# Patient Record
Sex: FEMALE | Race: BLACK OR AFRICAN AMERICAN | NOT HISPANIC OR LATINO | Employment: OTHER | URBAN - METROPOLITAN AREA
[De-identification: names, ages, dates, MRNs, and addresses within clinical notes are randomized per-mention and may not be internally consistent; named-entity substitution may affect disease eponyms.]

---

## 2017-04-21 ENCOUNTER — HOSPITAL ENCOUNTER (INPATIENT)
Facility: HOSPITAL | Age: 72
LOS: 3 days | Discharge: HOME | DRG: 639 | End: 2017-04-24
Attending: EMERGENCY MEDICINE | Admitting: INTERNAL MEDICINE
Payer: MEDICARE

## 2017-04-21 DIAGNOSIS — K63.89 NARCOTIC BOWEL SYNDROME DUE TO THERAPEUTIC USE: Chronic | ICD-10-CM

## 2017-04-21 DIAGNOSIS — E11.9 DIABETES MELLITUS TYPE 2 WITHOUT RETINOPATHY: Chronic | ICD-10-CM

## 2017-04-21 DIAGNOSIS — M54.16 LUMBAR RADICULAR PAIN: Chronic | ICD-10-CM

## 2017-04-21 DIAGNOSIS — R11.10 VOMITING: ICD-10-CM

## 2017-04-21 DIAGNOSIS — E13.10 DIABETIC KETOACIDOSIS WITHOUT COMA ASSOCIATED WITH OTHER SPECIFIED DIABETES MELLITUS: Primary | ICD-10-CM

## 2017-04-21 DIAGNOSIS — E11.10 DKA, TYPE 2, NOT AT GOAL: ICD-10-CM

## 2017-04-21 DIAGNOSIS — R11.2 INTRACTABLE VOMITING WITH NAUSEA, UNSPECIFIED VOMITING TYPE: ICD-10-CM

## 2017-04-21 DIAGNOSIS — T40.605A NARCOTIC BOWEL SYNDROME DUE TO THERAPEUTIC USE: Chronic | ICD-10-CM

## 2017-04-21 DIAGNOSIS — I10 ESSENTIAL HYPERTENSION: Chronic | ICD-10-CM

## 2017-04-21 LAB
ALBUMIN SERPL BCP-MCNC: 4.1 G/DL
ALBUMIN SERPL BCP-MCNC: 4.1 G/DL
ALLENS TEST: ABNORMAL
ALP SERPL-CCNC: 106 U/L
ALP SERPL-CCNC: 98 U/L
ALT SERPL W/O P-5'-P-CCNC: 15 U/L
ALT SERPL W/O P-5'-P-CCNC: 16 U/L
ANION GAP SERPL CALC-SCNC: 11 MMOL/L
ANION GAP SERPL CALC-SCNC: 15 MMOL/L
ANION GAP SERPL CALC-SCNC: 16 MMOL/L
ANION GAP SERPL CALC-SCNC: 16 MMOL/L
ANION GAP SERPL CALC-SCNC: 21 MMOL/L
ANION GAP SERPL CALC-SCNC: 22 MMOL/L
ANION GAP SERPL CALC-SCNC: 6 MMOL/L
AST SERPL-CCNC: 21 U/L
AST SERPL-CCNC: 22 U/L
B-OH-BUTYR BLD STRIP-SCNC: 2.2 MMOL/L
BACTERIA #/AREA URNS AUTO: NORMAL /HPF
BASOPHILS # BLD AUTO: 0.02 K/UL
BASOPHILS NFR BLD: 0.3 %
BILIRUB SERPL-MCNC: 0.6 MG/DL
BILIRUB SERPL-MCNC: 0.7 MG/DL
BILIRUB UR QL STRIP: NEGATIVE
BUN SERPL-MCNC: 2 MG/DL
BUN SERPL-MCNC: 3 MG/DL
BUN SERPL-MCNC: 4 MG/DL
BUN SERPL-MCNC: 4 MG/DL
CALCIUM SERPL-MCNC: 10.2 MG/DL
CALCIUM SERPL-MCNC: 6.6 MG/DL
CALCIUM SERPL-MCNC: 8.2 MG/DL
CALCIUM SERPL-MCNC: 9 MG/DL
CALCIUM SERPL-MCNC: 9.1 MG/DL
CALCIUM SERPL-MCNC: 9.1 MG/DL
CALCIUM SERPL-MCNC: 9.5 MG/DL
CHLORIDE SERPL-SCNC: 102 MMOL/L
CHLORIDE SERPL-SCNC: 105 MMOL/L
CHLORIDE SERPL-SCNC: 105 MMOL/L
CHLORIDE SERPL-SCNC: 108 MMOL/L
CHLORIDE SERPL-SCNC: 108 MMOL/L
CHLORIDE SERPL-SCNC: 111 MMOL/L
CHLORIDE SERPL-SCNC: 122 MMOL/L
CLARITY UR REFRACT.AUTO: CLEAR
CO2 SERPL-SCNC: 17 MMOL/L
CO2 SERPL-SCNC: 17 MMOL/L
CO2 SERPL-SCNC: 19 MMOL/L
CO2 SERPL-SCNC: 22 MMOL/L
CO2 SERPL-SCNC: 22 MMOL/L
COLOR UR AUTO: YELLOW
CREAT SERPL-MCNC: 0.6 MG/DL
CREAT SERPL-MCNC: 0.7 MG/DL
CREAT SERPL-MCNC: 0.8 MG/DL
CREAT SERPL-MCNC: 0.9 MG/DL
DELSYS: ABNORMAL
DIFFERENTIAL METHOD: ABNORMAL
EOSINOPHIL # BLD AUTO: 0 K/UL
EOSINOPHIL NFR BLD: 0 %
ERYTHROCYTE [DISTWIDTH] IN BLOOD BY AUTOMATED COUNT: 14.1 %
EST. GFR  (AFRICAN AMERICAN): >60 ML/MIN/1.73 M^2
EST. GFR  (NON AFRICAN AMERICAN): >60 ML/MIN/1.73 M^2
FIO2: 21
GLUCOSE SERPL-MCNC: 106 MG/DL
GLUCOSE SERPL-MCNC: 126 MG/DL
GLUCOSE SERPL-MCNC: 133 MG/DL
GLUCOSE SERPL-MCNC: 133 MG/DL
GLUCOSE SERPL-MCNC: 163 MG/DL
GLUCOSE SERPL-MCNC: 192 MG/DL
GLUCOSE SERPL-MCNC: 239 MG/DL
GLUCOSE UR QL STRIP: ABNORMAL
HCO3 UR-SCNC: 19.9 MMOL/L (ref 24–28)
HCT VFR BLD AUTO: 36.5 %
HGB BLD-MCNC: 12.4 G/DL
HGB UR QL STRIP: ABNORMAL
HYALINE CASTS UR QL AUTO: 0 /LPF
KETONES UR QL STRIP: ABNORMAL
LACTATE SERPL-SCNC: 1.5 MMOL/L
LACTATE SERPL-SCNC: 1.5 MMOL/L
LEUKOCYTE ESTERASE UR QL STRIP: NEGATIVE
LIPASE SERPL-CCNC: 15 U/L
LYMPHOCYTES # BLD AUTO: 1.5 K/UL
LYMPHOCYTES NFR BLD: 20.4 %
MAGNESIUM SERPL-MCNC: 1.3 MG/DL
MAGNESIUM SERPL-MCNC: 1.4 MG/DL
MAGNESIUM SERPL-MCNC: 1.6 MG/DL
MCH RBC QN AUTO: 27.1 PG
MCHC RBC AUTO-ENTMCNC: 34 %
MCV RBC AUTO: 80 FL
MICROSCOPIC COMMENT: NORMAL
MODE: ABNORMAL
MONOCYTES # BLD AUTO: 0.5 K/UL
MONOCYTES NFR BLD: 7.4 %
NEUTROPHILS # BLD AUTO: 5.3 K/UL
NEUTROPHILS NFR BLD: 71.8 %
NITRITE UR QL STRIP: NEGATIVE
PCO2 BLDA: 22.8 MMHG (ref 35–45)
PH SMN: 7.55 [PH] (ref 7.35–7.45)
PH UR STRIP: 7 [PH] (ref 5–8)
PHOSPHATE SERPL-MCNC: 1.6 MG/DL
PHOSPHATE SERPL-MCNC: 3.6 MG/DL
PHOSPHATE SERPL-MCNC: 5.6 MG/DL
PLATELET # BLD AUTO: 258 K/UL
PMV BLD AUTO: 12.2 FL
PO2 BLDA: 25 MMHG (ref 40–60)
POC BE: -2 MMOL/L
POC SATURATED O2: 56 % (ref 95–100)
POTASSIUM SERPL-SCNC: 2.8 MMOL/L
POTASSIUM SERPL-SCNC: 2.8 MMOL/L
POTASSIUM SERPL-SCNC: 3 MMOL/L
POTASSIUM SERPL-SCNC: 3 MMOL/L
POTASSIUM SERPL-SCNC: 3.2 MMOL/L
POTASSIUM SERPL-SCNC: 3.5 MMOL/L
POTASSIUM SERPL-SCNC: 6.8 MMOL/L
PROT SERPL-MCNC: 8.4 G/DL
PROT SERPL-MCNC: 8.7 G/DL
PROT UR QL STRIP: ABNORMAL
RBC # BLD AUTO: 4.58 M/UL
RBC #/AREA URNS AUTO: 2 /HPF (ref 0–4)
SAMPLE: ABNORMAL
SITE: ABNORMAL
SODIUM SERPL-SCNC: 142 MMOL/L
SODIUM SERPL-SCNC: 142 MMOL/L
SODIUM SERPL-SCNC: 143 MMOL/L
SODIUM SERPL-SCNC: 143 MMOL/L
SODIUM SERPL-SCNC: 144 MMOL/L
SODIUM SERPL-SCNC: 144 MMOL/L
SODIUM SERPL-SCNC: 145 MMOL/L
SP GR UR STRIP: 1.01 (ref 1–1.03)
TROPONIN I SERPL DL<=0.01 NG/ML-MCNC: <0.006 NG/ML
URN SPEC COLLECT METH UR: ABNORMAL
UROBILINOGEN UR STRIP-ACNC: NEGATIVE EU/DL
WBC # BLD AUTO: 7.32 K/UL
WBC #/AREA URNS AUTO: 0 /HPF (ref 0–5)
YEAST UR QL AUTO: NORMAL

## 2017-04-21 PROCEDURE — 81000 URINALYSIS NONAUTO W/SCOPE: CPT

## 2017-04-21 PROCEDURE — 96375 TX/PRO/DX INJ NEW DRUG ADDON: CPT

## 2017-04-21 PROCEDURE — 84484 ASSAY OF TROPONIN QUANT: CPT

## 2017-04-21 PROCEDURE — 84100 ASSAY OF PHOSPHORUS: CPT

## 2017-04-21 PROCEDURE — 25000003 PHARM REV CODE 250: Performed by: EMERGENCY MEDICINE

## 2017-04-21 PROCEDURE — 83735 ASSAY OF MAGNESIUM: CPT

## 2017-04-21 PROCEDURE — 99291 CRITICAL CARE FIRST HOUR: CPT | Mod: 25

## 2017-04-21 PROCEDURE — 83605 ASSAY OF LACTIC ACID: CPT

## 2017-04-21 PROCEDURE — 63600175 PHARM REV CODE 636 W HCPCS: Performed by: NURSE PRACTITIONER

## 2017-04-21 PROCEDURE — 96361 HYDRATE IV INFUSION ADD-ON: CPT

## 2017-04-21 PROCEDURE — 83690 ASSAY OF LIPASE: CPT

## 2017-04-21 PROCEDURE — 83735 ASSAY OF MAGNESIUM: CPT | Mod: 91

## 2017-04-21 PROCEDURE — 99900035 HC TECH TIME PER 15 MIN (STAT)

## 2017-04-21 PROCEDURE — 93005 ELECTROCARDIOGRAM TRACING: CPT

## 2017-04-21 PROCEDURE — 25000003 PHARM REV CODE 250: Performed by: INTERNAL MEDICINE

## 2017-04-21 PROCEDURE — 80053 COMPREHEN METABOLIC PANEL: CPT | Mod: 91

## 2017-04-21 PROCEDURE — 80048 BASIC METABOLIC PNL TOTAL CA: CPT | Mod: 91

## 2017-04-21 PROCEDURE — 63600175 PHARM REV CODE 636 W HCPCS: Performed by: INTERNAL MEDICINE

## 2017-04-21 PROCEDURE — 63600175 PHARM REV CODE 636 W HCPCS: Performed by: EMERGENCY MEDICINE

## 2017-04-21 PROCEDURE — 80053 COMPREHEN METABOLIC PANEL: CPT

## 2017-04-21 PROCEDURE — 99291 CRITICAL CARE FIRST HOUR: CPT | Mod: ,,, | Performed by: INTERNAL MEDICINE

## 2017-04-21 PROCEDURE — 85025 COMPLETE CBC W/AUTO DIFF WBC: CPT

## 2017-04-21 PROCEDURE — 82962 GLUCOSE BLOOD TEST: CPT

## 2017-04-21 PROCEDURE — 36415 COLL VENOUS BLD VENIPUNCTURE: CPT

## 2017-04-21 PROCEDURE — 84100 ASSAY OF PHOSPHORUS: CPT | Mod: 91

## 2017-04-21 PROCEDURE — 51702 INSERT TEMP BLADDER CATH: CPT

## 2017-04-21 PROCEDURE — C9113 INJ PANTOPRAZOLE SODIUM, VIA: HCPCS | Performed by: INTERNAL MEDICINE

## 2017-04-21 PROCEDURE — 93010 ELECTROCARDIOGRAM REPORT: CPT | Mod: ,,, | Performed by: INTERNAL MEDICINE

## 2017-04-21 PROCEDURE — 82010 KETONE BODYS QUAN: CPT

## 2017-04-21 PROCEDURE — 96374 THER/PROPH/DIAG INJ IV PUSH: CPT

## 2017-04-21 PROCEDURE — 96376 TX/PRO/DX INJ SAME DRUG ADON: CPT

## 2017-04-21 PROCEDURE — 20000000 HC ICU ROOM

## 2017-04-21 RX ORDER — ONDANSETRON 2 MG/ML
4 INJECTION INTRAMUSCULAR; INTRAVENOUS
Status: DISCONTINUED | OUTPATIENT
Start: 2017-04-21 | End: 2017-04-21

## 2017-04-21 RX ORDER — MAGNESIUM SULFATE 1 G/100ML
1 INJECTION INTRAVENOUS ONCE
Status: COMPLETED | OUTPATIENT
Start: 2017-04-21 | End: 2017-04-21

## 2017-04-21 RX ORDER — POTASSIUM CHLORIDE 7.45 MG/ML
10 INJECTION INTRAVENOUS ONCE
Status: COMPLETED | OUTPATIENT
Start: 2017-04-21 | End: 2017-04-21

## 2017-04-21 RX ORDER — METOCLOPRAMIDE HYDROCHLORIDE 5 MG/ML
5 INJECTION INTRAMUSCULAR; INTRAVENOUS EVERY 6 HOURS PRN
Status: DISCONTINUED | OUTPATIENT
Start: 2017-04-21 | End: 2017-04-22

## 2017-04-21 RX ORDER — DEXTROSE MONOHYDRATE 100 MG/ML
1000 INJECTION, SOLUTION INTRAVENOUS
Status: DISCONTINUED | OUTPATIENT
Start: 2017-04-21 | End: 2017-04-24 | Stop reason: HOSPADM

## 2017-04-21 RX ORDER — ERGOCALCIFEROL 1.25 MG/1
50000 CAPSULE ORAL
COMMUNITY

## 2017-04-21 RX ORDER — HYDRALAZINE HYDROCHLORIDE 20 MG/ML
10 INJECTION INTRAMUSCULAR; INTRAVENOUS EVERY 6 HOURS PRN
Status: DISCONTINUED | OUTPATIENT
Start: 2017-04-21 | End: 2017-04-23

## 2017-04-21 RX ORDER — ONDANSETRON 2 MG/ML
4 INJECTION INTRAMUSCULAR; INTRAVENOUS
Status: COMPLETED | OUTPATIENT
Start: 2017-04-21 | End: 2017-04-21

## 2017-04-21 RX ORDER — SODIUM CHLORIDE 9 MG/ML
INJECTION, SOLUTION INTRAVENOUS ONCE
Status: COMPLETED | OUTPATIENT
Start: 2017-04-21 | End: 2017-04-21

## 2017-04-21 RX ORDER — HYDROMORPHONE HYDROCHLORIDE 2 MG/ML
0.5 INJECTION, SOLUTION INTRAMUSCULAR; INTRAVENOUS; SUBCUTANEOUS
Status: COMPLETED | OUTPATIENT
Start: 2017-04-21 | End: 2017-04-21

## 2017-04-21 RX ORDER — PANTOPRAZOLE SODIUM 40 MG/10ML
40 INJECTION, POWDER, LYOPHILIZED, FOR SOLUTION INTRAVENOUS DAILY
Status: DISCONTINUED | OUTPATIENT
Start: 2017-04-21 | End: 2017-04-24 | Stop reason: HOSPADM

## 2017-04-21 RX ORDER — CLONIDINE HYDROCHLORIDE 0.1 MG/1
0.1 TABLET ORAL ONCE
Status: DISCONTINUED | OUTPATIENT
Start: 2017-04-21 | End: 2017-04-23

## 2017-04-21 RX ORDER — HYDROCODONE BITARTRATE AND ACETAMINOPHEN 10; 325 MG/1; MG/1
1 TABLET ORAL EVERY 4 HOURS PRN
Status: DISCONTINUED | OUTPATIENT
Start: 2017-04-21 | End: 2017-04-23

## 2017-04-21 RX ORDER — SODIUM CHLORIDE 9 MG/ML
1000 INJECTION, SOLUTION INTRAVENOUS
Status: COMPLETED | OUTPATIENT
Start: 2017-04-21 | End: 2017-04-21

## 2017-04-21 RX ORDER — ONDANSETRON 2 MG/ML
4 INJECTION INTRAMUSCULAR; INTRAVENOUS EVERY 12 HOURS PRN
Status: DISCONTINUED | OUTPATIENT
Start: 2017-04-21 | End: 2017-04-22

## 2017-04-21 RX ORDER — TRAZODONE HYDROCHLORIDE 50 MG/1
50 TABLET ORAL NIGHTLY
COMMUNITY
End: 2019-05-11

## 2017-04-21 RX ORDER — LABETALOL HCL 20 MG/4 ML
50 SYRINGE (ML) INTRAVENOUS ONCE
Status: DISCONTINUED | OUTPATIENT
Start: 2017-04-21 | End: 2017-04-21

## 2017-04-21 RX ORDER — ENOXAPARIN SODIUM 100 MG/ML
40 INJECTION SUBCUTANEOUS EVERY 24 HOURS
Status: DISCONTINUED | OUTPATIENT
Start: 2017-04-21 | End: 2017-04-24 | Stop reason: HOSPADM

## 2017-04-21 RX ORDER — HYDRALAZINE HYDROCHLORIDE 20 MG/ML
10 INJECTION INTRAMUSCULAR; INTRAVENOUS EVERY 6 HOURS PRN
Status: DISCONTINUED | OUTPATIENT
Start: 2017-04-21 | End: 2017-04-21

## 2017-04-21 RX ORDER — SODIUM CHLORIDE AND POTASSIUM CHLORIDE 150; 900 MG/100ML; MG/100ML
INJECTION, SOLUTION INTRAVENOUS CONTINUOUS
Status: DISCONTINUED | OUTPATIENT
Start: 2017-04-21 | End: 2017-04-23

## 2017-04-21 RX ORDER — HYDROMORPHONE HYDROCHLORIDE 1 MG/ML
0.5 INJECTION, SOLUTION INTRAMUSCULAR; INTRAVENOUS; SUBCUTANEOUS EVERY 6 HOURS PRN
Status: DISCONTINUED | OUTPATIENT
Start: 2017-04-21 | End: 2017-04-24 | Stop reason: HOSPADM

## 2017-04-21 RX ORDER — LABETALOL HYDROCHLORIDE 5 MG/ML
20 INJECTION, SOLUTION INTRAVENOUS ONCE
Status: COMPLETED | OUTPATIENT
Start: 2017-04-21 | End: 2017-04-21

## 2017-04-21 RX ORDER — ENALAPRILAT 1.25 MG/ML
1.25 INJECTION INTRAVENOUS EVERY 6 HOURS
Status: DISCONTINUED | OUTPATIENT
Start: 2017-04-21 | End: 2017-04-21

## 2017-04-21 RX ORDER — METOCLOPRAMIDE HYDROCHLORIDE 5 MG/ML
10 INJECTION INTRAMUSCULAR; INTRAVENOUS
Status: COMPLETED | OUTPATIENT
Start: 2017-04-21 | End: 2017-04-21

## 2017-04-21 RX ORDER — ENOXAPARIN SODIUM 100 MG/ML
40 INJECTION SUBCUTANEOUS EVERY 24 HOURS
Status: DISCONTINUED | OUTPATIENT
Start: 2017-04-21 | End: 2017-04-21 | Stop reason: SDUPTHER

## 2017-04-21 RX ORDER — TIZANIDINE HYDROCHLORIDE 6 MG/1
4 CAPSULE, GELATIN COATED ORAL 3 TIMES DAILY
COMMUNITY
End: 2019-05-29 | Stop reason: CLARIF

## 2017-04-21 RX ORDER — MEPERIDINE HYDROCHLORIDE 25 MG/ML
25 INJECTION INTRAMUSCULAR; INTRAVENOUS; SUBCUTANEOUS
Status: COMPLETED | OUTPATIENT
Start: 2017-04-21 | End: 2017-04-21

## 2017-04-21 RX ADMIN — METOCLOPRAMIDE 5 MG: 5 INJECTION, SOLUTION INTRAMUSCULAR; INTRAVENOUS at 12:04

## 2017-04-21 RX ADMIN — POTASSIUM CHLORIDE 10 MEQ: 10 INJECTION, SOLUTION INTRAVENOUS at 10:04

## 2017-04-21 RX ADMIN — PANTOPRAZOLE SODIUM 40 MG: 40 INJECTION, POWDER, FOR SOLUTION INTRAVENOUS at 10:04

## 2017-04-21 RX ADMIN — INSULIN HUMAN 2 UNITS: 100 INJECTION, SOLUTION PARENTERAL at 05:04

## 2017-04-21 RX ADMIN — HYDROMORPHONE HYDROCHLORIDE 0.5 MG: 1 INJECTION, SOLUTION INTRAMUSCULAR; INTRAVENOUS; SUBCUTANEOUS at 09:04

## 2017-04-21 RX ADMIN — LABETALOL HYDROCHLORIDE 20 MG: 5 INJECTION, SOLUTION INTRAVENOUS at 10:04

## 2017-04-21 RX ADMIN — INSULIN HUMAN 2 UNITS: 100 INJECTION, SOLUTION PARENTERAL at 04:04

## 2017-04-21 RX ADMIN — POTASSIUM PHOSPHATE, MONOBASIC AND POTASSIUM PHOSPHATE, DIBASIC 30 MMOL: 224; 236 INJECTION, SOLUTION, CONCENTRATE INTRAVENOUS at 12:04

## 2017-04-21 RX ADMIN — INSULIN DETEMIR 10 UNITS: 100 INJECTION, SOLUTION SUBCUTANEOUS at 08:04

## 2017-04-21 RX ADMIN — ENOXAPARIN SODIUM 40 MG: 100 INJECTION SUBCUTANEOUS at 04:04

## 2017-04-21 RX ADMIN — SODIUM CHLORIDE: 0.9 INJECTION, SOLUTION INTRAVENOUS at 04:04

## 2017-04-21 RX ADMIN — SODIUM CHLORIDE 1000 ML: 0.9 SOLUTION INTRAVENOUS at 12:04

## 2017-04-21 RX ADMIN — SODIUM CHLORIDE 1000 ML: 0.9 INJECTION, SOLUTION INTRAVENOUS at 02:04

## 2017-04-21 RX ADMIN — METOCLOPRAMIDE 5 MG: 5 INJECTION, SOLUTION INTRAMUSCULAR; INTRAVENOUS at 09:04

## 2017-04-21 RX ADMIN — HYDRALAZINE HYDROCHLORIDE 10 MG: 20 INJECTION INTRAMUSCULAR; INTRAVENOUS at 10:04

## 2017-04-21 RX ADMIN — ONDANSETRON 4 MG: 2 INJECTION INTRAMUSCULAR; INTRAVENOUS at 06:04

## 2017-04-21 RX ADMIN — HYDROMORPHONE HYDROCHLORIDE 0.5 MG: 2 INJECTION, SOLUTION INTRAMUSCULAR; INTRAVENOUS; SUBCUTANEOUS at 04:04

## 2017-04-21 RX ADMIN — SODIUM CHLORIDE 1000 ML: 0.9 INJECTION, SOLUTION INTRAVENOUS at 11:04

## 2017-04-21 RX ADMIN — INSULIN HUMAN 2 UNITS: 100 INJECTION, SOLUTION PARENTERAL at 06:04

## 2017-04-21 RX ADMIN — MEPERIDINE HYDROCHLORIDE 25 MG: 25 INJECTION INTRAMUSCULAR; INTRAVENOUS; SUBCUTANEOUS at 02:04

## 2017-04-21 RX ADMIN — POTASSIUM CHLORIDE AND SODIUM CHLORIDE: 900; 150 INJECTION, SOLUTION INTRAVENOUS at 10:04

## 2017-04-21 RX ADMIN — MAGNESIUM SULFATE IN DEXTROSE 1 G: 10 INJECTION, SOLUTION INTRAVENOUS at 06:04

## 2017-04-21 RX ADMIN — HYDROMORPHONE HYDROCHLORIDE 0.5 MG: 1 INJECTION, SOLUTION INTRAMUSCULAR; INTRAVENOUS; SUBCUTANEOUS at 07:04

## 2017-04-21 RX ADMIN — ONDANSETRON 4 MG: 2 INJECTION INTRAMUSCULAR; INTRAVENOUS at 09:04

## 2017-04-21 RX ADMIN — ONDANSETRON 4 MG: 2 INJECTION INTRAMUSCULAR; INTRAVENOUS at 02:04

## 2017-04-21 RX ADMIN — SODIUM CHLORIDE 1 UNITS/HR: 900 INJECTION, SOLUTION INTRAVENOUS at 02:04

## 2017-04-21 RX ADMIN — METOCLOPRAMIDE 10 MG: 5 INJECTION, SOLUTION INTRAMUSCULAR; INTRAVENOUS at 12:04

## 2017-04-21 RX ADMIN — ENALAPRILAT 1.25 MG: 1.25 INJECTION, SOLUTION INTRAVENOUS at 10:04

## 2017-04-21 NOTE — ED NOTES
Spoke with tobi castrejon and [facility]. Called [facility] for transport spoke with Kevyn set up for within the hour. Marya pts primary nurse made aware.

## 2017-04-21 NOTE — PLAN OF CARE
Patient answered discharge assessment questions appropriately, but easily return to sleep. CM will continue to assess for post hospital discharge needs.  CM will suggest HH via MD comment.       04/21/17 5571   Discharge Assessment   Assessment Type Discharge Planning Assessment   Confirmed/corrected address and phone number on facesheet? Yes   Assessment information obtained from? Patient;Medical Record   Expected Length of Stay (days) (unable to determine)   Communicated expected length of stay with patient/caregiver yes   Type of Healthcare Directive Received (none)   If Healthcare Directive is received, is it scanned into Epic? no (comment)   Prior to hospitilization cognitive status: Alert/Oriented   Prior to hospitalization functional status: Independent   Current cognitive status: (slightly lethargic, but able to answer questions appropriately)   Current Functional Status: Needs Assistance   Arrived From [facility] hospital   Lives With other relative(s)   Able to Return to Prior Arrangements yes   Is patient able to care for self after discharge? Yes   How many people do you have in your home that can help with your care after discharge? 0   Patient's perception of discharge disposition admitted as an inpatient;home or selfcare   Readmission Within The Last 30 Days no previous admission in last 30 days   Patient currently being followed by outpatient case management? No   Patient currently receives home health services? No   Does the patient currently use HME? No   Patient currently receives private duty nursing? N/A   Patient currently receives any other outside agency services? No   Equipment Currently Used at Home cane, quad;walker, rolling;wheelchair   Do you have any problems affording any of your prescribed medications? No   Is the patient taking medications as prescribed? yes   Do you have any financial concerns preventing you from receiving the healthcare you need? No   Does the patient have  transportation to healthcare appointments? Yes   Transportation Available family or friend will provide   On Dialysis? No   Does the patient receive services at the [facility]? No   Are there any open cases? No   Discharge Plan A Home Health;Home with family   Discharge Plan B Home with family   Patient/Family In Agreement With Plan yes

## 2017-04-21 NOTE — PROGRESS NOTES
Pt arrived to unit at approx 0900.  Pt is aaox4.  Complains of back pain and right leg spasms.  99.5 temp,  on arrival.  Stat labs being drawn.

## 2017-04-21 NOTE — ED PROVIDER NOTES
SCRIBE #1 NOTE: I, Salena Henriquez, am scribing for, and in the presence of, Beny Garcia MD. I have scribed the entire note.      History      Chief Complaint   Patient presents with    Emesis     since 2000 tonight, 6 episodes,     Back Pain     since being put on [facility] stretcher        Review of patient's allergies indicates:   Allergen Reactions    Morphine Other (See Comments)     headache    Latex, natural rubber Rash        HPI   HPI    4/21/2017, 12:33 AM   History obtained from the [facility] and patient      History of Present Illness: Melva Barker is a 72 y.o. female patient who presents to the Emergency Department via [facility] for emesis which onset gradually 6 hours PTA. Sxs are episodic (x6 episodes) and moderate in severity. There are no mitigating or exacerbating factors noted. Associated sxs include nausea. Pt also c/o of exacerbation of chronic back pain which onset gradually shortly PTA.  Pt denies any fever, diarrhea, abd pain, bowel/bladder dysfunction, saddle anesthesia, injury, and all other sxs at this time. No further complaints or concerns at this time.     Arrival mode: [facility]    PCP: Claire Souza MD       Past Medical History:  Past Medical History:   Diagnosis Date    Anxiety     Back pain     chronic    Diabetes mellitus     Gastroparesis     Hypertension     Sciatica        Past Surgical History:  No past surgical history on file.      Family History:  Family History   Problem Relation Age of Onset    Hyperlipidemia Father     Hypertension Mother        Social History:  Social History     Social History Main Topics    Smoking status: Never Smoker    Smokeless tobacco: Not on file    Alcohol use No    Drug use: No    Sexual activity: No       ROS   Review of Systems   Constitutional: Negative for fever.   HENT: Negative for sore throat.    Respiratory: Negative for shortness of breath.    Cardiovascular: Negative for chest pain.   Gastrointestinal: Positive for nausea  and vomiting. Negative for abdominal pain and diarrhea.        (-) bowel dysfunction   Genitourinary: Negative for dysuria.        (-) bladder incontinence   Musculoskeletal: Positive for back pain.        (-) saddle anesthesia   Skin: Negative for rash.   Neurological: Negative for weakness.   Hematological: Does not bruise/bleed easily.       Physical Exam    Initial Vitals   BP Pulse Resp Temp SpO2   04/21/17 0019 04/21/17 0019 04/21/17 0019 04/21/17 0019 04/21/17 0019   186/86 108 26 97.6 °F (36.4 °C) 100 %      Physical Exam  Nursing Notes and Vital Signs Reviewed.  Constitutional: Patient is in no acute distress. Awake and alert. Well-developed and well-nourished.  Head: Atraumatic. Normocephalic.  Eyes: PERRL. EOM intact. Conjunctivae are not pale. No scleral icterus.  ENT: Mucous membranes are moist. Oropharynx is clear and symmetric.    Neck: Supple. No cervical midline bony tenderness, deformities, or step-offs.   Back: Mid to lower spinal tenderness. No midline bony tenderness, deformities, or step-offs of the T-spine or L-spine. Skin appears normal without abrasions or bruising. No erythema, induration, or fluctuance.   Neurological: Awake and alert. Appropriate for age. Positivenegative straight leg raise bilaterally. No strength deficit; equal and 5/5 in bilateral upper and lower extremities.  DTRs 2+ and equal.. No acute focal neurological deficits noted.  Cardiovascular: Regular rate. Regular rhythm. No murmurs, rubs, or gallops. Distal pulses are 2+ and symmetric.  Pulmonary/Chest: No respiratory distress. Clear to auscultation bilaterally. No wheezing, rales, or rhonchi.  Abdominal: Soft and non-distended.  There is no tenderness.  No rebound, guarding, or rigidity.   Genitourinary: No CVA tenderness  Musculoskeletal: Moves all extremities. No obvious deformities. No edema. No calf tenderness.  Skin: Warm and dry.  Psychiatric: Normal affect. Good eye contact. Appropriate in content.    ED Course     Critical Care  Date/Time: 4/21/2017 2:57 AM  Performed by: JONNATHAN MALDONADO.  Authorized by: JONNATHAN MALDONADO   Direct patient critical care time: 15 minutes  Additional history critical care time: 5 minutes  Ordering / reviewing critical care time: 5 minutes  Documentation critical care time: 5 minutes  Consulting other physicians critical care time: 10 minutes  Total critical care time (exclusive of procedural time) : 40 minutes  Critical care time was exclusive of separately billable procedures and treating other patients.  Critical care was necessary to treat or prevent imminent or life-threatening deterioration of the following conditions: DKA.  Critical care was time spent personally by me on the following activities: blood draw for specimens, discussions with consultants, evaluation of patient's response to treatment, obtaining history from patient or surrogate, pulse oximetry, review of old charts, ordering and review of laboratory studies, development of treatment plan with patient or surrogate, interpretation of cardiac output measurements, examination of patient, ordering and performing treatments and interventions, ordering and review of radiographic studies and re-evaluation of patient's condition.        ED Vital Signs:  Vitals:    04/21/17 0019 04/21/17 0232 04/21/17 0247 04/21/17 0403   BP: (!) 186/86 (!) 191/93 (!) 195/86 (!) 191/103   Pulse: 108 (!) 115 (!) 114 (!) 123   Resp: (!) 26      Temp: 97.6 °F (36.4 °C)      TempSrc: Oral      SpO2: 100% 98% 100% 100%   Weight: 52.2 kg (115 lb)      Height: 5' (1.524 m)          Abnormal Lab Results:  Labs Reviewed   CBC W/ AUTO DIFFERENTIAL - Abnormal; Notable for the following:        Result Value    Hematocrit 36.5 (*)     MCV 80 (*)     All other components within normal limits   COMPREHENSIVE METABOLIC PANEL - Abnormal; Notable for the following:     Potassium 3.2 (*)     CO2 17 (*)     Glucose 239 (*)     BUN, Bld 4 (*)     Anion Gap 22 (*)     All other  components within normal limits   URINALYSIS - Abnormal; Notable for the following:     Protein, UA 1+ (*)     Glucose, UA 3+ (*)     Ketones, UA 2+ (*)     Occult Blood UA Trace (*)     All other components within normal limits   BETA - HYDROXYBUTYRATE, SERUM - Abnormal; Notable for the following:     Beta-Hydroxybutyrate 2.2 (*)     All other components within normal limits   LIPASE   TROPONIN I   BETA - HYDROXYBUTYRATE, SERUM   URINALYSIS MICROSCOPIC   POCT GLUCOSE MONITORING CONTINUOUS        All Lab Results:  Results for orders placed or performed during the hospital encounter of 04/21/17   CBC auto differential   Result Value Ref Range    WBC 7.32 3.90 - 12.70 K/uL    RBC 4.58 4.00 - 5.40 M/uL    Hemoglobin 12.4 12.0 - 16.0 g/dL    Hematocrit 36.5 (L) 37.0 - 48.5 %    MCV 80 (L) 82 - 98 fL    MCH 27.1 27.0 - 31.0 pg    MCHC 34.0 32.0 - 36.0 %    RDW 14.1 11.5 - 14.5 %    Platelets 258 150 - 350 K/uL    MPV 12.2 9.2 - 12.9 fL    Gran # 5.3 1.8 - 7.7 K/uL    Lymph # 1.5 1.0 - 4.8 K/uL    Mono # 0.5 0.3 - 1.0 K/uL    Eos # 0.0 0.0 - 0.5 K/uL    Baso # 0.02 0.00 - 0.20 K/uL    Gran% 71.8 38.0 - 73.0 %    Lymph% 20.4 18.0 - 48.0 %    Mono% 7.4 4.0 - 15.0 %    Eosinophil% 0.0 0.0 - 8.0 %    Basophil% 0.3 0.0 - 1.9 %    Differential Method Automated    Comprehensive metabolic panel   Result Value Ref Range    Sodium 144 136 - 145 mmol/L    Potassium 3.2 (L) 3.5 - 5.1 mmol/L    Chloride 105 95 - 110 mmol/L    CO2 17 (L) 23 - 29 mmol/L    Glucose 239 (H) 70 - 110 mg/dL    BUN, Bld 4 (L) 8 - 23 mg/dL    Creatinine 0.8 0.5 - 1.4 mg/dL    Calcium 9.5 8.7 - 10.5 mg/dL    Total Protein 8.4 6.0 - 8.4 g/dL    Albumin 4.1 3.5 - 5.2 g/dL    Total Bilirubin 0.6 0.1 - 1.0 mg/dL    Alkaline Phosphatase 98 55 - 135 U/L    AST 22 10 - 40 U/L    ALT 16 10 - 44 U/L    Anion Gap 22 (H) 8 - 16 mmol/L    eGFR if African American >60.0 >60 mL/min/1.73 m^2    eGFR if non African American >60.0 >60 mL/min/1.73 m^2   Lipase   Result Value  Ref Range    Lipase 15 4 - 60 U/L   Troponin I   Result Value Ref Range    Troponin I <0.006 0.000 - 0.026 ng/mL   Urinalysis   Result Value Ref Range    Specimen UA Urine, Clean Catch     Color, UA Yellow Yellow, Straw, Tonya    Appearance, UA Clear Clear    pH, UA 7.0 5.0 - 8.0    Specific Gravity, UA 1.015 1.005 - 1.030    Protein, UA 1+ (A) Negative    Glucose, UA 3+ (A) Negative    Ketones, UA 2+ (A) Negative    Bilirubin (UA) Negative Negative    Occult Blood UA Trace (A) Negative    Nitrite, UA Negative Negative    Urobilinogen, UA Negative <2.0 EU/dL    Leukocytes, UA Negative Negative   Beta - Hydroxybutyrate, Serum   Result Value Ref Range    Beta-Hydroxybutyrate 2.2 (H) 0.0 - 0.5 mmol/L   Urinalysis Microscopic   Result Value Ref Range    RBC, UA 2 0 - 4 /hpf    WBC, UA 0 0 - 5 /hpf    Bacteria, UA None None-Occ /hpf    Yeast, UA None None    Hyaline Casts, UA 0 0-1/lpf /lpf    Microscopic Comment SEE COMMENT          Imaging Results:  Imaging Results         X-Ray Abdomen Flat And Erect (In process)          The EKG was ordered, reviewed, and independently interpreted by the ED provider.  Interpretation time: 00:53  Rate: 106 BPM  Rhythm: sinus tachycardia  Interpretation: Nonspecific T wave abnormality. Prolonged QT. No STEMI.           The Emergency Provider reviewed the vital signs and test results, which are outlined above.    ED Discussion     2:54 AM: Consult with Dr. Mosqueda (Hospital Medicine at [facility]) concerning pt. There are no inpatient services, which the patient requires, offered at [facility] at this time. Dr. Mosqueda expresses understanding and will accept transfer for DKA. Pt to be admitted to ICU.  Accepting Facility/Service: [facility]  Accepting Physician: Dr. Mosqueda    2:55 AM: Re-evaluated pt. Informed pt that there are no inpatient services available at this time. I have discussed test results, shared treatment plan, and the need for transfer with  patient and family at bedside. All historical, clinical, radiographic, and laboratory findings were reviewed with the patient in detail. Patient will be transferred by [facility] services with IV fluids and cardiac monitoring required en route. Patient understands that there could be unforeseen motor vehicle accidents or loss of vital signs that could result in potential death or permanent disability. Pt expresses understanding at this time and agree with all information. All questions answered. Pt has no further questions or concerns at this time. Pt is ready for transfer.        ED Medication(s):  Medications   hydrocodone-acetaminophen 10-325mg per tablet 1 tablet (not administered)   metoclopramide HCl injection 5 mg (not administered)   ondansetron injection 4 mg (not administered)   insulin regular injection 2 Units (2 Units Intravenous Given 4/21/17 0413)   0.9%  NaCl infusion (1,000 mLs Intravenous New Bag 4/21/17 0045)   metoclopramide HCl injection 10 mg (10 mg Intravenous Given 4/21/17 0045)   sodium chloride 0.9% bolus 1,000 mL (0 mLs Intravenous Stopped 4/21/17 0421)   0.9%  NaCl infusion (1,000 mLs Intravenous New Bag 4/21/17 0208)   meperidine (PF) injection 25 mg (25 mg Intravenous Given 4/21/17 0211)   ondansetron injection 4 mg (4 mg Intravenous Given 4/21/17 0240)   hydromorphone (PF) injection 0.5 mg (0.5 mg Intravenous Given 4/21/17 0453)       New Prescriptions    No medications on file             Medical Decision Making    Medical Decision Making:   History:   Old Records Summarized: records from previous admission(s).  Clinical Tests:   Lab Tests: Ordered and Reviewed  Radiological Study: Reviewed and Ordered  Medical Tests: Ordered and Reviewed  Other:   I have discussed this case with another health care provider.           Scribe Attestation:   Scribe #1: I performed the above scribed service and the documentation accurately describes the services I performed. I attest to the accuracy of the  note.    Attending:   Physician Attestation Statement for Scribe #1: I, Beny Garcia MD, personally performed the services described in this documentation, as scribed by Salena Henriquez, in my presence, and it is both accurate and complete.          Clinical Impression       ICD-10-CM ICD-9-CM   1. Diabetic ketoacidosis without coma associated with other specified diabetes mellitus E13.10 250.12   2. Vomiting R11.10 787.03   3. Intractable vomiting with nausea, unspecified vomiting type R11.2 536.2       Disposition:   Disposition: Transferred  Condition: Stable         Beny Garcia MD  04/21/17 8990

## 2017-04-21 NOTE — PROGRESS NOTES
Continuing HTN with systolic 215-220, after labetalol 20mg given.  MD Gray ordered enalaprilat, given.

## 2017-04-21 NOTE — PLAN OF CARE
Problem: Patient Care Overview  Goal: Plan of Care Review  Outcome: Ongoing (interventions implemented as appropriate)  Plan of care and all interventions discussed with patient.  Pt is aaox4. HTN, antihtns given.  Insulin gtt continues to infuse, currently at 2units/hr.  Monitoring, replacing electrolytes.

## 2017-04-21 NOTE — ED NOTES
ABG collection unsuccessful.  MD notified, states okay to collect venous sample.  Venous sample collected by RN.

## 2017-04-21 NOTE — ED NOTES
Report called to [facility] ICU bed 14. SOLITARIO Hathaway  Accepted report. Answered all questions at this time.

## 2017-04-21 NOTE — ED NOTES
Spoke with Emma, house supervisor; will hold pt here until after 7am when a bed will be available; MD and RN notified

## 2017-04-21 NOTE — ED NOTES
Pt resting in bed. Nad noted. SR up x 2, bed locked and low. Call light in reach. Instructed to call for assistance.

## 2017-04-21 NOTE — ASSESSMENT & PLAN NOTE
Admit to ICU  Insulin infusion  DKA protocol  BMP every 4 hours  Accuchecks hourly  Replete potassium

## 2017-04-21 NOTE — ED NOTES
"Patient back from radiology; patient stating: "oh Dionicio helps me my back hurts". Patient questioned about how long her back has been hurting her to which patient replies: "my sciatica is acting up real bad right now and it won't stop"  "

## 2017-04-21 NOTE — H&P
[facility] Hospital Medicine  History & Physical    Patient Name: Melva Barker  MRN: [id number]  Admission Date: 4/21/2017  Attending Physician: No att. providers found   Primary Care Provider: Claire Souza MD         Patient information was obtained from patient and ER records.     Subjective:     Principal Problem:Diabetic ketoacidosis without coma associated with type 2 diabetes mellitus    Chief Complaint:   Chief Complaint   Patient presents with    Emesis     since 2000 tonight, 6 episodes,     Back Pain     since being put on [facility] stretcher         HPI: Melva Barker is a 71 yo female with PMHx of HTN, IDDM, Gastroparesis and chronic back pain who presents to [facility] with c/o multiple episodes of emesis for 6 hours PTA.  Pt denies fever, urinary symptoms, URI symptoms, diarrhea, cough and S/S of caudal equina. She was uncertain if this was a flare of gastroparesis. In the ED, hypertension, tachycardia and tachypnea present. CBC is unremarkable; CMP notes hypokalemia, CO2 17, anion gap 22, glucose 239 and beta hydroxybutyrate, ABG notes pH 7.5. An insulin infusion was initiated and pt was admitted to ICU for DKA.     Past Medical History:   Diagnosis Date    Anxiety     Back pain     chronic    Diabetes mellitus     Gastroparesis     Hypertension     Sciatica        No past surgical history on file.    Review of patient's allergies indicates:   Allergen Reactions    Morphine Other (See Comments)     headache    Latex, natural rubber Rash       No current facility-administered medications on file prior to encounter.      Current Outpatient Prescriptions on File Prior to Encounter   Medication Sig    benazepril (LOTENSIN) 40 MG tablet Take 40 mg by mouth once daily.    bisoprolol-hydrochlorothiazide 5-6.25 mg (ZIAC) 5-6.25 mg Tab Take 1 tablet by mouth once daily.    clonazePAM (KLONOPIN) 1 MG tablet Take 0.5 tablets (0.5 mg total) by mouth 2 (two) times daily as  needed for Anxiety. (Patient taking differently: Take 0.5 mg by mouth 3 (three) times daily. )    gabapentin (NEURONTIN) 600 MG tablet Take 600 mg by mouth 3 (three) times daily.    insulin regular 100 unit/mL Inj injection Inject into the skin 3 (three) times daily before meals.    meloxicam (MOBIC) 7.5 MG tablet Take 1 tablet (7.5 mg total) by mouth once daily.    mirtazapine (REMERON) 15 MG tablet Take 15 mg by mouth every evening.    potassium chloride SA (K-DUR,KLOR-CON) 20 MEQ tablet Take 20 mEq by mouth once daily.    sertraline (ZOLOFT) 50 MG tablet Take 50 mg by mouth once daily.    insulin aspart protamine-insulin aspart (NOVOLOG 70/30) 100 unit/mL (70-30) InPn pen Inject 10 Units into the skin 2 (two) times daily before meals.    lisinopril (PRINIVIL,ZESTRIL) 20 MG tablet Take 1 tablet (20 mg total) by mouth once daily.    metoprolol tartrate (LOPRESSOR) 25 MG tablet Take 1 tablet (25 mg total) by mouth 2 (two) times daily.    ondansetron (ZOFRAN-ODT) 4 MG TbDL Take 1 tablet (4 mg total) by mouth every 8 (eight) hours as needed.    pantoprazole (PROTONIX) 40 MG tablet Take 1 tablet (40 mg total) by mouth once daily.     Family History     Problem Relation (Age of Onset)    Hyperlipidemia Father    Hypertension Mother        Social History Main Topics    Smoking status: Never Smoker    Smokeless tobacco: Not on file    Alcohol use No    Drug use: No    Sexual activity: No     Review of Systems   Constitutional: Positive for activity change, appetite change and fatigue. Negative for chills, diaphoresis and fever.   HENT: Negative.    Eyes: Negative for pain and redness.   Respiratory: Negative for cough, shortness of breath and wheezing.    Cardiovascular: Negative for chest pain, palpitations and leg swelling.   Gastrointestinal: Positive for abdominal pain, nausea and vomiting. Negative for constipation and diarrhea.   Genitourinary: Negative for difficulty urinating, dysuria, frequency  and hematuria.   Musculoskeletal: Positive for back pain. Negative for gait problem.        Right sided radiculopathy   Skin: Negative for pallor, rash and wound.   Neurological: Positive for weakness. Negative for dizziness, light-headedness, numbness and headaches.   Psychiatric/Behavioral: Negative for confusion. The patient is not nervous/anxious.      Objective:     Vital Signs (Most Recent):  Temp: 99.5 °F (37.5 °C) (04/21/17 0915)  Pulse: (!) 120 (04/21/17 0915)  Resp: (!) 26 (04/21/17 0915)  BP: (!) 221/109 (04/21/17 1001)  SpO2: 100 % (04/21/17 0915) Vital Signs (24h Range):  Temp:  [97.6 °F (36.4 °C)-99.5 °F (37.5 °C)] 99.5 °F (37.5 °C)  Pulse:  [106-123] 120  Resp:  [22-46] 26  SpO2:  [98 %-100 %] 100 %  BP: (186-221)/() 221/109     Weight: 52.2 kg (115 lb)  Body mass index is 22.46 kg/(m^2).    Physical Exam   Constitutional: She is oriented to person, place, and time. She appears well-developed and well-nourished. She appears ill.   HENT:   Head: Normocephalic and atraumatic.   Mouth/Throat: Mucous membranes are dry.   Eyes: Conjunctivae and EOM are normal. No scleral icterus.   Neck: Normal range of motion. Neck supple.   Cardiovascular: Regular rhythm and normal heart sounds.  Tachycardia present.  Exam reveals no gallop and no friction rub.    No murmur heard.  Pulmonary/Chest: Effort normal and breath sounds normal.   Abdominal: Soft. Bowel sounds are normal.   Musculoskeletal: Normal range of motion. She exhibits no edema or tenderness.   Neurological: She is alert and oriented to person, place, and time.   Skin: Skin is warm and dry.   Psychiatric: She has a normal mood and affect. Her behavior is normal.   Nursing note and vitals reviewed.       Significant Labs:   ABGs:   Recent Labs  Lab 04/21/17  0546   PH 7.549*   PCO2 22.8*   HCO3 19.9*   POCSATURATED 56*   BE -2     CBC:   Recent Labs  Lab 04/21/17  0044   WBC 7.32   HGB 12.4   HCT 36.5*        CMP:   Recent Labs  Lab  04/21/17  0044 04/21/17  0941    142   K 3.2* 2.8*    102   CO2 17* 19*   * 192*   BUN 4* 3*   CREATININE 0.8 0.8   CALCIUM 9.5 10.2   PROT 8.4  --    ALBUMIN 4.1  --    BILITOT 0.6  --    ALKPHOS 98  --    AST 22  --    ALT 16  --    ANIONGAP 22* 21*   EGFRNONAA >60.0 >60     All pertinent labs within the past 24 hours have been reviewed.    Significant Imaging: I have reviewed all pertinent imaging results/findings within the past 24 hours.    Assessment/Plan:     * Diabetic ketoacidosis without coma associated with type 2 diabetes mellitus  Admit to ICU  Insulin infusion  DKA protocol  BMP every 4 hours  Accuchecks hourly  Replete potassium      Lumbar radicular pain  Prn analgesia      Accelerated hypertension  IV lopressor x 1  Resume home meds once tolerating po  Vasotec IV every 6 hours      Intractable vomiting with nausea  Supportive care  IV hydration      VTE Risk Mitigation         Ordered     enoxaparin injection 40 mg  Daily     Route:  Subcutaneous        04/21/17 0907     Medium Risk of VTE  Once      04/21/17 0907        Mary Grace Rutledge NP  Department of Hospital Medicine   [facility]

## 2017-04-21 NOTE — ED NOTES
Patient pulled PIV out of left forearm intact. Bandage applied by CARMEN Renteria RN; NS infusion stopped presently for new IV to be started

## 2017-04-21 NOTE — ED NOTES
Aliyah Nguyen, patient niece, called to inform that patient will be transferred to [facility] ICU bed 14. Verbalized understanding. Answered all questions at this time. Patient notified that family member was contacted. Verbalized understanding.

## 2017-04-21 NOTE — PROGRESS NOTES
Pt had episode of severe hypotension, IVF Bolus NS being given at this time.  MD Ocampo at bedside.  Pt responsive and appropriate with periods of drowsiness.  Pt is currently in slight trendelenburg.

## 2017-04-21 NOTE — IP AVS SNAPSHOT
[address] [place] [address]           Patient Discharge Instructions   Our goal is to set you up for success. This packet includes information on your condition, medications, and your home care.  It will help you care for yourself to prevent having to return to the hospital.     Please ask your nurse if you have any questions.      There are many details to remember when preparing to leave the hospital. Here is what you will need to do:    1. Take your medicine. If you are prescribed medications, review your Medication List on the following pages. You may have new medications to  at the pharmacy and others that you'll need to stop taking. Review the instructions for how and when to take your medications. Talk with your doctor or nurses if you are unsure of what to do.     2. Go to your follow-up appointments. Specific follow-up information is listed in the following pages. Your may be contacted by a nurse or clinical provider about future appointments. Be sure we have all of the phone numbers to reach you. Please contact your provider's office if you are unable to make an appointment.     3. Watch for warning signs. Your doctor or nurse will give you detailed warning signs to watch for and when to call for assistance. These instructions may also include educational information about your condition. If you experience any of warning signs to your health, call your doctor.               ** Verify the list of medication(s) below is accurate and up to date. Carry this with you in case of emergency. If your medications have changed, please notify your healthcare provider.             Medication List      START taking these medications        Additional Info                      bisacodyl 10 mg Supp   Commonly known as:  DULCOLAX   Refills:  0   Dose:  10 mg    Instructions:  Place 1 suppository (10 mg total) rectally daily as needed.     Begin Date    AM    Noon     PM    Bedtime       megestrol 40 MG Tab   Commonly known as:  MEGACE   Quantity:  30 tablet   Refills:  0   Dose:  40 mg    Last time this was given:  40 mg on 4/24/2017  8:35 AM   Instructions:  Take 1 tablet (40 mg total) by mouth once daily.     Begin Date    AM    Noon    PM    Bedtime       metoclopramide HCl 5 MG tablet   Commonly known as:  REGLAN   Quantity:  20 tablet   Refills:  0   Dose:  5 mg    Instructions:  Take 1 tablet (5 mg total) by mouth 4 (four) times daily before meals and nightly.     Begin Date    AM    Noon    PM    Bedtime         CONTINUE taking these medications        Additional Info                      benazepril 40 MG tablet   Commonly known as:  LOTENSIN   Refills:  0   Dose:  40 mg    Instructions:  Take 40 mg by mouth once daily.     Begin Date    AM    Noon    PM    Bedtime       bisoprolol-hydrochlorothiazide 5-6.25 mg 5-6.25 mg Tab   Commonly known as:  ZIAC   Refills:  0   Dose:  1 tablet    Instructions:  Take 1 tablet by mouth once daily.     Begin Date    AM    Noon    PM    Bedtime       gabapentin 600 MG tablet   Commonly known as:  NEURONTIN   Refills:  0   Dose:  600 mg    Instructions:  Take 600 mg by mouth 3 (three) times daily.     Begin Date    AM    Noon    PM    Bedtime       insulin glargine 100 unit/mL injection   Commonly known as:  LANTUS   Refills:  0   Dose:  10 Units    Instructions:  Inject 10 Units into the skin every evening.     Begin Date    AM    Noon    PM    Bedtime       insulin regular 100 unit/mL injection   Commonly known as:  Humulin R   Refills:  0    Last time this was given:  4/21/2017  9:00 PM   Instructions:  Inject into the skin 3 (three) times daily before meals.     Begin Date    AM    Noon    PM    Bedtime       ondansetron 4 MG Tbdl   Commonly known as:  ZOFRAN-ODT   Quantity:  20 tablet   Refills:  1   Dose:  4 mg    Instructions:  Take 1 tablet (4 mg total) by mouth every 8 (eight) hours as needed.     Begin Date    AM    Noon     PM    Bedtime       pantoprazole 40 MG tablet   Commonly known as:  PROTONIX   Quantity:  30 tablet   Refills:  0   Dose:  40 mg    Instructions:  Take 1 tablet (40 mg total) by mouth once daily.     Begin Date    AM    Noon    PM    Bedtime       potassium chloride SA 20 MEQ tablet   Commonly known as:  K-DUR,KLOR-CON   Refills:  0   Dose:  20 mEq    Instructions:  Take 20 mEq by mouth once daily.     Begin Date    AM    Noon    PM    Bedtime       sertraline 50 MG tablet   Commonly known as:  ZOLOFT   Refills:  0   Dose:  50 mg    Last time this was given:  50 mg on 4/24/2017  8:39 AM   Instructions:  Take 50 mg by mouth once daily.     Begin Date    AM    Noon    PM    Bedtime       trazodone 50 MG tablet   Commonly known as:  DESYREL   Refills:  0   Dose:  50 mg    Instructions:  Take 50 mg by mouth every evening.     Begin Date    AM    Noon    PM    Bedtime       VITAMIN D2 50,000 unit Cap   Refills:  0   Dose:  44772 Units   Generic drug:  ergocalciferol    Instructions:  Take 50,000 Units by mouth every 7 days.     Begin Date    AM    Noon    PM    Bedtime       ZANAFLEX 6 mg capsule   Refills:  0   Dose:  4 mg   Generic drug:  tizanidine    Instructions:  Take 4 mg by mouth 3 (three) times daily.     Begin Date    AM    Noon    PM    Bedtime         STOP taking these medications     clonazePAM 1 MG tablet   Commonly known as:  KLONOPIN       insulin aspart protamine-insulin aspart 100 unit/mL (70-30) Inpn pen   Commonly known as:  NovoLOG 70/30       lisinopril 20 MG tablet   Commonly known as:  PRINIVIL,ZESTRIL       meloxicam 7.5 MG tablet   Commonly known as:  MOBIC       metoprolol tartrate 25 MG tablet   Commonly known as:  LOPRESSOR       mirtazapine 15 MG tablet   Commonly known as:  REMERON            Where to Get Your Medications      These medications were sent to [facility] - [address] [facility]  [address] [facility] [address] [address]     Phone:  [phone]      megestrol 40 MG Tab    metoclopramide HCl 5 MG tablet    pantoprazole 40 MG tablet         You can get these medications from any pharmacy     You don't need a prescription for these medications     bisacodyl 10 mg Supp                  Please bring to all follow up appointments:    1. A copy of your discharge instructions.  2. All medicines you are currently taking in their original bottles.  3. Identification and insurance card.    Please arrive 15 minutes ahead of scheduled appointment time.    Please call 24 hours in advance if you must reschedule your appointment and/or time.        Follow-up Information     Follow up with Claire Souza MD In 3 days.    Specialty:  Family Medicine    Contact information:    [address] [place] [address]  [phone]          Discharge Instructions     Future Orders    Activity as tolerated     Diet general     Questions:    Total calories:  2000 Calorie    Fat restriction, if any:      Protein restriction, if any:      Na restriction, if any:      Fluid restriction:      Additional restrictions:  Cardiac (Low Na/Chol)    Diabetic 2000        Primary Diagnosis     Your primary diagnosis was:  High Blood Pressure      Admission Information     Date & Time Provider Department CSN    4/21/2017 12:22 AM Nasim Gerard MD [facility] -  [id number]      Care Providers     Provider Role Specialty Primary office phone    Nasim Gerard MD Attending Provider Internal Medicine [phone]    Rene Mosqueda MD Consulting Physician  Internal Medicine [phone]    Nasim Gerard MD Team Attending  Internal Medicine [phone]      Your Vitals Were     BP Pulse Temp Resp Height Weight    171/92 (BP Location: Right arm, Patient Position: Lying, BP Method: Automatic) 56 98.6 °F (37 °C) (Oral) 18 5' (1.524 m) 51.8 kg (114 lb 4.6 oz)    SpO2 BMI             96% 22.32 kg/m2         Recent Lab Values        8/20/2015 8/21/2015 2/19/2016                      3:12 PM  3:27 AM  8:42 AM         A1C 7.7 (H) 7.5 (H) 10.4 (H)                   Pending Labs     Order Current Status    Hemoglobin A1c if not done in past 6 weeks In process      Allergies as of 4/24/2017        Reactions    Morphine Other (See Comments)    headache    Latex, Natural Rubber Rash      [facility]     [facility] Nurse Care Line - 24/7 Assistance  Unless otherwise directed by your provider, please contact [facility], our nurse care line that is available for 24/7 assistance.     Registered nurses in the [facility] provide clinical advisement, health education, appointment booking, and other advisory services.  Call for this free service at [phone].        Advance Directives     An advance directive is a document which, in the event you are no longer able to make decisions for yourself, tells your healthcare team what kind of treatment you do or do not want to receive, or who you would like to make those decisions for you.  If you do not currently have an advance directive, [facility] encourages you to create one.  For more information call:  [phone]), [phone] ([phone]),  or log on to [email or web address].        Language Assistance Services     ATTENTION: Language assistance services are available, free of charge. Please call [phone].      ATENCIÓN: Si habla español, tiene a quijano disposición servicios gratuitos de asistencia lingüística. Llame al [phone].     [facility] Ý: N?u b?n nói Ti?ng Vi?t, có các d?ch v? h? tr? ngôn ng? mi?n phí dành cho b?n. G?i s? [phone].        Diabetes Discharge Instructions                                   [facility] Sign-Up     Activating your [facility] account is as easy as 1-2-3!     1) Visit [email or web address], select Sign Up Now, enter this activation code and your date of birth, then select Next.  [email or web address]  Expires: 6/8/2017  2:32 PM      2) Create a username and password to use when  you visit [facility] in the future and select a security question in case you lose your password and select Next.    3) Enter your e-mail address and click Sign Up!    Additional Information  If you have questions, please e-mail [email or web address] or call [phone] to talk to our [facility] staff. Remember, [facility] is NOT to be used for urgent needs. For medical emergencies, dial 911.          [facility] complies with applicable Federal civil rights laws and does not discriminate on the basis of race, color, national origin, age, disability, or sex.

## 2017-04-21 NOTE — SUBJECTIVE & OBJECTIVE
Past Medical History:   Diagnosis Date    Anxiety     Back pain     chronic    Diabetes mellitus     Gastroparesis     Hypertension     Sciatica        No past surgical history on file.    Review of patient's allergies indicates:   Allergen Reactions    Morphine Other (See Comments)     headache    Latex, natural rubber Rash       No current facility-administered medications on file prior to encounter.      Current Outpatient Prescriptions on File Prior to Encounter   Medication Sig    benazepril (LOTENSIN) 40 MG tablet Take 40 mg by mouth once daily.    bisoprolol-hydrochlorothiazide 5-6.25 mg (ZIAC) 5-6.25 mg Tab Take 1 tablet by mouth once daily.    clonazePAM (KLONOPIN) 1 MG tablet Take 0.5 tablets (0.5 mg total) by mouth 2 (two) times daily as needed for Anxiety. (Patient taking differently: Take 0.5 mg by mouth 3 (three) times daily. )    gabapentin (NEURONTIN) 600 MG tablet Take 600 mg by mouth 3 (three) times daily.    insulin regular 100 unit/mL Inj injection Inject into the skin 3 (three) times daily before meals.    meloxicam (MOBIC) 7.5 MG tablet Take 1 tablet (7.5 mg total) by mouth once daily.    mirtazapine (REMERON) 15 MG tablet Take 15 mg by mouth every evening.    potassium chloride SA (K-DUR,KLOR-CON) 20 MEQ tablet Take 20 mEq by mouth once daily.    sertraline (ZOLOFT) 50 MG tablet Take 50 mg by mouth once daily.    insulin aspart protamine-insulin aspart (NOVOLOG 70/30) 100 unit/mL (70-30) InPn pen Inject 10 Units into the skin 2 (two) times daily before meals.    lisinopril (PRINIVIL,ZESTRIL) 20 MG tablet Take 1 tablet (20 mg total) by mouth once daily.    metoprolol tartrate (LOPRESSOR) 25 MG tablet Take 1 tablet (25 mg total) by mouth 2 (two) times daily.    ondansetron (ZOFRAN-ODT) 4 MG TbDL Take 1 tablet (4 mg total) by mouth every 8 (eight) hours as needed.    pantoprazole (PROTONIX) 40 MG tablet Take 1 tablet (40 mg total) by mouth once daily.     Family History      Problem Relation (Age of Onset)    Hyperlipidemia Father    Hypertension Mother        Social History Main Topics    Smoking status: Never Smoker    Smokeless tobacco: Not on file    Alcohol use No    Drug use: No    Sexual activity: No     Review of Systems   Constitutional: Positive for activity change, appetite change and fatigue. Negative for chills, diaphoresis and fever.   HENT: Negative.    Eyes: Negative for pain and redness.   Respiratory: Negative for cough, shortness of breath and wheezing.    Cardiovascular: Negative for chest pain, palpitations and leg swelling.   Gastrointestinal: Positive for abdominal pain, nausea and vomiting. Negative for constipation and diarrhea.   Genitourinary: Negative for difficulty urinating, dysuria, frequency and hematuria.   Musculoskeletal: Positive for back pain. Negative for gait problem.        Right sided radiculopathy   Skin: Negative for pallor, rash and wound.   Neurological: Positive for weakness. Negative for dizziness, light-headedness, numbness and headaches.   Psychiatric/Behavioral: Negative for confusion. The patient is not nervous/anxious.      Objective:     Vital Signs (Most Recent):  Temp: 99.5 °F (37.5 °C) (04/21/17 0915)  Pulse: (!) 120 (04/21/17 0915)  Resp: (!) 26 (04/21/17 0915)  BP: (!) 221/109 (04/21/17 1001)  SpO2: 100 % (04/21/17 0915) Vital Signs (24h Range):  Temp:  [97.6 °F (36.4 °C)-99.5 °F (37.5 °C)] 99.5 °F (37.5 °C)  Pulse:  [106-123] 120  Resp:  [22-46] 26  SpO2:  [98 %-100 %] 100 %  BP: (186-221)/() 221/109     Weight: 52.2 kg (115 lb)  Body mass index is 22.46 kg/(m^2).    Physical Exam   Constitutional: She is oriented to person, place, and time. She appears well-developed and well-nourished. She appears ill.   HENT:   Head: Normocephalic and atraumatic.   Mouth/Throat: Mucous membranes are dry.   Eyes: Conjunctivae and EOM are normal. No scleral icterus.   Neck: Normal range of motion. Neck supple.   Cardiovascular:  Regular rhythm and normal heart sounds.  Tachycardia present.  Exam reveals no gallop and no friction rub.    No murmur heard.  Pulmonary/Chest: Effort normal and breath sounds normal.   Abdominal: Soft. Bowel sounds are normal.   Musculoskeletal: Normal range of motion. She exhibits no edema or tenderness.   Neurological: She is alert and oriented to person, place, and time.   Skin: Skin is warm and dry.   Psychiatric: She has a normal mood and affect. Her behavior is normal.   Nursing note and vitals reviewed.       Significant Labs:   ABGs:   Recent Labs  Lab 04/21/17  0546   PH 7.549*   PCO2 22.8*   HCO3 19.9*   POCSATURATED 56*   BE -2     CBC:   Recent Labs  Lab 04/21/17 0044   WBC 7.32   HGB 12.4   HCT 36.5*        CMP:   Recent Labs  Lab 04/21/17 0044 04/21/17  0941    142   K 3.2* 2.8*    102   CO2 17* 19*   * 192*   BUN 4* 3*   CREATININE 0.8 0.8   CALCIUM 9.5 10.2   PROT 8.4  --    ALBUMIN 4.1  --    BILITOT 0.6  --    ALKPHOS 98  --    AST 22  --    ALT 16  --    ANIONGAP 22* 21*   EGFRNONAA >60.0 >60     All pertinent labs within the past 24 hours have been reviewed.    Significant Imaging: I have reviewed all pertinent imaging results/findings within the past 24 hours.

## 2017-04-21 NOTE — CONSULTS
Consult Note    Inpatient consult to Pulmonology  Consult performed by: ELENO PRADHAN  Consult ordered by: JONNATHAN MALDONADO  Reason for consult: DKA          Patient Name: Melva Barker  MRN: [id number]  Admission Date: 4/21/2017  Hospital Length of Stay: 0 days  Code Status: Full Code  Attending Provider: No att. providers found  Primary Care Provider: Claire Souza MD   Principal Problem: Diabetic ketoacidosis without coma associated with type 2 diabetes mellitus    SUBJECTIVE:     HPI: 72 year old female who  has a past medical history of Anxiety; Back pain; Diabetes mellitus; Gastroparesis; Hypertension; and Sciatica admitted from [facility] ED with intractable nausea and vomitting and DKA    Hospital/ICU Course: Admitted to ICU for DKA. Insulin gtt  - DKA protocol. IV rehydration. IV Dilaudid. IV Labetalol. IV Reglan. IV Zofran.    Interval History/Significant Events: Seen and examined at bedside. Pain adequately controlled. She reports that she feels better.       Review of patient's allergies indicates:   Allergen Reactions    Morphine Other (See Comments)     headache    Latex, natural rubber Rash     Past Medical History:   Diagnosis Date    Anxiety     Back pain     chronic    Diabetes mellitus     Gastroparesis     Hypertension     Sciatica      No past surgical history on file.  Family History   Problem Relation Age of Onset    Hyperlipidemia Father     Hypertension Mother      Social History   Substance Use Topics    Smoking status: Never Smoker    Smokeless tobacco: Not on file    Alcohol use No     Review of Systems   Constitutional: Negative.    HENT: Negative.    Eyes: Negative.    Respiratory: Negative.    Cardiovascular: Negative.    Gastrointestinal: Positive for nausea and vomiting. Negative for abdominal pain.   Genitourinary: Negative.    Musculoskeletal: Positive for back pain.   Skin: Negative.    Neurological: Negative for focal weakness, seizures and loss of  consciousness.   Endo/Heme/Allergies: Negative for environmental allergies and polydipsia.   Psychiatric/Behavioral: Positive for depression.     OBJECTIVE:     Medications :  Scheduled Meds:   enoxaparin  40 mg Subcutaneous Daily    insulin detemir  10 Units Subcutaneous QHS    pantoprazole  40 mg Intravenous Daily     Continuous Infusions:   0/9% NACL & POTASSIUM CHLORIDE 20 MEQ/L      insulin (HUMAN R) infusion (adults)       PRN Meds:.dextrose 10 % in water (D10W), dextrose 10 % in water (D10W), dextrose 50%, dextrose 50%, hydrocodone-acetaminophen 10-325mg, hydromorphone, metoclopramide HCl, ondansetron      Vital Signs (Most Recent):  Temp: 99.5 °F (37.5 °C) (04/21/17 0915)  Pulse: (!) 120 (04/21/17 0915)  Resp: (!) 26 (04/21/17 0915)  BP: (!) 221/109 (04/21/17 1001)  SpO2: 100 % (04/21/17 0915) Vital Signs (24h Range):  Temp:  [97.6 °F (36.4 °C)-99.5 °F (37.5 °C)] 99.5 °F (37.5 °C)  Pulse:  [106-123] 120  Resp:  [22-46] 26  SpO2:  [98 %-100 %] 100 %  BP: (186-221)/() 221/109     Weight: 52.2 kg (115 lb)  Body mass index is 22.46 kg/(m^2).      Intake/Output Summary (Last 24 hours) at 04/21/17 1034  Last data filed at 04/21/17 0600   Gross per 24 hour   Intake             2000 ml   Output             2625 ml   Net             -625 ml       Physical Exam   Constitutional: She is oriented to person, place, and time. She appears well-developed and well-nourished.   HENT:   Head: Normocephalic and atraumatic.   Eyes: EOM are normal. Pupils are equal, round, and reactive to light.   Neck: Normal range of motion. Neck supple.   Cardiovascular: Normal rate and regular rhythm.  Exam reveals no gallop and no friction rub.    No murmur heard.  Pulmonary/Chest: Effort normal and breath sounds normal.   Abdominal: Soft. Bowel sounds are normal.   Musculoskeletal: Normal range of motion.   Neurological: She is alert and oriented to person, place, and time. She has normal reflexes.   Skin: Skin is warm and dry.    Psychiatric: She has a normal mood and affect.   Nursing note and vitals reviewed.      Vents:           Lines/Drains/Airways     Drain                 Urethral Catheter 04/21/17 0400 Non-latex;Straight-tip 18 Fr. less than 1 day                Significant Labs:    CBC/Anemia Profile:    Recent Labs  Lab 04/21/17  0044   WBC 7.32   HGB 12.4   HCT 36.5*      MCV 80*   RDW 14.1        Chemistries:    Recent Labs  Lab 04/21/17  0044 04/21/17  0941    142   K 3.2* 2.8*    102   CO2 17* 19*   BUN 4* 3*   CREATININE 0.8 0.8   CALCIUM 9.5 10.2   ALBUMIN 4.1  --    PROT 8.4  --    BILITOT 0.6  --    ALKPHOS 98  --    ALT 16  --    AST 22  --    MG  --  1.6   PHOS  --  1.6*       ABGs:   Recent Labs  Lab 04/21/17  0546   PH 7.549*   PCO2 22.8*   HCO3 19.9*   POCSATURATED 56*   BE -2     Lactic Acid: Pending.    Significant Imaging:    X-Ray Abdomen Flat And Erect:    1. The bowel gas pattern is normal in appearance.  2. There is a mild amount of dextroconvex curvature of the lumbar spine.  3. There are oval shaped calcifications projected over the pelvis. These are characteristic of phleboliths.    ASSESSMENT/PLAN:       I have reviewed all labs and imaging studies and compared to previous results. 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   n/a   C: SAT & SBT Coordinated?  yes                    D: Delirium: CAM-ICU   n/a   E: Early Mobility Performed? Yes   F: Feeding Goal:    Status:     Current Diet Order   Procedures    Diet NPO      AS: Analgesia/Sedation DILAUDID   T: Thromboembolic  Prophylaxis ENOXAPARIN   H: HOB > 300 Yes   U: Stress Ulcer Prophylaxis (if needed) PANTOPRAZOLE   G: Glucose Control INSULIN GTT PER DKA PROTOCOL   B: Bowel Function     I: Indwelling Catheter (Lines & Hendricks) Necessity yes   D: De-escalation of Antimicrobials/Pharmacotherapies n/a    Plan for the day/ETD Continue current    Code Status:  Family/Goals of Care: Full Code  Home self care       PLAN:    1. Neuro:   Normal strength and tone. No focal numbness or weakness Sedation not required for adequate patient comfort crossed into general / IV anesthesia RASS 0  alert and calm: ICU neuro monitoring.    2. Pulmonary:    Stable Oxygen Supplementation by nasal canula. FIO2 titrate to > 90 - 92 % Bronchodilators as needed      3. Cardiac:    Stable  Hypertensive Regular rate and rhythm. IV LABETALOL.  Discontinue HYDRALLAZINE  Monitor hemodynamics and  monitor for dysrhythmias. MAP goal of  65 mmHg.      4. Renal:    Stable Volume status: EUVOLEMIC. IV hydration. Daily weights   Hendricks in place, monitor I/Os     5. Infectious Disease:   Stable   Monitor fever curve and sepsis surveillance        6. Hematology/Oncology:   Stable Conservative transfusion strategy and monitor for SS of occult or overt bleeding        7. Endocrine: Improving. INSULIN IV GTT - DKA PROTOCOL. Transition to LEVIMIR once blood sugar under control.  Blood glucose target 100 - 180 mg/dl    8. Fluids/Electrolytes/Nutrition/GI: hypokalemia: replace potassium, Monitor and replete electrolytes.  Anion gap trending down.  Maintain even fluid balance NPO. REGLAN. ZOFRAN.    9. Musculoskeletal: Stable  Bed Rest     10. Pain Management: Pain control  adequate. Analgesia per ICU protocol.    11. Disposition: Unchanged. FULL CODE        Counseling/Consultation: I have discussed the care of this patient in detail with Multidisciplinary team during rounds, bedside nursing staff and Dr. ZULUAGA      Critical Care Time: 50 Minutes    Critical secondary to Patient  has a condition that poses threat to life and bodily function: DKA      Critical care was time spent personally by me on the following activities: development of treatment plan with patient or surrogate and bedside caregivers, discussions with consultants, evaluation of patient's response to treatment, examination of patient, ordering and performing treatments and interventions, ordering and review of laboratory studies, ordering and review of radiographic studies, pulse oximetry, re-evaluation of patient's condition.  This critical care time did not overlap with that of any other provider or involve time for any procedures.     Hector Gray MD  Critical Care Medicine  [facility]

## 2017-04-21 NOTE — ED NOTES
Pt continues to vomit. 2 episodes in last 20 minutes. Dr. Garcia notified. Pt also asking for additional pain medication. Offered prn medication but pt unable to tolerate po secondary to vomiting. Awaiting additional orders.

## 2017-04-22 PROBLEM — E11.43 GASTROPARESIS DIABETICORUM: Chronic | Status: ACTIVE | Noted: 2017-04-22

## 2017-04-22 PROBLEM — K31.84 GASTROPARESIS DIABETICORUM: Chronic | Status: ACTIVE | Noted: 2017-04-22

## 2017-04-22 LAB
ANION GAP SERPL CALC-SCNC: 11 MMOL/L
ANION GAP SERPL CALC-SCNC: 12 MMOL/L
ANION GAP SERPL CALC-SCNC: 14 MMOL/L
ANION GAP SERPL CALC-SCNC: 15 MMOL/L
BASOPHILS # BLD AUTO: 0.01 K/UL
BASOPHILS NFR BLD: 0.1 %
BUN SERPL-MCNC: 5 MG/DL
BUN SERPL-MCNC: 6 MG/DL
BUN SERPL-MCNC: 7 MG/DL
BUN SERPL-MCNC: 7 MG/DL
CALCIUM SERPL-MCNC: 8.4 MG/DL
CALCIUM SERPL-MCNC: 8.5 MG/DL
CALCIUM SERPL-MCNC: 8.8 MG/DL
CALCIUM SERPL-MCNC: 8.8 MG/DL
CHLORIDE SERPL-SCNC: 111 MMOL/L
CHLORIDE SERPL-SCNC: 111 MMOL/L
CHLORIDE SERPL-SCNC: 112 MMOL/L
CHLORIDE SERPL-SCNC: 114 MMOL/L
CO2 SERPL-SCNC: 15 MMOL/L
CO2 SERPL-SCNC: 16 MMOL/L
CO2 SERPL-SCNC: 17 MMOL/L
CO2 SERPL-SCNC: 19 MMOL/L
CREAT SERPL-MCNC: 0.8 MG/DL
CREAT SERPL-MCNC: 1 MG/DL
DIFFERENTIAL METHOD: ABNORMAL
EOSINOPHIL # BLD AUTO: 0 K/UL
EOSINOPHIL NFR BLD: 0.1 %
ERYTHROCYTE [DISTWIDTH] IN BLOOD BY AUTOMATED COUNT: 14.6 %
EST. GFR  (AFRICAN AMERICAN): >60 ML/MIN/1.73 M^2
EST. GFR  (NON AFRICAN AMERICAN): 57 ML/MIN/1.73 M^2
EST. GFR  (NON AFRICAN AMERICAN): >60 ML/MIN/1.73 M^2
GLUCOSE SERPL-MCNC: 127 MG/DL
GLUCOSE SERPL-MCNC: 145 MG/DL
GLUCOSE SERPL-MCNC: 157 MG/DL
GLUCOSE SERPL-MCNC: 215 MG/DL
HCT VFR BLD AUTO: 33.5 %
HGB BLD-MCNC: 11.5 G/DL
LYMPHOCYTES # BLD AUTO: 2.2 K/UL
LYMPHOCYTES NFR BLD: 26.3 %
MAGNESIUM SERPL-MCNC: 1.6 MG/DL
MAGNESIUM SERPL-MCNC: 1.7 MG/DL
MAGNESIUM SERPL-MCNC: 1.7 MG/DL
MAGNESIUM SERPL-MCNC: 1.9 MG/DL
MAGNESIUM SERPL-MCNC: 1.9 MG/DL
MCH RBC QN AUTO: 27.4 PG
MCHC RBC AUTO-ENTMCNC: 34.3 %
MCV RBC AUTO: 80 FL
MONOCYTES # BLD AUTO: 0.4 K/UL
MONOCYTES NFR BLD: 4.8 %
NEUTROPHILS # BLD AUTO: 5.8 K/UL
NEUTROPHILS NFR BLD: 68.7 %
PHOSPHATE SERPL-MCNC: 2.1 MG/DL
PHOSPHATE SERPL-MCNC: 2.4 MG/DL
PHOSPHATE SERPL-MCNC: 3 MG/DL
PHOSPHATE SERPL-MCNC: 3.6 MG/DL
PHOSPHATE SERPL-MCNC: 3.6 MG/DL
PLATELET # BLD AUTO: 252 K/UL
PMV BLD AUTO: 10.9 FL
POCT GLUCOSE: 112 MG/DL (ref 70–110)
POCT GLUCOSE: 125 MG/DL (ref 70–110)
POCT GLUCOSE: 129 MG/DL (ref 70–110)
POCT GLUCOSE: 130 MG/DL (ref 70–110)
POCT GLUCOSE: 131 MG/DL (ref 70–110)
POCT GLUCOSE: 136 MG/DL (ref 70–110)
POCT GLUCOSE: 138 MG/DL (ref 70–110)
POCT GLUCOSE: 144 MG/DL (ref 70–110)
POCT GLUCOSE: 159 MG/DL (ref 70–110)
POCT GLUCOSE: 162 MG/DL (ref 70–110)
POCT GLUCOSE: 165 MG/DL (ref 70–110)
POCT GLUCOSE: 169 MG/DL (ref 70–110)
POCT GLUCOSE: 178 MG/DL (ref 70–110)
POCT GLUCOSE: 186 MG/DL (ref 70–110)
POCT GLUCOSE: 195 MG/DL (ref 70–110)
POCT GLUCOSE: 203 MG/DL (ref 70–110)
POCT GLUCOSE: 205 MG/DL (ref 70–110)
POCT GLUCOSE: 206 MG/DL (ref 70–110)
POCT GLUCOSE: 212 MG/DL (ref 70–110)
POCT GLUCOSE: 248 MG/DL (ref 70–110)
POTASSIUM SERPL-SCNC: 2.9 MMOL/L
POTASSIUM SERPL-SCNC: 3.5 MMOL/L
POTASSIUM SERPL-SCNC: 3.8 MMOL/L
POTASSIUM SERPL-SCNC: 4.2 MMOL/L
RBC # BLD AUTO: 4.2 M/UL
SODIUM SERPL-SCNC: 138 MMOL/L
SODIUM SERPL-SCNC: 142 MMOL/L
SODIUM SERPL-SCNC: 143 MMOL/L
SODIUM SERPL-SCNC: 144 MMOL/L
WBC # BLD AUTO: 8.47 K/UL

## 2017-04-22 PROCEDURE — 63600175 PHARM REV CODE 636 W HCPCS: Performed by: EMERGENCY MEDICINE

## 2017-04-22 PROCEDURE — 99291 CRITICAL CARE FIRST HOUR: CPT | Mod: ,,, | Performed by: INTERNAL MEDICINE

## 2017-04-22 PROCEDURE — 80048 BASIC METABOLIC PNL TOTAL CA: CPT

## 2017-04-22 PROCEDURE — 25000003 PHARM REV CODE 250: Performed by: INTERNAL MEDICINE

## 2017-04-22 PROCEDURE — C9113 INJ PANTOPRAZOLE SODIUM, VIA: HCPCS | Performed by: INTERNAL MEDICINE

## 2017-04-22 PROCEDURE — 83735 ASSAY OF MAGNESIUM: CPT | Mod: 91

## 2017-04-22 PROCEDURE — 36415 COLL VENOUS BLD VENIPUNCTURE: CPT

## 2017-04-22 PROCEDURE — 84100 ASSAY OF PHOSPHORUS: CPT | Mod: 91

## 2017-04-22 PROCEDURE — 63600175 PHARM REV CODE 636 W HCPCS: Performed by: NURSE PRACTITIONER

## 2017-04-22 PROCEDURE — 20000000 HC ICU ROOM

## 2017-04-22 PROCEDURE — 83036 HEMOGLOBIN GLYCOSYLATED A1C: CPT

## 2017-04-22 PROCEDURE — 85025 COMPLETE CBC W/AUTO DIFF WBC: CPT

## 2017-04-22 PROCEDURE — 63600175 PHARM REV CODE 636 W HCPCS: Performed by: INTERNAL MEDICINE

## 2017-04-22 RX ORDER — METOPROLOL TARTRATE 1 MG/ML
5 INJECTION, SOLUTION INTRAVENOUS 4 TIMES DAILY PRN
Status: DISCONTINUED | OUTPATIENT
Start: 2017-04-22 | End: 2017-04-22

## 2017-04-22 RX ORDER — METOCLOPRAMIDE HYDROCHLORIDE 5 MG/ML
10 INJECTION INTRAMUSCULAR; INTRAVENOUS EVERY 6 HOURS PRN
Status: DISCONTINUED | OUTPATIENT
Start: 2017-04-22 | End: 2017-04-24 | Stop reason: HOSPADM

## 2017-04-22 RX ORDER — METOCLOPRAMIDE HYDROCHLORIDE 5 MG/ML
5 INJECTION INTRAMUSCULAR; INTRAVENOUS EVERY 6 HOURS PRN
Status: DISCONTINUED | OUTPATIENT
Start: 2017-04-22 | End: 2017-04-22

## 2017-04-22 RX ORDER — MAGNESIUM SULFATE 1 G/100ML
1 INJECTION INTRAVENOUS ONCE
Status: COMPLETED | OUTPATIENT
Start: 2017-04-22 | End: 2017-04-22

## 2017-04-22 RX ORDER — ONDANSETRON 2 MG/ML
8 INJECTION INTRAMUSCULAR; INTRAVENOUS EVERY 8 HOURS PRN
Status: DISCONTINUED | OUTPATIENT
Start: 2017-04-22 | End: 2017-04-24 | Stop reason: HOSPADM

## 2017-04-22 RX ORDER — ONDANSETRON 2 MG/ML
4 INJECTION INTRAMUSCULAR; INTRAVENOUS EVERY 8 HOURS PRN
Status: DISCONTINUED | OUTPATIENT
Start: 2017-04-22 | End: 2017-04-22

## 2017-04-22 RX ORDER — ONDANSETRON 2 MG/ML
4 INJECTION INTRAMUSCULAR; INTRAVENOUS EVERY 12 HOURS PRN
Status: DISCONTINUED | OUTPATIENT
Start: 2017-04-22 | End: 2017-04-22

## 2017-04-22 RX ORDER — GLUCAGON 1 MG
1 KIT INJECTION
Status: DISCONTINUED | OUTPATIENT
Start: 2017-04-22 | End: 2017-04-24 | Stop reason: HOSPADM

## 2017-04-22 RX ORDER — INSULIN ASPART 100 [IU]/ML
1-10 INJECTION, SOLUTION INTRAVENOUS; SUBCUTANEOUS EVERY 6 HOURS PRN
Status: DISCONTINUED | OUTPATIENT
Start: 2017-04-22 | End: 2017-04-24 | Stop reason: HOSPADM

## 2017-04-22 RX ORDER — POTASSIUM CHLORIDE 7.45 MG/ML
10 INJECTION INTRAVENOUS
Status: COMPLETED | OUTPATIENT
Start: 2017-04-22 | End: 2017-04-22

## 2017-04-22 RX ADMIN — MAGNESIUM SULFATE IN DEXTROSE 1 G: 10 INJECTION, SOLUTION INTRAVENOUS at 06:04

## 2017-04-22 RX ADMIN — POTASSIUM PHOSPHATE, MONOBASIC AND POTASSIUM PHOSPHATE, DIBASIC 30 MMOL: 224; 236 INJECTION, SOLUTION, CONCENTRATE INTRAVENOUS at 06:04

## 2017-04-22 RX ADMIN — METOCLOPRAMIDE 5 MG: 5 INJECTION, SOLUTION INTRAMUSCULAR; INTRAVENOUS at 03:04

## 2017-04-22 RX ADMIN — PROMETHAZINE HYDROCHLORIDE 12.5 MG: 25 INJECTION, SOLUTION INTRAMUSCULAR; INTRAVENOUS at 11:04

## 2017-04-22 RX ADMIN — HYDROMORPHONE HYDROCHLORIDE 0.5 MG: 1 INJECTION, SOLUTION INTRAMUSCULAR; INTRAVENOUS; SUBCUTANEOUS at 10:04

## 2017-04-22 RX ADMIN — METOCLOPRAMIDE 5 MG: 5 INJECTION, SOLUTION INTRAMUSCULAR; INTRAVENOUS at 11:04

## 2017-04-22 RX ADMIN — HYDROMORPHONE HYDROCHLORIDE 0.5 MG: 1 INJECTION, SOLUTION INTRAMUSCULAR; INTRAVENOUS; SUBCUTANEOUS at 03:04

## 2017-04-22 RX ADMIN — PANTOPRAZOLE SODIUM 40 MG: 40 INJECTION, POWDER, FOR SOLUTION INTRAVENOUS at 09:04

## 2017-04-22 RX ADMIN — SODIUM CHLORIDE 500 ML: 0.9 INJECTION, SOLUTION INTRAVENOUS at 05:04

## 2017-04-22 RX ADMIN — ENOXAPARIN SODIUM 40 MG: 100 INJECTION SUBCUTANEOUS at 04:04

## 2017-04-22 RX ADMIN — POTASSIUM CHLORIDE 10 MEQ: 10 INJECTION, SOLUTION INTRAVENOUS at 07:04

## 2017-04-22 RX ADMIN — POTASSIUM CHLORIDE 10 MEQ: 10 INJECTION, SOLUTION INTRAVENOUS at 10:04

## 2017-04-22 RX ADMIN — POTASSIUM CHLORIDE 10 MEQ: 10 INJECTION, SOLUTION INTRAVENOUS at 09:04

## 2017-04-22 RX ADMIN — ONDANSETRON 4 MG: 2 INJECTION, SOLUTION INTRAMUSCULAR; INTRAVENOUS at 04:04

## 2017-04-22 RX ADMIN — POTASSIUM CHLORIDE AND SODIUM CHLORIDE: 900; 150 INJECTION, SOLUTION INTRAVENOUS at 01:04

## 2017-04-22 RX ADMIN — INSULIN DETEMIR 10 UNITS: 100 INJECTION, SOLUTION SUBCUTANEOUS at 09:04

## 2017-04-22 RX ADMIN — ONDANSETRON 8 MG: 2 INJECTION INTRAMUSCULAR; INTRAVENOUS at 09:04

## 2017-04-22 RX ADMIN — HYDROMORPHONE HYDROCHLORIDE 0.5 MG: 1 INJECTION, SOLUTION INTRAMUSCULAR; INTRAVENOUS; SUBCUTANEOUS at 05:04

## 2017-04-22 RX ADMIN — ONDANSETRON 4 MG: 2 INJECTION INTRAMUSCULAR; INTRAVENOUS at 10:04

## 2017-04-22 RX ADMIN — POTASSIUM CHLORIDE 10 MEQ: 10 INJECTION, SOLUTION INTRAVENOUS at 08:04

## 2017-04-22 NOTE — PROGRESS NOTES
BP 38/14, patient drowsy, respond aproprietly to Questions, oriented to self , day, time and situation,  eICU aware Dr. Harris order 1000cc bolus of 0.9% saline, order implemented, will continue to monitor

## 2017-04-22 NOTE — SUBJECTIVE & OBJECTIVE
Interval History: Patient developing N/V. No pain    Review of Systems   Constitutional: Positive for appetite change. Negative for unexpected weight change.   HENT: Negative.  Negative for trouble swallowing.    Eyes: Negative.    Respiratory: Negative.  Negative for cough, shortness of breath and wheezing.    Cardiovascular: Negative.  Negative for chest pain.   Gastrointestinal: Positive for constipation, nausea and vomiting.   Endocrine: Negative.    Genitourinary: Negative.    Musculoskeletal: Negative.    Skin: Negative.    Allergic/Immunologic: Negative.    Neurological: Positive for weakness. Negative for dizziness.   Hematological: Negative.    Psychiatric/Behavioral: Negative.    All other systems reviewed and are negative.    Objective:     Vital Signs (Most Recent):  Temp: 99.4 °F (37.4 °C) (04/22/17 0745)  Pulse: 69 (04/22/17 0733)  Resp: 20 (04/22/17 0730)  BP: (!) 89/42 (04/22/17 0730)  SpO2: 100 % (04/22/17 0730) Vital Signs (24h Range):  Temp:  [98.9 °F (37.2 °C)-99.4 °F (37.4 °C)] 99.4 °F (37.4 °C)  Pulse:  [] 69  Resp:  [16-32] 20  SpO2:  [94 %-100 %] 100 %  BP: ()/() 89/42     Weight: 51.8 kg (114 lb 4.6 oz)  Body mass index is 22.32 kg/(m^2).    Intake/Output Summary (Last 24 hours) at 04/22/17 1143  Last data filed at 04/22/17 0722   Gross per 24 hour   Intake          4056.73 ml   Output             1870 ml   Net          2186.73 ml      Physical Exam   Constitutional: She is oriented to person, place, and time. She appears well-developed and well-nourished.   HENT:   Head: Normocephalic and atraumatic.   Eyes: EOM are normal. Pupils are equal, round, and reactive to light.   Neck: Normal range of motion. Neck supple. No JVD present.   Cardiovascular: Regular rhythm, normal heart sounds and intact distal pulses.    No murmur heard.  Tachy   Pulmonary/Chest: Effort normal and breath sounds normal. No respiratory distress. She has no wheezes.   Abdominal: She exhibits no  distension. There is no tenderness. There is no guarding.   Hyperactive BS   Musculoskeletal: Normal range of motion. She exhibits no edema or tenderness.   Neurological: She is alert and oriented to person, place, and time. She has normal reflexes. No cranial nerve deficit.   Skin: Skin is warm and dry. No erythema.   Psychiatric: She has a normal mood and affect. Her behavior is normal. Judgment and thought content normal.   Nursing note and vitals reviewed.      Significant Labs:   BMP:   Recent Labs  Lab 04/22/17  0347   *      K 2.9*   *   CO2 19*   BUN 5*   CREATININE 1.0   CALCIUM 8.8   MG 1.7     CBC:   Recent Labs  Lab 04/21/17  0044 04/22/17  0347   WBC 7.32 8.47   HGB 12.4 11.5*   HCT 36.5* 33.5*    252     CMP:   Recent Labs  Lab 04/21/17  0044  04/21/17  2019 04/21/17  2154 04/22/17 0347     < > 145 143  143 144   K 3.2*  < > 6.8* 3.0*  3.0* 2.9*     < > 122* 108  108 111*   CO2 17*  < > 17* 19*  19* 19*   *  < > 106 133*  133* 127*   BUN 4*  < > 2* 3*  3* 5*   CREATININE 0.8  < > 0.6 0.8  0.8 1.0   CALCIUM 9.5  < > 6.6* 9.1  9.1 8.8   PROT 8.4  --   --  8.7*  --    ALBUMIN 4.1  --   --  4.1  --    BILITOT 0.6  --   --  0.7  --    ALKPHOS 98  --   --  106  --    AST 22  --   --  21  --    ALT 16  --   --  15  --    ANIONGAP 22*  < > 6* 16  16 14   EGFRNONAA >60.0  < > >60 >60  >60 57*   < > = values in this interval not displayed.  Cardiac Markers: No results for input(s): CKMB, MYOGLOBIN, BNP, TROPISTAT in the last 48 hours.  Lactic Acid:   Recent Labs  Lab 04/21/17  1044   LACTATE 1.5  1.5     Urine Studies:   Recent Labs  Lab 04/21/17  0156   COLORU Yellow   APPEARANCEUA Clear   PHUR 7.0   SPECGRAV 1.015   PROTEINUA 1+*   GLUCUA 3+*   KETONESU 2+*   BILIRUBINUA Negative   OCCULTUA Trace*   NITRITE Negative   UROBILINOGEN Negative   LEUKOCYTESUR Negative   RBCUA 2   WBCUA 0   BACTERIA None   HYALINECASTS 0     All pertinent labs within the past  24 hours have been reviewed.    Significant Imaging: I have reviewed all pertinent imaging results/findings within the past 24 hours.

## 2017-04-22 NOTE — PROGRESS NOTES
Pt having hypotension this am.  Fluid bolus finishing upon receiving report this am.  MD Gray at bedside giving orders.  Replacing K this am.  Will continue to monitor electrolytes.  Pt remains alert, oriented, and calm to situation.  HR 72, BP currently 84/42, MAP 53.

## 2017-04-22 NOTE — PROGRESS NOTES
Pt having intermittent NV, unrelieved by reglan, zofran.  Pt given 12.5mg of phenergan IV diluted in 50cc of d5%. Pt's blood pressure also elevated at 227/115, MD Gerard aware, prn orders given for metoprolol IV.  BP decreased to 206/87 after phenergan.  Will continue to monitor.

## 2017-04-22 NOTE — PLAN OF CARE
Problem: Patient Care Overview  Goal: Plan of Care Review  Outcome: Ongoing (interventions implemented as appropriate)  Pt having intermittent nausea this shift with intermittent bile vomitus.  Antiemetic medications adjusted.  Pt attempting  Clear fluids and light food.  BS stable.  Continuing to monitor and replace electrolytes.

## 2017-04-22 NOTE — PROGRESS NOTES
[facility]  Critical Care Medicine  Progress Note    Patient Name: Melva Barker  MRN: [id number]  Admission Date: 4/21/2017  Hospital Length of Stay: 1 days  Code Status: Full Code  Attending Provider: Nasim Gerard MD  Primary Care Provider: Claire Souza MD   Principal Problem: Accelerated hypertension    SUBJECTIVE:     HPI: 72 year old female who  has a past medical history of Anxiety; Back pain; Diabetes mellitus; Gastroparesis; Hypertension; and Sciatica admitted from [facility] ED with intractable nausea and vomitting and DKA     Hospital/ICU Course: Admitted to ICU for DKA. Insulin gtt - DKA protocol. IV rehydration. IV Dilaudid. IV Labetalol. IV Reglan. IV Zofran.     Interval History/Significant Events: Seen and examined at bedside. DKA RESOLVED. PERSISTENT NAUSEA AND  EMESIS. LABILE HYPERTENSION. Pain adequately controlled.     Review of Systems   Constitutional: Negative for chills and diaphoresis.   HENT: Negative for congestion, facial swelling, sneezing and sore throat.    Eyes: Negative for pain and itching.   Respiratory: Negative for cough, chest tightness and wheezing.    Cardiovascular: Negative for chest pain and leg swelling.   Gastrointestinal: Positive for nausea and vomiting. Negative for abdominal pain and diarrhea.   Genitourinary: Negative for dysuria and flank pain.   Musculoskeletal: Negative for arthralgias, back pain and joint swelling.   Skin: Negative for rash and wound.   Allergic/Immunologic: Negative for immunocompromised state.   Neurological: Negative for dizziness, facial asymmetry and headaches.   Hematological: Negative for adenopathy.   Psychiatric/Behavioral: Positive for dysphoric mood. Negative for agitation.         OBJECTIVE:       Medications :  Scheduled Meds:   cloNIDine  0.1 mg Oral Once    enoxaparin  40 mg Subcutaneous Daily    insulin detemir  10 Units Subcutaneous QHS    pantoprazole  40 mg Intravenous Daily    potassium  phosphate IVPB  30 mmol Intravenous Once     Continuous Infusions:   0/9% NACL & POTASSIUM CHLORIDE 20 MEQ/L 100 mL/hr at 04/22/17 1600     PRN Meds:.dextrose 10 % in water (D10W), dextrose 10 % in water (D10W), dextrose 50%, dextrose 50%, glucagon (human recombinant), hydrALAZINE, hydrocodone-acetaminophen 10-325mg, hydromorphone, insulin aspart, metoclopramide HCl, ondansetron      Review of patient's allergies indicates:   Allergen Reactions    Morphine Other (See Comments)     headache    Latex, natural rubber Rash       Vital Signs (Most Recent):  Temp: 100.2 °F (37.9 °C) (04/22/17 1600)  Pulse: 90 (04/22/17 1600)  Resp: (!) 22 (04/22/17 1600)  BP: 139/79 (04/22/17 1600)  SpO2: 100 % (04/22/17 1600) Vital Signs (24h Range):  Temp:  [98.9 °F (37.2 °C)-100.2 °F (37.9 °C)] 100.2 °F (37.9 °C)  Pulse:  [] 90  Resp:  [16-32] 22  SpO2:  [99 %-100 %] 100 %  BP: ()/() 139/79     Weight: 51.8 kg (114 lb 4.6 oz)  Body mass index is 22.32 kg/(m^2).      Intake/Output Summary (Last 24 hours) at 04/22/17 1747  Last data filed at 04/22/17 1600   Gross per 24 hour   Intake           3263.4 ml   Output             2320 ml   Net            943.4 ml       Physical Exam    Vents:       Lines/Drains/Airways     Drain                 Urethral Catheter 04/21/17 0400 Non-latex;Straight-tip 18 Fr. 1 day          Peripheral Intravenous Line                 Peripheral IV - Single Lumen 04/21/17 0600 Left Wrist 1 day         Peripheral IV - Single Lumen 04/21/17 1000 Left Forearm 1 day         Peripheral IV - Single Lumen 04/21/17 1454 Right Hand 1 day                Significant Labs:    CBC/Anemia Profile:    Recent Labs  Lab 04/21/17  0044 04/22/17  0347   WBC 7.32 8.47   HGB 12.4 11.5*   HCT 36.5* 33.5*    252   MCV 80* 80*   RDW 14.1 14.6*        Chemistries:    Recent Labs  Lab 04/21/17  0044  04/21/17  2154  04/22/17  0347 04/22/17  1204 04/22/17  1651     < > 143  143  --  144 142 143   K 3.2*  <  > 3.0*  3.0*  --  2.9* 3.5 3.8     < > 108  108  --  111* 112* 114*   CO2 17*  < > 19*  19*  --  19* 15* 17*   BUN 4*  < > 3*  3*  --  5* 6* 7*   CREATININE 0.8  < > 0.8  0.8  --  1.0 0.8 0.8   CALCIUM 9.5  < > 9.1  9.1  --  8.8 8.8 8.5*   ALBUMIN 4.1  --  4.1  --   --   --   --    PROT 8.4  --  8.7*  --   --   --   --    BILITOT 0.6  --  0.7  --   --   --   --    ALKPHOS 98  --  106  --   --   --   --    ALT 16  --  15  --   --   --   --    AST 22  --  21  --   --   --   --    MG  --   < >  --   < > 1.7 1.7 1.6   PHOS  --   < >  --   < > 3.0 2.1* 2.4*   < > = values in this interval not displayed.    ABGs:     Recent Labs  Lab 04/21/17  0546   PH 7.549*   PCO2 22.8*   HCO3 19.9*   POCSATURATED 56*   BE -2     Lactic Acid:     Recent Labs  Lab 04/21/17  1044   LACTATE 1.5  1.5       Significant Imaging:    X-Ray Abdomen Portable     Comparisons are made to 04/21/2017. Mild S-shaped curvature of the spine again seen.  Stable degenerative changes of the spine and right greater than left hip joint.  Phleboliths versus ureteroliths overlie the pelvis.    Bowel gas pattern remains normal.    ASSESSMENT/PLAN:       I have reviewed all labs and imaging studies and compared to previous results. I have also discussed labs with all the teams in the medical care of the patient and my plan is outlined below       Active Diagnoses:    Diagnosis Date Noted POA    PRINCIPAL PROBLEM:  Accelerated hypertension [I10] 08/20/2015 Yes    Intractable vomiting with nausea [R11.2] 02/19/2016 Yes    Narcotic bowel syndrome due to therapeutic use [K63.89, T50.905A] 07/04/2015 Yes     Chronic    Lumbar radicular pain [M54.16] 04/22/2015 Yes     Chronic      Problems Resolved During this Admission:    Diagnosis Date Noted Date Resolved POA    Diabetic ketoacidosis without coma associated with type 2 diabetes mellitus [E13.10] 08/20/2015 04/22/2017 Yes    Diabetic ketoacidosis without coma [E13.10] 02/19/2016 04/22/2017 Yes     DKA, type 2, not at goal [E13.10] 02/19/2016 04/22/2017 Yes        Critical Care Medicine Daily Checklist:    A: Awake: RASS Goal/Actual Goal:    Actual: Greer Agitation Sedation Scale (RASS): Alert and calm   B: Spontaneous Breathing Trial Performed?     C: SAT & SBT Coordinated?  YES                      D: Delirium: CAM-ICU     E: Early Mobility Performed? Yes   F: Feeding Goal:    Status:     Current Diet Order   Procedures    Diet Diabetic 2000 Calories      AS: Analgesia/Sedation DILAUDID   T: Thromboembolic Prophylaxis ENOXAPARIN   H: HOB > 300 Yes   U: Stress Ulcer Prophylaxis (if needed) PANTOPRAZOLE   G: Glucose Control INSULIN ASPART + DETEMIR   B: Bowel Function     I: Indwelling Catheter (Lines & Hendricks) Necessity YES   D: De-escalation of Antimicrobials/Pharmacotherapies N/A    Plan for the day/ETD CONTINUE CURRENT    Code Status:  Family/Goals of Care: Full Code  HOME SELF CARE              PLAN:    1. Neuro:   Normal strength and tone. No focal numbness or weakness Sedation not required for patient comfort RASS 0  alert and calm ICU neuro monitoring. RESUME REMERON, ZOLOFT WHEN ABLE TO TOLERATE PO INTAKE.    2. Pulmonary:    Stable Oxygen supplementation. FIO2 titrate to > 90 - 92 % Bronchodilators as needed.    3. Cardiac:    Stable  LABILE HYPERTENSION regular rate and rhythm. HYDRALLAZINE PRN.  RESUME HOME ANTIHYPERTENSIVES WHEN ABLE TO TOLERATE PO INTAKE ( LOTENSIN, ZIAC) Monitor hemodynamics and  monitor for dysrhythmias MAP goal of  65 mmHg.     4. Renal:    Stable Volume status: MILD HYPOVOLEMIA - VOLUME REPLETION ( NACL + K @ 100 MLS /HOUR)  Daily weights. Hendricks in place. Monitor I/Os     5. Infectious Disease:   Stable   Monitor fever curve and sepsis surveillance         6. Hematology/Oncology:   Stable Conservative transfusion strategy and monitor for SS of occult or overt bleeding        7. Endocrine: Stable. DKA RESOLVED.EUGLYCEMIC.  DETEMIR + SSI. Blood glucose target 100 - 180  mg/dl    8. Fluids/Electrolytes/Nutrition/GI: MONITOR AND REPLETE ELECTROLYTES Maintain even fluid balance diabetic diet . REGLAN, ZOFRAN PRN NAUSEA AND VOMITTING. DISCONTINUE PHENERGAN - INDUCES HYPOTENSION    9. Musculoskeletal: Stable  PT and OT     10. Pain Management: Pain control  adequate. Analgesia per ICU protocol.    11. Disposition: Unchanged. FULL CODE        Counseling/Consultation: I have discussed the care of this patient in detail with Multidisciplinary team during rounds, bedside nursing staff and Dr. RODRIGUEZ      Critical Care Time: 60 Minutes    Critical secondary to Patient has a condition that poses threat to life and bodily function: INTRACTABLE VOMITTING.  ACCELERATED / LABILE HYPERTENSION. DKA NOW RESOLVED. DIABETIC GASTROPARESIS     Critical care was time spent personally by me on the following activities: development of treatment plan with patient or surrogate and bedside caregivers, discussions with consultants, evaluation of patient's response to treatment, examination of patient, ordering and performing treatments and interventions, ordering and review of laboratory studies, ordering and review of radiographic studies, pulse oximetry, re-evaluation of patient's condition.  This critical care time did not overlap with that of any other provider or involve time for any procedures.     Hector Gray MD  Critical Care Medicine  [facility]

## 2017-04-22 NOTE — PROGRESS NOTES
eICU notified about , order received for 10mg Hydralazine IV push, please see MAR, will continue to monitor

## 2017-04-23 PROBLEM — I10 UNCONTROLLED HYPERTENSION: Status: ACTIVE | Noted: 2017-04-23

## 2017-04-23 PROBLEM — E11.10 DIABETIC KETOACIDOSIS WITHOUT COMA: Status: ACTIVE | Noted: 2017-04-23

## 2017-04-23 LAB
ANION GAP SERPL CALC-SCNC: 11 MMOL/L
ANION GAP SERPL CALC-SCNC: 8 MMOL/L
ANION GAP SERPL CALC-SCNC: 9 MMOL/L
BUN SERPL-MCNC: 5 MG/DL
BUN SERPL-MCNC: 5 MG/DL
BUN SERPL-MCNC: 6 MG/DL
CALCIUM SERPL-MCNC: 8.4 MG/DL
CALCIUM SERPL-MCNC: 8.6 MG/DL
CALCIUM SERPL-MCNC: 9.6 MG/DL
CHLORIDE SERPL-SCNC: 110 MMOL/L
CO2 SERPL-SCNC: 17 MMOL/L
CO2 SERPL-SCNC: 18 MMOL/L
CO2 SERPL-SCNC: 19 MMOL/L
CREAT SERPL-MCNC: 0.8 MG/DL
EST. GFR  (AFRICAN AMERICAN): >60 ML/MIN/1.73 M^2
EST. GFR  (NON AFRICAN AMERICAN): >60 ML/MIN/1.73 M^2
GLUCOSE SERPL-MCNC: 168 MG/DL
GLUCOSE SERPL-MCNC: 213 MG/DL
GLUCOSE SERPL-MCNC: 220 MG/DL
MAGNESIUM SERPL-MCNC: 1.8 MG/DL
MAGNESIUM SERPL-MCNC: 1.9 MG/DL
PHOSPHATE SERPL-MCNC: 2.6 MG/DL
PHOSPHATE SERPL-MCNC: 3.8 MG/DL
POCT GLUCOSE: 152 MG/DL (ref 70–110)
POCT GLUCOSE: 188 MG/DL (ref 70–110)
POCT GLUCOSE: 206 MG/DL (ref 70–110)
POCT GLUCOSE: 228 MG/DL (ref 70–110)
POTASSIUM SERPL-SCNC: 4 MMOL/L
POTASSIUM SERPL-SCNC: 4.2 MMOL/L
POTASSIUM SERPL-SCNC: 4.3 MMOL/L
SODIUM SERPL-SCNC: 136 MMOL/L
SODIUM SERPL-SCNC: 137 MMOL/L
SODIUM SERPL-SCNC: 139 MMOL/L

## 2017-04-23 PROCEDURE — 25000003 PHARM REV CODE 250: Performed by: NURSE PRACTITIONER

## 2017-04-23 PROCEDURE — G8987 SELF CARE CURRENT STATUS: HCPCS | Mod: CK

## 2017-04-23 PROCEDURE — 97116 GAIT TRAINING THERAPY: CPT

## 2017-04-23 PROCEDURE — 63600175 PHARM REV CODE 636 W HCPCS: Performed by: INTERNAL MEDICINE

## 2017-04-23 PROCEDURE — G8979 MOBILITY GOAL STATUS: HCPCS | Mod: CJ

## 2017-04-23 PROCEDURE — 84100 ASSAY OF PHOSPHORUS: CPT | Mod: 91

## 2017-04-23 PROCEDURE — 80048 BASIC METABOLIC PNL TOTAL CA: CPT | Mod: 91

## 2017-04-23 PROCEDURE — 63600175 PHARM REV CODE 636 W HCPCS: Performed by: EMERGENCY MEDICINE

## 2017-04-23 PROCEDURE — 80048 BASIC METABOLIC PNL TOTAL CA: CPT

## 2017-04-23 PROCEDURE — 36415 COLL VENOUS BLD VENIPUNCTURE: CPT

## 2017-04-23 PROCEDURE — 11000001 HC ACUTE MED/SURG PRIVATE ROOM

## 2017-04-23 PROCEDURE — 83735 ASSAY OF MAGNESIUM: CPT | Mod: 91

## 2017-04-23 PROCEDURE — 84100 ASSAY OF PHOSPHORUS: CPT

## 2017-04-23 PROCEDURE — C9113 INJ PANTOPRAZOLE SODIUM, VIA: HCPCS | Performed by: INTERNAL MEDICINE

## 2017-04-23 PROCEDURE — G8978 MOBILITY CURRENT STATUS: HCPCS | Mod: CK

## 2017-04-23 PROCEDURE — 83735 ASSAY OF MAGNESIUM: CPT

## 2017-04-23 PROCEDURE — 99233 SBSQ HOSP IP/OBS HIGH 50: CPT | Mod: ,,, | Performed by: INTERNAL MEDICINE

## 2017-04-23 PROCEDURE — 97161 PT EVAL LOW COMPLEX 20 MIN: CPT

## 2017-04-23 PROCEDURE — 97530 THERAPEUTIC ACTIVITIES: CPT

## 2017-04-23 PROCEDURE — 63600175 PHARM REV CODE 636 W HCPCS: Performed by: NURSE PRACTITIONER

## 2017-04-23 PROCEDURE — 25000003 PHARM REV CODE 250: Performed by: INTERNAL MEDICINE

## 2017-04-23 PROCEDURE — G8988 SELF CARE GOAL STATUS: HCPCS | Mod: CJ

## 2017-04-23 PROCEDURE — G8980 MOBILITY D/C STATUS: HCPCS | Mod: CI

## 2017-04-23 PROCEDURE — 97165 OT EVAL LOW COMPLEX 30 MIN: CPT

## 2017-04-23 RX ORDER — MEGESTROL ACETATE 40 MG/1
40 TABLET ORAL DAILY
Status: DISCONTINUED | OUTPATIENT
Start: 2017-04-23 | End: 2017-04-24 | Stop reason: HOSPADM

## 2017-04-23 RX ORDER — HYDRALAZINE HYDROCHLORIDE 20 MG/ML
10 INJECTION INTRAMUSCULAR; INTRAVENOUS EVERY 8 HOURS PRN
Status: DISCONTINUED | OUTPATIENT
Start: 2017-04-23 | End: 2017-04-24 | Stop reason: HOSPADM

## 2017-04-23 RX ORDER — GABAPENTIN 100 MG/1
100 CAPSULE ORAL 3 TIMES DAILY
Status: DISCONTINUED | OUTPATIENT
Start: 2017-04-23 | End: 2017-04-24 | Stop reason: HOSPADM

## 2017-04-23 RX ORDER — SODIUM CHLORIDE 9 MG/ML
INJECTION, SOLUTION INTRAVENOUS CONTINUOUS
Status: DISCONTINUED | OUTPATIENT
Start: 2017-04-23 | End: 2017-04-24 | Stop reason: HOSPADM

## 2017-04-23 RX ORDER — SERTRALINE HYDROCHLORIDE 50 MG/1
50 TABLET, FILM COATED ORAL DAILY
Status: DISCONTINUED | OUTPATIENT
Start: 2017-04-23 | End: 2017-04-24 | Stop reason: HOSPADM

## 2017-04-23 RX ORDER — BISACODYL 10 MG
10 SUPPOSITORY, RECTAL RECTAL DAILY PRN
Status: DISCONTINUED | OUTPATIENT
Start: 2017-04-23 | End: 2017-04-24 | Stop reason: HOSPADM

## 2017-04-23 RX ORDER — HYDROCODONE BITARTRATE AND ACETAMINOPHEN 10; 325 MG/1; MG/1
1 TABLET ORAL EVERY 4 HOURS PRN
Status: DISCONTINUED | OUTPATIENT
Start: 2017-04-23 | End: 2017-04-24 | Stop reason: HOSPADM

## 2017-04-23 RX ADMIN — METOCLOPRAMIDE 10 MG: 5 INJECTION, SOLUTION INTRAMUSCULAR; INTRAVENOUS at 08:04

## 2017-04-23 RX ADMIN — HYDROMORPHONE HYDROCHLORIDE 0.5 MG: 1 INJECTION, SOLUTION INTRAMUSCULAR; INTRAVENOUS; SUBCUTANEOUS at 12:04

## 2017-04-23 RX ADMIN — METOCLOPRAMIDE 10 MG: 5 INJECTION, SOLUTION INTRAMUSCULAR; INTRAVENOUS at 12:04

## 2017-04-23 RX ADMIN — POTASSIUM CHLORIDE AND SODIUM CHLORIDE: 900; 150 INJECTION, SOLUTION INTRAVENOUS at 12:04

## 2017-04-23 RX ADMIN — INSULIN DETEMIR 10 UNITS: 100 INJECTION, SOLUTION SUBCUTANEOUS at 09:04

## 2017-04-23 RX ADMIN — HYDROMORPHONE HYDROCHLORIDE 0.5 MG: 1 INJECTION, SOLUTION INTRAMUSCULAR; INTRAVENOUS; SUBCUTANEOUS at 11:04

## 2017-04-23 RX ADMIN — ONDANSETRON 8 MG: 2 INJECTION INTRAMUSCULAR; INTRAVENOUS at 05:04

## 2017-04-23 RX ADMIN — GABAPENTIN 100 MG: 100 CAPSULE ORAL at 02:04

## 2017-04-23 RX ADMIN — GABAPENTIN 100 MG: 100 CAPSULE ORAL at 09:04

## 2017-04-23 RX ADMIN — INSULIN ASPART 1 UNITS: 100 INJECTION, SOLUTION INTRAVENOUS; SUBCUTANEOUS at 06:04

## 2017-04-23 RX ADMIN — ONDANSETRON 8 MG: 2 INJECTION INTRAMUSCULAR; INTRAVENOUS at 02:04

## 2017-04-23 RX ADMIN — SODIUM CHLORIDE: 0.9 INJECTION, SOLUTION INTRAVENOUS at 10:04

## 2017-04-23 RX ADMIN — MEGESTROL ACETATE 40 MG: 40 TABLET ORAL at 10:04

## 2017-04-23 RX ADMIN — ENOXAPARIN SODIUM 40 MG: 100 INJECTION SUBCUTANEOUS at 04:04

## 2017-04-23 RX ADMIN — HYDROMORPHONE HYDROCHLORIDE 0.5 MG: 1 INJECTION, SOLUTION INTRAMUSCULAR; INTRAVENOUS; SUBCUTANEOUS at 06:04

## 2017-04-23 RX ADMIN — SERTRALINE HYDROCHLORIDE 50 MG: 50 TABLET ORAL at 02:04

## 2017-04-23 RX ADMIN — PANTOPRAZOLE SODIUM 40 MG: 40 INJECTION, POWDER, FOR SOLUTION INTRAVENOUS at 08:04

## 2017-04-23 RX ADMIN — INSULIN ASPART 4 UNITS: 100 INJECTION, SOLUTION INTRAVENOUS; SUBCUTANEOUS at 04:04

## 2017-04-23 RX ADMIN — HYDROMORPHONE HYDROCHLORIDE 0.5 MG: 1 INJECTION, SOLUTION INTRAMUSCULAR; INTRAVENOUS; SUBCUTANEOUS at 05:04

## 2017-04-23 NOTE — PLAN OF CARE
Problem: Patient Care Overview  Goal: Plan of Care Review  Outcome: Ongoing (interventions implemented as appropriate)  Received from ICU to room 544 via wheelchair. C/o intermittent nausea, relieved with IV Zofran. Denies c/o pain at this time. Skin intact. No s/s acute distress.

## 2017-04-23 NOTE — PT/OT/SLP EVAL
Physical Therapy  Evaluation    Melva Barker   MRN: [id number]   Admitting Diagnosis: Essential hypertension    PT Received On: 17  PT Start Time: 1115     PT Stop Time: 1140    PT Total Time (min): 25 min       Billable Minutes:  Evaluation 15 and Gait [id number]    Diagnosis: Essential hypertension  P.T. RX DX:  IMPAIRED GAIT    Past Medical History:   Diagnosis Date    Anxiety     Back pain     chronic    Diabetes mellitus     Gastroparesis     Hypertension     Sciatica       No past surgical history on file.    Referring physician: DR RODRIGUEZ  Date referred to PT: 17     General Precautions: Standard, fall  Orthopedic Precautions: N/A   Braces: N/A            Patient History:  Lives With: other relative(s)  Living Arrangements: house  Home Accessibility:  (RAMP)  Stair Railings at Home: outside, present at both sides (RAILS ON RAMP)  Transportation Available: family or friend will provide  Living Environment Comment: FAMILY MEMBER PROVIDES 24 HR CARE  Equipment Currently Used at Home: cane, quad, walker, rolling, wheelchair  DME owned (not currently used): rolling walker and wheelchair    Previous Level of Function:  Ambulation Skills: needs device (USES QUAD AT HOME)  Transfer Skills: independent  ADL Skills: independent  Work/Leisure Activity: independent    Subjective:  Communicated with EPIC AND NURSE GODOY prior to session.  PT AGREES TO THERAPY  Chief Complaint: PAIN IN BACK  Patient goals: TO GET BETTER    Pain Ratin/10      Location - Orientation: lower  Location: back (SI AREA)  Pain Addressed: Reposition, Distraction, Pre-medicate for activity, Nurse notified       Objective:   Patient found with: telemetry, peripheral IV, blood pressure cuff     Cognitive Exam:  Oriented to: Person, Place, Time and Situation    Follows Commands/attention: Follows multistep  commands  Communication: clear/fluent  Safety awareness/insight to disability: intact    Physical Exam:  Postural  examination/scapula alignment: Rounded shoulder and Head forward    Skin integrity: Visible skin intact  Edema: None noted LE'S    Sensation:   Intact      Lower Extremity Range of Motion:  Right Lower Extremity: WFL  Left Lower Extremity: WFL    Lower Extremity Strength:  Right Lower Extremity: Deficits: 3-/5  Left Lower Extremity: Deficits: 3+/5       Functional Mobility:  Bed Mobility:  Rolling/Turning to Left: Stand by assistance  Rolling/Turning Right: Stand by assistance  Scooting/Bridging: Contact Guard Assistance  Supine to Sit: Contact Guard Assistance  Sit to Supine: Contact Guard Assistance    Transfers:  Sit <> Stand Assistance: Minimum Assistance  Sit <> Stand Assistive Device: No Assistive Device  Bed <> Chair Technique: Stand Pivot  Bed <> Chair Assistance: Minimum Assistance  Bed <> Chair Assistive Device: No Assistive Device    Gait:   Gait Distance: 35 FEET IN ROOM WITH PT FAVORING RLE QUITE A BIT, SEC PAIN, WHICH EASED A LITTLE WITH GAIT AND MOBILITY  Assistance 1: Minimum assistance  Gait Assistive Device: Hand held assist  Gait Pattern: swing-to gait  Gait Deviation(s): decreased lyle, decreased stride length, decreased step length      Balance:   Static Sit: FAIR+: Able to take MINIMAL challenges from all directions  Dynamic Sit: FAIR+: Maintains balance through MINIMAL excursions of active trunk motion  Static Stand: POOR+: Needs MINIMAL assist to maintain  Dynamic stand: POOR: N/A    Therapeutic Activities and Exercises:  PT SAT ON SIDE OF BED FOR 5 MINS, THEN AMBULATED 35 FEET IN ROOM WITH SLOW DELIBERATE PACED, AND RETURNED TO BED TO REST.  MET TO REDUCE PELVIC OBLIQUITY AND PAIN IN SI AREA DECREASED.  PT EDUCATION    AM-PAC 6 CLICK MOBILITY  How much help from another person does this patient currently need?   1 = Unable, Total/Dependent Assistance  2 = A lot, Maximum/Moderate Assistance  3 = A little, Minimum/Contact Guard/Supervision  4 = None, Modified [place]/Independent           AM-PAC Raw Score CMS G-Code Modifier Level of Impairment Assistance   6 % Total / Unable   7 - 9 CM 80 - 100% Maximal Assist   10 - 14 CL 60 - 80% Moderate Assist   15 - 19 CK 40 - 60% Moderate Assist   20 - 22 CJ 20 - 40% Minimal Assist   23 CI 1-20% SBA / CGA   24 CH 0% Independent/ Mod I     Patient left supine with all lines intact, call button in reach and WALT  present.    Assessment:   Melva Barker is a 72 y.o. female with a medical diagnosis of Essential hypertension and presents with IMPAIRED MOBILITY AND GAIT AND WILL BENEFIT FROM SKILLED P.T. INTERVENTION TO ADDRESS STATED GOALS AND IMPROVE GAIT AND MOBILITY.    Rehab identified problem list/impairments: Rehab identified problem list/impairments: weakness, impaired endurance, impaired balance, decreased lower extremity function, pain, decreased ROM    Rehab potential is good.    Activity tolerance: Good    Discharge recommendations: Discharge Facility/Level Of Care Needs: home health PT, FOLLOWED BY OUTPATIENT P.T. FOR BACK PAIN/SCIATIC PAIN.     Barriers to discharge: Barriers to Discharge: None    Equipment recommendations: Equipment Needed After Discharge: none     GOALS:   Physical Therapy Goals        Problem: Physical Therapy Goal    Goal Priority Disciplines Outcome Goal Variances Interventions   Physical Therapy Goal     PT/OT, PT      Description:  Goals to be met by: 17     Patient will increase functional independence with mobility by performin. Supine to sit with [place]  2. Bed to chair transfer with Modified [place] using Quad Cane  3. Gait  x 150 feet with Modified [place] using QUAD CANE  4. Lower extremity exercise program x20 reps per handout, with supervision                PLAN:    Patient to be seen 5 x/week to address the above listed problems via gait training, therapeutic activities, therapeutic exercises  Plan of Care expires: 17  Plan of Care reviewed with:  patient    Functional Assessment Tool Used: FIM  Score: 4  Functional Limitation: Mobility: Walking and moving around  Mobility: Walking and Moving Around Current Status (): CK  Mobility: Walking and Moving Around Goal Status (): CJ  Mobility: Walking and Moving Around Discharge Status (): PRISCA Moss, PT  04/23/2017

## 2017-04-23 NOTE — PROGRESS NOTES
[facility] Hospital Medicine  Progress Note    Patient Name: Melva Barker  MRN: [id number]  Patient Class: IP- Inpatient   Admission Date: 4/21/2017  Length of Stay: 2 days  Attending Physician: Nasim Gerard MD  Primary Care Provider: Claire Souza MD        Subjective:     Principal Problem:Essential hypertension    HPI:  Melva Barker is a 73 yo female with PMHx of HTN, IDDM, Gastroparesis and chronic back pain who presents to [facility] with c/o multiple episodes of emesis for 6 hours PTA.  Pt denies fever, urinary symptoms, URI symptoms, diarrhea, cough and S/S of caudal equina. She was uncertain if this was a flare of gastroparesis. In the ED, hypertension, tachycardia and tachypnea present. CBC is unremarkable; CMP notes hypokalemia, CO2 17, anion gap 22, glucose 239 and beta hydroxybutyrate, ABG notes pH 7.5. An insulin infusion was initiated and pt was admitted to ICU for DKA.     Hospital Course:  4/22/17 - Pateint admitted to ICU for DKA POA - Initiated on insulin Gtt. Patient blood sugars controlled and patient weaned off Gtt. Patient experiencing bouts of Hypotension - 38/14 and hypertensive urgency with SBP in the 220's noted. Patient developing an acute abdomen associated with Nausea / Vomiting. Appears to be narcotic bowel in etiology. Xray ordered. Phenergan provided.   4/23/17 - Patient improved BP control. Patient denies new complaint.       Interval History: Patient improving clinically.     Review of Systems   Constitutional: Positive for appetite change. Negative for unexpected weight change.   HENT: Negative.  Negative for trouble swallowing.    Eyes: Negative.    Respiratory: Negative.  Negative for cough, shortness of breath and wheezing.    Cardiovascular: Negative.  Negative for chest pain.   Gastrointestinal: Positive for nausea. Negative for constipation and vomiting.   Endocrine: Negative.    Genitourinary: Negative.    Musculoskeletal: Negative.    Skin:  Negative.    Allergic/Immunologic: Negative.    Neurological: Positive for weakness. Negative for dizziness.   Hematological: Negative.    Psychiatric/Behavioral: Negative.    All other systems reviewed and are negative.    Objective:     Vital Signs (Most Recent):  Temp: 99.1 °F (37.3 °C) (04/23/17 0845)  Pulse: (!) 58 (04/23/17 1000)  Resp: (!) 22 (04/23/17 1000)  BP: 124/78 (04/23/17 1000)  SpO2: 100 % (04/23/17 1000) Vital Signs (24h Range):  Temp:  [98.4 °F (36.9 °C)-100.2 °F (37.9 °C)] 99.1 °F (37.3 °C)  Pulse:  [] 58  Resp:  [12-29] 22  SpO2:  [99 %-100 %] 100 %  BP: ()/(41-90) 124/78     Weight: 51.8 kg (114 lb 4.6 oz)  Body mass index is 22.32 kg/(m^2).    Intake/Output Summary (Last 24 hours) at 04/23/17 1254  Last data filed at 04/23/17 1000   Gross per 24 hour   Intake             2700 ml   Output             2145 ml   Net              555 ml      Physical Exam   Constitutional: She is oriented to person, place, and time. She appears well-developed and well-nourished.   HENT:   Head: Normocephalic and atraumatic.   Eyes: EOM are normal. Pupils are equal, round, and reactive to light.   Neck: Normal range of motion. Neck supple. No JVD present.   Cardiovascular: Regular rhythm, normal heart sounds and intact distal pulses.    No murmur heard.  Tachy   Pulmonary/Chest: Effort normal and breath sounds normal. No respiratory distress. She has no wheezes.   Abdominal: She exhibits no distension. There is no tenderness. There is no guarding.   Hyperactive BS   Musculoskeletal: Normal range of motion. She exhibits no edema or tenderness.   Neurological: She is alert and oriented to person, place, and time. She has normal reflexes. No cranial nerve deficit.   Skin: Skin is warm and dry. No erythema.   Psychiatric: She has a normal mood and affect. Her behavior is normal. Judgment and thought content normal.   Nursing note and vitals reviewed.      Significant Labs:   BMP:     Recent Labs  Lab  04/23/17  0357   *      K 4.2      CO2 17*   BUN 5*   CREATININE 0.8   CALCIUM 8.4*   MG 1.8     CBC:     Recent Labs  Lab 04/22/17  0347   WBC 8.47   HGB 11.5*   HCT 33.5*        CMP:   Recent Labs  Lab 04/21/17  2154  04/22/17  2133 04/23/17  0055 04/23/17  0357     143  < > 138 139 136   K 3.0*  3.0*  < > 4.2 4.3 4.2     108  < > 111* 110 110   CO2 19*  19*  < > 16* 18* 17*   *  133*  < > 215* 220* 213*   BUN 3*  3*  < > 7* 6* 5*   CREATININE 0.8  0.8  < > 0.8 0.8 0.8   CALCIUM 9.1  9.1  < > 8.4* 9.6 8.4*   PROT 8.7*  --   --   --   --    ALBUMIN 4.1  --   --   --   --    BILITOT 0.7  --   --   --   --    ALKPHOS 106  --   --   --   --    AST 21  --   --   --   --    ALT 15  --   --   --   --    ANIONGAP 16  16  < > 11 11 9   EGFRNONAA >60  >60  < > >60 >60 >60   < > = values in this interval not displayed.  Cardiac Markers: No results for input(s): CKMB, MYOGLOBIN, BNP, TROPISTAT in the last 48 hours.  Lactic Acid: No results for input(s): LACTATE in the last 48 hours.  Urine Studies: No results for input(s): COLORU, APPEARANCEUA, PHUR, SPECGRAV, PROTEINUA, GLUCUA, KETONESU, BILIRUBINUA, OCCULTUA, NITRITE, UROBILINOGEN, LEUKOCYTESUR, RBCUA, WBCUA, BACTERIA, SQUAMEPITHEL, HYALINECASTS in the last 48 hours.    Invalid input(s): WRIGHTSUR  All pertinent labs within the past 24 hours have been reviewed.    Significant Imaging: I have reviewed all pertinent imaging results/findings within the past 24 hours.    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

## 2017-04-23 NOTE — SUBJECTIVE & OBJECTIVE
Interval History: Patient improving clinically.     Review of Systems   Constitutional: Positive for appetite change. Negative for unexpected weight change.   HENT: Negative.  Negative for trouble swallowing.    Eyes: Negative.    Respiratory: Negative.  Negative for cough, shortness of breath and wheezing.    Cardiovascular: Negative.  Negative for chest pain.   Gastrointestinal: Positive for nausea. Negative for constipation and vomiting.   Endocrine: Negative.    Genitourinary: Negative.    Musculoskeletal: Negative.    Skin: Negative.    Allergic/Immunologic: Negative.    Neurological: Positive for weakness. Negative for dizziness.   Hematological: Negative.    Psychiatric/Behavioral: Negative.    All other systems reviewed and are negative.    Objective:     Vital Signs (Most Recent):  Temp: 99.1 °F (37.3 °C) (04/23/17 0845)  Pulse: (!) 58 (04/23/17 1000)  Resp: (!) 22 (04/23/17 1000)  BP: 124/78 (04/23/17 1000)  SpO2: 100 % (04/23/17 1000) Vital Signs (24h Range):  Temp:  [98.4 °F (36.9 °C)-100.2 °F (37.9 °C)] 99.1 °F (37.3 °C)  Pulse:  [] 58  Resp:  [12-29] 22  SpO2:  [99 %-100 %] 100 %  BP: ()/(41-90) 124/78     Weight: 51.8 kg (114 lb 4.6 oz)  Body mass index is 22.32 kg/(m^2).    Intake/Output Summary (Last 24 hours) at 04/23/17 1254  Last data filed at 04/23/17 1000   Gross per 24 hour   Intake             2700 ml   Output             2145 ml   Net              555 ml      Physical Exam   Constitutional: She is oriented to person, place, and time. She appears well-developed and well-nourished.   HENT:   Head: Normocephalic and atraumatic.   Eyes: EOM are normal. Pupils are equal, round, and reactive to light.   Neck: Normal range of motion. Neck supple. No JVD present.   Cardiovascular: Regular rhythm, normal heart sounds and intact distal pulses.    No murmur heard.  Tachy   Pulmonary/Chest: Effort normal and breath sounds normal. No respiratory distress. She has no wheezes.   Abdominal: She  exhibits no distension. There is no tenderness. There is no guarding.   Hyperactive BS   Musculoskeletal: Normal range of motion. She exhibits no edema or tenderness.   Neurological: She is alert and oriented to person, place, and time. She has normal reflexes. No cranial nerve deficit.   Skin: Skin is warm and dry. No erythema.   Psychiatric: She has a normal mood and affect. Her behavior is normal. Judgment and thought content normal.   Nursing note and vitals reviewed.      Significant Labs:   BMP:     Recent Labs  Lab 04/23/17  0357   *      K 4.2      CO2 17*   BUN 5*   CREATININE 0.8   CALCIUM 8.4*   MG 1.8     CBC:     Recent Labs  Lab 04/22/17  0347   WBC 8.47   HGB 11.5*   HCT 33.5*        CMP:   Recent Labs  Lab 04/21/17  2154  04/22/17  2133 04/23/17  0055 04/23/17  0357     143  < > 138 139 136   K 3.0*  3.0*  < > 4.2 4.3 4.2     108  < > 111* 110 110   CO2 19*  19*  < > 16* 18* 17*   *  133*  < > 215* 220* 213*   BUN 3*  3*  < > 7* 6* 5*   CREATININE 0.8  0.8  < > 0.8 0.8 0.8   CALCIUM 9.1  9.1  < > 8.4* 9.6 8.4*   PROT 8.7*  --   --   --   --    ALBUMIN 4.1  --   --   --   --    BILITOT 0.7  --   --   --   --    ALKPHOS 106  --   --   --   --    AST 21  --   --   --   --    ALT 15  --   --   --   --    ANIONGAP 16  16  < > 11 11 9   EGFRNONAA >60  >60  < > >60 >60 >60   < > = values in this interval not displayed.  Cardiac Markers: No results for input(s): CKMB, MYOGLOBIN, BNP, TROPISTAT in the last 48 hours.  Lactic Acid: No results for input(s): LACTATE in the last 48 hours.  Urine Studies: No results for input(s): COLORU, APPEARANCEUA, PHUR, SPECGRAV, PROTEINUA, GLUCUA, KETONESU, BILIRUBINUA, OCCULTUA, NITRITE, UROBILINOGEN, LEUKOCYTESUR, RBCUA, WBCUA, BACTERIA, SQUAMEPITHEL, HYALINECASTS in the last 48 hours.    Invalid input(s): MIRIAN  All pertinent labs within the past 24 hours have been reviewed.    Significant Imaging: I have reviewed  all pertinent imaging results/findings within the past 24 hours.

## 2017-04-23 NOTE — PROGRESS NOTES
Progress Note  Critical Care    Admit Date: 4/21/2017   LOS: 2 days     Follow-up For: labile HTN and resolved DKA     SUBJECTIVE:     New over last 24 hours: BP improved off BP meds and nausea improved.  Up in chair this AM eating breakfast    ROS  Review of Systems   Constitutional: Negative for chills, fever and malaise/fatigue.   HENT: Negative for congestion.    Eyes: Negative for blurred vision.   Respiratory: Negative for cough, sputum production and shortness of breath.    Cardiovascular: Negative for chest pain and leg swelling.   Gastrointestinal: Positive for nausea. Negative for abdominal pain, diarrhea and vomiting.        Poor appetite   Genitourinary: Negative for dysuria.   Musculoskeletal: Positive for back pain (chronic). Negative for myalgias.   Skin: Negative for rash.   Neurological: Negative for dizziness, focal weakness, weakness and headaches.   Endo/Heme/Allergies: Does not bruise/bleed easily.   Psychiatric/Behavioral: The patient is not nervous/anxious.        Continuous Infusions:   0/9% NACL & POTASSIUM CHLORIDE 20 MEQ/L 100 mL/hr at 04/23/17 0700     Review of patient's allergies indicates:   Allergen Reactions    Morphine Other (See Comments)     headache    Latex, natural rubber Rash       OBJECTIVE:     Vital Signs (Most Recent)  Temp: 99.5 °F (37.5 °C) (04/23/17 0330)  Pulse: (!) 53 (04/23/17 0720)  Resp: 19 (04/23/17 0700)  BP: (!) 101/52 (04/23/17 0700)  SpO2: 100 % (04/23/17 0720)    Vital Signs Range (Last 24H):  Temp:  [98.4 °F (36.9 °C)-100.2 °F (37.9 °C)]   Pulse:  []   Resp:  [12-29]   BP: ()/()   SpO2:  [99 %-100 %]     I & O (Last 24H):  Intake/Output Summary (Last 24 hours) at 04/23/17 0939  Last data filed at 04/23/17 0700   Gross per 24 hour   Intake             2750 ml   Output             2290 ml   Net              460 ml       Ventilator Data (Last 24H):          Physical Exam:    Physical Exam   Constitutional: She is oriented to person, place,  and time and well-developed, well-nourished, and in no distress. Vital signs are normal. She appears not lethargic, to not be writhing in pain and not malnourished. She appears healthy.  Non-toxic appearance. She does not have a sickly appearance. No distress.   HENT:   Head: Normocephalic and atraumatic.   Mouth/Throat: Oropharynx is clear and moist.   Eyes: EOM and lids are normal. Pupils are equal, round, and reactive to light.   Neck: Normal range of motion. Carotid bruit is not present.   Cardiovascular: Regular rhythm.  Bradycardia present.    Pulses:       Radial pulses are 2+ on the right side, and 2+ on the left side.        Dorsalis pedis pulses are 1+ on the right side, and 1+ on the left side.   Pulmonary/Chest: Breath sounds normal. No accessory muscle usage. No respiratory distress.   Abdominal: Soft. Normal appearance and bowel sounds are normal. She exhibits no distension. There is no tenderness.   Genitourinary:   Genitourinary Comments: Hendricks   Musculoskeletal: Normal range of motion.   No edema   Lymphadenopathy:     She has no cervical adenopathy.   Neurological: She is alert and oriented to person, place, and time. She appears not lethargic.   Skin: Skin is warm, dry and intact. She is not diaphoretic. No cyanosis.   Psychiatric: Mood, memory and judgment normal.       Laboratory (Last 24H):  CMP:    Recent Labs  Lab 04/23/17  0357   CALCIUM 8.4*      K 4.2   CO2 17*      BUN 5*   CREATININE 0.8       Chest X-Ray:  No recent film last 24 hours          ASSESSMENT/PLAN:     Problem   Essential Hypertension   Diabetes Mellitus Type 2 Without Retinopathy   Gastroparesis Diabeticorum   Narcotic Bowel Syndrome Due to Therapeutic Use   Lumbar Radicular Pain   Intractable Vomiting With Nausea (Resolved)       PLAN:    1. Neuro: neuro monitoring    2. Pulmonary: Encourage C&DB and OOB.      3. Cardiac: monitor Vital signs.    4. Renal: Hendricks in place remove, monitor I/Os     5. Infectious  Disease: Follow fever curve and remove Hendricks    6. Hematology/Oncology: monitor for bleeding    7. Endocrine:  Monitor glucose cont SSI    8. Fluids/Electrolytes/Nutrition/GI: Encourage ADA diet and cont IVFs.  Add Megace    9. Musculoskeletal:  ROM and consult PT/OT    10. Pain Management: PRN Hydrocodone and Dilaudid     11. Discharge and Palliative Care: Plan home with family    Preventive Measures and Monitoring:   Stress Ulcer: PPI  Nutrition: ADA diet  Glucose control: SSI  Bowel prophylaxis: PRN Dulcolax  DVT prophylaxis: LMWH/SCDs  Hx CAD on B-Blocker: no hx CAD  Head of Bed/Reposition: Elevate HOB and turn Q1-2 hours    Early Mobility: OOB  Hendricks Day: #3  Code Status: Full    Counseling/Consultation: I have discussed the care of this patient in detail with Multidisciplinary team during rounds, bedside nursing staff and Dr. Gray and Dr. Gerard    Will transfer to Med Surg and sign off,  Dr. Gerard agrees.     CARMEN Sawyer [facility]

## 2017-04-23 NOTE — PT/OT/SLP EVAL
Occupational Therapy  Evaluation    Melva Barker   MRN: [id number]   Admitting Diagnosis: Essential hypertension    OT Date of Treatment: 17   OT Start Time: 1100  OT Stop Time: 1130  OT Total Time (min): 30 min    Billable Minutes:  Evaluation 15 minutes  Therapeutic Activity 10 minutes    Diagnosis: Essential hypertension   Debility and generalized weakness    Past Medical History:   Diagnosis Date    Anxiety     Back pain     chronic    Diabetes mellitus     Gastroparesis     Hypertension     Sciatica       No past surgical history on file.    Referring physician: dr. tom  Date referred to OT: 17    General Precautions: Standard, fall  Orthopedic Precautions: N/A  Braces:            Patient History:  Living Environment  Lives With: other (see comments) (godbrother)  Living Arrangements: house  Home Layout: Able to live on 1st floor  Stair Railings at Home: none  Transportation Available: family or friend will provide  Living Environment Comment: pt lives with god-brother with 24 hour care.  Equipment Currently Used at Home: cane, quad, walker, rolling, wheelchair    Prior level of function:   Bed Mobility/Transfers: independent  Grooming: independent  Bathing: independent  Upper Body Dressing: independent  Lower Body Dressing: independent  Toileting: independent  Driving License: Yes  Occupation: Retired     Dominant hand: right    Subjective:  Communicated with nurse milan and epic chart review prior to session.    Chief Complaint: debility and generalized weakness  Patient/Family stated goals:     Pain Ratin/10                   Objective:  Patient found with: telemetry, peripheral IV, blood pressure cuff    Cognitive Exam:  Oriented to: Person, Place, Time and Situation  Follows Commands/attention: Follows one-step commands  Communication: clear/fluent  Memory:  No Deficits noted  Safety awareness/insight to disability: intact  Coping skills/emotional control: Appropriate to  "situation    Visual/perceptual:  Intact    Physical Exam:  Postural examination/scapula alignment: Scoliosis  Skin integrity: Visible skin intact and Thin  Edema: None noted     Sensation:   Intact    Upper Extremity Range of Motion:  Right Upper Extremity: WFL  Left Upper Extremity: WFL    Upper Extremity Strength:  Right Upper Extremity: mmt: 3/5 grossly  Left Upper Extremity: mmt: 3/5 grossly   Strength: mmt: 3+/5 grossly    Fine motor coordination:   Intact    Gross motor coordination: WFL    Functional Mobility:  Bed Mobility:  Rolling/Turning to Left: Stand by assistance  Rolling/Turning Right: Stand by assistance  Scooting/Bridging: Contact Guard Assistance  Supine to Sit: Contact Guard Assistance  Sit to Supine: Contact Guard Assistance    Transfers:  Sit <> Stand Assistance: Minimum Assistance  Sit <> Stand Assistive Device: No Assistive Device (hand held assist)    Functional Ambulation: pt ambulated 35 feet with min a / hand held assist    Activities of Daily Living:     Feeding adaptive equipment: na  UE Dressing Level of Assistance: Minimum assistance  UE adaptive equipment: na     LE adaptive equipment: na                    Bathing adaptive equipment: na    Balance:   Static Sit: FAIR+: Able to take MINIMAL challenges from all directions  Dynamic Sit: FAIR: Cannot move trunk without losing balance  Static Stand: POOR+: Needs MINIMAL assist to maintain  Dynamic stand: poor+  Therapeutic Activities and Exercises:      AM-PAC 6 CLICK ADL  How much help from another person does this patient currently need?  1 = Unable, Total/Dependent Assistance  2 = A lot, Maximum/Moderate Assistance  3 = A little, Minimum/Contact Guard/Supervision  4 = None, Modified [place]/Independent         AM-PAC Raw Score CMS "G-Code Modifier Level of Impairment Assistance   6 % Total / Unable   7 - 9 CM 80 - 100% Maximal Assist   10 - 14 CL 60 - 80% Moderate Assist   15 - 19 CK 40 - 60% Moderate Assist   20 - 22 " CJ 20 - 40% Minimal Assist   23 CI 1-20% SBA / CGA   24 CH 0% Independent/ Mod I       Patient left HOB elevated with all lines intact, call button in reach, NURSE WALT notified and NURSE WALT present    Assessment:  Melva Barker is a 72 y.o. female with a medical diagnosis of Essential hypertension and presents with DEBILITY AND GENERALIZED WEAKNESS. PT WILL BENEFIT FROM SKILLED O.T. TO INCREASE (I) IN THE ABOVE AREAS.    Rehab identified problem list/impairments: Rehab identified problem list/impairments: weakness, impaired functional mobilty, impaired balance, impaired self care skills, impaired endurance, gait instability, decreased coordination, decreased upper extremity function, decreased safety awareness, decreased lower extremity function    Rehab potential is good.    Activity tolerance: Good    Discharge recommendations: Discharge Facility/Level Of Care Needs: home health OT     Barriers to discharge: Barriers to Discharge: None    Equipment recommendations: none     GOALS:   Occupational Therapy Goals        Problem: Occupational Therapy Goal    Goal Priority Disciplines Outcome Interventions   Occupational Therapy Goal     OT, PT/OT     Description:  OT GOALS TO BE MET BY 4-30-17  1. S WITH UE DRESSING  2. S WITH TOILET T/F'S  3. PT WILL TOLERATE 1 SET X 10 REPS B UE ROM EXERCISE WITH MIN RESISTANCE                PLAN:  Patient to be seen 3 x/week to address the above listed problems via therapeutic exercises, self-care/home management, therapeutic activities  Plan of Care expires:    Plan of Care reviewed with: patient         Josefina Connerzier, OT  04/23/2017

## 2017-04-23 NOTE — PLAN OF CARE
Problem: Patient Care Overview  Goal: Plan of Care Review  Outcome: Ongoing (interventions implemented as appropriate)  Pt AAO. RA. Hemodynamically stable. Mild c/o nausea and pain. Medication given prn. Pt rested most of the night. Md informed of low phos and calcium.  Will continue to monitor.

## 2017-04-24 VITALS
OXYGEN SATURATION: 96 % | BODY MASS INDEX: 22.44 KG/M2 | DIASTOLIC BLOOD PRESSURE: 92 MMHG | RESPIRATION RATE: 18 BRPM | SYSTOLIC BLOOD PRESSURE: 171 MMHG | HEIGHT: 60 IN | HEART RATE: 56 BPM | WEIGHT: 114.31 LBS | TEMPERATURE: 99 F

## 2017-04-24 LAB
ANION GAP SERPL CALC-SCNC: 10 MMOL/L
ANION GAP SERPL CALC-SCNC: 9 MMOL/L
BUN SERPL-MCNC: 3 MG/DL
BUN SERPL-MCNC: 4 MG/DL
CALCIUM SERPL-MCNC: 8.6 MG/DL
CALCIUM SERPL-MCNC: 8.9 MG/DL
CHLORIDE SERPL-SCNC: 109 MMOL/L
CHLORIDE SERPL-SCNC: 110 MMOL/L
CO2 SERPL-SCNC: 19 MMOL/L
CO2 SERPL-SCNC: 20 MMOL/L
CREAT SERPL-MCNC: 0.8 MG/DL
CREAT SERPL-MCNC: 0.8 MG/DL
EST. GFR  (AFRICAN AMERICAN): >60 ML/MIN/1.73 M^2
EST. GFR  (AFRICAN AMERICAN): >60 ML/MIN/1.73 M^2
EST. GFR  (NON AFRICAN AMERICAN): >60 ML/MIN/1.73 M^2
EST. GFR  (NON AFRICAN AMERICAN): >60 ML/MIN/1.73 M^2
GLUCOSE SERPL-MCNC: 223 MG/DL
GLUCOSE SERPL-MCNC: 98 MG/DL
MAGNESIUM SERPL-MCNC: 1.6 MG/DL
POCT GLUCOSE: 229 MG/DL (ref 70–110)
POCT GLUCOSE: 279 MG/DL (ref 70–110)
POTASSIUM SERPL-SCNC: 3.2 MMOL/L
POTASSIUM SERPL-SCNC: 3.8 MMOL/L
SODIUM SERPL-SCNC: 137 MMOL/L
SODIUM SERPL-SCNC: 140 MMOL/L

## 2017-04-24 PROCEDURE — 36415 COLL VENOUS BLD VENIPUNCTURE: CPT

## 2017-04-24 PROCEDURE — 25000003 PHARM REV CODE 250: Performed by: NURSE PRACTITIONER

## 2017-04-24 PROCEDURE — 63600175 PHARM REV CODE 636 W HCPCS: Performed by: NURSE PRACTITIONER

## 2017-04-24 PROCEDURE — 80048 BASIC METABOLIC PNL TOTAL CA: CPT | Mod: 91

## 2017-04-24 PROCEDURE — 96372 THER/PROPH/DIAG INJ SC/IM: CPT

## 2017-04-24 PROCEDURE — C9113 INJ PANTOPRAZOLE SODIUM, VIA: HCPCS | Performed by: INTERNAL MEDICINE

## 2017-04-24 PROCEDURE — 63600175 PHARM REV CODE 636 W HCPCS: Performed by: INTERNAL MEDICINE

## 2017-04-24 PROCEDURE — 97116 GAIT TRAINING THERAPY: CPT

## 2017-04-24 PROCEDURE — 97530 THERAPEUTIC ACTIVITIES: CPT

## 2017-04-24 PROCEDURE — 83735 ASSAY OF MAGNESIUM: CPT

## 2017-04-24 PROCEDURE — 97110 THERAPEUTIC EXERCISES: CPT

## 2017-04-24 RX ORDER — INSULIN GLARGINE 100 [IU]/ML
10 INJECTION, SOLUTION SUBCUTANEOUS NIGHTLY
COMMUNITY
End: 2017-12-25

## 2017-04-24 RX ORDER — PANTOPRAZOLE SODIUM 40 MG/1
40 TABLET, DELAYED RELEASE ORAL DAILY
Qty: 30 TABLET | Refills: 0 | Status: ON HOLD | OUTPATIENT
Start: 2017-04-24 | End: 2019-11-18 | Stop reason: HOSPADM

## 2017-04-24 RX ORDER — BISACODYL 10 MG
10 SUPPOSITORY, RECTAL RECTAL DAILY PRN
Refills: 0 | COMMUNITY
Start: 2017-04-24 | End: 2019-05-29

## 2017-04-24 RX ORDER — MEGESTROL ACETATE 40 MG/1
40 TABLET ORAL DAILY
Qty: 30 TABLET | Refills: 0 | Status: SHIPPED | OUTPATIENT
Start: 2017-04-24 | End: 2019-05-11

## 2017-04-24 RX ORDER — METOCLOPRAMIDE 5 MG/1
5 TABLET ORAL
Qty: 20 TABLET | Refills: 0 | Status: SHIPPED | OUTPATIENT
Start: 2017-04-24 | End: 2017-04-29

## 2017-04-24 RX ADMIN — PANTOPRAZOLE SODIUM 40 MG: 40 INJECTION, POWDER, FOR SOLUTION INTRAVENOUS at 08:04

## 2017-04-24 RX ADMIN — INSULIN ASPART 2 UNITS: 100 INJECTION, SOLUTION INTRAVENOUS; SUBCUTANEOUS at 12:04

## 2017-04-24 RX ADMIN — ONDANSETRON 8 MG: 2 INJECTION INTRAMUSCULAR; INTRAVENOUS at 12:04

## 2017-04-24 RX ADMIN — INSULIN ASPART 4 UNITS: 100 INJECTION, SOLUTION INTRAVENOUS; SUBCUTANEOUS at 11:04

## 2017-04-24 RX ADMIN — HYDROMORPHONE HYDROCHLORIDE 0.5 MG: 1 INJECTION, SOLUTION INTRAMUSCULAR; INTRAVENOUS; SUBCUTANEOUS at 06:04

## 2017-04-24 RX ADMIN — HYDROCODONE BITARTRATE AND ACETAMINOPHEN 1 TABLET: 10; 325 TABLET ORAL at 01:04

## 2017-04-24 RX ADMIN — SERTRALINE HYDROCHLORIDE 50 MG: 50 TABLET ORAL at 08:04

## 2017-04-24 RX ADMIN — SODIUM CHLORIDE: 0.9 INJECTION, SOLUTION INTRAVENOUS at 09:04

## 2017-04-24 RX ADMIN — ONDANSETRON 8 MG: 2 INJECTION INTRAMUSCULAR; INTRAVENOUS at 08:04

## 2017-04-24 RX ADMIN — METOCLOPRAMIDE 10 MG: 5 INJECTION, SOLUTION INTRAMUSCULAR; INTRAVENOUS at 10:04

## 2017-04-24 RX ADMIN — GABAPENTIN 100 MG: 100 CAPSULE ORAL at 01:04

## 2017-04-24 RX ADMIN — GABAPENTIN 100 MG: 100 CAPSULE ORAL at 05:04

## 2017-04-24 RX ADMIN — HYDROMORPHONE HYDROCHLORIDE 0.5 MG: 1 INJECTION, SOLUTION INTRAMUSCULAR; INTRAVENOUS; SUBCUTANEOUS at 12:04

## 2017-04-24 RX ADMIN — MEGESTROL ACETATE 40 MG: 40 TABLET ORAL at 08:04

## 2017-04-24 RX ADMIN — INSULIN ASPART 6 UNITS: 100 INJECTION, SOLUTION INTRAVENOUS; SUBCUTANEOUS at 05:04

## 2017-04-24 NOTE — PHYSICIAN QUERY
PT Name: Melva Barker  MR #: [id number]     Physician Query Form - Documentation Clarification      CDS/: Keri Raman RN               Contact information:[email or web address]    This form is a permanent document in the medical record.     Query Date: April 24, 2017    By submitting this query, we are merely seeking further clarification of documentation. Please utilize your independent clinical judgment when addressing the question(s) below.    The Medical record reflects the following:    Supporting Clinical Findings Location in Medical Record   Kphosphate IV given    Phosphorous 1.6     4/22 HM PN    4/21 lab                                                                            Doctor, Please specify diagnosis or diagnoses associated with above clinical findings.    Provider Use Only    Hypophosphatemia POA                                                                                                                           [  ] Clinically undetermined

## 2017-04-24 NOTE — NURSING
Discharge instructions given to pt. 3 RX called into Pt's pharmacy and the suppository is OTC. Pt to make a hospital f/u appt with her PCP (non [facility] PCP) in 3 days. Pt verbalized understanding. Pt said she has a ride and will be calling them shortly.

## 2017-04-24 NOTE — PLAN OF CARE
Problem: Patient Care Overview  Goal: Plan of Care Review  Outcome: Ongoing (interventions implemented as appropriate)  Pt remains free of injury, pain managed adequately, Pt complains of nausea. Pt has not vomited. no s/s of distress. 24 hour chart check completed. Will continue to monitor.

## 2017-04-24 NOTE — NURSING
Patient assessed for diabetes educational needs following chart review  She reports was diagnosed over 5 years ago and has attended several education classes in the past  She admits to being off track with her care due to family stress  She has a home glucose meter and checks 3-4 times daily with averages 130-160      She logs results for PCP visits  She is consistent with administering her insulin using the insulin pen    She does basal/bolus and also follows a sliding scale    She reports correct insulin storage and site selection/rotation  She has very good recall on teach back but reinforced info on target glucose values/hyper/hypoglycemia  She verbalizes understanding

## 2017-04-24 NOTE — PT/OT/SLP PROGRESS
Physical Therapy  Treatment    Melva Barker   MRN: [id number]   Admitting Diagnosis: Essential hypertension    PT Received On: 17  PT Start Time: 0840     PT Stop Time: 905    PT Total Time (min): 25 min       Billable Minutes:  Gait [id number] and Therapeutic Activity 10    Treatment Type: Treatment  PT/PTA: PT             General Precautions: Standard, fall  Orthopedic Precautions: N/A   Braces: N/A         Subjective:  Communicated with EPIC AND NURSE GARCIA prior to session.  PT AGREES TO THERAPY    Pain Ratin/10     Location - Orientation: lower  Location: back  Pain Addressed: Reposition, Distraction, Pre-medicate for activity       Objective:   Patient found with:  (waiting to start new IV)    Functional Mobility:  Bed Mobility:   Rolling/Turning to Left: Stand by assistance  Scooting/Bridging: Stand by Assistance  Supine to Sit: Stand by Assistance  Sit to Supine: Stand by Assistance    Transfers:  Sit <> Stand Assistance: Contact Guard Assistance  Sit <> Stand Assistive Device: Rolling Walker  Bed <> Chair Technique: Stand Pivot  Bed <> Chair Assistance: Contact Guard Assistance  Bed <> Chair Assistive Device: Rolling Walker    Gait:   Gait Distance: 80 FEET WITH RW RAISED A LITTLE HISGHER THAN NORMAL TO DECREASE TOO PUSH DEPENDENCY ON IT.  NO LOB. SLOW PACE  Assistance 1: Contact Guard Assistance  Gait Assistive Device: Rolling walker  Gait Pattern: swing-through gait  Gait Deviation(s): decreased lyle, decreased step length, decreased stride length      Balance:   Static Sit: GOOD-: Takes MODERATE challenges from all directions but inconsistently  Dynamic Sit: GOOD-: Maintains balance through MODERATE excursions of active trunk movement,     Static Stand: FAIR: Maintains without assist but unable to take challenges  Dynamic stand: FAIR: Needs CONTACT GUARD during gait     Therapeutic Activities and Exercises:  PT AMBULATED 80 FEET WITH RW, AND COMPLETED AROM EXERCISES.  CONT PT  EDUCATION     AM-PAC 6 CLICK MOBILITY  How much help from another person does this patient currently need?   1 = Unable, Total/Dependent Assistance  2 = A lot, Maximum/Moderate Assistance  3 = A little, Minimum/Contact Guard/Supervision  4 = None, Modified [place]/Independent         AM-PAC Raw Score CMS G-Code Modifier Level of Impairment Assistance   6 % Total / Unable   7 - 9 CM 80 - 100% Maximal Assist   10 - 14 CL 60 - 80% Moderate Assist   15 - 19 CK 40 - 60% Moderate Assist   20 - 22 CJ 20 - 40% Minimal Assist   23 CI 1-20% SBA / CGA   24 CH 0% Independent/ Mod I     Patient left supine with call button in reach and CHARGE NURSE ALDAIR present.    Assessment:  Melva Barker is a 72 y.o. female with a medical diagnosis of Essential hypertension and presents with IMPAIRED MOBILITY AND WILL NEED CONT P.T.    Rehab identified problem list/impairments: Rehab identified problem list/impairments: weakness, impaired endurance, impaired balance, decreased lower extremity function, pain, decreased ROM    Rehab potential is good.    Activity tolerance: Good    Discharge recommendations: Discharge Facility/Level Of Care Needs: home health PT     Barriers to discharge: Barriers to Discharge: None    Equipment recommendations: Equipment Needed After Discharge: none     GOALS:   Physical Therapy Goals        Problem: Physical Therapy Goal    Goal Priority Disciplines Outcome Goal Variances Interventions   Physical Therapy Goal     PT/OT, PT      Description:  Goals to be met by: 17     Patient will increase functional independence with mobility by performin. Supine to sit with [place]  2. Bed to chair transfer with Modified [place] using Quad Cane  3. Gait  x 150 feet with Modified [place] using QUAD CANE  4. Lower extremity exercise program x20 reps per handout, with supervision                PLAN:    Patient to be seen 5 x/week  to address the above listed problems via gait  training, therapeutic activities, therapeutic exercises  Plan of Care expires: 04/30/17  Plan of Care reviewed with: patient         Juanita Rojasan, PT  04/24/2017

## 2017-04-24 NOTE — PLAN OF CARE
Problem: Patient Care Overview  Goal: Plan of Care Review  P.T. NOTE: PT AMBULATED 80 FEET WITH RW CGA   Outcome: Ongoing (interventions implemented as appropriate)  Pt remains free from injury/falls. Fall precautions in place. Bed alarm on and functioning while pt is in bed. Pt turns herself in bed independently. Pt on 2000 ADA diet with c/o nausea, controlled with IV antiemetic. IV fluids infusing as ordered. Blood glucose monitoring continues. Pain controlled with PRN pain meds. Pt able to verbalize needs/wants. Bed in low, locked position, call light and personal items within reach. VSS. Will cont to monitor.

## 2017-04-24 NOTE — PT/OT/SLP PROGRESS
Physical Therapy      Melva Barker  MRN: [id number]    PT IN SUPINE,  COMPLETED AROM EXERCISES IN SITTING ON SIDE OF BED 2 X 10 REPS, AT SBA.  TKE'S, AP'S AND MARCHING IN PLACE.  PT RETURNED TO SUPINE AND POSITIONED SELF.      Juanita Moss, PT   04/24/17  [id number]

## 2017-04-24 NOTE — PLAN OF CARE
Pt remains free from injury/falls. Fall precautions in place. Bed alarm on and functioning while pt is in bed. Pt turns herself in bed independently. Pt on 2000 ADA diet with no further c/o nausea since this morning. IV fluids infusing as ordered. Blood glucose monitoring continues. Pain controlled with PRN pain meds. Pt able to verbalize needs/wants. Bed in low, locked position, call light and personal items within reach. VSS. Will cont to monitor.

## 2017-04-25 PROBLEM — K31.84 GASTROPARESIS: Status: ACTIVE | Noted: 2017-04-22

## 2017-04-25 LAB
ESTIMATED AVG GLUCOSE: 186 MG/DL
HBA1C MFR BLD HPLC: 8.1 %
POCT GLUCOSE: 218 MG/DL (ref 70–110)

## 2017-04-25 NOTE — DISCHARGE SUMMARY
[facility] Hospital Medicine  Discharge Summary      Patient Name: Melva Barker  MRN: [id number]  Admission Date: 4/21/2017  Hospital Length of Stay: 3 days  Discharge Date: 4/24/17  Attending Physician Dr. Gerard   Discharging Provider: Emi Tripathi NP  Primary Care Provider: Claire Souza MD      HPI:   Melva Barker is a 73 yo female with PMHx of HTN, IDDM, Gastroparesis and chronic back pain who presents to [facility] with c/o multiple episodes of emesis for 6 hours PTA.  Pt denies fever, urinary symptoms, URI symptoms, diarrhea, cough and S/S of caudal equina. She was uncertain if this was a flare of gastroparesis. In the ED, hypertension, tachycardia and tachypnea present. CBC is unremarkable; CMP notes hypokalemia, CO2 17, anion gap 22, glucose 239 and beta hydroxybutyrate, ABG notes pH 7.5. An insulin infusion was initiated and pt was admitted to ICU for DKA.     * No surgery found *      Indwelling Lines/Drains at time of discharge:   Lines/Drains/Airways          No matching active lines, drains, or airways        Hospital Course:    Pateint admitted to ICU with DKA on insulin Gtt. Patient blood sugars controlled and patient weaned off Gtt. Pt was placed on Levemir and NISS - blood sugars were controlled. Pt then had uncontrolled Hypertension as well as mild diffuse abdominal pain with some N/V. She was restarted on home medications and BP was controlled. She has hx of gastroparesis and has taken Reglan in the past. She was placed on Reglan and N/V and abdominal pain improved. She will be discharged back on Lantus. She was provided prescription for Reglan QID x 5 days. The pt was seen and examined today and determined to be stable for discharge.      Consults:   Consults         Status Ordering Provider     Inpatient consult to Pulmonology  Once     Provider:  (Not yet assigned)    Completed JONNATHAN MALDONADO          Significant Diagnostic Studies:   Imaging Results         X-Ray  Abdomen Portable (Final result) Result time:  04/22/17 12:08:03    Final result by Manjinder Bess MD (04/22/17 12:08:03)    Impression:           1.  Overall, no significant interval change has occurred.      Electronically signed by: MANJINDER BESS MD  Date:     04/22/17  Time:    12:08     Narrative:    KUB, one view    Clinical Indication:   Generalized abdominal pain.       Findings:     Comparisons are made to 04/21/2017. Mild S-shaped curvature of the spine again seen.  Stable degenerative changes of the spine and right greater than left hip joint.  Phleboliths versus ureteroliths overlie the pelvis.    Bowel gas pattern remains normal.            X-Ray Abdomen Flat And Erect (Final result) Result time:  04/21/17 06:10:47    Final result by MARIBEL Botello Sr., MD (04/21/17 06:10:47)    Impression:      1. The bowel gas pattern is normal in appearance.  2. There is a mild amount of dextroconvex curvature of the lumbar spine.  3. There are oval shaped calcifications projected over the pelvis. These are characteristic of phleboliths.      Electronically signed by: MARIBEL BOTELLO MD  Date:     04/21/17  Time:    06:10     Narrative:    Flat and erect KUB    History: Vomiting, unspecified    Finding: Comparison was made to a prior examination performed on 2/19/2016. The bowel gas pattern is normal in appearance. There is no pneumoperitoneum. There is a mild amount of dextroconvex curvature of the lumbar spine. There are oval shaped calcifications projected over the pelvis.              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] 07/04/2015 Yes     Chronic     Diabetes mellitus type 2 without retinopathy [E11.9] 04/23/2015 Yes     Chronic    Lumbar radicular pain [M54.16] 04/22/2015 Yes     Chronic      Problems Resolved During this Admission:    Diagnosis Date Noted Date Resolved POA    Diabetic ketoacidosis without coma [E13.10] 02/19/2016 04/22/2017 Yes    Intractable vomiting with nausea [R11.2] 02/19/2016 04/23/2017 Yes    DKA, type 2, not at goal [E13.10] 02/19/2016 04/22/2017 Yes    Diabetic ketoacidosis without coma associated with type 2 diabetes mellitus [E13.10] 08/20/2015 04/22/2017 Yes          Discharged Condition: stable    Disposition: Home or Self Care    Follow Up:  Follow-up Information     Follow up with Claire Souza MD In 3 days.    Specialty:  Family Medicine    Contact information:    [facility] [id number] [phone]          Patient Instructions:     Diet general   Order Specific Question Answer Comments   Total calories: 2000 Calorie    Additional restrictions: Cardiac (Low Na/Chol)    Additional restrictions: Diabetic 2000      Activity as tolerated       Medications:  Reconciled Home Medications:   Discharge Medication List as of 4/24/2017  2:32 PM      START taking these medications    Details   bisacodyl (DULCOLAX) 10 mg Supp Place 1 suppository (10 mg total) rectally daily as needed., Starting 4/24/2017, Until Discontinued, OTC      megestrol (MEGACE) 40 MG Tab Take 1 tablet (40 mg total) by mouth once daily., Starting 4/24/2017, Until Tue 4/24/18, Normal      metoclopramide HCl (REGLAN) 5 MG tablet Take 1 tablet (5 mg total) by mouth 4 (four) times daily before meals and nightly., Starting 4/24/2017, Until Sat 4/29/17, Normal         CONTINUE these medications which have CHANGED    Details   pantoprazole (PROTONIX) 40 MG tablet Take 1 tablet (40 mg total) by mouth once daily., Starting 4/24/2017, Until Tue 4/24/18, Normal         CONTINUE these medications which have NOT CHANGED    Details   benazepril  (LOTENSIN) 40 MG tablet Take 40 mg by mouth once daily., Until Discontinued, Historical Med      bisoprolol-hydrochlorothiazide 5-6.25 mg (ZIAC) 5-6.25 mg Tab Take 1 tablet by mouth once daily., Until Discontinued, Historical Med      ergocalciferol (VITAMIN D2) 50,000 unit Cap Take 50,000 Units by mouth every 7 days., Until Discontinued, Historical Med      gabapentin (NEURONTIN) 600 MG tablet Take 600 mg by mouth 3 (three) times daily., Until Discontinued, Historical Med      insulin glargine (LANTUS) 100 unit/mL injection Inject 10 Units into the skin every evening., Until Discontinued, Historical Med      insulin regular 100 unit/mL Inj injection Inject into the skin 3 (three) times daily before meals., Until Discontinued, Historical Med      ondansetron (ZOFRAN-ODT) 4 MG TbDL Take 1 tablet (4 mg total) by mouth every 8 (eight) hours as needed., Starting 2/25/2016, Until Discontinued, Print      potassium chloride SA (K-DUR,KLOR-CON) 20 MEQ tablet Take 20 mEq by mouth once daily., Until Discontinued, Historical Med      sertraline (ZOLOFT) 50 MG tablet Take 50 mg by mouth once daily., Until Discontinued, Historical Med      tizanidine (ZANAFLEX) 6 mg capsule Take 4 mg by mouth 3 (three) times daily. , Until Discontinued, Historical Med      trazodone (DESYREL) 50 MG tablet Take 50 mg by mouth every evening., Until Discontinued, Historical Med         STOP taking these medications       clonazePAM (KLONOPIN) 1 MG tablet Comments:   Reason for Stopping:         insulin aspart protamine-insulin aspart (NOVOLOG 70/30) 100 unit/mL (70-30) InPn pen Comments:   Reason for Stopping:         lisinopril (PRINIVIL,ZESTRIL) 20 MG tablet Comments:   Reason for Stopping:         meloxicam (MOBIC) 7.5 MG tablet Comments:   Reason for Stopping:         metoprolol tartrate (LOPRESSOR) 25 MG tablet Comments:   Reason for Stopping:         mirtazapine (REMERON) 15 MG tablet Comments:   Reason for Stopping:             Time spent  on the discharge of patient: 45 minutes    Emi Tripathi NP  Department of Hospital Medicine  [facility]

## 2017-12-25 ENCOUNTER — HOSPITAL ENCOUNTER (EMERGENCY)
Facility: HOSPITAL | Age: 72
Discharge: HOME | End: 2017-12-25
Attending: EMERGENCY MEDICINE
Payer: MEDICARE

## 2017-12-25 ENCOUNTER — TELEPHONE (OUTPATIENT)
Dept: EMERGENCY MEDICINE | Facility: HOSPITAL | Age: 72
End: 2017-12-25

## 2017-12-25 VITALS
SYSTOLIC BLOOD PRESSURE: 144 MMHG | RESPIRATION RATE: 20 BRPM | DIASTOLIC BLOOD PRESSURE: 90 MMHG | HEIGHT: 60 IN | BODY MASS INDEX: 23.56 KG/M2 | OXYGEN SATURATION: 97 % | TEMPERATURE: 99 F | HEART RATE: 98 BPM | WEIGHT: 120 LBS

## 2017-12-25 DIAGNOSIS — I10 ESSENTIAL HYPERTENSION: ICD-10-CM

## 2017-12-25 DIAGNOSIS — J01.90 ACUTE BACTERIAL RHINOSINUSITIS: Primary | ICD-10-CM

## 2017-12-25 DIAGNOSIS — M54.50 CHRONIC BILATERAL LOW BACK PAIN WITHOUT SCIATICA: ICD-10-CM

## 2017-12-25 DIAGNOSIS — B96.89 ACUTE BACTERIAL RHINOSINUSITIS: Primary | ICD-10-CM

## 2017-12-25 DIAGNOSIS — G89.29 CHRONIC BILATERAL LOW BACK PAIN WITHOUT SCIATICA: ICD-10-CM

## 2017-12-25 LAB
FLUAV AG SPEC QL IA: NEGATIVE
FLUBV AG SPEC QL IA: NEGATIVE
SPECIMEN SOURCE: NORMAL

## 2017-12-25 PROCEDURE — 25000003 PHARM REV CODE 250: Performed by: NURSE PRACTITIONER

## 2017-12-25 PROCEDURE — 99284 EMERGENCY DEPT VISIT MOD MDM: CPT

## 2017-12-25 PROCEDURE — 87400 INFLUENZA A/B EACH AG IA: CPT

## 2017-12-25 RX ORDER — ONDANSETRON 4 MG/1
4 TABLET, ORALLY DISINTEGRATING ORAL
Status: COMPLETED | OUTPATIENT
Start: 2017-12-25 | End: 2017-12-25

## 2017-12-25 RX ORDER — NABUMETONE 500 MG/1
500 TABLET, FILM COATED ORAL 2 TIMES DAILY PRN
Qty: 30 TABLET | Refills: 0 | Status: SHIPPED | OUTPATIENT
Start: 2017-12-25 | End: 2019-05-11

## 2017-12-25 RX ORDER — HYDROCODONE BITARTRATE AND ACETAMINOPHEN 7.5; 325 MG/1; MG/1
1 TABLET ORAL EVERY 6 HOURS PRN
Qty: 10 TABLET | Refills: 0 | Status: SHIPPED | OUTPATIENT
Start: 2017-12-25 | End: 2018-04-05 | Stop reason: SDUPTHER

## 2017-12-25 RX ORDER — METHOCARBAMOL 500 MG/1
1000 TABLET, FILM COATED ORAL
Status: COMPLETED | OUTPATIENT
Start: 2017-12-25 | End: 2017-12-25

## 2017-12-25 RX ORDER — AMOXICILLIN AND CLAVULANATE POTASSIUM 875; 125 MG/1; MG/1
1 TABLET, FILM COATED ORAL 2 TIMES DAILY
Qty: 20 TABLET | Refills: 0 | Status: SHIPPED | OUTPATIENT
Start: 2017-12-25 | End: 2018-01-04

## 2017-12-25 RX ORDER — KETOROLAC TROMETHAMINE 10 MG/1
10 TABLET, FILM COATED ORAL
Status: COMPLETED | OUTPATIENT
Start: 2017-12-25 | End: 2017-12-25

## 2017-12-25 RX ORDER — HYDROCODONE BITARTRATE AND ACETAMINOPHEN 10; 325 MG/1; MG/1
TABLET ORAL
COMMUNITY
End: 2017-12-25 | Stop reason: ALTCHOICE

## 2017-12-25 RX ORDER — HYDROCODONE BITARTRATE AND ACETAMINOPHEN 10; 325 MG/1; MG/1
1 TABLET ORAL
Status: COMPLETED | OUTPATIENT
Start: 2017-12-25 | End: 2017-12-25

## 2017-12-25 RX ORDER — ACETAMINOPHEN 325 MG/1
650 TABLET ORAL
Status: COMPLETED | OUTPATIENT
Start: 2017-12-25 | End: 2017-12-25

## 2017-12-25 RX ORDER — INSULIN ASPART 100 [IU]/ML
INJECTION, SOLUTION INTRAVENOUS; SUBCUTANEOUS
Status: ON HOLD | COMMUNITY
End: 2019-11-18 | Stop reason: HOSPADM

## 2017-12-25 RX ADMIN — METHOCARBAMOL 1000 MG: 500 TABLET ORAL at 08:12

## 2017-12-25 RX ADMIN — KETOROLAC TROMETHAMINE 10 MG: 10 TABLET, FILM COATED ORAL at 08:12

## 2017-12-25 RX ADMIN — HYDROCODONE BITARTRATE AND ACETAMINOPHEN 1 TABLET: 10; 325 TABLET ORAL at 07:12

## 2017-12-25 RX ADMIN — ACETAMINOPHEN 650 MG: 325 TABLET ORAL at 07:12

## 2017-12-25 RX ADMIN — ONDANSETRON 4 MG: 4 TABLET, ORALLY DISINTEGRATING ORAL at 08:12

## 2017-12-26 NOTE — ED NOTES
LOC: The patient is awake, alert and aware of environment with an appropriate affect, the patient is oriented x 3 and speaking appropriately.  APPEARANCE: Patient resting comfortably and in no acute distress, patient is clean and well groomed, patient's clothing is properly fastened.  HEENT: Brief WNL  SKIN: Brief WNL.   MUSCULOSKELETAL: Brief WNL  RESPIRATORY: Brief WNL. resp easy and labored, breath sounds clear bilateral throughout. Ambulating to room without difficulty  CARDIAC: Brief WNL  GASTRO: Brief WNL  : Brief WNL  Peripheral Vasc: Brief WNL  NEURO: Brief WNL  PSYCH: Brief WNL

## 2017-12-26 NOTE — ED PROVIDER NOTES
Encounter Date: 12/25/2017       History     Chief Complaint   Patient presents with    Cough     Patient reports that she is here to get a prescription for her pain medeication because someone stole her last few pills and also to have her cough check. No coughing noted in triage. She also reports having pain across her back that she rates 8/10 at present       The history is provided by the patient and the EMS personnel.   Cough   This is a new problem. The current episode started two days ago. The cough is productive of sputum. Associated symptoms include chills, headaches, rhinorrhea and myalgias. Pertinent negatives include no chest pain, no sweats, no weight loss, no ear pain, no sore throat, no shortness of breath and no wheezing. She has tried nothing for the symptoms. She is not a smoker.   URI   The primary symptoms include fever, headaches, cough and myalgias. Primary symptoms do not include ear pain, sore throat, wheezing, abdominal pain, nausea, vomiting or rash. The current episode started two days ago. This is a new problem. The problem has been gradually worsening. The fever began yesterday. The temperature was taken by no thermometer. The maximum temperature recorded prior to her arrival was unknown.   The pain from the headache is at a severity of 8/10. Location/region(s) of the headache: frontal. The headache is not associated with aura, photophobia, eye pain, visual change, neck stiffness, paresthesias or loss of balance.   The cough began 2 days ago. The cough is productive. The sputum is white and clear.   Myalgias began yesterday. The myalgias have been unchanged since their onset. The myalgias are generalized. The myalgias are aching. The myalgias are not associated with weakness. The myalgia pain is at a severity of 8/10.   Symptoms associated with the illness include chills, congestion and rhinorrhea. The following treatments were addressed: Acetaminophen was ineffective.       PCP:   Claire  Susu Souza MD        Review of patient's allergies indicates:   Allergen Reactions    Morphine Other (See Comments)     headache    Latex, natural rubber Rash     Past Medical History:   Diagnosis Date    Anxiety     Back pain     chronic    Diabetes mellitus     Gastroparesis     Hypertension     Sciatica      History reviewed. No pertinent surgical history.  Family History   Problem Relation Age of Onset    Hyperlipidemia Father     Hypertension Mother      Social History   Substance Use Topics    Smoking status: Never Smoker    Smokeless tobacco: Never Used    Alcohol use No     Review of Systems   Constitutional: Positive for chills and fever. Negative for weight loss.   HENT: Positive for congestion and rhinorrhea. Negative for ear pain and sore throat.    Eyes: Negative for photophobia and pain.   Respiratory: Positive for cough and chest tightness. Negative for shortness of breath and wheezing.    Cardiovascular: Negative for chest pain.   Gastrointestinal: Negative for abdominal pain, nausea and vomiting.   Genitourinary: Negative for dysuria.   Musculoskeletal: Positive for back pain (chronic) and myalgias. Negative for neck stiffness.   Skin: Negative for rash.   Neurological: Positive for headaches. Negative for dizziness, weakness, light-headedness, paresthesias and loss of balance.   Hematological: Does not bruise/bleed easily.       Physical Exam     Initial Vitals [12/25/17 1909]   BP Pulse Resp Temp SpO2   (!) 170/86 (!) 118 20 99.9 °F (37.7 °C) --      MAP       114         Physical Exam    Nursing note and vitals reviewed.  Constitutional: She appears well-developed and well-nourished. She is cooperative. She does not appear ill. No distress.   HENT:   Head: Normocephalic and atraumatic.   Right Ear: Hearing, tympanic membrane and external ear normal.   Left Ear: Hearing, tympanic membrane and external ear normal.   Nose: Mucosal edema and sinus tenderness present.   Mouth/Throat:  Uvula is midline and mucous membranes are normal. Posterior oropharyngeal erythema present.   Ceruminosis present bilaterally.   Eyes: Conjunctivae, EOM and lids are normal. Pupils are equal, round, and reactive to light.   Neck: Trachea normal and normal range of motion. Neck supple.   Cardiovascular: Normal rate, regular rhythm, intact distal pulses and normal pulses.   Pulmonary/Chest: Effort normal and breath sounds normal. No respiratory distress. She has no wheezes. She has no rhonchi. She has no rales.   Abdominal: Soft. She exhibits no distension and no mass. There is no tenderness. There is no rebound and no guarding.   Musculoskeletal: Normal range of motion. She exhibits no edema.        Lumbar back: She exhibits tenderness (patient has subjective complaints of bilateral lower back pain - no bony tenderness or deformity noted - neurovascular intact distally). She exhibits normal range of motion and no bony tenderness.   Neurological: She is alert and oriented to person, place, and time. She has normal strength. Gait normal. GCS eye subscore is 4. GCS verbal subscore is 5. GCS motor subscore is 6.   Skin: Skin is warm, dry and intact. Capillary refill takes less than 2 seconds. No rash noted.   Psychiatric: She has a normal mood and affect. Her speech is normal and behavior is normal. Thought content normal.         ED Course   Procedures      ED Lab Results:   Results for orders placed or performed during the hospital encounter of 12/25/17   Influenza antigen Nasopharyngeal Swab   Result Value Ref Range    Influenza A Ag, EIA Negative Negative    Influenza B Ag, EIA Negative Negative    Flu A & B Source Nasopharyngeal Swab        Medications   hydrocodone-acetaminophen 10-325mg per tablet 1 tablet (1 tablet Oral Given 12/25/17 1951)   acetaminophen tablet 650 mg (650 mg Oral Given 12/25/17 1951)   ondansetron disintegrating tablet 4 mg (4 mg Oral Given 12/25/17 2021)   methocarbamol tablet 1,000 mg (1,000  mg Oral Given 12/25/17 2045)   ketorolac tablet 10 mg (10 mg Oral Given 12/25/17 2045)         ED Course Vitals  Vitals:    12/25/17 1909 12/25/17 2052   BP: (!) 170/86 (!) 144/90   BP Location: Left arm Right arm   Patient Position: Sitting Lying   Pulse: (!) 118 98   Resp: 20 20   Temp: 99.9 °F (37.7 °C) 98.7 °F (37.1 °C)   TempSrc: Oral Axillary   SpO2:  97%   Weight: 54.4 kg (120 lb)    Height: 5' (1.524 m)        2050 HOURS RE-EVALUATION & DISPOSITION:   Reassessment at the time of disposition demonstrates that the patient is resting comfortably in no acute distress.  She has remained hemodynamically stable throughout the entire ED visit and is without objective evidence for acute process requiring urgent intervention or hospitalization. I discussed test results and provided counseling to patient with regard to condition, the treatment plan, specific conditions for return, and the importance of follow up.  Answered questions at this time. The patient is stable for discharge.             Medical Decision Making:   History:   I obtained history from: EMS provider.  Old Records Summarized: records from clinic visits.  Clinical Tests:   Lab Tests: Ordered and Reviewed                     Clinical Impression:       ICD-10-CM ICD-9-CM   1. Acute bacterial rhinosinusitis J01.90 461.9    B96.89    2. Essential hypertension I10 401.9   3. Chronic bilateral low back pain without sciatica M54.5 724.2    G89.29 338.29         Disposition:   Disposition: Discharged  Condition: Stable  I discussed with patient that the evaluation in the emergency department does not suggest any emergent or life threatening medical condition requiring immediate intervention beyond what was provided in the ED, and I believe patient is safe for discharge.  Regardless, an unremarkable evaluation in the ED does not preclude the development or presence of a serious of life threatening condition. As such, patient was instructed to return  immediately for any worsening or change in current symptoms. I also discussed the results of my evaluation and diagnosis with patient and she concurs with the evaluation and management plan.  Detailed written and verbal instructions provided to patient and she expressed a verbal understanding of the discharge instructions and overall management plan. Reiterated the importance of medication administration and safety and advised patient to follow up with primary care provider in 3-5 days or sooner if needed.  Also advised patient to return to the ER for any complications.     Regarding SINUSITIS, for treatment, encouraged patient to refrain from smoking, drink plenty of fluids, rest, take medications as prescribed, and to use a humidifier or steam in the bathroom. Patient instructed to notify primary care provider if: they have a cough most days or have a cough that returns frequently; are coughing up blood; have a high fever or shaking chills; have a low-grade fever for three or more days; develop thick, greenish mucus, especially if it has a bad smell; and feel short of breath or have chest pain, or return to emergency department for further evaluation. Also recommended that the patient shower in the morning and evening to wash away any allergens and help reduce the production of mucus, avoid taking decongestants if diagnosed with hypertension.  If decongestants were recommended, advised patient to not take for longer than 5 days to help prevent rebound congestion. For prevention, discussed with patient the importance of not smoking, getting annual influenza vaccines, reducing exposure to air pollution, and to frequently wash hands to avoid spread of infection. Instructed patient to follow up with primary care provider.    Regarding HYPERTENSION, I advised patient to: keep a record of blood pressure results; take your blood pressure medication exactly as directed without skipping doses; avoid medications that  contain heart stimulants, including over-the-counter drugs such as decongestants; maintain a healthy weight; cut back on sodium intake (i.e., limit canned, dried, packaged, and fast foods and dont add salt to food); follow the DASH (Dietary Approaches to Stop Hypertension) eating plan which recommends vegetables, fruits, whole gains, and other heart healthy foods; begin an exercise program that includes  aerobic exercise 3 to 4 times a week for an average of 40 minutes at a time (with approval of cardiologist or primary care provider); limit drinks that contain alcohol and caffeine; control levels of emotional stress; and seek emergency care for any shortness of breath, chest pain, difficulty speaking, confusion, or visual changes.      Regarding BACK PAIN, I advised patient to rest and slowly start to increase activity as pain decreases or as tolerable.  Also recommend the use of ice and heat. Explained to patient that ice will help decrease swelling and pain and prevent tissue damage (verbalized importance of covering ice with a towel and not applying it directly to skin and applying it for 20-30 minutes every 2 hours as needed). Further explained that heat will help decrease pain and muscle spasms (instructed patient to not fall asleep with heating pad and to apply heat to lower back for 20-30 minutes every 2 hours as needed). For prevention, I recommended that the patient use correct body movements (bend at the hips and knees when picking up objects and use leg muscles as you lift the load; keep objects close to chest when lifting it; and avoid twisting or lifting anything above the waist; change position often when standing for long periods of time; never reach, pull, or push while seated; exercise frequently and warm up before exercising; and maintain a healthy weight.  Follow up with primary care provider if no improvement is noticed in next three days.  Take all medications as prescribed and avoid operating  heavy machinery or driving if prescribed pain medications or muscle relaxants.  Instructed patient to return to the emergency department if they develop incontinence, notice increased swelling or pain in lower back; begin to have difficulty moving lower extremities; or develop numbness to legs.            New Prescriptions    AMOXICILLIN-CLAVULANATE 875-125MG (AUGMENTIN) 875-125 MG PER TABLET    Take 1 tablet by mouth 2 (two) times daily.    HYDROCODONE-ACETAMINOPHEN 7.5-325MG (NORCO) 7.5-325 MG PER TABLET    Take 1 tablet by mouth every 6 (six) hours as needed for Pain.    NABUMETONE (RELAFEN) 500 MG TABLET    Take 1 tablet (500 mg total) by mouth 2 (two) times daily as needed for Pain.       Follow-up Information     Schedule an appointment as soon as possible for a visit  with Claire Souza MD.    Specialty:  Family Medicine  Contact information:  [address] [facility]  [place] [address]  [phone]                                Robert Otto NP  12/25/17 1835

## 2018-04-05 ENCOUNTER — HOSPITAL ENCOUNTER (EMERGENCY)
Facility: HOSPITAL | Age: 73
Discharge: HOME | End: 2018-04-05
Attending: EMERGENCY MEDICINE
Payer: MEDICARE

## 2018-04-05 VITALS
RESPIRATION RATE: 18 BRPM | BODY MASS INDEX: 23.88 KG/M2 | TEMPERATURE: 99 F | WEIGHT: 121.63 LBS | HEART RATE: 102 BPM | DIASTOLIC BLOOD PRESSURE: 75 MMHG | SYSTOLIC BLOOD PRESSURE: 159 MMHG | HEIGHT: 60 IN | OXYGEN SATURATION: 100 %

## 2018-04-05 DIAGNOSIS — R10.9 ABDOMINAL PAIN: ICD-10-CM

## 2018-04-05 DIAGNOSIS — K31.84 GASTROPARESIS: Primary | ICD-10-CM

## 2018-04-05 LAB
ALBUMIN SERPL BCP-MCNC: 4.1 G/DL
ALP SERPL-CCNC: 91 U/L
ALT SERPL W/O P-5'-P-CCNC: 9 U/L
ANION GAP SERPL CALC-SCNC: 13 MMOL/L
AST SERPL-CCNC: 14 U/L
BASOPHILS # BLD AUTO: 0.06 K/UL
BASOPHILS NFR BLD: 0.4 %
BILIRUB SERPL-MCNC: 0.7 MG/DL
BUN SERPL-MCNC: 13 MG/DL
CALCIUM SERPL-MCNC: 9.8 MG/DL
CHLORIDE SERPL-SCNC: 105 MMOL/L
CO2 SERPL-SCNC: 18 MMOL/L
CREAT SERPL-MCNC: 0.8 MG/DL
DIFFERENTIAL METHOD: ABNORMAL
EOSINOPHIL # BLD AUTO: 0.2 K/UL
EOSINOPHIL NFR BLD: 1.3 %
ERYTHROCYTE [DISTWIDTH] IN BLOOD BY AUTOMATED COUNT: 15.3 %
EST. GFR  (AFRICAN AMERICAN): >60 ML/MIN/1.73 M^2
EST. GFR  (NON AFRICAN AMERICAN): >60 ML/MIN/1.73 M^2
GLUCOSE SERPL-MCNC: 168 MG/DL
HCT VFR BLD AUTO: 36.4 %
HGB BLD-MCNC: 12 G/DL
LIPASE SERPL-CCNC: 28 U/L
LYMPHOCYTES # BLD AUTO: 4.2 K/UL
LYMPHOCYTES NFR BLD: 26.8 %
MCH RBC QN AUTO: 26.8 PG
MCHC RBC AUTO-ENTMCNC: 33 G/DL
MCV RBC AUTO: 81 FL
MONOCYTES # BLD AUTO: 1.1 K/UL
MONOCYTES NFR BLD: 7 %
NEUTROPHILS # BLD AUTO: 10 K/UL
NEUTROPHILS NFR BLD: 63.5 %
PLATELET # BLD AUTO: 352 K/UL
PMV BLD AUTO: 10.9 FL
POTASSIUM SERPL-SCNC: 4 MMOL/L
PROT SERPL-MCNC: 9.1 G/DL
RBC # BLD AUTO: 4.48 M/UL
SODIUM SERPL-SCNC: 136 MMOL/L
TROPONIN I SERPL DL<=0.01 NG/ML-MCNC: <0.006 NG/ML
WBC # BLD AUTO: 15.78 K/UL

## 2018-04-05 PROCEDURE — 93010 ELECTROCARDIOGRAM REPORT: CPT | Mod: ,,, | Performed by: INTERNAL MEDICINE

## 2018-04-05 PROCEDURE — 96365 THER/PROPH/DIAG IV INF INIT: CPT

## 2018-04-05 PROCEDURE — 63600175 PHARM REV CODE 636 W HCPCS: Performed by: EMERGENCY MEDICINE

## 2018-04-05 PROCEDURE — 96376 TX/PRO/DX INJ SAME DRUG ADON: CPT

## 2018-04-05 PROCEDURE — 80053 COMPREHEN METABOLIC PANEL: CPT

## 2018-04-05 PROCEDURE — 84484 ASSAY OF TROPONIN QUANT: CPT

## 2018-04-05 PROCEDURE — 99900035 HC TECH TIME PER 15 MIN (STAT)

## 2018-04-05 PROCEDURE — 96375 TX/PRO/DX INJ NEW DRUG ADDON: CPT

## 2018-04-05 PROCEDURE — 25000003 PHARM REV CODE 250: Performed by: EMERGENCY MEDICINE

## 2018-04-05 PROCEDURE — 99284 EMERGENCY DEPT VISIT MOD MDM: CPT | Mod: 25

## 2018-04-05 PROCEDURE — 93005 ELECTROCARDIOGRAM TRACING: CPT

## 2018-04-05 PROCEDURE — 85025 COMPLETE CBC W/AUTO DIFF WBC: CPT

## 2018-04-05 PROCEDURE — 83690 ASSAY OF LIPASE: CPT

## 2018-04-05 RX ORDER — METOCLOPRAMIDE 5 MG/1
5 TABLET ORAL 4 TIMES DAILY
COMMUNITY
End: 2018-06-02 | Stop reason: SDUPTHER

## 2018-04-05 RX ORDER — HYDROCODONE BITARTRATE AND ACETAMINOPHEN 5; 325 MG/1; MG/1
2 TABLET ORAL
Status: DISCONTINUED | OUTPATIENT
Start: 2018-04-05 | End: 2018-04-05 | Stop reason: HOSPADM

## 2018-04-05 RX ORDER — ONDANSETRON 2 MG/ML
4 INJECTION INTRAMUSCULAR; INTRAVENOUS
Status: COMPLETED | OUTPATIENT
Start: 2018-04-05 | End: 2018-04-05

## 2018-04-05 RX ORDER — ONDANSETRON 4 MG/1
4 TABLET, ORALLY DISINTEGRATING ORAL EVERY 12 HOURS PRN
Qty: 6 TABLET | Refills: 1 | Status: SHIPPED | OUTPATIENT
Start: 2018-04-05 | End: 2018-06-02 | Stop reason: SDUPTHER

## 2018-04-05 RX ORDER — HYDROMORPHONE HYDROCHLORIDE 1 MG/ML
0.5 INJECTION, SOLUTION INTRAMUSCULAR; INTRAVENOUS; SUBCUTANEOUS
Status: COMPLETED | OUTPATIENT
Start: 2018-04-05 | End: 2018-04-05

## 2018-04-05 RX ORDER — BISOPROLOL FUMARATE AND HYDROCHLOROTHIAZIDE 10; 6.25 MG/1; MG/1
1 TABLET ORAL DAILY
Status: ON HOLD | COMMUNITY
End: 2019-11-18 | Stop reason: HOSPADM

## 2018-04-05 RX ORDER — HYDROCODONE BITARTRATE AND ACETAMINOPHEN 7.5; 325 MG/1; MG/1
1 TABLET ORAL EVERY 12 HOURS PRN
Qty: 12 TABLET | Refills: 0 | Status: SHIPPED | OUTPATIENT
Start: 2018-04-05 | End: 2018-06-02 | Stop reason: SDUPTHER

## 2018-04-05 RX ADMIN — Medication: at 07:04

## 2018-04-05 RX ADMIN — HYDROMORPHONE HYDROCHLORIDE 0.5 MG: 1 INJECTION, SOLUTION INTRAMUSCULAR; INTRAVENOUS; SUBCUTANEOUS at 05:04

## 2018-04-05 RX ADMIN — PROMETHAZINE HYDROCHLORIDE 6.25 MG: 25 INJECTION INTRAMUSCULAR; INTRAVENOUS at 05:04

## 2018-04-05 RX ADMIN — ONDANSETRON 4 MG: 2 INJECTION INTRAMUSCULAR; INTRAVENOUS at 04:04

## 2018-04-05 NOTE — ED NOTES
Called Nidia again for patient discharge. Spoke to Nidia and stated she would be coming to get patient.

## 2018-04-05 NOTE — ED PROVIDER NOTES
Encounter Date: 4/5/2018       History     Chief Complaint   Patient presents with    Emesis     Patient reports vomiting and right hip pain that radiates down leg. She reports nausea and vomiting onset earlier today.     Recent hospitalization at the [facility]. including 15 days in ICU for pneumonia and influenza, ultimately transferred to rehabilitation and home since March 20.  Continues to have home health, was checked by them as morning and had a blood pressure of 195/105 by report.  Mildly ill since this morning with upper abdominal discomfort, some nausea and 3 episodes of vomiting.  No diarrhea, fever, urinary symptoms, chest pain, hematemesis, or other new complaints she is on chronic Norco for chronic sciatica, indicating the generalized area of her right hip, buttock, and sciatic pathway, and although that is hurting her today it is not different than her usual.  She feel that her abdominal symptoms are likely due to her previously diagnosed gastroparesis.  No known ill contacts.  No other complaints.      The history is provided by the patient and a relative. No  was used.     Review of patient's allergies indicates:   Allergen Reactions    Morphine Other (See Comments)     headache    Latex, natural rubber Rash     Past Medical History:   Diagnosis Date    Anxiety     Back pain     chronic    Diabetes mellitus     Gastroparesis     Hypertension     Sciatica      History reviewed. No pertinent surgical history.  Family History   Problem Relation Age of Onset    Hyperlipidemia Father     Hypertension Mother      Social History   Substance Use Topics    Smoking status: Never Smoker    Smokeless tobacco: Never Used    Alcohol use No     Review of Systems   Constitutional: Negative for activity change, fatigue and fever.   HENT: Negative for congestion, ear pain, facial swelling, nosebleeds, sinus pressure and sore throat.    Eyes: Negative for pain, discharge, redness  and visual disturbance.   Respiratory: Negative for cough, choking, chest tightness, shortness of breath and wheezing.    Cardiovascular: Negative for chest pain, palpitations and leg swelling.   Gastrointestinal: Positive for abdominal pain, nausea and vomiting. Negative for abdominal distention.   Endocrine: Negative for heat intolerance, polydipsia and polyuria.   Genitourinary: Negative for difficulty urinating, dysuria, flank pain, hematuria and urgency.   Musculoskeletal: Positive for back pain. Negative for gait problem, joint swelling and myalgias.   Skin: Negative for color change and rash.   Allergic/Immunologic: Negative for environmental allergies and food allergies.   Neurological: Negative for dizziness, weakness, numbness and headaches.   Hematological: Negative for adenopathy. Does not bruise/bleed easily.   Psychiatric/Behavioral: Negative for agitation and behavioral problems. The patient is not nervous/anxious.    All other systems reviewed and are negative.      Physical Exam     Initial Vitals [04/05/18 1540]   BP Pulse Resp Temp SpO2   (!) 156/79 99 18 98.6 °F (37 °C) 100 %      MAP       104.67         Physical Exam    Nursing note and vitals reviewed.  Constitutional: She appears well-developed and well-nourished. She is not diaphoretic. No distress.   Mildly anxious/ NAD   HENT:   Head: Normocephalic and atraumatic.   Mouth/Throat: No oropharyngeal exudate.   Eyes: Conjunctivae and EOM are normal. Pupils are equal, round, and reactive to light. Right eye exhibits no discharge. Left eye exhibits no discharge. No scleral icterus.   Neck: Normal range of motion. Neck supple. No thyromegaly present. No tracheal deviation present. No JVD present.   Cardiovascular: Normal rate, regular rhythm, normal heart sounds and intact distal pulses. Exam reveals no gallop and no friction rub.    No murmur heard.  Pulmonary/Chest: Breath sounds normal. No stridor. No respiratory distress. She has no wheezes.  She has no rhonchi. She has no rales. She exhibits no tenderness.   Abdominal: Soft. Bowel sounds are normal. She exhibits no distension and no mass. There is no tenderness. There is no rebound and no guarding.   Musculoskeletal: Normal range of motion. She exhibits tenderness. She exhibits no edema.   Generalized mild tenderness right lateral hip/ sciatic pathway; chronic pain per patient.   Neurological: She is alert and oriented to person, place, and time. She has normal strength.   Skin: Skin is warm and dry. No rash and no abscess noted. No erythema.   Psychiatric: She has a normal mood and affect. Her behavior is normal. Judgment and thought content normal.         ED Course   Procedures  Labs Reviewed   CBC W/ AUTO DIFFERENTIAL - Abnormal; Notable for the following:        Result Value    WBC 15.78 (*)     Hematocrit 36.4 (*)     MCV 81 (*)     MCH 26.8 (*)     RDW 15.3 (*)     Platelets 352 (*)     Gran # (ANC) 10.0 (*)     Mono # 1.1 (*)     All other components within normal limits   COMPREHENSIVE METABOLIC PANEL - Abnormal; Notable for the following:     CO2 18 (*)     Glucose 168 (*)     Total Protein 9.1 (*)     ALT 9 (*)     All other components within normal limits   LIPASE   TROPONIN I     EKG Readings: (Independently Interpreted)   Initial Reading: No STEMI. Rhythm: Sinus Tachycardia. Heart Rate: 108. Ectopy: No Ectopy. Conduction: Normal. Axis: Normal.   ST o/w wnl     Imaging Results          X-Ray Chest PA And Lateral (Final result)  Result time 04/05/18 16:57:10    Final result by MARIBEL Botello Sr., MD (04/05/18 16:57:10)                 Impression:        1. The lungs are clear.   2. There are findings characteristic of a small hiatal hernia.       Electronically signed by: MARIBEL BOTELLO MD  Date:     04/05/18  Time:    16:57              Narrative:    Two-view chest x-ray    Clinical History:  Unspecified abdominal pain    Finding: Comparison was made to a prior examination performed on  2/20/2016. The size and contour of the heart are normal. The lungs are clear. There are findings characteristic of a small hiatal hernia. There is no pneumothorax or pleural effusion.                             X-Ray Abdomen Flat And Erect (Final result)  Result time 04/05/18 16:55:10    Final result by MARIBEL Botello Sr., MD (04/05/18 16:55:10)                 Impression:      1. The bowel gas pattern is normal in appearance.  2. There is a mild amount of dextroconvex curvature of the thoracolumbar spine.   3. There are mild degenerative changes in the lumbar spine.          Electronically signed by: MARIBEL BOTELLO MD  Date:     04/05/18  Time:    16:55              Narrative:    Flat and erect KUB    History: Abdominal pain    Finding: The bowel gas pattern is normal in appearance. There is no pneumoperitoneum. There is a mild amount of dextroconvex curvature of the thoracolumbar spine. There are mild degenerative changes in the lumbar spine.                                5:47 PM Scattered intermittent complaints of nausea and pain persists, we are now realizing that she is out of her Norco 7.5 and would like a refill.  Asking for IV Dilaudid by name.  Exam is stable, she is clinically stable for discharge home and continued routine outpatient therapy and home health follow-up.                          Clinical Impression:     1. Gastroparesis    2. Abdominal pain          Disposition:   Condition: Stable                        Nasim Nguyen MD  04/05/18 [id number]

## 2018-04-05 NOTE — ED NOTES
Dr. Santos informed of patient emesis upon discharging; understanding verbalized and awaiting further orders.

## 2018-04-05 NOTE — DISCHARGE INSTRUCTIONS
_______________    Tests and exam are fine.    In particular, your chest x-ray is normal.    Return to the ER as needed.    See your MD for a recheck soon.    _________________

## 2018-04-10 ENCOUNTER — HOSPITAL ENCOUNTER (EMERGENCY)
Facility: HOSPITAL | Age: 73
Discharge: HOME | End: 2018-04-10
Attending: EMERGENCY MEDICINE
Payer: MEDICARE

## 2018-04-10 VITALS
OXYGEN SATURATION: 98 % | TEMPERATURE: 99 F | WEIGHT: 121 LBS | SYSTOLIC BLOOD PRESSURE: 180 MMHG | RESPIRATION RATE: 18 BRPM | HEART RATE: 107 BPM | DIASTOLIC BLOOD PRESSURE: 82 MMHG | BODY MASS INDEX: 23.63 KG/M2

## 2018-04-10 DIAGNOSIS — K31.84 GASTROPARESIS: Primary | ICD-10-CM

## 2018-04-10 DIAGNOSIS — N30.00 ACUTE CYSTITIS WITHOUT HEMATURIA: ICD-10-CM

## 2018-04-10 DIAGNOSIS — G89.4 CHRONIC PAIN SYNDROME: ICD-10-CM

## 2018-04-10 LAB
ALBUMIN SERPL BCP-MCNC: 4.2 G/DL
ALP SERPL-CCNC: 104 U/L
ALT SERPL W/O P-5'-P-CCNC: 16 U/L
ANION GAP SERPL CALC-SCNC: 19 MMOL/L
AST SERPL-CCNC: 29 U/L
BACTERIA #/AREA URNS AUTO: ABNORMAL /HPF
BASOPHILS # BLD AUTO: 0.04 K/UL
BASOPHILS NFR BLD: 0.3 %
BILIRUB SERPL-MCNC: 0.7 MG/DL
BILIRUB UR QL STRIP: NEGATIVE
BUN SERPL-MCNC: 9 MG/DL
CALCIUM SERPL-MCNC: 10.5 MG/DL
CHLORIDE SERPL-SCNC: 96 MMOL/L
CLARITY UR REFRACT.AUTO: CLEAR
CO2 SERPL-SCNC: 23 MMOL/L
COLOR UR AUTO: YELLOW
CREAT SERPL-MCNC: 0.9 MG/DL
DIFFERENTIAL METHOD: ABNORMAL
EOSINOPHIL # BLD AUTO: 0 K/UL
EOSINOPHIL NFR BLD: 0.2 %
ERYTHROCYTE [DISTWIDTH] IN BLOOD BY AUTOMATED COUNT: 14.2 %
EST. GFR  (AFRICAN AMERICAN): >60 ML/MIN/1.73 M^2
EST. GFR  (NON AFRICAN AMERICAN): >60 ML/MIN/1.73 M^2
GLUCOSE SERPL-MCNC: 160 MG/DL
GLUCOSE UR QL STRIP: ABNORMAL
HCT VFR BLD AUTO: 38 %
HGB BLD-MCNC: 13.1 G/DL
HGB UR QL STRIP: ABNORMAL
HYALINE CASTS UR QL AUTO: 0 /LPF
KETONES UR QL STRIP: ABNORMAL
LEUKOCYTE ESTERASE UR QL STRIP: ABNORMAL
LIPASE SERPL-CCNC: 22 U/L
LYMPHOCYTES # BLD AUTO: 3.1 K/UL
LYMPHOCYTES NFR BLD: 23.5 %
MCH RBC QN AUTO: 27.8 PG
MCHC RBC AUTO-ENTMCNC: 34.5 G/DL
MCV RBC AUTO: 81 FL
MICROSCOPIC COMMENT: ABNORMAL
MONOCYTES # BLD AUTO: 0.9 K/UL
MONOCYTES NFR BLD: 6.9 %
NEUTROPHILS # BLD AUTO: 9 K/UL
NEUTROPHILS NFR BLD: 68.7 %
NITRITE UR QL STRIP: NEGATIVE
NON-SQ EPI CELLS #/AREA URNS AUTO: 3 /HPF
PH UR STRIP: 8 [PH] (ref 5–8)
PLATELET # BLD AUTO: 336 K/UL
PMV BLD AUTO: 10.5 FL
POTASSIUM SERPL-SCNC: 3.7 MMOL/L
PROT SERPL-MCNC: 9.6 G/DL
PROT UR QL STRIP: ABNORMAL
RBC # BLD AUTO: 4.71 M/UL
RBC #/AREA URNS AUTO: 0 /HPF (ref 0–4)
SODIUM SERPL-SCNC: 138 MMOL/L
SP GR UR STRIP: 1.02 (ref 1–1.03)
SQUAMOUS #/AREA URNS AUTO: 3 /HPF
URN SPEC COLLECT METH UR: ABNORMAL
UROBILINOGEN UR STRIP-ACNC: ABNORMAL EU/DL
WBC # BLD AUTO: 13.06 K/UL
WBC #/AREA URNS AUTO: 50 /HPF (ref 0–5)
WBC CLUMPS UR QL AUTO: ABNORMAL
YEAST UR QL AUTO: ABNORMAL

## 2018-04-10 PROCEDURE — 96372 THER/PROPH/DIAG INJ SC/IM: CPT | Mod: 59

## 2018-04-10 PROCEDURE — 63600175 PHARM REV CODE 636 W HCPCS: Performed by: EMERGENCY MEDICINE

## 2018-04-10 PROCEDURE — 99284 EMERGENCY DEPT VISIT MOD MDM: CPT | Mod: 25

## 2018-04-10 PROCEDURE — 87086 URINE CULTURE/COLONY COUNT: CPT

## 2018-04-10 PROCEDURE — 85025 COMPLETE CBC W/AUTO DIFF WBC: CPT

## 2018-04-10 PROCEDURE — S0028 INJECTION, FAMOTIDINE, 20 MG: HCPCS | Performed by: EMERGENCY MEDICINE

## 2018-04-10 PROCEDURE — 80053 COMPREHEN METABOLIC PANEL: CPT

## 2018-04-10 PROCEDURE — 25000003 PHARM REV CODE 250: Performed by: EMERGENCY MEDICINE

## 2018-04-10 PROCEDURE — 96361 HYDRATE IV INFUSION ADD-ON: CPT

## 2018-04-10 PROCEDURE — 96375 TX/PRO/DX INJ NEW DRUG ADDON: CPT

## 2018-04-10 PROCEDURE — 81000 URINALYSIS NONAUTO W/SCOPE: CPT

## 2018-04-10 PROCEDURE — 96365 THER/PROPH/DIAG IV INF INIT: CPT

## 2018-04-10 PROCEDURE — 83690 ASSAY OF LIPASE: CPT

## 2018-04-10 RX ORDER — ZIPRASIDONE MESYLATE 20 MG/ML
5 INJECTION, POWDER, LYOPHILIZED, FOR SOLUTION INTRAMUSCULAR
Status: COMPLETED | OUTPATIENT
Start: 2018-04-10 | End: 2018-04-10

## 2018-04-10 RX ORDER — DIPHENHYDRAMINE HYDROCHLORIDE 50 MG/ML
25 INJECTION INTRAMUSCULAR; INTRAVENOUS
Status: COMPLETED | OUTPATIENT
Start: 2018-04-10 | End: 2018-04-10

## 2018-04-10 RX ORDER — FAMOTIDINE 20 MG/50ML
20 INJECTION, SOLUTION INTRAVENOUS
Status: COMPLETED | OUTPATIENT
Start: 2018-04-10 | End: 2018-04-10

## 2018-04-10 RX ORDER — ONDANSETRON 2 MG/ML
4 INJECTION INTRAMUSCULAR; INTRAVENOUS
Status: COMPLETED | OUTPATIENT
Start: 2018-04-10 | End: 2018-04-10

## 2018-04-10 RX ORDER — FAMOTIDINE 10 MG/ML
20 INJECTION INTRAVENOUS
Status: DISCONTINUED | OUTPATIENT
Start: 2018-04-10 | End: 2018-04-10

## 2018-04-10 RX ORDER — CIPROFLOXACIN 500 MG/1
500 TABLET ORAL
Status: COMPLETED | OUTPATIENT
Start: 2018-04-10 | End: 2018-04-10

## 2018-04-10 RX ORDER — CIPROFLOXACIN 500 MG/1
500 TABLET ORAL 2 TIMES DAILY
Qty: 9 TABLET | Refills: 0 | Status: SHIPPED | OUTPATIENT
Start: 2018-04-10 | End: 2018-04-15

## 2018-04-10 RX ORDER — HYDROMORPHONE HYDROCHLORIDE 1 MG/ML
0.5 INJECTION, SOLUTION INTRAMUSCULAR; INTRAVENOUS; SUBCUTANEOUS
Status: COMPLETED | OUTPATIENT
Start: 2018-04-10 | End: 2018-04-10

## 2018-04-10 RX ADMIN — CIPROFLOXACIN 500 MG: 500 TABLET, FILM COATED ORAL at 07:04

## 2018-04-10 RX ADMIN — ONDANSETRON 4 MG: 2 INJECTION INTRAMUSCULAR; INTRAVENOUS at 05:04

## 2018-04-10 RX ADMIN — DIPHENHYDRAMINE HYDROCHLORIDE 25 MG: 50 INJECTION, SOLUTION INTRAMUSCULAR; INTRAVENOUS at 05:04

## 2018-04-10 RX ADMIN — ZIPRASIDONE MESYLATE 5 MG: 20 INJECTION, POWDER, LYOPHILIZED, FOR SOLUTION INTRAMUSCULAR at 06:04

## 2018-04-10 RX ADMIN — SODIUM CHLORIDE 1000 ML: 0.9 INJECTION, SOLUTION INTRAVENOUS at 05:04

## 2018-04-10 RX ADMIN — HYDROMORPHONE HYDROCHLORIDE 0.5 MG: 1 INJECTION, SOLUTION INTRAMUSCULAR; INTRAVENOUS; SUBCUTANEOUS at 07:04

## 2018-04-10 RX ADMIN — FAMOTIDINE 20 MG: 20 INJECTION, SOLUTION INTRAVENOUS at 05:04

## 2018-04-10 NOTE — ED PROVIDER NOTES
Encounter Date: 4/10/2018       History     Chief Complaint   Patient presents with    Emesis     seen here for same 5 days ago     The history is provided by the patient.   Emesis    This is a recurrent problem. The current episode started just prior to arrival. The problem occurs constantly. The problem has been unchanged (similar to pt's previous episodes of gastroparesis). The emesis has an appearance of stomach contents and bilious material. Associated symptoms include abdominal pain (generalized). Pertinent negatives include no arthralgias, no chills, no cough, no diarrhea, no fever, no headaches, no myalgias, no sweats and no URI.     Pt states she had improved from this gastroparesis episode 4 days ago, but this recurred today.  Came to ER for further evaluation and treatment.    Review of patient's allergies indicates:   Allergen Reactions    Morphine Other (See Comments)     headache    Latex, natural rubber Rash     Past Medical History:   Diagnosis Date    Anxiety     Back pain     chronic    Diabetes mellitus     Gastroparesis     Hypertension     Sciatica      No past surgical history on file.  Family History   Problem Relation Age of Onset    Hyperlipidemia Father     Hypertension Mother      Social History   Substance Use Topics    Smoking status: Never Smoker    Smokeless tobacco: Never Used    Alcohol use No     Review of Systems   Constitutional: Negative for chills and fever.   HENT: Negative for sore throat.    Respiratory: Negative for cough and shortness of breath.    Cardiovascular: Negative for chest pain.   Gastrointestinal: Positive for abdominal pain (generalized) and vomiting. Negative for diarrhea and nausea.   Genitourinary: Negative for dysuria.   Musculoskeletal: Negative for arthralgias, back pain and myalgias.   Skin: Negative for rash.   Neurological: Negative for weakness and headaches.   Hematological: Does not bruise/bleed easily.   All other systems reviewed and  are negative.      Physical Exam     Initial Vitals [04/10/18 1646]   BP Pulse Resp Temp SpO2   (!) 170/88 108 18 99.1 °F (37.3 °C) 99 %      MAP       115.33         Physical Exam    Nursing note and vitals reviewed.  Constitutional: She appears well-developed and well-nourished.   HENT:   Head: Normocephalic and atraumatic.   Mouth/Throat: No oropharyngeal exudate.   Eyes: Conjunctivae and EOM are normal. Pupils are equal, round, and reactive to light.   Neck: Normal range of motion. Neck supple. No thyromegaly present.   Cardiovascular: Normal rate, regular rhythm, normal heart sounds and intact distal pulses. Exam reveals no gallop and no friction rub.    No murmur heard.  Pulmonary/Chest: Breath sounds normal. No respiratory distress. She has no wheezes. She has no rhonchi. She exhibits no tenderness.   Abdominal: Soft. Bowel sounds are normal. She exhibits no distension. There is generalized tenderness. There is no rigidity, no rebound, no guarding, no CVA tenderness, no tenderness at McBurney's point and negative Inman's sign. No hernia.   Musculoskeletal: Normal range of motion. She exhibits no edema or tenderness.   Lymphadenopathy:     She has no cervical adenopathy.   Neurological: She is alert and oriented to person, place, and time. She has normal strength. No cranial nerve deficit or sensory deficit.   Skin: Skin is warm and dry. No rash noted.   Psychiatric: She has a normal mood and affect. Her behavior is normal. Judgment and thought content normal.         ED Course   Procedures  Labs Reviewed   CBC W/ AUTO DIFFERENTIAL - Abnormal; Notable for the following:        Result Value    WBC 13.06 (*)     MCV 81 (*)     Gran # (ANC) 9.0 (*)     All other components within normal limits   COMPREHENSIVE METABOLIC PANEL - Abnormal; Notable for the following:     Glucose 160 (*)     Total Protein 9.6 (*)     Anion Gap 19 (*)     All other components within normal limits   LIPASE   URINALYSIS          Vitals:     04/10/18 1646 04/10/18 1904   BP: (!) 170/88 (!) 195/91   Pulse: 108 (!) 123   Resp: 18 18   Temp: 99.1 °F (37.3 °C) 99 °F (37.2 °C)   TempSrc: Oral Oral   SpO2: 99% 97%   Weight: 54.9 kg (121 lb)        Results for orders placed or performed during the hospital encounter of 04/10/18   CBC W/ AUTO DIFFERENTIAL   Result Value Ref Range    WBC 13.06 (H) 3.90 - 12.70 K/uL    RBC 4.71 4.00 - 5.40 M/uL    Hemoglobin 13.1 12.0 - 16.0 g/dL    Hematocrit 38.0 37.0 - 48.5 %    MCV 81 (L) 82 - 98 fL    MCH 27.8 27.0 - 31.0 pg    MCHC 34.5 32.0 - 36.0 g/dL    RDW 14.2 11.5 - 14.5 %    Platelets 336 150 - 350 K/uL    MPV 10.5 9.2 - 12.9 fL    Gran # (ANC) 9.0 (H) 1.8 - 7.7 K/uL    Lymph # 3.1 1.0 - 4.8 K/uL    Mono # 0.9 0.3 - 1.0 K/uL    Eos # 0.0 0.0 - 0.5 K/uL    Baso # 0.04 0.00 - 0.20 K/uL    Gran% 68.7 38.0 - 73.0 %    Lymph% 23.5 18.0 - 48.0 %    Mono% 6.9 4.0 - 15.0 %    Eosinophil% 0.2 0.0 - 8.0 %    Basophil% 0.3 0.0 - 1.9 %    Differential Method Automated    Comp. Metabolic Panel   Result Value Ref Range    Sodium 138 136 - 145 mmol/L    Potassium 3.7 3.5 - 5.1 mmol/L    Chloride 96 95 - 110 mmol/L    CO2 23 23 - 29 mmol/L    Glucose 160 (H) 70 - 110 mg/dL    BUN, Bld 9 8 - 23 mg/dL    Creatinine 0.9 0.5 - 1.4 mg/dL    Calcium 10.5 8.7 - 10.5 mg/dL    Total Protein 9.6 (H) 6.0 - 8.4 g/dL    Albumin 4.2 3.5 - 5.2 g/dL    Total Bilirubin 0.7 0.1 - 1.0 mg/dL    Alkaline Phosphatase 104 55 - 135 U/L    AST 29 10 - 40 U/L    ALT 16 10 - 44 U/L    Anion Gap 19 (H) 8 - 16 mmol/L    eGFR if African American >60.0 >60 mL/min/1.73 m^2    eGFR if non African American >60.0 >60 mL/min/1.73 m^2   Lipase   Result Value Ref Range    Lipase 22 4 - 60 U/L         Imaging Results          X-Ray Abdomen Flat And Erect (Final result)  Result time 04/10/18 18:34:17    Final result by Bety Villanueva MD (Timothy) (04/10/18 18:34:17)                 Impression:         Unremarkable abdomen .      Electronically signed by: BETY  JEFFERY BAKER  Date:     04/10/18  Time:    18:34              Narrative:    Abdomen, 2 views    Clinical history: abdominal pain    Findings: Comparison 04/05/2018.    Normal, nonobstructed bowel gas pattern. There is an average amount of stool present. There are no unusual calcifications.    Multilevel degenerative changes of the thoracolumbar spine.    There is no intraperitoneal free air. The lung bases are clear.                              Medications   HYDROmorphone injection 0.5 mg (not administered)   sodium chloride 0.9% bolus 1,000 mL (1,000 mLs Intravenous New Bag 4/10/18 1723)   ondansetron injection 4 mg (4 mg Intravenous Given 4/10/18 1724)   diphenhydrAMINE injection 25 mg (25 mg Intravenous Given 4/10/18 1724)   famotidine IVPB 20 mg (0 mg Intravenous Stopped 4/10/18 1758)   ziprasidone injection 5 mg (5 mg Intramuscular Given 4/10/18 1808)     6:00 PM - Transfer of Care: Patient care transferred from Dr. Blum to Dr. Nguyen, pending labs and studies.        6:47 PM Assumed care as above.  Known to me from recent ER visit for similar circumstances.  Repeat presentation with gastroparesis symptoms including nausea, vomiting, decreased oral intake, and presentation is somewhat complicated by the presence of chronic sciatica and the use of oral narcotics.  IV hydration and medications as well as laboratory evaluation underway as per Dr. Blum, awaiting response to treatment and further data.  Patient interviewed, examined, chart reviewed.    7:02 PM No distress, states that abdominal symptoms are generally improved, but still has generalized pain and would like a shot of pain medicine.  She now reports that she is running out of her Norco and Percocet, and has been discontinued from her previous pain management clinic because of her recent hospitalization.  She has an appointment with her primary care doctor tomorrow to reestablish chronic pain management.  Exam is benign.  Laboratory profile is  improved generally from last time.    - Re-evaluation:  The patient is resting comfortably and is in no acute distress. Discussed test results and notified of pending labs. Answered questions at this time.     Pre-hypertension/Hypertension: The pt has been informed that they may have pre-hypertension or hypertension based on a blood pressure reading in the ED. I recommend that the pt call the PCP listed on their discharge instructions or a physician of their choice this week to arrange f/u for further evaluation of possible pre-hypertension or hypertension.     Melva Barker was given a handout which discussed their disease process, precautions, and instructions for follow-up and therapy.    Follow-up Information     Claire Souza MD In 1 day.    Specialty:  Family Medicine  Contact information:  [address] [facility] [id number] [phone]             [facility].    Specialty:  Emergency Medicine  Why:  As needed  Contact information:  [address] [address] [address] [phone]                 New Prescriptions    No medications on file          ED Diagnosis  1. Gastroparesis    2. Chronic pain syndrome                                Clinical Impression:   The primary encounter diagnosis was Gastroparesis. A diagnosis of Chronic pain syndrome was also pertinent to this visit.                           Nasim Nguyen MD  04/10/18 9539

## 2018-04-10 NOTE — ED NOTES
Pt presents to ED with hx of gastroparesis. Reports vomiting and left side pain (sciatica). appt with PCP tomorrow.     Level of Consciousness: The patient is awake, alert, and oriented to person, place and time. Pts affect is appropriate, speech is appropriate.   Appearance: Pt is resting in stretcher, no acute distress is noted. Clothing and hygiene are appropriate.   Skin: Skin is W/D/I. Normal skin turgor. Mucous membranes are moist. Skin color normal.   Musculoskeletal: Moves all extremities well, full range of motion. No obvious deformities noted.  Respiratory: Airway open and patent. Respiration rate even and unlabored. No use of accessory muscles noted.   Cardiac: No peripheral edema noted. Peripheral pulses palpated. Capillary refill brisk.   Abdomen: No distension noted. Soft and non-tender to palpation.   Neurologic: Symmetrical expression noted in face. Hand grasps equal. Normal sensation reported in all extremities. No obvious neurological deficits noted.

## 2018-04-11 ENCOUNTER — PES CALL (OUTPATIENT)
Dept: ADMINISTRATIVE | Facility: CLINIC | Age: 73
End: 2018-04-11

## 2018-04-11 NOTE — DISCHARGE INSTRUCTIONS
________________    As discussed, your x-rays and labs are actually improved compared to last time.  We have given you medications that will last for the night.  We cannot treat your chronic pain in the Emergency Department; keep your follow-up with your primary care tomorrow as scheduled to resume chronic pain management.  Return to emergency department as needed.    _________________

## 2018-04-12 LAB
BACTERIA UR CULT: NORMAL
BACTERIA UR CULT: NORMAL

## 2018-06-02 ENCOUNTER — HOSPITAL ENCOUNTER (EMERGENCY)
Facility: HOSPITAL | Age: 73
Discharge: HOME | End: 2018-06-02
Attending: EMERGENCY MEDICINE
Payer: MEDICARE

## 2018-06-02 VITALS
SYSTOLIC BLOOD PRESSURE: 152 MMHG | WEIGHT: 118.63 LBS | OXYGEN SATURATION: 99 % | DIASTOLIC BLOOD PRESSURE: 80 MMHG | TEMPERATURE: 99 F | BODY MASS INDEX: 24.9 KG/M2 | HEART RATE: 89 BPM | HEIGHT: 58 IN | RESPIRATION RATE: 16 BRPM

## 2018-06-02 DIAGNOSIS — E11.43 DIABETIC GASTROPARESIS: Primary | ICD-10-CM

## 2018-06-02 DIAGNOSIS — K31.84 DIABETIC GASTROPARESIS: Primary | ICD-10-CM

## 2018-06-02 LAB
ALBUMIN SERPL BCP-MCNC: 4.4 G/DL
ALP SERPL-CCNC: 114 U/L
ALT SERPL W/O P-5'-P-CCNC: 37 U/L
ANION GAP SERPL CALC-SCNC: 14 MMOL/L
AST SERPL-CCNC: 27 U/L
BASOPHILS # BLD AUTO: 0.06 K/UL
BASOPHILS NFR BLD: 0.7 %
BILIRUB SERPL-MCNC: 0.7 MG/DL
BUN SERPL-MCNC: 17 MG/DL
CALCIUM SERPL-MCNC: 10.3 MG/DL
CHLORIDE SERPL-SCNC: 107 MMOL/L
CO2 SERPL-SCNC: 14 MMOL/L
CREAT SERPL-MCNC: 1.3 MG/DL
DIFFERENTIAL METHOD: ABNORMAL
EOSINOPHIL # BLD AUTO: 0.4 K/UL
EOSINOPHIL NFR BLD: 5 %
ERYTHROCYTE [DISTWIDTH] IN BLOOD BY AUTOMATED COUNT: 14.1 %
EST. GFR  (AFRICAN AMERICAN): 47.4 ML/MIN/1.73 M^2
EST. GFR  (NON AFRICAN AMERICAN): 41.1 ML/MIN/1.73 M^2
GLUCOSE SERPL-MCNC: 173 MG/DL
HCT VFR BLD AUTO: 38.6 %
HGB BLD-MCNC: 13.4 G/DL
LYMPHOCYTES # BLD AUTO: 2.8 K/UL
LYMPHOCYTES NFR BLD: 33.5 %
MCH RBC QN AUTO: 26.2 PG
MCHC RBC AUTO-ENTMCNC: 34.7 G/DL
MCV RBC AUTO: 76 FL
MONOCYTES # BLD AUTO: 0.5 K/UL
MONOCYTES NFR BLD: 6.5 %
NEUTROPHILS # BLD AUTO: 4.4 K/UL
NEUTROPHILS NFR BLD: 54.1 %
PLATELET # BLD AUTO: 266 K/UL
PMV BLD AUTO: 11.2 FL
POTASSIUM SERPL-SCNC: 3.9 MMOL/L
PROT SERPL-MCNC: 9.5 G/DL
RBC # BLD AUTO: 5.11 M/UL
SODIUM SERPL-SCNC: 135 MMOL/L
WBC # BLD AUTO: 8.21 K/UL

## 2018-06-02 PROCEDURE — 96361 HYDRATE IV INFUSION ADD-ON: CPT

## 2018-06-02 PROCEDURE — 85025 COMPLETE CBC W/AUTO DIFF WBC: CPT

## 2018-06-02 PROCEDURE — 25000003 PHARM REV CODE 250: Performed by: EMERGENCY MEDICINE

## 2018-06-02 PROCEDURE — 99284 EMERGENCY DEPT VISIT MOD MDM: CPT | Mod: 25

## 2018-06-02 PROCEDURE — 63600175 PHARM REV CODE 636 W HCPCS: Performed by: EMERGENCY MEDICINE

## 2018-06-02 PROCEDURE — 80053 COMPREHEN METABOLIC PANEL: CPT

## 2018-06-02 PROCEDURE — 96374 THER/PROPH/DIAG INJ IV PUSH: CPT

## 2018-06-02 RX ORDER — METOCLOPRAMIDE HYDROCHLORIDE 5 MG/ML
5 INJECTION INTRAMUSCULAR; INTRAVENOUS
Status: COMPLETED | OUTPATIENT
Start: 2018-06-02 | End: 2018-06-02

## 2018-06-02 RX ORDER — METOCLOPRAMIDE 10 MG/1
5 TABLET ORAL EVERY 6 HOURS PRN
Qty: 30 TABLET | Refills: 1 | Status: SHIPPED | OUTPATIENT
Start: 2018-06-02 | End: 2019-05-11

## 2018-06-02 RX ORDER — HYDROCODONE BITARTRATE AND ACETAMINOPHEN 7.5; 325 MG/1; MG/1
1 TABLET ORAL EVERY 12 HOURS PRN
Qty: 20 TABLET | Refills: 0 | Status: SHIPPED | OUTPATIENT
Start: 2018-06-02 | End: 2019-06-21

## 2018-06-02 RX ORDER — HYDROCODONE BITARTRATE AND ACETAMINOPHEN 5; 325 MG/1; MG/1
2 TABLET ORAL
Status: COMPLETED | OUTPATIENT
Start: 2018-06-02 | End: 2018-06-02

## 2018-06-02 RX ORDER — ONDANSETRON 4 MG/1
4 TABLET, ORALLY DISINTEGRATING ORAL
Status: COMPLETED | OUTPATIENT
Start: 2018-06-02 | End: 2018-06-02

## 2018-06-02 RX ORDER — ONDANSETRON 4 MG/1
4 TABLET, ORALLY DISINTEGRATING ORAL EVERY 12 HOURS PRN
Qty: 20 TABLET | Refills: 1 | Status: SHIPPED | OUTPATIENT
Start: 2018-06-02 | End: 2019-06-21

## 2018-06-02 RX ADMIN — HYDROCODONE BITARTRATE AND ACETAMINOPHEN 2 TABLET: 5; 325 TABLET ORAL at 04:06

## 2018-06-02 RX ADMIN — METOCLOPRAMIDE 5 MG: 5 INJECTION, SOLUTION INTRAMUSCULAR; INTRAVENOUS at 04:06

## 2018-06-02 RX ADMIN — SODIUM CHLORIDE 1000 ML: 0.9 INJECTION, SOLUTION INTRAVENOUS at 05:06

## 2018-06-02 RX ADMIN — ONDANSETRON 4 MG: 4 TABLET, ORALLY DISINTEGRATING ORAL at 04:06

## 2018-06-02 NOTE — ED NOTES
Patient supine on ER stretcher reports that for several days she has been having diarrhea and vomiting. She reports that she has loss 2 pounds in the last week. 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 Patient abd soft non tender, + BS skin warm and dry resp even and unlabored. Denies fevers. Will continue to monitor.

## 2018-06-02 NOTE — ED PROVIDER NOTES
Encounter Date: 6/2/2018       History     Chief Complaint   Patient presents with    Diarrhea     and vomiting. x 2 weeks off and on      Known to me from previous visits, has history of diabetic gastroparesis.  Will be seeing her primary care physician soon and plans to ask for a referral to Gastroenterology.  Out of Reglan, Zofran, and Norco with associated flare in symptoms for the last 10 or 12 days.  She feels her blood pressure is a little low because she has a little bit of dehydration at present.  She reports that she has lost a little bit of weight.  No fever, abdominal pain, but is having vomiting and diarrhea intermittently.  Her medications do work well for her generally speaking.  No other complaints.      The history is provided by the patient and a relative. No  was used.     Review of patient's allergies indicates:   Allergen Reactions    Morphine Other (See Comments)     headache    Latex, natural rubber Rash     Past Medical History:   Diagnosis Date    Anxiety     Back pain     chronic    Diabetes mellitus     Gastroparesis     Hypertension     Sciatica      History reviewed. No pertinent surgical history.  Family History   Problem Relation Age of Onset    Hyperlipidemia Father     Hypertension Mother      Social History   Substance Use Topics    Smoking status: Never Smoker    Smokeless tobacco: Never Used    Alcohol use No     Review of Systems   Constitutional: Negative for activity change, fatigue and fever.   HENT: Negative for congestion, ear pain, facial swelling, nosebleeds, sinus pressure and sore throat.    Eyes: Negative for pain, discharge, redness and visual disturbance.   Respiratory: Negative for cough, choking, chest tightness, shortness of breath and wheezing.    Cardiovascular: Negative for chest pain, palpitations and leg swelling.   Gastrointestinal: Positive for nausea and vomiting. Negative for abdominal distention and abdominal pain.    Endocrine: Negative for heat intolerance, polydipsia and polyuria.   Genitourinary: Negative for difficulty urinating, dysuria, flank pain, hematuria and urgency.   Musculoskeletal: Negative for back pain, gait problem, joint swelling and myalgias.   Skin: Negative for color change and rash.   Allergic/Immunologic: Negative for environmental allergies and food allergies.   Neurological: Negative for dizziness, weakness, numbness and headaches.   Hematological: Negative for adenopathy. Does not bruise/bleed easily.   Psychiatric/Behavioral: Negative for agitation and behavioral problems. The patient is not nervous/anxious.    All other systems reviewed and are negative.      Physical Exam     Initial Vitals [06/02/18 1536]   BP Pulse Resp Temp SpO2   (!) 151/73 (!) 111 20 98.5 °F (36.9 °C) 99 %      MAP       99         Physical Exam    Nursing note and vitals reviewed.  Constitutional: She appears well-developed and well-nourished. She is not diaphoretic. No distress.   HENT:   Head: Normocephalic and atraumatic.   Mouth/Throat: No oropharyngeal exudate.   Eyes: Conjunctivae and EOM are normal. Pupils are equal, round, and reactive to light. Right eye exhibits no discharge. Left eye exhibits no discharge. No scleral icterus.   Neck: Normal range of motion. Neck supple. No thyromegaly present. No tracheal deviation present. No JVD present.   Cardiovascular: Normal rate, regular rhythm, normal heart sounds and intact distal pulses. Exam reveals no gallop and no friction rub.    No murmur heard.  Pulmonary/Chest: Breath sounds normal. No stridor. No respiratory distress. She has no wheezes. She has no rhonchi. She has no rales. She exhibits no tenderness.   Abdominal: Soft. Bowel sounds are normal. She exhibits no distension and no mass. There is no tenderness. There is no rebound and no guarding.   Musculoskeletal: Normal range of motion. She exhibits no edema or tenderness.   Neurological: She is alert and oriented  to person, place, and time. She has normal strength.   Skin: Skin is warm and dry. No rash and no abscess noted. No erythema.   Psychiatric: She has a normal mood and affect. Her behavior is normal. Judgment and thought content normal.         ED Course   Procedures  Labs Reviewed   CBC W/ AUTO DIFFERENTIAL - Abnormal; Notable for the following:        Result Value    MCV 76 (*)     MCH 26.2 (*)     All other components within normal limits   COMPREHENSIVE METABOLIC PANEL - Abnormal; Notable for the following:     Sodium 135 (*)     CO2 14 (*)     Glucose 173 (*)     Total Protein 9.5 (*)     eGFR if  47.4 (*)     eGFR if non  41.1 (*)     All other components within normal limits                                  Clinical Impression:     1. Diabetic gastroparesis          Disposition:   Disposition: Discharged  Condition: Stable                        Nasim Nguyen MD  06/02/18 5534

## 2018-06-02 NOTE — ED NOTES
Patient in NAD awaiting for fluids to finish. Skin warm and dry resp even and unlabored. Will continue to monitor.

## 2019-02-25 ENCOUNTER — HOSPITAL ENCOUNTER (EMERGENCY)
Facility: HOSPITAL | Age: 74
End: 2019-02-25
Attending: EMERGENCY MEDICINE
Payer: MEDICARE

## 2019-02-25 VITALS
SYSTOLIC BLOOD PRESSURE: 168 MMHG | DIASTOLIC BLOOD PRESSURE: 97 MMHG | HEIGHT: 60 IN | HEART RATE: 112 BPM | RESPIRATION RATE: 20 BRPM | TEMPERATURE: 100 F | BODY MASS INDEX: 25.52 KG/M2 | WEIGHT: 130 LBS | OXYGEN SATURATION: 100 %

## 2019-02-25 DIAGNOSIS — R11.2 NAUSEA & VOMITING: ICD-10-CM

## 2019-02-25 DIAGNOSIS — E11.43 GASTROPARESIS DUE TO DM: Primary | ICD-10-CM

## 2019-02-25 DIAGNOSIS — R11.2 INTRACTABLE VOMITING WITH NAUSEA, UNSPECIFIED VOMITING TYPE: ICD-10-CM

## 2019-02-25 DIAGNOSIS — Z79.4 TYPE 2 DIABETES MELLITUS WITH HYPERGLYCEMIA, WITH LONG-TERM CURRENT USE OF INSULIN: ICD-10-CM

## 2019-02-25 DIAGNOSIS — E11.65 TYPE 2 DIABETES MELLITUS WITH HYPERGLYCEMIA, WITH LONG-TERM CURRENT USE OF INSULIN: ICD-10-CM

## 2019-02-25 DIAGNOSIS — K31.84 GASTROPARESIS DUE TO DM: Primary | ICD-10-CM

## 2019-02-25 DIAGNOSIS — I10 HYPERTENSION, UNSPECIFIED TYPE: ICD-10-CM

## 2019-02-25 LAB
ALBUMIN SERPL BCP-MCNC: 4 G/DL
ALP SERPL-CCNC: 113 U/L
ALT SERPL W/O P-5'-P-CCNC: 15 U/L
ANION GAP SERPL CALC-SCNC: 12 MMOL/L
AST SERPL-CCNC: 14 U/L
B-OH-BUTYR BLD STRIP-SCNC: 0.8 MMOL/L
BACTERIA #/AREA URNS AUTO: ABNORMAL /HPF
BASOPHILS # BLD AUTO: 0.03 K/UL
BASOPHILS NFR BLD: 0.3 %
BILIRUB SERPL-MCNC: 0.6 MG/DL
BILIRUB UR QL STRIP: NEGATIVE
BUN SERPL-MCNC: 11 MG/DL
CALCIUM SERPL-MCNC: 9.8 MG/DL
CHLORIDE SERPL-SCNC: 101 MMOL/L
CLARITY UR REFRACT.AUTO: CLEAR
CO2 SERPL-SCNC: 23 MMOL/L
COLOR UR AUTO: YELLOW
CREAT SERPL-MCNC: 1 MG/DL
DIFFERENTIAL METHOD: ABNORMAL
EOSINOPHIL # BLD AUTO: 0.1 K/UL
EOSINOPHIL NFR BLD: 0.8 %
ERYTHROCYTE [DISTWIDTH] IN BLOOD BY AUTOMATED COUNT: 16.1 %
EST. GFR  (AFRICAN AMERICAN): >60 ML/MIN/1.73 M^2
EST. GFR  (NON AFRICAN AMERICAN): 56 ML/MIN/1.73 M^2
GLUCOSE SERPL-MCNC: 263 MG/DL
GLUCOSE UR QL STRIP: ABNORMAL
HCT VFR BLD AUTO: 40 %
HGB BLD-MCNC: 13.3 G/DL
HGB UR QL STRIP: ABNORMAL
HYALINE CASTS UR QL AUTO: 0 /LPF
KETONES UR QL STRIP: ABNORMAL
LEUKOCYTE ESTERASE UR QL STRIP: ABNORMAL
LYMPHOCYTES # BLD AUTO: 3.4 K/UL
LYMPHOCYTES NFR BLD: 31.1 %
MCH RBC QN AUTO: 27.4 PG
MCHC RBC AUTO-ENTMCNC: 33.3 G/DL
MCV RBC AUTO: 82 FL
MICROSCOPIC COMMENT: ABNORMAL
MONOCYTES # BLD AUTO: 0.7 K/UL
MONOCYTES NFR BLD: 6.5 %
NEUTROPHILS # BLD AUTO: 6.7 K/UL
NEUTROPHILS NFR BLD: 60.5 %
NITRITE UR QL STRIP: NEGATIVE
PH UR STRIP: 7 [PH] (ref 5–8)
PLATELET # BLD AUTO: 280 K/UL
PMV BLD AUTO: 10.7 FL
POCT GLUCOSE: 236 MG/DL (ref 70–110)
POCT GLUCOSE: 271 MG/DL (ref 70–110)
POTASSIUM SERPL-SCNC: 3.8 MMOL/L
PROT SERPL-MCNC: 8.6 G/DL
PROT UR QL STRIP: ABNORMAL
RBC # BLD AUTO: 4.86 M/UL
RBC #/AREA URNS AUTO: 0 /HPF (ref 0–4)
SODIUM SERPL-SCNC: 136 MMOL/L
SP GR UR STRIP: 1.01 (ref 1–1.03)
SQUAMOUS #/AREA URNS AUTO: 3 /HPF
URN SPEC COLLECT METH UR: ABNORMAL
UROBILINOGEN UR STRIP-ACNC: <2 EU/DL
WBC # BLD AUTO: 11.02 K/UL
WBC #/AREA URNS AUTO: 5 /HPF (ref 0–5)
YEAST UR QL AUTO: ABNORMAL

## 2019-02-25 PROCEDURE — 82962 GLUCOSE BLOOD TEST: CPT | Mod: ER

## 2019-02-25 PROCEDURE — 96375 TX/PRO/DX INJ NEW DRUG ADDON: CPT | Mod: ER

## 2019-02-25 PROCEDURE — 96365 THER/PROPH/DIAG IV INF INIT: CPT | Mod: ER

## 2019-02-25 PROCEDURE — 93010 EKG 12-LEAD: ICD-10-PCS | Mod: ,,, | Performed by: INTERNAL MEDICINE

## 2019-02-25 PROCEDURE — 25000003 PHARM REV CODE 250: Mod: ER | Performed by: EMERGENCY MEDICINE

## 2019-02-25 PROCEDURE — 99900035 HC TECH TIME PER 15 MIN (STAT): Mod: ER

## 2019-02-25 PROCEDURE — 82010 KETONE BODYS QUAN: CPT | Mod: ER

## 2019-02-25 PROCEDURE — 93010 ELECTROCARDIOGRAM REPORT: CPT | Mod: ,,, | Performed by: INTERNAL MEDICINE

## 2019-02-25 PROCEDURE — 81000 URINALYSIS NONAUTO W/SCOPE: CPT | Mod: ER

## 2019-02-25 PROCEDURE — 80053 COMPREHEN METABOLIC PANEL: CPT | Mod: ER

## 2019-02-25 PROCEDURE — 96361 HYDRATE IV INFUSION ADD-ON: CPT | Mod: ER

## 2019-02-25 PROCEDURE — 85025 COMPLETE CBC W/AUTO DIFF WBC: CPT | Mod: ER

## 2019-02-25 PROCEDURE — 99285 EMERGENCY DEPT VISIT HI MDM: CPT | Mod: 25,ER

## 2019-02-25 PROCEDURE — 93005 ELECTROCARDIOGRAM TRACING: CPT | Mod: ER

## 2019-02-25 PROCEDURE — 63600175 PHARM REV CODE 636 W HCPCS: Mod: ER | Performed by: EMERGENCY MEDICINE

## 2019-02-25 RX ORDER — METOCLOPRAMIDE HYDROCHLORIDE 5 MG/ML
10 INJECTION INTRAMUSCULAR; INTRAVENOUS
Status: COMPLETED | OUTPATIENT
Start: 2019-02-25 | End: 2019-02-25

## 2019-02-25 RX ORDER — HYDROCODONE BITARTRATE AND ACETAMINOPHEN 10; 325 MG/1; MG/1
1 TABLET ORAL
Status: COMPLETED | OUTPATIENT
Start: 2019-02-25 | End: 2019-02-25

## 2019-02-25 RX ORDER — ONDANSETRON 2 MG/ML
4 INJECTION INTRAMUSCULAR; INTRAVENOUS
Status: COMPLETED | OUTPATIENT
Start: 2019-02-25 | End: 2019-02-25

## 2019-02-25 RX ADMIN — METOCLOPRAMIDE 10 MG: 5 INJECTION, SOLUTION INTRAMUSCULAR; INTRAVENOUS at 02:02

## 2019-02-25 RX ADMIN — PROMETHAZINE HYDROCHLORIDE 12.5 MG: 25 INJECTION INTRAMUSCULAR; INTRAVENOUS at 04:02

## 2019-02-25 RX ADMIN — SODIUM CHLORIDE 1000 ML: 0.9 INJECTION, SOLUTION INTRAVENOUS at 02:02

## 2019-02-25 RX ADMIN — ONDANSETRON 4 MG: 2 INJECTION INTRAMUSCULAR; INTRAVENOUS at 04:02

## 2019-02-25 RX ADMIN — HYDROCODONE BITARTRATE AND ACETAMINOPHEN 1 TABLET: 10; 325 TABLET ORAL at 03:02

## 2019-02-25 NOTE — ED NOTES
"Patient AAOx3, nad, no sob, no cp, denies nausea and vomiting or diarrhea at this time. Continue to monitor patient while in ER. Call light in reach, side rails up, bed locked an in low position. Pt states "i'm cold." Warm blanket provided to pt.   "

## 2019-02-25 NOTE — ED NOTES
Contacted by Thao at [facility]. Patient is accepted by Dr. Flip Souza at [facility]. Phone number is  [phone]. [facility]

## 2019-02-25 NOTE — ED NOTES
The patient is resting quietly. Airway is open and patent, respirations are spontaneous, normal respiratory effort and rate noted, skin warm and dry, appearance: in no acute distress and changing positions for comfort. Bed lock in low position. Cardia monitor in place. Pt c/o lower back pain at this time. MD aware.

## 2019-02-25 NOTE — ED PROVIDER NOTES
"Encounter Date: 2/25/2019       History     Chief Complaint   Patient presents with    Vomiting      x 3 hours. +diarrhea. [facility] states pt vomited upon arrival. states "I tried taking something but it keeps coming back up. Everything comes back up. My pain medicine too." VSS. NAD. GCS 15     Patient currently presents with chief complaint of nausea and vomiting.  Onset noted 6 hours ago.  There have been numerous bouts of emesis and a few episodes episodes of associated diarrhea.  Patient denies fever.  Patient denies sustained abdominal pain.  Patient denies urinary symptoms.  There is not blood in the stools.  Patient notably has a history of gastroparesis.  She has been unable to hold down her medications this evening.           Review of patient's allergies indicates:   Allergen Reactions    Morphine Other (See Comments)     headache    Latex, natural rubber Rash     Past Medical History:   Diagnosis Date    Anxiety     Back pain     chronic    Diabetes mellitus     Gastroparesis     Hypertension     Sciatica      Past Surgical History:   Procedure Laterality Date    ESOPHAGOGASTRODUODENOSCOPY (EGD) N/A 7/2/2015    Performed by Pj Campos MD at [facility] ENDO     Family History   Problem Relation Age of Onset    Hyperlipidemia Father     Hypertension Mother      Social History     Tobacco Use    Smoking status: Never Smoker    Smokeless tobacco: Never Used   Substance Use Topics    Alcohol use: No     Alcohol/week: 0.0 oz    Drug use: No     Review of Systems   Constitutional: Negative for chills and fever.   HENT: Negative for congestion and rhinorrhea.    Respiratory: Negative for cough, chest tightness, shortness of breath and wheezing.    Cardiovascular: Negative for chest pain, palpitations and leg swelling.   Gastrointestinal: Positive for diarrhea, nausea and vomiting. Negative for abdominal pain and constipation.   Genitourinary: Negative for dysuria, frequency, urgency, vaginal " bleeding and vaginal discharge.   Musculoskeletal: Positive for back pain (acute on chronic).   Skin: Negative for color change and rash.   Allergic/Immunologic: Negative for immunocompromised state.   Neurological: Negative for dizziness, weakness and numbness.   Hematological: Negative for adenopathy. Does not bruise/bleed easily.   All other systems reviewed and are negative.      Physical Exam     Initial Vitals [02/25/19 0155]   BP Pulse Resp Temp SpO2   (!) 158/90 102 18 98.9 °F (37.2 °C) 100 %      MAP       --         Vitals:    02/25/19 0155 02/25/19 0203 02/25/19 0302 02/25/19 0358   BP: (!) 158/90  (!) 168/88 (!) 167/70   Pulse: 102 103 109 103   Resp: 18  20 (!) 22   Temp: 98.9 °F (37.2 °C)      TempSrc: Oral      SpO2: 100%  98% 100%   Weight: 59 kg (130 lb)      Height: 5' (1.524 m)       02/25/19 0402 02/25/19 0554   BP: (!) 198/94    Pulse: 101 (!) 112   Resp: (!) 22 (!) 22   Temp:  99.7 °F (37.6 °C)   TempSrc:  Oral   SpO2: 100% 99%   Weight:     Height:       Physical Exam    Nursing note and vitals reviewed.  Constitutional: She appears well-developed and well-nourished. She is not diaphoretic. No distress.   HENT:   Head: Normocephalic and atraumatic.   Right Ear: External ear normal.   Left Ear: External ear normal.   Nose: Nose normal.   Mouth/Throat: Oropharynx is clear and moist.   Eyes: Conjunctivae and EOM are normal. Pupils are equal, round, and reactive to light. No scleral icterus.   Neck: Neck supple. No tracheal deviation present. No JVD present.   Cardiovascular: Normal rate, regular rhythm, normal heart sounds and intact distal pulses. Exam reveals no gallop and no friction rub.    No murmur heard.  Pulmonary/Chest: Breath sounds normal. No respiratory distress. She has no wheezes. She has no rhonchi. She has no rales.   Abdominal: Soft. Bowel sounds are normal. She exhibits no distension. There is no tenderness.   Musculoskeletal: Normal range of motion. She exhibits no edema.    Neurological: She is alert and oriented to person, place, and time. She has normal strength. No cranial nerve deficit or sensory deficit.   Skin: Skin is warm and dry. No rash noted.   Psychiatric: She has a normal mood and affect. Her behavior is normal.         ED Course   Procedures  Labs Reviewed   CBC W/ AUTO DIFFERENTIAL - Abnormal; Notable for the following components:       Result Value    RDW 16.1 (*)     All other components within normal limits   COMPREHENSIVE METABOLIC PANEL - Abnormal; Notable for the following components:    Glucose 263 (*)     Total Protein 8.6 (*)     eGFR if non  56.0 (*)     All other components within normal limits   URINALYSIS, REFLEX TO URINE CULTURE - Abnormal; Notable for the following components:    Protein, UA 1+ (*)     Glucose, UA 3+ (*)     Ketones, UA 1+ (*)     Occult Blood UA 1+ (*)     Leukocytes, UA Trace (*)     All other components within normal limits    Narrative:     Preferred Collection Type->Urine, Clean Catch   BETA - HYDROXYBUTYRATE, SERUM - Abnormal; Notable for the following components:    Beta-Hydroxybutyrate 0.8 (*)     All other components within normal limits   URINALYSIS MICROSCOPIC - Abnormal; Notable for the following components:    Bacteria, UA Many (*)     Yeast, UA Occasional (*)     All other components within normal limits    Narrative:     Preferred Collection Type->Urine, Clean Catch   POCT GLUCOSE - Abnormal; Notable for the following components:    POCT Glucose 271 (*)     All other components within normal limits   POCT GLUCOSE - Abnormal; Notable for the following components:    POCT Glucose 236 (*)     All other components within normal limits     EKG Readings: (Independently Interpreted)   Initial Reading: No STEMI. Rhythm: Sinus Tachycardia. Heart Rate: 105. Ectopy: No Ectopy. Conduction: Normal. Axis: Normal.       Imaging Results          X-Ray Abdomen Flat And Erect (In process)                  Medical Decision  Making:   ED Management:  Although briefly resolved, the patients nausea and vomiting has returned.  Will administer Zofran and Phenergan and reassess.  5:03 AM      Patient with additional emesis shortly after last dose of meds.  Will  Monitor for further emesis.  BG at 236 but without evidence of HNS or DKA.  Following discussion of all pertinent details presently available from the patient's encounter, the patient was transitioned into the care of  for ongoing evaluation and management.  A request has been placed with the [facility] for transfer to General acute hospital per the patient's request secondary to intractable vomiting despite 2 rounds of Zofran, Reglan, and Phenergan.  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  Raghu Mccord MD  6:02 AM            All historical, clinical, radiographic, and laboratory findings were reviewed with the patient/family in detail along with the indications for transfer to an outside facility (rather than admission to our facility in [place]) secondary to patient request and a need for iv fluids and admission given the diagnosis of intractable vomiting.  All remaining questions and concerns were addressed at that time and the patient/family communicates understanding and agrees to proceed accordingly.  Similarly all pertinent details of the encounter were discussed with Dr. Souza at the [facility] who agrees to accept the patient in transfer based on the needs/patient preferences outlined above.  Patient will be transferred by [facility] ambulance services secondary to a need for ongoing IV fluids and cardiac monitoring en route.  Raghu Mccord MD  6:34 AM               Clinical Impression:       ICD-10-CM ICD-9-CM   1. Gastroparesis due to DM E11.43 250.60    K31.84 536.3   2. Nausea & vomiting  R11.2 787.01   3. Hypertension, unspecified type I10 401.9   4. Type 2 diabetes mellitus with hyperglycemia, with long-term current use of insulin E11.65 250.00    Z79.4 790.29     V58.67   5. Intractable vomiting with nausea, unspecified vomiting type R11.2 536.2         Disposition:   Disposition: Transferred  Condition: Fair                        Raghu Mccord MD  02/25/19 0635

## 2019-03-26 ENCOUNTER — HOSPITAL ENCOUNTER (EMERGENCY)
Facility: HOSPITAL | Age: 74
Discharge: LEFT BEFORE DISCHARGE | End: 2019-03-27
Attending: EMERGENCY MEDICINE
Payer: MEDICARE

## 2019-03-26 DIAGNOSIS — E87.6 HYPOKALEMIA: ICD-10-CM

## 2019-03-26 DIAGNOSIS — R11.10 EMESIS: ICD-10-CM

## 2019-03-26 DIAGNOSIS — K31.84 GASTROPARESIS: Primary | ICD-10-CM

## 2019-03-26 DIAGNOSIS — I10 POORLY-CONTROLLED HYPERTENSION: ICD-10-CM

## 2019-03-26 LAB
ALBUMIN SERPL BCP-MCNC: 3.8 G/DL (ref 3.5–5.2)
ALP SERPL-CCNC: 90 U/L (ref 55–135)
ALT SERPL W/O P-5'-P-CCNC: 12 U/L (ref 10–44)
ANION GAP SERPL CALC-SCNC: 11 MMOL/L (ref 8–16)
AST SERPL-CCNC: 15 U/L (ref 10–40)
BACTERIA #/AREA URNS AUTO: ABNORMAL /HPF
BASOPHILS # BLD AUTO: 0.03 K/UL (ref 0–0.2)
BASOPHILS NFR BLD: 0.2 % (ref 0–1.9)
BILIRUB SERPL-MCNC: 0.5 MG/DL (ref 0.1–1)
BILIRUB UR QL STRIP: NEGATIVE
BUN SERPL-MCNC: 5 MG/DL (ref 8–23)
CALCIUM SERPL-MCNC: 9.7 MG/DL (ref 8.7–10.5)
CHLORIDE SERPL-SCNC: 110 MMOL/L (ref 95–110)
CLARITY UR REFRACT.AUTO: CLEAR
CO2 SERPL-SCNC: 19 MMOL/L (ref 23–29)
COLOR UR AUTO: YELLOW
CREAT SERPL-MCNC: 0.8 MG/DL (ref 0.5–1.4)
DIFFERENTIAL METHOD: ABNORMAL
EOSINOPHIL # BLD AUTO: 0.2 K/UL (ref 0–0.5)
EOSINOPHIL NFR BLD: 1.3 % (ref 0–8)
ERYTHROCYTE [DISTWIDTH] IN BLOOD BY AUTOMATED COUNT: 14.4 % (ref 11.5–14.5)
EST. GFR  (AFRICAN AMERICAN): >60 ML/MIN/1.73 M^2
EST. GFR  (NON AFRICAN AMERICAN): >60 ML/MIN/1.73 M^2
GLUCOSE SERPL-MCNC: 174 MG/DL (ref 70–110)
GLUCOSE UR QL STRIP: ABNORMAL
HCT VFR BLD AUTO: 33.3 % (ref 37–48.5)
HGB BLD-MCNC: 11 G/DL (ref 12–16)
HGB UR QL STRIP: NEGATIVE
HYALINE CASTS UR QL AUTO: 0 /LPF
KETONES UR QL STRIP: ABNORMAL
LEUKOCYTE ESTERASE UR QL STRIP: NEGATIVE
LIPASE SERPL-CCNC: 23 U/L (ref 4–60)
LYMPHOCYTES # BLD AUTO: 2.5 K/UL (ref 1–4.8)
LYMPHOCYTES NFR BLD: 17.8 % (ref 18–48)
MCH RBC QN AUTO: 27.4 PG (ref 27–31)
MCHC RBC AUTO-ENTMCNC: 33 G/DL (ref 32–36)
MCV RBC AUTO: 83 FL (ref 82–98)
MICROSCOPIC COMMENT: ABNORMAL
MONOCYTES # BLD AUTO: 0.8 K/UL (ref 0.3–1)
MONOCYTES NFR BLD: 5.5 % (ref 4–15)
NEUTROPHILS # BLD AUTO: 10.3 K/UL (ref 1.8–7.7)
NEUTROPHILS NFR BLD: 74.8 % (ref 38–73)
NITRITE UR QL STRIP: NEGATIVE
NON-SQ EPI CELLS #/AREA URNS AUTO: 2 /HPF
PH UR STRIP: 7 [PH] (ref 5–8)
PLATELET # BLD AUTO: 250 K/UL (ref 150–350)
PMV BLD AUTO: 10.9 FL (ref 9.2–12.9)
POTASSIUM SERPL-SCNC: 2.7 MMOL/L (ref 3.5–5.1)
PROT SERPL-MCNC: 7.7 G/DL (ref 6–8.4)
PROT UR QL STRIP: ABNORMAL
RBC # BLD AUTO: 4.01 M/UL (ref 4–5.4)
RBC #/AREA URNS AUTO: 0 /HPF (ref 0–4)
SODIUM SERPL-SCNC: 140 MMOL/L (ref 136–145)
SP GR UR STRIP: 1.02 (ref 1–1.03)
TROPONIN I SERPL DL<=0.01 NG/ML-MCNC: <0.006 NG/ML (ref 0–0.03)
URN SPEC COLLECT METH UR: ABNORMAL
UROBILINOGEN UR STRIP-ACNC: NEGATIVE EU/DL
WBC # BLD AUTO: 13.82 K/UL (ref 3.9–12.7)
WBC #/AREA URNS AUTO: 1 /HPF (ref 0–5)
YEAST UR QL AUTO: ABNORMAL

## 2019-03-26 PROCEDURE — 96365 THER/PROPH/DIAG IV INF INIT: CPT | Mod: 59,ER

## 2019-03-26 PROCEDURE — 84484 ASSAY OF TROPONIN QUANT: CPT | Mod: ER

## 2019-03-26 PROCEDURE — 25000003 PHARM REV CODE 250: Mod: ER | Performed by: EMERGENCY MEDICINE

## 2019-03-26 PROCEDURE — 80053 COMPREHEN METABOLIC PANEL: CPT | Mod: ER

## 2019-03-26 PROCEDURE — 96375 TX/PRO/DX INJ NEW DRUG ADDON: CPT | Mod: ER

## 2019-03-26 PROCEDURE — 99285 EMERGENCY DEPT VISIT HI MDM: CPT | Mod: 25,ER

## 2019-03-26 PROCEDURE — 63600175 PHARM REV CODE 636 W HCPCS: Mod: ER | Performed by: EMERGENCY MEDICINE

## 2019-03-26 PROCEDURE — 96361 HYDRATE IV INFUSION ADD-ON: CPT | Mod: ER

## 2019-03-26 PROCEDURE — 81000 URINALYSIS NONAUTO W/SCOPE: CPT | Mod: ER

## 2019-03-26 PROCEDURE — 93005 ELECTROCARDIOGRAM TRACING: CPT | Mod: ER

## 2019-03-26 PROCEDURE — 83690 ASSAY OF LIPASE: CPT | Mod: ER

## 2019-03-26 PROCEDURE — 85025 COMPLETE CBC W/AUTO DIFF WBC: CPT | Mod: ER

## 2019-03-26 PROCEDURE — 93010 EKG 12-LEAD: ICD-10-PCS | Mod: ,,, | Performed by: INTERNAL MEDICINE

## 2019-03-26 PROCEDURE — 93010 ELECTROCARDIOGRAM REPORT: CPT | Mod: ,,, | Performed by: INTERNAL MEDICINE

## 2019-03-26 PROCEDURE — 96366 THER/PROPH/DIAG IV INF ADDON: CPT | Mod: ER

## 2019-03-26 PROCEDURE — 99900035 HC TECH TIME PER 15 MIN (STAT): Mod: ER

## 2019-03-26 RX ORDER — LABETALOL HYDROCHLORIDE 5 MG/ML
20 INJECTION, SOLUTION INTRAVENOUS
Status: COMPLETED | OUTPATIENT
Start: 2019-03-26 | End: 2019-03-26

## 2019-03-26 RX ORDER — METOCLOPRAMIDE HYDROCHLORIDE 5 MG/ML
10 INJECTION INTRAMUSCULAR; INTRAVENOUS
Status: COMPLETED | OUTPATIENT
Start: 2019-03-26 | End: 2019-03-26

## 2019-03-26 RX ORDER — POTASSIUM CHLORIDE 7.45 MG/ML
10 INJECTION INTRAVENOUS
Status: COMPLETED | OUTPATIENT
Start: 2019-03-26 | End: 2019-03-27

## 2019-03-26 RX ORDER — HYDROMORPHONE HYDROCHLORIDE 2 MG/ML
0.5 INJECTION, SOLUTION INTRAMUSCULAR; INTRAVENOUS; SUBCUTANEOUS
Status: COMPLETED | OUTPATIENT
Start: 2019-03-27 | End: 2019-03-27

## 2019-03-26 RX ORDER — ONDANSETRON 2 MG/ML
4 INJECTION INTRAMUSCULAR; INTRAVENOUS
Status: COMPLETED | OUTPATIENT
Start: 2019-03-26 | End: 2019-03-26

## 2019-03-26 RX ADMIN — POTASSIUM CHLORIDE 10 MEQ: 10 INJECTION, SOLUTION INTRAVENOUS at 08:03

## 2019-03-26 RX ADMIN — METOCLOPRAMIDE 10 MG: 5 INJECTION, SOLUTION INTRAMUSCULAR; INTRAVENOUS at 06:03

## 2019-03-26 RX ADMIN — LABETALOL HYDROCHLORIDE 20 MG: 5 INJECTION, SOLUTION INTRAVENOUS at 06:03

## 2019-03-26 RX ADMIN — SODIUM CHLORIDE 1000 ML: 0.9 INJECTION, SOLUTION INTRAVENOUS at 08:03

## 2019-03-26 RX ADMIN — POTASSIUM CHLORIDE 10 MEQ: 10 INJECTION, SOLUTION INTRAVENOUS at 09:03

## 2019-03-26 RX ADMIN — POTASSIUM CHLORIDE 10 MEQ: 10 INJECTION, SOLUTION INTRAVENOUS at 10:03

## 2019-03-26 RX ADMIN — ONDANSETRON 4 MG: 2 INJECTION INTRAMUSCULAR; INTRAVENOUS at 10:03

## 2019-03-26 RX ADMIN — PROMETHAZINE HYDROCHLORIDE 12.5 MG: 25 INJECTION INTRAMUSCULAR; INTRAVENOUS at 06:03

## 2019-03-26 RX ADMIN — SODIUM CHLORIDE 1000 ML: 0.9 INJECTION, SOLUTION INTRAVENOUS at 06:03

## 2019-03-26 RX ADMIN — POTASSIUM CHLORIDE 10 MEQ: 10 INJECTION, SOLUTION INTRAVENOUS at 07:03

## 2019-03-26 NOTE — ED PROVIDER NOTES
Encounter Date: 3/26/2019       History     Chief Complaint   Patient presents with    Abdominal Pain     abd pain with vomiting since 2pm today. enroute zofran iv and iv 22g left forearm . cbg 150    Emesis     The history is provided by the patient.   Abdominal Pain   The current episode started today. The onset of the illness was abrupt. The problem has been gradually improving. The abdominal pain is generalized. The abdominal pain radiates to the back. The severity of the abdominal pain is 8/10. The abdominal pain is relieved by vomiting. The abdominal pain is exacerbated by eating. The other symptoms of the illness include vomiting.   The vomiting began today. The emesis contains stomach contents.   The patient states that she believes she is currently not pregnant. The patient has not had a change in bowel habit. Additional symptoms associated with the illness include back pain. Symptoms associated with the illness do not include chills, anorexia, diaphoresis, heartburn, constipation, urgency, hematuria or frequency.   Emesis    Associated symptoms include abdominal pain. Pertinent negatives include no chills.     Review of patient's allergies indicates:   Allergen Reactions    Morphine Other (See Comments)     headache    Latex, natural rubber Rash     Past Medical History:   Diagnosis Date    Anxiety     Back pain     chronic    Diabetes mellitus     Gastroparesis     Hypertension     Sciatica      Past Surgical History:   Procedure Laterality Date    ESOPHAGOGASTRODUODENOSCOPY (EGD) N/A 7/2/2015    Performed by Pj [facility]pos MD at [facility] ENDO     Family History   Problem Relation Age of Onset    Hyperlipidemia Father     Hypertension Mother      Social History     Tobacco Use    Smoking status: Never Smoker    Smokeless tobacco: Never Used   Substance Use Topics    Alcohol use: No     Alcohol/week: 0.0 oz    Drug use: No     Review of Systems   Constitutional: Negative for chills and  diaphoresis.   Gastrointestinal: Positive for abdominal pain and vomiting. Negative for anorexia, constipation and heartburn.   Genitourinary: Negative for frequency, hematuria and urgency.   Musculoskeletal: Positive for back pain.   All other systems reviewed and are negative.      Physical Exam     Initial Vitals [03/26/19 1746]   BP Pulse Resp Temp SpO2   (!) 200/105 (!) 114 20 98.5 °F (36.9 °C) 100 %      MAP       --         Vitals:    03/26/19 1746 03/26/19 1750 03/26/19 1800 03/26/19 1802   BP: (!) 200/105 (!) 200/105 (!) 204/96 (!) 187/99   Pulse: (!) 114 (!) 111 105 101   Resp: 20 (!) 22 (!) 23 (!) 23   Temp: 98.5 °F (36.9 °C)      TempSrc: Oral      SpO2: 100%  100% 100%   Weight: 59 kg (130 lb)      Height: 5' (1.524 m)       03/26/19 2132 03/27/19 0002   BP: (!) 165/85 (!) 176/81   Pulse: 94 109   Resp:  20   Temp:     TempSrc:     SpO2: 100% 100%   Weight:     Height:       Physical Exam    Nursing note and vitals reviewed.  Constitutional: She appears well-developed and well-nourished. No distress.   HENT:   Head: Normocephalic and atraumatic.   Mouth/Throat: Oropharynx is clear and moist.   Eyes: Conjunctivae and EOM are normal. Pupils are equal, round, and reactive to light.   Neck: Normal range of motion. Neck supple.   Cardiovascular: Regular rhythm. Tachycardia present.    Pulmonary/Chest: Breath sounds normal. No respiratory distress. She has no wheezes. She has no rales.   Abdominal: Soft. Bowel sounds are normal.   Musculoskeletal: Normal range of motion.   Neurological: She is alert and oriented to person, place, and time. She has normal strength.   Skin: Skin is warm and dry.   Psychiatric: She has a normal mood and affect. Thought content normal.       ED Course   Procedures  Labs Reviewed   CBC W/ AUTO DIFFERENTIAL - Abnormal; Notable for the following components:       Result Value    WBC 13.82 (*)     Hemoglobin 11.0 (*)     Hematocrit 33.3 (*)     Gran # (ANC) 10.3 (*)     Gran% 74.8  (*)     Lymph% 17.8 (*)     All other components within normal limits   COMPREHENSIVE METABOLIC PANEL - Abnormal; Notable for the following components:    Potassium 2.7 (*)     CO2 19 (*)     Glucose 174 (*)     BUN, Bld 5 (*)     All other components within normal limits    Narrative:      K+  critical result(s) called and verbal readback obtained from Sergey Renteria, 03/26/2019 18:45   URINALYSIS, REFLEX TO URINE CULTURE - Abnormal; Notable for the following components:    Protein, UA 1+ (*)     Glucose, UA 3+ (*)     Ketones, UA Trace (*)     All other components within normal limits    Narrative:     Preferred Collection Type->Urine, Clean Catch   URINALYSIS MICROSCOPIC - Abnormal; Notable for the following components:    Non-Squam Epith 2 (*)     All other components within normal limits    Narrative:     Preferred Collection Type->Urine, Clean Catch   LIPASE   TROPONIN I          Imaging Results    None          Medical Decision Making:   ED Management:  All findings were reviewed with the patient/family in detail along with the diagnosis of gastroparesis, hypokalemia, poorly-controlled hypertension, and persistent vomiting.    We have advised patient of the need for admission given her persistent vomiting and the need to maintain IV hydration and antiemetics pending her improvement.  Patient unfortunately reports that she will declined admission noting that she has to care for a family member at home.  We expressed our concern regarding the potential risk that it poses to her at this point as she does not appear to be markedly improved.  She is mildly tachycardic and still vomiting intermittently.  She communicates understanding of the need for admission and reports that she will return to the hospital later today after checking on her family.  We attempted to a sister in alleviating any barriers that would prevent her from staying but she notes that there is no one else available to care for her sister.   Patient at this point will sign out against medical advice notably declining further labs, IV fluids, and ongoing observation for bowel rest.                          Clinical Impression:       ICD-10-CM ICD-9-CM   1. Gastroparesis K31.84 536.3   2. Emesis R11.10 787.03   3. Hypokalemia E87.6 276.8   4. Poorly-controlled hypertension I10 401.9                                Deep Agee MD  03/27/19 0423       Deep Agee MD  03/27/19 0430

## 2019-03-27 VITALS
RESPIRATION RATE: 20 BRPM | HEIGHT: 60 IN | DIASTOLIC BLOOD PRESSURE: 88 MMHG | BODY MASS INDEX: 25.52 KG/M2 | OXYGEN SATURATION: 100 % | SYSTOLIC BLOOD PRESSURE: 164 MMHG | WEIGHT: 130 LBS | HEART RATE: 110 BPM | TEMPERATURE: 99 F

## 2019-03-27 PROCEDURE — 63600175 PHARM REV CODE 636 W HCPCS: Mod: ER | Performed by: EMERGENCY MEDICINE

## 2019-03-27 RX ORDER — POTASSIUM CHLORIDE 20 MEQ/1
40 TABLET, EXTENDED RELEASE ORAL
Status: DISCONTINUED | OUTPATIENT
Start: 2019-03-27 | End: 2019-03-27 | Stop reason: HOSPADM

## 2019-03-27 RX ADMIN — HYDROMORPHONE HYDROCHLORIDE 0.5 MG: 2 INJECTION, SOLUTION INTRAMUSCULAR; INTRAVENOUS; SUBCUTANEOUS at 12:03

## 2019-03-27 NOTE — ED NOTES
Pt decided she will leave Md DHEERAJ explained risk of leaving at this time. Will try to find patient family member to come get patient.

## 2019-03-27 NOTE — ED NOTES
Pt had large watery stool, pt bathed, linen changed, cathaterized  with specimen sent to lab & diapered.  SR up x 2, callbell within reach

## 2019-03-27 NOTE — ED NOTES
Pt states at this time that only family member that could come get her is at the hospital with another family member. No transportation at this time. MD notified. Will continue to monitor patient

## 2019-03-28 ENCOUNTER — HOSPITAL ENCOUNTER (EMERGENCY)
Facility: HOSPITAL | Age: 74
Discharge: HOME | End: 2019-03-28
Attending: EMERGENCY MEDICINE
Payer: MEDICARE

## 2019-03-28 VITALS
TEMPERATURE: 99 F | RESPIRATION RATE: 20 BRPM | OXYGEN SATURATION: 98 % | BODY MASS INDEX: 25.35 KG/M2 | DIASTOLIC BLOOD PRESSURE: 96 MMHG | HEART RATE: 100 BPM | WEIGHT: 129.81 LBS | SYSTOLIC BLOOD PRESSURE: 187 MMHG

## 2019-03-28 DIAGNOSIS — G89.29 CHRONIC BILATERAL LOW BACK PAIN WITH RIGHT-SIDED SCIATICA: Primary | ICD-10-CM

## 2019-03-28 DIAGNOSIS — M54.41 CHRONIC BILATERAL LOW BACK PAIN WITH RIGHT-SIDED SCIATICA: Primary | ICD-10-CM

## 2019-03-28 DIAGNOSIS — K31.84 GASTROPARESIS: ICD-10-CM

## 2019-03-28 PROCEDURE — 99283 EMERGENCY DEPT VISIT LOW MDM: CPT | Mod: ER

## 2019-03-28 PROCEDURE — 25000003 PHARM REV CODE 250: Mod: ER | Performed by: EMERGENCY MEDICINE

## 2019-03-28 RX ORDER — HYDROCODONE BITARTRATE AND ACETAMINOPHEN 10; 325 MG/1; MG/1
1 TABLET ORAL
Status: COMPLETED | OUTPATIENT
Start: 2019-03-28 | End: 2019-03-28

## 2019-03-28 RX ORDER — PROMETHAZINE HYDROCHLORIDE 25 MG/1
25 TABLET ORAL
Status: COMPLETED | OUTPATIENT
Start: 2019-03-28 | End: 2019-03-28

## 2019-03-28 RX ADMIN — PROMETHAZINE HYDROCHLORIDE 25 MG: 25 TABLET ORAL at 07:03

## 2019-03-28 RX ADMIN — HYDROCODONE BITARTRATE AND ACETAMINOPHEN 1 TABLET: 10; 325 TABLET ORAL at 07:03

## 2019-03-29 NOTE — ED PROVIDER NOTES
Encounter Date: 3/28/2019       History     Chief Complaint   Patient presents with    Back Pain     chronic back pain. worsening today. states pain is now making her nauseous.      Patient reports a chief complaint of back pain.  This is isolated to the lower back.  This pain does radiate down the lateral right thigh.  There has been no trauma.  This problem has been present for 3-4 years and fluctuates in intensity.  There is no numbness, weakness, incontinence reported.  Patient has attempted treatment at home with her usual medications which include gabapentin, Zanaflex, and Relafen.  Urinary complaints are denied.  Patient notably left against medical advice from an encounter that began the day before yesterday.  She notes that she has not had any further vomiting but remains intermittently nauseated.            Review of patient's allergies indicates:   Allergen Reactions    Morphine Other (See Comments)     headache    Latex, natural rubber Rash     Past Medical History:   Diagnosis Date    Anxiety     Back pain     chronic    Diabetes mellitus     Gastroparesis     Hypertension     Sciatica      Past Surgical History:   Procedure Laterality Date    ESOPHAGOGASTRODUODENOSCOPY (EGD) N/A 7/2/2015    Performed by Pj [facility]pos MD at [facility] ENDO     Family History   Problem Relation Age of Onset    Hyperlipidemia Father     Hypertension Mother      Social History     Tobacco Use    Smoking status: Never Smoker    Smokeless tobacco: Never Used   Substance Use Topics    Alcohol use: No     Alcohol/week: 0.0 oz    Drug use: No     Review of Systems   Constitutional: Negative for chills and fever.   HENT: Negative for congestion and rhinorrhea.    Eyes: Negative for visual disturbance.   Respiratory: Negative for cough, chest tightness, shortness of breath and wheezing.    Cardiovascular: Negative for chest pain, palpitations and leg swelling.   Gastrointestinal: Positive for nausea. Negative for  abdominal pain, constipation, diarrhea and vomiting.   Genitourinary: Negative for dysuria, frequency, urgency, vaginal bleeding and vaginal discharge.   Skin: Negative for color change and rash.   Allergic/Immunologic: Negative for immunocompromised state.   Neurological: Negative for dizziness, weakness and numbness.   Hematological: Negative for adenopathy. Does not bruise/bleed easily.   All other systems reviewed and are negative.      Physical Exam     Initial Vitals [03/28/19 1850]   BP Pulse Resp Temp SpO2   (!) 211/102 84 20 98.8 °F (37.1 °C) 98 %      MAP       --         Physical Exam    Nursing note and vitals reviewed.  Constitutional: She appears well-developed and well-nourished. She is not diaphoretic. No distress.   HENT:   Head: Normocephalic and atraumatic.   Right Ear: External ear normal.   Left Ear: External ear normal.   Nose: Nose normal.   Mouth/Throat: Oropharynx is clear and moist.   Eyes: Conjunctivae and EOM are normal. Pupils are equal, round, and reactive to light. No scleral icterus.   Neck: Neck supple. No tracheal deviation present. No JVD present.   Cardiovascular: Normal rate, regular rhythm, normal heart sounds and intact distal pulses. Exam reveals no gallop and no friction rub.    No murmur heard.  Pulmonary/Chest: Breath sounds normal. No respiratory distress. She has no wheezes. She has no rhonchi. She has no rales.   Abdominal: Soft. Bowel sounds are normal. She exhibits no distension. There is no tenderness.   Musculoskeletal: Normal range of motion. She exhibits no edema.        Lumbar back: She exhibits tenderness and pain. She exhibits normal range of motion, no bony tenderness, no deformity and normal pulse.        Back:    Neurological: She is alert and oriented to person, place, and time. She has normal strength. No cranial nerve deficit or sensory deficit.   Skin: Skin is warm and dry. No rash noted.   Psychiatric: She has a normal mood and affect. Her behavior is  normal.         ED Course   Procedures  Labs Reviewed - No data to display       Imaging Results    None          Medical Decision Making:   ED Management:  All findings were reviewed with the patient/family in detail along with the diagnosis of chronic low back pain and sciatica as well as nausea related to chronic gastroparesis.  I see no indication of an emergent process beyond that addressed during our encounter but have duly counseled the patient/family regarding the need for prompt follow-up as well as the indications that should prompt immediate return to the emergency room should new or worrisome developments occur.  The patient/family communicates understanding of all this information and all remaining questions and concerns were addressed at this time.                          Clinical Impression:       ICD-10-CM ICD-9-CM   1. Chronic bilateral low back pain with right-sided sciatica M54.41 724.2    G89.29 724.3     338.29   2. Gastroparesis K31.84 536.3                                Deep Agee MD  03/28/19 2218

## 2019-04-25 ENCOUNTER — HOSPITAL ENCOUNTER (EMERGENCY)
Facility: HOSPITAL | Age: 74
Discharge: HOME | End: 2019-04-25
Attending: EMERGENCY MEDICINE
Payer: MEDICARE

## 2019-04-25 VITALS
RESPIRATION RATE: 20 BRPM | OXYGEN SATURATION: 100 % | SYSTOLIC BLOOD PRESSURE: 122 MMHG | DIASTOLIC BLOOD PRESSURE: 75 MMHG | BODY MASS INDEX: 24.02 KG/M2 | WEIGHT: 123 LBS | HEART RATE: 63 BPM | TEMPERATURE: 98 F

## 2019-04-25 DIAGNOSIS — I95.1 ORTHOSTATIC HYPOTENSION: Primary | ICD-10-CM

## 2019-04-25 DIAGNOSIS — R73.9 HYPERGLYCEMIA: ICD-10-CM

## 2019-04-25 DIAGNOSIS — R42 LIGHTHEADEDNESS: ICD-10-CM

## 2019-04-25 LAB
ALBUMIN SERPL BCP-MCNC: 3.7 G/DL (ref 3.5–5.2)
ALP SERPL-CCNC: 89 U/L (ref 55–135)
ALT SERPL W/O P-5'-P-CCNC: 63 U/L (ref 10–44)
ANION GAP SERPL CALC-SCNC: 12 MMOL/L (ref 8–16)
AST SERPL-CCNC: 58 U/L (ref 10–40)
BACTERIA #/AREA URNS AUTO: NORMAL /HPF
BASOPHILS # BLD AUTO: 0.03 K/UL (ref 0–0.2)
BASOPHILS NFR BLD: 0.4 % (ref 0–1.9)
BILIRUB SERPL-MCNC: 0.7 MG/DL (ref 0.1–1)
BILIRUB UR QL STRIP: NEGATIVE
BUN SERPL-MCNC: 16 MG/DL (ref 8–23)
CALCIUM SERPL-MCNC: 9.4 MG/DL (ref 8.7–10.5)
CHLORIDE SERPL-SCNC: 103 MMOL/L (ref 95–110)
CLARITY UR REFRACT.AUTO: CLEAR
CO2 SERPL-SCNC: 20 MMOL/L (ref 23–29)
COLOR UR AUTO: YELLOW
CREAT SERPL-MCNC: 1.2 MG/DL (ref 0.5–1.4)
DIFFERENTIAL METHOD: ABNORMAL
EOSINOPHIL # BLD AUTO: 0.4 K/UL (ref 0–0.5)
EOSINOPHIL NFR BLD: 5.7 % (ref 0–8)
ERYTHROCYTE [DISTWIDTH] IN BLOOD BY AUTOMATED COUNT: 14.2 % (ref 11.5–14.5)
EST. GFR  (AFRICAN AMERICAN): 51.8 ML/MIN/1.73 M^2
EST. GFR  (NON AFRICAN AMERICAN): 44.9 ML/MIN/1.73 M^2
GLUCOSE SERPL-MCNC: 308 MG/DL (ref 70–110)
GLUCOSE UR QL STRIP: ABNORMAL
HCT VFR BLD AUTO: 34.4 % (ref 37–48.5)
HGB BLD-MCNC: 11.4 G/DL (ref 12–16)
HGB UR QL STRIP: NEGATIVE
KETONES UR QL STRIP: NEGATIVE
LACTATE SERPL-SCNC: 2 MMOL/L (ref 0.5–2.2)
LACTATE SERPL-SCNC: 2.8 MMOL/L (ref 0.5–2.2)
LEUKOCYTE ESTERASE UR QL STRIP: ABNORMAL
LYMPHOCYTES # BLD AUTO: 1.8 K/UL (ref 1–4.8)
LYMPHOCYTES NFR BLD: 25.3 % (ref 18–48)
MAGNESIUM SERPL-MCNC: 2 MG/DL (ref 1.6–2.6)
MCH RBC QN AUTO: 27.4 PG (ref 27–31)
MCHC RBC AUTO-ENTMCNC: 33.1 G/DL (ref 32–36)
MCV RBC AUTO: 83 FL (ref 82–98)
MICROSCOPIC COMMENT: NORMAL
MONOCYTES # BLD AUTO: 0.5 K/UL (ref 0.3–1)
MONOCYTES NFR BLD: 6.7 % (ref 4–15)
NEUTROPHILS # BLD AUTO: 4.4 K/UL (ref 1.8–7.7)
NEUTROPHILS NFR BLD: 61.8 % (ref 38–73)
NITRITE UR QL STRIP: NEGATIVE
PH UR STRIP: 6 [PH] (ref 5–8)
PLATELET # BLD AUTO: 252 K/UL (ref 150–350)
PMV BLD AUTO: 11.1 FL (ref 9.2–12.9)
POCT GLUCOSE: 217 MG/DL (ref 70–110)
POTASSIUM SERPL-SCNC: 3.8 MMOL/L (ref 3.5–5.1)
PROT SERPL-MCNC: 7.8 G/DL (ref 6–8.4)
PROT UR QL STRIP: NEGATIVE
RBC # BLD AUTO: 4.16 M/UL (ref 4–5.4)
SODIUM SERPL-SCNC: 135 MMOL/L (ref 136–145)
SP GR UR STRIP: <=1.005 (ref 1–1.03)
SQUAMOUS #/AREA URNS AUTO: 2 /HPF
T4 FREE SERPL-MCNC: 0.89 NG/DL (ref 0.71–1.51)
TROPONIN I SERPL DL<=0.01 NG/ML-MCNC: <0.006 NG/ML (ref 0–0.03)
TSH SERPL DL<=0.005 MIU/L-ACNC: 0.19 UIU/ML (ref 0.4–4)
URN SPEC COLLECT METH UR: ABNORMAL
UROBILINOGEN UR STRIP-ACNC: <2 EU/DL
WBC # BLD AUTO: 7.04 K/UL (ref 3.9–12.7)
WBC #/AREA URNS AUTO: 3 /HPF (ref 0–5)
YEAST UR QL AUTO: NORMAL

## 2019-04-25 PROCEDURE — 99900035 HC TECH TIME PER 15 MIN (STAT): Mod: ER

## 2019-04-25 PROCEDURE — 93010 ELECTROCARDIOGRAM REPORT: CPT | Mod: ,,, | Performed by: INTERNAL MEDICINE

## 2019-04-25 PROCEDURE — 83735 ASSAY OF MAGNESIUM: CPT | Mod: ER

## 2019-04-25 PROCEDURE — 63600175 PHARM REV CODE 636 W HCPCS: Mod: ER | Performed by: EMERGENCY MEDICINE

## 2019-04-25 PROCEDURE — 80053 COMPREHEN METABOLIC PANEL: CPT | Mod: ER

## 2019-04-25 PROCEDURE — 84439 ASSAY OF FREE THYROXINE: CPT | Mod: ER

## 2019-04-25 PROCEDURE — 25000003 PHARM REV CODE 250: Mod: ER | Performed by: EMERGENCY MEDICINE

## 2019-04-25 PROCEDURE — 93005 ELECTROCARDIOGRAM TRACING: CPT | Mod: ER

## 2019-04-25 PROCEDURE — 96360 HYDRATION IV INFUSION INIT: CPT | Mod: ER

## 2019-04-25 PROCEDURE — 99285 EMERGENCY DEPT VISIT HI MDM: CPT | Mod: 25,ER

## 2019-04-25 PROCEDURE — 83605 ASSAY OF LACTIC ACID: CPT | Mod: ER

## 2019-04-25 PROCEDURE — 81000 URINALYSIS NONAUTO W/SCOPE: CPT | Mod: ER

## 2019-04-25 PROCEDURE — 84443 ASSAY THYROID STIM HORMONE: CPT | Mod: ER

## 2019-04-25 PROCEDURE — 93010 EKG 12-LEAD: ICD-10-PCS | Mod: ,,, | Performed by: INTERNAL MEDICINE

## 2019-04-25 PROCEDURE — 84484 ASSAY OF TROPONIN QUANT: CPT | Mod: ER

## 2019-04-25 PROCEDURE — 85025 COMPLETE CBC W/AUTO DIFF WBC: CPT | Mod: ER

## 2019-04-25 RX ORDER — HYDROCODONE BITARTRATE AND ACETAMINOPHEN 10; 325 MG/1; MG/1
1 TABLET ORAL
Status: COMPLETED | OUTPATIENT
Start: 2019-04-25 | End: 2019-04-25

## 2019-04-25 RX ADMIN — SODIUM CHLORIDE 1000 ML: 0.9 INJECTION, SOLUTION INTRAVENOUS at 11:04

## 2019-04-25 RX ADMIN — HYDROCODONE BITARTRATE AND ACETAMINOPHEN 1 TABLET: 10; 325 TABLET ORAL at 01:04

## 2019-04-25 NOTE — ED PROVIDER NOTES
Encounter Date: 4/25/2019       History     Chief Complaint   Patient presents with    Dizziness     dizziness this morning and home health nurse told her that her blood pressure was low when she stood up.      Patient is a 74-year-old female with a past medical history as listed below, who presents today with complaints of lightheadedness.  Patient states that she woke up today feeling lightheaded.  She took her muscle relaxer and continued to feel lightheaded.  She informed her home health nurse that she felt lightheaded.  Home health nurse took the blood pressure and it was reportedly low.  EMS was called and EMS stated that she had positive orthostatics on their arrival.  Patient was transferred to the emergency department via EMS.  No treatment was given in route.  Patient states that she feels like her normal self while sitting down.  She denied any chest pain at any point, shortness of breath, palpitations, blood in the stool, nausea/vomiting/diarrhea, abdominal pain, dysuria, fever/chills.  Patient states she did not take her hypertension medications this morning.  Only medication she took this morning was her muscle relaxer.  She denies any new medications.  She is requesting her chronic hydrocodone does for her chronic back pain. No other complaints.        Review of patient's allergies indicates:   Allergen Reactions    Morphine Other (See Comments)     headache    Latex, natural rubber Rash     Past Medical History:   Diagnosis Date    Anxiety     Back pain     chronic    Diabetes mellitus     Gastroparesis     Hypertension     Sciatica      Past Surgical History:   Procedure Laterality Date    ESOPHAGOGASTRODUODENOSCOPY (EGD) N/A 7/2/2015    Performed by Pj Campos MD at [facility] END[facility]     Family History   Problem Relation Age of Onset    Hyperlipidemia Father     Hypertension Mother      Social History     Tobacco Use    Smoking status: Never Smoker    Smokeless tobacco: Never Used    Substance Use Topics    Alcohol use: No     Alcohol/week: 0.0 oz    Drug use: No     Review of Systems   Constitutional: Negative for chills, diaphoresis and fever.   HENT: Negative for congestion, rhinorrhea and sore throat.    Eyes: Negative for pain, redness and visual disturbance.   Respiratory: Negative for cough and shortness of breath.    Cardiovascular: Negative for chest pain, palpitations and leg swelling.   Gastrointestinal: Negative for abdominal distention, abdominal pain, blood in stool, constipation, diarrhea, nausea and vomiting.   Genitourinary: Negative for dysuria and hematuria.   Musculoskeletal: Positive for back pain (chronic). Negative for arthralgias and joint swelling.   Skin: Negative for rash and wound.   Neurological: Positive for light-headedness. Negative for seizures, syncope and headaches.   All other systems reviewed and are negative.      Physical Exam     Initial Vitals [04/25/19 1041]   BP Pulse Resp Temp SpO2   (!) 101/58 61 20 98.9 °F (37.2 °C) 99 %      MAP       --         Physical Exam    Nursing note and vitals reviewed.  Constitutional: She appears well-developed and well-nourished. No distress.   HENT:   Head: Normocephalic and atraumatic.   Mouth/Throat: Oropharynx is clear and moist.   Eyes: Conjunctivae and EOM are normal. Pupils are equal, round, and reactive to light.   Neck: Neck supple. No tracheal deviation present.   Cardiovascular: Normal rate, regular rhythm, normal heart sounds and intact distal pulses.   Pulmonary/Chest: Breath sounds normal. No respiratory distress.   Abdominal: Soft. She exhibits no distension. There is no tenderness. There is no rebound and no guarding.   Musculoskeletal: Normal range of motion. She exhibits no edema or tenderness.   Neurological: She is alert and oriented to person, place, and time.   No focal deficits   Skin: Skin is warm. No rash noted. No erythema.   Psychiatric: She has a normal mood and affect. Her behavior is  normal.         ED Course   Procedures  Labs Reviewed   COMPREHENSIVE METABOLIC PANEL - Abnormal; Notable for the following components:       Result Value    Sodium 135 (*)     CO2 20 (*)     Glucose 308 (*)     AST 58 (*)     ALT 63 (*)     eGFR if  51.8 (*)     eGFR if non  44.9 (*)     All other components within normal limits   CBC W/ AUTO DIFFERENTIAL - Abnormal; Notable for the following components:    Hemoglobin 11.4 (*)     Hematocrit 34.4 (*)     All other components within normal limits   LACTIC ACID, PLASMA - Abnormal; Notable for the following components:    Lactate (Lactic Acid) 2.8 (*)     All other components within normal limits   URINALYSIS, REFLEX TO URINE CULTURE - Abnormal; Notable for the following components:    Specific Gravity, UA <=1.005 (*)     Glucose, UA 3+ (*)     Leukocytes, UA Trace (*)     All other components within normal limits    Narrative:     Preferred Collection Type->Urine, Clean Catch   TSH - Abnormal; Notable for the following components:    TSH 0.189 (*)     All other components within normal limits   POCT GLUCOSE - Abnormal; Notable for the following components:    POCT Glucose 217 (*)     All other components within normal limits   TROPONIN I   MAGNESIUM   T4, FREE   URINALYSIS MICROSCOPIC    Narrative:     Preferred Collection Type->Urine, Clean Catch   LACTIC ACID, PLASMA   POCT GLUCOSE MONITORING CONTINUOUS     Results for orders placed or performed during the hospital encounter of 04/25/19   Comprehensive metabolic panel   Result Value Ref Range    Sodium 135 (L) 136 - 145 mmol/L    Potassium 3.8 3.5 - 5.1 mmol/L    Chloride 103 95 - 110 mmol/L    CO2 20 (L) 23 - 29 mmol/L    Glucose 308 (H) 70 - 110 mg/dL    BUN, Bld 16 8 - 23 mg/dL    Creatinine 1.2 0.5 - 1.4 mg/dL    Calcium 9.4 8.7 - 10.5 mg/dL    Total Protein 7.8 6.0 - 8.4 g/dL    Albumin 3.7 3.5 - 5.2 g/dL    Total Bilirubin 0.7 0.1 - 1.0 mg/dL    Alkaline Phosphatase 89 55 - 135  U/L    AST 58 (H) 10 - 40 U/L    ALT 63 (H) 10 - 44 U/L    Anion Gap 12 8 - 16 mmol/L    eGFR if African American 51.8 (A) >60 mL/min/1.73 m^2    eGFR if non African American 44.9 (A) >60 mL/min/1.73 m^2   CBC auto differential   Result Value Ref Range    WBC 7.04 3.90 - 12.70 K/uL    RBC 4.16 4.00 - 5.40 M/uL    Hemoglobin 11.4 (L) 12.0 - 16.0 g/dL    Hematocrit 34.4 (L) 37.0 - 48.5 %    MCV 83 82 - 98 fL    MCH 27.4 27.0 - 31.0 pg    MCHC 33.1 32.0 - 36.0 g/dL    RDW 14.2 11.5 - 14.5 %    Platelets 252 150 - 350 K/uL    MPV 11.1 9.2 - 12.9 fL    Gran # (ANC) 4.4 1.8 - 7.7 K/uL    Lymph # 1.8 1.0 - 4.8 K/uL    Mono # 0.5 0.3 - 1.0 K/uL    Eos # 0.4 0.0 - 0.5 K/uL    Baso # 0.03 0.00 - 0.20 K/uL    Gran% 61.8 38.0 - 73.0 %    Lymph% 25.3 18.0 - 48.0 %    Mono% 6.7 4.0 - 15.0 %    Eosinophil% 5.7 0.0 - 8.0 %    Basophil% 0.4 0.0 - 1.9 %    Differential Method Automated    Troponin I   Result Value Ref Range    Troponin I <0.006 0.000 - 0.026 ng/mL   Magnesium   Result Value Ref Range    Magnesium 2.0 1.6 - 2.6 mg/dL   Lactic acid, plasma   Result Value Ref Range    Lactate (Lactic Acid) 2.8 (H) 0.5 - 2.2 mmol/L   Urinalysis, Reflex to Urine Culture Urine, Clean Catch   Result Value Ref Range    Specimen UA Urine, Clean Catch     Color, UA Yellow Yellow, Straw, Tonya    Appearance, UA Clear Clear    pH, UA 6.0 5.0 - 8.0    Specific Gravity, UA <=1.005 (A) 1.005 - 1.030    Protein, UA Negative Negative    Glucose, UA 3+ (A) Negative    Ketones, UA Negative Negative    Bilirubin (UA) Negative Negative    Occult Blood UA Negative Negative    Nitrite, UA Negative Negative    Urobilinogen, UA <2.0 <2.0 EU/dL    Leukocytes, UA Trace (A) Negative   TSH   Result Value Ref Range    TSH 0.189 (L) 0.400 - 4.000 uIU/mL   T4, free   Result Value Ref Range    Free T4 0.89 0.71 - 1.51 ng/dL   Urinalysis Microscopic   Result Value Ref Range    WBC, UA 3 0 - 5 /hpf    Bacteria, UA Rare None-Occ /hpf    Yeast, UA None None    Squam  Epithel, UA 2 /hpf    Microscopic Comment SEE COMMENT    Lactic acid, plasma   Result Value Ref Range    Lactate (Lactic Acid) 2.0 0.5 - 2.2 mmol/L   POCT glucose   Result Value Ref Range    POCT Glucose 217 (H) 70 - 110 mg/dL          ECG Results          EKG 12-lead (In process)  Result time 04/25/19 12:02:26    In process by Interface, Lab In [facility] (04/25/19 12:02:26)                 Narrative:    Test Reason : I95.9,    Vent. Rate : 062 BPM     Atrial Rate : 062 BPM     P-R Int : 160 ms          QRS Dur : 056 ms      QT Int : 454 ms       P-R-T Axes : 025 032 053 degrees     QTc Int : 460 ms    Normal sinus rhythm  Nonspecific T wave abnormality  Prolonged QT  Abnormal ECG  When compared with ECG of 26-MAR-2019 17:56,  Vent. rate has decreased BY  44 BPM    Referred By: [email or web address]   SELF           Confirmed By:                             Imaging Results          X-Ray Chest AP Portable (Final result)  Result time 04/25/19 11:15:43    Final result by MARIBEL Botello Sr., MD (04/25/19 11:15:43)                 Impression:      1. The lungs are clear.  2. There are findings characteristic of a small hiatal hernia.  .      Electronically signed by: Melvin Botello MD  Date:    04/25/2019  Time:    11:15             Narrative:    EXAMINATION:  XR CHEST AP PORTABLE    CLINICAL HISTORY:  hypotension;    COMPARISON:  03/26/2019    FINDINGS:  The size of the heart is normal. The lungs are clear. There is no pneumothorax.  The costophrenic angles are sharp.  There are findings characteristic of a small hiatal hernia.                              Medications   sodium chloride 0.9% bolus 1,000 mL (0 mLs Intravenous Stopped 4/25/19 1222)   HYDROcodone-acetaminophen  mg per tablet 1 tablet (1 tablet Oral Given 4/25/19 1311)     Vitals:    04/25/19 1222 04/25/19 1300 04/25/19 1308 04/25/19 1330   BP: 129/69  118/65 122/75   BP Location:   Left arm Left arm   Patient Position:   Lying Lying   Pulse: 78  65 63   Resp:  20 20 20 20   Temp:   98.2 °F (36.8 °C) 98 °F (36.7 °C)   TempSrc:   Oral Oral   SpO2:   100% 100%   Weight:         Patient given IV fluids and observed in the emergency department until her blood pressure normalized and she could stand symptoms.  Patient ambulating around the ED asymptomatic prior to discharge. Patient advised not to take her hydrocodone and muscle relaxer at the same time, and to follow up with primary care physician in regards to further evaluation of her blood pressure.  Patient discharged home in stable/improved condition with PCP follow-up instructions and ER return precautions                            Clinical Impression:   Final diagnoses:  [I95.1] Orthostatic hypotension (Primary)  [R73.9] Hyperglycemia  [R42] Lightheadedness                             Maritn Pan MD  04/25/19 0684

## 2019-05-11 ENCOUNTER — HOSPITAL ENCOUNTER (EMERGENCY)
Facility: HOSPITAL | Age: 74
Discharge: HOME | End: 2019-05-11
Attending: EMERGENCY MEDICINE
Payer: MEDICARE

## 2019-05-11 VITALS
HEIGHT: 60 IN | TEMPERATURE: 99 F | OXYGEN SATURATION: 97 % | HEART RATE: 121 BPM | DIASTOLIC BLOOD PRESSURE: 94 MMHG | RESPIRATION RATE: 20 BRPM | SYSTOLIC BLOOD PRESSURE: 197 MMHG | WEIGHT: 120 LBS | BODY MASS INDEX: 23.56 KG/M2

## 2019-05-11 DIAGNOSIS — R00.0 TACHYCARDIA: ICD-10-CM

## 2019-05-11 DIAGNOSIS — K52.9 COLITIS: ICD-10-CM

## 2019-05-11 DIAGNOSIS — E87.6 HYPOKALEMIA: ICD-10-CM

## 2019-05-11 DIAGNOSIS — R11.2 NON-INTRACTABLE VOMITING WITH NAUSEA, UNSPECIFIED VOMITING TYPE: Primary | ICD-10-CM

## 2019-05-11 LAB
ALBUMIN SERPL BCP-MCNC: 4.3 G/DL (ref 3.5–5.2)
ALP SERPL-CCNC: 97 U/L (ref 55–135)
ALT SERPL W/O P-5'-P-CCNC: 28 U/L (ref 10–44)
ANION GAP SERPL CALC-SCNC: 17 MMOL/L (ref 8–16)
AST SERPL-CCNC: 25 U/L (ref 10–40)
BACTERIA #/AREA URNS AUTO: NORMAL /HPF
BASOPHILS # BLD AUTO: 0.03 K/UL (ref 0–0.2)
BASOPHILS NFR BLD: 0.2 % (ref 0–1.9)
BILIRUB SERPL-MCNC: 0.8 MG/DL (ref 0.1–1)
BILIRUB UR QL STRIP: NEGATIVE
BUN SERPL-MCNC: 7 MG/DL (ref 8–23)
CALCIUM SERPL-MCNC: 10.2 MG/DL (ref 8.7–10.5)
CHLORIDE SERPL-SCNC: 98 MMOL/L (ref 95–110)
CLARITY UR REFRACT.AUTO: CLEAR
CO2 SERPL-SCNC: 23 MMOL/L (ref 23–29)
COLOR UR AUTO: YELLOW
CREAT SERPL-MCNC: 0.7 MG/DL (ref 0.5–1.4)
DIFFERENTIAL METHOD: ABNORMAL
EOSINOPHIL # BLD AUTO: 0 K/UL (ref 0–0.5)
EOSINOPHIL NFR BLD: 0.3 % (ref 0–8)
ERYTHROCYTE [DISTWIDTH] IN BLOOD BY AUTOMATED COUNT: 14.9 % (ref 11.5–14.5)
EST. GFR  (AFRICAN AMERICAN): >60 ML/MIN/1.73 M^2
EST. GFR  (NON AFRICAN AMERICAN): >60 ML/MIN/1.73 M^2
GLUCOSE SERPL-MCNC: 190 MG/DL (ref 70–110)
GLUCOSE UR QL STRIP: ABNORMAL
HCT VFR BLD AUTO: 40.5 % (ref 37–48.5)
HGB BLD-MCNC: 14 G/DL (ref 12–16)
HGB UR QL STRIP: ABNORMAL
HYALINE CASTS UR QL AUTO: 0 /LPF
KETONES UR QL STRIP: ABNORMAL
LEUKOCYTE ESTERASE UR QL STRIP: NEGATIVE
LIPASE SERPL-CCNC: 15 U/L (ref 4–60)
LYMPHOCYTES # BLD AUTO: 1.9 K/UL (ref 1–4.8)
LYMPHOCYTES NFR BLD: 14 % (ref 18–48)
MAGNESIUM SERPL-MCNC: 1.8 MG/DL (ref 1.6–2.6)
MCH RBC QN AUTO: 27.1 PG (ref 27–31)
MCHC RBC AUTO-ENTMCNC: 34.6 G/DL (ref 32–36)
MCV RBC AUTO: 78 FL (ref 82–98)
MICROSCOPIC COMMENT: NORMAL
MONOCYTES # BLD AUTO: 0.5 K/UL (ref 0.3–1)
MONOCYTES NFR BLD: 3.8 % (ref 4–15)
NEUTROPHILS # BLD AUTO: 11.2 K/UL (ref 1.8–7.7)
NEUTROPHILS NFR BLD: 81.4 % (ref 38–73)
NITRITE UR QL STRIP: NEGATIVE
PH UR STRIP: 8 [PH] (ref 5–8)
PLATELET # BLD AUTO: 331 K/UL (ref 150–350)
PMV BLD AUTO: 11.6 FL (ref 9.2–12.9)
POTASSIUM SERPL-SCNC: 3.2 MMOL/L (ref 3.5–5.1)
PROT SERPL-MCNC: 8.9 G/DL (ref 6–8.4)
PROT UR QL STRIP: ABNORMAL
RBC # BLD AUTO: 5.17 M/UL (ref 4–5.4)
RBC #/AREA URNS AUTO: 2 /HPF (ref 0–4)
SODIUM SERPL-SCNC: 138 MMOL/L (ref 136–145)
SP GR UR STRIP: <=1.005 (ref 1–1.03)
TROPONIN I SERPL DL<=0.01 NG/ML-MCNC: 0.01 NG/ML (ref 0–0.03)
URN SPEC COLLECT METH UR: ABNORMAL
UROBILINOGEN UR STRIP-ACNC: NEGATIVE EU/DL
WBC # BLD AUTO: 13.76 K/UL (ref 3.9–12.7)
WBC #/AREA URNS AUTO: 0 /HPF (ref 0–5)
YEAST UR QL AUTO: NORMAL

## 2019-05-11 PROCEDURE — 96374 THER/PROPH/DIAG INJ IV PUSH: CPT | Mod: ER

## 2019-05-11 PROCEDURE — 96372 THER/PROPH/DIAG INJ SC/IM: CPT | Mod: 59,ER

## 2019-05-11 PROCEDURE — 96361 HYDRATE IV INFUSION ADD-ON: CPT | Mod: ER

## 2019-05-11 PROCEDURE — 84484 ASSAY OF TROPONIN QUANT: CPT | Mod: ER

## 2019-05-11 PROCEDURE — 25000003 PHARM REV CODE 250: Mod: ER | Performed by: EMERGENCY MEDICINE

## 2019-05-11 PROCEDURE — 25500020 PHARM REV CODE 255: Mod: ER | Performed by: EMERGENCY MEDICINE

## 2019-05-11 PROCEDURE — 83735 ASSAY OF MAGNESIUM: CPT | Mod: ER

## 2019-05-11 PROCEDURE — 81000 URINALYSIS NONAUTO W/SCOPE: CPT | Mod: ER

## 2019-05-11 PROCEDURE — 96375 TX/PRO/DX INJ NEW DRUG ADDON: CPT | Mod: ER

## 2019-05-11 PROCEDURE — C9113 INJ PANTOPRAZOLE SODIUM, VIA: HCPCS | Mod: ER | Performed by: EMERGENCY MEDICINE

## 2019-05-11 PROCEDURE — 99285 EMERGENCY DEPT VISIT HI MDM: CPT | Mod: 25,ER

## 2019-05-11 PROCEDURE — 63600175 PHARM REV CODE 636 W HCPCS: Mod: ER | Performed by: EMERGENCY MEDICINE

## 2019-05-11 PROCEDURE — 83690 ASSAY OF LIPASE: CPT | Mod: ER

## 2019-05-11 PROCEDURE — 96376 TX/PRO/DX INJ SAME DRUG ADON: CPT | Mod: ER

## 2019-05-11 PROCEDURE — 85025 COMPLETE CBC W/AUTO DIFF WBC: CPT | Mod: ER

## 2019-05-11 PROCEDURE — 80053 COMPREHEN METABOLIC PANEL: CPT | Mod: ER

## 2019-05-11 RX ORDER — HYDROMORPHONE HYDROCHLORIDE 2 MG/ML
0.5 INJECTION, SOLUTION INTRAMUSCULAR; INTRAVENOUS; SUBCUTANEOUS
Status: COMPLETED | OUTPATIENT
Start: 2019-05-11 | End: 2019-05-11

## 2019-05-11 RX ORDER — ONDANSETRON 2 MG/ML
4 INJECTION INTRAMUSCULAR; INTRAVENOUS
Status: COMPLETED | OUTPATIENT
Start: 2019-05-11 | End: 2019-05-11

## 2019-05-11 RX ORDER — POTASSIUM CHLORIDE 20 MEQ/15ML
20 SOLUTION ORAL
Status: COMPLETED | OUTPATIENT
Start: 2019-05-11 | End: 2019-05-11

## 2019-05-11 RX ORDER — POTASSIUM CHLORIDE 1500 MG/1
20 TABLET, EXTENDED RELEASE ORAL 2 TIMES DAILY
Qty: 6 TABLET | Refills: 0 | Status: SHIPPED | OUTPATIENT
Start: 2019-05-11 | End: 2019-05-14

## 2019-05-11 RX ORDER — HYDROCODONE BITARTRATE AND ACETAMINOPHEN 10; 325 MG/1; MG/1
1 TABLET ORAL EVERY 8 HOURS PRN
Refills: 0 | COMMUNITY
Start: 2019-04-02 | End: 2019-05-11

## 2019-05-11 RX ORDER — PANTOPRAZOLE SODIUM 40 MG/10ML
40 INJECTION, POWDER, LYOPHILIZED, FOR SOLUTION INTRAVENOUS
Status: COMPLETED | OUTPATIENT
Start: 2019-05-11 | End: 2019-05-11

## 2019-05-11 RX ORDER — CIPROFLOXACIN 500 MG/1
500 TABLET ORAL 2 TIMES DAILY
Qty: 14 TABLET | Refills: 0 | Status: SHIPPED | OUTPATIENT
Start: 2019-05-11 | End: 2019-05-18

## 2019-05-11 RX ORDER — TIZANIDINE 4 MG/1
4 TABLET ORAL 3 TIMES DAILY PRN
Status: ON HOLD | COMMUNITY
Start: 2019-04-24 | End: 2019-11-18 | Stop reason: HOSPADM

## 2019-05-11 RX ORDER — PROMETHAZINE HYDROCHLORIDE 25 MG/ML
25 INJECTION, SOLUTION INTRAMUSCULAR; INTRAVENOUS
Status: COMPLETED | OUTPATIENT
Start: 2019-05-11 | End: 2019-05-11

## 2019-05-11 RX ADMIN — PROMETHAZINE HYDROCHLORIDE 25 MG: 25 INJECTION INTRAMUSCULAR; INTRAVENOUS at 12:05

## 2019-05-11 RX ADMIN — SODIUM CHLORIDE 1000 ML: 0.9 INJECTION, SOLUTION INTRAVENOUS at 11:05

## 2019-05-11 RX ADMIN — IOHEXOL 75 ML: 350 INJECTION, SOLUTION INTRAVENOUS at 12:05

## 2019-05-11 RX ADMIN — POTASSIUM CHLORIDE 20 MEQ: 20 SOLUTION ORAL at 03:05

## 2019-05-11 RX ADMIN — HYDROMORPHONE HYDROCHLORIDE 0.5 MG: 2 INJECTION INTRAMUSCULAR; INTRAVENOUS; SUBCUTANEOUS at 04:05

## 2019-05-11 RX ADMIN — PANTOPRAZOLE SODIUM 40 MG: 40 INJECTION, POWDER, LYOPHILIZED, FOR SOLUTION INTRAVENOUS at 03:05

## 2019-05-11 RX ADMIN — SODIUM CHLORIDE, SODIUM LACTATE, POTASSIUM CHLORIDE, AND CALCIUM CHLORIDE 1000 ML: .6; .31; .03; .02 INJECTION, SOLUTION INTRAVENOUS at 03:05

## 2019-05-11 RX ADMIN — ONDANSETRON 4 MG: 2 INJECTION INTRAMUSCULAR; INTRAVENOUS at 02:05

## 2019-05-11 RX ADMIN — HYDROMORPHONE HYDROCHLORIDE 0.5 MG: 2 INJECTION INTRAMUSCULAR; INTRAVENOUS; SUBCUTANEOUS at 12:05

## 2019-05-11 RX ADMIN — ONDANSETRON 4 MG: 2 INJECTION INTRAMUSCULAR; INTRAVENOUS at 11:05

## 2019-05-11 NOTE — ED NOTES
Pt in NAD, Resp e/u. AAO x4. Sinus tach on monitor. Stretcher locked in lowest position. Side rails up x2. Call bell within reach. Will continue to monitor.

## 2019-05-11 NOTE — ED NOTES
Pt in NAD, Resp e/u. MD aware of pts VS.  Stretcher locked in lowest position. Side rails up x2. Call bell within reach. Will continue to monitor.

## 2019-05-11 NOTE — ED PROVIDER NOTES
Encounter Date: 5/11/2019       History     Chief Complaint   Patient presents with    Diarrhea     since last night    Emesis     Patient is a 74-year-old female who presents today with complaints nausea/vomiting/diarrhea since last night.  She reports 5-10 episodes of vomiting. She states if she ate or drank anything, it would be vomited shortly thereafter.  Denies any chest pain, shortness of breath, fever/chills, dysuria, hematuria, flank pain, headache.  She does report some generalized abdominal discomfort.  She has had no prior evaluation for her symptoms.  Symptoms are constant in duration and moderate in severity.  Patient is requesting pain medication for her chronic back pain. She states that she could not tolerate her home p.o. pain meds        Review of patient's allergies indicates:   Allergen Reactions    Morphine Other (See Comments)     headache    Latex, natural rubber Rash     Past Medical History:   Diagnosis Date    Anxiety     Back pain     chronic    Diabetes mellitus     Gastroparesis     Hypertension     Sciatica      Past Surgical History:   Procedure Laterality Date    ESOPHAGOGASTRODUODENOSCOPY (EGD) N/A 7/2/2015    Performed by Pj Campos MD at [facility]     Family History   Problem Relation Age of Onset    Hyperlipidemia Father     Hypertension Mother      Social History     Tobacco Use    Smoking status: Never Smoker    Smokeless tobacco: Never Used   Substance Use Topics    Alcohol use: No     Alcohol/week: 0.0 oz    Drug use: No     Review of Systems   Constitutional: Negative for chills, diaphoresis and fever.   HENT: Negative for congestion, rhinorrhea and sore throat.    Eyes: Negative for pain, redness and visual disturbance.   Respiratory: Negative for cough and shortness of breath.    Cardiovascular: Negative for chest pain, palpitations and leg swelling.   Gastrointestinal: Positive for abdominal pain, diarrhea, nausea and vomiting. Negative for  abdominal distention, blood in stool and constipation.   Genitourinary: Negative for dysuria and hematuria.   Musculoskeletal: Positive for back pain. Negative for arthralgias and joint swelling.   Skin: Negative for rash and wound.   Neurological: Negative for seizures, syncope and headaches.   All other systems reviewed and are negative.      Physical Exam     Initial Vitals [05/11/19 1100]   BP Pulse Resp Temp SpO2   (!) 188/98 106 16 98.7 °F (37.1 °C) 100 %      MAP       --         Physical Exam    Nursing note and vitals reviewed.  Constitutional: She appears well-developed and well-nourished. No distress.   HENT:   Head: Normocephalic and atraumatic.   Mouth/Throat: Oropharynx is clear and moist.   Eyes: Conjunctivae and EOM are normal. Pupils are equal, round, and reactive to light.   Neck: Neck supple. No tracheal deviation present.   Cardiovascular: Regular rhythm, normal heart sounds and intact distal pulses.   Tachycardic   Pulmonary/Chest: Breath sounds normal. No respiratory distress.   Abdominal: Soft. She exhibits no distension. There is tenderness (generalized). There is no rebound and no guarding.   Actively vomiting   Musculoskeletal: Normal range of motion. She exhibits no edema or tenderness.   Neurological: She is alert and oriented to person, place, and time.   No focal deficits   Skin: Skin is warm. No rash noted. No erythema.   Psychiatric: She has a normal mood and affect. Her behavior is normal.         ED Course   Procedures  Labs Reviewed   CBC W/ AUTO DIFFERENTIAL - Abnormal; Notable for the following components:       Result Value    WBC 13.76 (*)     Mean Corpuscular Volume 78 (*)     RDW 14.9 (*)     Gran # (ANC) 11.2 (*)     Gran% 81.4 (*)     Lymph% 14.0 (*)     Mono% 3.8 (*)     All other components within normal limits   COMPREHENSIVE METABOLIC PANEL - Abnormal; Notable for the following components:    Potassium 3.2 (*)     Glucose 190 (*)     BUN, Bld 7 (*)     Total Protein 8.9  (*)     Anion Gap 17 (*)     All other components within normal limits   URINALYSIS, REFLEX TO URINE CULTURE - Abnormal; Notable for the following components:    Specific Gravity, UA <=1.005 (*)     Protein, UA 2+ (*)     Glucose, UA 3+ (*)     Ketones, UA 1+ (*)     Occult Blood UA Trace (*)     All other components within normal limits    Narrative:     Preferred Collection Type->Urine, Clean Catch   LIPASE   MAGNESIUM   TROPONIN I   URINALYSIS MICROSCOPIC    Narrative:     Preferred Collection Type->Urine, Clean Catch     Results for orders placed or performed during the hospital encounter of 05/11/19   CBC auto differential   Result Value Ref Range    WBC 13.76 (H) 3.90 - 12.70 K/uL    RBC 5.17 4.00 - 5.40 M/uL    Hemoglobin 14.0 12.0 - 16.0 g/dL    Hematocrit 40.5 37.0 - 48.5 %    Mean Corpuscular Volume 78 (L) 82 - 98 fL    Mean Corpuscular Hemoglobin 27.1 27.0 - 31.0 pg    Mean Corpuscular Hemoglobin Conc 34.6 32.0 - 36.0 g/dL    RDW 14.9 (H) 11.5 - 14.5 %    Platelets 331 150 - 350 K/uL    MPV 11.6 9.2 - 12.9 fL    Gran # (ANC) 11.2 (H) 1.8 - 7.7 K/uL    Lymph # 1.9 1.0 - 4.8 K/uL    Mono # 0.5 0.3 - 1.0 K/uL    Eos # 0.0 0.0 - 0.5 K/uL    Baso # 0.03 0.00 - 0.20 K/uL    Gran% 81.4 (H) 38.0 - 73.0 %    Lymph% 14.0 (L) 18.0 - 48.0 %    Mono% 3.8 (L) 4.0 - 15.0 %    Eosinophil% 0.3 0.0 - 8.0 %    Basophil% 0.2 0.0 - 1.9 %    Differential Method Automated    Comprehensive metabolic panel   Result Value Ref Range    Sodium 138 136 - 145 mmol/L    Potassium 3.2 (L) 3.5 - 5.1 mmol/L    Chloride 98 95 - 110 mmol/L    CO2 23 23 - 29 mmol/L    Glucose 190 (H) 70 - 110 mg/dL    BUN, Bld 7 (L) 8 - 23 mg/dL    Creatinine 0.7 0.5 - 1.4 mg/dL    Calcium 10.2 8.7 - 10.5 mg/dL    Total Protein 8.9 (H) 6.0 - 8.4 g/dL    Albumin 4.3 3.5 - 5.2 g/dL    Total Bilirubin 0.8 0.1 - 1.0 mg/dL    Alkaline Phosphatase 97 55 - 135 U/L    AST 25 10 - 40 U/L    ALT 28 10 - 44 U/L    Anion Gap 17 (H) 8 - 16 mmol/L    eGFR if African  American >60.0 >60 mL/min/1.73 m^2    eGFR if non African American >60.0 >60 mL/min/1.73 m^2   Lipase   Result Value Ref Range    Lipase 15 4 - 60 U/L   Magnesium   Result Value Ref Range    Magnesium 1.8 1.6 - 2.6 mg/dL   Urinalysis, Reflex to Urine Culture Urine, Clean Catch   Result Value Ref Range    Specimen UA Urine, Clean Catch     Color, UA Yellow Yellow, Straw, Tonya    Appearance, UA Clear Clear    pH, UA 8.0 5.0 - 8.0    Specific Gravity, UA <=1.005 (A) 1.005 - 1.030    Protein, UA 2+ (A) Negative    Glucose, UA 3+ (A) Negative    Ketones, UA 1+ (A) Negative    Bilirubin (UA) Negative Negative    Occult Blood UA Trace (A) Negative    Nitrite, UA Negative Negative    Urobilinogen, UA Negative <2.0 EU/dL    Leukocytes, UA Negative Negative   Troponin I   Result Value Ref Range    Troponin I 0.006 0.000 - 0.026 ng/mL   Urinalysis Microscopic   Result Value Ref Range    RBC, UA 2 0 - 4 /hpf    WBC, UA 0 0 - 5 /hpf    Bacteria Rare None-Occ /hpf    Yeast, UA None None    Hyaline Casts, UA 0 0-1/lpf /lpf    Microscopic Comment SEE COMMENT             Imaging Results          CT Abdomen Pelvis With Contrast (Final result)  Result time 05/11/19 15:02:03    Final result by Constantino Hi MD (05/11/19 15:02:03)                 Impression:      Colon is mostly collapsed limiting evaluation however there is suggestion of generalized wall thickening of the right colon and transverse colon and descending colon suspicious for colitis of infectious or inflammatory etiology.  Pancolonic mild diverticulosis without diverticulitis.  Appendix unremarkable.  No abscess, bowel obstruction, or free air.    Large sliding hiatal hernia.  Within the hiatal hernia, there is suggestion of gastric wall edema raising question of gastritis.  Similar gastric wall edema seen at the antrum also suspicious for gastritis.      Electronically signed by: Constantino Hi  Date:    05/11/2019  Time:    15:02             Narrative:     EXAMINATION:  CT ABDOMEN PELVIS WITH CONTRAST    CLINICAL HISTORY:  Nausea, vomiting, diarrhea;    TECHNIQUE:  Low dose axial images, sagittal and coronal reformations were obtained from the lung bases to the pubic symphysis following the IV administration of 100 mL of Omnipaque 350 .  Oral contrast was not administered.    COMPARISON:  02/18/2016    FINDINGS:  ABDOMEN:    - Lung bases: Large sliding hiatal hernia.  Within the hiatal hernia, there is suggestion of gastric wall edema raising question of gastritis.  Similar gastric wall edema seen at the antrum also raising question of gastritis.    - Liver: 11 mm hepatic left cysts unchanged.    - Gallbladder: No calcified gallstones.    - Bile Ducts: No evidence of intra or extra hepatic biliary ductal dilation.    - Spleen: Negative.    - Kidneys: No mass or hydronephrosis.    - Adrenals: Unremarkable.    - Pancreas: No mass or peripancreatic fat stranding.    - Retroperitoneum:  No significant adenopathy.    - Vascular: No abdominal aortic aneurysm.    - Abdominal wall:  Unremarkable.    PELVIS:    No pelvic mass, adenopathy.  Trace free fluid in the pelvis.    BOWEL/MESENTERY:    Colon is mostly collapsed limiting evaluation however there is suggestion of generalized wall thickening of the right colon and transverse colon and descending colon suspicious for colitis of infectious or inflammatory etiology.  Pancolonic mild diverticulosis without diverticulitis.  Appendix unremarkable.    BONES:  No acute osseous abnormality and no suspicious lytic or blastic lesion.  Discogenic degenerative changes multiple levels of the lumbar spine, most notable at T12-L1, L1-2, L2-3.    All CT scans at this facility use dose modulation, iterative reconstruction and/or weight based dosing when appropriate to reduce radiation dose to as low as reasonably achievable.                              Medications   sodium chloride 0.9% bolus 1,000 mL (0 mLs Intravenous Stopped 5/11/19  1330)   ondansetron injection 4 mg (4 mg Intravenous Given 5/11/19 1120)   promethazine injection 25 mg (25 mg Intramuscular Given 5/11/19 1202)   iohexol (OMNIPAQUE 350) injection 100 mL (75 mLs Intravenous Given 5/11/19 1220)   hydromorphone (PF) injection 0.5 mg (0.5 mg Intravenous Given by Other 5/11/19 1247)   ondansetron injection 4 mg (4 mg Intravenous Given 5/11/19 1459)   potassium chloride 10% oral solution 20 mEq (20 mEq Oral Given 5/11/19 1541)   pantoprazole injection 40 mg (40 mg Intravenous Given by Other 5/11/19 1540)   lactated ringers bolus 1,000 mL (0 mLs Intravenous Stopped 5/11/19 1646)   hydromorphone (PF) injection 0.5 mg (0.5 mg Intravenous Given 5/11/19 1613)        Medication List      START taking these medications    ciprofloxacin HCl 500 MG tablet  Commonly known as:  CIPRO  Take 1 tablet (500 mg total) by mouth 2 (two) times daily. for 7 days        ASK your doctor about these medications    benazepril 40 MG tablet  Commonly known as:  LOTENSIN     bisacodyl 10 mg Supp  Commonly known as:  DULCOLAX  Place 1 suppository (10 mg total) rectally daily as needed.     * bisoprolol-hydrochlorothiazide 5-6.25 mg 5-6.25 mg Tab  Commonly known as:  ZIAC     * bisoprolol-hydrochlorothiazide 10-6.25 mg per tablet  Commonly known as:  ZIAC     gabapentin 600 MG tablet  Commonly known as:  NEURONTIN     HYDROcodone-acetaminophen 7.5-325 mg per tablet  Commonly known as:  NORCO  Take 1 tablet by mouth every 12 (twelve) hours as needed for Pain.     insulin aspart U-100 100 unit/mL injection  Commonly known as:  NOVOLOG     ondansetron 4 MG Tbdl  Commonly known as:  ZOFRAN-ODT  Take 1 tablet (4 mg total) by mouth every 12 (twelve) hours as needed.     pantoprazole 40 MG tablet  Commonly known as:  PROTONIX  Take 1 tablet (40 mg total) by mouth once daily.     potassium chloride 20 mEq  Commonly known as:  K-TAB  Take 1 tablet (20 mEq total) by mouth 2 (two) times daily. for 3 days  Ask about: Should I  take this medication?     TOUJEO SOLOSTAR SUBQ     VITAMIN D2 50,000 unit Cap  Generic drug:  ergocalciferol     * ZANAFLEX 6 mg capsule  Generic drug:  tiZANidine     * tiZANidine 4 MG tablet  Commonly known as:  ZANAFLEX         * This list has 4 medication(s) that are the same as other medications prescribed for you. Read the directions carefully, and ask your doctor or other care provider to review them with you.               Where to Get Your Medications      You can get these medications from any pharmacy    Bring a paper prescription for each of these medications  · ciprofloxacin HCl 500 MG tablet  · potassium chloride 20 mEq            Medical Decision Makin-year-old female presenting with nausea/vomiting/diarrhea; patient vomited in the emergency department multiple times requiring multiple antiemetics; patient did have an elevated white blood cell count with suggestion of colitis on CT scan; antiemetics eventually controlled nausea/vomiting and patient was able to tolerate p.o. in the emergency department; on multiple re-evaluations, patient was hopeful for discharge; patient was noted to be tachycardic even after IV fluids; patient has not tolerated any of her p.o. medications including her beta blockers and this may be playing a role in her tachycardia; patient's home beta-blocker is not currently available here in the emergency department; patient advised to take her beta blocker and all her other scheduled medications when she gets home; patient agreeable; patient offered antiemetic prescription, but patient states that she has some at home already that she can start taking; patient given prescription for ciprofloxacin for colitis and given prescription for potassium; patient given potassium in the emergency department as well; patient on Protonix daily and advised to continue taking this; close outpatient follow-up encouraged; ER return precautions provided;   Pain medicine were given however  they were given for her chronic back pain. She was unable to tolerate her home PO pain meds                      Clinical Impression:   Final diagnoses:  [R11.2] Non-intractable vomiting with nausea, unspecified vomiting type (Primary)  [R00.0] Tachycardia  [K52.9] Colitis  [E87.6] Hypokalemia     Discharged home in improved condition                             Martin Pan MD  05/16/19 1212

## 2019-05-11 NOTE — ED NOTES
Pt in NAD, VSS, Resp e/u. Pt asking for pain medication to relieve back and leg pain. Stretcher locked in lowest position. Side rails up x2. Call bell within reach. Will continue to monitor.

## 2019-05-11 NOTE — ED NOTES
MD discussed plan of care with patient. OK with most recent VS for patient to be discharged with. Patient advised to take home medication as soon as she gets home since we do not have the specific medication that she takes. Pt verbalized understanding.

## 2019-05-11 NOTE — ED NOTES
Pt lying in stretcher, NAD. Reports nausea is still present, but has not thrown up since approx 1355 when ERTs were cleaning up patient. ST on monitor, resp e/u. No episodes of diarrhea either. Aware of plan of care. Will continue to monitor.

## 2019-05-11 NOTE — ED NOTES
RAMOS Clemons and Boni ERT cleaned patient soap/water wipes, and placed on clean gown. ERTs reported that patient had one episode of emesis during cleaning of patient, but felt better once she was lying flat, but still remained nauseous. Primary nurse aware, will continue to monitor.

## 2019-05-29 ENCOUNTER — HOSPITAL ENCOUNTER (EMERGENCY)
Facility: HOSPITAL | Age: 74
Discharge: HOME | End: 2019-05-29
Attending: EMERGENCY MEDICINE
Payer: MEDICARE

## 2019-05-29 VITALS
WEIGHT: 121 LBS | OXYGEN SATURATION: 100 % | BODY MASS INDEX: 23.75 KG/M2 | RESPIRATION RATE: 16 BRPM | SYSTOLIC BLOOD PRESSURE: 176 MMHG | HEIGHT: 60 IN | HEART RATE: 96 BPM | TEMPERATURE: 100 F | DIASTOLIC BLOOD PRESSURE: 86 MMHG

## 2019-05-29 DIAGNOSIS — R11.10 EMESIS: ICD-10-CM

## 2019-05-29 DIAGNOSIS — R11.10 VOMITING: ICD-10-CM

## 2019-05-29 DIAGNOSIS — K31.84 GASTROPARESIS: ICD-10-CM

## 2019-05-29 DIAGNOSIS — R73.9 HYPERGLYCEMIA: Primary | ICD-10-CM

## 2019-05-29 LAB
ALBUMIN SERPL BCP-MCNC: 4.2 G/DL (ref 3.5–5.2)
ALP SERPL-CCNC: 121 U/L (ref 55–135)
ALT SERPL W/O P-5'-P-CCNC: 40 U/L (ref 10–44)
AMORPH CRY UR QL COMP ASSIST: NORMAL
ANION GAP SERPL CALC-SCNC: 21 MMOL/L (ref 8–16)
AST SERPL-CCNC: 32 U/L (ref 10–40)
B-OH-BUTYR BLD STRIP-SCNC: 2.6 MMOL/L (ref 0–0.5)
BACTERIA #/AREA URNS AUTO: NORMAL /HPF
BASOPHILS # BLD AUTO: 0.05 K/UL (ref 0–0.2)
BASOPHILS NFR BLD: 0.4 % (ref 0–1.9)
BILIRUB SERPL-MCNC: 0.9 MG/DL (ref 0.1–1)
BILIRUB UR QL STRIP: ABNORMAL
BUN SERPL-MCNC: 22 MG/DL (ref 8–23)
CALCIUM SERPL-MCNC: 11.1 MG/DL (ref 8.7–10.5)
CHLORIDE SERPL-SCNC: 103 MMOL/L (ref 95–110)
CLARITY UR REFRACT.AUTO: CLEAR
CO2 SERPL-SCNC: 14 MMOL/L (ref 23–29)
COLOR UR AUTO: YELLOW
CREAT SERPL-MCNC: 1.8 MG/DL (ref 0.5–1.4)
DIFFERENTIAL METHOD: ABNORMAL
EOSINOPHIL # BLD AUTO: 0.2 K/UL (ref 0–0.5)
EOSINOPHIL NFR BLD: 1.3 % (ref 0–8)
ERYTHROCYTE [DISTWIDTH] IN BLOOD BY AUTOMATED COUNT: 14.4 % (ref 11.5–14.5)
EST. GFR  (AFRICAN AMERICAN): 31.7 ML/MIN/1.73 M^2
EST. GFR  (NON AFRICAN AMERICAN): 27.5 ML/MIN/1.73 M^2
GLUCOSE SERPL-MCNC: 250 MG/DL (ref 70–110)
GLUCOSE UR QL STRIP: ABNORMAL
HCT VFR BLD AUTO: 39.9 % (ref 37–48.5)
HGB BLD-MCNC: 13.3 G/DL (ref 12–16)
HGB UR QL STRIP: ABNORMAL
HYALINE CASTS UR QL AUTO: 0 /LPF
KETONES UR QL STRIP: ABNORMAL
LEUKOCYTE ESTERASE UR QL STRIP: NEGATIVE
LYMPHOCYTES # BLD AUTO: 2.6 K/UL (ref 1–4.8)
LYMPHOCYTES NFR BLD: 23.3 % (ref 18–48)
MCH RBC QN AUTO: 26.9 PG (ref 27–31)
MCHC RBC AUTO-ENTMCNC: 33.3 G/DL (ref 32–36)
MCV RBC AUTO: 81 FL (ref 82–98)
MICROSCOPIC COMMENT: NORMAL
MONOCYTES # BLD AUTO: 0.5 K/UL (ref 0.3–1)
MONOCYTES NFR BLD: 4.9 % (ref 4–15)
NEUTROPHILS # BLD AUTO: 7.7 K/UL (ref 1.8–7.7)
NEUTROPHILS NFR BLD: 69.7 % (ref 38–73)
NITRITE UR QL STRIP: NEGATIVE
PH UR STRIP: 6 [PH] (ref 5–8)
PLATELET # BLD AUTO: 348 K/UL (ref 150–350)
PMV BLD AUTO: 11.4 FL (ref 9.2–12.9)
POCT GLUCOSE: 168 MG/DL (ref 70–110)
POCT GLUCOSE: 252 MG/DL (ref 70–110)
POTASSIUM SERPL-SCNC: 4.1 MMOL/L (ref 3.5–5.1)
PROT SERPL-MCNC: 9.6 G/DL (ref 6–8.4)
PROT UR QL STRIP: ABNORMAL
RBC # BLD AUTO: 4.95 M/UL (ref 4–5.4)
RBC #/AREA URNS AUTO: 1 /HPF (ref 0–4)
SODIUM SERPL-SCNC: 138 MMOL/L (ref 136–145)
SP GR UR STRIP: >=1.03 (ref 1–1.03)
SQUAMOUS #/AREA URNS AUTO: 1 /HPF
URN SPEC COLLECT METH UR: ABNORMAL
UROBILINOGEN UR STRIP-ACNC: NEGATIVE EU/DL
WBC # BLD AUTO: 11.12 K/UL (ref 3.9–12.7)
WBC #/AREA URNS AUTO: 1 /HPF (ref 0–5)
WBC CLUMPS UR QL AUTO: NORMAL

## 2019-05-29 PROCEDURE — 93010 EKG 12-LEAD: ICD-10-PCS | Mod: ,,, | Performed by: INTERNAL MEDICINE

## 2019-05-29 PROCEDURE — 96361 HYDRATE IV INFUSION ADD-ON: CPT | Mod: ER

## 2019-05-29 PROCEDURE — 93010 ELECTROCARDIOGRAM REPORT: CPT | Mod: ,,, | Performed by: INTERNAL MEDICINE

## 2019-05-29 PROCEDURE — 99285 EMERGENCY DEPT VISIT HI MDM: CPT | Mod: 25,ER

## 2019-05-29 PROCEDURE — 81000 URINALYSIS NONAUTO W/SCOPE: CPT | Mod: ER

## 2019-05-29 PROCEDURE — 80053 COMPREHEN METABOLIC PANEL: CPT | Mod: ER

## 2019-05-29 PROCEDURE — 63600175 PHARM REV CODE 636 W HCPCS: Mod: ER | Performed by: EMERGENCY MEDICINE

## 2019-05-29 PROCEDURE — 96365 THER/PROPH/DIAG IV INF INIT: CPT | Mod: ER

## 2019-05-29 PROCEDURE — 82962 GLUCOSE BLOOD TEST: CPT | Mod: ER,91

## 2019-05-29 PROCEDURE — P9612 CATHETERIZE FOR URINE SPEC: HCPCS | Mod: ER

## 2019-05-29 PROCEDURE — 85025 COMPLETE CBC W/AUTO DIFF WBC: CPT | Mod: ER

## 2019-05-29 PROCEDURE — 93005 ELECTROCARDIOGRAM TRACING: CPT | Mod: ER

## 2019-05-29 PROCEDURE — 96375 TX/PRO/DX INJ NEW DRUG ADDON: CPT | Mod: ER

## 2019-05-29 PROCEDURE — 25000003 PHARM REV CODE 250: Mod: ER | Performed by: EMERGENCY MEDICINE

## 2019-05-29 PROCEDURE — 93041 RHYTHM ECG TRACING: CPT | Mod: ER

## 2019-05-29 PROCEDURE — 82010 KETONE BODYS QUAN: CPT | Mod: ER

## 2019-05-29 PROCEDURE — 99900035 HC TECH TIME PER 15 MIN (STAT): Mod: ER

## 2019-05-29 RX ORDER — PROMETHAZINE HYDROCHLORIDE 25 MG/1
25 SUPPOSITORY RECTAL EVERY 6 HOURS PRN
Qty: 10 SUPPOSITORY | Refills: 0 | Status: ON HOLD | OUTPATIENT
Start: 2019-05-29 | End: 2019-11-18 | Stop reason: HOSPADM

## 2019-05-29 RX ORDER — DIPHENHYDRAMINE HYDROCHLORIDE 50 MG/ML
25 INJECTION INTRAMUSCULAR; INTRAVENOUS
Status: COMPLETED | OUTPATIENT
Start: 2019-05-29 | End: 2019-05-29

## 2019-05-29 RX ORDER — LANCETS 33 GAUGE
EACH MISCELLANEOUS
COMMUNITY
Start: 2019-02-28

## 2019-05-29 RX ORDER — CALCIUM CITRATE/VITAMIN D3 200MG-6.25
TABLET ORAL
COMMUNITY
Start: 2019-02-28

## 2019-05-29 RX ORDER — BLOOD-GLUCOSE METER
EACH MISCELLANEOUS
COMMUNITY
Start: 2019-02-28

## 2019-05-29 RX ORDER — METOCLOPRAMIDE HYDROCHLORIDE 5 MG/ML
10 INJECTION INTRAMUSCULAR; INTRAVENOUS
Status: COMPLETED | OUTPATIENT
Start: 2019-05-29 | End: 2019-05-29

## 2019-05-29 RX ORDER — KETOROLAC TROMETHAMINE 30 MG/ML
15 INJECTION, SOLUTION INTRAMUSCULAR; INTRAVENOUS
Status: COMPLETED | OUTPATIENT
Start: 2019-05-29 | End: 2019-05-29

## 2019-05-29 RX ADMIN — SODIUM CHLORIDE 1000 ML: 0.9 INJECTION, SOLUTION INTRAVENOUS at 06:05

## 2019-05-29 RX ADMIN — METOCLOPRAMIDE 10 MG: 5 INJECTION, SOLUTION INTRAMUSCULAR; INTRAVENOUS at 07:05

## 2019-05-29 RX ADMIN — PROMETHAZINE HYDROCHLORIDE 25 MG: 25 INJECTION INTRAMUSCULAR; INTRAVENOUS at 05:05

## 2019-05-29 RX ADMIN — DIPHENHYDRAMINE HYDROCHLORIDE 25 MG: 50 INJECTION, SOLUTION INTRAMUSCULAR; INTRAVENOUS at 07:05

## 2019-05-29 RX ADMIN — KETOROLAC TROMETHAMINE 15 MG: 30 INJECTION, SOLUTION INTRAMUSCULAR at 07:05

## 2019-05-29 NOTE — ED NOTES
Pt asking why her pain isn't any better, explained that we may not be able to get all of her pain to subside due to it's chronic nature. Pt verbalized understanding. Dr. Blum aware.

## 2019-05-29 NOTE — ED NOTES
"Pt here today c/o generalized abdominal pain, N/V and diarrhea since yesterday. Reports vomiting did not start until 10 pm last night. Small amt of bile looking emesis noted in bag. Also c/o R sided lower back pain that "feels like sciatica". AAOx4. NAD. Resp e/u. Reports being here two weeks ago for same symptoms. Has appt with PCP today for follow up.   "

## 2019-05-29 NOTE — ED PROVIDER NOTES
Encounter Date: 5/29/2019       History     Chief Complaint   Patient presents with    Emesis     since 11pm last night. c/o abdominal pain. tender to touch. given 4mg zofran pta. reports little relief.      The history is provided by the patient.   Emesis    This is a recurrent problem. The current episode started yesterday. The problem occurs 5 - 10 times per day. The problem has been unchanged. The emesis has an appearance of stomach contents and bilious material. Associated symptoms include abdominal pain (generalized, pt states similar to previous gastroparesis episodes). Pertinent negatives include no arthralgias, no chills, no cough, no diarrhea, no fever, no headaches, no myalgias, no sweats and no URI. Risk factors: gastroparesis.     Review of patient's allergies indicates:   Allergen Reactions    Morphine Other (See Comments)     headache    Latex, natural rubber Rash     Past Medical History:   Diagnosis Date    Anxiety     Back pain     chronic    Diabetes mellitus     Gastroparesis     Hypertension     Sciatica      Past Surgical History:   Procedure Laterality Date    ESOPHAGOGASTRODUODENOSCOPY (EGD) N/A 7/2/2015    Performed by Pj Campos MD at [facility] END[facility]     Family History   Problem Relation Age of Onset    Hyperlipidemia Father     Hypertension Mother      Social History     Tobacco Use    Smoking status: Never Smoker    Smokeless tobacco: Never Used   Substance Use Topics    Alcohol use: No     Alcohol/week: 0.0 oz    Drug use: No     Review of Systems   Constitutional: Negative for chills and fever.   HENT: Negative for sore throat.    Respiratory: Negative for cough and shortness of breath.    Cardiovascular: Negative for chest pain.   Gastrointestinal: Positive for abdominal pain (generalized, pt states similar to previous gastroparesis episodes) and vomiting. Negative for diarrhea and nausea.   Genitourinary: Negative for dysuria.   Musculoskeletal: Negative for  arthralgias, back pain and myalgias.   Skin: Negative for rash.   Neurological: Negative for weakness and headaches.   Hematological: Does not bruise/bleed easily.   All other systems reviewed and are negative.      Physical Exam     Initial Vitals [05/29/19 0531]   BP Pulse Resp Temp SpO2   (!) 171/83 96 20 99.6 °F (37.6 °C) 100 %      MAP       --         Physical Exam    Nursing note and vitals reviewed.  Constitutional: She appears well-developed and well-nourished.   HENT:   Head: Normocephalic and atraumatic.   Mouth/Throat: No oropharyngeal exudate.   Eyes: Conjunctivae and EOM are normal. Pupils are equal, round, and reactive to light.   Neck: Normal range of motion. Neck supple. No thyromegaly present.   Cardiovascular: Normal rate, regular rhythm, normal heart sounds and intact distal pulses. Exam reveals no gallop and no friction rub.    No murmur heard.  Pulmonary/Chest: Breath sounds normal. No respiratory distress. She has no wheezes. She has no rhonchi. She exhibits no tenderness.   Abdominal: Soft. Bowel sounds are normal. She exhibits no distension. There is no tenderness. There is no rebound and no guarding.   Musculoskeletal: Normal range of motion. She exhibits no edema or tenderness.   Lymphadenopathy:     She has no cervical adenopathy.   Neurological: She is alert and oriented to person, place, and time. She has normal strength. No cranial nerve deficit or sensory deficit.   Skin: Skin is warm and dry. No rash noted.   Psychiatric: She has a normal mood and affect. Her behavior is normal. Judgment and thought content normal.         ED Course   Procedures  Labs Reviewed   URINALYSIS, REFLEX TO URINE CULTURE - Abnormal; Notable for the following components:       Result Value    Specific Gravity, UA >=1.030 (*)     Protein, UA 2+ (*)     Glucose, UA 2+ (*)     Ketones, UA 1+ (*)     Bilirubin (UA) 1+ (*)     Occult Blood UA Trace (*)     All other components within normal limits    Narrative:      Preferred Collection Type->Urine, Clean Catch   CBC W/ AUTO DIFFERENTIAL - Abnormal; Notable for the following components:    Mean Corpuscular Volume 81 (*)     Mean Corpuscular Hemoglobin 26.9 (*)     All other components within normal limits   COMPREHENSIVE METABOLIC PANEL - Abnormal; Notable for the following components:    CO2 14 (*)     Glucose 250 (*)     Creatinine 1.8 (*)     Calcium 11.1 (*)     Total Protein 9.6 (*)     Anion Gap 21 (*)     eGFR if  31.7 (*)     eGFR if non  27.5 (*)     All other components within normal limits   BETA - HYDROXYBUTYRATE, SERUM - Abnormal; Notable for the following components:    Beta-Hydroxybutyrate 2.6 (*)     All other components within normal limits   POCT GLUCOSE - Abnormal; Notable for the following components:    POCT Glucose 252 (*)     All other components within normal limits   POCT GLUCOSE - Abnormal; Notable for the following components:    POCT Glucose 168 (*)     All other components within normal limits   URINALYSIS MICROSCOPIC    Narrative:     Preferred Collection Type->Urine, Clean Catch     EKG Readings: (Independently Interpreted)   Initial Reading: No STEMI. Rhythm: Normal Sinus Rhythm. Heart Rate: 95. Ectopy: No Ectopy. Conduction: Normal. ST Segments: Normal ST Segments. T Waves: Normal. Axis: Normal. Clinical Impression: Normal Sinus Rhythm     ECG Results          EKG 12-lead (In process)  Result time 05/29/19 10:17:42    In process by Interface, Lab In [facility] (05/29/19 10:17:42)                 Narrative:    Test Reason : R11.10,    Vent. Rate : 095 BPM     Atrial Rate : 095 BPM     P-R Int : 142 ms          QRS Dur : 052 ms      QT Int : 378 ms       P-R-T Axes : 077 053 061 degrees     QTc Int : 475 ms    Normal sinus rhythm  Possible Left atrial enlargement  Septal infarct ,age undetermined  Abnormal ECG  When compared with ECG of 25-APR-2019 10:51,  Vent. rate has increased BY  33 BPM  Septal infarct is now  Present  Nonspecific T wave abnormality no longer evident in Lateral leads    Referred By: [email or web address]   SELF           Confirmed By:                             Imaging Results          X-Ray Abdomen Flat And Erect (Final result)  Result time 05/29/19 08:32:26    Final result by Андрей Camp MD (05/29/19 08:32:26)                 Impression:      Normal study.      Electronically signed by: Андрей Camp MD  Date:    05/29/2019  Time:    08:32             Narrative:    EXAMINATION:  XR ABDOMEN FLAT AND ERECT    CLINICAL HISTORY:  Vomiting, unspecified    TECHNIQUE:  Flat and erect AP views of the abdomen were performed.    COMPARISON:  03/26/2019    FINDINGS:  The bowel gas pattern is within normal limits.  No abnormal calcifications identified over the collecting systems.  No acute osseous abnormality.                                    Vitals:    05/29/19 0531 05/29/19 0702 05/29/19 0745 05/29/19 0854   BP: (!) 171/83 (!) 198/89 (!) 167/89 (!) 167/87   Pulse: 96 93 99 95   Resp: 20 18 16    Temp: 99.6 °F (37.6 °C)      TempSrc: Oral      SpO2: 100% 100% 99% 100%   Weight: 54.9 kg (121 lb)      Height: 5' (1.524 m)       05/29/19 0931   BP: (!) 176/86   Pulse: 96   Resp:    Temp:    TempSrc:    SpO2: 100%   Weight:    Height:        Results for orders placed or performed during the hospital encounter of 05/29/19   Urinalysis, Reflex to Urine Culture Urine, Clean Catch   Result Value Ref Range    Specimen UA Urine, Catheterized     Color, UA Yellow Yellow, Straw, Tonya    Appearance, UA Clear Clear    pH, UA 6.0 5.0 - 8.0    Specific Gravity, UA >=1.030 (A) 1.005 - 1.030    Protein, UA 2+ (A) Negative    Glucose, UA 2+ (A) Negative    Ketones, UA 1+ (A) Negative    Bilirubin (UA) 1+ (A) Negative    Occult Blood UA Trace (A) Negative    Nitrite, UA Negative Negative    Urobilinogen, UA Negative <2.0 EU/dL    Leukocytes, UA Negative Negative   CBC auto differential   Result Value Ref Range    WBC 11.12 3.90 - 12.70 K/uL     RBC 4.95 4.00 - 5.40 M/uL    Hemoglobin 13.3 12.0 - 16.0 g/dL    Hematocrit 39.9 37.0 - 48.5 %    Mean Corpuscular Volume 81 (L) 82 - 98 fL    Mean Corpuscular Hemoglobin 26.9 (L) 27.0 - 31.0 pg    Mean Corpuscular Hemoglobin Conc 33.3 32.0 - 36.0 g/dL    RDW 14.4 11.5 - 14.5 %    Platelets 348 150 - 350 K/uL    MPV 11.4 9.2 - 12.9 fL    Gran # (ANC) 7.7 1.8 - 7.7 K/uL    Lymph # 2.6 1.0 - 4.8 K/uL    Mono # 0.5 0.3 - 1.0 K/uL    Eos # 0.2 0.0 - 0.5 K/uL    Baso # 0.05 0.00 - 0.20 K/uL    Gran% 69.7 38.0 - 73.0 %    Lymph% 23.3 18.0 - 48.0 %    Mono% 4.9 4.0 - 15.0 %    Eosinophil% 1.3 0.0 - 8.0 %    Basophil% 0.4 0.0 - 1.9 %    Differential Method Automated    Comprehensive metabolic panel   Result Value Ref Range    Sodium 138 136 - 145 mmol/L    Potassium 4.1 3.5 - 5.1 mmol/L    Chloride 103 95 - 110 mmol/L    CO2 14 (L) 23 - 29 mmol/L    Glucose 250 (H) 70 - 110 mg/dL    BUN, Bld 22 8 - 23 mg/dL    Creatinine 1.8 (H) 0.5 - 1.4 mg/dL    Calcium 11.1 (H) 8.7 - 10.5 mg/dL    Total Protein 9.6 (H) 6.0 - 8.4 g/dL    Albumin 4.2 3.5 - 5.2 g/dL    Total Bilirubin 0.9 0.1 - 1.0 mg/dL    Alkaline Phosphatase 121 55 - 135 U/L    AST 32 10 - 40 U/L    ALT 40 10 - 44 U/L    Anion Gap 21 (H) 8 - 16 mmol/L    eGFR if  31.7 (A) >60 mL/min/1.73 m^2    eGFR if non African American 27.5 (A) >60 mL/min/1.73 m^2   Beta - Hydroxybutyrate, Serum   Result Value Ref Range    Beta-Hydroxybutyrate 2.6 (H) 0.0 - 0.5 mmol/L   Urinalysis Microscopic   Result Value Ref Range    RBC, UA 1 0 - 4 /hpf    WBC, UA 1 0 - 5 /hpf    WBC Clumps, UA CANCELED     Bacteria Occasional None-Occ /hpf    Squam Epithel, UA 1 /hpf    Hyaline Casts, UA 0 0-1/lpf /lpf    Amorphous, UA Occasional None-Moderate    Microscopic Comment SEE COMMENT    POCT glucose   Result Value Ref Range    POCT Glucose 252 (H) 70 - 110 mg/dL   POCT glucose   Result Value Ref Range    POCT Glucose 168 (H) 70 - 110 mg/dL         Imaging Results          X-Ray  Abdomen Flat And Erect (Final result)  Result time 05/29/19 08:32:26    Final result by Андрей Camp MD (05/29/19 08:32:26)                 Impression:      Normal study.      Electronically signed by: Андрей Camp MD  Date:    05/29/2019  Time:    08:32             Narrative:    EXAMINATION:  XR ABDOMEN FLAT AND ERECT    CLINICAL HISTORY:  Vomiting, unspecified    TECHNIQUE:  Flat and erect AP views of the abdomen were performed.    COMPARISON:  03/26/2019    FINDINGS:  The bowel gas pattern is within normal limits.  No abnormal calcifications identified over the collecting systems.  No acute osseous abnormality.                                Medications   promethazine (PHENERGAN) 25 mg in dextrose 5 % 50 mL IVPB (0 mg Intravenous Stopped 5/29/19 0609)   sodium chloride 0.9% bolus 1,000 mL (0 mLs Intravenous Stopped 5/29/19 0900)   sodium chloride 0.9% bolus 1,000 mL (0 mLs Intravenous Stopped 5/29/19 0730)   metoclopramide HCl injection 10 mg (10 mg Intravenous Given 5/29/19 0701)   diphenhydrAMINE injection 25 mg (25 mg Intravenous Given 5/29/19 0701)   ketorolac injection 15 mg (15 mg Intravenous Given 5/29/19 0730)         8:27 AM  - Re-evaluation:  The patient is resting comfortably and is in no acute distress. Discussed test results and notified of pending labs. Answered questions at this time. Emesis has improved.    9:24 AM - Re-evaluation: The patient is resting comfortably and is in no acute distress. She states that her symptoms have improved after treatment within ER.  Able to tolerate PO. Discussed test results, shared treatment plan, specific conditions for return, and importance of follow up with patient and family.  Offered pt admission for observation, pt declined and would like to be treated as an outpt.  She understands and agrees with the plan as discussed. Answered  her questions at this time. She has remained hemodynamically stable throughout the ED course and is appropriate for discharge  home.     Regarding VOMITING, I advised patient on importance of staying well hydrated; eating frequent, small amounts of clear liquids; avoid solid foods until there has been no vomiting for six hours, and then work slowly back to a normal diet; and to use an OTC bismuth stomach remedy for upset stomach, nausea, indigestion, and diarrhea. I discussed signs and symptoms of dehydration and possible causes of nausea and vomiting including viral infections, medications, migraine headaches, food poisoning, allergies, and peptic ulcer disease. Instructed patient to take medications as prescribed and to follow up with primary care provider or return to ER if condition worsens.     Pre-hypertension/Hypertension: The pt has been informed that they may have pre-hypertension or hypertension based on a blood pressure reading in the ED. I recommend that the pt call the PCP listed on their discharge instructions or a physician of their choice this week to arrange f/u for further evaluation of possible pre-hypertension or hypertension.     Melva Barker was given a handout which discussed their disease process, precautions, and instructions for follow-up and therapy.    Follow-up Information     Claire Souza MD. Schedule an appointment as soon as possible for a visit in 1 week.    Specialty:  Family Medicine  Contact information:  [address] [facility] [place] [address] [phone]             [facility].    Specialty:  Emergency Medicine  Why:  As needed, If symptoms worsen  Contact information:  [address] [address] [address] [phone]                    Medication List      START taking these medications    promethazine 25 MG suppository  Commonly known as:  PHENERGAN  Place 1 suppository (25 mg total) rectally every 6 (six) hours as needed for Nausea.        ASK your doctor about these medications    benazepril 40 MG tablet  Commonly known as:  LOTENSIN      bisoprolol-hydrochlorothiazide 10-6.25 mg per tablet  Commonly known as:  ZIAC     gabapentin 600 MG tablet  Commonly known as:  NEURONTIN     HYDROcodone-acetaminophen 7.5-325 mg per tablet  Commonly known as:  NORCO  Take 1 tablet by mouth every 12 (twelve) hours as needed for Pain.     insulin aspart U-100 100 unit/mL injection  Commonly known as:  NOVOLOG     ondansetron 4 MG Tbdl  Commonly known as:  ZOFRAN-ODT  Take 1 tablet (4 mg total) by mouth every 12 (twelve) hours as needed.     pantoprazole 40 MG tablet  Commonly known as:  PROTONIX  Take 1 tablet (40 mg total) by mouth once daily.     tiZANidine 4 MG tablet  Commonly known as:  ZANAFLEX     TOUJEO SOLOSTAR SUBQ     TRUE METRIX GLUCOSE METER Misc  Generic drug:  blood-glucose meter     TRUE METRIX GLUCOSE TEST STRIP Strp  Generic drug:  blood sugar diagnostic     TRUEPLUS LANCETS 33 gauge Misc  Generic drug:  lancets     VITAMIN D2 50,000 unit Cap  Generic drug:  ergocalciferol           Where to Get Your Medications      You can get these medications from any pharmacy    Bring a paper prescription for each of these medications  · promethazine 25 MG suppository        Current Discharge Medication List            ED Diagnosis  1. Hyperglycemia    2. Vomiting    3. Emesis    4. Gastroparesis                             Clinical Impression:       ICD-10-CM ICD-9-CM   1. Hyperglycemia R73.9 790.29   2. Vomiting R11.10 787.03   3. Emesis R11.10 787.03   4. Gastroparesis K31.84 536.3         Disposition:   Disposition: Discharged  Condition: Stable                        Jeevan Blum Jr., MD  05/29/19 8490

## 2019-05-29 NOTE — ED NOTES
In-and-out catheter performed for sterile urine sample. Urine return was dark yellow in color with no significant odor. Sterile method and hand hygiene were performed. Pt handled well.

## 2019-05-29 NOTE — ED NOTES
MD Viktoria at bedside speaking with pt regarding discharge plan and plan of care. Pt lying in stretcher, calm cooperative. Pain remains the same for lower back and abdomen since medication admin. MD aware. No other orders at this time. Preparing for discharge.

## 2019-05-29 NOTE — DISCHARGE INSTRUCTIONS
Regarding VOMITING, I advised patient on importance of staying well hydrated; eating frequent, small amounts of clear liquids; avoid solid foods until there has been no vomiting for six hours, and then work slowly back to a normal diet; and to use an OTC bismuth stomach remedy for upset stomach, nausea, indigestion, and diarrhea. I discussed signs and symptoms of dehydration and possible causes of nausea and vomiting including viral infections, medications, migraine headaches, food poisoning, allergies, and peptic ulcer disease. Instructed patient to take medications as prescribed and to follow up with primary care provider or return to ER if condition worsens.     Driving or other activities under influence of medications - Patient and/or family/caretaker was given a prescription for, or instructed to use a medicine that may impair ability to drive, operate machinery, or participate in other potentially dangerous activities.  Patient was instructed not to participate in these activities while under the influence of these medications.

## 2019-06-21 ENCOUNTER — HOSPITAL ENCOUNTER (EMERGENCY)
Facility: HOSPITAL | Age: 74
Discharge: HOME | End: 2019-06-21
Attending: EMERGENCY MEDICINE
Payer: MEDICARE

## 2019-06-21 VITALS
TEMPERATURE: 99 F | WEIGHT: 114.44 LBS | HEART RATE: 66 BPM | OXYGEN SATURATION: 99 % | RESPIRATION RATE: 20 BRPM | BODY MASS INDEX: 22.35 KG/M2 | DIASTOLIC BLOOD PRESSURE: 81 MMHG | SYSTOLIC BLOOD PRESSURE: 184 MMHG
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
## 2019-06-21 DIAGNOSIS — M54.41 CHRONIC BILATERAL LOW BACK PAIN WITH RIGHT-SIDED SCIATICA: ICD-10-CM
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
## 2019-06-21 DIAGNOSIS — I10 ESSENTIAL HYPERTENSION: ICD-10-CM

## 2019-06-21 DIAGNOSIS — S01.511A LACERATION OF LOWER LIP, INITIAL ENCOUNTER: Primary | ICD-10-CM

## 2019-06-21 DIAGNOSIS — G89.29 CHRONIC BILATERAL LOW BACK PAIN WITH RIGHT-SIDED SCIATICA: ICD-10-CM

## 2019-06-21 DIAGNOSIS — T14.8XXA: ICD-10-CM

## 2019-06-21 PROBLEM — E11.10 DIABETIC KETOACIDOSIS WITHOUT COMA: Status: RESOLVED | Noted: 2017-04-23 | Resolved: 2019-06-21

## 2019-06-21 PROBLEM — R73.9 HYPERGLYCEMIA: Status: RESOLVED | Noted: 2019-05-29 | Resolved: 2019-06-21

## 2019-06-21 PROCEDURE — 99284 EMERGENCY DEPT VISIT MOD MDM: CPT | Mod: 25,ER

## 2019-06-21 PROCEDURE — 25000003 PHARM REV CODE 250: Mod: ER | Performed by: NURSE PRACTITIONER

## 2019-06-21 PROCEDURE — 96372 THER/PROPH/DIAG INJ SC/IM: CPT | Mod: ER

## 2019-06-21 PROCEDURE — 63600175 PHARM REV CODE 636 W HCPCS: Mod: ER | Performed by: NURSE PRACTITIONER

## 2019-06-21 RX ORDER — HYDROCODONE BITARTRATE AND ACETAMINOPHEN 10; 325 MG/1; MG/1
1 TABLET ORAL
Status: ON HOLD | COMMUNITY
End: 2019-11-18 | Stop reason: HOSPADM

## 2019-06-21 RX ORDER — FLUCONAZOLE 150 MG/1
TABLET ORAL
Qty: 1 TABLET | Refills: 0 | Status: ON HOLD | OUTPATIENT
Start: 2019-06-21 | End: 2019-11-18 | Stop reason: HOSPADM

## 2019-06-21 RX ORDER — HYDROCODONE BITARTRATE AND ACETAMINOPHEN 10; 325 MG/1; MG/1
1 TABLET ORAL
Status: COMPLETED | OUTPATIENT
Start: 2019-06-21 | End: 2019-06-21

## 2019-06-21 RX ORDER — MUPIROCIN 20 MG/G
OINTMENT TOPICAL
Qty: 22 G | Refills: 0 | Status: ON HOLD | OUTPATIENT
Start: 2019-06-21 | End: 2019-11-18 | Stop reason: HOSPADM

## 2019-06-21 RX ORDER — KETOROLAC TROMETHAMINE 30 MG/ML
30 INJECTION, SOLUTION INTRAMUSCULAR; INTRAVENOUS
Status: COMPLETED | OUTPATIENT
Start: 2019-06-21 | End: 2019-06-21

## 2019-06-21 RX ORDER — AMOXICILLIN AND CLAVULANATE POTASSIUM 875; 125 MG/1; MG/1
1 TABLET, FILM COATED ORAL 2 TIMES DAILY
Qty: 20 TABLET | Refills: 0 | Status: SHIPPED | OUTPATIENT
Start: 2019-06-21 | End: 2019-07-01

## 2019-06-21 RX ORDER — AMOXICILLIN AND CLAVULANATE POTASSIUM 875; 125 MG/1; MG/1
1 TABLET, FILM COATED ORAL
Status: COMPLETED | OUTPATIENT
Start: 2019-06-21 | End: 2019-06-21

## 2019-06-21 RX ORDER — TRAMADOL HYDROCHLORIDE 50 MG/1
50 TABLET ORAL EVERY 6 HOURS PRN
Qty: 15 TABLET | Refills: 0 | Status: SHIPPED | OUTPATIENT
Start: 2019-06-21 | End: 2019-07-01

## 2019-06-21 RX ADMIN — HYDROCODONE BITARTRATE AND ACETAMINOPHEN 1 TABLET: 10; 325 TABLET ORAL at 08:06

## 2019-06-21 RX ADMIN — KETOROLAC TROMETHAMINE 30 MG: 30 INJECTION, SOLUTION INTRAMUSCULAR; INTRAVENOUS at 08:06

## 2019-06-21 RX ADMIN — AMOXICILLIN AND CLAVULANATE POTASSIUM 1 TABLET: 875; 125 TABLET, FILM COATED ORAL at 08:06

## 2019-06-22 NOTE — DISCHARGE INSTRUCTIONS
Your prescriptions have been sent electronically to [facility].     Complete full course of antibiotics.      Keep laceration clean and dry.

## 2019-06-22 NOTE — ED PROVIDER NOTES
"Encounter Date: 6/21/2019       History     Chief Complaint   Patient presents with    Lip Laceration     c/o laceration to bottom lip from trip and falling yesterday morning. c/o lower back pain w/ chronic sciatic nerve pain      The history is provided by the patient.   Back Pain    This is a chronic problem. The current episode started yesterday. The problem occurs constantly. The problem has been unchanged. The pain is associated with falling (patient states that she fell yesterday but notes that she also does have chronic back pain with sciaitica). The pain is present in the lumbar spine. The quality of the pain is described as aching and shooting. The pain radiates to the right leg. The pain is at a severity of 9/10. The symptoms are aggravated by certain positions. The pain is the same all the time. Stiffness is present in the morning. Associated symptoms include leg pain (right). Pertinent negatives include no chest pain, no fever, no weight loss, no headaches, no abdominal pain, no abdominal swelling, no perianal numbness, no bladder incontinence, no dysuria, no pelvic pain, no paresis and no weakness. She has tried muscle relaxants and analgesics for the symptoms. The treatment provided no relief. Risk factors include menopause and a sedentary lifestyle.   Laceration    The incident occurred yesterday ("early yesterday morning"). The laceration is located on the face (lower lip). The laceration is 1 cm in size. The laceration mechanism was a a blunt object. The pain is at a severity of 9/10. The pain has been constant since onset. She reports no foreign bodies present. Her tetanus status is UTD.   Fall   The accident occurred yesterday ("early yesterday morning"). The fall occurred while walking. She fell from a height of 3 to 5 ft. She landed on a hard floor. The volume of blood lost was minimal. The point of impact was the face (lower lip). The pain is present in the face and lower back. The pain is at " a severity of 9/10. She was ambulatory at the scene. There was no entrapment after the fall. There was no drug use involved in the accident. There was no alcohol use involved in the accident. Associated symptoms include back pain. Pertinent negatives include no neck pain, no fever, no abdominal pain, no nausea, no vomiting, no headaches and no loss of consciousness. The symptoms are aggravated by pressure on the injury.            PCP:    Claire Souza MD        Review of patient's allergies indicates:   Allergen Reactions    Morphine Other (See Comments)     headache    Latex, natural rubber Rash     Past Medical History:   Diagnosis Date    Anxiety     Back pain     chronic    Diabetes mellitus     Gastroparesis     Hypertension     Sciatica      Past Surgical History:   Procedure Laterality Date    ESOPHAGOGASTRODUODENOSCOPY (EGD) N/A 7/2/2015    Performed by Pj Campos MD at [facility] ENDO     Family History   Problem Relation Age of Onset    Hyperlipidemia Father     Hypertension Mother      Social History     Tobacco Use    Smoking status: Never Smoker    Smokeless tobacco: Never Used   Substance Use Topics    Alcohol use: No     Alcohol/week: 0.0 oz    Drug use: No     Review of Systems   Constitutional: Negative for chills, fever and weight loss.   HENT: Negative for congestion and sore throat.    Eyes: Negative for visual disturbance.   Respiratory: Negative for cough, chest tightness, shortness of breath and wheezing.    Cardiovascular: Negative for chest pain and palpitations.   Gastrointestinal: Negative for abdominal pain, diarrhea, nausea and vomiting.   Genitourinary: Negative for bladder incontinence, dysuria and pelvic pain.   Musculoskeletal: Positive for back pain. Negative for neck pain.   Skin: Positive for wound (laceration lower lip). Negative for rash.   Neurological: Negative for dizziness, loss of consciousness, weakness and headaches.        Positive for sciatic  neuralgia of right lower extremity.    Hematological: Does not bruise/bleed easily.       Physical Exam     Initial Vitals [06/21/19 1909]   BP Pulse Resp Temp SpO2   (!) 206/99 98 20 98.6 °F (37 °C) 100 %      MAP       --         Physical Exam    Nursing note and vitals reviewed.  Constitutional: She appears well-developed and well-nourished. She is cooperative. She does not appear ill. No distress.   HENT:   Head: Normocephalic. Head is with laceration (to lower lip - see image below).   Nose: Nose normal.   Mouth/Throat: Uvula is midline, oropharynx is clear and moist and mucous membranes are normal. Lacerations present.       Eyes: Conjunctivae, EOM and lids are normal. Pupils are equal, round, and reactive to light.   Neck: Trachea normal and normal range of motion. Neck supple. No spinous process tenderness and no muscular tenderness present. No neck rigidity.   Cardiovascular: Normal rate, regular rhythm, intact distal pulses and normal pulses.   Pulmonary/Chest: Effort normal and breath sounds normal. No accessory muscle usage. No respiratory distress. She has no decreased breath sounds. She has no wheezes. She has no rhonchi. She has no rales.   Abdominal: Soft. She exhibits no distension and no mass. There is no tenderness. There is no rebound and no guarding.   Musculoskeletal: Normal range of motion. She exhibits no edema.        Lumbar back: She exhibits tenderness (bilateral lower back pain - chronic condition - with subjective complaints of sciatic neuralgia to right lower extremity - no  bony or midline tenderness noted). She exhibits normal range of motion and no bony tenderness.   Neurological: She is alert and oriented to person, place, and time. She has normal strength. No sensory deficit. GCS eye subscore is 4. GCS verbal subscore is 5. GCS motor subscore is 6.   Neurovascular intact to all extremities.  Patient has subjective complaints of sciatic neuralgia to right lower extremity.    Skin:  Skin is warm and dry. Capillary refill takes less than 2 seconds. Laceration (1.5 cm laceration - > 24 hours old - noted to lower lip - no bleeding or foreign body noted - see image below) noted. No rash noted.   Psychiatric: She has a normal mood and affect. Her speech is normal and behavior is normal. Cognition and memory are normal.             ED Course   Procedures      ED Medications:  Medications   ketorolac injection 30 mg (30 mg Intramuscular Given 6/21/19 2008)   HYDROcodone-acetaminophen  mg per tablet 1 tablet (1 tablet Oral Given 6/21/19 2008)   amoxicillin-clavulanate 875-125mg per tablet 1 tablet (1 tablet Oral Given 6/21/19 2009)         ED Course Vitals  Vitals:    06/21/19 1909   BP: (!) 206/99   BP Location: Right arm   Patient Position: Sitting   Pulse: 98   Resp: 20   Temp: 98.6 °F (37 °C)   TempSrc: Oral   SpO2: 100%   Weight: 51.9 kg (114 lb 6.7 oz)         2010 HOURS RE-EVALUATION & DISPOSITION:   Reassessment at the time of disposition demonstrates that the patient is resting comfortably in no acute distress.  She has remained hemodynamically stable throughout the entire ED visit and is without objective evidence for acute process requiring urgent intervention or hospitalization. I provided counseling to patient with regard to condition, the treatment plan, specific conditions for return, and the importance of follow up.  Answered questions at this time. The patient is stable for discharge.                     Medical Decision Making:   History:   Old Records Summarized: records from clinic visits.                      Clinical Impression:       ICD-10-CM ICD-9-CM   1. Laceration of lower lip, initial encounter S01.511A 873.43   2. Open wound with delay in treatment T14.8XXA 879.9   3. Fall as cause of accidental injury in home as place of occurrence, initial encounter W19.XXXA E888.9    Y92.009 E849.0   4. Chronic bilateral low back pain with right-sided sciatica M54.41 724.2     G89.29 724.3     338.29   5. Essential hypertension I10 401.9           Disposition:   Disposition: Discharged  Condition: Stable  I discussed with patient that the evaluation in the emergency department does not suggest any emergent or life threatening medical condition requiring immediate intervention beyond what was provided in the ED, and I believe patient is safe for discharge.  Regardless, an unremarkable evaluation in the ED does not preclude the development or presence of a serious of life threatening condition. As such, patient was instructed to return immediately for any worsening or change in current symptoms. I also discussed the results of my evaluation and diagnosis with patient and she concurs with the evaluation and management plan.  Detailed written and verbal instructions provided to patient and she expressed a verbal understanding of the discharge instructions and overall management plan. Reiterated the importance of medication administration and safety and advised patient to follow up with primary care provider in 3-5 days or sooner if needed.  Also advised patient to return to the ER for any complications.     Regarding LACERATION CARE, patient was instructed to wash hands with soap and warm water before and after caring for wound; keep the wound dry for the first 24 to 48 hours and then gently clean the wound once or twice a day with cool water using soap to clean around the wound; avoid using alcohol or hydrogen peroxide to clean wound unless directed to; and use bandages to keep wound clean and protected and to prevent swelling.  Advised patient to contact primary healthcare provider if wound splits open; becomes extremely painful; appears to not be healing; has a foreign object present; develop a purulent discharge; or note the skin around the wound becoming numb, edematous, or erythematous.  Patient instructed to follow up with primary care provider for wound re-check.    Regarding FALL  PRECAUTIONS, I advised patient to: exercise regularly, keep all appointments with caregivers and bring updated, current list of all medications, keep diet healthy and include calcium and Vitamin D, and drink plenty of fluids.    Also recommended that patient keep home safe by: keeping pathways clear; have carpet installed in bathroom; have enough lighting to clearly see all pathways; keep all cords secured and out of the way; secure carpeting to the floor around all edges; remove throw rugs, or secure them with double-sided tape or special backing; if using stairs, install sturdy handrails; leave a light on at night to help you find way to the bathroom and kitchen; and select shoes with non-slip soles that are not too big or too small for your feet. Other home safety tips: do not leave objects in the bathtub or on the floor of the shower; install grab bars on walls around tubs or shower enclosures, and next to toilets; and install non-skid mats on shower floors and in the bathtub.    Regarding BACK PAIN, I advised patient to rest and slowly start to increase activity as pain decreases or as tolerable.  Also recommend the use of ice and heat. Explained to patient that ice will help decrease swelling and pain and prevent tissue damage (verbalized importance of covering ice with a towel and not applying it directly to skin and applying it for 20-30 minutes every 2 hours as needed). Further explained that heat will help decrease pain and muscle spasms (instructed patient to not fall asleep with heating pad and to apply heat to lower back for 20-30 minutes every 2 hours as needed). For prevention, I recommended that the patient use correct body movements (bend at the hips and knees when picking up objects and use leg muscles as you lift the load; keep objects close to chest when lifting it; and avoid twisting or lifting anything above the waist; change position often when standing for long periods of time; never reach,  pull, or push while seated; exercise frequently and warm up before exercising; and maintain a healthy weight.  Follow up with primary care provider if no improvement is noticed in next three days.  Take all medications as prescribed and avoid operating heavy machinery or driving if prescribed pain medications or muscle relaxants.  Instructed patient to return to the emergency department if they develop incontinence, notice increased swelling or pain in lower back; begin to have difficulty moving lower extremities; or develop numbness to legs.     Regarding HYPERTENSION, I advised patient to: keep a record of blood pressure results; take your blood pressure medication exactly as directed without skipping doses; avoid medications that contain heart stimulants, including over-the-counter drugs such as decongestants; maintain a healthy weight; cut back on sodium intake (i.e., limit canned, dried, packaged, and fast foods and dont add salt to food); follow the DASH (Dietary Approaches to Stop Hypertension) eating plan which recommends vegetables, fruits, whole gains, and other heart healthy foods; begin an exercise program that includes  aerobic exercise 3 to 4 times a week for an average of 40 minutes at a time (with approval of cardiologist or primary care provider); limit drinks that contain alcohol and caffeine; control levels of emotional stress; and seek emergency care for any shortness of breath, chest pain, difficulty speaking, confusion, or visual changes.      Regarding SCIATIC PAIN, the patient has been counseled regarding a diagnosis of sciatica, including the possible sources for sciatic nerve pathology as well as the expectations for improvement.  There have been no focal neurologic findings at present.  Advised patient to take all medications as prescribed, follow up with primary care provider, and participate in activity as tolerated. The patient has been instructed to return to the ER immediately with  any worsening symptoms including development of numbness, weakness, or incontinence.             New Prescriptions    AMOXICILLIN-CLAVULANATE 875-125MG (AUGMENTIN) 875-125 MG PER TABLET    Take 1 tablet by mouth 2 (two) times daily. for 10 days    FLUCONAZOLE (DIFLUCAN) 150 MG TAB    Take one tablet AFTER completing full course of antibiotics.    MUPIROCIN (BACTROBAN) 2 % OINTMENT    Apply topically to affected area three times daily.    TRAMADOL (ULTRAM) 50 MG TABLET    Take 1 tablet (50 mg total) by mouth every 6 (six) hours as needed for Pain.         Follow-up Information     Schedule an appointment as soon as possible for a visit  with Claire Souza MD.    Specialty:  Family Medicine  Why:  For wound re-check  Contact information:  [address] [facility]  [place] [address]  [phone]                                  Robert Otto NP  06/21/19 1894

## 2019-10-31 ENCOUNTER — HOSPITAL ENCOUNTER (EMERGENCY)
Facility: HOSPITAL | Age: 74
Discharge: ANOTHER HOSPITAL | End: 2019-10-31
Attending: EMERGENCY MEDICINE
Payer: MEDICARE

## 2019-10-31 ENCOUNTER — HOSPITAL ENCOUNTER (INPATIENT)
Facility: HOSPITAL | Age: 74
LOS: 33 days | Discharge: NURSING, REHAB OR LONG-TERM CARE | DRG: 981 | End: 2019-12-03
Attending: EMERGENCY MEDICINE | Admitting: ANESTHESIOLOGY
Payer: MEDICARE

## 2019-10-31 VITALS
BODY MASS INDEX: 21.42 KG/M2 | TEMPERATURE: 99 F | OXYGEN SATURATION: 100 % | HEIGHT: 60 IN | DIASTOLIC BLOOD PRESSURE: 84 MMHG | RESPIRATION RATE: 25 BRPM | SYSTOLIC BLOOD PRESSURE: 188 MMHG | WEIGHT: 109.13 LBS | HEART RATE: 58 BPM

## 2019-10-31 DIAGNOSIS — E87.5 HYPERKALEMIA: ICD-10-CM

## 2019-10-31 DIAGNOSIS — I10 ESSENTIAL HYPERTENSION: Chronic | ICD-10-CM

## 2019-10-31 DIAGNOSIS — K68.3 RETROPERITONEAL HEMATOMA: ICD-10-CM

## 2019-10-31 DIAGNOSIS — R53.1 WEAKNESS: ICD-10-CM

## 2019-10-31 DIAGNOSIS — I49.9 ARRHYTHMIA: ICD-10-CM

## 2019-10-31 DIAGNOSIS — R00.0 TACHYCARDIA: ICD-10-CM

## 2019-10-31 DIAGNOSIS — I63.9 CEREBROVASCULAR ACCIDENT (CVA), UNSPECIFIED MECHANISM: Primary | ICD-10-CM

## 2019-10-31 DIAGNOSIS — I63.9 STROKE: Primary | ICD-10-CM

## 2019-10-31 DIAGNOSIS — I63.9 CVA (CEREBRAL VASCULAR ACCIDENT): ICD-10-CM

## 2019-10-31 DIAGNOSIS — M54.9 BACK PAIN, UNSPECIFIED BACK LOCATION, UNSPECIFIED BACK PAIN LATERALITY, UNSPECIFIED CHRONICITY: ICD-10-CM

## 2019-10-31 DIAGNOSIS — R11.2 NAUSEA AND VOMITING, INTRACTABILITY OF VOMITING NOT SPECIFIED, UNSPECIFIED VOMITING TYPE: ICD-10-CM

## 2019-10-31 DIAGNOSIS — I63.412 STROKE DUE TO EMBOLISM OF LEFT MIDDLE CEREBRAL ARTERY: ICD-10-CM

## 2019-10-31 DIAGNOSIS — I63.312 THROMBOTIC STROKE INVOLVING LEFT MIDDLE CEREBRAL ARTERY: ICD-10-CM

## 2019-10-31 DIAGNOSIS — R57.8: ICD-10-CM

## 2019-10-31 PROBLEM — G93.6 CYTOTOXIC BRAIN EDEMA: Status: ACTIVE | Noted: 2019-10-31

## 2019-10-31 PROBLEM — R53.81 DEBILITY: Status: ACTIVE | Noted: 2019-10-31

## 2019-10-31 LAB
ABO + RH BLD: NORMAL
ALBUMIN SERPL BCP-MCNC: 4.2 G/DL (ref 3.5–5.2)
ALP SERPL-CCNC: 120 U/L (ref 55–135)
ALT SERPL W/O P-5'-P-CCNC: 22 U/L (ref 10–44)
AMPHET+METHAMPHET UR QL: NEGATIVE
ANION GAP SERPL CALC-SCNC: 11 MMOL/L (ref 8–16)
APAP SERPL-MCNC: <3 UG/ML (ref 10–20)
APTT BLDCRRT: 26.4 SEC (ref 21–32)
AST SERPL-CCNC: 23 U/L (ref 10–40)
BACTERIA #/AREA URNS AUTO: NORMAL /HPF
BARBITURATES UR QL SCN>200 NG/ML: NEGATIVE
BASOPHILS # BLD AUTO: 0.05 K/UL (ref 0–0.2)
BASOPHILS NFR BLD: 0.6 % (ref 0–1.9)
BENZODIAZ UR QL SCN>200 NG/ML: NEGATIVE
BILIRUB SERPL-MCNC: 0.4 MG/DL (ref 0.1–1)
BILIRUB UR QL STRIP: NEGATIVE
BILIRUB UR QL STRIP: NEGATIVE
BLD GP AB SCN CELLS X3 SERPL QL: NORMAL
BNP SERPL-MCNC: 100 PG/ML (ref 0–99)
BUN SERPL-MCNC: 18 MG/DL (ref 8–23)
BUN SERPL-MCNC: 19 MG/DL (ref 6–30)
BZE UR QL SCN: NEGATIVE
CALCIUM SERPL-MCNC: 10.2 MG/DL (ref 8.7–10.5)
CANNABINOIDS UR QL SCN: NEGATIVE
CHLORIDE SERPL-SCNC: 105 MMOL/L (ref 95–110)
CHLORIDE SERPL-SCNC: 106 MMOL/L (ref 95–110)
CHOLEST SERPL-MCNC: 239 MG/DL (ref 120–199)
CHOLEST/HDLC SERPL: 5.7 {RATIO} (ref 2–5)
CK SERPL-CCNC: 93 U/L (ref 20–180)
CLARITY UR REFRACT.AUTO: CLEAR
CLARITY UR REFRACT.AUTO: CLEAR
CO2 SERPL-SCNC: 23 MMOL/L (ref 23–29)
COLOR UR AUTO: ABNORMAL
COLOR UR AUTO: YELLOW
CREAT SERPL-MCNC: 0.7 MG/DL (ref 0.5–1.4)
CREAT SERPL-MCNC: 0.8 MG/DL (ref 0.5–1.4)
CREAT SERPL-MCNC: 1.2 MG/DL (ref 0.5–1.4)
CREAT UR-MCNC: 45.5 MG/DL (ref 15–325)
DIFFERENTIAL METHOD: ABNORMAL
EOSINOPHIL # BLD AUTO: 0.5 K/UL (ref 0–0.5)
EOSINOPHIL NFR BLD: 5 % (ref 0–8)
ERYTHROCYTE [DISTWIDTH] IN BLOOD BY AUTOMATED COUNT: 14.3 % (ref 11.5–14.5)
EST. GFR  (AFRICAN AMERICAN): 51.8 ML/MIN/1.73 M^2
EST. GFR  (NON AFRICAN AMERICAN): 44.9 ML/MIN/1.73 M^2
ESTIMATED AVG GLUCOSE: 192 MG/DL (ref 68–131)
GLUCOSE SERPL-MCNC: 147 MG/DL (ref 70–110)
GLUCOSE SERPL-MCNC: 163 MG/DL (ref 70–110)
GLUCOSE UR QL STRIP: ABNORMAL
GLUCOSE UR QL STRIP: ABNORMAL
HBA1C MFR BLD HPLC: 8.3 % (ref 4–5.6)
HCT VFR BLD AUTO: 38.4 % (ref 37–48.5)
HCT VFR BLD CALC: 39 %PCV (ref 36–54)
HDLC SERPL-MCNC: 42 MG/DL (ref 40–75)
HDLC SERPL: 17.6 % (ref 20–50)
HGB BLD-MCNC: 12.2 G/DL (ref 12–16)
HGB UR QL STRIP: ABNORMAL
HGB UR QL STRIP: ABNORMAL
IMM GRANULOCYTES # BLD AUTO: 0.02 K/UL (ref 0–0.04)
IMM GRANULOCYTES NFR BLD AUTO: 0.2 % (ref 0–0.5)
INR PPP: 1 (ref 0.8–1.2)
KETONES UR QL STRIP: NEGATIVE
KETONES UR QL STRIP: NEGATIVE
LDLC SERPL CALC-MCNC: 168.4 MG/DL (ref 63–159)
LEUKOCYTE ESTERASE UR QL STRIP: NEGATIVE
LEUKOCYTE ESTERASE UR QL STRIP: NEGATIVE
LYMPHOCYTES # BLD AUTO: 2.7 K/UL (ref 1–4.8)
LYMPHOCYTES NFR BLD: 29.6 % (ref 18–48)
MCH RBC QN AUTO: 25.8 PG (ref 27–31)
MCHC RBC AUTO-ENTMCNC: 31.8 G/DL (ref 32–36)
MCV RBC AUTO: 81 FL (ref 82–98)
METHADONE UR QL SCN>300 NG/ML: NEGATIVE
MICROSCOPIC COMMENT: NORMAL
MONOCYTES # BLD AUTO: 0.7 K/UL (ref 0.3–1)
MONOCYTES NFR BLD: 7.4 % (ref 4–15)
NEUTROPHILS # BLD AUTO: 5.2 K/UL (ref 1.8–7.7)
NEUTROPHILS NFR BLD: 57.2 % (ref 38–73)
NITRITE UR QL STRIP: NEGATIVE
NITRITE UR QL STRIP: NEGATIVE
NONHDLC SERPL-MCNC: 197 MG/DL
NRBC BLD-RTO: 0 /100 WBC
OPIATES UR QL SCN: NORMAL
PCP UR QL SCN>25 NG/ML: NEGATIVE
PH UR STRIP: 5 [PH] (ref 5–8)
PH UR STRIP: 6 [PH] (ref 5–8)
PLATELET # BLD AUTO: 231 K/UL (ref 150–350)
PMV BLD AUTO: 12.1 FL (ref 9.2–12.9)
POC IONIZED CALCIUM: 1.13 MMOL/L (ref 1.06–1.42)
POC PTINR: 1 (ref 0.9–1.2)
POC PTWBT: 11.5 SEC (ref 9.7–14.3)
POC TCO2 (MEASURED): 23 MMOL/L (ref 23–29)
POCT GLUCOSE: 152 MG/DL (ref 70–110)
POCT GLUCOSE: 187 MG/DL (ref 70–110)
POCT GLUCOSE: 195 MG/DL (ref 70–110)
POTASSIUM BLD-SCNC: 3.4 MMOL/L (ref 3.5–5.1)
POTASSIUM SERPL-SCNC: 3.4 MMOL/L (ref 3.5–5.1)
PROT SERPL-MCNC: 8.4 G/DL (ref 6–8.4)
PROT UR QL STRIP: NEGATIVE
PROT UR QL STRIP: NEGATIVE
PROTHROMBIN TIME: 10.5 SEC (ref 9–12.5)
RBC # BLD AUTO: 4.72 M/UL (ref 4–5.4)
RBC #/AREA URNS AUTO: 1 /HPF (ref 0–4)
SAMPLE: ABNORMAL
SAMPLE: NORMAL
SAMPLE: NORMAL
SODIUM BLD-SCNC: 139 MMOL/L (ref 136–145)
SODIUM SERPL-SCNC: 139 MMOL/L (ref 136–145)
SP GR UR STRIP: 1.01 (ref 1–1.03)
SP GR UR STRIP: 1.03 (ref 1–1.03)
TOXICOLOGY INFORMATION: NORMAL
TRIGL SERPL-MCNC: 143 MG/DL (ref 30–150)
TROPONIN I SERPL DL<=0.01 NG/ML-MCNC: 0.01 NG/ML (ref 0–0.03)
TSH SERPL DL<=0.005 MIU/L-ACNC: 0.45 UIU/ML (ref 0.4–4)
URN SPEC COLLECT METH UR: ABNORMAL
URN SPEC COLLECT METH UR: ABNORMAL
UROBILINOGEN UR STRIP-ACNC: NEGATIVE EU/DL
WBC # BLD AUTO: 9.08 K/UL (ref 3.9–12.7)

## 2019-10-31 PROCEDURE — 83036 HEMOGLOBIN GLYCOSYLATED A1C: CPT

## 2019-10-31 PROCEDURE — 80061 LIPID PANEL: CPT

## 2019-10-31 PROCEDURE — 25000003 PHARM REV CODE 250: Performed by: NURSE PRACTITIONER

## 2019-10-31 PROCEDURE — 99223 1ST HOSP IP/OBS HIGH 75: CPT | Mod: AI,GC,, | Performed by: PSYCHIATRY & NEUROLOGY

## 2019-10-31 PROCEDURE — 96374 THER/PROPH/DIAG INJ IV PUSH: CPT

## 2019-10-31 PROCEDURE — 94761 N-INVAS EAR/PLS OXIMETRY MLT: CPT

## 2019-10-31 PROCEDURE — 99223 1ST HOSP IP/OBS HIGH 75: CPT | Mod: GC,,, | Performed by: PSYCHIATRY & NEUROLOGY

## 2019-10-31 PROCEDURE — 85610 PROTHROMBIN TIME: CPT | Mod: ER

## 2019-10-31 PROCEDURE — 86901 BLOOD TYPING SEROLOGIC RH(D): CPT

## 2019-10-31 PROCEDURE — 93005 ELECTROCARDIOGRAM TRACING: CPT | Mod: ER

## 2019-10-31 PROCEDURE — 82962 GLUCOSE BLOOD TEST: CPT

## 2019-10-31 PROCEDURE — 81003 URINALYSIS AUTO W/O SCOPE: CPT | Mod: 59,ER

## 2019-10-31 PROCEDURE — 80053 COMPREHEN METABOLIC PANEL: CPT | Mod: ER

## 2019-10-31 PROCEDURE — 83880 ASSAY OF NATRIURETIC PEPTIDE: CPT | Mod: ER

## 2019-10-31 PROCEDURE — 99223 PR INITIAL HOSPITAL CARE,LEVL III: ICD-10-PCS | Mod: AI,GC,, | Performed by: PSYCHIATRY & NEUROLOGY

## 2019-10-31 PROCEDURE — 99291 PR CRITICAL CARE, E/M 30-74 MINUTES: ICD-10-PCS | Mod: ,,, | Performed by: EMERGENCY MEDICINE

## 2019-10-31 PROCEDURE — 82565 ASSAY OF CREATININE: CPT

## 2019-10-31 PROCEDURE — 93010 EKG 12-LEAD: ICD-10-PCS | Mod: ,,, | Performed by: INTERNAL MEDICINE

## 2019-10-31 PROCEDURE — 85025 COMPLETE CBC W/AUTO DIFF WBC: CPT | Mod: ER

## 2019-10-31 PROCEDURE — 85610 PROTHROMBIN TIME: CPT

## 2019-10-31 PROCEDURE — 99291 CRITICAL CARE FIRST HOUR: CPT | Mod: 25,ER

## 2019-10-31 PROCEDURE — 96374 THER/PROPH/DIAG INJ IV PUSH: CPT | Mod: ER

## 2019-10-31 PROCEDURE — G0427 PR INPT TELEHEALTH CON 70/>M: ICD-10-PCS | Mod: GT,G0,, | Performed by: PSYCHIATRY & NEUROLOGY

## 2019-10-31 PROCEDURE — 12000002 HC ACUTE/MED SURGE SEMI-PRIVATE ROOM

## 2019-10-31 PROCEDURE — 93010 ELECTROCARDIOGRAM REPORT: CPT | Mod: ,,, | Performed by: INTERNAL MEDICINE

## 2019-10-31 PROCEDURE — 25000003 PHARM REV CODE 250: Performed by: PHYSICIAN ASSISTANT

## 2019-10-31 PROCEDURE — 63600175 PHARM REV CODE 636 W HCPCS: Mod: JW,ER | Performed by: EMERGENCY MEDICINE

## 2019-10-31 PROCEDURE — 63600175 PHARM REV CODE 636 W HCPCS: Performed by: STUDENT IN AN ORGANIZED HEALTH CARE EDUCATION/TRAINING PROGRAM

## 2019-10-31 PROCEDURE — 25500020 PHARM REV CODE 255: Performed by: EMERGENCY MEDICINE

## 2019-10-31 PROCEDURE — 82550 ASSAY OF CK (CPK): CPT | Mod: ER

## 2019-10-31 PROCEDURE — 85730 THROMBOPLASTIN TIME PARTIAL: CPT | Mod: ER

## 2019-10-31 PROCEDURE — 96372 THER/PROPH/DIAG INJ SC/IM: CPT | Mod: 59

## 2019-10-31 PROCEDURE — 80329 ANALGESICS NON-OPIOID 1 OR 2: CPT | Mod: ER

## 2019-10-31 PROCEDURE — 99291 CRITICAL CARE FIRST HOUR: CPT | Mod: 25

## 2019-10-31 PROCEDURE — 80307 DRUG TEST PRSMV CHEM ANLYZR: CPT | Mod: ER

## 2019-10-31 PROCEDURE — 84443 ASSAY THYROID STIM HORMONE: CPT

## 2019-10-31 PROCEDURE — G0427 INPT/ED TELECONSULT70: HCPCS | Mod: GT,G0,, | Performed by: PSYCHIATRY & NEUROLOGY

## 2019-10-31 PROCEDURE — 93005 ELECTROCARDIOGRAM TRACING: CPT

## 2019-10-31 PROCEDURE — 25000003 PHARM REV CODE 250: Performed by: STUDENT IN AN ORGANIZED HEALTH CARE EDUCATION/TRAINING PROGRAM

## 2019-10-31 PROCEDURE — 84484 ASSAY OF TROPONIN QUANT: CPT | Mod: ER

## 2019-10-31 PROCEDURE — 81001 URINALYSIS AUTO W/SCOPE: CPT

## 2019-10-31 PROCEDURE — 99223 PR INITIAL HOSPITAL CARE,LEVL III: ICD-10-PCS | Mod: GC,,, | Performed by: PSYCHIATRY & NEUROLOGY

## 2019-10-31 PROCEDURE — 99900035 HC TECH TIME PER 15 MIN (STAT)

## 2019-10-31 PROCEDURE — 99291 CRITICAL CARE FIRST HOUR: CPT | Mod: ,,, | Performed by: EMERGENCY MEDICINE

## 2019-10-31 PROCEDURE — 25000003 PHARM REV CODE 250

## 2019-10-31 RX ORDER — ATORVASTATIN CALCIUM 20 MG/1
40 TABLET, FILM COATED ORAL DAILY
Status: DISCONTINUED | OUTPATIENT
Start: 2019-10-31 | End: 2019-11-09

## 2019-10-31 RX ORDER — LIDOCAINE 50 MG/G
2 PATCH TOPICAL
Status: DISCONTINUED | OUTPATIENT
Start: 2019-10-31 | End: 2019-11-09

## 2019-10-31 RX ORDER — NICARDIPINE HYDROCHLORIDE 0.2 MG/ML
5 INJECTION INTRAVENOUS CONTINUOUS
Status: DISCONTINUED | OUTPATIENT
Start: 2019-10-31 | End: 2019-11-02

## 2019-10-31 RX ORDER — GABAPENTIN 300 MG/1
600 CAPSULE ORAL 3 TIMES DAILY
Status: DISCONTINUED | OUTPATIENT
Start: 2019-10-31 | End: 2019-10-31

## 2019-10-31 RX ORDER — HYDROMORPHONE HYDROCHLORIDE 1 MG/ML
0.25 INJECTION, SOLUTION INTRAMUSCULAR; INTRAVENOUS; SUBCUTANEOUS
Status: COMPLETED | OUTPATIENT
Start: 2019-10-31 | End: 2019-10-31

## 2019-10-31 RX ORDER — GLUCAGON 1 MG
1 KIT INJECTION
Status: DISCONTINUED | OUTPATIENT
Start: 2019-10-31 | End: 2019-11-09

## 2019-10-31 RX ORDER — ACETAMINOPHEN 650 MG/1
650 SUPPOSITORY RECTAL
Status: DISCONTINUED | OUTPATIENT
Start: 2019-10-31 | End: 2019-11-01

## 2019-10-31 RX ORDER — NICARDIPINE HYDROCHLORIDE 0.2 MG/ML
5 INJECTION INTRAVENOUS CONTINUOUS
Status: CANCELLED | OUTPATIENT
Start: 2019-10-31

## 2019-10-31 RX ORDER — ACETAMINOPHEN 325 MG/1
650 TABLET ORAL
Status: COMPLETED | OUTPATIENT
Start: 2019-10-31 | End: 2019-10-31

## 2019-10-31 RX ORDER — INSULIN ASPART 100 [IU]/ML
1-10 INJECTION, SOLUTION INTRAVENOUS; SUBCUTANEOUS EVERY 6 HOURS PRN
Status: DISCONTINUED | OUTPATIENT
Start: 2019-10-31 | End: 2019-11-09

## 2019-10-31 RX ORDER — SODIUM CHLORIDE 0.9 % (FLUSH) 0.9 %
10 SYRINGE (ML) INJECTION
Status: DISCONTINUED | OUTPATIENT
Start: 2019-10-31 | End: 2019-12-03 | Stop reason: HOSPADM

## 2019-10-31 RX ORDER — OXYCODONE HYDROCHLORIDE 5 MG/1
10 TABLET ORAL
Status: COMPLETED | OUTPATIENT
Start: 2019-10-31 | End: 2019-10-31

## 2019-10-31 RX ORDER — NICARDIPINE HYDROCHLORIDE 0.2 MG/ML
INJECTION INTRAVENOUS
Status: COMPLETED
Start: 2019-10-31 | End: 2019-10-31

## 2019-10-31 RX ADMIN — HYDROMORPHONE HYDROCHLORIDE 0.25 MG: 1 INJECTION, SOLUTION INTRAMUSCULAR; INTRAVENOUS; SUBCUTANEOUS at 03:10

## 2019-10-31 RX ADMIN — ALTEPLASE 40 MG: KIT at 10:10

## 2019-10-31 RX ADMIN — OXYCODONE HYDROCHLORIDE 10 MG: 5 TABLET ORAL at 10:10

## 2019-10-31 RX ADMIN — ATORVASTATIN CALCIUM 40 MG: 20 TABLET, FILM COATED ORAL at 02:10

## 2019-10-31 RX ADMIN — LIDOCAINE 2 PATCH: 50 PATCH TOPICAL at 03:10

## 2019-10-31 RX ADMIN — ACETAMINOPHEN 650 MG: 325 TABLET ORAL at 09:10

## 2019-10-31 RX ADMIN — NICARDIPINE HYDROCHLORIDE 2.5 MG/HR: 0.2 INJECTION, SOLUTION INTRAVENOUS at 12:10

## 2019-10-31 RX ADMIN — IOHEXOL 100 ML: 350 INJECTION, SOLUTION INTRAVENOUS at 01:10

## 2019-10-31 RX ADMIN — HYDROMORPHONE HYDROCHLORIDE 0.25 MG: 1 INJECTION, SOLUTION INTRAMUSCULAR; INTRAVENOUS; SUBCUTANEOUS at 01:10

## 2019-10-31 RX ADMIN — NICARDIPINE HYDROCHLORIDE 2.5 MG/HR: 0.2 INJECTION INTRAVENOUS at 12:10

## 2019-10-31 NOTE — ED NOTES
Pt okayed to let friend and family know. Number to brandyn wrong but other number provided for friend noman [phone] aware and will notify family. Pt made aware.

## 2019-10-31 NOTE — HPI
The patient is a 73 y/o AAF with HTN and DM, Who is being evaluated by vascular neurology as an acute transfer from [facility] for left MCA syndrome.       Patient reports chronic RSW that started 1 month however she never sought medical attention. She woke up this morning at her baseline, however later around 7:30 am she developed sudden worsening in right-sided weakness. She was taken to OSH where she was evaluated by tele stroke, NIH 9 at the time, initial CTH without intracranial bleed, and she deemed a candidate for thrombolytic therapy. tPA bolus was given at 10:26 and infusion started at 10:27. She was transferred to [facility] for CTA-MP and close monitoring in [facility]. She reports some improvement in RUE weakness but not the leg. She reports compliance with her medication.

## 2019-10-31 NOTE — ASSESSMENT & PLAN NOTE
stroke risk factor, poorly controlled on glargine 17 units daily  HbA1c 8.3    - diabetic diet  - MD SSI while inpatient   - management per [facility]   Equal and normal pulses (carotid, femoral, dorsalis pedis)

## 2019-10-31 NOTE — SUBJECTIVE & OBJECTIVE
Woke up with symptoms?: no    Recent bleeding noted: no  Does the patient take any Blood Thinners? no  Medications: No relevant medications      Past Medical History: hypertension, diabetes and hyperlipidemia    Past Surgical History: no major surgeries within the last 2 weeks    Family History: no relevant history    Social History: no smoking, no drinking, no drugs    Allergies: Morphine  Latex, Natural Rubber     Review of Systems   Neurological: Positive for facial asymmetry, speech difficulty and weakness.   All other systems reviewed and are negative.    Objective:   Vitals: Blood pressure (!) 192/84, pulse 64, temperature 99.4 °F (37.4 °C), temperature source Oral, resp. rate 16, height 5' (1.524 m), weight 49.5 kg (109 lb 2 oz), SpO2 100 %.     CT READ: Yes  No hemmorhage. No mass effect. No early infarct signs.     Physical Exam   Constitutional: She is oriented to person, place, and time. She appears well-developed and well-nourished.   HENT:   Head: Normocephalic and atraumatic.   Eyes: Pupils are equal, round, and reactive to light. EOM are normal.   Cardiovascular: Normal rate and regular rhythm.   Pulmonary/Chest: Effort normal.   Neurological: She is alert and oriented to person, place, and time. A cranial nerve deficit is present.   Vitals reviewed.

## 2019-10-31 NOTE — SUBJECTIVE & OBJECTIVE
Past Medical History:   Diagnosis Date    Anxiety     Back pain     chronic    Diabetes mellitus     Gastroparesis     Hypertension     Sciatica      History reviewed. No pertinent surgical history.  Family History   Problem Relation Age of Onset    Hyperlipidemia Father     Hypertension Mother      Social History     Tobacco Use    Smoking status: Never Smoker    Smokeless tobacco: Never Used   Substance Use Topics    Alcohol use: No     Alcohol/week: 0.0 standard drinks    Drug use: No     Review of patient's allergies indicates:   Allergen Reactions    Morphine Other (See Comments)     headache    Latex, natural rubber Rash       Medications: I have reviewed the current medication administration record.      (Not in a hospital admission)    Review of Systems   Constitutional: Negative for chills, diaphoresis and fever.   HENT: Negative for drooling and trouble swallowing.    Eyes: Negative for photophobia and visual disturbance.   Respiratory: Negative for choking and shortness of breath.    Cardiovascular: Negative for chest pain and palpitations.   Gastrointestinal: Negative for nausea and vomiting.   Musculoskeletal: Positive for back pain.   Allergic/Immunologic: Negative for immunocompromised state.   Neurological: Positive for facial asymmetry, weakness and numbness. Negative for seizures, speech difficulty and headaches.   Psychiatric/Behavioral: Negative for agitation and confusion.     Objective:     Vital Signs (Most Recent):  Temp: 98.1 °F (36.7 °C) (10/31/19 1224)  Pulse: 81 (10/31/19 1433)  Resp: 18 (10/31/19 1433)  BP: 135/61 (10/31/19 1433)  SpO2: 98 % (10/31/19 1433)    Vital Signs Range (Last 24H):  Temp:  [98.1 °F (36.7 °C)-99.4 °F (37.4 °C)]   Pulse:  [58-81]   Resp:  [16-32]   BP: (134-207)/()   SpO2:  [97 %-100 %]     Physical Exam   Constitutional: She appears well-developed and well-nourished. She is cooperative. She does not appear ill. No distress.   HENT:   Head:  Normocephalic.   Eyes: Pupils are equal, round, and reactive to light.   Neck: Normal range of motion.   Cardiovascular: Normal rate.   Pulmonary/Chest: Effort normal. No respiratory distress.   Abdominal: Soft. She exhibits no distension.   Musculoskeletal: Normal range of motion. She exhibits no edema.   Neurological: She is alert. She displays no seizure activity.   Skin: Skin is warm. She is not diaphoretic. No erythema.   Psychiatric: She has a normal mood and affect. Her speech is normal and behavior is normal.   Vitals reviewed.      Neurological Exam:   LOC: alert  Attention Span: Good   Language: No aphasia, some anomia (unclear if baseline or poor knowledge)  Articulation: No dysarthria  Orientation: Person, Place, Time   Visual Fields: Superior quadrantanopsia: right  EOM (CN III, IV, VI): Gaze preference  left and can overcome midline  Pupils (CN II, III): PERRL  Facial Sensation (CN V): Normal  Facial Movement (CN VII): Lower facial weakness on the Right  Motor: Arm left  Normal 5/5  Leg left  Normal 5/5  Arm right  Paresis: 3/5  Leg right Paresis: 2/5  Cebellar: slight end point tremor due to weakness  Sensation: Andrews-hypoesthesia right  Tone: Normal tone throughout      Laboratory:  CMP:   Recent Labs   Lab 10/31/19  1015   CALCIUM 10.2   ALBUMIN 4.2   PROT 8.4      K 3.4*   CO2 23      BUN 18   CREATININE 1.2   ALKPHOS 120   ALT 22   AST 23   BILITOT 0.4     CBC:   Recent Labs   Lab 10/31/19  1015 10/31/19  1239   WBC 9.08  --    RBC 4.72  --    HGB 12.2  --    HCT 38.4 39     --    MCV 81*  --    MCH 25.8*  --    MCHC 31.8*  --      Lipid Panel: No results for input(s): CHOL, LDLCALC, HDL, TRIG in the last 168 hours.  Coagulation:   Recent Labs   Lab 10/31/19  1015   INR 1.0   APTT 26.4     Hgb A1C: No results for input(s): HGBA1C in the last 168 hours.  TSH: No results for input(s): TSH in the last 168 hours.    Diagnostic Results:    Brain imaging:  CTH (10/31/19):  No acute  intracranial pathology    Vessel Imaging:  CTA-MP (10/31/19):  Atherosclerotic disease of the cavernous and supraclinoid ICAs with mild narrowing. No high-grade stenosis or LVO. Less than 50% proximal ICA stenosis. mild-moderate narrowing of the right vertebral artery origin. There is mild left V1 segment narrowing.  No significant focal vertebral artery stenosis or occlusion.    Cardiac Evaluation:   ECG (10/31/2019):  Normal sinus rhythm and rate !80  Normal axis  No ZEUS ot TWI  QTc 444

## 2019-10-31 NOTE — ASSESSMENT & PLAN NOTE
Stroke risk factor, sBP 180-200 upon arrival to [facility]  At home on benazepril 40 daily + bisoprolol-HCTZ 10-6.25 daily

## 2019-10-31 NOTE — ED PROVIDER NOTES
"Encounter Date: 10/31/2019       History     Chief Complaint   Patient presents with    Transfer     arrived via [facility] from [facility], tpa bolus 1026 4mg, infusion 40mg 1027     The pt is a 73 yo F with IDDM and HTN that presents as a transfer for a stroke code. She reports that she has been having right sided weakness most notably in the upper extremities since the end of September- noted that her already terrible hand writing was nearly illegible since then. She woke up this morning with a marked difference in her strength- stating that she was unable to get out of bed and that she had marked increase in her weakness across her entire right body. Also reporting diminished sensation on the right sense- described as "numbness".         Review of patient's allergies indicates:   Allergen Reactions    Morphine Other (See Comments)     headache    Latex, natural rubber Rash     Past Medical History:   Diagnosis Date    Anxiety     Back pain     chronic    Diabetes mellitus     Gastroparesis     Hypertension     Sciatica      History reviewed. No pertinent surgical history.  Family History   Problem Relation Age of Onset    Hyperlipidemia Father     Hypertension Mother      Social History     Tobacco Use    Smoking status: Never Smoker    Smokeless tobacco: Never Used   Substance Use Topics    Alcohol use: No     Alcohol/week: 0.0 standard drinks    Drug use: No     Review of Systems   Constitutional: Negative for chills and fever.   HENT: Negative for congestion and drooling.    Eyes: Positive for visual disturbance (not more so than her baseline). Negative for photophobia.   Respiratory: Negative for cough and shortness of breath.    Cardiovascular: Negative for chest pain and palpitations.   Gastrointestinal: Negative for nausea and vomiting.   Musculoskeletal: Positive for back pain and gait problem (chronic, does not ambulate independently).   Skin: Negative for pallor and rash. "   Neurological: Positive for weakness (right sided) and numbness (right sided).       Physical Exam     Initial Vitals   BP Pulse Resp Temp SpO2   10/31/19 1226 10/31/19 1224 10/31/19 1224 -- 10/31/19 1224   (!) 180/87 61 18  99 %      MAP       --                Physical Exam    Constitutional: She appears well-developed and well-nourished. She is not diaphoretic. No distress.   HENT:   Head: Normocephalic and atraumatic.   Right Ear: External ear normal.   Left Ear: External ear normal.   Nose: Nose normal.   Arcus senilis bilaterally  Facial droop on the right   Eyes: EOM are normal. Pupils are equal, round, and reactive to light. No scleral icterus.   Neck: No tracheal deviation present.   Cardiovascular: Normal rate, regular rhythm, normal heart sounds and intact distal pulses. Exam reveals no gallop.    No murmur heard.  Pulmonary/Chest: Breath sounds normal. No stridor. She has no wheezes. She has no rales.   Abdominal: Soft. Bowel sounds are normal. She exhibits no distension. There is no guarding.   Musculoskeletal: She exhibits no edema or tenderness.   Neurological: She is alert and oriented to person, place, and time. A sensory deficit is present. No cranial nerve deficit. GCS score is 15. GCS eye subscore is 4. GCS verbal subscore is 5. GCS motor subscore is 6.   5/5 strength to LUE and LLE  2/5 strength to RUE and RLE  Diminished sensation on the right upper and lower extremity as compared to the left  V1 and V2 sensation equal bilaterally, V3 diminished on the right     Skin: Skin is warm and dry. Capillary refill takes less than 2 seconds. No rash noted. No erythema.   Psychiatric: She has a normal mood and affect. Thought content normal.         ED Course   Procedures  Labs Reviewed   POCT GLUCOSE - Abnormal; Notable for the following components:       Result Value    POCT Glucose 152 (*)     All other components within normal limits   ISTAT PROCEDURE - Abnormal; Notable for the following  components:    POC Glucose 163 (*)     POC Potassium 3.4 (*)     All other components within normal limits   ISTAT PROCEDURE   ISTAT CREATININE          Imaging Results          CTA STROKE MULTI-PHASE (Final result)  Result time 10/31/19 13:30:51    Final result by Cristofer Hansen DO (10/31/19 13:30:51)                 Impression:      CTA head: Atherosclerotic disease of the cavernous and supraclinoid ICAs with mild narrowing.  Otherwise unremarkable CTA of the head specifically without evidence for proximal significant stenosis or occlusion.    CTA neck: Less than 50% proximal ICA stenosis by NASCET criteria.    Atherosclerotic disease with mild moderate narrowing of the right vertebral artery origin.  There is mild left V1 segment narrowing.  No significant focal vertebral artery stenosis or occlusion.    Interlobular septal thickening with tree in bud micro nodularity visualized upper lobes bilaterally which may represent inflammatory/infectious process clinical correlation and correlation with dedicated CT thorax recommended.    CT head: Bandlike region hypoattenuation left posterior limb internal capsule unchanged from prior suggestive for possible recent to subacute age infarction.  No evidence for hydrocephalus or acute intracranial hemorrhage.  Clinical correlation and further evaluation with MRI if patient compatible.    Please see above for additional details.      Electronically signed by: Cristofer Hansen DO  Date:    10/31/2019  Time:    13:30             Narrative:    EXAMINATION:  CTA STROKE MULTI-PHASE    CLINICAL HISTORY:  Stroke;    TECHNIQUE:  5 mm axial images of the head pre contrast with 0.625 mm axial CTA images of the head neck postcontrast.  Coronal and sagittal MPR and MIP imaging was performed 100 ml of Omnipaque 350 contrast was injected intravenously    COMPARISON:  CT outside institution earlier today 10/31/2019 at 09:32    FINDINGS:  CT head  without contrast: There is no evidence for  acute intracranial hemorrhage or sulcal effacement.  There is slight heterogeneous hypoattenuation and fullness along the left posterior limb of the internal capsule similar to prior earlier today cannot exclude recent to subacute aged area of infarction.  There is no evidence for midline shift or significant mass effect.  The ventricles are stable in size and configuration without evidence for hydrocephalus.  There is no midline shift or mass effect.  The visualized paranasal sinuses and mastoid air cells are clear.    CTA head:    Anterior circulation: The bilateral distal cervical, petrous, cavernous, and supraclinoid segments of the ICAs are patent without significant focal stenosis or aneurysm.  There is scattered atherosclerotic plaquing in the cavernous and supraclinoid ICAs with mild narrowing.    Anterior and middle cerebral arteries are patent allowing for slight motion limitation no focal high-grade stenosis or proximal occlusion.    Posterior circulation: The distal vertebral arteries, basilar artery and posterior cerebral arteries are patent without focal stenosis or aneurysm.    CTA neck: Atherosclerotic plaquing of the arch.  There is no significant stenosis origin of the great vessels from the arch which are somewhat tortuous.    Atherosclerotic plaquing origin of the right vertebral artery with mild moderate narrowing.  There is tortuosity of the left vertebral artery V1 segment with atherosclerotic plaquing and mild narrowing.  Otherwise the vertebral arteries are patent throughout their course without significant focal stenosis..    Right carotid: The right common carotid artery, carotid bifurcation and extracranial portions of the internal carotid artery are patent without significant focal stenosis.    Left carotid: The left common carotid artery, carotid bifurcation and extracranial portions of the internal carotid arteries are patent without significant focal stenosis.    Less than 50%  proximal ICA stenosis by NASCET criteria.  There is medialized course of the internal carotid arteries within the retropharyngeal soft tissues greater on the left.    Pharynx/larynx: Nonspecific prominence of the palatine tonsils with superimposed medialized left carotid artery.  Study somewhat limited by patient motion.  Asymmetry of the piriform sinuses the left of which is not opacified the right is prominent.  There is questionable medialized right vocal cord concerning for possible right vocal cord paralysis/paresis.  Clinical correlation advised.    Oral cavity and the buccal space     there is multiple periapical cyst associated with the maxillary dentition with numerous dental caries throughout the maxillary mandibular dentition.  Clinical correlation advised    Glands: Bilateral parotid and submandibular glands are within normal limits. Subcentimeter hypodensity right lobe of the thyroid overall indeterminate.    No evidence for adenopathy throughout the neck by size criteria.    No evidence for acute fracture or subluxation visualized spine.    No focal lung consolidation.  There is interlobular septal thickening and multiple tree in bud micro nodules within the upper lobes bilaterally which may represent inflammatory/infectious process clinical correlation and follow-up dedicated CT thorax recommended.    Case discussed with Dr. Amaro on 10/31/2019 at 13:21                                 Medical Decision Making:   Initial Assessment:   The pt is a 73 yo F with T2DM and HTN that presents for stroke code 2/2 new onset right sided weakness and numbness.   Differential Diagnosis:   DDX includes but is not limited to:  Ischemic Stroke  Hemorrhagic stroke (r/o with CT head)          APC / Resident Notes:   Patient to be admitted to neuro critical care for further evaluation                  Clinical Impression:       ICD-10-CM ICD-9-CM   1. Stroke I63.9 434.91                                Jony Cummings  DO  Resident  10/31/19 [id number]

## 2019-10-31 NOTE — ASSESSMENT & PLAN NOTE
Patient with multiple risk factors for stroke and a recent-onset RSW x1 month, p/w acute worsening of RSW at 7:30 am (10/31). NIH 9 on tele stroke, received tPA at OSH and transferred to [facility]. CTA-MP w/o LVO. Etiology unclear as of now. Patient will be admitted to NCC for close monitoring post tPA.    Antithrombotics for secondary stroke prevention: Antiplatelets: None: Hold all Antithrombotics x 24 hours after IV t-PA administration  Statins for secondary stroke prevention and hyperlipidemia, if present: Statins: Atorvastatin- 40 mg daily,   Aggressive risk factor modification: HTN, HLD, Diet     Rehab efforts: The patient has been evaluated by a stroke team provider and the therapy needs have been fully considered based off the presenting complaints and exam findings. The following therapy evaluations are needed: PT evaluate and treat, OT evaluate and treat, SLP evaluate and treat, PM&R evaluate for appropriate placement    Diagnostics ordered/pending: MRI head without contrast to assess brain parenchyma, TTE to assess cardiac function/status   VTE prophylaxis: None: Reason for No Pharmacological VTE Prophylaxis: Mechanical prophylaxis: Place SCDs, post tPA  BP parameters: Infarct: Post tPA, SBP <180

## 2019-10-31 NOTE — ED NOTES
Patient complains of right sided weakness. Patient states that  She has had some weakness since September, however she woke up this morning at approx 0630, and could not move her right side at 0730.    Level of Consciousness: Patient is awake, alert, and oriented to person, place, time, and situation. Speech is clear.   HEENT: PERRLA, moist mucous membranes, no congestion/drainage, no JVD, pt has right sided facial droop, more noticeable with smile.  Appearance: Patient resting comfortably in bed, hygiene and clothing are both intact and appropriate.   Skin: Skin is warm, dry, and intact. Skin is of normal color, free of any skin breakdown. Mucus membranes pink and moist.   Musculoskeletal: Patient can move all extremities, however right sided weakness exist, pt has right facial droop, cannot hold right arm up over gravity for 10 seconds without drift. Right leg weakness exist, When patient was stood up to weigh, pt needed 3 person assistance for safety, pt had difficulty standing.  Respiratory: Airway open and patent. Respirations equal and unlabored. Lung sounds clear upon auscultation. Patient denies any SOB.   Cardiac: Regular rate and rhythm. No peripheral edema noted to bilateral lower extremities. Denies any chest pain or discomfort.   GI: Abdomen soft, non-tender. Bowel sounds present and active in all quadrants x 4. No distention noted. Denies any nausea or vomiting.   : Patient stating she has to urinate, however pt unable to produce urine when on bedpan.  Neurological: Normal sensation reported to all extremities with sensation test. Patient has right sided facial droop more prominent with smile, pt unable to look fully to right with eye test.   Psychosocial: Patient is calm and cooperative, appropriate to situation.      Patient informed of plan of care, verbalizes understanding, and has no questions or concern at this time. Bed is in the lowest position and locked. Call bell within reach of the  patient. Will continue to monitor.

## 2019-10-31 NOTE — HPI
"The pt is a 75 yo F with IDDM and HTN that presents as a transfer for a stroke code. She reports that she has been having right sided weakness most notably in the upper extremities since the end of September- noted that her already terrible hand writing was nearly illegible since then. She woke up this morning with a marked difference in her strength- stating that she was unable to get out of bed and that she had marked increase in her weakness across her entire right body. Also reporting diminished sensation on the right sense- described as "numbness". Symptoms initially began around 6 am and patient was initially admitted to [place] where CT head showed no bleed so TPA was administered at 1030. She was subsequently transferred [place] [facility] [place]A and CT head were done showing bandlike region hypoattenuation left posterior limb internal capsule unchanged from prior suggestive for possible recent to subacute age infarction. Patient is currently stable endorsing a minor im  "

## 2019-10-31 NOTE — CONSULTS
[facility]  Vascular Neurology  Comprehensive Stroke Center  Tele-Consultation Note      Inpatient consult to Telemedicine-Stroke  Consult performed by: Urmila Caceres MD  Consult ordered by: Raghu Mccord MD  Reason for consult: Stroke due to embolism of left middle cerebral artery          Consulting Provider: RAGHU MCCORD  Current Providers  No providers found    Patient Location:  [facility] EMERGENCY DEPARTMENT Emergency Department  Spoke hospital nurse at bedside with patient assisting consultant.     Patient information was obtained from patient.         Assessment/Plan:     STROKE DOCUMENTATION     Acute Stroke Times:   Acute Stroke Times   Last Known Normal Date: 10/31/19  Last Known Normal Time: 0630  Symptom Onset Date: 10/31/19  Symptom Onset Time: 0730  Stroke Team Called Date: 10/31/19  Stroke Team Called Time: 0936  Stroke Team Arrival Date: 10/31/19  Stroke Team Arrival Time: 0946  CT Interpretation Time: 0946  Decision to Treat Time for Alteplase: 1003    NIH Scale:  Interval: baseline  1a. Level of Consciousness: 0-->Alert, keenly responsive  1b. LOC Questions: 0-->Answers both questions correctly  1c. LOC Commands: 0-->Performs both tasks correctly  2. Best Gaze: 1-->Partial gaze palsy, gaze is abnormal in one or both eyes, but forced deviation or total gaze paresis is not present  3. Visual: 0-->No visual loss  4. Facial Palsy: 2-->Partial paralysis (total or near-total paralysis of lower face)  5a. Motor Arm, Left: 0-->No drift, limb holds 90 (or 45) degrees for full 10 secs  5b. Motor Arm, Right: 2-->Some effort against gravity, limb cannot get to or maintain (if cued) 90 (or 45) degrees, drifts down to bed, but has some effort against gravity  6a. Motor Leg, Left: 0-->No drift, leg holds 30 degree position for full 5 secs  6b. Motor Leg, Right: 2-->Some effort against gravity, leg falls to bed by 5 secs, but has some effort against gravity  7.  Limb Ataxia: 1-->Present in one limb  8. Sensory: 0-->Normal, no sensory loss  9. Best Language: 1-->Mild-to-moderate aphasia, some obvious loss of fluency or facility of comprehension, without significant limitation on ideas expressed or form of expression. Reduction of speech and/or comprehension, however, makes conversation. . . (see row details)  10. Dysarthria: 0-->Normal  11. Extinction and Inattention (formerly Neglect): 0-->No abnormality  Total (NIH Stroke Scale): 9     Modified [place] Score: 0  Dillon Coma Scale:    ABCD2 Score:    [id number] Score:   HAS -BLED Score:   ICH Score:   Hunt & Amaya Classification:       Diagnoses:   Stroke due to embolism of left middle cerebral artery  Stroke due to embolism of left middle cerebral artery  Antithrombotics for secondary stroke prevention: Antiplatelets: None: Hold all Antithrombotics x 24 hours after IV t-PA administration    Statins for secondary stroke prevention and hyperlipidemia, if present:   Statins: Atorvastatin- 80 mg daily    Aggressive risk factor modification: HTN, DM     Rehab efforts: The patient has been evaluated by a stroke team provider and the therapy needs have been fully considered based off the presenting complaints and exam findings. The following therapy evaluations are needed: PT evaluate and treat, OT evaluate and treat, SLP evaluate and treat    Diagnostics ordered/pending: CTA Head to assess vasculature , CTA Neck/Arch to assess vasculature, HgbA1C to assess blood glucose levels, Lipid Profile to assess cholesterol levels, MRI head without contrast to assess brain parenchyma, TTE to assess cardiac function/status     VTE prophylaxis: None: Reason for No Pharmacological VTE Prophylaxis: Mechanical prophylaxis: Place SCDs    BP parameters: Infarct: Post tPA, SBP <180            Blood pressure (!) 192/84, pulse 64, temperature 99.4 °F (37.4 °C), temperature source Oral, resp. rate 16, height 5' (1.524 m), weight 49.5 kg (109 lb 2  oz), SpO2 100 %.  Alteplase Eligible?: Yes  Alteplase Recommendation:   Alteplase Total Dose:   Total dose: Alteplase 0.9mg/kg (max dose:90mg)                      ** based on acquired weight from facility   Bolus Dose:   10% of total Alteplase dose given intravenously over 1 minute   Continuous Infusion Dose:   Remaining 90% of total Alteplase dose infused intravenously over 60 minutes    **infusion must start at the same time as the bolus dose     Additional Recommendations:   1. Neurological assessment and vital signs (except temperature) every 15 minutes during Altaplase infusion.  2. Frequency of BP assessments may need to be increased if systolic BP stays >= 180 mm Hg or diastolic BP stays >= 105 mm Hg. Administer antihypertensive meds as ordered  3. Continue to monitor and control blood pressure and monitor for neurological deterioration every 15 minutes for the first hour after the infusion is stopped. Then every 30 minutes for the next 6 hours. Perform hourly monitoring from the 8th post-infusion hour until 24 hours post-infusion.  4. Temperature every 4 hours or as required.  5. Follow hospital protocol for further orders re: post tPA infusion patient management.  6. No antithrombotics or anticoagulants (including but not limited to: heparin, warfarin, aspirin, clopidigrel, or dipyridamole) for 24 hours, then start antithrombotics as ordered by treating physician    Adapted from the American Heart Association/American Stroke Association (AHA/ASA) and American Association of Neuroscience Nurses (AANN) Guidelines.   Possible Interventional Revascularization Candidate? Yes    Disposition Recommendation: transfer to [facility] by  air  stat    Subjective:     History of Present Illness:  73 y/o female with history of right sided paresis since 9/2019 but stable until 730 today when she had worsening of same.       Woke up with symptoms?: no    Recent bleeding noted: no  Does the patient take any Blood  Thinners? no  Medications: No relevant medications      Past Medical History: hypertension, diabetes and hyperlipidemia    Past Surgical History: no major surgeries within the last 2 weeks    Family History: no relevant history    Social History: no smoking, no drinking, no drugs    Allergies: Morphine  Latex, Natural Rubber     Review of Systems   Neurological: Positive for facial asymmetry, speech difficulty and weakness.   All other systems reviewed and are negative.    Objective:   Vitals: Blood pressure (!) 192/84, pulse 64, temperature 99.4 °F (37.4 °C), temperature source Oral, resp. rate 16, height 5' (1.524 m), weight 49.5 kg (109 lb 2 oz), SpO2 100 %.     CT READ: Yes  No hemmorhage. No mass effect. No early infarct signs.     Physical Exam   Constitutional: She is oriented to person, place, and time. She appears well-developed and well-nourished.   HENT:   Head: Normocephalic and atraumatic.   Eyes: Pupils are equal, round, and reactive to light. EOM are normal.   Cardiovascular: Normal rate and regular rhythm.   Pulmonary/Chest: Effort normal.   Neurological: She is alert and oriented to person, place, and time. A cranial nerve deficit is present.   Vitals reviewed.            Recommended the emergency room physician to have a brief discussion with the patient and/or family if available regarding the risks and benefits of treatment, and to briefly document the occurrence of that discussion in his clinical encounter note.     The attending portion of this evaluation, treatment, and documentation was performed per Urmila Caceres MD via audiovisual.    Billing code:  (moderate to severe stroke, large areas of edema, some mimics)    · This patient has a critical neurological condition/illness, with high morbidity and mortality.  · There is a high probability for acute neurological change leading to clinical and possibly life-threatening deterioration requiring highest level of physician preparedness  for urgent intervention.  · Care was coordinated with other physicians involved in the patient's care.  · Radiologic studies and laboratory data were reviewed and interpreted, and plan of care was re-assessed based on the results.  · Diagnosis, treatment options and prognosis may have been discussed with the patient and/or family members or caregiver.  · Further advanced medical management and further evaluation is warranted for his care.      In your opinion, this was a: Tier 1 Van Positive    Consult End Time: 10:19 AM     Urmila Caceres MD  [facility]  Vascular Neurology   [facility]

## 2019-10-31 NOTE — ASSESSMENT & PLAN NOTE
New onset R sided plegia presenting at 6am, s/p tpa at [place] @ 10:30, CTA head and neck showed no acute infarct or hemorrhage  Plan  -s/p tpa  -bp <180  -repeat MRI 24 hours from time of tpa

## 2019-10-31 NOTE — ED NOTES
Patient in CT scan on cardiac monitor with RN x 2. Patient A&Ox4 and following commands. Neuro at bedside.

## 2019-10-31 NOTE — ED NOTES
leoncio with c called and pt accepted to [facility] on [address]. Accepting md nathaniel hinds ed. Call report [phone].

## 2019-10-31 NOTE — ED TRIAGE NOTES
Patient presents as stroke tx from [facility]. Patient LKN 0730. Patient with right side weakness, right side facial droop, and left side gaze preference. Patient with TPA administration PTA. Patient arrives A&Ox4 and following commands.

## 2019-10-31 NOTE — HPI
73 y/o female with history of right sided paresis since 9/2019 but stable until 730 today when she had worsening of same.

## 2019-10-31 NOTE — H&P
"[facility]  Neurocritical Care  History & Physical    Admit Date: 10/31/2019  Service Date: 10/31/2019  Length of Stay: 0    Subjective:     Chief Complaint: Stroke due to embolism of left middle cerebral artery    History of Present Illness: The pt is a 75 yo F with IDDM and HTN that presents as a transfer for a stroke code. She reports that she has been having right sided weakness most notably in the upper extremities since the end of September- noted that her already terrible hand writing was nearly illegible since then. She woke up this morning with a marked difference in her strength- stating that she was unable to get out of bed and that she had marked increase in her weakness across her entire right body. Also reporting diminished sensation on the right sense- described as "numbness".  Symptoms initially began around 6 am and patient was initially admitted to [facility] where CT head showed no bleed so TPA was administered at 1030. She was subsequently transferred to [facility] where CTA and CT head were done showing bandlike region hypoattenuation left posterior limb internal capsule unchanged from prior suggestive for possible recent to subacute age infarction. Patient is currently stable endorsing a minor im    Past Medical History:   Diagnosis Date    Anxiety     Back pain     chronic    Diabetes mellitus     Gastroparesis     Hypertension     Sciatica      History reviewed. No pertinent surgical history.   Current Facility-Administered Medications on File Prior to Encounter   Medication Dose Route Frequency Provider Last Rate Last Dose    [COMPLETED] alteplase (ACTIVASE) 100 mg injection 40 mg  0.81 mg/kg Intravenous ED 1 Time Raghu Mccord MD 40 mL/hr at 10/31/19 1027 40 mg at 10/31/19 1027    [COMPLETED] ALTEPLASE IV BOLUS bolus from vial 4 mg  0.09 mg/kg Intravenous ED 1 Time Raghu Mccord MD   4 mg at 10/31/19 1026     Current Outpatient Medications on File Prior to " Encounter   Medication Sig Dispense Refill    benazepril (LOTENSIN) 40 MG tablet Take 40 mg by mouth once daily.      bisoprolol-hydrochlorothiazide (ZIAC) 10-6.25 mg per tablet Take 1 tablet by mouth once daily.      ergocalciferol (VITAMIN D2) 50,000 unit Cap Take 50,000 Units by mouth every 7 days.      fluconazole (DIFLUCAN) 150 MG Tab Take one tablet AFTER completing full course of antibiotics. 1 tablet 0    gabapentin (NEURONTIN) 600 MG tablet Take 600 mg by mouth 3 (three) times daily.      HYDROcodone-acetaminophen (NORCO)  mg per tablet Take 1 tablet by mouth.      insulin aspart (NOVOLOG) 100 unit/mL injection Inject into the skin 3 (three) times daily after meals. Patient reports a sliding scale      INSULIN GLARGINE,HUM.REC.ANLOG (TOUJEO SOLOSTAR SUBQ) Inject 17 Units/day into the skin.      mupirocin (BACTROBAN) 2 % ointment Apply topically to affected area three times daily. 22 g 0    pantoprazole (PROTONIX) 40 MG tablet Take 1 tablet (40 mg total) by mouth once daily. 30 tablet 0    promethazine (PHENERGAN) 25 MG suppository Place 1 suppository (25 mg total) rectally every 6 (six) hours as needed for Nausea. 10 suppository 0    tiZANidine (ZANAFLEX) 4 MG tablet Take 4 mg by mouth 3 (three) times daily as needed.       TRUE METRIX GLUCOSE METER Misc       TRUE METRIX GLUCOSE TEST STRIP Strp       TRUEPLUS LANCETS 33 gauge Misc         Allergies: Morphine and Latex, natural rubber    Family History   Problem Relation Age of Onset    Hyperlipidemia Father     Hypertension Mother      Social History     Tobacco Use    Smoking status: Never Smoker    Smokeless tobacco: Never Used   Substance Use Topics    Alcohol use: No     Alcohol/week: 0.0 standard drinks    Drug use: No     Review of Systems   Constitutional: Positive for activity change.   HENT: Negative for ear pain.    Eyes: Negative for discharge.   Respiratory: Negative for cough and shortness of breath.     Cardiovascular: Negative for chest pain.   Gastrointestinal: Negative for abdominal distention and abdominal pain.   Genitourinary: Negative for dysuria.   Neurological: Positive for numbness. Negative for dizziness and facial asymmetry.     Objective:     Vitals:    Temp: 98.1 °F (36.7 °C)  Pulse: 80  BP: 135/63  MAP (mmHg): 90  Resp: 12  SpO2: 96 %  O2 Device (Oxygen Therapy): room air    Temp  Min: 98.1 °F (36.7 °C)  Max: 99.4 °F (37.4 °C)  Pulse  Min: 58  Max: 84  BP  Min: 130/61  Max: 207/99  MAP (mmHg)  Min: 88  Max: 132  Resp  Min: 12  Max: 32  SpO2  Min: 96 %  Max: 100 %    No intake/output data recorded.           Physical Exam   Constitutional: She is oriented to person, place, and time. She appears well-developed and well-nourished.   HENT:   Head: Normocephalic and atraumatic.   Eyes: Pupils are equal, round, and reactive to light.   Cardiovascular: Normal rate.   Pulmonary/Chest: Effort normal and breath sounds normal.   Neurological: She is alert and oriented to person, place, and time.   2/5 R sided Upper and lower extremity with decreased sensation on right side, baseline strength on left side, no facial weakness   Skin: Skin is warm and dry.           Assessment/Plan:     Neuro  * Stroke due to embolism of left middle cerebral artery  New onset R sided plegia presenting at 6am, s/p tpa at [place] @ 10:30, CTA head and neck showed no acute infarct or hemorrhage  Plan  -s/p tpa  -bp <180  -repeat MRI 24 hours from time of tpa    Cardiac/Vascular  Essential hypertension  Permissive HTN <180 in the setting of ischemic stroke  -cardene drip for pressure control    Endocrine  Diabetes mellitus type 2 without retinopathy  Most recent a1c of 10.4  -SSI              Activity Orders          Diet NPO: NPO starting at 10/31 1350        Full Code    Traci Lamb MD  Neurocritical Care  [facility]

## 2019-10-31 NOTE — ASSESSMENT & PLAN NOTE
Stroke due to embolism of left middle cerebral artery  Antithrombotics for secondary stroke prevention: Antiplatelets: None: Hold all Antithrombotics x 24 hours after IV t-PA administration    Statins for secondary stroke prevention and hyperlipidemia, if present:   Statins: Atorvastatin- 80 mg daily    Aggressive risk factor modification: HTN, DM     Rehab efforts: The patient has been evaluated by a stroke team provider and the therapy needs have been fully considered based off the presenting complaints and exam findings. The following therapy evaluations are needed: PT evaluate and treat, OT evaluate and treat, SLP evaluate and treat    Diagnostics ordered/pending: CTA Head to assess vasculature , CTA Neck/Arch to assess vasculature, HgbA1C to assess blood glucose levels, Lipid Profile to assess cholesterol levels, MRI head without contrast to assess brain parenchyma, TTE to assess cardiac function/status     VTE prophylaxis: None: Reason for No Pharmacological VTE Prophylaxis: Mechanical prophylaxis: Place SCDs    BP parameters: Infarct: Post tPA, SBP <180

## 2019-10-31 NOTE — ED NOTES
56mg/56mL wasted from Activase bottle prior to hanging for administration, to ensure pt safety. Precious Berg RN witness.

## 2019-10-31 NOTE — ED NOTES
Immediately after code stroke called CT was notified and came to get pt. Pt placed on heart monitor and VS machine once returning from CT. Vitals were interrupted due to pt continuously asking to urinate, having to get pt up to scale, Telestroke, IV insertion and in and out cath.

## 2019-10-31 NOTE — CONSULTS
[facility]  Vascular Neurology  Comprehensive Stroke Center  Consult Note    Inpatient consult to Vascular (Stroke) Neurology  Consult performed by: Meghna Cotter MD  Consult ordered by: Traci Lamb MD  Reason for consult: L MCA infarct        Assessment/Plan:     Patient is a 74 y.o. year old female with:    * Stroke due to embolism of left middle cerebral artery  Patient with multiple risk factors for stroke and a recent-onset RSW x1 month, p/w acute worsening of RSW at 7:30 am (10/31). NIH 9 on tele stroke, received tPA at OSH and [place] [facility] CTA-MP w/o LVO. Etiology unclear as of now. Patient will be admitted to [facility] for close monitoring post tPA.    Antithrombotics for secondary stroke prevention: Antiplatelets: None: Hold all Antithrombotics x 24 hours after IV t-PA administration  Statins for secondary stroke prevention and hyperlipidemia, if present: Statins: Atorvastatin- 40 mg daily,   Aggressive risk factor modification: HTN, HLD, Diet     Rehab efforts: The patient has been evaluated by a stroke team provider and the therapy needs have been fully considered based off the presenting complaints and exam findings. The following therapy evaluations are needed: PT evaluate and treat, OT evaluate and treat, SLP evaluate and treat, PM&R evaluate for appropriate placement    Diagnostics ordered/pending: MRI head without contrast to assess brain parenchyma, TTE to assess cardiac function/status   VTE prophylaxis: None: Reason for No Pharmacological VTE Prophylaxis: Mechanical prophylaxis: Place SCDs, post tPA  BP parameters: Infarct: Post tPA, SBP <180    Essential hypertension  Stroke risk factor, sBP 180-200 upon arrival to [facility]  At home on benazepril 40 daily + bisoprolol-HCTZ 10-6.25 daily    Diabetes mellitus type 2 without retinopathy  stroke risk factor, poorly controlled on glargine 17 units daily  HbA1c 8.3    - diabetic diet  - MD JOHNSON while inpatient   - management  per [facility]      STROKE DOCUMENTATION     Acute Stroke Times   Last Known Normal Date: 10/31/19  Last Known Normal Time: 0630  Symptom Onset Date: 10/31/19  Symptom Onset Time: 0730  Stroke Team Called Date: 10/31/19  Stroke Team Called Time: 0936  Stroke Team Arrival Date: 10/31/19  Stroke Team Arrival Time: 0946  Decision to Treat Time for Alteplase: 1003    NIH Scale:  1a. Level of Consciousness: 0-->Alert, keenly responsive  1b. LOC Questions: 0-->Answers both questions correctly  1c. LOC Commands: 0-->Performs both tasks correctly  2. Best Gaze: 1-->Partial gaze palsy, gaze is abnormal in one or both eyes, but forced deviation or total gaze paresis is not present  3. Visual: 1-->Partial hemianopia  4. Facial Palsy: 2-->Partial paralysis (total or near-total paralysis of lower face)  5a. Motor Arm, Left: 0-->No drift, limb holds 90 (or 45) degrees for full 10 secs  5b. Motor Arm, Right: 1-->Drift, limb holds 90 (or 45) degrees, but drifts down before full 10 secs, does not hit bed or other support  6a. Motor Leg, Left: 0-->No drift, leg holds 30 degree position for full 5 secs  6b. Motor Leg, Right: 3-->No effort against gravity, leg falls to bed immediately  7. Limb Ataxia: 1-->Present in one limb  8. Sensory: 1-->Mild-to-moderate sensory loss, patient feels pinprick is less sharp or is dull on the affected side, or there is a loss of superficial pain with pinprick, but patient is aware of being touched  9. Best Language: 0-->No aphasia, normal  10. Dysarthria: 0-->Normal  11. Extinction and Inattention (formerly Neglect): 0-->No abnormality  Total (NIH Stroke Scale): 10    Modified [place] Score: 0  [place] Coma Scale:    ABCD2 Score:    [id number] Score:   HAS -BLED Score:   ICH Score:   Hunt & Amaya Classification:       Thrombolysis Candidate? Yes, given prior to arrival at outside hospital    Delays to Thrombolysis?  No    Interventional Revascularization Candidate?   Is the patient eligible for mechanical  endovascular reperfusion (MARTINA)?  No; No large vessel occlusion    Hemorrhagic change of an Ischemic Stroke: Does this patient have an ischemic stroke with hemorrhagic changes? No     Subjective:     History of Present Illness:  The patient is a 75 y/o AAF with HTN and DM, Who is being evaluated by vascular neurology as an acute transfer from [facility] for left MCA syndrome.       Patient reports chronic RSW that started 1 month however she never sought medical attention. She woke up this morning at her baseline, however later around 7:30 am she developed sudden worsening in right-sided weakness. She was taken to OSH where she was evaluated by tele stroke, NIH 9 at the time, initial CTH without intracranial bleed, and she deemed a candidate for thrombolytic therapy. tPA bolus was given at 10:26 and infusion started at 10:27. She was [place] [facility] [place] and close monitoring in [facility]. She reports some improvement in RUE weakness but not the leg. She reports compliance with her medication.         Past Medical History:   Diagnosis Date    Anxiety     Back pain     chronic    Diabetes mellitus     Gastroparesis     Hypertension     Sciatica      History reviewed. No pertinent surgical history.  Family History   Problem Relation Age of Onset    Hyperlipidemia Father     Hypertension Mother      Social History     Tobacco Use    Smoking status: Never Smoker    Smokeless tobacco: Never Used   Substance Use Topics    Alcohol use: No     Alcohol/week: 0.0 standard drinks    Drug use: No     Review of patient's allergies indicates:   Allergen Reactions    Morphine Other (See Comments)     headache    Latex, natural rubber Rash       Medications: I have reviewed the current medication administration record.      (Not in a hospital admission)    Review of Systems   Constitutional: Negative for chills, diaphoresis and fever.   HENT: Negative for drooling and trouble swallowing.    Eyes: Negative for  photophobia and visual disturbance.   Respiratory: Negative for choking and shortness of breath.    Cardiovascular: Negative for chest pain and palpitations.   Gastrointestinal: Negative for nausea and vomiting.   Musculoskeletal: Positive for back pain.   Allergic/Immunologic: Negative for immunocompromised state.   Neurological: Positive for facial asymmetry, weakness and numbness. Negative for seizures, speech difficulty and headaches.   Psychiatric/Behavioral: Negative for agitation and confusion.     Objective:     Vital Signs (Most Recent):  Temp: 98.1 °F (36.7 °C) (10/31/19 1224)  Pulse: 81 (10/31/19 1433)  Resp: 18 (10/31/19 1433)  BP: 135/61 (10/31/19 1433)  SpO2: 98 % (10/31/19 1433)    Vital Signs Range (Last 24H):  Temp:  [98.1 °F (36.7 °C)-99.4 °F (37.4 °C)]   Pulse:  [58-81]   Resp:  [16-32]   BP: (134-207)/()   SpO2:  [97 %-100 %]     Physical Exam   Constitutional: She appears well-developed and well-nourished. She is cooperative. She does not appear ill. No distress.   HENT:   Head: Normocephalic.   Eyes: Pupils are equal, round, and reactive to light.   Neck: Normal range of motion.   Cardiovascular: Normal rate.   Pulmonary/Chest: Effort normal. No respiratory distress.   Abdominal: Soft. She exhibits no distension.   Musculoskeletal: Normal range of motion. She exhibits no edema.   Neurological: She is alert. She displays no seizure activity.   Skin: Skin is warm. She is not diaphoretic. No erythema.   Psychiatric: She has a normal mood and affect. Her speech is normal and behavior is normal.   Vitals reviewed.      Neurological Exam:   LOC: alert  Attention Span: Good   Language: No aphasia, some anomia (unclear if baseline or poor knowledge)  Articulation: No dysarthria  Orientation: Person, Place, Time   Visual Fields: Superior quadrantanopsia: right  EOM (CN III, IV, VI): Gaze preference  left and can overcome midline  Pupils (CN II, III): PERRL  Facial Sensation (CN V): Normal  Facial  Movement (CN VII): Lower facial weakness on the Right  Motor: Arm left  Normal 5/5  Leg left  Normal 5/5  Arm right  Paresis: 3/5  Leg right Paresis: 2/5  Cebellar: slight end point tremor due to weakness  Sensation: Andrews-hypoesthesia right  Tone: Normal tone throughout      Laboratory:  CMP:   Recent Labs   Lab 10/31/19  1015   CALCIUM 10.2   ALBUMIN 4.2   PROT 8.4      K 3.4*   CO2 23      BUN 18   CREATININE 1.2   ALKPHOS 120   ALT 22   AST 23   BILITOT 0.4     CBC:   Recent Labs   Lab 10/31/19  1015 10/31/19  1239   WBC 9.08  --    RBC 4.72  --    HGB 12.2  --    HCT 38.4 39     --    MCV 81*  --    MCH 25.8*  --    MCHC 31.8*  --      Lipid Panel: No results for input(s): CHOL, LDLCALC, HDL, TRIG in the last 168 hours.  Coagulation:   Recent Labs   Lab 10/31/19  1015   INR 1.0   APTT 26.4     Hgb A1C: No results for input(s): HGBA1C in the last 168 hours.  TSH: No results for input(s): TSH in the last 168 hours.    Diagnostic Results:    Brain imaging:  CTH (10/31/19):  No acute intracranial pathology    Vessel Imaging:  CTA-MP (10/31/19):  Atherosclerotic disease of the cavernous and supraclinoid ICAs with mild narrowing. No high-grade stenosis or LVO. Less than 50% proximal ICA stenosis. mild-moderate narrowing of the right vertebral artery origin. There is mild left V1 segment narrowing.  No significant focal vertebral artery stenosis or occlusion.    Cardiac Evaluation:   ECG (10/31/2019):  Normal sinus rhythm and rate !80  Normal axis  No ZEUS ot TWI  QTc 444        Meghna Cotter MD  [facility]  Department of Vascular Neurology   [facility]

## 2019-10-31 NOTE — SUBJECTIVE & OBJECTIVE
Past Medical History:   Diagnosis Date    Anxiety     Back pain     chronic    Diabetes mellitus     Gastroparesis     Hypertension     Sciatica      History reviewed. No pertinent surgical history.   Current Facility-Administered Medications on File Prior to Encounter   Medication Dose Route Frequency Provider Last Rate Last Dose    [COMPLETED] alteplase (ACTIVASE) 100 mg injection 40 mg  0.81 mg/kg Intravenous ED 1 Time Raghu Mccord MD 40 mL/hr at 10/31/19 1027 40 mg at 10/31/19 1027    [COMPLETED] ALTEPLASE IV BOLUS bolus from vial 4 mg  0.09 mg/kg Intravenous ED 1 Time Raghu Mccord MD   4 mg at 10/31/19 1026     Current Outpatient Medications on File Prior to Encounter   Medication Sig Dispense Refill    benazepril (LOTENSIN) 40 MG tablet Take 40 mg by mouth once daily.      bisoprolol-hydrochlorothiazide (ZIAC) 10-6.25 mg per tablet Take 1 tablet by mouth once daily.      ergocalciferol (VITAMIN D2) 50,000 unit Cap Take 50,000 Units by mouth every 7 days.      fluconazole (DIFLUCAN) 150 MG Tab Take one tablet AFTER completing full course of antibiotics. 1 tablet 0    gabapentin (NEURONTIN) 600 MG tablet Take 600 mg by mouth 3 (three) times daily.      HYDROcodone-acetaminophen (NORCO)  mg per tablet Take 1 tablet by mouth.      insulin aspart (NOVOLOG) 100 unit/mL injection Inject into the skin 3 (three) times daily after meals. Patient reports a sliding scale      INSULIN GLARGINE,HUM.REC.ANLOG (TOUJEO SOLOSTAR SUBQ) Inject 17 Units/day into the skin.      mupirocin (BACTROBAN) 2 % ointment Apply topically to affected area three times daily. 22 g 0    pantoprazole (PROTONIX) 40 MG tablet Take 1 tablet (40 mg total) by mouth once daily. 30 tablet 0    promethazine (PHENERGAN) 25 MG suppository Place 1 suppository (25 mg total) rectally every 6 (six) hours as needed for Nausea. 10 suppository 0    tiZANidine (ZANAFLEX) 4 MG tablet Take 4 mg by mouth 3 (three) times daily as  needed.       TRUE METRIX GLUCOSE METER Misc       TRUE METRIX GLUCOSE TEST STRIP Strp       TRUEPLUS LANCETS 33 gauge Misc         Allergies: Morphine and Latex, natural rubber    Family History   Problem Relation Age of Onset    Hyperlipidemia Father     Hypertension Mother      Social History     Tobacco Use    Smoking status: Never Smoker    Smokeless tobacco: Never Used   Substance Use Topics    Alcohol use: No     Alcohol/week: 0.0 standard drinks    Drug use: No     Review of Systems   Constitutional: Positive for activity change.   HENT: Negative for ear pain.    Eyes: Negative for discharge.   Respiratory: Negative for cough and shortness of breath.    Cardiovascular: Negative for chest pain.   Gastrointestinal: Negative for abdominal distention and abdominal pain.   Genitourinary: Negative for dysuria.   Neurological: Positive for numbness. Negative for dizziness and facial asymmetry.     Objective:     Vitals:    Temp: 98.1 °F (36.7 °C)  Pulse: 80  BP: 135/63  MAP (mmHg): 90  Resp: 12  SpO2: 96 %  O2 Device (Oxygen Therapy): room air    Temp  Min: 98.1 °F (36.7 °C)  Max: 99.4 °F (37.4 °C)  Pulse  Min: 58  Max: 84  BP  Min: 130/61  Max: 207/99  MAP (mmHg)  Min: 88  Max: 132  Resp  Min: 12  Max: 32  SpO2  Min: 96 %  Max: 100 %    No intake/output data recorded.           Physical Exam   Constitutional: She is oriented to person, place, and time. She appears well-developed and well-nourished.   HENT:   Head: Normocephalic and atraumatic.   Eyes: Pupils are equal, round, and reactive to light.   Cardiovascular: Normal rate.   Pulmonary/Chest: Effort normal and breath sounds normal.   Neurological: She is alert and oriented to person, place, and time.   2/5 R sided Upper and lower extremity with decreased sensation on right side, baseline strength on left side, no facial weakness   Skin: Skin is warm and dry.

## 2019-10-31 NOTE — ED PROVIDER NOTES
Encounter Date: 10/31/2019       History     Chief Complaint   Patient presents with    Cerebrovascular Accident     Pt states onset x 2 weeks.      The history is provided by the patient.   Cerebrovascular Accident   The primary symptoms include focal weakness. Primary symptoms do not include headaches, loss of consciousness, visual change, loss of sensation, memory loss, fever, nausea or vomiting. The symptoms began 1 to 2 hours ago. The neurological symptoms are focal.   Weakness began 1 - 3 hours ago.   Additional symptoms do not include weakness.     Review of patient's allergies indicates:   Allergen Reactions    Morphine Other (See Comments)     headache    Latex, natural rubber Rash     Past Medical History:   Diagnosis Date    Anxiety     Back pain     chronic    Diabetes mellitus     Gastroparesis     Hypertension     Sciatica      No past surgical history on file.  Family History   Problem Relation Age of Onset    Hyperlipidemia Father     Hypertension Mother      Social History     Tobacco Use    Smoking status: Never Smoker    Smokeless tobacco: Never Used   Substance Use Topics    Alcohol use: No     Alcohol/week: 0.0 standard drinks    Drug use: No     Review of Systems   Constitutional: Negative for fever.   HENT: Negative for sore throat.    Respiratory: Negative for shortness of breath.    Cardiovascular: Negative for chest pain.   Gastrointestinal: Negative for nausea and vomiting.   Genitourinary: Negative for dysuria.   Musculoskeletal: Negative for back pain.   Skin: Negative for rash.   Neurological: Positive for focal weakness. Negative for loss of consciousness, weakness and headaches.   Hematological: Does not bruise/bleed easily.   Psychiatric/Behavioral: Negative for memory loss.       Physical Exam     Initial Vitals   BP Pulse Resp Temp SpO2   10/31/19 0951 10/31/19 0948 10/31/19 0951 10/31/19 0951 10/31/19 0951   (!) 192/84 62 16 99.4 °F (37.4 °C) 100 %      MAP       --                 Physical Exam    Nursing note and vitals reviewed.  Constitutional: She appears well-developed and well-nourished. No distress.   HENT:   Head: Normocephalic and atraumatic.   Mouth/Throat: Oropharynx is clear and moist.   Eyes: Conjunctivae and EOM are normal. Pupils are equal, round, and reactive to light.   Neck: Normal range of motion. Neck supple.   Cardiovascular: Normal rate, regular rhythm and normal heart sounds. Exam reveals no gallop and no friction rub.    No murmur heard.  Pulmonary/Chest: Breath sounds normal. No respiratory distress. She has no wheezes. She has no rhonchi. She has no rales.   Abdominal: Soft. Bowel sounds are normal. She exhibits no distension and no mass. There is no tenderness. There is no rebound and no guarding.   Musculoskeletal: Normal range of motion. She exhibits no edema or tenderness.   Neurological: She is alert and oriented to person, place, and time. She has normal strength. GCS eye subscore is 4. GCS verbal subscore is 5. GCS motor subscore is 6.   3/5 strength to RUE 5/5 to LUE.  Right sided facial droop.   Skin: Skin is warm and dry. No rash noted.   Psychiatric: She has a normal mood and affect. Thought content normal.         ED Course   Critical Care  Date/Time: 10/31/2019 10:28 AM  Performed by: Raghu Mccord MD  Authorized by: Raghu Mccord MD   Direct patient critical care time: 20 minutes  Additional history critical care time: 10 minutes  Ordering / reviewing critical care time: 10 minutes  Documentation critical care time: 12 minutes  Consulting other physicians critical care time: 5 minutes  Total critical care time (exclusive of procedural time) : 57 minutes  Critical care was necessary to treat or prevent imminent or life-threatening deterioration of the following conditions: CNS failure or compromise.        Labs Reviewed   CBC W/ AUTO DIFFERENTIAL   COMPREHENSIVE METABOLIC PANEL   URINALYSIS, REFLEX TO URINE CULTURE   B-TYPE  NATRIURETIC PEPTIDE   CK   TROPONIN I   ACETAMINOPHEN LEVEL   DRUG SCREEN PANEL, URINE EMERGENCY     EKG Readings: (Independently Interpreted)   Rhythm: Normal Sinus Rhythm. Heart Rate: 60. Ectopy: No Ectopy. Conduction: Normal. ST Segments: Normal ST Segments. T Waves: Normal. Clinical Impression: Normal Sinus Rhythm       Imaging Results          X-Ray Chest AP Portable (Final result)  Result time 10/31/19 09:45:41    Final result by Zheng Espinosa Jr., MD (10/31/19 09:45:41)                 Impression:      No acute findings.      Electronically signed by: Zheng Espinosa Jr., MD  Date:    10/31/2019  Time:    09:45             Narrative:    EXAMINATION:  XR CHEST AP PORTABLE    CLINICAL HISTORY:  weakness;    COMPARISON:  Prior radiograph from April 25, 2019.    FINDINGS:  Calcified granulomata right upper lobe.  The lungs are otherwise clear.  No pleural fluid or pneumothorax.  Heart size within normal limits.Degenerative spine change.                               CT Head Without Contrast (Final result)  Result time 10/31/19 09:44:50    Final result by Zheng Espinosa Jr., MD (10/31/19 09:44:50)                 Impression:      No acute or significant CT abnormality.    All CT scans at this facility use dose modulation, iterative reconstruction, and/or weight base dosing when appropriate to reduce radiation dose to as low as reasonably achievable.      Electronically signed by: Zheng Espinosa Jr., MD  Date:    10/31/2019  Time:    09:44             Narrative:    EXAMINATION:  CT HEAD WITHOUT CONTRAST    CLINICAL HISTORY:  Stroke;    TECHNIQUE:  Contiguous axial images were obtained from the skull base through the vertex without intravenous contrast.    COMPARISON:  None    FINDINGS:  No intracranial hemorrhage. No mass effect or midline shift. Normal parenchymal attenuation. The ventricles and sulci are normal in size and configuration. The paranasal sinuses and mastoid air cells are clear.  No concerning osseous  findings.                                     ED Vital Signs:  Vitals:    10/31/19 0948 10/31/19 0951 10/31/19 1007 10/31/19 1018   BP:  (!) 192/84  (!) 146/72   Pulse: 62 64  65   Resp:  16  20   Temp:  99.4 °F (37.4 °C)     TempSrc:  Oral     SpO2:  100%  98%   Weight:   49.5 kg (109 lb 2 oz)    Height:   5' (1.524 m)          Abnormal Lab Results:  Labs Reviewed   CBC W/ AUTO DIFFERENTIAL   COMPREHENSIVE METABOLIC PANEL   URINALYSIS, REFLEX TO URINE CULTURE   B-TYPE NATRIURETIC PEPTIDE   CK   TROPONIN I   ACETAMINOPHEN LEVEL   DRUG SCREEN PANEL, URINE EMERGENCY          All Lab Results:  Results for orders placed or performed during the hospital encounter of 05/29/19   Urinalysis, Reflex to Urine Culture Urine, Clean Catch   Result Value Ref Range    Specimen UA Urine, Catheterized     Color, UA Yellow Yellow, Straw, Tonya    Appearance, UA Clear Clear    pH, UA 6.0 5.0 - 8.0    Specific Gravity, UA >=1.030 (A) 1.005 - 1.030    Protein, UA 2+ (A) Negative    Glucose, UA 2+ (A) Negative    Ketones, UA 1+ (A) Negative    Bilirubin (UA) 1+ (A) Negative    Occult Blood UA Trace (A) Negative    Nitrite, UA Negative Negative    Urobilinogen, UA Negative <2.0 EU/dL    Leukocytes, UA Negative Negative   CBC auto differential   Result Value Ref Range    WBC 11.12 3.90 - 12.70 K/uL    RBC 4.95 4.00 - 5.40 M/uL    Hemoglobin 13.3 12.0 - 16.0 g/dL    Hematocrit 39.9 37.0 - 48.5 %    Mean Corpuscular Volume 81 (L) 82 - 98 fL    Mean Corpuscular Hemoglobin 26.9 (L) 27.0 - 31.0 pg    Mean Corpuscular Hemoglobin Conc 33.3 32.0 - 36.0 g/dL    RDW 14.4 11.5 - 14.5 %    Platelets 348 150 - 350 K/uL    MPV 11.4 9.2 - 12.9 fL    Gran # (ANC) 7.7 1.8 - 7.7 K/uL    Lymph # 2.6 1.0 - 4.8 K/uL    Mono # 0.5 0.3 - 1.0 K/uL    Eos # 0.2 0.0 - 0.5 K/uL    Baso # 0.05 0.00 - 0.20 K/uL    Gran% 69.7 38.0 - 73.0 %    Lymph% 23.3 18.0 - 48.0 %    Mono% 4.9 4.0 - 15.0 %    Eosinophil% 1.3 0.0 - 8.0 %    Basophil% 0.4 0.0 - 1.9 %    Differential  Method Automated    Comprehensive metabolic panel   Result Value Ref Range    Sodium 138 136 - 145 mmol/L    Potassium 4.1 3.5 - 5.1 mmol/L    Chloride 103 95 - 110 mmol/L    CO2 14 (L) 23 - 29 mmol/L    Glucose 250 (H) 70 - 110 mg/dL    BUN, Bld 22 8 - 23 mg/dL    Creatinine 1.8 (H) 0.5 - 1.4 mg/dL    Calcium 11.1 (H) 8.7 - 10.5 mg/dL    Total Protein 9.6 (H) 6.0 - 8.4 g/dL    Albumin 4.2 3.5 - 5.2 g/dL    Total Bilirubin 0.9 0.1 - 1.0 mg/dL    Alkaline Phosphatase 121 55 - 135 U/L    AST 32 10 - 40 U/L    ALT 40 10 - 44 U/L    Anion Gap 21 (H) 8 - 16 mmol/L    eGFR if  31.7 (A) >60 mL/min/1.73 m^2    eGFR if non African American 27.5 (A) >60 mL/min/1.73 m^2   Beta - Hydroxybutyrate, Serum   Result Value Ref Range    Beta-Hydroxybutyrate 2.6 (H) 0.0 - 0.5 mmol/L   Urinalysis Microscopic   Result Value Ref Range    RBC, UA 1 0 - 4 /hpf    WBC, UA 1 0 - 5 /hpf    WBC Clumps, UA CANCELED     Bacteria Occasional None-Occ /hpf    Squam Epithel, UA 1 /hpf    Hyaline Casts, UA 0 0-1/lpf /lpf    Amorphous, UA Occasional None-Moderate    Microscopic Comment SEE COMMENT    POCT glucose   Result Value Ref Range    POCT Glucose 252 (H) 70 - 110 mg/dL   POCT glucose   Result Value Ref Range    POCT Glucose 168 (H) 70 - 110 mg/dL           Imaging Results:  Imaging Results          X-Ray Chest AP Portable (Final result)  Result time 10/31/19 09:45:41    Final result by Zheng Espinosa Jr., MD (10/31/19 09:45:41)                 Impression:      No acute findings.      Electronically signed by: Zheng Espinosa Jr., MD  Date:    10/31/2019  Time:    09:45             Narrative:    EXAMINATION:  XR CHEST AP PORTABLE    CLINICAL HISTORY:  weakness;    COMPARISON:  Prior radiograph from April 25, 2019.    FINDINGS:  Calcified granulomata right upper lobe.  The lungs are otherwise clear.  No pleural fluid or pneumothorax.  Heart size within normal limits.Degenerative spine change.                               CT Head  Without Contrast (Final result)  Result time 10/31/19 09:44:50    Final result by Zheng Espinosa Jr., MD (10/31/19 09:44:50)                 Impression:      No acute or significant CT abnormality.    All CT scans at this facility use dose modulation, iterative reconstruction, and/or weight base dosing when appropriate to reduce radiation dose to as low as reasonably achievable.      Electronically signed by: Zheng Espinosa Jr., MD  Date:    10/31/2019  Time:    09:44             Narrative:    EXAMINATION:  CT HEAD WITHOUT CONTRAST    CLINICAL HISTORY:  Stroke;    TECHNIQUE:  Contiguous axial images were obtained from the skull base through the vertex without intravenous contrast.    COMPARISON:  None    FINDINGS:  No intracranial hemorrhage. No mass effect or midline shift. Normal parenchymal attenuation. The ventricles and sulci are normal in size and configuration. The paranasal sinuses and mastoid air cells are clear.  No concerning osseous findings.                                   The Emergency Provider reviewed the vital signs and test results, which are outlined above.    ED Discussions:  10:10 AM  Discussed with patient the recommendation from Dr. Caceres to give tPA.  Patient understands the risk of bleeding, and the benefit of treating her CVA  Patient also understands that she will be admitted to the [facility] in [place] for further management of her CVA>          All historical, clinical, radiographic, and laboratory findings were reviewed with the patient/family in detail along with the indications for transfer to an outside facility (rather than admission to our facility in [place]) secondary to patient need and a need for  Neuro critical care given the diagnosis of CVA s/p tPA.  All remaining questions and concerns were addressed at that time and the patient/family communicates understanding and agrees to proceed accordingly.  Similarly all pertinent details of the encounter were discussed with  Dr. Caceres at [facility] who agrees to accept the patient in transfer based on the needs/patient preferences outlined above.  Patient will be transferred by [facility] ambulance services secondary to a need for ongoing neuro monitoring en route.  Raghu Mccord MD  10:29 AM                     Clinical Impression:       ICD-10-CM ICD-9-CM   1. Cerebrovascular accident (CVA), unspecified mechanism I63.9 434.91   2. Weakness R53.1 780.79           Disposition:   Disposition: Transferred  Condition: Critical                        Raghu Mccord MD  10/31/19 1031

## 2019-11-01 PROBLEM — G62.9 NEUROPATHY: Status: ACTIVE | Noted: 2019-11-01

## 2019-11-01 PROBLEM — I63.9 STROKE: Status: ACTIVE | Noted: 2019-11-01

## 2019-11-01 LAB
ALBUMIN SERPL BCP-MCNC: 4.3 G/DL (ref 3.5–5.2)
ALP SERPL-CCNC: 143 U/L (ref 55–135)
ALT SERPL W/O P-5'-P-CCNC: 24 U/L (ref 10–44)
ANION GAP SERPL CALC-SCNC: 11 MMOL/L (ref 8–16)
ASCENDING AORTA: 2.45 CM
AST SERPL-CCNC: 29 U/L (ref 10–40)
AV INDEX (PROSTH): 0.74
AV MEAN GRADIENT: 6 MMHG
AV PEAK GRADIENT: 9 MMHG
AV VALVE AREA: 1.99 CM2
AV VELOCITY RATIO: 0.71
BASOPHILS # BLD AUTO: 0.09 K/UL (ref 0–0.2)
BASOPHILS NFR BLD: 1 % (ref 0–1.9)
BILIRUB SERPL-MCNC: 0.7 MG/DL (ref 0.1–1)
BSA FOR ECHO PROCEDURE: 1.45 M2
BUN SERPL-MCNC: 13 MG/DL (ref 8–23)
CALCIUM SERPL-MCNC: 10.4 MG/DL (ref 8.7–10.5)
CHLORIDE SERPL-SCNC: 103 MMOL/L (ref 95–110)
CO2 SERPL-SCNC: 22 MMOL/L (ref 23–29)
CREAT SERPL-MCNC: 0.8 MG/DL (ref 0.5–1.4)
CV ECHO LV RWT: 0.63 CM
DIFFERENTIAL METHOD: ABNORMAL
DOP CALC AO PEAK VEL: 1.54 M/S
DOP CALC AO VTI: 24.78 CM
DOP CALC LVOT AREA: 2.7 CM2
DOP CALC LVOT DIAMETER: 1.85 CM
DOP CALC LVOT PEAK VEL: 1.09 M/S
DOP CALC LVOT STROKE VOLUME: 49.35 CM3
DOP CALCLVOT PEAK VEL VTI: 18.37 CM
ECHO LV POSTERIOR WALL: 1 CM (ref 0.6–1.1)
EOSINOPHIL # BLD AUTO: 0.8 K/UL (ref 0–0.5)
EOSINOPHIL NFR BLD: 8.1 % (ref 0–8)
ERYTHROCYTE [DISTWIDTH] IN BLOOD BY AUTOMATED COUNT: 14.2 % (ref 11.5–14.5)
EST. GFR  (AFRICAN AMERICAN): >60 ML/MIN/1.73 M^2
EST. GFR  (NON AFRICAN AMERICAN): >60 ML/MIN/1.73 M^2
FRACTIONAL SHORTENING: 41 % (ref 28–44)
GLUCOSE SERPL-MCNC: 176 MG/DL (ref 70–110)
HCT VFR BLD AUTO: 40.9 % (ref 37–48.5)
HGB BLD-MCNC: 13.6 G/DL (ref 12–16)
IMM GRANULOCYTES # BLD AUTO: 0.05 K/UL (ref 0–0.04)
IMM GRANULOCYTES NFR BLD AUTO: 0.5 % (ref 0–0.5)
INTERVENTRICULAR SEPTUM: 1 CM (ref 0.6–1.1)
LA MAJOR: 4.2 CM
LA MINOR: 3.55 CM
LA WIDTH: 2.87 CM
LEFT ATRIUM SIZE: 2.57 CM
LEFT ATRIUM VOLUME INDEX: 16.7 ML/M2
LEFT ATRIUM VOLUME: 24.12 CM3
LEFT INTERNAL DIMENSION IN SYSTOLE: 1.87 CM (ref 2.1–4)
LEFT VENTRICLE DIASTOLIC VOLUME INDEX: 27.57 ML/M2
LEFT VENTRICLE DIASTOLIC VOLUME: 39.78 ML
LEFT VENTRICLE MASS INDEX: 61 G/M2
LEFT VENTRICLE SYSTOLIC VOLUME INDEX: 7.5 ML/M2
LEFT VENTRICLE SYSTOLIC VOLUME: 10.75 ML
LEFT VENTRICULAR INTERNAL DIMENSION IN DIASTOLE: 3.16 CM (ref 3.5–6)
LEFT VENTRICULAR MASS: 88.65 G
LYMPHOCYTES # BLD AUTO: 3.2 K/UL (ref 1–4.8)
LYMPHOCYTES NFR BLD: 34.5 % (ref 18–48)
MAGNESIUM SERPL-MCNC: 2.1 MG/DL (ref 1.6–2.6)
MCH RBC QN AUTO: 26.5 PG (ref 27–31)
MCHC RBC AUTO-ENTMCNC: 33.3 G/DL (ref 32–36)
MCV RBC AUTO: 80 FL (ref 82–98)
MONOCYTES # BLD AUTO: 0.6 K/UL (ref 0.3–1)
MONOCYTES NFR BLD: 6.8 % (ref 4–15)
NEUTROPHILS # BLD AUTO: 4.5 K/UL (ref 1.8–7.7)
NEUTROPHILS NFR BLD: 49.1 % (ref 38–73)
NRBC BLD-RTO: 0 /100 WBC
PHOSPHATE SERPL-MCNC: 3.6 MG/DL (ref 2.7–4.5)
PISA TR MAX VEL: 2.39 M/S
PLATELET # BLD AUTO: 218 K/UL (ref 150–350)
PMV BLD AUTO: 11.8 FL (ref 9.2–12.9)
POCT GLUCOSE: 146 MG/DL (ref 70–110)
POCT GLUCOSE: 162 MG/DL (ref 70–110)
POCT GLUCOSE: 165 MG/DL (ref 70–110)
POCT GLUCOSE: 188 MG/DL (ref 70–110)
POTASSIUM SERPL-SCNC: 3.2 MMOL/L (ref 3.5–5.1)
PROT SERPL-MCNC: 8.5 G/DL (ref 6–8.4)
RA MAJOR: 4.21 CM
RA PRESSURE: 3 MMHG
RA WIDTH: 2.72 CM
RBC # BLD AUTO: 5.13 M/UL (ref 4–5.4)
RIGHT VENTRICULAR END-DIASTOLIC DIMENSION: 2.32 CM
SINUS: 2.31 CM
SODIUM SERPL-SCNC: 136 MMOL/L (ref 136–145)
STJ: 2.42 CM
TDI LATERAL: 0.09 M/S
TDI SEPTAL: 0.04 M/S
TDI: 0.07 M/S
TR MAX PG: 23 MMHG
TRICUSPID ANNULAR PLANE SYSTOLIC EXCURSION: 1.66 CM
TV REST PULMONARY ARTERY PRESSURE: 26 MMHG
WBC # BLD AUTO: 9.23 K/UL (ref 3.9–12.7)

## 2019-11-01 PROCEDURE — 80053 COMPREHEN METABOLIC PANEL: CPT

## 2019-11-01 PROCEDURE — 99233 PR SUBSEQUENT HOSPITAL CARE,LEVL III: ICD-10-PCS | Mod: ,,, | Performed by: PSYCHIATRY & NEUROLOGY

## 2019-11-01 PROCEDURE — 25000003 PHARM REV CODE 250: Performed by: STUDENT IN AN ORGANIZED HEALTH CARE EDUCATION/TRAINING PROGRAM

## 2019-11-01 PROCEDURE — 25000003 PHARM REV CODE 250: Performed by: NURSE PRACTITIONER

## 2019-11-01 PROCEDURE — 92610 EVALUATE SWALLOWING FUNCTION: CPT

## 2019-11-01 PROCEDURE — 63600175 PHARM REV CODE 636 W HCPCS: Performed by: STUDENT IN AN ORGANIZED HEALTH CARE EDUCATION/TRAINING PROGRAM

## 2019-11-01 PROCEDURE — 99233 SBSQ HOSP IP/OBS HIGH 50: CPT | Mod: GC,,, | Performed by: PSYCHIATRY & NEUROLOGY

## 2019-11-01 PROCEDURE — 99233 PR SUBSEQUENT HOSPITAL CARE,LEVL III: ICD-10-PCS | Mod: GC,,, | Performed by: PSYCHIATRY & NEUROLOGY

## 2019-11-01 PROCEDURE — 25000003 PHARM REV CODE 250: Performed by: PSYCHIATRY & NEUROLOGY

## 2019-11-01 PROCEDURE — 94761 N-INVAS EAR/PLS OXIMETRY MLT: CPT

## 2019-11-01 PROCEDURE — 97165 OT EVAL LOW COMPLEX 30 MIN: CPT

## 2019-11-01 PROCEDURE — 83735 ASSAY OF MAGNESIUM: CPT

## 2019-11-01 PROCEDURE — 84100 ASSAY OF PHOSPHORUS: CPT

## 2019-11-01 PROCEDURE — 20000000 HC ICU ROOM

## 2019-11-01 PROCEDURE — 85025 COMPLETE CBC W/AUTO DIFF WBC: CPT

## 2019-11-01 PROCEDURE — 92523 SPEECH SOUND LANG COMPREHEN: CPT

## 2019-11-01 PROCEDURE — 97162 PT EVAL MOD COMPLEX 30 MIN: CPT

## 2019-11-01 PROCEDURE — 99233 SBSQ HOSP IP/OBS HIGH 50: CPT | Mod: ,,, | Performed by: PSYCHIATRY & NEUROLOGY

## 2019-11-01 RX ORDER — GABAPENTIN 600 MG/1
600 TABLET ORAL 3 TIMES DAILY
Status: DISCONTINUED | OUTPATIENT
Start: 2019-11-01 | End: 2019-11-01

## 2019-11-01 RX ORDER — BENAZEPRIL HYDROCHLORIDE 20 MG/1
40 TABLET ORAL DAILY
Status: DISCONTINUED | OUTPATIENT
Start: 2019-11-01 | End: 2019-11-06

## 2019-11-01 RX ORDER — BISOPROLOL FUMARATE AND HYDROCHLOROTHIAZIDE 10; 6.25 MG/1; MG/1
1 TABLET ORAL DAILY
Status: DISCONTINUED | OUTPATIENT
Start: 2019-11-01 | End: 2019-11-01

## 2019-11-01 RX ORDER — GABAPENTIN 300 MG/1
600 CAPSULE ORAL 3 TIMES DAILY
Status: DISCONTINUED | OUTPATIENT
Start: 2019-11-01 | End: 2019-11-01

## 2019-11-01 RX ORDER — NAPROXEN SODIUM 220 MG/1
81 TABLET, FILM COATED ORAL DAILY
Status: DISCONTINUED | OUTPATIENT
Start: 2019-11-02 | End: 2019-11-01

## 2019-11-01 RX ORDER — NAPROXEN SODIUM 220 MG/1
81 TABLET, FILM COATED ORAL DAILY
Status: DISCONTINUED | OUTPATIENT
Start: 2019-11-01 | End: 2019-11-05

## 2019-11-01 RX ORDER — AMOXICILLIN 250 MG
1 CAPSULE ORAL DAILY
Status: DISCONTINUED | OUTPATIENT
Start: 2019-11-01 | End: 2019-11-09

## 2019-11-01 RX ORDER — GABAPENTIN 300 MG/1
600 CAPSULE ORAL EVERY 8 HOURS
Status: DISCONTINUED | OUTPATIENT
Start: 2019-11-01 | End: 2019-11-09

## 2019-11-01 RX ORDER — ACETAMINOPHEN 325 MG/1
650 TABLET ORAL EVERY 4 HOURS PRN
Status: DISCONTINUED | OUTPATIENT
Start: 2019-11-01 | End: 2019-11-09

## 2019-11-01 RX ADMIN — LIDOCAINE 2 PATCH: 50 PATCH TOPICAL at 03:11

## 2019-11-01 RX ADMIN — BENAZEPRIL HYDROCHLORIDE 40 MG: 10 TABLET ORAL at 10:11

## 2019-11-01 RX ADMIN — SENNOSIDES AND DOCUSATE SODIUM 1 TABLET: 8.6; 5 TABLET ORAL at 10:11

## 2019-11-01 RX ADMIN — ASPIRIN 81 MG CHEWABLE TABLET 81 MG: 81 TABLET CHEWABLE at 09:11

## 2019-11-01 RX ADMIN — ACETAMINOPHEN 650 MG: 325 TABLET ORAL at 04:11

## 2019-11-01 RX ADMIN — GABAPENTIN 600 MG: 300 CAPSULE ORAL at 12:11

## 2019-11-01 RX ADMIN — INSULIN ASPART 2 UNITS: 100 INJECTION, SOLUTION INTRAVENOUS; SUBCUTANEOUS at 12:11

## 2019-11-01 RX ADMIN — ATORVASTATIN CALCIUM 40 MG: 20 TABLET, FILM COATED ORAL at 08:11

## 2019-11-01 RX ADMIN — INSULIN ASPART 1 UNITS: 100 INJECTION, SOLUTION INTRAVENOUS; SUBCUTANEOUS at 02:11

## 2019-11-01 RX ADMIN — ACETAMINOPHEN 650 MG: 325 TABLET ORAL at 08:11

## 2019-11-01 RX ADMIN — GABAPENTIN 600 MG: 300 CAPSULE ORAL at 09:11

## 2019-11-01 RX ADMIN — ACETAMINOPHEN 650 MG: 325 TABLET ORAL at 06:11

## 2019-11-01 RX ADMIN — INSULIN ASPART 2 UNITS: 100 INJECTION, SOLUTION INTRAVENOUS; SUBCUTANEOUS at 06:11

## 2019-11-01 NOTE — HOSPITAL COURSE
Ms. Melva Barker was admitted to Vascular Neurology for acute stroke workup. Stroke etiology suspected to be small artery occlusion 2/2 small vessel disease at this time. Hospital stay was extended due to complications related to R-sided retroperitoneal hematoma requiring blood transfusions, as well lumbar artery embolization in IR with subsequent return of hemodynamic stability and evidence of progressively decreased size of hematoma on repeat imaging. Pt also with intermittent episodes of hypotension during admission requiring fluid resuscitation; BP stabilized and later required initiation of antihypertensive agent. Discharge then delayed due to several issues surrounding coordinating placement. Patient now accepted to [facility]; family by phone amenable to plan.     Inpatient acute stroke work-up completed and patient is stable for discharge. Please see all appropriate medication changes, imaging results, and necessary follow-up below.

## 2019-11-01 NOTE — PLAN OF CARE
11/01/19 1657   Post-Acute Status   Post-Acute Authorization Placement   Post-Acute Placement Status Referrals Sent  ([facility])     NONA met with Pt at bedside. Discussed therapy recs for rehab and provided list. Pt reported [facility] and will want to return there.    NONA sent referral via .    Mely Tran LMSW  Neurocritical Care   [facility]  [id number]

## 2019-11-01 NOTE — PROGRESS NOTES
[facility]  Vascular Neurology  Comprehensive Stroke Center  Progress Note    Assessment/Plan:     * Stroke due to embolism of left middle cerebral artery  Melva Barker is a 74 y.o. female with PMHx of HTN, HLD, and DM who presented to OSH with acute worsening of RSW. Patient had been experiencing RSW x1 month. Yesterday, she began having acute worsening. She was treated with tPA at OSH. CTA without LVO. MRI with infarct in L internal capsule and corona radiata. Etiology likely small vessel disease        Antithrombotics for secondary stroke prevention: Antiplatelets: None: Hold all Antithrombotics x 24 hours after IV t-PA administration, start asa 81 mg daily 24 hours post tPA  Statins for secondary stroke prevention and hyperlipidemia, if present: Statins: Atorvastatin- 40 mg daily,   Aggressive risk factor modification: HTN, HLD, Diet     Rehab efforts: The patient has been evaluated by a stroke team provider and the therapy needs have been fully considered based off the presenting complaints and exam findings. The following therapy evaluations are needed: PT evaluate and treat, OT evaluate and treat, SLP evaluate and treat, PM&R evaluate for appropriate placement    Diagnostics ordered/pending: None   VTE prophylaxis: None: Reason for No Pharmacological VTE Prophylaxis: Mechanical prophylaxis: Place SCDs, post tPA, start sq heparin 24 hours post tPA  BP parameters: Infarct: Post tPA, SBP <180        Cytotoxic brain edema  Area of cytotoxic cerebral edema identified when reviewing brain imaging in the territory of the L middle cerebral artery. There is no mass effect associated with it. We will continue to monitor the patients clinical exam for any worsening of symptoms which may indicate expansion of the stroke or the area of the edema resulting in the clinical change. The pattern is suggestive of small vessel etiology.        Essential hypertension  Stroke risk factor  SBP  <180 post tPA  Management per primary team    Diabetes mellitus type 2 without retinopathy  stroke risk factor, poorly controlled on glargine 17 units daily  HbA1c 8.3  Currently on SSI   Diabetic diet  Management per primary team         11/1/19 MRI with small vessel L MCA infarct, therapy recommending rehab    STROKE DOCUMENTATION   Acute Stroke Times   Last Known Normal Date: 10/31/19  Last Known Normal Time: 0630  Symptom Onset Date: 10/31/19  Symptom Onset Time: 0730  Stroke Team Called Date: 10/31/19  Stroke Team Called Time: 0936  Stroke Team Arrival Date: 10/31/19  Stroke Team Arrival Time: 0946  Decision to Treat Time for Alteplase: 1003    NIH Scale:  1a. Level of Consciousness: 0-->Alert, keenly responsive  1b. LOC Questions: 0-->Answers both questions correctly  1c. LOC Commands: 0-->Performs both tasks correctly  2. Best Gaze: 0-->Normal  3. Visual: 0-->No visual loss  4. Facial Palsy: 1-->Minor paralysis (flattened nasolabial fold, asymmetry on smiling)  5a. Motor Arm, Left: 0-->No drift, limb holds 90 (or 45) degrees for full 10 secs  5b. Motor Arm, Right: 1-->Drift, limb holds 90 (or 45) degrees, but drifts down before full 10 secs, does not hit bed or other support  6a. Motor Leg, Left: 0-->No drift, leg holds 30 degree position for full 5 secs  6b. Motor Leg, Right: 3-->No effort against gravity, leg falls to bed immediately  7. Limb Ataxia: 0-->Absent  8. Sensory: 0-->Normal, no sensory loss  9. Best Language: 0-->No aphasia, normal  10. Dysarthria: 0-->Normal  11. Extinction and Inattention (formerly Neglect): 0-->No abnormality  Total (NIH Stroke Scale): 5       Modified [place] Score: 0  [place] Coma Scale:    ABCD2 Score:    [id number] Score:   HAS -BLED Score:   ICH Score:   Hunt & Amaya Classification:      Hemorrhagic change of an Ischemic Stroke: Does this patient have an ischemic stroke with hemorrhagic changes? No     Neurologic Chief Complaint: L MCA    Subjective:     Interval History:  Patient is seen for follow-up neurological assessment and treatment recommendations: NAEON, no new complaints, continued RSW    HPI, Past Medical, Family, and Social History remains the same as documented in the initial encounter.     Review of Systems   Constitutional: Negative for chills and fever.   Respiratory: Negative for cough.    Cardiovascular: Negative for chest pain.   Gastrointestinal: Negative for nausea and vomiting.   Neurological: Positive for facial asymmetry and weakness. Negative for speech difficulty and numbness.     Scheduled Meds:   atorvastatin  40 mg Oral Daily    benazepril  40 mg Oral Daily    gabapentin  600 mg Oral Q8H    lidocaine  2 patch Transdermal Q24H    senna-docusate 8.6-50 mg  1 tablet Oral Daily     Continuous Infusions:   niCARdipine Stopped (11/01/19 0805)     PRN Meds:acetaminophen, Dextrose 10% Bolus, Dextrose 10% Bolus, glucagon (human recombinant), insulin aspart U-100, sodium chloride 0.9%    Objective:     Vital Signs (Most Recent):  Temp: 97.9 °F (36.6 °C) (11/01/19 1205)  Pulse: 90 (11/01/19 1305)  Resp: (!) 37 (11/01/19 1305)  BP: 136/65 (11/01/19 1305)  SpO2: 100 % (11/01/19 1305)  BP Location: Left arm    Vital Signs Range (Last 24H):  Temp:  [97.8 °F (36.6 °C)-98.2 °F (36.8 °C)]   Pulse:  [69-91]   Resp:  [12-38]   BP: (118-154)/(57-82)   SpO2:  [96 %-100 %]   BP Location: Left arm    Physical Exam   Constitutional: She appears well-developed and well-nourished. No distress.   HENT:   Head: Normocephalic and atraumatic.   Eyes: Pupils are equal, round, and reactive to light. EOM are normal.   Cardiovascular: Normal rate.   Pulmonary/Chest: Effort normal. No respiratory distress.   Neurological: She is alert.   Skin: Skin is warm and dry.   Vitals reviewed.      Neurological Exam:   LOC: alert  Attention Span: Good   Language: No aphasia  Articulation: No dysarthria  Orientation: Person, Place, Time   Visual Fields: Full  EOM (CN III, IV, VI):  Full/intact  Pupils (CN II, III): PERRL  Facial Movement (CN VII): Lower facial weakness on the Right  Motor: Arm left  Normal 5/5  Leg left  Normal 5/5  Arm right  Paresis: 4/5  Leg right Paresis: 3/5  Sensation: Intact to light touch, temperature and vibration  Tone: Normal tone throughout    Laboratory:  CMP:   Recent Labs   Lab 11/01/19  0446   CALCIUM 10.4   ALBUMIN 4.3   PROT 8.5*      K 3.2*   CO2 22*      BUN 13   CREATININE 0.8   ALKPHOS 143*   ALT 24   AST 29   BILITOT 0.7     CBC:   Recent Labs   Lab 11/01/19  0446   WBC 9.23   RBC 5.13   HGB 13.6   HCT 40.9      MCV 80*   MCH 26.5*   MCHC 33.3     Lipid Panel:   Recent Labs   Lab 10/31/19  1437   CHOL 239*   LDLCALC 168.4*   HDL 42   TRIG 143     Coagulation:   Recent Labs   Lab 10/31/19  1015   INR 1.0   APTT 26.4     Platelet Aggregation Study: No results for input(s): PLTAGG, PLTAGINTERP, PLTAGREGLACO, ADPPLTAGGREG in the last 168 hours.  Hgb A1C:   Recent Labs   Lab 10/31/19  1437   HGBA1C 8.3*     TSH:   Recent Labs   Lab 10/31/19  1437   TSH 0.447       Diagnostic Results     Brain Imaging   MRI Brain (11/01/19):  Acute lacunar type infarct coursing through the left posterior limb internal capsule and corona radiata.    CTH (10/31/19):  No acute intracranial pathology    Vessel Imaging   CTA-MP (10/31/19):  Atherosclerotic disease of the cavernous and supraclinoid ICAs with mild narrowing. No high-grade stenosis or LVO. Less than 50% proximal ICA stenosis. mild-moderate narrowing of the right vertebral artery origin. There is mild left V1 segment narrowing.  No significant focal vertebral artery stenosis or occlusion.    Cardiac Imaging   Echo (11/01/19)  · Increased (hyperdynamic) left ventricular systolic function. The estimated ejection fraction is 75%  · Concentric left ventricular remodeling.  · Normal LV diastolic function.  · No wall motion abnormalities.  · Normal right ventricular systolic function.  · Normal central  venous pressure (3 mm Hg).  · Mild tricuspid regurgitation.  · The estimated PA systolic pressure is 26 mm Hg  · Echolucent structure next to the LA, likely representing hiatal hernia. Clinincal correlation is required.          Emi Murphy PA-C  Comprehensive Stroke Center  Department of Vascular Neurology   [facility]

## 2019-11-01 NOTE — HOSPITAL COURSE
10/31/2019: admitted to hospital   11/1/2019   NAEO. Neuro exam improving. TPA window ends at 1130. Defer SQH and ASA until MRI brain completed. Started home benazepril and gabapentin. Started bowel regimen. Afebrile, no leukocytosis, and H&H/Plts stable. If MRI stable

## 2019-11-01 NOTE — ASSESSMENT & PLAN NOTE
Melva Barker is a 74 y.o. female with PMHx of HTN, HLD, and DM who presented to OSH with acute worsening of RSW. Patient had been experiencing RSW x1 month. Yesterday, she began having acute worsening. She was treated with tPA at OSH. CTA without LVO. MRI with infarct in L internal capsule and corona radiata. Etiology likely small vessel disease        Antithrombotics for secondary stroke prevention: Antiplatelets: None: Hold all Antithrombotics x 24 hours after IV t-PA administration, start asa 81 mg daily 24 hours post tPA  Statins for secondary stroke prevention and hyperlipidemia, if present: Statins: Atorvastatin- 40 mg daily,   Aggressive risk factor modification: HTN, HLD, Diet     Rehab efforts: The patient has been evaluated by a stroke team provider and the therapy needs have been fully considered based off the presenting complaints and exam findings. The following therapy evaluations are needed: PT evaluate and treat, OT evaluate and treat, SLP evaluate and treat, PM&R evaluate for appropriate placement    Diagnostics ordered/pending: None   VTE prophylaxis: None: Reason for No Pharmacological VTE Prophylaxis: Mechanical prophylaxis: Place SCDs, post tPA, start sq heparin 24 hours post tPA  BP parameters: Infarct: Post tPA, SBP <180

## 2019-11-01 NOTE — CONSULTS
[facility]  Adult Nutrition  Consult Note    SUMMARY     Recommendations    Recommendation/Intervention:   As medically able, recommend advancing diet to Diabetic with texture per SLP recommendations.   Add Boost Glucose (strawberry) TID to increase caloric intake daily.     RD to monitor.    Goals: Pt to receive and tolerate >85% EEN and EPN by RD follow up.  Nutrition Goal Status: new  Communication of RD Recs: reviewed with RN    Reason for Assessment    Reason For Assessment: consult  Diagnosis: stroke/CVA  Relevant Medical History: IDDM, HTN  Interdisciplinary Rounds: attended  General Information Comments: Pt remains NPO. SLP recs for Regular/thin liquids. Pt reports wanting to try small meal at first and work up intake as tolerated. Pt states over previous year she has lost 20lbs while caring for her sister with dementia. 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 NFPE completed - pt with moderate muscle wasting in clavicles, calves, hands. Pt does not meet criteria for malnutrition given only 16% weight loss in 1 year however pt at risk.   Nutrition Discharge Planning: adequate po intake for optimal nutrition    Nutrition Risk Screen    Nutrition Risk Screen: no indicators present    Nutrition/Diet History    Spiritual, Cultural Beliefs, Congregational Practices, Values that Affect Care: no  Supplemental Drinks or Food Habits: Ensure Plus  Factors Affecting Nutritional Intake: NPO    Anthropometrics    Temp: 97.9 °F (36.6 °C)  Height: 5' (152.4 cm)  Height (inches): 60 in  Weight Method: Bed Scale  Weight: 49.5 kg (109 lb 2 oz)  Weight (lb): 109.13 lb  Ideal Body Weight (IBW), Female: 100 lb  % Ideal Body Weight, Female (lb): 109.13 lb  BMI (Calculated): 21.4  BMI Grade: 18.5-24.9 - normal  Weight Loss: unintentional  Usual Body Weight (UBW), k kg  % Usual Body Weight: 84.07  % Weight Change From Usual Weight: -16.1 %       Lab/Procedures/Meds    Pertinent  Labs Reviewed: reviewed  Pertinent Labs Comments: HgbA1c 8.3, POCT Glu 165-195  Pertinent Medications Reviewed: reviewed  Pertinent Medications Comments: cardene, insulin    Estimated/Assessed Needs    Weight Used For Calorie Calculations: 49.5 kg (109 lb 2 oz)  Energy Calorie Requirements (kcal): 6540-9444  Energy Need Method: Kcal/kg(25-30kcal/kg)  Protein Requirements: 50-60g(1.0-1.2g/kg)  Weight Used For Protein Calculations: 49.5 kg (109 lb 2 oz)  Fluid Requirements (mL): 1mL/kcal or per MD     RDA Method (mL): 1237  CHO Requirement: 175g CHO daily      Nutrition Prescription Ordered    Current Diet Order: NPO    Evaluation of Received Nutrient/Fluid Intake       % Intake of Estimated Energy Needs: 0 - 25 %  % Meal Intake: NPO    Nutrition Risk    Level of Risk/Frequency of Follow-up: high(f/u 2x/week)     Assessment and Plan    Nutrition Problem  Inadequate energy intake    Related to (etiology):   NPO    Signs and Symptoms (as evidenced by):   Pt receiving <85% EEN and EPN.     Interventions(treatment strategy):  Collaboration of care with providers    Nutrition Diagnosis Status:   New         Monitor and Evaluation    Food and Nutrient Intake: energy intake, food and beverage intake  Food and Nutrient Adminstration: diet order  Anthropometric Measurements: weight, weight change, body mass index  Biochemical Data, Medical Tests and Procedures: electrolyte and renal panel, gastrointestinal profile, glucose/endocrine profile, inflammatory profile, lipid profile  Nutrition-Focused Physical Findings: overall appearance     Malnutrition Assessment             Weight Loss (Malnutrition): (16% in 1 year)  Energy Intake (Malnutrition): (pt reports poor but cannot quantify)   Orbital Region (Subcutaneous Fat Loss): mild depletion  Upper Arm Region (Subcutaneous Fat Loss): well nourished  Thoracic and Lumbar Region: well nourished   [place] Region (Muscle Loss): mild depletion  Clavicle Bone Region (Muscle Loss):  moderate depletion  Clavicle and Acromion Bone Region (Muscle Loss): moderate depletion  Scapular Bone Region (Muscle Loss): moderate depletion  Dorsal Hand (Muscle Loss): moderate depletion  Patellar Region (Muscle Loss): mild depletion  Anterior Thigh Region (Muscle Loss): mild depletion  Posterior Calf Region (Muscle Loss): moderate depletion                 Nutrition Follow-Up    RD Follow-up?: Yes

## 2019-11-01 NOTE — ASSESSMENT & PLAN NOTE
-SBP goal  HTN <180 in the setting of ischemic stroke  -on cardene gtt   -restarted home med Benazepril tab 40 mg daily

## 2019-11-01 NOTE — NURSING
Multiple attempts by RN since 0900 to get pt to MRI on time for 24 hr post tpa scan. Pt now in MRI, transported by RN on monitor, VSS.

## 2019-11-01 NOTE — CONSULTS
Inpatient consult to Physical Medicine Rehab  Consult performed by: Emi Castano NP  Consult ordered by: Traci Lamb MD  Reason for consult: Assess rehab needs      Reviewed patient history and current admission.  Rehab team following.  Full consult to follow.    AKANKSHA Butts, FNP-C  Physical Medicine & Rehabilitation   11/01/2019  Spectralink: [id number]

## 2019-11-01 NOTE — SUBJECTIVE & OBJECTIVE
Interval History:  NAEO. Neuro exam improving. TPA window ends at 1130. Defer SQH and ASA until MRI brain completed. Started home benazepril and gabapentin. Started bowel regimen. Afebrile, no leukocytosis, and H&H/Plts stable. If MRI stable     Review of Systems   Constitutional: Negative for fever.   Eyes: Negative for photophobia.   Respiratory: Negative for chest tightness and shortness of breath.    Cardiovascular: Negative for chest pain.   Gastrointestinal: Negative for diarrhea, nausea and vomiting.   Neurological: Positive for weakness. Negative for speech difficulty.   Psychiatric/Behavioral: Negative for agitation and behavioral problems.       Objective:     Vitals:  Temp: 97.8 °F (36.6 °C)  Pulse: 81  Rhythm: normal sinus rhythm  BP: (!) 154/74  MAP (mmHg): 106  Resp: (!) 28  SpO2: 100 %  O2 Device (Oxygen Therapy): room air    Temp  Min: 97.8 °F (36.6 °C)  Max: 98.2 °F (36.8 °C)  Pulse  Min: 61  Max: 91  BP  Min: 118/74  Max: 207/99  MAP (mmHg)  Min: 84  Max: 110  Resp  Min: 12  Max: 38  SpO2  Min: 96 %  Max: 100 %    10/31 0701 - 11/01 0700  In: 174.5 [I.V.:174.5]  Out: -            Physical Exam   Constitutional: She appears well-developed and well-nourished.   HENT:   Head: Normocephalic.   Cardiovascular: Normal rate and regular rhythm.   Pulmonary/Chest: Effort normal and breath sounds normal.   Abdominal: Soft. Bowel sounds are normal.   Neurological:   -AAOx4, follows commands   -E4V5M6 GCS (15)   -PERRLA, EOMI  -Face symmetric  -ASHRAF spontaneously and against gravity   -Diminished strength and sensation on Right side   -c/o of neuropathy down posterior leg         Medications:  Continuous  niCARdipine Last Rate: Stopped (11/01/19 0805)   Scheduled  atorvastatin 40 mg Daily   benazepril 40 mg Daily   gabapentin 600 mg TID   lidocaine 2 patch Q24H   senna-docusate 8.6-50 mg 1 tablet Daily   PRN  acetaminophen 650 mg Q4H PRN   Dextrose 10% Bolus 12.5 g PRN   Dextrose 10% Bolus 25 g PRN   glucagon  (human recombinant) 1 mg PRN   insulin aspart U-100 1-10 Units Q6H PRN   sodium chloride 0.9% 10 mL PRN     Today I personally reviewed pertinent medications, lines/drains/airways, imaging, cardiology results, laboratory results, microbiology results, notably:    Diet  Diet NPO  Diet NPO

## 2019-11-01 NOTE — ASSESSMENT & PLAN NOTE
stroke risk factor, poorly controlled on glargine 17 units daily  HbA1c 8.3  Currently on SSI   Diabetic diet  Management per primary team

## 2019-11-01 NOTE — PLAN OF CARE
Nutrition assessment completed. Please see RD note for details.    Recommendation/Intervention:   As medically able, recommend advancing diet to Diabetic with texture per SLP recommendations.   Add Boost Glucose (strawberry) TID to increase caloric intake daily.     RD to monitor.    Goals: Pt to receive and tolerate >85% EEN and EPN by RD follow up.  Nutrition Goal Status: new  Communication of RD Recs: reviewed with RN  Problem: Oral Intake Inadequate  Goal: Improved Oral Intake  Outcome: Ongoing, Progressing

## 2019-11-01 NOTE — PT/OT/SLP EVAL
Occupational Therapy   Evaluation    Name: Melva Barker  MRN: [id number]  Admitting Diagnosis:  Stroke due to embolism of left middle cerebral artery      Recommendations:     Discharge Recommendations: rehabilitation facility  Discharge Equipment Recommendations:  (TBD)  Barriers to discharge:  Decreased caregiver support    Assessment:     Melva Barker is a 74 y.o. female with a medical diagnosis of Stroke due to embolism of left middle cerebral artery.  She presents with performance deficits including weakness, impaired endurance, impaired sensation, impaired self care skills, impaired functional mobilty, gait instability, impaired balance, decreased upper extremity function, decreased lower extremity function, pain, decreased coordination, decreased ROM. Pt would continue to benefit from OT to increase functional independence and safety. Recommend rehab upon D/C as pt is motivated to return to PLOF and unsafe to return home at current functional level.    Rehab Prognosis: Good; patient would benefit from acute skilled OT services to address these deficits and reach maximum level of function.       Plan:     Patient to be seen 4 x/week to address the above listed problems via self-care/home management, therapeutic activities, therapeutic exercises, neuromuscular re-education  · Plan of Care Expires: 12/01/19  · Plan of Care Reviewed with: patient    Subjective     Chief Complaint: Back pain  Patient/Family Comments/goals: Return to PLOF    Occupational Profile:  Living Environment: Pt lives with her sister and nephew in 1-story house with 2 ZEUS and tub/shower combo.  Previous level of function: (I) with ADLs/IADLs and mobility, drives  Equipment Used at Home:  none (owns quad cane and rolling walker)  Assistance upon Discharge: Not consistently available-- sister has Alzheimer's and nephew has COPD and are unable to assist    Pain/Comfort:  · Pain Rating 1: 8/10  · Location - Orientation 1:  lower  · Location 1: back (chronic)  · Pain Addressed 1: Reposition, Cessation of Activity, Nurse notified  · Pain Rating Post-Intervention 1: 8/10    Patients cultural, spiritual, Tenriism conflicts given the current situation: no    Objective:     Communicated with: RN prior to session. Patient found supine with blood pressure cuff, pulse ox (continuous), telemetry, bed alarm, peripheral IV upon OT entry to room.  Pt within 24 hr tPA window.    General Precautions: Standard, fall, aspiration   Orthopedic Precautions:N/A   Braces: N/A     Occupational Performance:    Bed Mobility:    · Patient completed Supine to Sit with contact guard assistance  · Patient completed Sit to Supine with moderate assistance    Functional Mobility/Transfers:  · Patient completed Sit <> Stand Transfer with minimum assistance from EOB with hand-held assist and R knee blocked  · Functional Mobility: Few side steps along EOB with Mod A with hand-held assist; unable to ambulate further due to back pain     Activities of Daily Living:  · Lower Body Dressing: maximal assistance to don socks while seated    Cognitive/Visual Perceptual:  Cognitive/Psychosocial Skills:     -       Oriented to: Person, Place, Time and Situation   -       Follows Commands/attention: Follows multistep commands  -       Communication: clear/fluent  -       Safety awareness/insight to disability: intact   Visual/Perceptual:      -Intact      Physical Exam:  Balance:    -       good sitting, fair standing balance  Sensation: Impaired-- reports decreased sensation R UE  Dominant hand:    -       Right  Upper Extremity Range of Motion:     -       Right Upper Extremity: shld AROM less than 90 deg; WFL elbow flex/ext  -       Left Upper Extremity: WFL  Upper Extremity Strength:    -       Right Upper Extremity: shld 2/5; elbow flex/ext 3/5  -       Left Upper Extremity: WFL   Strength:    -       Right Upper Extremity: impaired  -       Left Upper Extremity:  WFL  Fine Motor Coordination: impaired R opposition and finger to nose    AMPAC 6 Click ADL:  AMPAC Total Score: 12    Treatment & Education:  OT eval; educated on OT role and POC as well as R UE ROM and positioning  Supine BP after OOB activity 157/80-- RN present  Education:    Patient left supine with all lines intact, call button in reach, bed alarm on and RN notified    GOALS:   Multidisciplinary Problems     Occupational Therapy Goals        Problem: Occupational Therapy Goal    Goal Priority Disciplines Outcome Interventions   Occupational Therapy Goal     OT, PT/OT Ongoing, Progressing    Description:  Goals to be met by: 7 days (11/8/19)     Patient will increase functional independence with ADLs by performing:    UE Dressing with Contact Guard Assistance.  LE Dressing with Minimal Assistance.  Grooming while standing with Minimal Assistance.  Toileting from bedside commode with Minimal Assistance for hygiene and clothing management.   Toilet transfer to bedside commode with Contact Guard Assistance.  Increased functional strength to WFL for ADLs.  Complete functional mobility household distance with CGA using AD as needed.                      History:     Past Medical History:   Diagnosis Date    Anxiety     Back pain     chronic    Diabetes mellitus     Gastroparesis     Hypertension     Sciatica        History reviewed. No pertinent surgical history.    Time Tracking:     OT Date of Treatment: 11/01/19  OT Start Time: 0933  OT Stop Time: 0948  OT Total Time (min): 15 min    Billable Minutes:Evaluation 15 minutes    AURORA Florian  11/1/2019

## 2019-11-01 NOTE — PLAN OF CARE
Problem: SLP Goal  Goal: SLP Goal  Description  Speech Language Pathology Goals  Goals expected to be met by 11/8  1. Pt will participate in conversation without cues to express thought.   2. Pt will complete simple functional reasoning tasks with 80% accy given min cues.   3. Pt will complete assessment of reading, writing, visual spatial skills to determine need for tx.          Outcome: Ongoing, Progressing    Rec regular diet with thin liquids with strict aspiration precautions, meds whole 1-2 at a time. CRESENCIO Bryant, CCC/SLP  11/1/2019

## 2019-11-01 NOTE — PT/OT/SLP EVAL
Physical Therapy Evaluation    Patient Name:  Melva Barker   MRN:  [id number]    Recommendations:     Discharge Recommendations:  rehabilitation facility   Discharge Equipment Recommendations: (TBD)   Barriers to discharge: Inaccessible home, Decreased caregiver support and patient below functional baseline    Assessment:     Melva Barker is a 74 y.o. female admitted with a medical diagnosis of Stroke due to embolism of left middle cerebral artery.  She presents with the following impairments/functional limitations:  weakness, impaired endurance, impaired sensation, impaired functional mobilty, gait instability, impaired self care skills, impaired balance, decreased coordination, decreased upper extremity function, decreased lower extremity function, decreased safety awareness, pain, impaired coordination, abnormal tone, impaired fine motor, decreased ROM, impaired cardiopulmonary response to activity. The patient demonstrates R hemiparesis, impaired R UE/LE sensation to light touch. PTA she was independent with mobility and did not need to use an AD. Currently the patient requires moderate assistance for sidestepping towards head of bed with legs braced against bed, R knee buckling with increased weight bearing. She is unable to tolerate taking steps away from bedside due to weakness, fatigue, impaired balance.  PTA the patient was independent with gait. Based on the patient's progress with therapy, motivation to participate in treatment, and prior level of independence, they are an excellent candidate for inpatient rehabilitation and they would benefit from rehab to maximize their functional potential.      Rehab Prognosis: Good; patient would benefit from acute skilled PT services to address these deficits and reach maximum level of function.    Recent Surgery: * No surgery found *      Plan:     During this hospitalization, patient to be seen 4 x/week to address the identified rehab impairments via  gait training, therapeutic activities, therapeutic exercises, neuromuscular re-education and progress toward the following goals:    · Plan of Care Expires:  12/01/19    Subjective     Chief Complaint: back pain, chronic  Patient/Family Comments/goals: improve strength  Pain/Comfort:  · Pain Rating 1: 8/10(chronic LBP)  · Location - Orientation 1: generalized  · Location 1: back  · Pain Addressed 1: Reposition, Distraction, Cessation of Activity, Nurse notified  · Pain Rating Post-Intervention 1: 8/10    Patients cultural, spiritual, Mormon conflicts given the current situation: no    Living Environment:  The patient lives with her sister who has Alzheimers and son who has COPD in a [facility] with 2 ZEUS (no rail); tub shower. She was driving and independent PTA.  Equipment used at home: none.  DME owned (not currently used): rolling walker and quad cane.  Upon discharge, patient will have assistance from family (unable to provide physical assist to patient).    Objective:     Communicated with RN prior to session.  Patient found HOB elevated with bed alarm, blood pressure cuff, peripheral IV, telemetry, pulse ox (continuous)  upon PT entry to room.    General Precautions: Standard, aspiration, fall   Orthopedic Precautions:N/A   Braces: N/A     Exams:    Cognitive Exam  Patient is A&O x4 and follows 100% of one -step commands    Fine Motor Coordination   -       Impaired R heel to shin, R hemiparesis     Postural Exam Patient presented with the following abnormalities:    -       Rounded shoulders  -       Forward head  -       Kyphosis  -       Posterior pelvic tilt  -       Weight shift posterior    Sensation    -       Light touch decreased sensation to light touch R UE and LE   Skin Integrity/Edema     -       Skin integrity: visibly intact  -       Edema: mild R hand    R LE ROM WFL PROM   R LE Strength 3-/5 hip flexion, knee ext/flex, and ankle DF/PF   L LE ROM WFL AROM   L LE Strength  4-/5 hip flexion, knee  ext/flex, and ankle DF/PF     Balance          Static Sitting supervision assistance    Dynamic Sitting stand by assistance    Static Standing minimum assistance, hand held assist, legs braced against bed   Dynamic Standing moderate assistance, hand held assist, legs braced against bed, R knee buckling                Functional Mobility:    Bed Mobility  Rolling to L: minimum assistance   Supine to Sit:  minimum assistance   Sit to supine: moderate assistance   Transfers Sit to Stand:  minimum assistance, R knee blocked, cues for set up and sequencing   Gait  Gait Distance: 6 steps to Rt with hand held assist, legs braced against bed  Assistance Level: moderate assistance   Description: R knee buckling in stance- blocked, facilitation for weight shift, kyphotic posture, cues for sequencing, manual assist and cuing for R leg progression.   Gait limited by fatigue, weakness, back pain.           Therapeutic Activities and Exercises:     Patient educated on role of therapy, goals of session, benefits of out of bed mobility. Patient agreeable to mobilize with therapy.  Discussed PT plan of care during hospitalization. Patient educated that they need to call for assistance to mobilize out of bed. Whiteboard updated as appropriate. Patient educated on how their diagnosis impacts their mobility within PT scope of practice.     AM-PAC 6 CLICK MOBILITY  Total Score:12     Patient left HOB elevated with all lines intact, call button in reach and bed alarm on.    GOALS:   Multidisciplinary Problems     Physical Therapy Goals        Problem: Physical Therapy Goal    Goal Priority Disciplines Outcome Goal Variances Interventions   Physical Therapy Goal     PT, PT/OT Ongoing, Progressing     Description:  Goals to be met by:      Patient will increase functional independence with mobility by performin. Supine to sit with Stand-by Assistance  2. Sit to supine with Stand-by Assistance  3. Sit to stand transfer with  Stand-by Assistance  4. Gait  X 25 feet with Minimal Assistance using least restrictive device.   5. Stand for 1 minutes with Contact Guard Assistance using  least restrictive device or no AD  6. Lower extremity exercise program x20 reps per handout, with independence to improve strength and activity tolerance.                       History:     Past Medical History:   Diagnosis Date    Anxiety     Back pain     chronic    Diabetes mellitus     Gastroparesis     Hypertension     Sciatica        History reviewed. No pertinent surgical history.    Time Tracking:     PT Received On: 11/01/19  PT Start Time: 0933     PT Stop Time: 0950  PT Total Time (min): 17 min     Billable Minutes: Evaluation 15 (Co-eval with OT)        Hortencia Umaña, PT  11/01/2019

## 2019-11-01 NOTE — ED NOTES
"Pt pressing call light several times asking for pain meds. Admit team paged, verbal order for 650 acetaminophen PO. Pt refused. Pt states, "you can keep that, that isnt going to work for me ...".  "

## 2019-11-01 NOTE — PLAN OF CARE
POC reviewed with pt and family at 1400. Pt verbalized understanding. Questions and concerns addressed. Pt taken to MRI for 24 hr post tpa scan. No acute events today. Pt progressing toward goals. Will continue to monitor. See flowsheets for full assessment and VS info.

## 2019-11-01 NOTE — ASSESSMENT & PLAN NOTE
New onset R sided plegia presenting at 6am, s/p tpa at [place] @ 10:30, CTA head and neck showed no acute infarct or hemorrhage  - neuro checks Q1hr   - vital checks Q1hr   - SBP <180   - on Atorvastatin 40 mg daily   - on Benazepril tab 40 mg daily   -s/p tPA 10/31 (1003)   -repeat MRI acute infarct coursing through left posterior limb of internal capsule and corona radiata   - PT/OT/SLP when appropriate

## 2019-11-01 NOTE — PLAN OF CARE
Eval and POC set 11/1/19    Problem: Occupational Therapy Goal  Goal: Occupational Therapy Goal  Description  Goals to be met by: 7 days (11/8/19)     Patient will increase functional independence with ADLs by performing:    UE Dressing with Contact Guard Assistance.  LE Dressing with Minimal Assistance.  Grooming while standing with Minimal Assistance.  Toileting from bedside commode with Minimal Assistance for hygiene and clothing management.   Toilet transfer to bedside commode with Contact Guard Assistance.  Increased functional strength to WFL for ADLs.  Complete functional mobility household distance with CGA using AD as needed.     Outcome: Ongoing, Progressing

## 2019-11-01 NOTE — SUBJECTIVE & OBJECTIVE
Neurologic Chief Complaint: L MCA    Subjective:     Interval History: Patient is seen for follow-up neurological assessment and treatment recommendations: NAEON, no new complaints, continued RSW    HPI, Past Medical, Family, and Social History remains the same as documented in the initial encounter.     Review of Systems   Constitutional: Negative for chills and fever.   Respiratory: Negative for cough.    Cardiovascular: Negative for chest pain.   Gastrointestinal: Negative for nausea and vomiting.   Neurological: Positive for facial asymmetry and weakness. Negative for speech difficulty and numbness.     Scheduled Meds:   atorvastatin  40 mg Oral Daily    benazepril  40 mg Oral Daily    gabapentin  600 mg Oral Q8H    lidocaine  2 patch Transdermal Q24H    senna-docusate 8.6-50 mg  1 tablet Oral Daily     Continuous Infusions:   niCARdipine Stopped (11/01/19 0805)     PRN Meds:acetaminophen, Dextrose 10% Bolus, Dextrose 10% Bolus, glucagon (human recombinant), insulin aspart U-100, sodium chloride 0.9%    Objective:     Vital Signs (Most Recent):  Temp: 97.9 °F (36.6 °C) (11/01/19 1205)  Pulse: 90 (11/01/19 1305)  Resp: (!) 37 (11/01/19 1305)  BP: 136/65 (11/01/19 1305)  SpO2: 100 % (11/01/19 1305)  BP Location: Left arm    Vital Signs Range (Last 24H):  Temp:  [97.8 °F (36.6 °C)-98.2 °F (36.8 °C)]   Pulse:  [69-91]   Resp:  [12-38]   BP: (118-154)/(57-82)   SpO2:  [96 %-100 %]   BP Location: Left arm    Physical Exam   Constitutional: She appears well-developed and well-nourished. No distress.   HENT:   Head: Normocephalic and atraumatic.   Eyes: Pupils are equal, round, and reactive to light. EOM are normal.   Cardiovascular: Normal rate.   Pulmonary/Chest: Effort normal. No respiratory distress.   Neurological: She is alert.   Skin: Skin is warm and dry.   Vitals reviewed.      Neurological Exam:   LOC: alert  Attention Span: Good   Language: No aphasia  Articulation: No dysarthria  Orientation: Person,  Place, Time   Visual Fields: Full  EOM (CN III, IV, VI): Full/intact  Pupils (CN II, III): PERRL  Facial Movement (CN VII): Lower facial weakness on the Right  Motor: Arm left  Normal 5/5  Leg left  Normal 5/5  Arm right  Paresis: 4/5  Leg right Paresis: 3/5  Sensation: Intact to light touch, temperature and vibration  Tone: Normal tone throughout    Laboratory:  CMP:   Recent Labs   Lab 11/01/19  0446   CALCIUM 10.4   ALBUMIN 4.3   PROT 8.5*      K 3.2*   CO2 22*      BUN 13   CREATININE 0.8   ALKPHOS 143*   ALT 24   AST 29   BILITOT 0.7     CBC:   Recent Labs   Lab 11/01/19  0446   WBC 9.23   RBC 5.13   HGB 13.6   HCT 40.9      MCV 80*   MCH 26.5*   MCHC 33.3     Lipid Panel:   Recent Labs   Lab 10/31/19  1437   CHOL 239*   LDLCALC 168.4*   HDL 42   TRIG 143     Coagulation:   Recent Labs   Lab 10/31/19  1015   INR 1.0   APTT 26.4     Platelet Aggregation Study: No results for input(s): PLTAGG, PLTAGINTERP, PLTAGREGLACO, ADPPLTAGGREG in the last 168 hours.  Hgb A1C:   Recent Labs   Lab 10/31/19  1437   HGBA1C 8.3*     TSH:   Recent Labs   Lab 10/31/19  1437   TSH 0.447       Diagnostic Results     Brain Imaging   MRI Brain (11/01/19):  Acute lacunar type infarct coursing through the left posterior limb internal capsule and corona radiata.    CTH (10/31/19):  No acute intracranial pathology    Vessel Imaging   CTA-MP (10/31/19):  Atherosclerotic disease of the cavernous and supraclinoid ICAs with mild narrowing. No high-grade stenosis or LVO. Less than 50% proximal ICA stenosis. mild-moderate narrowing of the right vertebral artery origin. There is mild left V1 segment narrowing.  No significant focal vertebral artery stenosis or occlusion.    Cardiac Imaging   Echo (11/01/19)  · Increased (hyperdynamic) left ventricular systolic function. The estimated ejection fraction is 75%  · Concentric left ventricular remodeling.  · Normal LV diastolic function.  · No wall motion abnormalities.  · Normal  right ventricular systolic function.  · Normal central venous pressure (3 mm Hg).  · Mild tricuspid regurgitation.  · The estimated PA systolic pressure is 26 mm Hg  · Echolucent structure next to the LA, likely representing hiatal hernia. Clinincal correlation is required.

## 2019-11-01 NOTE — ASSESSMENT & PLAN NOTE
Area of cytotoxic cerebral edema identified when reviewing brain imaging in the territory of the L middle cerebral artery. There is no mass effect associated with it. We will continue to monitor the patients clinical exam for any worsening of symptoms which may indicate expansion of the stroke or the area of the edema resulting in the clinical change. The pattern is suggestive of small vessel etiology.

## 2019-11-01 NOTE — PROGRESS NOTES
"[facility]  Neurocritical Care  Progress Note    Admit Date: 10/31/2019  Service Date: 11/01/2019  Length of Stay: 1    Subjective:     Chief Complaint: Stroke due to embolism of left middle cerebral artery    History of Present Illness: The pt is a 73 yo F with IDDM and HTN that presents as a transfer for a stroke code. She reports that she has been having right sided weakness most notably in the upper extremities since the end of September- noted that her already terrible hand writing was nearly illegible since then. She woke up this morning with a marked difference in her strength- stating that she was unable to get out of bed and that she had marked increase in her weakness across her entire right body. Also reporting diminished sensation on the right sense- described as "numbness".  Symptoms initially began around 6 am and patient was initially admitted to [place] where CT head showed no bleed so TPA was administered at 1030. [place] [facility] [place] head were done showing bandlike region hypoattenuation left posterior limb internal capsule unchanged from prior suggestive for possible recent to subacute age infarction. Patient is currently stable endorsing a minor im    Hospital Course: 10/31/2019: admitted to hospital   11/1/2019   NAEO. Neuro exam improving. TPA window ends at 1130. Defer SQH and ASA until MRI brain completed. Started home benazepril and gabapentin. Started bowel regimen. Afebrile, no leukocytosis, and H&H/Plts stable. If MRI stable         Interval History:  NAEO. Neuro exam improving. TPA window ends at 1130. Defer SQH and ASA until MRI brain completed. Started home benazepril and gabapentin. Started bowel regimen. Afebrile, no leukocytosis, and H&H/Plts stable. If MRI stable     Review of Systems   Constitutional: Negative for fever.   Eyes: Negative for photophobia.   Respiratory: Negative for chest tightness and shortness of breath.  "   Cardiovascular: Negative for chest pain.   Gastrointestinal: Negative for diarrhea, nausea and vomiting.   Neurological: Positive for weakness. Negative for speech difficulty.   Psychiatric/Behavioral: Negative for agitation and behavioral problems.       Objective:     Vitals:  Temp: 97.8 °F (36.6 °C)  Pulse: 81  Rhythm: normal sinus rhythm  BP: (!) 154/74  MAP (mmHg): 106  Resp: (!) 28  SpO2: 100 %  O2 Device (Oxygen Therapy): room air    Temp  Min: 97.8 °F (36.6 °C)  Max: 98.2 °F (36.8 °C)  Pulse  Min: 61  Max: 91  BP  Min: 118/74  Max: 207/99  MAP (mmHg)  Min: 84  Max: 110  Resp  Min: 12  Max: 38  SpO2  Min: 96 %  Max: 100 %    10/31 0701 - 11/01 0700  In: 174.5 [I.V.:174.5]  Out: -            Physical Exam   Constitutional: She appears well-developed and well-nourished.   HENT:   Head: Normocephalic.   Cardiovascular: Normal rate and regular rhythm.   Pulmonary/Chest: Effort normal and breath sounds normal.   Abdominal: Soft. Bowel sounds are normal.   Neurological:   -AAOx4, follows commands   -E4V5M6 GCS (15)   -PERRLA, EOMI  -Face symmetric  -ASHRAF spontaneously and against gravity   -Diminished strength and sensation on Right side   -c/o of neuropathy down posterior leg         Medications:  Continuous  niCARdipine Last Rate: Stopped (11/01/19 0805)   Scheduled  atorvastatin 40 mg Daily   benazepril 40 mg Daily   gabapentin 600 mg TID   lidocaine 2 patch Q24H   senna-docusate 8.6-50 mg 1 tablet Daily   PRN  acetaminophen 650 mg Q4H PRN   Dextrose 10% Bolus 12.5 g PRN   Dextrose 10% Bolus 25 g PRN   glucagon (human recombinant) 1 mg PRN   insulin aspart U-100 1-10 Units Q6H PRN   sodium chloride 0.9% 10 mL PRN     Today I personally reviewed pertinent medications, lines/drains/airways, imaging, cardiology results, laboratory results, microbiology results, notably:    Diet  Diet NPO  Diet NPO        Assessment/Plan:     Neuro  * Stroke due to embolism of left middle cerebral artery  New onset R sided plegia  presenting at 6am, s/p tpa at [place] @ 10:30, CTA head and neck showed no acute infarct or hemorrhage  - neuro checks Q1hr   - vital checks Q1hr   - SBP <180   - on Atorvastatin 40 mg daily   - on Benazepril tab 40 mg daily   -s/p tPA 10/31 (1003)   -repeat MRI acute infarct coursing through left posterior limb of internal capsule and corona radiata   - PT/OT/SLP when appropriate      Neuropathy  Restarted on home meds Gabapentin 600 mg TID   Reports chronic neuropathic pain         Cardiac/Vascular  Essential hypertension  -SBP goal  HTN <180 in the setting of ischemic stroke  -on cardene gtt   -restarted home med Benazepril tab 40 mg daily     Endocrine  Diabetes mellitus type 2 without retinopathy  -HgA1C 8.3 (10/31)   -Glucose checks  -SSI    Orthopedic  Sciatica  Pt complaints of pain radiating down posterior leg   On Gabapentin     Other  Debility  2/2 stroke   Right sided weakness   PT/OT when appropriate           The patient is being Prophylaxed for:  Venous Thromboembolism with: Mechanical  Stress Ulcer with: None  Ventilator Pneumonia with: not applicable    Activity Orders          Diet NPO: NPO starting at 10/31 1350        Full Code  I have spent 35 min with this patient, with over 50% of this time spent coordinating care and speaking with the family    Faraz Weinstein PA-C  Neurocritical Care  [facility]

## 2019-11-01 NOTE — PT/OT/SLP EVAL
Speech Language Pathology Evaluation  Cognitive/Bedside Swallow    Patient Name:  Melva Barker   MRN:  [id number]  Admitting Diagnosis: Stroke due to embolism of left middle cerebral artery    Recommendations:                  General Recommendations:  Speech/language therapy and Cognitive-linguistic therapy  Diet recommendations:  Regular, Thin   Aspiration Precautions: Strict aspiration precautions   General Precautions: Standard, aspiration, fall  Communication strategies:  provide increased time to answer and go to room if call light pushed    History:     Past Medical History:   Diagnosis Date    Anxiety     Back pain     chronic    Diabetes mellitus     Gastroparesis     Hypertension     Sciatica        History reviewed. No pertinent surgical history.    Social History: Patient lives with sister whom she care for 2/2 Alzheimer's Disease.  Her nephew also lives with them though is also in poor health 2/2 COPD.  Pt reports she manages also household tasks including finances and medications.  She drives.     Prior Intubation HX:  None this admission     Prior diet: reg/thin.    Subjective     Pt seen bedside, cooperative. RN reports coughing episode with meds last night, pt took all medications at one time.     Pain/Comfort:  · Pain Rating 1: 8/10  · Location - Side 1: Right  · Location 1: (side)  · Pain Rating Post-Intervention 1: 8/10    Objective:     Cognitive Status:    Orientation Oriented x4  Memory Immediate Recall 100% up to 5 digits/words and Delayed 0/3 unassociated words recalled after delay  Problem Solving Categories 50% with assist, Sequencing multiple repetitions 100%, Solutions 100% and Compare/contrast 100%      Receptive Language:   Comprehension:   WFL  Questions Complex yes/no 100%  Commands  multistep basic commands repetition x1    Pragmatics:    WFL    Expressive Language:  Verbal:    Pt able to express simple thought though some circumlocution noted   Naming Confrontation 100%  and Divergent responsive 5 items stated in concrete cat in 1 min, 15-20 is WFL      Motor Speech:  WFL    Voice:   WFL, low intensity    Visual-Spatial:  tba    Reading:   tba     Written Expression:   tba    Oral Musculature Evaluation  · Oral Musculature: (mild R droop)  · Dentition: edentulous  · Secretion Management: adequate  · Mucosal Quality: adequate  · Mandibular Strength and Mobility: WFL  · Oral Labial Strength and Mobility: functional seal  · Lingual Strength and Mobility: functional strength  · Buccal Strength and Mobility: decreased tone(mild)  · Volitional Cough: WFL  · Volitional Swallow: WFL  · Voice Prior to PO Intake: clear, low intensity, no dysarthria    Bedside Swallow Eval:   Consistencies Assessed:  · Thin liquids    · Puree    · Solids        Oral Phase:   · WFL    Pharyngeal Phase:   · no overt clinical signs/symptoms of aspiration  · no overt clinical signs/symptoms of pharyngeal dysphagia    Compensatory Strategies  · None    Treatment: Education provided re: role of SLP, diet recs, swallow precs and SLP POC.     Assessment:     Melva Barker is a 74 y.o. female with an SLP diagnosis of possible mild aphasia, cogntiive lingusitic impairment.  .    Goals:   Multidisciplinary Problems     SLP Goals        Problem: SLP Goal    Goal Priority Disciplines Outcome   SLP Goal     SLP Ongoing, Progressing   Description:  Speech Language Pathology Goals  Goals expected to be met by 11/8  1. Pt will participate in conversation without cues to express thought.   2. Pt will complete simple functional reasoning tasks with 80% accy given min cues.   3. Pt will complete assessment of reading, writing, visual spatial skills to determine need for tx.                           Plan:     · Patient to be seen:  3 x/week   · Plan of Care expires:  12/01/19  · Plan of Care reviewed with:  patient   · SLP Follow-Up:  Yes       Discharge recommendations:  Discharge Facility/Level of Care Needs: home health  speech therapy(pending PT/OT)   Barriers to Discharge:  Decreased Care Giver Support    Time Tracking:     SLP Treatment Date:   11/01/19  Speech Start Time:  0735  Speech Stop Time:  0752     Speech Total Time (min):  17 min    Billable Minutes: Eval 9  and Eval Swallow and Oral Function 8    CRESENCIO Bryant, CCC-SLP  11/01/2019

## 2019-11-01 NOTE — PLAN OF CARE
Problem: Physical Therapy Goal  Goal: Physical Therapy Goal  Description  Goals to be met by:      Patient will increase functional independence with mobility by performin. Supine to sit with Stand-by Assistance  2. Sit to supine with Stand-by Assistance  3. Sit to stand transfer with Stand-by Assistance  4. Gait  X 25 feet with Minimal Assistance using least restrictive device.   5. Stand for 1 minutes with Contact Guard Assistance using  least restrictive device or no AD  6. Lower extremity exercise program x20 reps per handout, with independence to improve strength and activity tolerance.      Outcome: Ongoing, Progressing   Evaluation completed, initiated plan of care.   Hortencia Umaña, PT  2019

## 2019-11-01 NOTE — PLAN OF CARE
CM met with patient for Discharge Planning Assessment.  Per patient, she resides with her sister (who has Alzheimer's) and a nephew (who has COPD).  Patient stated that she is the caretaker for both relatives.  Per patient, she was independent and used no dme PTA.  Patient stated that she has a rolling walker and a quad cane at home.  Per patient, she will have the assistance of a friend (Meka Ruiz [phone]) upon dishcarge.  Patient stated that she does not have a ride home.         11/01/19 1340   Discharge Assessment   Assessment Type Discharge Planning Assessment   Confirmed/corrected address and phone number on facesheet? Yes   Assessment information obtained from? Patient   Expected Length of Stay (days) 3   Prior to hospitilization cognitive status: Alert/Oriented   Prior to hospitalization functional status: Independent;Assistive Equipment   Current cognitive status: Alert/Oriented   Current Functional Status: Needs Assistance   Facility Arrived From: [facility]   Lives With sibling(s);other relative(s)  (Patient stated that she lives with her sister with Alzheimer's and a nephew with COPD)   Able to Return to Prior Arrangements yes   Is patient able to care for self after discharge? Unable to determine at this time (comments)   Who are your caregiver(s) and their phone number(s)? Paolo Barker (son) [phone]   Patient's perception of discharge disposition rehab facility   Readmission Within the Last 30 Days no previous admission in last 30 days   Patient currently being followed by outpatient case management? No   Patient currently receives any other outside agency services? No   Equipment Currently Used at Home cane, quad;walker, rolling   Do you have any problems affording any of your prescribed medications? No   Does the patient have transportation home? No   Does the patient receive services at the [facility]? No   Discharge Plan A Rehab   Discharge Plan B Home;Home Health   DME  Needed Upon Discharge  none   Patient/Family in Agreement with Plan yes         Discharge/ My Health Packet Folder Given to patient/family:      yes      PCP:   Claire Souza MD        Pharm[place]    [facility] [place] [address]  [address]  [place] [address]  Phone: [phone] Fax: [phone]        Emergency Contacts:    Extended Emergency Contact Information  Primary Emergency Contact: Aliyah Nguyen   [facility]  Home Phone: [phone]  Mobile Phone: [phone]  Relation: Relative  Secondary Emergency Contact: TAMMY CACREES  Mobile Phone: [phone]  Relation: Friend  Preferred language: English   needed? No    Insurance:    Payor: [employer] MANAGED MEDICARE / Plan: [employer] MEDICARE HMO / Product Type: Capitation /     Danelle Khanna RN, CCRN-K, [facility]  Neuro-Critical Care   X [phone]

## 2019-11-02 PROBLEM — R11.2 NAUSEA & VOMITING: Status: ACTIVE | Noted: 2019-11-02

## 2019-11-02 LAB
ALBUMIN SERPL BCP-MCNC: 4.3 G/DL (ref 3.5–5.2)
ALP SERPL-CCNC: 145 U/L (ref 55–135)
ALT SERPL W/O P-5'-P-CCNC: 24 U/L (ref 10–44)
ANION GAP SERPL CALC-SCNC: 18 MMOL/L (ref 8–16)
AST SERPL-CCNC: 29 U/L (ref 10–40)
BASOPHILS # BLD AUTO: 0.05 K/UL (ref 0–0.2)
BASOPHILS NFR BLD: 0.6 % (ref 0–1.9)
BILIRUB SERPL-MCNC: 1.1 MG/DL (ref 0.1–1)
BUN SERPL-MCNC: 12 MG/DL (ref 8–23)
CALCIUM SERPL-MCNC: 10.3 MG/DL (ref 8.7–10.5)
CHLORIDE SERPL-SCNC: 104 MMOL/L (ref 95–110)
CO2 SERPL-SCNC: 17 MMOL/L (ref 23–29)
CREAT SERPL-MCNC: 0.8 MG/DL (ref 0.5–1.4)
DIFFERENTIAL METHOD: ABNORMAL
EOSINOPHIL # BLD AUTO: 0.3 K/UL (ref 0–0.5)
EOSINOPHIL NFR BLD: 3.8 % (ref 0–8)
ERYTHROCYTE [DISTWIDTH] IN BLOOD BY AUTOMATED COUNT: 14.1 % (ref 11.5–14.5)
EST. GFR  (AFRICAN AMERICAN): >60 ML/MIN/1.73 M^2
EST. GFR  (NON AFRICAN AMERICAN): >60 ML/MIN/1.73 M^2
GLUCOSE SERPL-MCNC: 164 MG/DL (ref 70–110)
HCT VFR BLD AUTO: 39.7 % (ref 37–48.5)
HGB BLD-MCNC: 12.9 G/DL (ref 12–16)
IMM GRANULOCYTES # BLD AUTO: 0.02 K/UL (ref 0–0.04)
IMM GRANULOCYTES NFR BLD AUTO: 0.2 % (ref 0–0.5)
LYMPHOCYTES # BLD AUTO: 2.7 K/UL (ref 1–4.8)
LYMPHOCYTES NFR BLD: 32.6 % (ref 18–48)
MAGNESIUM SERPL-MCNC: 2 MG/DL (ref 1.6–2.6)
MCH RBC QN AUTO: 26 PG (ref 27–31)
MCHC RBC AUTO-ENTMCNC: 32.5 G/DL (ref 32–36)
MCV RBC AUTO: 80 FL (ref 82–98)
MONOCYTES # BLD AUTO: 0.6 K/UL (ref 0.3–1)
MONOCYTES NFR BLD: 6.9 % (ref 4–15)
NEUTROPHILS # BLD AUTO: 4.6 K/UL (ref 1.8–7.7)
NEUTROPHILS NFR BLD: 55.9 % (ref 38–73)
NRBC BLD-RTO: 0 /100 WBC
PHOSPHATE SERPL-MCNC: 3.7 MG/DL (ref 2.7–4.5)
PLATELET # BLD AUTO: 250 K/UL (ref 150–350)
PMV BLD AUTO: 12.1 FL (ref 9.2–12.9)
POCT GLUCOSE: 149 MG/DL (ref 70–110)
POCT GLUCOSE: 186 MG/DL (ref 70–110)
POCT GLUCOSE: 190 MG/DL (ref 70–110)
POCT GLUCOSE: 210 MG/DL (ref 70–110)
POTASSIUM SERPL-SCNC: 3.2 MMOL/L (ref 3.5–5.1)
PROT SERPL-MCNC: 8.6 G/DL (ref 6–8.4)
RBC # BLD AUTO: 4.97 M/UL (ref 4–5.4)
SODIUM SERPL-SCNC: 139 MMOL/L (ref 136–145)
WBC # BLD AUTO: 8.17 K/UL (ref 3.9–12.7)

## 2019-11-02 PROCEDURE — 63600175 PHARM REV CODE 636 W HCPCS

## 2019-11-02 PROCEDURE — 99233 SBSQ HOSP IP/OBS HIGH 50: CPT | Mod: GC,,, | Performed by: PSYCHIATRY & NEUROLOGY

## 2019-11-02 PROCEDURE — 25000003 PHARM REV CODE 250: Performed by: NURSE PRACTITIONER

## 2019-11-02 PROCEDURE — 85025 COMPLETE CBC W/AUTO DIFF WBC: CPT

## 2019-11-02 PROCEDURE — 83735 ASSAY OF MAGNESIUM: CPT

## 2019-11-02 PROCEDURE — 25000003 PHARM REV CODE 250: Performed by: STUDENT IN AN ORGANIZED HEALTH CARE EDUCATION/TRAINING PROGRAM

## 2019-11-02 PROCEDURE — 99233 PR SUBSEQUENT HOSPITAL CARE,LEVL III: ICD-10-PCS | Mod: GC,,, | Performed by: PSYCHIATRY & NEUROLOGY

## 2019-11-02 PROCEDURE — 63600175 PHARM REV CODE 636 W HCPCS: Performed by: STUDENT IN AN ORGANIZED HEALTH CARE EDUCATION/TRAINING PROGRAM

## 2019-11-02 PROCEDURE — 63600175 PHARM REV CODE 636 W HCPCS: Performed by: NURSE PRACTITIONER

## 2019-11-02 PROCEDURE — 84100 ASSAY OF PHOSPHORUS: CPT

## 2019-11-02 PROCEDURE — 25000003 PHARM REV CODE 250: Performed by: PSYCHIATRY & NEUROLOGY

## 2019-11-02 PROCEDURE — 20600001 HC STEP DOWN PRIVATE ROOM

## 2019-11-02 PROCEDURE — 80053 COMPREHEN METABOLIC PANEL: CPT

## 2019-11-02 RX ORDER — LABETALOL HCL 20 MG/4 ML
5 SYRINGE (ML) INTRAVENOUS ONCE
Status: COMPLETED | OUTPATIENT
Start: 2019-11-02 | End: 2019-11-02

## 2019-11-02 RX ORDER — SODIUM CHLORIDE 9 MG/ML
INJECTION, SOLUTION INTRAVENOUS CONTINUOUS
Status: DISCONTINUED | OUTPATIENT
Start: 2019-11-02 | End: 2019-11-05

## 2019-11-02 RX ORDER — HYDROCHLOROTHIAZIDE 12.5 MG/1
12.5 TABLET ORAL DAILY
Status: DISCONTINUED | OUTPATIENT
Start: 2019-11-02 | End: 2019-11-03

## 2019-11-02 RX ORDER — BISACODYL 10 MG
10 SUPPOSITORY, RECTAL RECTAL ONCE
Status: COMPLETED | OUTPATIENT
Start: 2019-11-02 | End: 2019-11-02

## 2019-11-02 RX ORDER — BISOPROLOL FUMARATE 5 MG/1
10 TABLET, FILM COATED ORAL DAILY
Status: DISCONTINUED | OUTPATIENT
Start: 2019-11-02 | End: 2019-11-06

## 2019-11-02 RX ORDER — POTASSIUM CHLORIDE 7.45 MG/ML
10 INJECTION INTRAVENOUS
Status: DISCONTINUED | OUTPATIENT
Start: 2019-11-02 | End: 2019-11-02

## 2019-11-02 RX ORDER — PANTOPRAZOLE SODIUM 40 MG/1
40 TABLET, DELAYED RELEASE ORAL DAILY
Status: DISCONTINUED | OUTPATIENT
Start: 2019-11-02 | End: 2019-11-05

## 2019-11-02 RX ORDER — ONDANSETRON 2 MG/ML
4 INJECTION INTRAMUSCULAR; INTRAVENOUS ONCE
Status: COMPLETED | OUTPATIENT
Start: 2019-11-02 | End: 2019-11-02

## 2019-11-02 RX ORDER — ONDANSETRON 2 MG/ML
INJECTION INTRAMUSCULAR; INTRAVENOUS
Status: COMPLETED
Start: 2019-11-02 | End: 2019-11-02

## 2019-11-02 RX ORDER — ONDANSETRON 2 MG/ML
4 INJECTION INTRAMUSCULAR; INTRAVENOUS EVERY 6 HOURS PRN
Status: DISCONTINUED | OUTPATIENT
Start: 2019-11-02 | End: 2019-11-13

## 2019-11-02 RX ORDER — CLOPIDOGREL BISULFATE 75 MG/1
75 TABLET ORAL DAILY
Status: DISCONTINUED | OUTPATIENT
Start: 2019-11-02 | End: 2019-11-08

## 2019-11-02 RX ADMIN — ATORVASTATIN CALCIUM 40 MG: 20 TABLET, FILM COATED ORAL at 08:11

## 2019-11-02 RX ADMIN — ONDANSETRON 4 MG: 2 INJECTION INTRAMUSCULAR; INTRAVENOUS at 08:11

## 2019-11-02 RX ADMIN — POTASSIUM CHLORIDE 10 MEQ: 7.46 INJECTION, SOLUTION INTRAVENOUS at 09:11

## 2019-11-02 RX ADMIN — SODIUM CHLORIDE: 0.9 INJECTION, SOLUTION INTRAVENOUS at 06:11

## 2019-11-02 RX ADMIN — ONDANSETRON 4 MG: 2 INJECTION INTRAMUSCULAR; INTRAVENOUS at 07:11

## 2019-11-02 RX ADMIN — CLOPIDOGREL BISULFATE 75 MG: 75 TABLET ORAL at 08:11

## 2019-11-02 RX ADMIN — HYDROCHLOROTHIAZIDE 12.5 MG: 12.5 TABLET ORAL at 08:11

## 2019-11-02 RX ADMIN — BENAZEPRIL HYDROCHLORIDE 40 MG: 10 TABLET ORAL at 08:11

## 2019-11-02 RX ADMIN — POTASSIUM CHLORIDE 10 MEQ: 7.46 INJECTION, SOLUTION INTRAVENOUS at 05:11

## 2019-11-02 RX ADMIN — GABAPENTIN 600 MG: 300 CAPSULE ORAL at 06:11

## 2019-11-02 RX ADMIN — PANTOPRAZOLE SODIUM 40 MG: 40 TABLET, DELAYED RELEASE ORAL at 08:11

## 2019-11-02 RX ADMIN — GABAPENTIN 600 MG: 300 CAPSULE ORAL at 09:11

## 2019-11-02 RX ADMIN — PROMETHAZINE HYDROCHLORIDE 12.5 MG: 25 INJECTION INTRAMUSCULAR; INTRAVENOUS at 02:11

## 2019-11-02 RX ADMIN — POTASSIUM CHLORIDE 10 MEQ: 7.46 INJECTION, SOLUTION INTRAVENOUS at 08:11

## 2019-11-02 RX ADMIN — BISACODYL 10 MG: 10 SUPPOSITORY RECTAL at 04:11

## 2019-11-02 RX ADMIN — GABAPENTIN 600 MG: 300 CAPSULE ORAL at 02:11

## 2019-11-02 RX ADMIN — LIDOCAINE 2 PATCH: 50 PATCH TOPICAL at 02:11

## 2019-11-02 RX ADMIN — ONDANSETRON 4 MG: 2 INJECTION INTRAMUSCULAR; INTRAVENOUS at 05:11

## 2019-11-02 RX ADMIN — LABETALOL HCL IV SOLN PREFILLED SYRINGE 20 MG/4ML (5 MG/ML) 5 MG: 20/4 SOLUTION PREFILLED SYRINGE at 01:11

## 2019-11-02 RX ADMIN — BISOPROLOL FUMARATE 10 MG: 5 TABLET, COATED ORAL at 11:11

## 2019-11-02 RX ADMIN — ASPIRIN 81 MG CHEWABLE TABLET 81 MG: 81 TABLET CHEWABLE at 08:11

## 2019-11-02 RX ADMIN — SENNOSIDES AND DOCUSATE SODIUM 1 TABLET: 8.6; 5 TABLET ORAL at 08:11

## 2019-11-02 NOTE — ASSESSMENT & PLAN NOTE
Patient with episodes of vomiting early this AM  IV zofran ordered with mild relief  Pt with no BM since 10/30 - KUB pending

## 2019-11-02 NOTE — PROGRESS NOTES
[facility]  Vascular Neurology  Comprehensive Stroke Center  Progress Note    Assessment/Plan:     * Stroke due to embolism of left middle cerebral artery  Melva Barker is a 74 y.o. female with PMHx of HTN, HLD, and DM who presented to OSH with acute worsening of RSW. Patient had been experiencing RSW x1 month. Yesterday, she began having acute worsening. She was treated with tPA at OSH. CTA without LVO. MRI with infarct in L internal capsule and corona radiata. Etiology likely small vessel disease    Antithrombotics for secondary stroke prevention: Antiplatelets: asa 81 mg + plavix 75 mg   Statins for secondary stroke prevention and hyperlipidemia, if present: Statins: Atorvastatin- 40 mg daily,   Aggressive risk factor modification: HTN, HLD, Diet     Rehab efforts: The patient has been evaluated by a stroke team provider and the therapy needs have been fully considered based off the presenting complaints and exam findings. The following therapy evaluations are needed: PT evaluate and treat, OT evaluate and treat, SLP evaluate and treat, PM&R evaluate for appropriate placement - rehab placement   Diagnostics ordered/pending: None   VTE prophylaxis: None: Reason for No Pharmacological VTE Prophylaxis: Mechanical prophylaxis: Place SCDs, post tPA, start sq heparin 24 hours post tPA  BP parameters: Infarct: Post tPA, SBP <180        Cytotoxic brain edema  Area of cytotoxic cerebral edema identified when reviewing brain imaging in the territory of the L middle cerebral artery. There is no mass effect associated with it. We will continue to monitor the patients clinical exam for any worsening of symptoms which may indicate expansion of the stroke or the area of the edema resulting in the clinical change. The pattern is suggestive of small vessel etiology.        Essential hypertension  Stroke risk factor  SBP <180 post tPA  Currently on Benazepril 40 mg daily, HCTZ 12.5 (increased  from home dose of 6.25), and bisoprolol 10 mg daily  Was on combo med at home - Bisoprolol 10 mg + HCTZ 6.25 mg       Diabetes mellitus type 2 without retinopathy  stroke risk factor, poorly controlled on glargine 17 units daily  HbA1c 8.3  Currently on SSI   Diabetic diet           11/1/19 MRI with small vessel L MCA infarct, therapy recommending rehab  11/2 - Patient stepped down overnight. Adjusting BP regimen. Patient with continuous complaints if nausea. IV Zofran ordered with some relief. Patient has not has BM since 10/30. Ordering KUB to rule out obstruction. Neuro exam unchanged.     STROKE DOCUMENTATION   Acute Stroke Times   Last Known Normal Date: 10/31/19  Last Known Normal Time: 0630  Symptom Onset Date: 10/31/19  Symptom Onset Time: 0730  Stroke Team Called Date: 10/31/19  Stroke Team Called Time: 0936  Stroke Team Arrival Date: 10/31/19  Stroke Team Arrival Time: 0946  Decision to Treat Time for Alteplase: 1003    NIH Scale:  1a. Level of Consciousness: 0-->Alert, keenly responsive  1b. LOC Questions: 1-->Answers one question correctly  1c. LOC Commands: 0-->Performs both tasks correctly  2. Best Gaze: 0-->Normal  3. Visual: 0-->No visual loss  4. Facial Palsy: 1-->Minor paralysis (flattened nasolabial fold, asymmetry on smiling)  5a. Motor Arm, Left: 0-->No drift, limb holds 90 (or 45) degrees for full 10 secs  5b. Motor Arm, Right: 1-->Drift, limb holds 90 (or 45) degrees, but drifts down before full 10 secs, does not hit bed or other support  6a. Motor Leg, Left: 0-->No drift, leg holds 30 degree position for full 5 secs  6b. Motor Leg, Right: 3-->No effort against gravity, leg falls to bed immediately  7. Limb Ataxia: 0-->Absent  8. Sensory: 1-->Mild-to-moderate sensory loss, patient feels pinprick is less sharp or is dull on the affected side, or there is a loss of superficial pain with pinprick, but patient is aware of being touched  9. Best Language: 0-->No aphasia, normal  10. Dysarthria:  0-->Normal  11. Extinction and Inattention (formerly Neglect): 0-->No abnormality  Total (NIH Stroke Scale): 7       Modified [place] Score: 0  Nabila Coma Scale:    ABCD2 Score:    [id number] Score:   HAS -BLED Score:   ICH Score:   Hunt & Amaya Classification:      Hemorrhagic change of an Ischemic Stroke: Does this patient have an ischemic stroke with hemorrhagic changes? No     Neurologic Chief Complaint: L MCA    Subjective:     Interval History: Patient is seen for follow-up neurological assessment and treatment recommendations:     Patient stepped down overnight. Adjusting BP regimen. Patient with continuous complaints if nausea. IV Zofran ordered with some relief. Patient has not has BM since 10/30. Ordering KUB to rule out obstruction. Neuro exam unchanged.     HPI, Past Medical, Family, and Social History remains the same as documented in the initial encounter.     Review of Systems   Constitutional: Negative for chills and fever.   Respiratory: Negative for cough.    Cardiovascular: Negative for chest pain.   Gastrointestinal: Positive for nausea and vomiting.   Neurological: Positive for facial asymmetry and weakness. Negative for speech difficulty and numbness.     Scheduled Meds:   aspirin  81 mg Oral Daily    atorvastatin  40 mg Oral Daily    benazepril  40 mg Oral Daily    bisoprolol  10 mg Oral Daily    clopidogrel  75 mg Oral Daily    gabapentin  600 mg Oral Q8H    hydroCHLOROthiazide  12.5 mg Oral Daily    lidocaine  2 patch Transdermal Q24H    pantoprazole  40 mg Oral Daily    senna-docusate 8.6-50 mg  1 tablet Oral Daily     Continuous Infusions:   sodium chloride 0.9% 50 mL/hr at 11/02/19 0654     PRN Meds:acetaminophen, Dextrose 10% Bolus, Dextrose 10% Bolus, glucagon (human recombinant), insulin aspart U-100, ondansetron, sodium chloride 0.9%    Objective:     Vital Signs (Most Recent):  Temp: 98.4 °F (36.9 °C) (11/02/19 0900)  Pulse: 103 (11/02/19 0900)  Resp: 18 (11/02/19  0900)  BP: (!) 182/88 (11/02/19 0900)  SpO2: 100 % (11/02/19 0900)  BP Location: Left arm    Vital Signs Range (Last 24H):  Temp:  [97.9 °F (36.6 °C)-99 °F (37.2 °C)]   Pulse:  []   Resp:  [18-37]   BP: (136-187)/()   SpO2:  [98 %-100 %]   BP Location: Left arm    Physical Exam   Constitutional: She appears well-developed and well-nourished. No distress.   HENT:   Head: Normocephalic and atraumatic.   Eyes: Pupils are equal, round, and reactive to light. EOM are normal.   Cardiovascular: Normal rate.   Pulmonary/Chest: Effort normal. No respiratory distress.   Neurological: She is alert.   Skin: Skin is warm and dry.   Vitals reviewed.      Neurological Exam:   LOC: alert  Attention Span: Good   Language: No aphasia  Articulation: No dysarthria  Orientation: Person, Time   Visual Fields: Full  EOM (CN III, IV, VI): Full/intact  Pupils (CN II, III): PERRL  Facial Movement (CN VII): Lower facial weakness on the Right  Motor: Arm left  Normal 5/5  Leg left  Normal 5/5  Arm right  Paresis: 4/5  Leg right Paresis: 3/5  Sensation: mild decrease in sensation RUE  Tone: Normal tone throughout    Laboratory:  CMP:   Recent Labs   Lab 11/02/19  0245   CALCIUM 10.3   ALBUMIN 4.3   PROT 8.6*      K 3.2*   CO2 17*      BUN 12   CREATININE 0.8   ALKPHOS 145*   ALT 24   AST 29   BILITOT 1.1*     CBC:   Recent Labs   Lab 11/02/19  0245   WBC 8.17   RBC 4.97   HGB 12.9   HCT 39.7      MCV 80*   MCH 26.0*   MCHC 32.5     Lipid Panel:   Recent Labs   Lab 10/31/19  1437   CHOL 239*   LDLCALC 168.4*   HDL 42   TRIG 143     Coagulation:   Recent Labs   Lab 10/31/19  1015   INR 1.0   APTT 26.4     Platelet Aggregation Study: No results for input(s): PLTAGG, PLTAGINTERP, PLTAGREGLACO, ADPPLTAGGREG in the last 168 hours.  Hgb A1C:   Recent Labs   Lab 10/31/19  1437   HGBA1C 8.3*     TSH:   Recent Labs   Lab 10/31/19  1437   TSH 0.447       Diagnostic Results     Brain Imaging   MRI Brain (11/01/19):  Acute  lacunar type infarct coursing through the left posterior limb internal capsule and corona radiata.    CTH (10/31/19):  No acute intracranial pathology    Vessel Imaging   CTA-MP (10/31/19):  Atherosclerotic disease of the cavernous and supraclinoid ICAs with mild narrowing. No high-grade stenosis or LVO. Less than 50% proximal ICA stenosis. mild-moderate narrowing of the right vertebral artery origin. There is mild left V1 segment narrowing.  No significant focal vertebral artery stenosis or occlusion.    Cardiac Imaging   Echo (11/01/19)  · Increased (hyperdynamic) left ventricular systolic function. The estimated ejection fraction is 75%  · Concentric left ventricular remodeling.  · Normal LV diastolic function.  · No wall motion abnormalities.  · Normal right ventricular systolic function.  · Normal central venous pressure (3 mm Hg).  · Mild tricuspid regurgitation.  · The estimated PA systolic pressure is 26 mm Hg  · Echolucent structure next to the LA, likely representing hiatal hernia. Clinincal correlation is required.      Sailaja Carter NP  Comprehensive Stroke Center  Department of Vascular Neurology   [facility]

## 2019-11-02 NOTE — NURSING
Called stroke about pt's high BP and high pulse rate and vomiting. They are following pt. No further orders.kayla Wallace RN.

## 2019-11-02 NOTE — SUBJECTIVE & OBJECTIVE
Neurologic Chief Complaint: L MCA    Subjective:     Interval History: Patient is seen for follow-up neurological assessment and treatment recommendations:     Patient stepped down overnight. Adjusting BP regimen. Patient with continuous complaints if nausea. IV Zofran ordered with some relief. Patient has not has BM since 10/30. Ordering KUB to rule out obstruction. Neuro exam unchanged.     HPI, Past Medical, Family, and Social History remains the same as documented in the initial encounter.     Review of Systems   Constitutional: Negative for chills and fever.   Respiratory: Negative for cough.    Cardiovascular: Negative for chest pain.   Gastrointestinal: Positive for nausea and vomiting.   Neurological: Positive for facial asymmetry and weakness. Negative for speech difficulty and numbness.     Scheduled Meds:   aspirin  81 mg Oral Daily    atorvastatin  40 mg Oral Daily    benazepril  40 mg Oral Daily    bisoprolol  10 mg Oral Daily    clopidogrel  75 mg Oral Daily    gabapentin  600 mg Oral Q8H    hydroCHLOROthiazide  12.5 mg Oral Daily    lidocaine  2 patch Transdermal Q24H    pantoprazole  40 mg Oral Daily    senna-docusate 8.6-50 mg  1 tablet Oral Daily     Continuous Infusions:   sodium chloride 0.9% 50 mL/hr at 11/02/19 0654     PRN Meds:acetaminophen, Dextrose 10% Bolus, Dextrose 10% Bolus, glucagon (human recombinant), insulin aspart U-100, ondansetron, sodium chloride 0.9%    Objective:     Vital Signs (Most Recent):  Temp: 98.4 °F (36.9 °C) (11/02/19 0900)  Pulse: 103 (11/02/19 0900)  Resp: 18 (11/02/19 0900)  BP: (!) 182/88 (11/02/19 0900)  SpO2: 100 % (11/02/19 0900)  BP Location: Left arm    Vital Signs Range (Last 24H):  Temp:  [97.9 °F (36.6 °C)-99 °F (37.2 °C)]   Pulse:  []   Resp:  [18-37]   BP: (136-187)/()   SpO2:  [98 %-100 %]   BP Location: Left arm    Physical Exam   Constitutional: She appears well-developed and well-nourished. No distress.   HENT:   Head:  Normocephalic and atraumatic.   Eyes: Pupils are equal, round, and reactive to light. EOM are normal.   Cardiovascular: Normal rate.   Pulmonary/Chest: Effort normal. No respiratory distress.   Neurological: She is alert.   Skin: Skin is warm and dry.   Vitals reviewed.      Neurological Exam:   LOC: alert  Attention Span: Good   Language: No aphasia  Articulation: No dysarthria  Orientation: Person, Time   Visual Fields: Full  EOM (CN III, IV, VI): Full/intact  Pupils (CN II, III): PERRL  Facial Movement (CN VII): Lower facial weakness on the Right  Motor: Arm left  Normal 5/5  Leg left  Normal 5/5  Arm right  Paresis: 4/5  Leg right Paresis: 3/5  Sensation: mild decrease in sensation RUE  Tone: Normal tone throughout    Laboratory:  CMP:   Recent Labs   Lab 11/02/19  0245   CALCIUM 10.3   ALBUMIN 4.3   PROT 8.6*      K 3.2*   CO2 17*      BUN 12   CREATININE 0.8   ALKPHOS 145*   ALT 24   AST 29   BILITOT 1.1*     CBC:   Recent Labs   Lab 11/02/19  0245   WBC 8.17   RBC 4.97   HGB 12.9   HCT 39.7      MCV 80*   MCH 26.0*   MCHC 32.5     Lipid Panel:   Recent Labs   Lab 10/31/19  1437   CHOL 239*   LDLCALC 168.4*   HDL 42   TRIG 143     Coagulation:   Recent Labs   Lab 10/31/19  1015   INR 1.0   APTT 26.4     Platelet Aggregation Study: No results for input(s): PLTAGG, PLTAGINTERP, PLTAGREGLACO, ADPPLTAGGREG in the last 168 hours.  Hgb A1C:   Recent Labs   Lab 10/31/19  1437   HGBA1C 8.3*     TSH:   Recent Labs   Lab 10/31/19  1437   TSH 0.447       Diagnostic Results     Brain Imaging   MRI Brain (11/01/19):  Acute lacunar type infarct coursing through the left posterior limb internal capsule and corona radiata.    CTH (10/31/19):  No acute intracranial pathology    Vessel Imaging   CTA-MP (10/31/19):  Atherosclerotic disease of the cavernous and supraclinoid ICAs with mild narrowing. No high-grade stenosis or LVO. Less than 50% proximal ICA stenosis. mild-moderate narrowing of the right  vertebral artery origin. There is mild left V1 segment narrowing.  No significant focal vertebral artery stenosis or occlusion.    Cardiac Imaging   Echo (11/01/19)  · Increased (hyperdynamic) left ventricular systolic function. The estimated ejection fraction is 75%  · Concentric left ventricular remodeling.  · Normal LV diastolic function.  · No wall motion abnormalities.  · Normal right ventricular systolic function.  · Normal central venous pressure (3 mm Hg).  · Mild tricuspid regurgitation.  · The estimated PA systolic pressure is 26 mm Hg  · Echolucent structure next to the LA, likely representing hiatal hernia. Clinincal correlation is required.

## 2019-11-02 NOTE — ASSESSMENT & PLAN NOTE
Melva Barker is a 74 y.o. female with PMHx of HTN, HLD, and DM who presented to OSH with acute worsening of RSW. Patient had been experiencing RSW x1 month. Yesterday, she began having acute worsening. She was treated with tPA at OSH. CTA without LVO. MRI with infarct in L internal capsule and corona radiata. Etiology likely small vessel disease    Antithrombotics for secondary stroke prevention: Antiplatelets: asa 81 mg + plavix 75 mg   Statins for secondary stroke prevention and hyperlipidemia, if present: Statins: Atorvastatin- 40 mg daily,   Aggressive risk factor modification: HTN, HLD, Diet     Rehab efforts: The patient has been evaluated by a stroke team provider and the therapy needs have been fully considered based off the presenting complaints and exam findings. The following therapy evaluations are needed: PT evaluate and treat, OT evaluate and treat, SLP evaluate and treat, PM&R evaluate for appropriate placement - rehab placement   Diagnostics ordered/pending: None   VTE prophylaxis: None: Reason for No Pharmacological VTE Prophylaxis: Mechanical prophylaxis: Place SCDs, post tPA, start sq heparin 24 hours post tPA  BP parameters: Infarct: Post tPA, SBP <180

## 2019-11-02 NOTE — ASSESSMENT & PLAN NOTE
Stroke risk factor  SBP <180 post tPA  Currently on Benazepril 40 mg daily, HCTZ 12.5 (increased from home dose of 6.25) --> increased 11/3 to 25 mg, and bisoprolol 10 mg daily  Was on combo med at home - Bisoprolol 10 mg + HCTZ 6.25 mg

## 2019-11-02 NOTE — NURSING
0538 am Pt arrived on the Unit accompanied by staff (2 male RNs).  AAOx4. No falls.  Ej Villalba RN informed this nurse that pt has been experiencing NV and Zofran was administered.  To continue to monitor.

## 2019-11-02 NOTE — PROGRESS NOTES
Pt having vomiting episode. Notified MD Vladimir. Per orders administer Zofran 4mg injection x1. RN will continue to monitor.

## 2019-11-02 NOTE — ASSESSMENT & PLAN NOTE
stroke risk factor, poorly controlled on glargine 17 units daily  HbA1c 8.3  Currently on SSI   Diabetic diet

## 2019-11-02 NOTE — PROGRESS NOTES
[facility]  Vascular Neurology  Comprehensive Stroke Center  Progress Note    Assessment/Plan:     * Stroke due to embolism of left middle cerebral artery  Melva Barker is a 74 y.o. female with PMHx of HTN, HLD, and DM who presented to OSH with acute worsening of RSW. Patient had been experiencing RSW x1 month. Yesterday, she began having acute worsening. She was treated with tPA at OSH. CTA without LVO. MRI with infarct in L internal capsule and corona radiata. Etiology likely small vessel disease    Antithrombotics for secondary stroke prevention: Antiplatelets: asa 81 mg + plavix 75 mg   Statins for secondary stroke prevention and hyperlipidemia, if present: Statins: Atorvastatin- 40 mg daily,   Aggressive risk factor modification: HTN, HLD, Diet     Rehab efforts: The patient has been evaluated by a stroke team provider and the therapy needs have been fully considered based off the presenting complaints and exam findings. The following therapy evaluations are needed: PT evaluate and treat, OT evaluate and treat, SLP evaluate and treat, PM&R evaluate for appropriate placement - rehab placement   Diagnostics ordered/pending: None   VTE prophylaxis: None: Reason for No Pharmacological VTE Prophylaxis: Mechanical prophylaxis: Place SCDs, post tPA, start sq heparin 24 hours post tPA  BP parameters: Infarct: Post tPA, SBP <180        Nausea & vomiting  Patient with episodes of vomiting early this AM  IV zofran ordered with mild relief  Pt with no BM since 10/30 - KUB pending     Cytotoxic brain edema  Area of cytotoxic cerebral edema identified when reviewing brain imaging in the territory of the L middle cerebral artery. There is no mass effect associated with it. We will continue to monitor the patients clinical exam for any worsening of symptoms which may indicate expansion of the stroke or the area of the edema resulting in the clinical change. The pattern is suggestive of small  vessel etiology.        Essential hypertension  Stroke risk factor  SBP <180 post tPA  Currently on Benazepril 40 mg daily, HCTZ 12.5 (increased from home dose of 6.25), and bisoprolol 10 mg daily  Was on combo med at home - Bisoprolol 10 mg + HCTZ 6.25 mg       Diabetes mellitus type 2 without retinopathy  stroke risk factor, poorly controlled on glargine 17 units daily  HbA1c 8.3  Currently on SSI   Diabetic diet           11/1/19 MRI with small vessel L MCA infarct, therapy recommending rehab  11/2 - Patient stepped down overnight. Adjusting BP regimen. Patient with continuous complaints if nausea. IV Zofran ordered with some relief. Patient has not has BM since 10/30. Ordering KUB to rule out obstruction. Neuro exam unchanged.     STROKE DOCUMENTATION   Acute Stroke Times   Last Known Normal Date: 10/31/19  Last Known Normal Time: 0630  Symptom Onset Date: 10/31/19  Symptom Onset Time: 0730  Stroke Team Called Date: 10/31/19  Stroke Team Called Time: 0936  Stroke Team Arrival Date: 10/31/19  Stroke Team Arrival Time: 0946  Decision to Treat Time for Alteplase: 1003    NIH Scale:  1a. Level of Consciousness: 0-->Alert, keenly responsive  1b. LOC Questions: 1-->Answers one question correctly  1c. LOC Commands: 0-->Performs both tasks correctly  2. Best Gaze: 0-->Normal  3. Visual: 0-->No visual loss  4. Facial Palsy: 1-->Minor paralysis (flattened nasolabial fold, asymmetry on smiling)  5a. Motor Arm, Left: 0-->No drift, limb holds 90 (or 45) degrees for full 10 secs  5b. Motor Arm, Right: 1-->Drift, limb holds 90 (or 45) degrees, but drifts down before full 10 secs, does not hit bed or other support  6a. Motor Leg, Left: 0-->No drift, leg holds 30 degree position for full 5 secs  6b. Motor Leg, Right: 3-->No effort against gravity, leg falls to bed immediately  7. Limb Ataxia: 0-->Absent  8. Sensory: 1-->Mild-to-moderate sensory loss, patient feels pinprick is less sharp or is dull on the affected side, or there  is a loss of superficial pain with pinprick, but patient is aware of being touched  9. Best Language: 0-->No aphasia, normal  10. Dysarthria: 0-->Normal  11. Extinction and Inattention (formerly Neglect): 0-->No abnormality  Total (NIH Stroke Scale): 7       Modified Dayanna Score: 0  Nabila Coma Scale:    ABCD2 Score:    [id number] Score:   HAS -BLED Score:   ICH Score:   Hunt & Amaya Classification:      Hemorrhagic change of an Ischemic Stroke: Does this patient have an ischemic stroke with hemorrhagic changes? No     Neurologic Chief Complaint: L MCA    Subjective:     Interval History: Patient is seen for follow-up neurological assessment and treatment recommendations:     Patient stepped down overnight. Adjusting BP regimen. Patient with continuous complaints if nausea. IV Zofran ordered with some relief. Patient has not has BM since 10/30. Ordering KUB to rule out obstruction. Neuro exam unchanged.     HPI, Past Medical, Family, and Social History remains the same as documented in the initial encounter.     Review of Systems   Constitutional: Negative for chills and fever.   Respiratory: Negative for cough.    Cardiovascular: Negative for chest pain.   Gastrointestinal: Positive for nausea and vomiting.   Neurological: Positive for facial asymmetry and weakness. Negative for speech difficulty and numbness.     Scheduled Meds:   aspirin  81 mg Oral Daily    atorvastatin  40 mg Oral Daily    benazepril  40 mg Oral Daily    bisoprolol  10 mg Oral Daily    clopidogrel  75 mg Oral Daily    gabapentin  600 mg Oral Q8H    hydroCHLOROthiazide  12.5 mg Oral Daily    lidocaine  2 patch Transdermal Q24H    pantoprazole  40 mg Oral Daily    senna-docusate 8.6-50 mg  1 tablet Oral Daily     Continuous Infusions:   sodium chloride 0.9% 50 mL/hr at 11/02/19 0654     PRN Meds:acetaminophen, Dextrose 10% Bolus, Dextrose 10% Bolus, glucagon (human recombinant), insulin aspart U-100, ondansetron, sodium chloride  0.9%    Objective:     Vital Signs (Most Recent):  Temp: 98.4 °F (36.9 °C) (11/02/19 0900)  Pulse: 103 (11/02/19 0900)  Resp: 18 (11/02/19 0900)  BP: (!) 182/88 (11/02/19 0900)  SpO2: 100 % (11/02/19 0900)  BP Location: Left arm    Vital Signs Range (Last 24H):  Temp:  [97.9 °F (36.6 °C)-99 °F (37.2 °C)]   Pulse:  []   Resp:  [18-37]   BP: (136-187)/()   SpO2:  [98 %-100 %]   BP Location: Left arm    Physical Exam   Constitutional: She appears well-developed and well-nourished. No distress.   HENT:   Head: Normocephalic and atraumatic.   Eyes: Pupils are equal, round, and reactive to light. EOM are normal.   Cardiovascular: Normal rate.   Pulmonary/Chest: Effort normal. No respiratory distress.   Neurological: She is alert.   Skin: Skin is warm and dry.   Vitals reviewed.      Neurological Exam:   LOC: alert  Attention Span: Good   Language: No aphasia  Articulation: No dysarthria  Orientation: Person, Time   Visual Fields: Full  EOM (CN III, IV, VI): Full/intact  Pupils (CN II, III): PERRL  Facial Movement (CN VII): Lower facial weakness on the Right  Motor: Arm left  Normal 5/5  Leg left  Normal 5/5  Arm right  Paresis: 4/5  Leg right Paresis: 3/5  Sensation: mild decrease in sensation RUE  Tone: Normal tone throughout    Laboratory:  CMP:   Recent Labs   Lab 11/02/19  0245   CALCIUM 10.3   ALBUMIN 4.3   PROT 8.6*      K 3.2*   CO2 17*      BUN 12   CREATININE 0.8   ALKPHOS 145*   ALT 24   AST 29   BILITOT 1.1*     CBC:   Recent Labs   Lab 11/02/19  0245   WBC 8.17   RBC 4.97   HGB 12.9   HCT 39.7      MCV 80*   MCH 26.0*   MCHC 32.5     Lipid Panel:   Recent Labs   Lab 10/31/19  1437   CHOL 239*   LDLCALC 168.4*   HDL 42   TRIG 143     Coagulation:   Recent Labs   Lab 10/31/19  1015   INR 1.0   APTT 26.4     Platelet Aggregation Study: No results for input(s): PLTAGG, PLTAGINTERP, PLTAGREGLACO, ADPPLTAGGREG in the last 168 hours.  Hgb A1C:   Recent Labs   Lab 10/31/19  1437   HGBA1C  8.3*     TSH:   Recent Labs   Lab 10/31/19  1437   TSH 0.447       Diagnostic Results     Brain Imaging   MRI Brain (11/01/19):  Acute lacunar type infarct coursing through the left posterior limb internal capsule and corona radiata.    CTH (10/31/19):  No acute intracranial pathology    Vessel Imaging   CTA-MP (10/31/19):  Atherosclerotic disease of the cavernous and supraclinoid ICAs with mild narrowing. No high-grade stenosis or LVO. Less than 50% proximal ICA stenosis. mild-moderate narrowing of the right vertebral artery origin. There is mild left V1 segment narrowing.  No significant focal vertebral artery stenosis or occlusion.    Cardiac Imaging   Echo (11/01/19)  · Increased (hyperdynamic) left ventricular systolic function. The estimated ejection fraction is 75%  · Concentric left ventricular remodeling.  · Normal LV diastolic function.  · No wall motion abnormalities.  · Normal right ventricular systolic function.  · Normal central venous pressure (3 mm Hg).  · Mild tricuspid regurgitation.  · The estimated PA systolic pressure is 26 mm Hg  · Echolucent structure next to the LA, likely representing hiatal hernia. Clinincal correlation is required.      Sailaja Carter NP  Comprehensive Stroke Center  Department of Vascular Neurology   [facility]

## 2019-11-03 LAB
ALBUMIN SERPL BCP-MCNC: 4.2 G/DL (ref 3.5–5.2)
ALP SERPL-CCNC: 140 U/L (ref 55–135)
ALT SERPL W/O P-5'-P-CCNC: 20 U/L (ref 10–44)
ANION GAP SERPL CALC-SCNC: 15 MMOL/L (ref 8–16)
AST SERPL-CCNC: 28 U/L (ref 10–40)
BASOPHILS # BLD AUTO: 0.05 K/UL (ref 0–0.2)
BASOPHILS NFR BLD: 0.5 % (ref 0–1.9)
BILIRUB SERPL-MCNC: 1.2 MG/DL (ref 0.1–1)
BUN SERPL-MCNC: 17 MG/DL (ref 8–23)
CALCIUM SERPL-MCNC: 9.9 MG/DL (ref 8.7–10.5)
CHLORIDE SERPL-SCNC: 103 MMOL/L (ref 95–110)
CO2 SERPL-SCNC: 20 MMOL/L (ref 23–29)
CREAT SERPL-MCNC: 0.9 MG/DL (ref 0.5–1.4)
DIFFERENTIAL METHOD: ABNORMAL
EOSINOPHIL # BLD AUTO: 0.1 K/UL (ref 0–0.5)
EOSINOPHIL NFR BLD: 1.4 % (ref 0–8)
ERYTHROCYTE [DISTWIDTH] IN BLOOD BY AUTOMATED COUNT: 14.3 % (ref 11.5–14.5)
EST. GFR  (AFRICAN AMERICAN): >60 ML/MIN/1.73 M^2
EST. GFR  (NON AFRICAN AMERICAN): >60 ML/MIN/1.73 M^2
GLUCOSE SERPL-MCNC: 188 MG/DL (ref 70–110)
HCT VFR BLD AUTO: 40.7 % (ref 37–48.5)
HGB BLD-MCNC: 12.8 G/DL (ref 12–16)
IMM GRANULOCYTES # BLD AUTO: 0.03 K/UL (ref 0–0.04)
IMM GRANULOCYTES NFR BLD AUTO: 0.3 % (ref 0–0.5)
LIPASE SERPL-CCNC: 25 U/L (ref 4–60)
LYMPHOCYTES # BLD AUTO: 2.5 K/UL (ref 1–4.8)
LYMPHOCYTES NFR BLD: 25.8 % (ref 18–48)
MAGNESIUM SERPL-MCNC: 2 MG/DL (ref 1.6–2.6)
MCH RBC QN AUTO: 26.1 PG (ref 27–31)
MCHC RBC AUTO-ENTMCNC: 31.4 G/DL (ref 32–36)
MCV RBC AUTO: 83 FL (ref 82–98)
MONOCYTES # BLD AUTO: 0.7 K/UL (ref 0.3–1)
MONOCYTES NFR BLD: 7.3 % (ref 4–15)
NEUTROPHILS # BLD AUTO: 6.2 K/UL (ref 1.8–7.7)
NEUTROPHILS NFR BLD: 64.7 % (ref 38–73)
NRBC BLD-RTO: 0 /100 WBC
PHOSPHATE SERPL-MCNC: 3.4 MG/DL (ref 2.7–4.5)
PLATELET # BLD AUTO: 271 K/UL (ref 150–350)
PMV BLD AUTO: 11.9 FL (ref 9.2–12.9)
POCT GLUCOSE: 143 MG/DL (ref 70–110)
POCT GLUCOSE: 201 MG/DL (ref 70–110)
POTASSIUM SERPL-SCNC: 4 MMOL/L (ref 3.5–5.1)
PROT SERPL-MCNC: 8.5 G/DL (ref 6–8.4)
RBC # BLD AUTO: 4.91 M/UL (ref 4–5.4)
SODIUM SERPL-SCNC: 138 MMOL/L (ref 136–145)
WBC # BLD AUTO: 9.54 K/UL (ref 3.9–12.7)

## 2019-11-03 PROCEDURE — 83690 ASSAY OF LIPASE: CPT

## 2019-11-03 PROCEDURE — 25000003 PHARM REV CODE 250: Performed by: NURSE PRACTITIONER

## 2019-11-03 PROCEDURE — 99233 SBSQ HOSP IP/OBS HIGH 50: CPT | Mod: GC,,, | Performed by: PSYCHIATRY & NEUROLOGY

## 2019-11-03 PROCEDURE — 25000003 PHARM REV CODE 250: Performed by: PSYCHIATRY & NEUROLOGY

## 2019-11-03 PROCEDURE — 99233 PR SUBSEQUENT HOSPITAL CARE,LEVL III: ICD-10-PCS | Mod: GC,,, | Performed by: PSYCHIATRY & NEUROLOGY

## 2019-11-03 PROCEDURE — 80053 COMPREHEN METABOLIC PANEL: CPT

## 2019-11-03 PROCEDURE — 83735 ASSAY OF MAGNESIUM: CPT

## 2019-11-03 PROCEDURE — 25000003 PHARM REV CODE 250: Performed by: STUDENT IN AN ORGANIZED HEALTH CARE EDUCATION/TRAINING PROGRAM

## 2019-11-03 PROCEDURE — 84100 ASSAY OF PHOSPHORUS: CPT

## 2019-11-03 PROCEDURE — 85025 COMPLETE CBC W/AUTO DIFF WBC: CPT

## 2019-11-03 PROCEDURE — 20600001 HC STEP DOWN PRIVATE ROOM

## 2019-11-03 PROCEDURE — 36415 COLL VENOUS BLD VENIPUNCTURE: CPT

## 2019-11-03 PROCEDURE — 63600175 PHARM REV CODE 636 W HCPCS: Performed by: NURSE PRACTITIONER

## 2019-11-03 RX ORDER — HEPARIN SODIUM 5000 [USP'U]/ML
5000 INJECTION, SOLUTION INTRAVENOUS; SUBCUTANEOUS EVERY 8 HOURS
Status: DISCONTINUED | OUTPATIENT
Start: 2019-11-03 | End: 2019-11-08

## 2019-11-03 RX ORDER — HYDROCHLOROTHIAZIDE 12.5 MG/1
12.5 TABLET ORAL ONCE
Status: COMPLETED | OUTPATIENT
Start: 2019-11-03 | End: 2019-11-03

## 2019-11-03 RX ORDER — HYDROCHLOROTHIAZIDE 12.5 MG/1
25 TABLET ORAL DAILY
Status: DISCONTINUED | OUTPATIENT
Start: 2019-11-04 | End: 2019-11-06

## 2019-11-03 RX ADMIN — PANTOPRAZOLE SODIUM 40 MG: 40 TABLET, DELAYED RELEASE ORAL at 09:11

## 2019-11-03 RX ADMIN — GABAPENTIN 600 MG: 300 CAPSULE ORAL at 02:11

## 2019-11-03 RX ADMIN — GABAPENTIN 600 MG: 300 CAPSULE ORAL at 05:11

## 2019-11-03 RX ADMIN — HEPARIN SODIUM 5000 UNITS: 5000 INJECTION, SOLUTION INTRAVENOUS; SUBCUTANEOUS at 02:11

## 2019-11-03 RX ADMIN — ATORVASTATIN CALCIUM 40 MG: 20 TABLET, FILM COATED ORAL at 09:11

## 2019-11-03 RX ADMIN — ASPIRIN 81 MG CHEWABLE TABLET 81 MG: 81 TABLET CHEWABLE at 09:11

## 2019-11-03 RX ADMIN — HYDROCHLOROTHIAZIDE 12.5 MG: 12.5 TABLET ORAL at 09:11

## 2019-11-03 RX ADMIN — ONDANSETRON 4 MG: 2 INJECTION INTRAMUSCULAR; INTRAVENOUS at 11:11

## 2019-11-03 RX ADMIN — INSULIN ASPART 2 UNITS: 100 INJECTION, SOLUTION INTRAVENOUS; SUBCUTANEOUS at 06:11

## 2019-11-03 RX ADMIN — HYDROCHLOROTHIAZIDE 12.5 MG: 12.5 TABLET ORAL at 02:11

## 2019-11-03 RX ADMIN — BISOPROLOL FUMARATE 10 MG: 5 TABLET, COATED ORAL at 09:11

## 2019-11-03 RX ADMIN — LIDOCAINE 2 PATCH: 50 PATCH TOPICAL at 04:11

## 2019-11-03 RX ADMIN — SENNOSIDES AND DOCUSATE SODIUM 1 TABLET: 8.6; 5 TABLET ORAL at 09:11

## 2019-11-03 RX ADMIN — HEPARIN SODIUM 5000 UNITS: 5000 INJECTION, SOLUTION INTRAVENOUS; SUBCUTANEOUS at 09:11

## 2019-11-03 RX ADMIN — ONDANSETRON 4 MG: 2 INJECTION INTRAMUSCULAR; INTRAVENOUS at 09:11

## 2019-11-03 RX ADMIN — GABAPENTIN 600 MG: 300 CAPSULE ORAL at 09:11

## 2019-11-03 RX ADMIN — CLOPIDOGREL BISULFATE 75 MG: 75 TABLET ORAL at 09:11

## 2019-11-03 RX ADMIN — INSULIN ASPART 1 UNITS: 100 INJECTION, SOLUTION INTRAVENOUS; SUBCUTANEOUS at 12:11

## 2019-11-03 RX ADMIN — BENAZEPRIL HYDROCHLORIDE 40 MG: 10 TABLET ORAL at 09:11

## 2019-11-03 RX ADMIN — ACETAMINOPHEN 650 MG: 325 TABLET ORAL at 06:11

## 2019-11-03 RX ADMIN — ONDANSETRON 4 MG: 2 INJECTION INTRAMUSCULAR; INTRAVENOUS at 01:11

## 2019-11-03 NOTE — PLAN OF CARE
POC reviewed with pt at bedside.  AAOX4.Questions answered and encouraged. Ambulated to bedside commode with assist twice.  Voided x2 and had a BM.  Pericare performed.  Rt sided weakness noted.  No NV throughout the night.  C/o of back pain.  Pain controlled by prn pain meds. Pt kept on requesting Zofran 4mg every six hours.  Education on the medication provided.  Requires reinforcement. Safety and fall precautions maintained.  Bed alarm activated and call light within easy reach.  WCTM.

## 2019-11-03 NOTE — PROGRESS NOTES
[facility]  Vascular Neurology  Comprehensive Stroke Center  Progress Note    Assessment/Plan:     * Stroke due to embolism of left middle cerebral artery  Melva Barker is a 74 y.o. female with PMHx of HTN, HLD, and DM who presented to OSH with acute worsening of RSW. Patient had been experiencing RSW x1 month. Yesterday, she began having acute worsening. She was treated with tPA at OSH. CTA without LVO. MRI with infarct in L internal capsule and corona radiata. Etiology likely small vessel disease    Antithrombotics for secondary stroke prevention: Antiplatelets: asa 81 mg + plavix 75 mg   Statins for secondary stroke prevention and hyperlipidemia, if present: Statins: Atorvastatin- 40 mg daily,   Aggressive risk factor modification: HTN, HLD, Diet     Rehab efforts: The patient has been evaluated by a stroke team provider and the therapy needs have been fully considered based off the presenting complaints and exam findings. The following therapy evaluations are needed: PT evaluate and treat, OT evaluate and treat, SLP evaluate and treat, PM&R evaluate for appropriate placement - rehab placement   Diagnostics ordered/pending: None   VTE prophylaxis: Heparin sbq with mechanical SCDs  BP parameters: Infarct: Post tPA, SBP <180        Nausea & vomiting  Patient with episodes of vomiting 11/2 - resolved throughout the day. No episodes of vomiting overnight.   PRN IV Zofran ordered   KUB completed and no abnormality noted  Patient with last BM 11/2 - normal bowel sounds on exam   Patient continues to report nausea - some mild middle lower abdominal pain upon palpation  Lipase ordered and WNL  Continue to monitor     Cytotoxic brain edema  Area of cytotoxic cerebral edema identified when reviewing brain imaging in the territory of the L middle cerebral artery. There is no mass effect associated with it. We will continue to monitor the patients clinical exam for any worsening of symptoms  which may indicate expansion of the stroke or the area of the edema resulting in the clinical change. The pattern is suggestive of small vessel etiology.        Essential hypertension  Stroke risk factor  SBP <180 post tPA  Currently on Benazepril 40 mg daily, HCTZ 12.5 (increased from home dose of 6.25) --> increased 11/3 to 25 mg, and bisoprolol 10 mg daily  Was on combo med at home - Bisoprolol 10 mg + HCTZ 6.25 mg       Diabetes mellitus type 2 without retinopathy  stroke risk factor, poorly controlled on glargine 17 units daily  HbA1c 8.3  Currently on SSI   Diabetic diet           11/1/19 MRI with small vessel L MCA infarct, therapy recommending rehab  11/2 - Patient stepped down overnight. Adjusting BP regimen. Patient with continuous complaints if nausea. IV Zofran ordered with some relief. Patient has not has BM since 10/30. Ordering KUB to rule out obstruction. Neuro exam unchanged.   11/3 - NAEON. Patient reports she had no episodes of vomiting overnight. Still complaining of nausea. Mild lower abdomen tenderness on exam. Lipase ordered and WNL. Continue to monitor.     STROKE DOCUMENTATION   Acute Stroke Times   Last Known Normal Date: 10/31/19  Last Known Normal Time: 0630  Symptom Onset Date: 10/31/19  Symptom Onset Time: 0730  Stroke Team Called Date: 10/31/19  Stroke Team Called Time: 0936  Stroke Team Arrival Date: 10/31/19  Stroke Team Arrival Time: 0946  Decision to Treat Time for Alteplase: 1003    NIH Scale:  1a. Level of Consciousness: 0-->Alert, keenly responsive  1b. LOC Questions: 1-->Answers one question correctly  1c. LOC Commands: 0-->Performs both tasks correctly  2. Best Gaze: 0-->Normal  3. Visual: 0-->No visual loss  4. Facial Palsy: 1-->Minor paralysis (flattened nasolabial fold, asymmetry on smiling)  5a. Motor Arm, Left: 0-->No drift, limb holds 90 (or 45) degrees for full 10 secs  5b. Motor Arm, Right: 1-->Drift, limb holds 90 (or 45) degrees, but drifts down before full 10 secs,  does not hit bed or other support  6a. Motor Leg, Left: 0-->No drift, leg holds 30 degree position for full 5 secs  6b. Motor Leg, Right: 2-->Some effort against gravity, leg falls to bed by 5 secs, but has some effort against gravity  7. Limb Ataxia: 0-->Absent  8. Sensory: 1-->Mild-to-moderate sensory loss, patient feels pinprick is less sharp or is dull on the affected side, or there is a loss of superficial pain with pinprick, but patient is aware of being touched  9. Best Language: 0-->No aphasia, normal  10. Dysarthria: 0-->Normal  11. Extinction and Inattention (formerly Neglect): 0-->No abnormality  Total (NIH Stroke Scale): 6       Modified Muhlenberg Score: 0  Nabila Coma Scale:    ABCD2 Score:    [id number] Score:   HAS -BLED Score:   ICH Score:   Hunt & Amaya Classification:      Hemorrhagic change of an Ischemic Stroke: Does this patient have an ischemic stroke with hemorrhagic changes? No     Neurologic Chief Complaint: L MCA    Subjective:     Interval History: Patient is seen for follow-up neurological assessment and treatment recommendations:     Patient stepped down overnight. Adjusting BP regimen. Patient with continuous complaints if nausea. IV Zofran ordered with some relief. Patient has not has BM since 10/30. Ordering KUB to rule out obstruction. Neuro exam unchanged.     HPI, Past Medical, Family, and Social History remains the same as documented in the initial encounter.     Review of Systems   Constitutional: Negative for chills and fever.   Respiratory: Negative for cough.    Cardiovascular: Positive for palpitations. Negative for chest pain.   Gastrointestinal: Positive for nausea.   Neurological: Positive for facial asymmetry and weakness. Negative for speech difficulty and numbness.     Scheduled Meds:   aspirin  81 mg Oral Daily    atorvastatin  40 mg Oral Daily    benazepril  40 mg Oral Daily    bisoprolol  10 mg Oral Daily    clopidogrel  75 mg Oral Daily    gabapentin  600 mg  Oral Q8H    hydroCHLOROthiazide  12.5 mg Oral Daily    lidocaine  2 patch Transdermal Q24H    pantoprazole  40 mg Oral Daily    senna-docusate 8.6-50 mg  1 tablet Oral Daily     Continuous Infusions:   sodium chloride 0.9% 50 mL/hr at 11/03/19 0611     PRN Meds:acetaminophen, Dextrose 10% Bolus, Dextrose 10% Bolus, glucagon (human recombinant), insulin aspart U-100, ondansetron, sodium chloride 0.9%    Objective:     Vital Signs (Most Recent):  Temp: 98.7 °F (37.1 °C) (11/03/19 1100)  Pulse: 70 (11/03/19 1142)  Resp: 18 (11/03/19 1100)  BP: (!) 162/82 (11/03/19 1100)  SpO2: 98 % (11/03/19 1100)  BP Location: Left arm    Vital Signs Range (Last 24H):  Temp:  [98.3 °F (36.8 °C)-99.3 °F (37.4 °C)]   Pulse:  []   Resp:  [14-18]   BP: (138-190)/(80-94)   SpO2:  [98 %-100 %]   BP Location: Left arm    Physical Exam   Constitutional: She appears well-developed and well-nourished. No distress.   HENT:   Head: Normocephalic and atraumatic.   Eyes: Pupils are equal, round, and reactive to light. EOM are normal.   Cardiovascular: Normal rate.   Pulmonary/Chest: Effort normal. No respiratory distress.   Abdominal: Soft. Bowel sounds are normal. There is tenderness (bilateral lower abdominal pain ).   Neurological: She is alert.   Skin: Skin is warm and dry.   Vitals reviewed.      Neurological Exam:   LOC: alert  Attention Span: Good   Language: No aphasia  Articulation: No dysarthria  Orientation: Person, Time   Visual Fields: Full  EOM (CN III, IV, VI): Full/intact  Pupils (CN II, III): PERRL  Facial Movement (CN VII): Lower facial weakness on the Right  Motor: Arm left  Normal 5/5  Leg left  Normal 5/5  Arm right  Paresis: +4/5  Leg right Paresis: 3/5  Sensation: mild decrease in sensation RUE  Tone: Normal tone throughout    Laboratory:  CMP:   Recent Labs   Lab 11/03/19  0613   CALCIUM 9.9   ALBUMIN 4.2   PROT 8.5*      K 4.0   CO2 20*      BUN 17   CREATININE 0.9   ALKPHOS 140*   ALT 20   AST 28    BILITOT 1.2*     CBC:   Recent Labs   Lab 11/03/19  0613   WBC 9.54   RBC 4.91   HGB 12.8   HCT 40.7      MCV 83   MCH 26.1*   MCHC 31.4*     Lipid Panel:   Recent Labs   Lab 10/31/19  1437   CHOL 239*   LDLCALC 168.4*   HDL 42   TRIG 143     Coagulation:   Recent Labs   Lab 10/31/19  1015   INR 1.0   APTT 26.4     Platelet Aggregation Study: No results for input(s): PLTAGG, PLTAGINTERP, PLTAGREGLACO, ADPPLTAGGREG in the last 168 hours.  Hgb A1C:   Recent Labs   Lab 10/31/19  1437   HGBA1C 8.3*     TSH:   Recent Labs   Lab 10/31/19  1437   TSH 0.447       Diagnostic Results     Brain Imaging   MRI Brain (11/01/19):  Acute lacunar type infarct coursing through the left posterior limb internal capsule and corona radiata.    CTH (10/31/19):  No acute intracranial pathology    Vessel Imaging   CTA-MP (10/31/19):  Atherosclerotic disease of the cavernous and supraclinoid ICAs with mild narrowing. No high-grade stenosis or LVO. Less than 50% proximal ICA stenosis. mild-moderate narrowing of the right vertebral artery origin. There is mild left V1 segment narrowing.  No significant focal vertebral artery stenosis or occlusion.    Cardiac Imaging   Echo (11/01/19)  · Increased (hyperdynamic) left ventricular systolic function. The estimated ejection fraction is 75%  · Concentric left ventricular remodeling.  · Normal LV diastolic function.  · No wall motion abnormalities.  · Normal right ventricular systolic function.  · Normal central venous pressure (3 mm Hg).  · Mild tricuspid regurgitation.  · The estimated PA systolic pressure is 26 mm Hg  · Echolucent structure next to the LA, likely representing hiatal hernia. Clinincal correlation is required.      Sailaja Carter NP  [facility]  Department of Vascular Neurology   [facility]

## 2019-11-03 NOTE — ASSESSMENT & PLAN NOTE
Melva Barker is a 74 y.o. female with PMHx of HTN, HLD, and DM who presented to OSH with acute worsening of RSW. Patient had been experiencing RSW x1 month. Yesterday, she began having acute worsening. She was treated with tPA at OSH. CTA without LVO. MRI with infarct in L internal capsule and corona radiata. Etiology likely small vessel disease    Antithrombotics for secondary stroke prevention: Antiplatelets: asa 81 mg + plavix 75 mg   Statins for secondary stroke prevention and hyperlipidemia, if present: Statins: Atorvastatin- 40 mg daily,   Aggressive risk factor modification: HTN, HLD, Diet     Rehab efforts: The patient has been evaluated by a stroke team provider and the therapy needs have been fully considered based off the presenting complaints and exam findings. The following therapy evaluations are needed: PT evaluate and treat, OT evaluate and treat, SLP evaluate and treat, PM&R evaluate for appropriate placement - rehab placement   Diagnostics ordered/pending: None   VTE prophylaxis: Heparin sbq with mechanical SCDs  BP parameters: Infarct: Post tPA, SBP <180

## 2019-11-03 NOTE — SUBJECTIVE & OBJECTIVE
Neurologic Chief Complaint: L MCA    Subjective:     Interval History: Patient is seen for follow-up neurological assessment and treatment recommendations:     Patient stepped down overnight. Adjusting BP regimen. Patient with continuous complaints if nausea. IV Zofran ordered with some relief. Patient has not has BM since 10/30. Ordering KUB to rule out obstruction. Neuro exam unchanged.     HPI, Past Medical, Family, and Social History remains the same as documented in the initial encounter.     Review of Systems   Constitutional: Negative for chills and fever.   Respiratory: Negative for cough.    Cardiovascular: Positive for palpitations. Negative for chest pain.   Gastrointestinal: Positive for nausea.   Neurological: Positive for facial asymmetry and weakness. Negative for speech difficulty and numbness.     Scheduled Meds:   aspirin  81 mg Oral Daily    atorvastatin  40 mg Oral Daily    benazepril  40 mg Oral Daily    bisoprolol  10 mg Oral Daily    clopidogrel  75 mg Oral Daily    gabapentin  600 mg Oral Q8H    hydroCHLOROthiazide  12.5 mg Oral Daily    lidocaine  2 patch Transdermal Q24H    pantoprazole  40 mg Oral Daily    senna-docusate 8.6-50 mg  1 tablet Oral Daily     Continuous Infusions:   sodium chloride 0.9% 50 mL/hr at 11/03/19 0611     PRN Meds:acetaminophen, Dextrose 10% Bolus, Dextrose 10% Bolus, glucagon (human recombinant), insulin aspart U-100, ondansetron, sodium chloride 0.9%    Objective:     Vital Signs (Most Recent):  Temp: 98.7 °F (37.1 °C) (11/03/19 1100)  Pulse: 70 (11/03/19 1142)  Resp: 18 (11/03/19 1100)  BP: (!) 162/82 (11/03/19 1100)  SpO2: 98 % (11/03/19 1100)  BP Location: Left arm    Vital Signs Range (Last 24H):  Temp:  [98.3 °F (36.8 °C)-99.3 °F (37.4 °C)]   Pulse:  []   Resp:  [14-18]   BP: (138-190)/(80-94)   SpO2:  [98 %-100 %]   BP Location: Left arm    Physical Exam   Constitutional: She appears well-developed and well-nourished. No distress.   HENT:    Head: Normocephalic and atraumatic.   Eyes: Pupils are equal, round, and reactive to light. EOM are normal.   Cardiovascular: Normal rate.   Pulmonary/Chest: Effort normal. No respiratory distress.   Abdominal: Soft. Bowel sounds are normal. There is tenderness (bilateral lower abdominal pain ).   Neurological: She is alert.   Skin: Skin is warm and dry.   Vitals reviewed.      Neurological Exam:   LOC: alert  Attention Span: Good   Language: No aphasia  Articulation: No dysarthria  Orientation: Person, Time   Visual Fields: Full  EOM (CN III, IV, VI): Full/intact  Pupils (CN II, III): PERRL  Facial Movement (CN VII): Lower facial weakness on the Right  Motor: Arm left  Normal 5/5  Leg left  Normal 5/5  Arm right  Paresis: +4/5  Leg right Paresis: 3/5  Sensation: mild decrease in sensation RUE  Tone: Normal tone throughout    Laboratory:  CMP:   Recent Labs   Lab 11/03/19  0613   CALCIUM 9.9   ALBUMIN 4.2   PROT 8.5*      K 4.0   CO2 20*      BUN 17   CREATININE 0.9   ALKPHOS 140*   ALT 20   AST 28   BILITOT 1.2*     CBC:   Recent Labs   Lab 11/03/19  0613   WBC 9.54   RBC 4.91   HGB 12.8   HCT 40.7      MCV 83   MCH 26.1*   MCHC 31.4*     Lipid Panel:   Recent Labs   Lab 10/31/19  1437   CHOL 239*   LDLCALC 168.4*   HDL 42   TRIG 143     Coagulation:   Recent Labs   Lab 10/31/19  1015   INR 1.0   APTT 26.4     Platelet Aggregation Study: No results for input(s): PLTAGG, PLTAGINTERP, PLTAGREGLACO, ADPPLTAGGREG in the last 168 hours.  Hgb A1C:   Recent Labs   Lab 10/31/19  1437   HGBA1C 8.3*     TSH:   Recent Labs   Lab 10/31/19  1437   TSH 0.447       Diagnostic Results     Brain Imaging   MRI Brain (11/01/19):  Acute lacunar type infarct coursing through the left posterior limb internal capsule and corona radiata.    CTH (10/31/19):  No acute intracranial pathology    Vessel Imaging   CTA-MP (10/31/19):  Atherosclerotic disease of the cavernous and supraclinoid ICAs with mild narrowing. No  high-grade stenosis or LVO. Less than 50% proximal ICA stenosis. mild-moderate narrowing of the right vertebral artery origin. There is mild left V1 segment narrowing.  No significant focal vertebral artery stenosis or occlusion.    Cardiac Imaging   Echo (11/01/19)  · Increased (hyperdynamic) left ventricular systolic function. The estimated ejection fraction is 75%  · Concentric left ventricular remodeling.  · Normal LV diastolic function.  · No wall motion abnormalities.  · Normal right ventricular systolic function.  · Normal central venous pressure (3 mm Hg).  · Mild tricuspid regurgitation.  · The estimated PA systolic pressure is 26 mm Hg  · Echolucent structure next to the LA, likely representing hiatal hernia. Clinincal correlation is required.

## 2019-11-03 NOTE — PLAN OF CARE
Problem: Fall Injury Risk  Goal: Absence of Fall and Fall-Related Injury  Outcome: Ongoing, Progressing     Problem: Adult Inpatient Plan of Care  Goal: Plan of Care Review  Outcome: Ongoing, Progressing     Problem: Diabetes Comorbidity  Goal: Blood Glucose Level Within Desired Range  Outcome: Ongoing, Progressing     Problem: Adjustment to Illness (Stroke, Ischemic/Transient Ischemic Attack)  Goal: Optimal Coping  Outcome: Ongoing, Progressing

## 2019-11-03 NOTE — ASSESSMENT & PLAN NOTE
Patient with episodes of vomiting 11/2 - resolved throughout the day. No episodes of vomiting overnight.   PRN IV Zofran ordered   KUB completed and no abnormality noted  Patient with last BM 11/2 - normal bowel sounds on exam   Patient continues to report nausea - some mild middle lower abdominal pain upon palpation  Lipase ordered and WNL  Continue to monitor

## 2019-11-04 LAB
ALBUMIN SERPL BCP-MCNC: 4.1 G/DL (ref 3.5–5.2)
ALP SERPL-CCNC: 135 U/L (ref 55–135)
ALT SERPL W/O P-5'-P-CCNC: 19 U/L (ref 10–44)
ANION GAP SERPL CALC-SCNC: 13 MMOL/L (ref 8–16)
AST SERPL-CCNC: 26 U/L (ref 10–40)
BASOPHILS # BLD AUTO: 0.08 K/UL (ref 0–0.2)
BASOPHILS NFR BLD: 0.7 % (ref 0–1.9)
BILIRUB SERPL-MCNC: 1.3 MG/DL (ref 0.1–1)
BUN SERPL-MCNC: 14 MG/DL (ref 8–23)
CALCIUM SERPL-MCNC: 9.7 MG/DL (ref 8.7–10.5)
CHLORIDE SERPL-SCNC: 100 MMOL/L (ref 95–110)
CO2 SERPL-SCNC: 22 MMOL/L (ref 23–29)
CREAT SERPL-MCNC: 0.8 MG/DL (ref 0.5–1.4)
DIFFERENTIAL METHOD: ABNORMAL
EOSINOPHIL # BLD AUTO: 0.3 K/UL (ref 0–0.5)
EOSINOPHIL NFR BLD: 3 % (ref 0–8)
ERYTHROCYTE [DISTWIDTH] IN BLOOD BY AUTOMATED COUNT: 13.7 % (ref 11.5–14.5)
EST. GFR  (AFRICAN AMERICAN): >60 ML/MIN/1.73 M^2
EST. GFR  (NON AFRICAN AMERICAN): >60 ML/MIN/1.73 M^2
GLUCOSE SERPL-MCNC: 144 MG/DL (ref 70–110)
HCT VFR BLD AUTO: 39.6 % (ref 37–48.5)
HGB BLD-MCNC: 12.6 G/DL (ref 12–16)
IMM GRANULOCYTES # BLD AUTO: 0.03 K/UL (ref 0–0.04)
IMM GRANULOCYTES NFR BLD AUTO: 0.3 % (ref 0–0.5)
LYMPHOCYTES # BLD AUTO: 3.4 K/UL (ref 1–4.8)
LYMPHOCYTES NFR BLD: 32.2 % (ref 18–48)
MAGNESIUM SERPL-MCNC: 1.9 MG/DL (ref 1.6–2.6)
MCH RBC QN AUTO: 25.8 PG (ref 27–31)
MCHC RBC AUTO-ENTMCNC: 31.8 G/DL (ref 32–36)
MCV RBC AUTO: 81 FL (ref 82–98)
MONOCYTES # BLD AUTO: 0.7 K/UL (ref 0.3–1)
MONOCYTES NFR BLD: 6.6 % (ref 4–15)
NEUTROPHILS # BLD AUTO: 6.1 K/UL (ref 1.8–7.7)
NEUTROPHILS NFR BLD: 57.2 % (ref 38–73)
NRBC BLD-RTO: 0 /100 WBC
PHOSPHATE SERPL-MCNC: 3.1 MG/DL (ref 2.7–4.5)
PLATELET # BLD AUTO: 288 K/UL (ref 150–350)
PMV BLD AUTO: 11.9 FL (ref 9.2–12.9)
POCT GLUCOSE: 121 MG/DL (ref 70–110)
POCT GLUCOSE: 124 MG/DL (ref 70–110)
POCT GLUCOSE: 152 MG/DL (ref 70–110)
POTASSIUM SERPL-SCNC: 3.3 MMOL/L (ref 3.5–5.1)
PROT SERPL-MCNC: 8.3 G/DL (ref 6–8.4)
RBC # BLD AUTO: 4.88 M/UL (ref 4–5.4)
SODIUM SERPL-SCNC: 135 MMOL/L (ref 136–145)
WBC # BLD AUTO: 10.68 K/UL (ref 3.9–12.7)

## 2019-11-04 PROCEDURE — 63600175 PHARM REV CODE 636 W HCPCS: Performed by: STUDENT IN AN ORGANIZED HEALTH CARE EDUCATION/TRAINING PROGRAM

## 2019-11-04 PROCEDURE — 25000003 PHARM REV CODE 250: Performed by: PSYCHIATRY & NEUROLOGY

## 2019-11-04 PROCEDURE — 94761 N-INVAS EAR/PLS OXIMETRY MLT: CPT

## 2019-11-04 PROCEDURE — 99233 PR SUBSEQUENT HOSPITAL CARE,LEVL III: ICD-10-PCS | Mod: GC,,, | Performed by: PSYCHIATRY & NEUROLOGY

## 2019-11-04 PROCEDURE — 99222 1ST HOSP IP/OBS MODERATE 55: CPT | Mod: ,,, | Performed by: NURSE PRACTITIONER

## 2019-11-04 PROCEDURE — 97112 NEUROMUSCULAR REEDUCATION: CPT

## 2019-11-04 PROCEDURE — 84100 ASSAY OF PHOSPHORUS: CPT

## 2019-11-04 PROCEDURE — 85025 COMPLETE CBC W/AUTO DIFF WBC: CPT

## 2019-11-04 PROCEDURE — 83735 ASSAY OF MAGNESIUM: CPT

## 2019-11-04 PROCEDURE — 97530 THERAPEUTIC ACTIVITIES: CPT

## 2019-11-04 PROCEDURE — 99233 SBSQ HOSP IP/OBS HIGH 50: CPT | Mod: GC,,, | Performed by: PSYCHIATRY & NEUROLOGY

## 2019-11-04 PROCEDURE — 25000003 PHARM REV CODE 250: Performed by: STUDENT IN AN ORGANIZED HEALTH CARE EDUCATION/TRAINING PROGRAM

## 2019-11-04 PROCEDURE — 92526 ORAL FUNCTION THERAPY: CPT

## 2019-11-04 PROCEDURE — 99222 PR INITIAL HOSPITAL CARE,LEVL II: ICD-10-PCS | Mod: ,,, | Performed by: NURSE PRACTITIONER

## 2019-11-04 PROCEDURE — 25000003 PHARM REV CODE 250: Performed by: NURSE PRACTITIONER

## 2019-11-04 PROCEDURE — 63600175 PHARM REV CODE 636 W HCPCS: Performed by: NURSE PRACTITIONER

## 2019-11-04 PROCEDURE — 20600001 HC STEP DOWN PRIVATE ROOM

## 2019-11-04 PROCEDURE — 92507 TX SP LANG VOICE COMM INDIV: CPT

## 2019-11-04 PROCEDURE — 36415 COLL VENOUS BLD VENIPUNCTURE: CPT

## 2019-11-04 PROCEDURE — 80053 COMPREHEN METABOLIC PANEL: CPT

## 2019-11-04 RX ADMIN — GABAPENTIN 600 MG: 300 CAPSULE ORAL at 06:11

## 2019-11-04 RX ADMIN — ATORVASTATIN CALCIUM 40 MG: 20 TABLET, FILM COATED ORAL at 09:11

## 2019-11-04 RX ADMIN — HEPARIN SODIUM 5000 UNITS: 5000 INJECTION, SOLUTION INTRAVENOUS; SUBCUTANEOUS at 06:11

## 2019-11-04 RX ADMIN — PANTOPRAZOLE SODIUM 40 MG: 40 TABLET, DELAYED RELEASE ORAL at 09:11

## 2019-11-04 RX ADMIN — BENAZEPRIL HYDROCHLORIDE 40 MG: 10 TABLET ORAL at 09:11

## 2019-11-04 RX ADMIN — HEPARIN SODIUM 5000 UNITS: 5000 INJECTION, SOLUTION INTRAVENOUS; SUBCUTANEOUS at 08:11

## 2019-11-04 RX ADMIN — GABAPENTIN 600 MG: 300 CAPSULE ORAL at 08:11

## 2019-11-04 RX ADMIN — SODIUM CHLORIDE: 0.9 INJECTION, SOLUTION INTRAVENOUS at 07:11

## 2019-11-04 RX ADMIN — CLOPIDOGREL BISULFATE 75 MG: 75 TABLET ORAL at 09:11

## 2019-11-04 RX ADMIN — BISOPROLOL FUMARATE 10 MG: 5 TABLET, COATED ORAL at 09:11

## 2019-11-04 RX ADMIN — ASPIRIN 81 MG CHEWABLE TABLET 81 MG: 81 TABLET CHEWABLE at 09:11

## 2019-11-04 RX ADMIN — SODIUM CHLORIDE: 0.9 INJECTION, SOLUTION INTRAVENOUS at 04:11

## 2019-11-04 RX ADMIN — ONDANSETRON 4 MG: 2 INJECTION INTRAMUSCULAR; INTRAVENOUS at 06:11

## 2019-11-04 RX ADMIN — HEPARIN SODIUM 5000 UNITS: 5000 INJECTION, SOLUTION INTRAVENOUS; SUBCUTANEOUS at 01:11

## 2019-11-04 RX ADMIN — LIDOCAINE 2 PATCH: 50 PATCH TOPICAL at 04:11

## 2019-11-04 RX ADMIN — GABAPENTIN 600 MG: 300 CAPSULE ORAL at 01:11

## 2019-11-04 RX ADMIN — ACETAMINOPHEN 650 MG: 325 TABLET ORAL at 09:11

## 2019-11-04 RX ADMIN — SENNOSIDES AND DOCUSATE SODIUM 1 TABLET: 8.6; 5 TABLET ORAL at 09:11

## 2019-11-04 RX ADMIN — HYDROCHLOROTHIAZIDE 25 MG: 12.5 TABLET ORAL at 09:11

## 2019-11-04 NOTE — PLAN OF CARE
11/04/19 1532   Post-Acute Status   Post-Acute Authorization Placement   Post-Acute Placement Status Additional Clinical Requested       Updates sent to [facility]. SW in contact with CM and Medical staff. Will continue to follow and offer support as needed.     [facility]   Ext. [phone]

## 2019-11-04 NOTE — SUBJECTIVE & OBJECTIVE
Past Medical History:   Diagnosis Date    Anxiety     Back pain     chronic    Diabetes mellitus     Gastroparesis     Hypertension     Sciatica      History reviewed. No pertinent surgical history.  Review of patient's allergies indicates:   Allergen Reactions    Morphine Other (See Comments)     headache    Latex, natural rubber Rash       Scheduled Medications:    aspirin  81 mg Oral Daily    atorvastatin  40 mg Oral Daily    benazepril  40 mg Oral Daily    bisoprolol  10 mg Oral Daily    clopidogrel  75 mg Oral Daily    gabapentin  600 mg Oral Q8H    heparin (porcine)  5,000 Units Subcutaneous Q8H    hydroCHLOROthiazide  25 mg Oral Daily    lidocaine  2 patch Transdermal Q24H    pantoprazole  40 mg Oral Daily    senna-docusate 8.6-50 mg  1 tablet Oral Daily       PRN Medications: acetaminophen, Dextrose 10% Bolus, Dextrose 10% Bolus, glucagon (human recombinant), insulin aspart U-100, ondansetron, sodium chloride 0.9%    Family History     Problem Relation (Age of Onset)    Hyperlipidemia Father    Hypertension Mother        Tobacco Use    Smoking status: Never Smoker    Smokeless tobacco: Never Used   Substance and Sexual Activity    Alcohol use: No     Alcohol/week: 0.0 standard drinks    Drug use: No    Sexual activity: Never     Review of Systems   Constitutional: Positive for activity change. Negative for fatigue and fever.   HENT: Negative for sore throat and trouble swallowing.    Eyes: Negative for visual disturbance.   Respiratory: Negative for cough and shortness of breath.    Cardiovascular: Negative for chest pain and leg swelling.   Gastrointestinal: Negative for abdominal distention and abdominal pain.   Genitourinary: Negative for difficulty urinating.   Musculoskeletal: Positive for gait problem. Negative for back pain.   Skin: Negative for color change.   Neurological: Positive for weakness. Negative for dizziness, light-headedness and headaches.   Psychiatric/Behavioral:  Positive for confusion. Negative for agitation.     Objective:     Vital Signs (Most Recent):  Temp: 97.2 °F (36.2 °C) (11/04/19 0744)  Pulse: 79 (11/04/19 0856)  Resp: 18 (11/04/19 0744)  BP: (!) 175/93 (11/04/19 0744)  SpO2: 98 % (11/04/19 0744)    Vital Signs (24h Range):  Temp:  [97.2 °F (36.2 °C)-99.4 °F (37.4 °C)] 97.2 °F (36.2 °C)  Pulse:  [69-95] 79  Resp:  [18] 18  SpO2:  [97 %-99 %] 98 %  BP: (157-175)/(80-98) 175/93     Body mass index is 21.29 kg/m².    Physical Exam   Constitutional: She appears well-developed and well-nourished.   HENT:   Head: Normocephalic and atraumatic.   Eyes: Pupils are equal, round, and reactive to light. EOM are normal.   Neck: Neck supple.   Pulmonary/Chest: Effort normal. No respiratory distress.   Abdominal: Normal appearance.   Neurological:   - R sided weakness 3/5  - Slow to answer questions, mild confusion present  - following commands   Skin: Skin is warm and dry.   Psychiatric: She has a normal mood and affect. Her behavior is normal.     NEUROLOGICAL EXAMINATION:     CRANIAL NERVES     CN III, IV, VI   Pupils are equal, round, and reactive to light.  Extraocular motions are normal.       Diagnostic Results: Labs: Reviewed  ECG: Reviewed  CT: Reviewed

## 2019-11-04 NOTE — PLAN OF CARE
[facility] general reviewing.      11/04/19 1332   Discharge Reassessment   Assessment Type Discharge Planning Reassessment   Discharge Plan A Rehab   Discharge Plan B Home Health   Anticipated Discharge Disposition Rehab

## 2019-11-04 NOTE — CONSULTS
[facility]  Physical Medicine & Rehab  Consult Note    Patient Name: Melva Barker  MRN: [id number]  Admission Date: 10/31/2019  Hospital Length of Stay: 4 days  Attending Physician: Venkat Amaro MD     Inpatient consult to Physical Medicine & Rehabilitation  Consult performed by: Emi Castano NP  Consult requested by:  Venkat Amaro MD    Collaborating Physician: Girma Rodriguez MD  Reason for Consult:  Assess rehabilitation needs     Consults  Subjective:     Principal Problem: Stroke due to embolism of left middle cerebral artery    HPI: Melva Barker is a 74-year-old female with PMHx of HTN and HLD. Patient presented to OSH with acute R sided weakness. Given tPA and transferred to [facility] 10/31/19. CTA no LVO and MRI with infarct in L internal capsule and corona radiata. Per VN Etiology likely small vessel disease. Hospital course complicated N & V (No BM since 10/30. KUB without obstruction, resolved).     Functional History: Patient lives with her sister who has Alzheimers and son who has COPD in a [facility] with 2 ZEUS  Prior to admission, (I). DME: none.     Hospital Course: 11/1/19: Evaluated by PT & OT. Bed mobility CGA- modA. Sit to stand HHA- Jan. Ambulated 6 steps   LBD maxA.    Past Medical History:   Diagnosis Date    Anxiety     Back pain     chronic    Diabetes mellitus     Gastroparesis     Hypertension     Sciatica      History reviewed. No pertinent surgical history.  Review of patient's allergies indicates:   Allergen Reactions    Morphine Other (See Comments)     headache    Latex, natural rubber Rash       Scheduled Medications:    aspirin  81 mg Oral Daily    atorvastatin  40 mg Oral Daily    benazepril  40 mg Oral Daily    bisoprolol  10 mg Oral Daily    clopidogrel  75 mg Oral Daily    gabapentin  600 mg Oral Q8H    heparin (porcine)  5,000 Units Subcutaneous Q8H    hydroCHLOROthiazide  25 mg Oral Daily    lidocaine  2 patch Transdermal Q24H     pantoprazole  40 mg Oral Daily    senna-docusate 8.6-50 mg  1 tablet Oral Daily       PRN Medications: acetaminophen, Dextrose 10% Bolus, Dextrose 10% Bolus, glucagon (human recombinant), insulin aspart U-100, ondansetron, sodium chloride 0.9%    Family History     Problem Relation (Age of Onset)    Hyperlipidemia Father    Hypertension Mother        Tobacco Use    Smoking status: Never Smoker    Smokeless tobacco: Never Used   Substance and Sexual Activity    Alcohol use: No     Alcohol/week: 0.0 standard drinks    Drug use: No    Sexual activity: Never     Review of Systems   Constitutional: Positive for activity change. Negative for fatigue and fever.   HENT: Negative for sore throat and trouble swallowing.    Eyes: Negative for visual disturbance.   Respiratory: Negative for cough and shortness of breath.    Cardiovascular: Negative for chest pain and leg swelling.   Gastrointestinal: Negative for abdominal distention and abdominal pain.   Genitourinary: Negative for difficulty urinating.   Musculoskeletal: Positive for gait problem. Negative for back pain.   Skin: Negative for color change.   Neurological: Positive for weakness. Negative for dizziness, light-headedness and headaches.   Psychiatric/Behavioral: Positive for confusion. Negative for agitation.     Objective:     Vital Signs (Most Recent):  Temp: 97.2 °F (36.2 °C) (11/04/19 0744)  Pulse: 79 (11/04/19 0856)  Resp: 18 (11/04/19 0744)  BP: (!) 175/93 (11/04/19 0744)  SpO2: 98 % (11/04/19 0744)    Vital Signs (24h Range):  Temp:  [97.2 °F (36.2 °C)-99.4 °F (37.4 °C)] 97.2 °F (36.2 °C)  Pulse:  [69-95] 79  Resp:  [18] 18  SpO2:  [97 %-99 %] 98 %  BP: (157-175)/(80-98) 175/93     Body mass index is 21.29 kg/m².    Physical Exam   Constitutional: She appears well-developed and well-nourished.   HENT:   Head: Normocephalic and atraumatic.   Eyes: Pupils are equal, round, and reactive to light. EOM are normal.   Neck: Neck supple.    Pulmonary/Chest: Effort normal. No respiratory distress.   Abdominal: Normal appearance.   Neurological:   - R sided weakness 3/5  - Slow to answer questions, mild confusion present  - following commands   Skin: Skin is warm and dry.   Psychiatric: She has a normal mood and affect. Her behavior is normal.     Diagnostic Results:   Labs: Reviewed  ECG: Reviewed  CT: Reviewed    Assessment/Plan:     * Stroke due to embolism of left middle cerebral artery  - s/p tPA  - CTA no LVO and MRI with infarct in L internal capsule and corona radiata.   - Per VN Etiology likely small vessel disease.     See hospital course for functional, cognitive/speech/language, and nutrition/swallow status.      Recommendations  -  Encourage mobility, OOB in chair at least 3 hours per day, and early ambulation as appropriate   -  PT/OT evaluate and treat  -  SLP speech and cognitive evaluate and treat  -  Monitor sleep disturbances and establish consistent sleep-wake cycle  -  Monitor for bowel and bladder dysfunction  -  Monitor for shoulder pain and subluxation  -  Monitor for spasticity  -  Monitor for and prevent skin breakdown and pressure ulcers  · Early mobility, repositioning/weight shifting every 20-30 minutes when sitting, turn patient every 2 hours, proper mattress/overlay and chair cushioning, pressure relief/heel protector boots  -  DVT prophylaxis  -  Reviewed discharge options (IP rehab, SNF, HH therapy, and OP therapy)    Nausea & vomiting  - No BM since 10/30.   - KUB without obstruction  - resolved    Essential hypertension  - Stroke risk factor      Recommend Inpatient Rehab.      Thank you for your consult.     Emi Castano NP  Department of Physical Medicine & Rehab  [facility]

## 2019-11-04 NOTE — NURSING
Pt kept on fidgeting in bed while receiving 0.9% NaCI.  Fluids at 50ml.hr.  As a result the IV that was placed to left forearm infiltrated. Swelling noted.  Warm compresses applied.  IV removed. Site elevated.  Hard stick and unable to get IV access.    WCTM.

## 2019-11-04 NOTE — PT/OT/SLP PROGRESS
Occupational Therapy   Treatment    Name: Melva Barker  MRN: [id number]  Admitting Diagnosis:  Stroke due to embolism of left middle cerebral artery       Recommendations:     Discharge Recommendations: rehabilitation facility  Discharge Equipment Recommendations:  (TBD)  Barriers to discharge:  Decreased caregiver support    Assessment:     Melva Barker is a 74 y.o. female with a medical diagnosis of Stroke due to embolism of left middle cerebral artery.  She presents with performance deficits including weakness, impaired endurance, impaired sensation, impaired self care skills, impaired functional mobilty, gait instability, impaired balance, decreased upper extremity function, decreased ROM, decreased lower extremity function, impaired coordination. Pt progressing toward goals and would continue to benefit from OT to increase functional independence and safety. Recommend rehab upon D/C as pt is motivated to return to PLOF.    Rehab Prognosis: Good; patient would benefit from acute skilled OT services to address these deficits and reach maximum level of function.       Plan:     Patient to be seen 4 x/week to address the above listed problems via self-care/home management, therapeutic activities, therapeutic exercises, neuromuscular re-education  · Plan of Care Expires: 12/01/19  · Plan of Care Reviewed with: patient    Subjective     Pain/Comfort:  · Pain Rating 1: 0/10  · Pain Rating Post-Intervention 1: 0/10    Objective:     Communicated with: RN prior to session. Patient found supine with telemetry upon OT entry to room.    General Precautions: Standard, fall, aspiration   Orthopedic Precautions:N/A   Braces: N/A     Occupational Performance:     Bed Mobility:    · Patient completed Rolling/Turning to Left with moderate assistance  · Patient completed Supine to Sit with moderate assistance with cues for technique    Functional Mobility/Transfers:  · Patient completed Sit <> Stand Transfer with  moderate assistance with hand-held assistance from EOB  · Functional Mobility: moderate assistance for stand pivot to bedside chair toward L side with R knee blocked and hand-held assist    Activities of Daily Living:  · Grooming: Min A hand-over-hand assist to wash face with R UE-- able to reach face, assist for thoroughness and continued grasp on cloth    · Upper Body Dressing: moderate assistance to change gown while seated      AMPAC 6 Click ADL: 15    Treatment & Education:  Pt emotional at times throughout session due to current functional status-- expresses concern regarding her sister and nephew at home as she is usually their caretaker; able to sit EOB with SBA, required Mod A for transfer to bedside chair; once up in chair, completed AAROM to R UE and educated on self-ROM and UE positioning; completed seated ADLs, discussed POC and D/C recs; pt verbalized understanding to call for assistance before getting up    Patient left up in chair with all lines intact, call button in reach and RN notifiedEducation:      GOALS:   Multidisciplinary Problems     Occupational Therapy Goals        Problem: Occupational Therapy Goal    Goal Priority Disciplines Outcome Interventions   Occupational Therapy Goal     OT, PT/OT Ongoing, Progressing    Description:  Goals to be met by: 7 days (11/8/19)     Patient will increase functional independence with ADLs by performing:    UE Dressing with Contact Guard Assistance.  LE Dressing with Minimal Assistance.  Grooming while standing with Minimal Assistance.  Toileting from bedside commode with Minimal Assistance for hygiene and clothing management.   Toilet transfer to bedside commode with Contact Guard Assistance.  Increased functional strength to WFL for ADLs.  Complete functional mobility household distance with CGA using AD as needed.                      Time Tracking:     OT Date of Treatment: 11/04/19  OT Start Time: 1340  OT Stop Time: 1407  OT Total Time (min): 27  min    Billable Minutes:Therapeutic Activity 15  Neuromuscular Re-education 12    AURORA Florian  11/4/2019

## 2019-11-04 NOTE — HOSPITAL COURSE
11/1/19: Evaluated by PT & OT. Bed mobility CGA- modA. Sit to stand HHA- Jan. Ambulated 6 steps   LBD maxA.

## 2019-11-04 NOTE — ASSESSMENT & PLAN NOTE
Patient with episodes of vomiting 11/2 - resolved throughout the day. No episodes of vomiting overnight.   PRN IV Zofran ordered   KUB completed and no abnormality noted  Patient with last BM 11/2 - normal bowel sounds on exam   Patient continues to report nausea - some mild middle lower abdominal pain upon palpation  Lipase ordered and WNL  11/4 - Denies N/V overnight and today  Continue to monitor

## 2019-11-04 NOTE — PLAN OF CARE
Problem: Adult Inpatient Plan of Care  Goal: Optimal Comfort and Wellbeing  Outcome: Ongoing, Progressing  Intervention: Provide Person-Centered Care  Flowsheets (Taken 11/4/2019 1443)  Trust Relationship/Rapport: care explained; choices provided; emotional support provided; empathic listening provided; questions answered; questions encouraged; reassurance provided; thoughts/feelings acknowledged     Problem: Oral Intake Inadequate  Goal: Improved Oral Intake  Outcome: Ongoing, Progressing  Intervention: Promote and Optimize Oral Intake  Flowsheets (Taken 11/4/2019 1443)  Oral Nutrition Promotion: rest periods promoted

## 2019-11-04 NOTE — PROGRESS NOTES
[facility]  Vascular Neurology  Comprehensive Stroke Center  Progress Note    Assessment/Plan:     * Stroke due to embolism of left middle cerebral artery  Melva Barker is a 74 y.o. female with PMHx of HTN, HLD, and DM who presented to OSH with acute worsening of RSW. Patient had been experiencing RSW x1 month. Yesterday, she began having acute worsening. She was treated with tPA at OSH. CTA without LVO. MRI with infarct in L internal capsule and corona radiata. Etiology likely small vessel disease    Antithrombotics for secondary stroke prevention: Antiplatelets: asa 81 mg + plavix 75 mg   Statins for secondary stroke prevention and hyperlipidemia, if present: Statins: Atorvastatin- 40 mg daily,   Aggressive risk factor modification: HTN, HLD, Diet     Rehab efforts: The patient has been evaluated by a stroke team provider and the therapy needs have been fully considered based off the presenting complaints and exam findings. The following therapy evaluations are needed: PT evaluate and treat, OT evaluate and treat, SLP evaluate and treat, PM&R evaluate for appropriate placement - rehab placement   Diagnostics ordered/pending: None   VTE prophylaxis: Heparin sbq with mechanical SCDs  BP parameters: Infarct: Post tPA, SBP <180        Nausea & vomiting  Patient with episodes of vomiting 11/2 - resolved throughout the day. No episodes of vomiting overnight.   PRN IV Zofran ordered   KUB completed and no abnormality noted  Patient with last BM 11/2 - normal bowel sounds on exam   Patient continues to report nausea - some mild middle lower abdominal pain upon palpation  Lipase ordered and WNL  11/4 - Denies N/V overnight and today  Continue to monitor     Cytotoxic brain edema  Area of cytotoxic cerebral edema identified when reviewing brain imaging in the territory of the L middle cerebral artery. There is no mass effect associated with it. We will continue to monitor the patients  clinical exam for any worsening of symptoms which may indicate expansion of the stroke or the area of the edema resulting in the clinical change. The pattern is suggestive of small vessel etiology.        Essential hypertension  Stroke risk factor  SBP <180 post tPA  Currently on Benazepril 40 mg daily, HCTZ 12.5 (increased from home dose of 6.25) --> increased 11/3 to 25 mg, and bisoprolol 10 mg daily  Was on combo med at home - Bisoprolol 10 mg + HCTZ 6.25 mg       Diabetes mellitus type 2 without retinopathy  stroke risk factor, poorly controlled on glargine 17 units daily  HbA1c 8.3  Currently on SSI   Diabetic diet           11/1/19 MRI with small vessel L MCA infarct, therapy recommending rehab  11/2 - Patient stepped down overnight. Adjusting BP regimen. Patient with continuous complaints if nausea. IV Zofran ordered with some relief. Patient has not has BM since 10/30. Ordering KUB to rule out obstruction. Neuro exam unchanged.   11/3 - NAEON. Patient reports she had no episodes of vomiting overnight. Still complaining of nausea. Mild lower abdomen tenderness on exam. Lipase ordered and WNL. Continue to monitor.   11/4 - denies nausea and vomiting. Abd tenderness remains present on palpitation. Continue to monitor. HCTZ increased yesterday. Pending rehab placement.     STROKE DOCUMENTATION   Acute Stroke Times   Last Known Normal Date: 10/31/19  Last Known Normal Time: 0630  Symptom Onset Date: 10/31/19  Symptom Onset Time: 0730  Stroke Team Called Date: 10/31/19  Stroke Team Called Time: 0936  Stroke Team Arrival Date: 10/31/19  Stroke Team Arrival Time: 0946  Decision to Treat Time for Alteplase: 1003    NIH Scale:  1a. Level of Consciousness: 0-->Alert, keenly responsive  1b. LOC Questions: 1-->Answers one question correctly  1c. LOC Commands: 0-->Performs both tasks correctly  2. Best Gaze: 0-->Normal  3. Visual: 0-->No visual loss  4. Facial Palsy: 1-->Minor paralysis (flattened nasolabial fold,  asymmetry on smiling)  5a. Motor Arm, Left: 0-->No drift, limb holds 90 (or 45) degrees for full 10 secs  5b. Motor Arm, Right: 1-->Drift, limb holds 90 (or 45) degrees, but drifts down before full 10 secs, does not hit bed or other support  6a. Motor Leg, Left: 0-->No drift, leg holds 30 degree position for full 5 secs  6b. Motor Leg, Right: 2-->Some effort against gravity, leg falls to bed by 5 secs, but has some effort against gravity  7. Limb Ataxia: 0-->Absent  8. Sensory: 1-->Mild-to-moderate sensory loss, patient feels pinprick is less sharp or is dull on the affected side, or there is a loss of superficial pain with pinprick, but patient is aware of being touched  9. Best Language: 0-->No aphasia, normal  10. Dysarthria: 0-->Normal  11. Extinction and Inattention (formerly Neglect): 0-->No abnormality  Total (NIH Stroke Scale): 6       Modified Dayanna Score: 0  Nabila Coma Scale:    ABCD2 Score:    [id number] Score:   HAS -BLED Score:   ICH Score:   Hunt & Amaya Classification:      Hemorrhagic change of an Ischemic Stroke: Does this patient have an ischemic stroke with hemorrhagic changes? No     Neurologic Chief Complaint: L MCA    Subjective:     Interval History: Patient is seen for follow-up neurological assessment and treatment recommendations:      denies nausea and vomiting. Abd tenderness remains present on palpitation. Continue to monitor. HCTZ increased yesterday. Pending rehab placement.       HPI, Past Medical, Family, and Social History remains the same as documented in the initial encounter.     Review of Systems   Constitutional: Negative for chills and fever.   Respiratory: Negative for cough.    Cardiovascular: Positive for palpitations. Negative for chest pain.   Gastrointestinal: Negative for nausea and vomiting.   Neurological: Positive for facial asymmetry and weakness. Negative for speech difficulty and numbness.     Scheduled Meds:   aspirin  81 mg Oral Daily    atorvastatin  40  mg Oral Daily    benazepril  40 mg Oral Daily    bisoprolol  10 mg Oral Daily    clopidogrel  75 mg Oral Daily    gabapentin  600 mg Oral Q8H    heparin (porcine)  5,000 Units Subcutaneous Q8H    hydroCHLOROthiazide  25 mg Oral Daily    lidocaine  2 patch Transdermal Q24H    pantoprazole  40 mg Oral Daily    senna-docusate 8.6-50 mg  1 tablet Oral Daily     Continuous Infusions:   sodium chloride 0.9% 50 mL/hr at 11/04/19 0441     PRN Meds:acetaminophen, Dextrose 10% Bolus, Dextrose 10% Bolus, glucagon (human recombinant), insulin aspart U-100, ondansetron, sodium chloride 0.9%    Objective:     Vital Signs (Most Recent):  Temp: 98.5 °F (36.9 °C) (11/04/19 1203)  Pulse: 75 (11/04/19 1203)  Resp: 18 (11/04/19 1203)  BP: 139/75 (11/04/19 1203)  SpO2: 96 % (11/04/19 1203)  BP Location: Right arm    Vital Signs Range (Last 24H):  Temp:  [97.2 °F (36.2 °C)-99.4 °F (37.4 °C)]   Pulse:  [69-95]   Resp:  [18]   BP: (139-175)/(75-98)   SpO2:  [96 %-99 %]   BP Location: Right arm    Physical Exam   Constitutional: She appears well-developed and well-nourished. No distress.   HENT:   Head: Normocephalic and atraumatic.   Eyes: Pupils are equal, round, and reactive to light. EOM are normal.   Cardiovascular: Normal rate.   Pulmonary/Chest: Effort normal. No respiratory distress.   Abdominal: Soft. Bowel sounds are normal. There is tenderness (bilateral lower abdominal pain ).   Neurological: She is alert.   Skin: Skin is warm and dry.   Vitals reviewed.      Neurological Exam:   LOC: alert  Attention Span: Good   Language: No aphasia  Articulation: No dysarthria  Orientation: Person, Time   Visual Fields: Full  EOM (CN III, IV, VI): Full/intact  Pupils (CN II, III): PERRL  Facial Movement (CN VII): Lower facial weakness on the Right  Motor: Arm left  Normal 5/5  Leg left  Normal 5/5  Arm right  Paresis: +4/5  Leg right Paresis: 3/5  Sensation: mild decrease in sensation RUE  Tone: Normal tone  throughout    Laboratory:  CMP:   Recent Labs   Lab 11/04/19  0459   CALCIUM 9.7   ALBUMIN 4.1   PROT 8.3   *   K 3.3*   CO2 22*      BUN 14   CREATININE 0.8   ALKPHOS 135   ALT 19   AST 26   BILITOT 1.3*     CBC:   Recent Labs   Lab 11/04/19  0459   WBC 10.68   RBC 4.88   HGB 12.6   HCT 39.6      MCV 81*   MCH 25.8*   MCHC 31.8*     Lipid Panel:   Recent Labs   Lab 10/31/19  1437   CHOL 239*   LDLCALC 168.4*   HDL 42   TRIG 143     Coagulation:   Recent Labs   Lab 10/31/19  1015   INR 1.0   APTT 26.4     Platelet Aggregation Study: No results for input(s): PLTAGG, PLTAGINTERP, PLTAGREGLACO, ADPPLTAGGREG in the last 168 hours.  Hgb A1C:   Recent Labs   Lab 10/31/19  1437   HGBA1C 8.3*     TSH:   Recent Labs   Lab 10/31/19  1437   TSH 0.447       Diagnostic Results     Brain Imaging   MRI Brain (11/01/19):  Acute lacunar type infarct coursing through the left posterior limb internal capsule and corona radiata.    CTH (10/31/19):  No acute intracranial pathology    Vessel Imaging   CTA-MP (10/31/19):  Atherosclerotic disease of the cavernous and supraclinoid ICAs with mild narrowing. No high-grade stenosis or LVO. Less than 50% proximal ICA stenosis. mild-moderate narrowing of the right vertebral artery origin. There is mild left V1 segment narrowing.  No significant focal vertebral artery stenosis or occlusion.    Cardiac Imaging   Echo (11/01/19)  · Increased (hyperdynamic) left ventricular systolic function. The estimated ejection fraction is 75%  · Concentric left ventricular remodeling.  · Normal LV diastolic function.  · No wall motion abnormalities.  · Normal right ventricular systolic function.  · Normal central venous pressure (3 mm Hg).  · Mild tricuspid regurgitation.  · The estimated PA systolic pressure is 26 mm Hg  · Echolucent structure next to the LA, likely representing hiatal hernia. Clinincal correlation is required.      Juan Chen NP  [facility]  Department  of Vascular Neurology   [facility]

## 2019-11-04 NOTE — PLAN OF CARE
Problem: SLP Goal  Goal: SLP Goal  Description  Speech Language Pathology Goals  Goals expected to be met by 11/8  1. Pt will participate in conversation without cues to express thought.   2. Pt will complete simple functional reasoning tasks with 80% accy given min cues.   3. Pt will complete assessment of reading, writing, visual spatial skills to determine need for tx.          Outcome: Ongoing, Progressing

## 2019-11-04 NOTE — PLAN OF CARE
Continue OT plan of care.    Problem: Occupational Therapy Goal  Goal: Occupational Therapy Goal  Description  Goals to be met by: 7 days (11/8/19)     Patient will increase functional independence with ADLs by performing:    UE Dressing with Contact Guard Assistance.  LE Dressing with Minimal Assistance.  Grooming while standing with Minimal Assistance.  Toileting from bedside commode with Minimal Assistance for hygiene and clothing management.   Toilet transfer to bedside commode with Contact Guard Assistance.  Increased functional strength to WFL for ADLs.  Complete functional mobility household distance with CGA using AD as needed.     Outcome: Ongoing, Progressing

## 2019-11-04 NOTE — PLAN OF CARE
POC reviewed with pt at bedside.  Verbalized understanding.  Complained of NV once this shift.  NV controlled by prn meds.  Meds helpful.  Ambulated OOB with assist to bedside commode.  Ambulated three times this shift.  Gait unstable.  Rt upper and rt lower extremities weak.  Voided.  No falls.  Safety maintained.  No acute events.  Progressing towards the goals.  Call light within reach.  WCTM.

## 2019-11-04 NOTE — HPI
Melva Barker is a 74-year-old female with PMHx of HTN and HLD. Patient presented to OSH with acute R sided weakness. Given tPA and [place] [facility] [place]. CTA no LVO and MRI with infarct in L internal capsule and corona radiata. Per VN Etiology likely small vessel disease. Hospital course complicated N & V (No BM since 10/30. KUB without obstruction, resolved).     Functional History: Patient lives with her sister who has Alzheimers and son who has COPD in a H with 2 ZEUS  Prior to admission, (I). DME: none.

## 2019-11-04 NOTE — SUBJECTIVE & OBJECTIVE
Neurologic Chief Complaint: L MCA    Subjective:     Interval History: Patient is seen for follow-up neurological assessment and treatment recommendations:      denies nausea and vomiting. Abd tenderness remains present on palpitation. Continue to monitor. HCTZ increased yesterday. Pending rehab placement.       HPI, Past Medical, Family, and Social History remains the same as documented in the initial encounter.     Review of Systems   Constitutional: Negative for chills and fever.   Respiratory: Negative for cough.    Cardiovascular: Positive for palpitations. Negative for chest pain.   Gastrointestinal: Negative for nausea and vomiting.   Neurological: Positive for facial asymmetry and weakness. Negative for speech difficulty and numbness.     Scheduled Meds:   aspirin  81 mg Oral Daily    atorvastatin  40 mg Oral Daily    benazepril  40 mg Oral Daily    bisoprolol  10 mg Oral Daily    clopidogrel  75 mg Oral Daily    gabapentin  600 mg Oral Q8H    heparin (porcine)  5,000 Units Subcutaneous Q8H    hydroCHLOROthiazide  25 mg Oral Daily    lidocaine  2 patch Transdermal Q24H    pantoprazole  40 mg Oral Daily    senna-docusate 8.6-50 mg  1 tablet Oral Daily     Continuous Infusions:   sodium chloride 0.9% 50 mL/hr at 11/04/19 0441     PRN Meds:acetaminophen, Dextrose 10% Bolus, Dextrose 10% Bolus, glucagon (human recombinant), insulin aspart U-100, ondansetron, sodium chloride 0.9%    Objective:     Vital Signs (Most Recent):  Temp: 98.5 °F (36.9 °C) (11/04/19 1203)  Pulse: 75 (11/04/19 1203)  Resp: 18 (11/04/19 1203)  BP: 139/75 (11/04/19 1203)  SpO2: 96 % (11/04/19 1203)  BP Location: Right arm    Vital Signs Range (Last 24H):  Temp:  [97.2 °F (36.2 °C)-99.4 °F (37.4 °C)]   Pulse:  [69-95]   Resp:  [18]   BP: (139-175)/(75-98)   SpO2:  [96 %-99 %]   BP Location: Right arm    Physical Exam   Constitutional: She appears well-developed and well-nourished. No distress.   HENT:   Head: Normocephalic and  atraumatic.   Eyes: Pupils are equal, round, and reactive to light. EOM are normal.   Cardiovascular: Normal rate.   Pulmonary/Chest: Effort normal. No respiratory distress.   Abdominal: Soft. Bowel sounds are normal. There is tenderness (bilateral lower abdominal pain ).   Neurological: She is alert.   Skin: Skin is warm and dry.   Vitals reviewed.      Neurological Exam:   LOC: alert  Attention Span: Good   Language: No aphasia  Articulation: No dysarthria  Orientation: Person, Time   Visual Fields: Full  EOM (CN III, IV, VI): Full/intact  Pupils (CN II, III): PERRL  Facial Movement (CN VII): Lower facial weakness on the Right  Motor: Arm left  Normal 5/5  Leg left  Normal 5/5  Arm right  Paresis: +4/5  Leg right Paresis: 3/5  Sensation: mild decrease in sensation RUE  Tone: Normal tone throughout    Laboratory:  CMP:   Recent Labs   Lab 11/04/19  0459   CALCIUM 9.7   ALBUMIN 4.1   PROT 8.3   *   K 3.3*   CO2 22*      BUN 14   CREATININE 0.8   ALKPHOS 135   ALT 19   AST 26   BILITOT 1.3*     CBC:   Recent Labs   Lab 11/04/19  0459   WBC 10.68   RBC 4.88   HGB 12.6   HCT 39.6      MCV 81*   MCH 25.8*   MCHC 31.8*     Lipid Panel:   Recent Labs   Lab 10/31/19  1437   CHOL 239*   LDLCALC 168.4*   HDL 42   TRIG 143     Coagulation:   Recent Labs   Lab 10/31/19  1015   INR 1.0   APTT 26.4     Platelet Aggregation Study: No results for input(s): PLTAGG, PLTAGINTERP, PLTAGREGLACO, ADPPLTAGGREG in the last 168 hours.  Hgb A1C:   Recent Labs   Lab 10/31/19  1437   HGBA1C 8.3*     TSH:   Recent Labs   Lab 10/31/19  1437   TSH 0.447       Diagnostic Results     Brain Imaging   MRI Brain (11/01/19):  Acute lacunar type infarct coursing through the left posterior limb internal capsule and corona radiata.    CTH (10/31/19):  No acute intracranial pathology    Vessel Imaging   CTA-MP (10/31/19):  Atherosclerotic disease of the cavernous and supraclinoid ICAs with mild narrowing. No high-grade stenosis or LVO.  Less than 50% proximal ICA stenosis. mild-moderate narrowing of the right vertebral artery origin. There is mild left V1 segment narrowing.  No significant focal vertebral artery stenosis or occlusion.    Cardiac Imaging   Echo (11/01/19)  · Increased (hyperdynamic) left ventricular systolic function. The estimated ejection fraction is 75%  · Concentric left ventricular remodeling.  · Normal LV diastolic function.  · No wall motion abnormalities.  · Normal right ventricular systolic function.  · Normal central venous pressure (3 mm Hg).  · Mild tricuspid regurgitation.  · The estimated PA systolic pressure is 26 mm Hg  · Echolucent structure next to the LA, likely representing hiatal hernia. Clinincal correlation is required.

## 2019-11-04 NOTE — PT/OT/SLP PROGRESS
"Speech Language Pathology Treatment    Patient Name:  Melva Barker   MRN:  [id number]  Admitting Diagnosis: Stroke due to embolism of left middle cerebral artery    Recommendations:                 General Recommendations:  Cognitive-linguistic therapy  Diet recommendations:  Regular, Liquid Diet Level: Thin   Aspiration Precautions: Standard aspiration precautions   General Precautions: Standard, aspiration, fall  Communication strategies:  none    Subjective     "I am just not hungry" per pt  Patient goals: home    Pain/Comfort:  · Pain Rating 1: 0/10  · Pain Rating Post-Intervention 1: 0/10    Objective:     Has the patient been evaluated by SLP for swallowing?   Yes  Keep patient NPO? No   Current Respiratory Status: room air      Pt. Seen at bedside and reported that she had not eaten over the weekend or this am.  Per nursing regular diet was ordered late this am.  Pt had been npo due to holding of puree bolus.  Nursing reported no difficulty swallowing meds with water this am.  HOB was raised to 90 degree angle andpt. Was observed self feeding 3 ounces of water and eating 1/2 ed cracker.  Dentition was poor however mastication was adequate with increased time needed.  No anterior loss of liquid bolus noted and no holding of bolus'.  No coughing, throat clearing or change in vocal quality was noted following the swallow.  Cognitively, pt. Verbalized good insight to deficits/situation.  She was oriented to time and place with good recall of recent events.  Ms. Barker orally read sentences at midline.  Writing not assessed as dominant hand is affected.  She responded to category exclusion tasks in field of 4 with 80% accuracy with min cues needed.  Education provided re poc.       Assessment:     Melva Barker is a 74 y.o. female with an SLP diagnosis of Cognitive-Linguistic Impairment.      Goals:   Multidisciplinary Problems     SLP Goals        Problem: SLP Goal    Goal Priority Disciplines " Outcome   SLP Goal     SLP Ongoing, Progressing   Description:  Speech Language Pathology Goals  Goals expected to be met by 11/8  1. Pt will participate in conversation without cues to express thought.   2. Pt will complete simple functional reasoning tasks with 80% accy given min cues.   3. Pt will complete assessment of reading, writing, visual spatial skills to determine need for tx.                           Plan:     · Patient to be seen:  3 x/week   · Plan of Care expires:  12/01/19  · Plan of Care reviewed with:  patient   · SLP Follow-Up:  Yes       Discharge recommendations:  rehabilitation facility   Barriers to Discharge:  None    Time Tracking:     SLP Treatment Date:   11/04/19  Speech Start Time:  1010  Speech Stop Time:  1030     Speech Total Time (min):  20 min    Billable Minutes: Speech Therapy Individual 10 and Treatment Swallowing Dysfunction 10    Cherry Pandey MA, CCC-SLP  11/04/2019

## 2019-11-04 NOTE — ASSESSMENT & PLAN NOTE
- s/p tPA  - CTA no LVO and MRI with infarct in L internal capsule and corona radiata.   - Per VN Etiology likely small vessel disease.     See hospital course for functional, cognitive/speech/language, and nutrition/swallow status.      Recommendations  -  Encourage mobility, OOB in chair at least 3 hours per day, and early ambulation as appropriate   -  PT/OT evaluate and treat  -  SLP speech and cognitive evaluate and treat  -  Monitor sleep disturbances and establish consistent sleep-wake cycle  -  Monitor for bowel and bladder dysfunction  -  Monitor for shoulder pain and subluxation  -  Monitor for spasticity  -  Monitor for and prevent skin breakdown and pressure ulcers  · Early mobility, repositioning/weight shifting every 20-30 minutes when sitting, turn patient every 2 hours, proper mattress/overlay and chair cushioning, pressure relief/heel protector boots  -  DVT prophylaxis  -  Reviewed discharge options (IP rehab, SNF, HH therapy, and OP therapy)

## 2019-11-05 LAB
POCT GLUCOSE: 117 MG/DL (ref 70–110)
POCT GLUCOSE: 124 MG/DL (ref 70–110)
POCT GLUCOSE: 166 MG/DL (ref 70–110)
POCT GLUCOSE: 180 MG/DL (ref 70–110)
POCT GLUCOSE: 220 MG/DL (ref 70–110)

## 2019-11-05 PROCEDURE — 25000003 PHARM REV CODE 250: Performed by: FAMILY MEDICINE

## 2019-11-05 PROCEDURE — 99233 PR SUBSEQUENT HOSPITAL CARE,LEVL III: ICD-10-PCS | Mod: ,,, | Performed by: PSYCHIATRY & NEUROLOGY

## 2019-11-05 PROCEDURE — 97530 THERAPEUTIC ACTIVITIES: CPT

## 2019-11-05 PROCEDURE — 97110 THERAPEUTIC EXERCISES: CPT

## 2019-11-05 PROCEDURE — 25000003 PHARM REV CODE 250: Performed by: PSYCHIATRY & NEUROLOGY

## 2019-11-05 PROCEDURE — 25000003 PHARM REV CODE 250: Performed by: NURSE PRACTITIONER

## 2019-11-05 PROCEDURE — 97116 GAIT TRAINING THERAPY: CPT

## 2019-11-05 PROCEDURE — 25000003 PHARM REV CODE 250: Performed by: STUDENT IN AN ORGANIZED HEALTH CARE EDUCATION/TRAINING PROGRAM

## 2019-11-05 PROCEDURE — 97112 NEUROMUSCULAR REEDUCATION: CPT

## 2019-11-05 PROCEDURE — 20600001 HC STEP DOWN PRIVATE ROOM

## 2019-11-05 PROCEDURE — 63600175 PHARM REV CODE 636 W HCPCS: Performed by: STUDENT IN AN ORGANIZED HEALTH CARE EDUCATION/TRAINING PROGRAM

## 2019-11-05 PROCEDURE — 97803 MED NUTRITION INDIV SUBSEQ: CPT

## 2019-11-05 PROCEDURE — 99233 SBSQ HOSP IP/OBS HIGH 50: CPT | Mod: ,,, | Performed by: PSYCHIATRY & NEUROLOGY

## 2019-11-05 PROCEDURE — 63600175 PHARM REV CODE 636 W HCPCS: Performed by: NURSE PRACTITIONER

## 2019-11-05 RX ORDER — RAMELTEON 8 MG/1
8 TABLET ORAL NIGHTLY PRN
Status: DISCONTINUED | OUTPATIENT
Start: 2019-11-05 | End: 2019-11-09

## 2019-11-05 RX ORDER — SUCRALFATE 1 G/1
1 TABLET ORAL 2 TIMES DAILY
Status: DISCONTINUED | OUTPATIENT
Start: 2019-11-05 | End: 2019-11-09

## 2019-11-05 RX ORDER — ASPIRIN 81 MG/1
81 TABLET ORAL DAILY
Status: DISCONTINUED | OUTPATIENT
Start: 2019-11-06 | End: 2019-11-08

## 2019-11-05 RX ORDER — METOCLOPRAMIDE HYDROCHLORIDE 5 MG/ML
10 INJECTION INTRAMUSCULAR; INTRAVENOUS ONCE
Status: COMPLETED | OUTPATIENT
Start: 2019-11-05 | End: 2019-11-05

## 2019-11-05 RX ADMIN — HYDROCHLOROTHIAZIDE 25 MG: 12.5 TABLET ORAL at 09:11

## 2019-11-05 RX ADMIN — SUCRALFATE 1 G: 1 TABLET ORAL at 08:11

## 2019-11-05 RX ADMIN — CLOPIDOGREL BISULFATE 75 MG: 75 TABLET ORAL at 09:11

## 2019-11-05 RX ADMIN — SUCRALFATE 1 G: 1 TABLET ORAL at 11:11

## 2019-11-05 RX ADMIN — HEPARIN SODIUM 5000 UNITS: 5000 INJECTION, SOLUTION INTRAVENOUS; SUBCUTANEOUS at 05:11

## 2019-11-05 RX ADMIN — BISOPROLOL FUMARATE 10 MG: 5 TABLET, COATED ORAL at 09:11

## 2019-11-05 RX ADMIN — GABAPENTIN 600 MG: 300 CAPSULE ORAL at 02:11

## 2019-11-05 RX ADMIN — GABAPENTIN 600 MG: 300 CAPSULE ORAL at 05:11

## 2019-11-05 RX ADMIN — HEPARIN SODIUM 5000 UNITS: 5000 INJECTION, SOLUTION INTRAVENOUS; SUBCUTANEOUS at 02:11

## 2019-11-05 RX ADMIN — INSULIN ASPART 4 UNITS: 100 INJECTION, SOLUTION INTRAVENOUS; SUBCUTANEOUS at 11:11

## 2019-11-05 RX ADMIN — BENAZEPRIL HYDROCHLORIDE 40 MG: 10 TABLET ORAL at 09:11

## 2019-11-05 RX ADMIN — SENNOSIDES AND DOCUSATE SODIUM 1 TABLET: 8.6; 5 TABLET ORAL at 09:11

## 2019-11-05 RX ADMIN — ATORVASTATIN CALCIUM 40 MG: 20 TABLET, FILM COATED ORAL at 09:11

## 2019-11-05 RX ADMIN — LIDOCAINE 2 PATCH: 50 PATCH TOPICAL at 05:11

## 2019-11-05 RX ADMIN — METOCLOPRAMIDE 10 MG: 5 INJECTION, SOLUTION INTRAMUSCULAR; INTRAVENOUS at 03:11

## 2019-11-05 RX ADMIN — GABAPENTIN 600 MG: 300 CAPSULE ORAL at 08:11

## 2019-11-05 RX ADMIN — ASPIRIN 81 MG CHEWABLE TABLET 81 MG: 81 TABLET CHEWABLE at 09:11

## 2019-11-05 RX ADMIN — HEPARIN SODIUM 5000 UNITS: 5000 INJECTION, SOLUTION INTRAVENOUS; SUBCUTANEOUS at 08:11

## 2019-11-05 RX ADMIN — PANTOPRAZOLE SODIUM 40 MG: 40 TABLET, DELAYED RELEASE ORAL at 09:11

## 2019-11-05 RX ADMIN — ONDANSETRON 4 MG: 2 INJECTION INTRAMUSCULAR; INTRAVENOUS at 12:11

## 2019-11-05 RX ADMIN — RAMELTEON 8 MG: 8 TABLET ORAL at 08:11

## 2019-11-05 NOTE — SUBJECTIVE & OBJECTIVE
Neurologic Chief Complaint: L MCA    Subjective:     Interval History: Patient is seen for follow-up neurological assessment and treatment recommendations:     N/V x1 overnight zofran resolved, sucralfate started. Placement pending       HPI, Past Medical, Family, and Social History remains the same as documented in the initial encounter.     Review of Systems   Constitutional: Negative for chills and fever.   Respiratory: Negative for cough.    Cardiovascular: Positive for palpitations. Negative for chest pain.   Gastrointestinal: Positive for abdominal pain (mid lower abdominal ), nausea and vomiting.   Neurological: Positive for facial asymmetry and weakness. Negative for speech difficulty and numbness.     Scheduled Meds:   [START ON 11/6/2019] aspirin  81 mg Oral Daily    atorvastatin  40 mg Oral Daily    benazepril  40 mg Oral Daily    bisoprolol  10 mg Oral Daily    clopidogrel  75 mg Oral Daily    gabapentin  600 mg Oral Q8H    heparin (porcine)  5,000 Units Subcutaneous Q8H    hydroCHLOROthiazide  25 mg Oral Daily    lidocaine  2 patch Transdermal Q24H    senna-docusate 8.6-50 mg  1 tablet Oral Daily    sucralfate  1 g Oral BID     Continuous Infusions:    PRN Meds:acetaminophen, Dextrose 10% Bolus, Dextrose 10% Bolus, glucagon (human recombinant), insulin aspart U-100, ondansetron, sodium chloride 0.9%    Objective:     Vital Signs (Most Recent):  Temp: 98.5 °F (36.9 °C) (11/05/19 1450)  Pulse: 73 (11/05/19 1450)  Resp: 16 (11/05/19 1450)  BP: (!) 113/56 (11/05/19 1450)  SpO2: 95 % (11/05/19 1450)  BP Location: Left arm    Vital Signs Range (Last 24H):  Temp:  [97.3 °F (36.3 °C)-99.2 °F (37.3 °C)]   Pulse:  [64-99]   Resp:  [16-18]   BP: (111-198)/(55-94)   SpO2:  [95 %-99 %]   BP Location: Left arm    Physical Exam   Constitutional: She appears well-developed and well-nourished. No distress.   HENT:   Head: Normocephalic and atraumatic.   Eyes: Pupils are equal, round, and reactive to light.  EOM are normal.   Cardiovascular: Normal rate.   Pulmonary/Chest: Effort normal. No respiratory distress.   Abdominal: Soft. Bowel sounds are normal. There is tenderness (bilateral lower abdominal pain ).   Neurological: She is alert.   Skin: Skin is warm and dry.   Vitals reviewed.      Neurological Exam:   LOC: alert  Attention Span: Good   Language: No aphasia  Articulation: No dysarthria  Orientation: Person, Time   Visual Fields: Full  EOM (CN III, IV, VI): Full/intact  Pupils (CN II, III): PERRL  Facial Movement (CN VII): Lower facial weakness on the Right  Motor: Arm left  Normal 5/5  Leg left  Normal 5/5  Arm right  Paresis: +4/5  Leg right Paresis: 3/5  Sensation: mild decrease in sensation RUE  Tone: Normal tone throughout    Laboratory:  CMP:   No results for input(s): GLUCOSE, CALCIUM, ALBUMIN, PROT, NA, K, CO2, CL, BUN, CREATININE, ALKPHOS, ALT, AST, BILITOT in the last 24 hours.  CBC:   Recent Labs   Lab 11/04/19  0459   WBC 10.68   RBC 4.88   HGB 12.6   HCT 39.6      MCV 81*   MCH 25.8*   MCHC 31.8*     Lipid Panel:   Recent Labs   Lab 10/31/19  1437   CHOL 239*   LDLCALC 168.4*   HDL 42   TRIG 143     Coagulation:   Recent Labs   Lab 10/31/19  1015   INR 1.0   APTT 26.4     Platelet Aggregation Study: No results for input(s): PLTAGG, PLTAGINTERP, PLTAGREGLACO, ADPPLTAGGREG in the last 168 hours.  Hgb A1C:   Recent Labs   Lab 10/31/19  1437   HGBA1C 8.3*     TSH:   Recent Labs   Lab 10/31/19  1437   TSH 0.447       Diagnostic Results     Brain Imaging   MRI Brain (11/01/19):  Acute lacunar type infarct coursing through the left posterior limb internal capsule and corona radiata.    CTH (10/31/19):  No acute intracranial pathology    Vessel Imaging   CTA-MP (10/31/19):  Atherosclerotic disease of the cavernous and supraclinoid ICAs with mild narrowing. No high-grade stenosis or LVO. Less than 50% proximal ICA stenosis. mild-moderate narrowing of the right vertebral artery origin. There is mild  left V1 segment narrowing.  No significant focal vertebral artery stenosis or occlusion.    Cardiac Imaging   Echo (11/01/19)  · Increased (hyperdynamic) left ventricular systolic function. The estimated ejection fraction is 75%  · Concentric left ventricular remodeling.  · Normal LV diastolic function.  · No wall motion abnormalities.  · Normal right ventricular systolic function.  · Normal central venous pressure (3 mm Hg).  · Mild tricuspid regurgitation.  · The estimated PA systolic pressure is 26 mm Hg  · Echolucent structure next to the LA, likely representing hiatal hernia. Clinincal correlation is required.

## 2019-11-05 NOTE — ASSESSMENT & PLAN NOTE
Patient with episodes of vomiting 11/2 - resolved throughout the day. No episodes of vomiting overnight.   PRN IV Zofran ordered   KUB completed and no abnormality noted  Patient with last BM 11/5 - normal bowel sounds on exam   Patient continues to report nausea - some mild middle lower abdominal pain upon palpation  Lipase ordered and WNL  CT abdomen (2016) diverticulosis in the descending colon and a small hiatal hernia. Follow up with PCP.  Sucralfate 1g started 11/5  Continue to monitor

## 2019-11-05 NOTE — PT/OT/SLP PROGRESS
Occupational Therapy   Treatment    Name: Melva Barker  MRN: [id number]  Admitting Diagnosis:  Stroke due to embolism of left middle cerebral artery       Recommendations:     Discharge Recommendations: rehabilitation facility  Discharge Equipment Recommendations:  (TBD)  Barriers to discharge:  Decreased caregiver support    Assessment:     Melva Barker is a 74 y.o. female with a medical diagnosis of Stroke due to embolism of left middle cerebral artery. She presents with performance deficits including weakness, impaired endurance, impaired functional mobilty, impaired self care skills, impaired balance, gait instability, decreased upper extremity function, decreased lower extremity function, decreased ROM, impaired coordination, impaired fine motor. Pt progressing toward goals and would continue to benefit from OT to increase functional independence and safety. Recommend rehab upon D/C as pt is motivated to return to PLOF.    Rehab Prognosis: Good; patient would benefit from acute skilled OT services to address these deficits and reach maximum level of function.       Plan:     Patient to be seen 4 x/week to address the above listed problems via self-care/home management, therapeutic activities, therapeutic exercises, neuromuscular re-education  · Plan of Care Expires: 12/01/19  · Plan of Care Reviewed with: patient    Subjective     Pain/Comfort:  · Pain Rating 1: 0/10  · Pain Rating Post-Intervention 1: 0/10    Objective:     Communicated with: RN prior to session. Patient found supine with telemetry, peripheral IV, SCD upon OT entry to room.    General Precautions: Standard, aspiration, fall   Orthopedic Precautions:N/A   Braces: N/A     Occupational Performance:     Bed Mobility:    · Patient completed Supine to Sit with contact guard assistance with HOB elevated    Functional Mobility/Transfers:  · Patient completed Sit <> Stand Transfer with minimum assistance with hand-held assist from EOB and R  knee blocked  · Functional Mobility: Few steps to bedside chair with minimum assistance with hand-held assist and R knee blocked    Activities of Daily Living:  · Lower Body Dressing: moderate assistance to don socks while seated EOB-- unable to come to 4 pt position on the R      [facility]    Treatment & Education:  Pt completed lateral trunk leans onto R forearm for weightbearing/proprioception x 8 reps with CGA to return to midline; completed AAROM to R UE all planes x 10 reps-- increased elbow flex and  strength noted this date; assisted to bedside chair and reviewed R UE ROM and positioning and to call for assistance before getting up    Patient left up in chair with all lines intact, call button in reach and RN notifiedEducation:      GOALS:   Multidisciplinary Problems     Occupational Therapy Goals        Problem: Occupational Therapy Goal    Goal Priority Disciplines Outcome Interventions   Occupational Therapy Goal     OT, PT/OT Ongoing, Progressing    Description:  Goals to be met by: 7 days (11/8/19)     Patient will increase functional independence with ADLs by performing:    UE Dressing with Contact Guard Assistance.  LE Dressing with Minimal Assistance.  Grooming while standing with Minimal Assistance.  Toileting from bedside commode with Minimal Assistance for hygiene and clothing management.   Toilet transfer to bedside commode with Contact Guard Assistance.  Increased functional strength to WFL for ADLs.  Complete functional mobility household distance with CGA using AD as needed.                      Time Tracking:     OT Date of Treatment: 11/05/19  OT Start Time: 1419  OT Stop Time: 1442  OT Total Time (min): 23 min    Billable Minutes:Therapeutic Activity 8  Neuromuscular Re-education 15    AURORA Florian  11/5/2019

## 2019-11-05 NOTE — PROGRESS NOTES
[facility]  Vascular Neurology  Comprehensive Stroke Center  Progress Note    Assessment/Plan:     * Stroke due to embolism of left middle cerebral artery  Melva Barker is a 74 y.o. female with PMHx of HTN, HLD, and DM who presented to OSH with acute worsening of RSW. Patient had been experiencing RSW x1 month. Yesterday, she began having acute worsening. She was treated with tPA at OSH. CTA without LVO. MRI with infarct in L internal capsule and corona radiata. Etiology likely small vessel disease    Antithrombotics for secondary stroke prevention: Antiplatelets: asa 81 mg + plavix 75 mg   Statins for secondary stroke prevention and hyperlipidemia, if present: Statins: Atorvastatin- 40 mg daily,   Aggressive risk factor modification: HTN, HLD, Diet     Rehab efforts: The patient has been evaluated by a stroke team provider and the therapy needs have been fully considered based off the presenting complaints and exam findings. The following therapy evaluations are needed: PT evaluate and treat, OT evaluate and treat, SLP evaluate and treat, PM&R evaluate for appropriate placement - rehab placement   Diagnostics ordered/pending: None   VTE prophylaxis: Heparin sbq with mechanical SCDs  BP parameters: Infarct: Post tPA, SBP <180        Nausea & vomiting  Patient with episodes of vomiting 11/2 - resolved throughout the day. No episodes of vomiting overnight.   PRN IV Zofran ordered   KUB completed and no abnormality noted  Patient with last BM 11/5 - normal bowel sounds on exam   Patient continues to report nausea - some mild middle lower abdominal pain upon palpation  Lipase ordered and WNL  CT abdomen (2016) diverticulosis in the descending colon and a small hiatal hernia. Follow up with PCP.  Sucralfate 1g started 11/5  Continue to monitor     Cytotoxic brain edema  Area of cytotoxic cerebral edema identified when reviewing brain imaging in the territory of the L middle cerebral  artery. There is no mass effect associated with it. We will continue to monitor the patients clinical exam for any worsening of symptoms which may indicate expansion of the stroke or the area of the edema resulting in the clinical change. The pattern is suggestive of small vessel etiology.        Essential hypertension  Stroke risk factor  SBP <180 post tPA  Currently on Benazepril 40 mg daily, HCTZ 12.5 (increased from home dose of 6.25) --> increased 11/3 to 25 mg, and bisoprolol 10 mg daily  Was on combo med at home - Bisoprolol 10 mg + HCTZ 6.25 mg       Diabetes mellitus type 2 without retinopathy  stroke risk factor, poorly controlled on glargine 17 units daily  HbA1c 8.3  Currently on SSI   Diabetic diet           11/1/19 MRI with small vessel L MCA infarct, therapy recommending rehab  11/2 - Patient stepped down overnight. Adjusting BP regimen. Patient with continuous complaints if nausea. IV Zofran ordered with some relief. Patient has not has BM since 10/30. Ordering KUB to rule out obstruction. Neuro exam unchanged.   11/3 - NAEON. Patient reports she had no episodes of vomiting overnight. Still complaining of nausea. Mild lower abdomen tenderness on exam. Lipase ordered and WNL. Continue to monitor.   11/4 - denies nausea and vomiting. Abd tenderness remains present on palpitation. Continue to monitor. HCTZ increased yesterday. Pending rehab placement.   11/5 -  N/V x1 overnight zofran resolved, sucralfate started. Placement pending     STROKE DOCUMENTATION   Acute Stroke Times   Last Known Normal Date: 10/31/19  Last Known Normal Time: 0630  Symptom Onset Date: 10/31/19  Symptom Onset Time: 0730  Stroke Team Called Date: 10/31/19  Stroke Team Called Time: 0936  Stroke Team Arrival Date: 10/31/19  Stroke Team Arrival Time: 0946  Decision to Treat Time for Alteplase: 1003    NIH Scale:  1a. Level of Consciousness: 0-->Alert, keenly responsive  1b. LOC Questions: 1-->Answers one question correctly  1c.  LOC Commands: 0-->Performs both tasks correctly  2. Best Gaze: 0-->Normal  3. Visual: 0-->No visual loss  4. Facial Palsy: 1-->Minor paralysis (flattened nasolabial fold, asymmetry on smiling)  5a. Motor Arm, Left: 0-->No drift, limb holds 90 (or 45) degrees for full 10 secs  5b. Motor Arm, Right: 1-->Drift, limb holds 90 (or 45) degrees, but drifts down before full 10 secs, does not hit bed or other support  6a. Motor Leg, Left: 0-->No drift, leg holds 30 degree position for full 5 secs  6b. Motor Leg, Right: 2-->Some effort against gravity, leg falls to bed by 5 secs, but has some effort against gravity  7. Limb Ataxia: 0-->Absent  8. Sensory: 1-->Mild-to-moderate sensory loss, patient feels pinprick is less sharp or is dull on the affected side, or there is a loss of superficial pain with pinprick, but patient is aware of being touched  9. Best Language: 0-->No aphasia, normal  10. Dysarthria: 0-->Normal  11. Extinction and Inattention (formerly Neglect): 0-->No abnormality  Total (NIH Stroke Scale): 6       Modified [place] Score: 0  [place] Coma Scale:    ABCD2 Score:    [id number] Score:   HAS -BLED Score:   ICH Score:   Hunt & Amaya Classification:      Hemorrhagic change of an Ischemic Stroke: Does this patient have an ischemic stroke with hemorrhagic changes? No     Neurologic Chief Complaint: L MCA    Subjective:     Interval History: Patient is seen for follow-up neurological assessment and treatment recommendations:     N/V x1 overnight zofran resolved, sucralfate started. Placement pending       HPI, Past Medical, Family, and Social History remains the same as documented in the initial encounter.     Review of Systems   Constitutional: Negative for chills and fever.   Respiratory: Negative for cough.    Cardiovascular: Positive for palpitations. Negative for chest pain.   Gastrointestinal: Positive for abdominal pain (mid lower abdominal ), nausea and vomiting.   Neurological: Positive for facial  asymmetry and weakness. Negative for speech difficulty and numbness.     Scheduled Meds:   [START ON 11/6/2019] aspirin  81 mg Oral Daily    atorvastatin  40 mg Oral Daily    benazepril  40 mg Oral Daily    bisoprolol  10 mg Oral Daily    clopidogrel  75 mg Oral Daily    gabapentin  600 mg Oral Q8H    heparin (porcine)  5,000 Units Subcutaneous Q8H    hydroCHLOROthiazide  25 mg Oral Daily    lidocaine  2 patch Transdermal Q24H    senna-docusate 8.6-50 mg  1 tablet Oral Daily    sucralfate  1 g Oral BID     Continuous Infusions:    PRN Meds:acetaminophen, Dextrose 10% Bolus, Dextrose 10% Bolus, glucagon (human recombinant), insulin aspart U-100, ondansetron, sodium chloride 0.9%    Objective:     Vital Signs (Most Recent):  Temp: 98.5 °F (36.9 °C) (11/05/19 1450)  Pulse: 73 (11/05/19 1450)  Resp: 16 (11/05/19 1450)  BP: (!) 113/56 (11/05/19 1450)  SpO2: 95 % (11/05/19 1450)  BP Location: Left arm    Vital Signs Range (Last 24H):  Temp:  [97.3 °F (36.3 °C)-99.2 °F (37.3 °C)]   Pulse:  [64-99]   Resp:  [16-18]   BP: (111-198)/(55-94)   SpO2:  [95 %-99 %]   BP Location: Left arm    Physical Exam   Constitutional: She appears well-developed and well-nourished. No distress.   HENT:   Head: Normocephalic and atraumatic.   Eyes: Pupils are equal, round, and reactive to light. EOM are normal.   Cardiovascular: Normal rate.   Pulmonary/Chest: Effort normal. No respiratory distress.   Abdominal: Soft. Bowel sounds are normal. There is tenderness (bilateral lower abdominal pain ).   Neurological: She is alert.   Skin: Skin is warm and dry.   Vitals reviewed.      Neurological Exam:   LOC: alert  Attention Span: Good   Language: No aphasia  Articulation: No dysarthria  Orientation: Person, Time   Visual Fields: Full  EOM (CN III, IV, VI): Full/intact  Pupils (CN II, III): PERRL  Facial Movement (CN VII): Lower facial weakness on the Right  Motor: Arm left  Normal 5/5  Leg left  Normal 5/5  Arm right  Paresis:  +4/5  Leg right Paresis: 3/5  Sensation: mild decrease in sensation RUE  Tone: Normal tone throughout    Laboratory:  CMP:   No results for input(s): GLUCOSE, CALCIUM, ALBUMIN, PROT, NA, K, CO2, CL, BUN, CREATININE, ALKPHOS, ALT, AST, BILITOT in the last 24 hours.  CBC:   Recent Labs   Lab 11/04/19  0459   WBC 10.68   RBC 4.88   HGB 12.6   HCT 39.6      MCV 81*   MCH 25.8*   MCHC 31.8*     Lipid Panel:   Recent Labs   Lab 10/31/19  1437   CHOL 239*   LDLCALC 168.4*   HDL 42   TRIG 143     Coagulation:   Recent Labs   Lab 10/31/19  1015   INR 1.0   APTT 26.4     Platelet Aggregation Study: No results for input(s): PLTAGG, PLTAGINTERP, PLTAGREGLACO, ADPPLTAGGREG in the last 168 hours.  Hgb A1C:   Recent Labs   Lab 10/31/19  1437   HGBA1C 8.3*     TSH:   Recent Labs   Lab 10/31/19  1437   TSH 0.447       Diagnostic Results     Brain Imaging   MRI Brain (11/01/19):  Acute lacunar type infarct coursing through the left posterior limb internal capsule and corona radiata.    CTH (10/31/19):  No acute intracranial pathology    Vessel Imaging   CTA-MP (10/31/19):  Atherosclerotic disease of the cavernous and supraclinoid ICAs with mild narrowing. No high-grade stenosis or LVO. Less than 50% proximal ICA stenosis. mild-moderate narrowing of the right vertebral artery origin. There is mild left V1 segment narrowing.  No significant focal vertebral artery stenosis or occlusion.    Cardiac Imaging   Echo (11/01/19)  · Increased (hyperdynamic) left ventricular systolic function. The estimated ejection fraction is 75%  · Concentric left ventricular remodeling.  · Normal LV diastolic function.  · No wall motion abnormalities.  · Normal right ventricular systolic function.  · Normal central venous pressure (3 mm Hg).  · Mild tricuspid regurgitation.  · The estimated PA systolic pressure is 26 mm Hg  · Echolucent structure next to the LA, likely representing hiatal hernia. Clinincal correlation is required.      Juan MAYO  CARMELA Chen  Comprehensive Stroke Center  Department of Vascular Neurology   [facility]

## 2019-11-05 NOTE — PLAN OF CARE
Problem: Physical Therapy Goal  Goal: Physical Therapy Goal  Description  Goals to be met by:      Patient will increase functional independence with mobility by performin. Supine to sit with Stand-by Assistance  2. Sit to supine with Stand-by Assistance  3. Sit to stand transfer with Stand-by Assistance  4. Gait  X 25 feet with Minimal Assistance using least restrictive device.   5. Stand for 1 minutes with Contact Guard Assistance using  least restrictive device or no AD  6. Lower extremity exercise program x20 reps per handout, with independence to improve strength and activity tolerance.      Outcome: Ongoing, Progressing   Continue with plan of care.   Hortencia Umaña, PT  2019

## 2019-11-05 NOTE — PLAN OF CARE
11/05/19 1455   Post-Acute Status   Post-Acute Authorization Placement   Post-Acute Placement Status Referrals Sent       Called [facility] ([phone]).  They have no one in their admissions department. So they asked me to hard fax ([phone]) the clinicals to them. Clinicals faxed to them.  SW in contact with CM and Medical staff. Will continue to follow and offer support as needed.     Demario Ledesma, CHANTE  [facility]   Ext. [phone]

## 2019-11-05 NOTE — PLAN OF CARE
POC reviewed with patient. All questions and concerns reviewed. Fall/safety precautions implemented and maintained. IVF maintained. Blood glucose monitored. Patient vomit x1. PRN zofran administered with some effect. Please see flow sheet for full assessment and vitals. Bed locked in lowest position. Call bell within reach. Will continue to monitor.

## 2019-11-05 NOTE — PLAN OF CARE
Problem: Fall Injury Risk  Goal: Absence of Fall and Fall-Related Injury  Outcome: Ongoing, Progressing  Intervention: Identify and Manage Contributors to Fall Injury Risk  Flowsheets (Taken 11/5/2019 1646)  Self-Care Promotion: safe use of adaptive equipment encouraged  Medication Review/Management: medications reviewed  Intervention: Promote Injury-Free Environment  Flowsheets (Taken 11/5/2019 1646)  Safety Promotion/Fall Prevention: assistive device/personal item within reach; bed alarm set; lighting adjusted; medications reviewed  Environmental Safety Modification: assistive device/personal items within reach; clutter free environment maintained; lighting adjusted     Problem: Fall Injury Risk  Goal: Absence of Fall and Fall-Related Injury  Intervention: Promote Injury-Free Environment  Flowsheets (Taken 11/5/2019 1646)  Safety Promotion/Fall Prevention: assistive device/personal item within reach; bed alarm set; lighting adjusted; medications reviewed  Environmental Safety Modification: assistive device/personal items within reach; clutter free environment maintained; lighting adjusted

## 2019-11-05 NOTE — PROGRESS NOTES
[facility]  Adult Nutrition  Progress Note    SUMMARY       Recommendations    : 1. Continue diabetic diet; change consistency to soft.  2. Add Boost Glucose Control (strawberry) TID.  Goals: 1. Pt's intake meals, supplements >50% by RD follow up.  Nutrition Goal Status: new  Communication of RD Recs: reviewed with physician    Reason for Assessment    Reason For Assessment: RD follow-up  Diagnosis: stroke/CVA  Relevant Medical History: IDDM, HTN  Interdisciplinary Rounds: did not attend  General Information Comments: Pt with fair intake meals (50%). Pt with 20 lb (16%) wt loss over last year, documented moderate muscle wasting per NFPE 10/31 and <50% intake x 5 days is at risk for malnutrition. Pt only ate a few bites of her lunch today (fried chicken nuggets and french fries) because she is missing most of her bottom teeth and she says the food was too hard. Pt also would like Boost shakes.  Nutrition Discharge Planning: Pt to follow higher protein/high kcal diet.    Nutrition Risk Screen    Nutrition Risk Screen: dysphagia or difficulty swallowing    Nutrition/Diet History    Spiritual, Cultural Beliefs, Zoroastrian Practices, Values that Affect Care: no  Supplemental Drinks or Food Habits: Ensure Plus  Factors Affecting Nutritional Intake: NPO    Anthropometrics    Temp: 98.7 °F (37.1 °C)  Height: 5' (152.4 cm)  Height (inches): 60 in  Weight Method: Bed Scale  Weight: 55.5 kg (122 lb 5.7 oz)  Weight (lb): 122.36 lb  Ideal Body Weight (IBW), Female: 100 lb  % Ideal Body Weight, Female (lb): 109.13 lb  BMI (Calculated): 21.4  BMI Grade: 18.5-24.9 - normal  Weight Loss: unintentional  Usual Body Weight (UBW), k kg  % Usual Body Weight: 84.07  % Weight Change From Usual Weight: -16.1 %       Lab/Procedures/Meds    Pertinent Labs Reviewed: reviewed  Pertinent Labs Comments: HgbA1c 8.3, POCT Glu 165-195  Pertinent Medications Reviewed: reviewed  Pertinent Medications Comments: cardene,  insulin    Estimated/Assessed Needs    Weight Used For Calorie Calculations: 49.5 kg (109 lb 2 oz)  Energy Calorie Requirements (kcal): 8877-0758  Energy Need Method: Kcal/kg(25-30kcal/kg)  Protein Requirements: 50-60g(1.0-1.2g/kg)  Weight Used For Protein Calculations: 49.5 kg (109 lb 2 oz)  Fluid Requirements (mL): 1mL/kcal or per MD     RDA Method (mL): 1237  CHO Requirement: 175g CHO daily      Nutrition Prescription Ordered    Current Diet Order: Diabetic 2000 kcal diet    Evaluation of Received Nutrient/Fluid Intake    Comments: LBM 11/05  % Intake of Estimated Energy Needs: 25 - 50 %  % Meal Intake: 25 - 50 %    Nutrition Risk    Level of Risk/Frequency of Follow-up: high     Assessment and Plan    Nutrition Problem  Inadequate energy intake    Related to (etiology):   Chewing difficulty    Signs and Symptoms (as evidenced by):   Pt with missing bottom teeth requesting softer foods    Interventions(treatment strategy):  Collaboration of care to providers.  Commercial beverage: Boost Glucose Control TID.    Nutrition Diagnosis Status:   New       Monitor and Evaluation    Food and Nutrient Intake: energy intake, food and beverage intake  Food and Nutrient Adminstration: diet order  Knowledge/Beliefs/Attitudes: food and nutrition knowledge/skill  Anthropometric Measurements: weight, weight change, body mass index  Biochemical Data, Medical Tests and Procedures: electrolyte and renal panel, gastrointestinal profile, glucose/endocrine profile, inflammatory profile, lipid profile  Nutrition-Focused Physical Findings: overall appearance     Malnutrition Assessment             Weight Loss (Malnutrition): (16% in 1 year)  Energy Intake (Malnutrition): (pt reports poor but cannot quantify)   Orbital Region (Subcutaneous Fat Loss): mild depletion  Upper Arm Region (Subcutaneous Fat Loss): well nourished  Thoracic and Lumbar Region: well nourished   [place] Region (Muscle Loss): mild depletion  Clavicle Bone Region  (Muscle Loss): moderate depletion  Clavicle and Acromion Bone Region (Muscle Loss): moderate depletion  Scapular Bone Region (Muscle Loss): moderate depletion  Dorsal Hand (Muscle Loss): moderate depletion  Patellar Region (Muscle Loss): mild depletion  Anterior Thigh Region (Muscle Loss): mild depletion  Posterior Calf Region (Muscle Loss): moderate depletion                 Nutrition Follow-Up    RD Follow-up?: Yes

## 2019-11-05 NOTE — PT/OT/SLP PROGRESS
Physical Therapy Treatment    Patient Name:  Melva Barker   MRN:  [id number]    Recommendations:     Discharge Recommendations:  rehabilitation facility   Discharge Equipment Recommendations: (TBD)   Barriers to discharge: Inaccessible home, Decreased caregiver support and patient below functional basline    Assessment:     Melva Barker is a 74 y.o. female admitted with a medical diagnosis of Stroke due to embolism of left middle cerebral artery.  She presents with the following impairments/functional limitations:  weakness, impaired endurance, gait instability, impaired functional mobilty, impaired self care skills, impaired balance, decreased lower extremity function, decreased upper extremity function, decreased coordination, decreased safety awareness, impaired fine motor, impaired coordination, decreased ROM, impaired cardiopulmonary response to activity. The patient presents with R hemiplegia limiting her independence with bed mobility, transfers, gait, and balance. She ambulated 10' x2 with RW with moderate assistance, requiring moderate assistance to progress and position R LE. The patient was independent with mobility.  Based on the patient's progress with therapy, motivation to participate in treatment, and prior level of independence, they are an excellent candidate for inpatient rehabilitation and they would benefit from rehab to maximize their functional potential.      Rehab Prognosis: Good; patient would benefit from acute skilled PT services to address these deficits and reach maximum level of function.    Recent Surgery: * No surgery found *      Plan:     During this hospitalization, patient to be seen 4 x/week to address the identified rehab impairments via gait training, therapeutic activities, therapeutic exercises, neuromuscular re-education and progress toward the following goals:    · Plan of Care Expires:  12/01/19    Subjective     Chief Complaint: dizziness,  lightheadedness  Patient/Family Comments/goals: get stronger, walk  Pain/Comfort:  · Pain Rating 1: 0/10      Objective:     Communicated with RN prior to session.  Patient found HOB elevated with telemetry, peripheral IV upon PT entry to room.     General Precautions: Standard, aspiration, fall   Orthopedic Precautions:N/A   Braces: N/A     Functional Mobility:    Bed Mobility  Rolling to R: stand by assistance  Rolling to L: minimum assistance  Supine to Sit:  contact guard assist on R, minimum assistance on L   Transfers Sit to Stand:  contact guard assist, cues for set up and sequencing   Gait  Gait Distance: 10 x2 ft with RW  Assistance Level: moderate assistance   Description: Narrow GAUDENCIO, R leg adductor compensation to progress leg, decreased R ankle DF dragging toes on ground, very short step to strides leading with L, patient with decreased heel contact in stance phase. Moderate assist to progress R leg through swing- patient improved with cuing but with fatigue needing additional assist. Gait limited by fatigue, lightheadedness.         AM-PAC 6 CLICK MOBILITY  Turning over in bed (including adjusting bedclothes, sheets and blankets)?: 3  Sitting down on and standing up from a chair with arms (e.g., wheelchair, bedside commode, etc.): 3  Moving from lying on back to sitting on the side of the bed?: 3  Moving to and from a bed to a chair (including a wheelchair)?: 3  Need to walk in hospital room?: 2  Climbing 3-5 steps with a railing?: 1  Basic Mobility Total Score: 15       Therapeutic Activities and Exercises:  Seated therex R LE to improve strength and neuromuscular re-education; 10 reps ea:   -LAQ, moderate assistance   -Marching, maximum assistance   -Ankle DF/PF, maximum assistance     After gait, patient c/o dizziness sitting EOB, BP 93/58; returned to supine /58 with decreased dizziness- RN alerted.      Patient educated on role of therapy, goals of session, benefits of out of bed mobility.  Patient agreeable to mobilize with therapy.  Discussed PT plan of care during hospitalization. Patient educated that they need to call for assistance to mobilize out of bed. Whiteboard updated as appropriate. Patient educated on how their diagnosis impacts their mobility within PT scope of practice.     Patient left HOB elevated with all lines intact, call button in reach and RN notified..    GOALS:   Multidisciplinary Problems     Physical Therapy Goals        Problem: Physical Therapy Goal    Goal Priority Disciplines Outcome Goal Variances Interventions   Physical Therapy Goal     PT, PT/OT Ongoing, Progressing     Description:  Goals to be met by:      Patient will increase functional independence with mobility by performin. Supine to sit with Stand-by Assistance  2. Sit to supine with Stand-by Assistance  3. Sit to stand transfer with Stand-by Assistance  4. Gait  X 25 feet with Minimal Assistance using least restrictive device.   5. Stand for 1 minutes with Contact Guard Assistance using  least restrictive device or no AD  6. Lower extremity exercise program x20 reps per handout, with independence to improve strength and activity tolerance.                       Time Tracking:     PT Received On: 19  PT Start Time: 1000     PT Stop Time: 1030  PT Total Time (min): 30 min     Billable Minutes: Gait Training 20 and Therapeutic Exercise 10    Treatment Type: Treatment  PT/PTA: PT     PTA Visit Number: 0     Hortencia Umaña, PT  2019

## 2019-11-06 PROBLEM — N17.9 AKI (ACUTE KIDNEY INJURY): Status: ACTIVE | Noted: 2019-11-06

## 2019-11-06 LAB
ALBUMIN SERPL BCP-MCNC: 3.4 G/DL (ref 3.5–5.2)
ALBUMIN SERPL BCP-MCNC: 4.2 G/DL (ref 3.5–5.2)
ALP SERPL-CCNC: 110 U/L (ref 55–135)
ALP SERPL-CCNC: 130 U/L (ref 55–135)
ALT SERPL W/O P-5'-P-CCNC: 18 U/L (ref 10–44)
ALT SERPL W/O P-5'-P-CCNC: 22 U/L (ref 10–44)
ANION GAP SERPL CALC-SCNC: 12 MMOL/L (ref 8–16)
ANION GAP SERPL CALC-SCNC: 12 MMOL/L (ref 8–16)
ANION GAP SERPL CALC-SCNC: 9 MMOL/L (ref 8–16)
AST SERPL-CCNC: 27 U/L (ref 10–40)
AST SERPL-CCNC: 46 U/L (ref 10–40)
BASOPHILS # BLD AUTO: 0.09 K/UL (ref 0–0.2)
BASOPHILS NFR BLD: 0.9 % (ref 0–1.9)
BILIRUB SERPL-MCNC: 0.4 MG/DL (ref 0.1–1)
BILIRUB SERPL-MCNC: 0.9 MG/DL (ref 0.1–1)
BUN SERPL-MCNC: 24 MG/DL (ref 8–23)
CALCIUM SERPL-MCNC: 8.8 MG/DL (ref 8.7–10.5)
CALCIUM SERPL-MCNC: 9.8 MG/DL (ref 8.7–10.5)
CALCIUM SERPL-MCNC: 9.8 MG/DL (ref 8.7–10.5)
CHLORIDE SERPL-SCNC: 102 MMOL/L (ref 95–110)
CHLORIDE SERPL-SCNC: 102 MMOL/L (ref 95–110)
CHLORIDE SERPL-SCNC: 105 MMOL/L (ref 95–110)
CO2 SERPL-SCNC: 21 MMOL/L (ref 23–29)
CO2 SERPL-SCNC: 21 MMOL/L (ref 23–29)
CO2 SERPL-SCNC: 23 MMOL/L (ref 23–29)
CREAT SERPL-MCNC: 1.3 MG/DL (ref 0.5–1.4)
CREAT SERPL-MCNC: 2.3 MG/DL (ref 0.5–1.4)
CREAT SERPL-MCNC: 2.3 MG/DL (ref 0.5–1.4)
DIFFERENTIAL METHOD: ABNORMAL
EOSINOPHIL # BLD AUTO: 0.4 K/UL (ref 0–0.5)
EOSINOPHIL NFR BLD: 4.3 % (ref 0–8)
ERYTHROCYTE [DISTWIDTH] IN BLOOD BY AUTOMATED COUNT: 14.2 % (ref 11.5–14.5)
EST. GFR  (AFRICAN AMERICAN): 23.6 ML/MIN/1.73 M^2
EST. GFR  (AFRICAN AMERICAN): 23.6 ML/MIN/1.73 M^2
EST. GFR  (AFRICAN AMERICAN): 47 ML/MIN/1.73 M^2
EST. GFR  (NON AFRICAN AMERICAN): 20.5 ML/MIN/1.73 M^2
EST. GFR  (NON AFRICAN AMERICAN): 20.5 ML/MIN/1.73 M^2
EST. GFR  (NON AFRICAN AMERICAN): 40.8 ML/MIN/1.73 M^2
GLUCOSE SERPL-MCNC: 128 MG/DL (ref 70–110)
GLUCOSE SERPL-MCNC: 155 MG/DL (ref 70–110)
GLUCOSE SERPL-MCNC: 155 MG/DL (ref 70–110)
HCT VFR BLD AUTO: 42.1 % (ref 37–48.5)
HGB BLD-MCNC: 13 G/DL (ref 12–16)
IMM GRANULOCYTES # BLD AUTO: 0.04 K/UL (ref 0–0.04)
IMM GRANULOCYTES NFR BLD AUTO: 0.4 % (ref 0–0.5)
LYMPHOCYTES # BLD AUTO: 4.1 K/UL (ref 1–4.8)
LYMPHOCYTES NFR BLD: 42 % (ref 18–48)
MCH RBC QN AUTO: 25.8 PG (ref 27–31)
MCHC RBC AUTO-ENTMCNC: 30.9 G/DL (ref 32–36)
MCV RBC AUTO: 84 FL (ref 82–98)
MONOCYTES # BLD AUTO: 0.6 K/UL (ref 0.3–1)
MONOCYTES NFR BLD: 6.1 % (ref 4–15)
NEUTROPHILS # BLD AUTO: 4.6 K/UL (ref 1.8–7.7)
NEUTROPHILS NFR BLD: 46.3 % (ref 38–73)
NRBC BLD-RTO: 0 /100 WBC
PLATELET # BLD AUTO: 244 K/UL (ref 150–350)
PMV BLD AUTO: 11.4 FL (ref 9.2–12.9)
POCT GLUCOSE: 112 MG/DL (ref 70–110)
POCT GLUCOSE: 122 MG/DL (ref 70–110)
POCT GLUCOSE: 126 MG/DL (ref 70–110)
POCT GLUCOSE: 192 MG/DL (ref 70–110)
POCT GLUCOSE: 201 MG/DL (ref 70–110)
POTASSIUM SERPL-SCNC: 3.2 MMOL/L (ref 3.5–5.1)
POTASSIUM SERPL-SCNC: 4.3 MMOL/L (ref 3.5–5.1)
POTASSIUM SERPL-SCNC: 4.3 MMOL/L (ref 3.5–5.1)
PROT SERPL-MCNC: 6.8 G/DL (ref 6–8.4)
PROT SERPL-MCNC: 8.7 G/DL (ref 6–8.4)
RBC # BLD AUTO: 5.03 M/UL (ref 4–5.4)
SODIUM SERPL-SCNC: 135 MMOL/L (ref 136–145)
SODIUM SERPL-SCNC: 135 MMOL/L (ref 136–145)
SODIUM SERPL-SCNC: 137 MMOL/L (ref 136–145)
WBC # BLD AUTO: 9.85 K/UL (ref 3.9–12.7)

## 2019-11-06 PROCEDURE — 25000003 PHARM REV CODE 250: Performed by: STUDENT IN AN ORGANIZED HEALTH CARE EDUCATION/TRAINING PROGRAM

## 2019-11-06 PROCEDURE — 92507 TX SP LANG VOICE COMM INDIV: CPT

## 2019-11-06 PROCEDURE — 25000003 PHARM REV CODE 250: Performed by: NURSE PRACTITIONER

## 2019-11-06 PROCEDURE — 25000003 PHARM REV CODE 250: Performed by: PSYCHIATRY & NEUROLOGY

## 2019-11-06 PROCEDURE — 63600175 PHARM REV CODE 636 W HCPCS: Performed by: NURSE PRACTITIONER

## 2019-11-06 PROCEDURE — 80053 COMPREHEN METABOLIC PANEL: CPT | Mod: 91

## 2019-11-06 PROCEDURE — 97116 GAIT TRAINING THERAPY: CPT

## 2019-11-06 PROCEDURE — 99233 PR SUBSEQUENT HOSPITAL CARE,LEVL III: ICD-10-PCS | Mod: ,,, | Performed by: PSYCHIATRY & NEUROLOGY

## 2019-11-06 PROCEDURE — 36415 COLL VENOUS BLD VENIPUNCTURE: CPT

## 2019-11-06 PROCEDURE — 63600175 PHARM REV CODE 636 W HCPCS: Performed by: FAMILY MEDICINE

## 2019-11-06 PROCEDURE — 20600001 HC STEP DOWN PRIVATE ROOM

## 2019-11-06 PROCEDURE — 85025 COMPLETE CBC W/AUTO DIFF WBC: CPT

## 2019-11-06 PROCEDURE — 25000003 PHARM REV CODE 250: Performed by: FAMILY MEDICINE

## 2019-11-06 PROCEDURE — 99233 SBSQ HOSP IP/OBS HIGH 50: CPT | Mod: ,,, | Performed by: PSYCHIATRY & NEUROLOGY

## 2019-11-06 RX ORDER — SODIUM CHLORIDE 9 MG/ML
INJECTION, SOLUTION INTRAVENOUS CONTINUOUS
Status: DISCONTINUED | OUTPATIENT
Start: 2019-11-06 | End: 2019-11-07

## 2019-11-06 RX ORDER — SODIUM CHLORIDE 9 MG/ML
INJECTION, SOLUTION INTRAVENOUS ONCE AS NEEDED
Status: DISCONTINUED | OUTPATIENT
Start: 2019-11-06 | End: 2019-11-06

## 2019-11-06 RX ORDER — METOCLOPRAMIDE 5 MG/1
10 TABLET ORAL
Status: DISCONTINUED | OUTPATIENT
Start: 2019-11-06 | End: 2019-11-09

## 2019-11-06 RX ORDER — SODIUM CHLORIDE 9 MG/ML
INJECTION, SOLUTION INTRAVENOUS ONCE AS NEEDED
Status: COMPLETED | OUTPATIENT
Start: 2019-11-06 | End: 2019-11-06

## 2019-11-06 RX ADMIN — HEPARIN SODIUM 5000 UNITS: 5000 INJECTION, SOLUTION INTRAVENOUS; SUBCUTANEOUS at 02:11

## 2019-11-06 RX ADMIN — ACETAMINOPHEN 650 MG: 325 TABLET ORAL at 11:11

## 2019-11-06 RX ADMIN — HEPARIN SODIUM 5000 UNITS: 5000 INJECTION, SOLUTION INTRAVENOUS; SUBCUTANEOUS at 05:11

## 2019-11-06 RX ADMIN — CLOPIDOGREL BISULFATE 75 MG: 75 TABLET ORAL at 09:11

## 2019-11-06 RX ADMIN — SODIUM CHLORIDE: 0.9 INJECTION, SOLUTION INTRAVENOUS at 02:11

## 2019-11-06 RX ADMIN — SUCRALFATE 1 G: 1 TABLET ORAL at 09:11

## 2019-11-06 RX ADMIN — ONDANSETRON 4 MG: 2 INJECTION INTRAMUSCULAR; INTRAVENOUS at 05:11

## 2019-11-06 RX ADMIN — METOCLOPRAMIDE 10 MG: 10 TABLET ORAL at 05:11

## 2019-11-06 RX ADMIN — ATORVASTATIN CALCIUM 40 MG: 20 TABLET, FILM COATED ORAL at 09:11

## 2019-11-06 RX ADMIN — SENNOSIDES AND DOCUSATE SODIUM 1 TABLET: 8.6; 5 TABLET ORAL at 09:11

## 2019-11-06 RX ADMIN — GABAPENTIN 600 MG: 300 CAPSULE ORAL at 08:11

## 2019-11-06 RX ADMIN — SODIUM CHLORIDE: 0.9 INJECTION, SOLUTION INTRAVENOUS at 05:11

## 2019-11-06 RX ADMIN — ACETAMINOPHEN 650 MG: 325 TABLET ORAL at 05:11

## 2019-11-06 RX ADMIN — HEPARIN SODIUM 5000 UNITS: 5000 INJECTION, SOLUTION INTRAVENOUS; SUBCUTANEOUS at 08:11

## 2019-11-06 RX ADMIN — RAMELTEON 8 MG: 8 TABLET ORAL at 08:11

## 2019-11-06 RX ADMIN — METOCLOPRAMIDE 10 MG: 10 TABLET ORAL at 01:11

## 2019-11-06 RX ADMIN — INSULIN ASPART 2 UNITS: 100 INJECTION, SOLUTION INTRAVENOUS; SUBCUTANEOUS at 12:11

## 2019-11-06 RX ADMIN — GABAPENTIN 600 MG: 300 CAPSULE ORAL at 05:11

## 2019-11-06 RX ADMIN — LIDOCAINE 2 PATCH: 50 PATCH TOPICAL at 05:11

## 2019-11-06 RX ADMIN — GABAPENTIN 600 MG: 300 CAPSULE ORAL at 02:11

## 2019-11-06 RX ADMIN — SODIUM CHLORIDE 500 ML: 0.9 INJECTION, SOLUTION INTRAVENOUS at 09:11

## 2019-11-06 RX ADMIN — SUCRALFATE 1 G: 1 TABLET ORAL at 08:11

## 2019-11-06 RX ADMIN — ASPIRIN 81 MG: 81 TABLET, COATED ORAL at 09:11

## 2019-11-06 NOTE — PROGRESS NOTES
[facility]  Vascular Neurology  Comprehensive Stroke Center  Progress Note    Assessment/Plan:     * Stroke due to embolism of left middle cerebral artery  Melva Barker is a 74 y.o. female with PMHx of HTN, HLD, and DM who presented to OSH with acute worsening of RSW. Patient had been experiencing RSW x1 month. Yesterday, she began having acute worsening. She was treated with tPA at OSH. CTA without LVO. MRI with infarct in L internal capsule and corona radiata. Etiology likely small vessel disease    Antithrombotics for secondary stroke prevention: Antiplatelets: asa 81 mg + plavix 75 mg   Statins for secondary stroke prevention and hyperlipidemia, if present: Statins: Atorvastatin- 40 mg daily,   Aggressive risk factor modification: HTN, HLD, Diet     Rehab efforts: The patient has been evaluated by a stroke team provider and the therapy needs have been fully considered based off the presenting complaints and exam findings. The following therapy evaluations are needed: PT evaluate and treat, OT evaluate and treat, SLP evaluate and treat, PM&R evaluate for appropriate placement - rehab placement   Diagnostics ordered/pending: None   VTE prophylaxis: Heparin sbq with mechanical SCDs  BP parameters: Infarct: Post tPA, SBP <180        FALLON (acute kidney injury)  BUN/Cr 24/2.3  NS 75ml/hr  Encourage PO intake  Repeat CMP ordered.    Nausea & vomiting  Patient with episodes of vomiting 11/2 - resolved throughout the day. No episodes of vomiting overnight.   PRN IV Zofran ordered   KUB completed and no abnormality noted  Patient continues to report nausea - some mild middle lower abdominal pain upon palpation  Lipase ordered and WNL  CT abdomen (2016) diverticulosis in the descending colon and a small hiatal hernia. Hx of diabetic gastroparesis, reglan started. Follow up with PCP.  Patient with last BM 11/6 - normal bowel sounds on exam   Sucralfate 1g started 11/5  Continue to monitor      Cytotoxic brain edema  Area of cytotoxic cerebral edema identified when reviewing brain imaging in the territory of the L middle cerebral artery. There is no mass effect associated with it. We will continue to monitor the patients clinical exam for any worsening of symptoms which may indicate expansion of the stroke or the area of the edema resulting in the clinical change. The pattern is suggestive of small vessel etiology.        Essential hypertension  Stroke risk factor  SBP <180 post tPA  Currently on Benazepril 40 mg daily, HCTZ 12.5 (increased from home dose of 6.25) --> increased 11/3 to 25 mg, and bisoprolol 10 mg daily  Was on combo med at home - Bisoprolol 10 mg + HCTZ 6.25 mg     11/6 hypotensive, held all bp meds,  cc bolus, restart when appropriate.      Gastroparesis  Reglan 10 mg TID before meals    Diabetes mellitus type 2 without retinopathy  stroke risk factor, poorly controlled on glargine 17 units daily  HbA1c 8.3  Currently on SSI   Diabetic diet           11/1/19 MRI with small vessel L MCA infarct, therapy recommending rehab  11/2 - Patient stepped down overnight. Adjusting BP regimen. Patient with continuous complaints if nausea. IV Zofran ordered with some relief. Patient has not has BM since 10/30. Ordering KUB to rule out obstruction. Neuro exam unchanged.   11/3 - NAEON. Patient reports she had no episodes of vomiting overnight. Still complaining of nausea. Mild lower abdomen tenderness on exam. Lipase ordered and WNL. Continue to monitor.   11/4 - denies nausea and vomiting. Abd tenderness remains present on palpitation. Continue to monitor. HCTZ increased yesterday. Pending rehab placement.   11/5 -  N/V x1 overnight zofran resolved, sucralfate started. Placement pending   11/6 - hypotensive, held all BP meds,  ml bolus, responded well. Increased BUN/Cr, start NS 75ml/hr.     STROKE DOCUMENTATION   Acute Stroke Times   Last Known Normal Date: 10/31/19  Last Known  Normal Time: 0630  Symptom Onset Date: 10/31/19  Symptom Onset Time: 0730  Stroke Team Called Date: 10/31/19  Stroke Team Called Time: 0936  Stroke Team Arrival Date: 10/31/19  Stroke Team Arrival Time: 0946  Decision to Treat Time for Alteplase: 1003    NIH Scale:  1a. Level of Consciousness: 0-->Alert, keenly responsive  1b. LOC Questions: 1-->Answers one question correctly  1c. LOC Commands: 0-->Performs both tasks correctly  2. Best Gaze: 0-->Normal  3. Visual: 0-->No visual loss  4. Facial Palsy: 1-->Minor paralysis (flattened nasolabial fold, asymmetry on smiling)  5a. Motor Arm, Left: 0-->No drift, limb holds 90 (or 45) degrees for full 10 secs  5b. Motor Arm, Right: 1-->Drift, limb holds 90 (or 45) degrees, but drifts down before full 10 secs, does not hit bed or other support  6a. Motor Leg, Left: 0-->No drift, leg holds 30 degree position for full 5 secs  6b. Motor Leg, Right: 2-->Some effort against gravity, leg falls to bed by 5 secs, but has some effort against gravity  7. Limb Ataxia: 0-->Absent  8. Sensory: 1-->Mild-to-moderate sensory loss, patient feels pinprick is less sharp or is dull on the affected side, or there is a loss of superficial pain with pinprick, but patient is aware of being touched  9. Best Language: 0-->No aphasia, normal  10. Dysarthria: 0-->Normal  11. Extinction and Inattention (formerly Neglect): 0-->No abnormality  Total (NIH Stroke Scale): 6       Modified [place] Score: 0  Snyder Coma Scale:    ABCD2 Score:    [id number] Score:   HAS -BLED Score:   ICH Score:   Hunt & Amaya Classification:      Hemorrhagic change of an Ischemic Stroke: Does this patient have an ischemic stroke with hemorrhagic changes? No     Neurologic Chief Complaint: L MCA    Subjective:     Interval History: Patient is seen for follow-up neurological assessment and treatment recommendations:    hypotensive, held all BP meds,  ml bolus, responded well. Increased BUN/Cr, start NS 75ml/hr.     HPI,  Past Medical, Family, and Social History remains the same as documented in the initial encounter.     Review of Systems   Constitutional: Negative for chills and fever.   Respiratory: Negative for cough.    Cardiovascular: Positive for palpitations. Negative for chest pain.   Gastrointestinal: Positive for abdominal pain (mid lower abdominal ). Negative for nausea and vomiting.   Neurological: Positive for facial asymmetry and weakness. Negative for speech difficulty and numbness.     Scheduled Meds:   aspirin  81 mg Oral Daily    atorvastatin  40 mg Oral Daily    clopidogrel  75 mg Oral Daily    gabapentin  600 mg Oral Q8H    heparin (porcine)  5,000 Units Subcutaneous Q8H    lidocaine  2 patch Transdermal Q24H    metoclopramide HCl  10 mg Oral TID AC    senna-docusate 8.6-50 mg  1 tablet Oral Daily    sucralfate  1 g Oral BID     Continuous Infusions:   sodium chloride 0.9%       PRN Meds:acetaminophen, Dextrose 10% Bolus, Dextrose 10% Bolus, glucagon (human recombinant), insulin aspart U-100, ondansetron, ramelteon, sodium chloride 0.9%    Objective:     Vital Signs (Most Recent):  Temp: 97.5 °F (36.4 °C) (11/06/19 1321)  Pulse: 73 (11/06/19 1321)  Resp: 18 (11/06/19 1321)  BP: 124/78 (11/06/19 1321)  SpO2: 97 % (11/06/19 1228)  BP Location: Right arm    Vital Signs Range (Last 24H):  Temp:  [97.2 °F (36.2 °C)-98.5 °F (36.9 °C)]   Pulse:  [58-73]   Resp:  [16-18]   BP: ()/(47-78)   SpO2:  [95 %-100 %]   BP Location: Right arm    Physical Exam   Constitutional: She appears well-developed and well-nourished. No distress.   HENT:   Head: Normocephalic and atraumatic.   Eyes: Pupils are equal, round, and reactive to light. EOM are normal.   Cardiovascular: Normal rate.   Pulmonary/Chest: Effort normal. No respiratory distress.   Abdominal: Soft. Bowel sounds are normal. There is tenderness (bilateral lower abdominal pain ).   Neurological: She is alert.   Skin: Skin is warm and dry.   Vitals  reviewed.      Neurological Exam:   LOC: alert  Attention Span: Good   Language: No aphasia  Articulation: No dysarthria  Orientation: Person, Time   Visual Fields: Full  EOM (CN III, IV, VI): Full/intact  Pupils (CN II, III): PERRL  Facial Movement (CN VII): Lower facial weakness on the Right  Motor: Arm left  Normal 5/5  Leg left  Normal 5/5  Arm right  Paresis: +4/5  Leg right Paresis: 3/5  Sensation: mild decrease in sensation RUE  Tone: Normal tone throughout    Laboratory:  CMP:   Recent Labs   Lab 11/06/19  0939   CALCIUM 9.8   *   K 4.3   CO2 21*      BUN 24*   CREATININE 2.3*     CBC:   Recent Labs   Lab 11/06/19  0939   WBC 9.85   RBC 5.03   HGB 13.0   HCT 42.1      MCV 84   MCH 25.8*   MCHC 30.9*     Lipid Panel:   Recent Labs   Lab 10/31/19  1437   CHOL 239*   LDLCALC 168.4*   HDL 42   TRIG 143     Coagulation:   Recent Labs   Lab 10/31/19  1015   INR 1.0   APTT 26.4     Platelet Aggregation Study: No results for input(s): PLTAGG, PLTAGINTERP, PLTAGREGLACO, ADPPLTAGGREG in the last 168 hours.  Hgb A1C:   Recent Labs   Lab 10/31/19  1437   HGBA1C 8.3*     TSH:   Recent Labs   Lab 10/31/19  1437   TSH 0.447       Diagnostic Results     Brain Imaging   MRI Brain (11/01/19):  Acute lacunar type infarct coursing through the left posterior limb internal capsule and corona radiata.    CTH (10/31/19):  No acute intracranial pathology    Vessel Imaging   CTA-MP (10/31/19):  Atherosclerotic disease of the cavernous and supraclinoid ICAs with mild narrowing. No high-grade stenosis or LVO. Less than 50% proximal ICA stenosis. mild-moderate narrowing of the right vertebral artery origin. There is mild left V1 segment narrowing.  No significant focal vertebral artery stenosis or occlusion.    Cardiac Imaging   Echo (11/01/19)  · Increased (hyperdynamic) left ventricular systolic function. The estimated ejection fraction is 75%  · Concentric left ventricular remodeling.  · Normal LV diastolic  function.  · No wall motion abnormalities.  · Normal right ventricular systolic function.  · Normal central venous pressure (3 mm Hg).  · Mild tricuspid regurgitation.  · The estimated PA systolic pressure is 26 mm Hg  · Echolucent structure next to the LA, likely representing hiatal hernia. Clinincal correlation is required.      Juan Chen NP  [facility]  Department of Vascular Neurology   [facility]

## 2019-11-06 NOTE — PT/OT/SLP PROGRESS
"Speech Language Pathology Treatment/Discharge    Patient Name:  Melva Barker   MRN:  [id number]  Admitting Diagnosis: Stroke due to embolism of left middle cerebral artery    Recommendations:                 General Recommendations:  no fu  Diet recommendations:  Regular, Liquid Diet Level: Thin   Aspiration Precautions: Standard aspiration precautions   General Precautions: Standard, fall  Communication strategies:  none    Subjective     I think I am doing okay with my mind"  Patient goals: home    Pain/Comfort:  · Pain Rating 1: 0/10  · Pain Rating Post-Intervention 1: 0/10    Objective:     Has the patient been evaluated by SLP for swallowing?   Yes  Keep patient NPO? No   Current Respiratory Status: room air      Pt. Seen while sitting up in chair with good attention to task.  Ms. Barker was oriented time and place with good recall of recent and remote events.  Immediate verbal recall was wfl with pt. Repeating 7 digits and 5 words.  She responded to complex yes no questions with 100% accuracy with no delays in responding or need for repetition.  On thought organization tasks she named 12 animals given one minute and recalled nouns in response to questions with 100% accuracy.  Pt. Compared and contrasted objects and generated multiple solutions to problems.  She responded to functional math and time calculations with 100% accuracy.  Education provided re poc.     Assessment:     Melva Barker is a 74 y.o. female with speech language and cognitive skills at baseline.  Oral and pharyngeal phases of swallow were wfl.    Goals:   Multidisciplinary Problems     SLP Goals        Problem: SLP Goal    Goal Priority Disciplines Outcome   SLP Goal     SLP Ongoing, Progressing   Description:  Speech Language Pathology Goals  Goals expected to be met by 11/8  1. Pt will participate in conversation without cues to express thought.   2. Pt will complete simple functional reasoning tasks with 80% accy given min " cues.   3. Pt will complete assessment of reading, writing, visual spatial skills to determine need for tx.                           Plan:     · Patient to be seen:  3 x/week   · Plan of Care expires:  12/01/19  · Plan of Care reviewed with:  patient   · SLP Follow-Up:  No       Discharge recommendations:  rehabilitation facility     Time Tracking:     SLP Treatment Date:   11/06/19  Speech Start Time:  0945  Speech Stop Time:  1002     Speech Total Time (min):  17 min    Billable Minutes: Speech Therapy Individual 17    Cherry Pandey MA, CCC-SLP  11/06/2019

## 2019-11-06 NOTE — SUBJECTIVE & OBJECTIVE
Neurologic Chief Complaint: L MCA    Subjective:     Interval History: Patient is seen for follow-up neurological assessment and treatment recommendations:    hypotensive, held all BP meds,  ml bolus, responded well. Increased BUN/Cr, start NS 75ml/hr.     HPI, Past Medical, Family, and Social History remains the same as documented in the initial encounter.     Review of Systems   Constitutional: Negative for chills and fever.   Respiratory: Negative for cough.    Cardiovascular: Positive for palpitations. Negative for chest pain.   Gastrointestinal: Positive for abdominal pain (mid lower abdominal ). Negative for nausea and vomiting.   Neurological: Positive for facial asymmetry and weakness. Negative for speech difficulty and numbness.     Scheduled Meds:   aspirin  81 mg Oral Daily    atorvastatin  40 mg Oral Daily    clopidogrel  75 mg Oral Daily    gabapentin  600 mg Oral Q8H    heparin (porcine)  5,000 Units Subcutaneous Q8H    lidocaine  2 patch Transdermal Q24H    metoclopramide HCl  10 mg Oral TID AC    senna-docusate 8.6-50 mg  1 tablet Oral Daily    sucralfate  1 g Oral BID     Continuous Infusions:   sodium chloride 0.9%       PRN Meds:acetaminophen, Dextrose 10% Bolus, Dextrose 10% Bolus, glucagon (human recombinant), insulin aspart U-100, ondansetron, ramelteon, sodium chloride 0.9%    Objective:     Vital Signs (Most Recent):  Temp: 97.5 °F (36.4 °C) (11/06/19 1321)  Pulse: 73 (11/06/19 1321)  Resp: 18 (11/06/19 1321)  BP: 124/78 (11/06/19 1321)  SpO2: 97 % (11/06/19 1228)  BP Location: Right arm    Vital Signs Range (Last 24H):  Temp:  [97.2 °F (36.2 °C)-98.5 °F (36.9 °C)]   Pulse:  [58-73]   Resp:  [16-18]   BP: ()/(47-78)   SpO2:  [95 %-100 %]   BP Location: Right arm    Physical Exam   Constitutional: She appears well-developed and well-nourished. No distress.   HENT:   Head: Normocephalic and atraumatic.   Eyes: Pupils are equal, round, and reactive to light. EOM are normal.    Cardiovascular: Normal rate.   Pulmonary/Chest: Effort normal. No respiratory distress.   Abdominal: Soft. Bowel sounds are normal. There is tenderness (bilateral lower abdominal pain ).   Neurological: She is alert.   Skin: Skin is warm and dry.   Vitals reviewed.      Neurological Exam:   LOC: alert  Attention Span: Good   Language: No aphasia  Articulation: No dysarthria  Orientation: Person, Time   Visual Fields: Full  EOM (CN III, IV, VI): Full/intact  Pupils (CN II, III): PERRL  Facial Movement (CN VII): Lower facial weakness on the Right  Motor: Arm left  Normal 5/5  Leg left  Normal 5/5  Arm right  Paresis: +4/5  Leg right Paresis: 3/5  Sensation: mild decrease in sensation RUE  Tone: Normal tone throughout    Laboratory:  CMP:   Recent Labs   Lab 11/06/19  0939   CALCIUM 9.8   *   K 4.3   CO2 21*      BUN 24*   CREATININE 2.3*     CBC:   Recent Labs   Lab 11/06/19  0939   WBC 9.85   RBC 5.03   HGB 13.0   HCT 42.1      MCV 84   MCH 25.8*   MCHC 30.9*     Lipid Panel:   Recent Labs   Lab 10/31/19  1437   CHOL 239*   LDLCALC 168.4*   HDL 42   TRIG 143     Coagulation:   Recent Labs   Lab 10/31/19  1015   INR 1.0   APTT 26.4     Platelet Aggregation Study: No results for input(s): PLTAGG, PLTAGINTERP, PLTAGREGLACO, ADPPLTAGGREG in the last 168 hours.  Hgb A1C:   Recent Labs   Lab 10/31/19  1437   HGBA1C 8.3*     TSH:   Recent Labs   Lab 10/31/19  1437   TSH 0.447       Diagnostic Results     Brain Imaging   MRI Brain (11/01/19):  Acute lacunar type infarct coursing through the left posterior limb internal capsule and corona radiata.    CTH (10/31/19):  No acute intracranial pathology    Vessel Imaging   CTA-MP (10/31/19):  Atherosclerotic disease of the cavernous and supraclinoid ICAs with mild narrowing. No high-grade stenosis or LVO. Less than 50% proximal ICA stenosis. mild-moderate narrowing of the right vertebral artery origin. There is mild left V1 segment narrowing.  No significant  focal vertebral artery stenosis or occlusion.    Cardiac Imaging   Echo (11/01/19)  · Increased (hyperdynamic) left ventricular systolic function. The estimated ejection fraction is 75%  · Concentric left ventricular remodeling.  · Normal LV diastolic function.  · No wall motion abnormalities.  · Normal right ventricular systolic function.  · Normal central venous pressure (3 mm Hg).  · Mild tricuspid regurgitation.  · The estimated PA systolic pressure is 26 mm Hg  · Echolucent structure next to the LA, likely representing hiatal hernia. Clinincal correlation is required.

## 2019-11-06 NOTE — PT/OT/SLP PROGRESS
Physical Therapy Treatment    Patient Name:  Melva Barker   MRN:  [id number]    Recommendations:     Discharge Recommendations:  rehabilitation facility   Discharge Equipment Recommendations: other (see comments)(TBD)   Barriers to discharge: Inaccessible home and Decreased caregiver support    Assessment:     Melva Barker is a 74 y.o. female admitted with a medical diagnosis of Stroke due to embolism of left middle cerebral artery.  She presents with the following impairments/functional limitations:  weakness, impaired endurance, impaired self care skills, impaired functional mobilty, gait instability, impaired balance, decreased upper extremity function, decreased lower extremity function, decreased safety awareness, decreased ROM, impaired fine motor Patient tolerated treatment well focusing on bed mobility, transfers and gait. Patient continues to require assistance and supervision for activity. Patient is motivated and demonstrates improvement each session however still showing the impairments listed above that would continue to improve with skilled physical therapy services in order to return to functional baseline and makes for an excellent inpatient rehab patient.    Rehab Prognosis: Good; patient would benefit from acute skilled PT services to address these deficits and reach maximum level of function.    Recent Surgery: * No surgery found *      Plan:     During this hospitalization, patient to be seen 4 x/week to address the identified rehab impairments via gait training, therapeutic activities, therapeutic exercises, neuromuscular re-education and progress toward the following goals:    · Plan of Care Expires:  12/01/19    Subjective     Chief Complaint: no c/o  Patient/Family Comments/goals: I want to get up  Pain/Comfort:  · Pain Rating 1: 0/10  · Pain Rating Post-Intervention 1: 0/10      Objective:     Communicated with nursing prior to session.  Patient found HOB elevated with telemetry,  peripheral IV, SCD upon PT entry to room.     General Precautions: Standard, fall   Orthopedic Precautions:N/A   Braces: N/A     Functional Mobility:  · Bed Mobility:     · Rolling Right: stand by assistance  · Supine to Sit: stand by assistance vcs fo rhand placement, HOB elevated bed rail for support  · Transfers:     · Sit to Stand:  contact guard assistance to chair and minimum assistance for anterior weight shift with rolling walker  · Gait: 18' RW Min A for balance and RW mgmt, vcs for increased R foot clearance and increased step length, step to gait pattern with forward flexed posture      AM-PAC 6 CLICK MOBILITY  Turning over in bed (including adjusting bedclothes, sheets and blankets)?: 3  Sitting down on and standing up from a chair with arms (e.g., wheelchair, bedside commode, etc.): 3  Moving from lying on back to sitting on the side of the bed?: 3  Moving to and from a bed to a chair (including a wheelchair)?: 3  Need to walk in hospital room?: 3  Climbing 3-5 steps with a railing?: 1  Basic Mobility Total Score: 16       Therapeutic Activities and Exercises:   Patient educated on importance of increased time out of bed and ROSI throughout the day    Patient left up in chair with call button in reach and chair alarm on..    GOALS:   Multidisciplinary Problems     Physical Therapy Goals        Problem: Physical Therapy Goal    Goal Priority Disciplines Outcome Goal Variances Interventions   Physical Therapy Goal     PT, PT/OT Ongoing, Progressing     Description:  Goals to be met by:      Patient will increase functional independence with mobility by performin. Supine to sit with Stand-by Assistance met 2019  2. Sit to supine with Stand-by Assistance   3. Sit to stand transfer with Stand-by Assistance  4. Gait  X 25 feet with Minimal Assistance using least restrictive device.   5. Stand for 1 minutes with Contact Guard Assistance using  least restrictive device or no AD  6. Lower  extremity exercise program x20 reps per handout, with independence to improve strength and activity tolerance.                        Time Tracking:     PT Received On: 11/06/19  PT Start Time: 0913     PT Stop Time: 0927  PT Total Time (min): 14 min     Billable Minutes: Gait Training 14    Treatment Type: Treatment  PT/PTA: PTA     PTA Visit Number: 1     Lavonne Madrid, TERRI  11/06/2019

## 2019-11-06 NOTE — ASSESSMENT & PLAN NOTE
Stroke risk factor  SBP <180 post tPA  Currently on Benazepril 40 mg daily, HCTZ 12.5 (increased from home dose of 6.25) --> increased 11/3 to 25 mg, and bisoprolol 10 mg daily  Was on combo med at home - Bisoprolol 10 mg + HCTZ 6.25 mg     11/6 hypotensive, held all bp meds,  cc bolus, restart when appropriate.

## 2019-11-06 NOTE — PLAN OF CARE
Problem: Physical Therapy Goal  Goal: Physical Therapy Goal  Description  Goals to be met by:      Patient will increase functional independence with mobility by performin. Supine to sit with Stand-by Assistance met 2019  2. Sit to supine with Stand-by Assistance   3. Sit to stand transfer with Stand-by Assistance  4. Gait  X 25 feet with Minimal Assistance using least restrictive device.   5. Stand for 1 minutes with Contact Guard Assistance using  least restrictive device or no AD  6. Lower extremity exercise program x20 reps per handout, with independence to improve strength and activity tolerance.       Outcome: Ongoing, Progressing

## 2019-11-06 NOTE — PLAN OF CARE
POC reviewed with patient. All questions and concerns reviewed. Fall/safety precautions implemented and maintained. Blood glucose monitored. No acute events noted this shift. Please see flow sheet for full assessment and vitals. Bed locked in lowest position. Call bell within reach. Will continue to monitor.

## 2019-11-06 NOTE — PLAN OF CARE
11/06/19 0924   Post-Acute Status   Post-Acute Authorization Placement   Post-Acute Placement Status Referrals Sent       [facility]  [facility]  Awaiting acceptance.  SW in contact with CM and Medical staff. Will continue to follow and offer support as needed.     Demario Ledesma LMSW  [facility]   Ext. [phone]

## 2019-11-06 NOTE — PLAN OF CARE
Problem: Skin Injury Risk Increased  Goal: Skin Health and Integrity  Intervention: Optimize Skin Protection  Flowsheets (Taken 11/6/2019 5680)  Pressure Reduction Devices: positioning supports utilized  Head of Bed (HOB): HOB at 30-45 degrees

## 2019-11-06 NOTE — PLAN OF CARE
11/06/19 1531   Post-Acute Status   Post-Acute Authorization Placement   Post-Acute Placement Status Referrals Sent       Pt changed mind and wants to go to [facility] re[facility].  Referral sent.  SW in contact with CM and Medical staff. Will continue to follow and offer support as needed.     Demario Ledesma, CHANTE  [facility]   Ext. [phone]

## 2019-11-06 NOTE — ASSESSMENT & PLAN NOTE
Patient with episodes of vomiting 11/2 - resolved throughout the day. No episodes of vomiting overnight.   PRN IV Zofran ordered   KUB completed and no abnormality noted  Patient continues to report nausea - some mild middle lower abdominal pain upon palpation  Lipase ordered and WNL  CT abdomen (2016) diverticulosis in the descending colon and a small hiatal hernia. Hx of diabetic gastroparesis, reglan started. Follow up with PCP.  Patient with last BM 11/6 - normal bowel sounds on exam   Sucralfate 1g started 11/5  Continue to monitor

## 2019-11-07 PROBLEM — N17.9 AKI (ACUTE KIDNEY INJURY): Status: RESOLVED | Noted: 2019-11-06 | Resolved: 2019-11-07

## 2019-11-07 LAB
ALBUMIN SERPL BCP-MCNC: 3.7 G/DL (ref 3.5–5.2)
ALP SERPL-CCNC: 113 U/L (ref 55–135)
ALT SERPL W/O P-5'-P-CCNC: 20 U/L (ref 10–44)
ANION GAP SERPL CALC-SCNC: 11 MMOL/L (ref 8–16)
AST SERPL-CCNC: 28 U/L (ref 10–40)
BASOPHILS # BLD AUTO: 0.05 K/UL (ref 0–0.2)
BASOPHILS NFR BLD: 0.6 % (ref 0–1.9)
BILIRUB SERPL-MCNC: 0.6 MG/DL (ref 0.1–1)
BUN SERPL-MCNC: 16 MG/DL (ref 8–23)
CALCIUM SERPL-MCNC: 9.4 MG/DL (ref 8.7–10.5)
CHLORIDE SERPL-SCNC: 107 MMOL/L (ref 95–110)
CO2 SERPL-SCNC: 23 MMOL/L (ref 23–29)
CREAT SERPL-MCNC: 0.9 MG/DL (ref 0.5–1.4)
DIFFERENTIAL METHOD: ABNORMAL
EOSINOPHIL # BLD AUTO: 0.3 K/UL (ref 0–0.5)
EOSINOPHIL NFR BLD: 3.5 % (ref 0–8)
ERYTHROCYTE [DISTWIDTH] IN BLOOD BY AUTOMATED COUNT: 13.8 % (ref 11.5–14.5)
EST. GFR  (AFRICAN AMERICAN): >60 ML/MIN/1.73 M^2
EST. GFR  (NON AFRICAN AMERICAN): >60 ML/MIN/1.73 M^2
GLUCOSE SERPL-MCNC: 134 MG/DL (ref 70–110)
HCT VFR BLD AUTO: 32.8 % (ref 37–48.5)
HGB BLD-MCNC: 10.6 G/DL (ref 12–16)
IMM GRANULOCYTES # BLD AUTO: 0.03 K/UL (ref 0–0.04)
IMM GRANULOCYTES NFR BLD AUTO: 0.3 % (ref 0–0.5)
LYMPHOCYTES # BLD AUTO: 3.5 K/UL (ref 1–4.8)
LYMPHOCYTES NFR BLD: 39.4 % (ref 18–48)
MAGNESIUM SERPL-MCNC: 1.9 MG/DL (ref 1.6–2.6)
MCH RBC QN AUTO: 26.5 PG (ref 27–31)
MCHC RBC AUTO-ENTMCNC: 32.3 G/DL (ref 32–36)
MCV RBC AUTO: 82 FL (ref 82–98)
MONOCYTES # BLD AUTO: 0.7 K/UL (ref 0.3–1)
MONOCYTES NFR BLD: 8.1 % (ref 4–15)
NEUTROPHILS # BLD AUTO: 4.3 K/UL (ref 1.8–7.7)
NEUTROPHILS NFR BLD: 48.1 % (ref 38–73)
NRBC BLD-RTO: 0 /100 WBC
PLATELET # BLD AUTO: 218 K/UL (ref 150–350)
PMV BLD AUTO: 12.9 FL (ref 9.2–12.9)
POCT GLUCOSE: 135 MG/DL (ref 70–110)
POCT GLUCOSE: 153 MG/DL (ref 70–110)
POCT GLUCOSE: 173 MG/DL (ref 70–110)
POTASSIUM SERPL-SCNC: 3 MMOL/L (ref 3.5–5.1)
PROT SERPL-MCNC: 7.2 G/DL (ref 6–8.4)
RBC # BLD AUTO: 4 M/UL (ref 4–5.4)
SODIUM SERPL-SCNC: 141 MMOL/L (ref 136–145)
WBC # BLD AUTO: 8.84 K/UL (ref 3.9–12.7)

## 2019-11-07 PROCEDURE — 63600175 PHARM REV CODE 636 W HCPCS: Performed by: NURSE PRACTITIONER

## 2019-11-07 PROCEDURE — 25000003 PHARM REV CODE 250: Performed by: STUDENT IN AN ORGANIZED HEALTH CARE EDUCATION/TRAINING PROGRAM

## 2019-11-07 PROCEDURE — 36415 COLL VENOUS BLD VENIPUNCTURE: CPT

## 2019-11-07 PROCEDURE — 25000003 PHARM REV CODE 250: Performed by: PSYCHIATRY & NEUROLOGY

## 2019-11-07 PROCEDURE — 25000003 PHARM REV CODE 250: Performed by: FAMILY MEDICINE

## 2019-11-07 PROCEDURE — 25000003 PHARM REV CODE 250: Performed by: NURSE PRACTITIONER

## 2019-11-07 PROCEDURE — 94761 N-INVAS EAR/PLS OXIMETRY MLT: CPT

## 2019-11-07 PROCEDURE — 80053 COMPREHEN METABOLIC PANEL: CPT

## 2019-11-07 PROCEDURE — 97530 THERAPEUTIC ACTIVITIES: CPT

## 2019-11-07 PROCEDURE — 99233 SBSQ HOSP IP/OBS HIGH 50: CPT | Mod: ,,, | Performed by: PSYCHIATRY & NEUROLOGY

## 2019-11-07 PROCEDURE — 20600001 HC STEP DOWN PRIVATE ROOM

## 2019-11-07 PROCEDURE — 99233 PR SUBSEQUENT HOSPITAL CARE,LEVL III: ICD-10-PCS | Mod: ,,, | Performed by: PSYCHIATRY & NEUROLOGY

## 2019-11-07 PROCEDURE — 85025 COMPLETE CBC W/AUTO DIFF WBC: CPT

## 2019-11-07 PROCEDURE — 97803 MED NUTRITION INDIV SUBSEQ: CPT

## 2019-11-07 PROCEDURE — 25000003 PHARM REV CODE 250: Performed by: PHYSICIAN ASSISTANT

## 2019-11-07 PROCEDURE — 83735 ASSAY OF MAGNESIUM: CPT

## 2019-11-07 PROCEDURE — 63600175 PHARM REV CODE 636 W HCPCS: Performed by: STUDENT IN AN ORGANIZED HEALTH CARE EDUCATION/TRAINING PROGRAM

## 2019-11-07 RX ORDER — POTASSIUM CHLORIDE 20 MEQ/15ML
20 SOLUTION ORAL EVERY 4 HOURS
Status: COMPLETED | OUTPATIENT
Start: 2019-11-07 | End: 2019-11-07

## 2019-11-07 RX ORDER — TRAMADOL HYDROCHLORIDE 50 MG/1
50 TABLET ORAL ONCE
Status: COMPLETED | OUTPATIENT
Start: 2019-11-08 | End: 2019-11-07

## 2019-11-07 RX ORDER — TIZANIDINE 4 MG/1
4 TABLET ORAL EVERY 8 HOURS PRN
Status: DISCONTINUED | OUTPATIENT
Start: 2019-11-07 | End: 2019-11-09

## 2019-11-07 RX ORDER — TRAMADOL HYDROCHLORIDE 50 MG/1
50 TABLET ORAL ONCE
Status: COMPLETED | OUTPATIENT
Start: 2019-11-07 | End: 2019-11-07

## 2019-11-07 RX ADMIN — CLOPIDOGREL BISULFATE 75 MG: 75 TABLET ORAL at 08:11

## 2019-11-07 RX ADMIN — ONDANSETRON 4 MG: 2 INJECTION INTRAMUSCULAR; INTRAVENOUS at 12:11

## 2019-11-07 RX ADMIN — GABAPENTIN 600 MG: 300 CAPSULE ORAL at 05:11

## 2019-11-07 RX ADMIN — METOCLOPRAMIDE 10 MG: 10 TABLET ORAL at 11:11

## 2019-11-07 RX ADMIN — METOCLOPRAMIDE 10 MG: 10 TABLET ORAL at 03:11

## 2019-11-07 RX ADMIN — TIZANIDINE 4 MG: 4 TABLET ORAL at 12:11

## 2019-11-07 RX ADMIN — ONDANSETRON 4 MG: 2 INJECTION INTRAMUSCULAR; INTRAVENOUS at 03:11

## 2019-11-07 RX ADMIN — INSULIN ASPART 2 UNITS: 100 INJECTION, SOLUTION INTRAVENOUS; SUBCUTANEOUS at 12:11

## 2019-11-07 RX ADMIN — ATORVASTATIN CALCIUM 40 MG: 20 TABLET, FILM COATED ORAL at 08:11

## 2019-11-07 RX ADMIN — METOCLOPRAMIDE 10 MG: 10 TABLET ORAL at 05:11

## 2019-11-07 RX ADMIN — HEPARIN SODIUM 5000 UNITS: 5000 INJECTION, SOLUTION INTRAVENOUS; SUBCUTANEOUS at 08:11

## 2019-11-07 RX ADMIN — ONDANSETRON 4 MG: 2 INJECTION INTRAMUSCULAR; INTRAVENOUS at 08:11

## 2019-11-07 RX ADMIN — ASPIRIN 81 MG: 81 TABLET, COATED ORAL at 08:11

## 2019-11-07 RX ADMIN — POTASSIUM CHLORIDE 20 MEQ: 20 SOLUTION ORAL at 11:11

## 2019-11-07 RX ADMIN — GABAPENTIN 600 MG: 300 CAPSULE ORAL at 08:11

## 2019-11-07 RX ADMIN — SUCRALFATE 1 G: 1 TABLET ORAL at 08:11

## 2019-11-07 RX ADMIN — TRAMADOL HYDROCHLORIDE 50 MG: 50 TABLET, FILM COATED ORAL at 03:11

## 2019-11-07 RX ADMIN — LIDOCAINE 2 PATCH: 50 PATCH TOPICAL at 03:11

## 2019-11-07 RX ADMIN — INSULIN ASPART 2 UNITS: 100 INJECTION, SOLUTION INTRAVENOUS; SUBCUTANEOUS at 11:11

## 2019-11-07 RX ADMIN — HEPARIN SODIUM 5000 UNITS: 5000 INJECTION, SOLUTION INTRAVENOUS; SUBCUTANEOUS at 05:11

## 2019-11-07 RX ADMIN — TRAMADOL HYDROCHLORIDE 50 MG: 50 TABLET, FILM COATED ORAL at 11:11

## 2019-11-07 RX ADMIN — ONDANSETRON 4 MG: 2 INJECTION INTRAMUSCULAR; INTRAVENOUS at 10:11

## 2019-11-07 RX ADMIN — GABAPENTIN 600 MG: 300 CAPSULE ORAL at 03:11

## 2019-11-07 RX ADMIN — SENNOSIDES AND DOCUSATE SODIUM 1 TABLET: 8.6; 5 TABLET ORAL at 08:11

## 2019-11-07 RX ADMIN — POTASSIUM CHLORIDE 20 MEQ: 20 SOLUTION ORAL at 03:11

## 2019-11-07 RX ADMIN — INSULIN ASPART 2 UNITS: 100 INJECTION, SOLUTION INTRAVENOUS; SUBCUTANEOUS at 06:11

## 2019-11-07 RX ADMIN — RAMELTEON 8 MG: 8 TABLET ORAL at 07:11

## 2019-11-07 RX ADMIN — HEPARIN SODIUM 5000 UNITS: 5000 INJECTION, SOLUTION INTRAVENOUS; SUBCUTANEOUS at 03:11

## 2019-11-07 RX ADMIN — TIZANIDINE 4 MG: 4 TABLET ORAL at 08:11

## 2019-11-07 NOTE — PT/OT/SLP PROGRESS
Occupational Therapy   Treatment    Name: Melva Barker  MRN: [id number]  Admitting Diagnosis:  Stroke due to embolism of left middle cerebral artery       Recommendations:     Discharge Recommendations: rehabilitation facility  Discharge Equipment Recommendations:  (TBD)  Barriers to discharge:  Decreased caregiver support    Assessment:     Melva Barker is a 74 y.o. female with a medical diagnosis of Stroke due to embolism of left middle cerebral artery.  She presents with performance deficits including weakness, impaired endurance, impaired self care skills, impaired functional mobilty, impaired sensation, decreased upper extremity function, decreased lower extremity function, impaired coordination, decreased safety awareness, decreased ROM, pain, impaired balance. Pt limited by generalized pain and nausea this date and was unable to tolerate OOB activity at time of attempt. Pt would continue to benefit from OT to increase functional independence and safety. Recommend rehab upon D/C.    Rehab Prognosis: Good; patient would benefit from acute skilled OT services to address these deficits and reach maximum level of function.       Plan:     Patient to be seen 4 x/week to address the above listed problems via self-care/home management, therapeutic activities, therapeutic exercises, neuromuscular re-education  · Plan of Care Expires: 12/01/19  · Plan of Care Reviewed with: patient    Subjective     Pain/Comfort:  · Pain Rating 1: 10/10  · Location - Side 1: Right  · Location - Orientation 1: lower  · Location 1: back(and R buttock)  · Pain Addressed 1: Pre-medicate for activity  · Pain Rating Post-Intervention 1: 10/10    Objective:     Communicated with: RN prior to session. Patient found right sidelying with SCD, peripheral IV, telemetry upon OT entry to room.    General Precautions: Standard, fall   Orthopedic Precautions:N/A   Braces: N/A     Occupational Performance:   Pt declined attempting bed  mobility, repositioning, or OOB activity due to pain and nausea    [facility] 6 Click ADL: 15    Treatment & Education:  Educated on red theraputty exercises/activities to improve R  strength and coordination as well as continued UE self-ROM and therex to improve functional use of R UE     Patient left right sidelying with all lines intact, call button in reach and RN notifiedEducation:      GOALS:   Multidisciplinary Problems     Occupational Therapy Goals        Problem: Occupational Therapy Goal    Goal Priority Disciplines Outcome Interventions   Occupational Therapy Goal     OT, PT/OT Ongoing, Progressing    Description:  Updated Goals to be met by: 7 days (11/14/19)     Patient will increase functional independence with ADLs by performing:    UE Dressing with Contact Guard Assistance.  LE Dressing with Minimal Assistance.  Grooming while standing with Minimal Assistance.  Toileting from bedside commode with Minimal Assistance for hygiene and clothing management.   Toilet transfer to bedside commode with Contact Guard Assistance.  Increased functional strength to WFL for ADLs.  Complete functional mobility household distance with CGA using AD as needed.      Goals to be met by: 7 days (11/8/19)     Patient will increase functional independence with ADLs by performing:    UE Dressing with Contact Guard Assistance.  LE Dressing with Minimal Assistance.  Grooming while standing with Minimal Assistance.  Toileting from bedside commode with Minimal Assistance for hygiene and clothing management.   Toilet transfer to bedside commode with Contact Guard Assistance.  Increased functional strength to WFL for ADLs.  Complete functional mobility household distance with CGA using AD as needed.                       Time Tracking:     OT Date of Treatment: 11/07/19  OT Start Time: 0945  OT Stop Time: 0953  OT Total Time (min): 8 min    Billable Minutes:Therapeutic Activity 8 minutes    AURORA Florian  11/7/2019

## 2019-11-07 NOTE — ASSESSMENT & PLAN NOTE
Stroke risk factor  SBP <180 post tPA  Currently on Benazepril 40 mg daily, HCTZ 12.5 (increased from home dose of 6.25) --> increased 11/3 to 25 mg, and bisoprolol 10 mg daily  Was on combo med at home - Bisoprolol 10 mg + HCTZ 6.25 mg     11/6 hypotensive, held all bp meds,  cc bolus, restart when appropriate.  11/7 hypotension resolving

## 2019-11-07 NOTE — PHYSICIAN QUERY
PT Name: Melva Barker  MR #: [id number]     Physician Query Form - Diagnosis Clarification      CDS/: Mary Steel RN, CDS               Contact information: [email or web address]    This form is a permanent document in the medical record.     Query Date: November 7, 2019    By submitting this query, we are merely seeking further clarification of documentation.  Please utilize your independent clinical judgment when addressing the question(s) below.     The medical record contains the following:      Findings Supporting Clinical Information Location in Medical Record      left MCA syndrome   --evaluated by vascular neurology as an acute transfer from [facility] for left MCA syndrome.     --Stroke due to embolism of left middle cerebral artery         -Patient with multiple risk factors for stroke and a recent-onset RSW x1 month, p/w acute worsening of RSW          -EOM (CN III, IV, VI): Gaze preference left and can overcome midline  --NIH Scale:       2. Best Gaze: 1-->Partial gaze palsy, gaze is abnormal in one or both eyes, but forced deviation or total gaze paresis is not present  3. Visual: 1-->Partial hemianopia  4. Facial Palsy: 2-->Partial paralysis (total or near-total paralysis of lower face)  5b. Motor Arm, Right: 1-->Drift, limb holds 90 (or 45) degrees, but drifts down before full 10 secs, does not hit bed or other support  6b. Motor Leg, Right: 3-->No effort against gravity, leg falls to bed immediately  7. Limb Ataxia: 1-->Present in one limb  8. Sensory: 1-->Mild-to-moderate sensory loss, patient feels pinprick is less sharp or is dull on the affected side, or there is a loss of superficial pain with pinprick, but patient is aware of being touched    --L brain syndrome treated with IV alteplase.  --MRI brain confirms small infarct L posterior limb internal capsule, acute/subacute. Stroke mechanism small vessel.  --R hemiparesis leg greater than right. R face weakness.       Vas  Neuro c/s 10/31      Vas Neuro c/s 10/31                                              Vas Neuro Note 11/1     Please clarify if the __left MCA syndrome_____ diagnosis has been: (during the admit [place] [facility])    [  ] Ruled In   [  ] Ruled In, Now Resolved   [  ] Ruled Out   [  xxxx] Other/Clarification of findings (please specify):  Appropriate code is embolic stroke to Left Middle cerebral artery   [  ] Clinically insignificant     [  ] Clinically undetermined     Please document in your progress notes daily for the duration of treatment, until resolved, and include in your discharge summary.

## 2019-11-07 NOTE — PHYSICIAN QUERY
PT Name: Melva Barker  MR #: [id number]     PHYSICIAN QUERY -  ELECTROLYTE CLARIFICATION      CDS/: Mary Steel RN, CDS               Contact information: [email or web address]  This form is a permanent document in the medical record.     Query Date: November 7, 2019    By submitting this query, we are merely seeking further clarification of documentation to reflect the severity of illness of your patient. Please utilize your independent clinical judgment when addressing the question(s) below.    The Medical record reflects the following:     Indicators   Supporting Clinical Findings Location in Medical Record   x Lab Value(s) --K: 3.4->3.2->4->3.3->4.3->3.0 Labs 10/31->11/7   x Treatment                                 Medication --potassium chloride 10 mEq in 100 mL IVPB q 1h (11/2 x 2 doses)  --potassium chloride 10% oral solution 20 mEq q4h (11/7)   MAR    Other       Provider, please specify the diagnosis or diagnoses that correspond(s) to the above indicators. Faustino all that apply:    [   ] Hypokalemia   [   ] Other electrolyte disturbance (please specify): _______   [ x  ]  Clinically Undetermined       Please document in your progress notes daily for the duration of treatment until resolved, and include in your discharge summary.

## 2019-11-07 NOTE — PHYSICIAN QUERY
PT Name: Melva Barker  MR #: [id number]    Physician Query Form - Nutrition Clarification     CDS/: Mary Steel RN, CDS               Contact information: [email or web address]    This form is a permanent document in the medical record.     Query Date: November 7, 2019    By submitting this query, we are merely seeking further clarification of documentation.. Please utilize your independent clinical judgment when addressing the question(s) below.    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   --% Intake of Estimated Energy Needs: 0 - 25 %    --Pt with fair intake meals (50%).   --% Intake of Estimated Energy Needs: 25 - 50 %  --% Meal Intake: 25 - 50 %   RD c/s 11/1        RD Note 11/5   x Weight Status over a time frame --Pt states over previous year she has lost 20lbs while caring for her sister with dementia.         -Pt does not meet criteria for malnutrition given only 16% weight loss in 1 year however pt at risk.   RD c/s 11/1   x Subcutaneous Fat and/or Muscle Loss --pt with moderate muscle wasting in clavicles, calves, hands  --Orbital Region (Subcutaneous Fat Loss): mild depletion  --Upper Arm Region (Subcutaneous Fat Loss): well nourished  --Thoracic and Lumbar Region: well nourished   --Confucianist Region (Muscle Loss): mild depletion--  --Clavicle Bone Region (Muscle Loss): moderate-- depletion  --Clavicle and Acromion Bone Region (Muscle Loss): moderate depletion  --Scapular Bone Region (Muscle Loss): moderate depletion  --Dorsal Hand (Muscle Loss): moderate depletion  --Patellar Region (Muscle Loss): mild depletion  --Anterior Thigh Region (Muscle Loss): mild depletion  --Posterior Calf Region (Muscle Loss): moderate depletion    RD c/s 11/1    Fluid Accumulation or  Edema      Reduced  Strength     x Wt / BMI / Usual Body Weight --Wt: 109 lbs  --BMI: 21.4   RD c/s 11/1    Delayed Wound Healing / Failure to Thrive     x Acute or Chronic Illness --IDDM and HTN   --Gastroparesis  --Stroke due to embolism of left middle cerebral artery   NCC H&P 10/31    Medication     x Treatment --Recommendation:        -Add Boost Glucose (strawberry) TID to increase caloric intake daily    --Boost Supplement   RD c/s 11/1        Orders 11/5   x Other --Pt only ate a few bites of her lunch today (fried chicken nuggets and french fries) because she is missing most of her bottom teeth and she says the food was too hard. Pt also would like Boost shakes.   RD Note 11/5     AND / ASPEN Clinical Characteristics (October 2011)  A minimum of two characteristics is recommended for diagnosing either moderate or severe malnutrition   Mild Malnutrition Moderate Malnutrition Severe Malnutrition   Energy Intake from p.o., TF or TPN. < 75% intake of estimated energy needs for less than 7 days < 75% intake of estimated energy needs for greater than 7 days < 50% intake of estimated energy needs for > 5 days   Weight Loss 1-2% in 1 month  5% in 3 months  7.5% in 6 months  10% in 1 year 1-2 % in 1 week  5% in 1 month  7.5% in 3 months  10% in 6 months  20% in 1 year > 2% in 1 week  > 5% in 1 month  > 7.5% in 3 months  > 10% in 6 months  > 20% in 1 year   Physical Findings     None *Mild subcutaneous fat and/or muscle loss  *Mild fluid accumulation  *Stage II decubitus  *Surgical wound or non-healing wound *Mod/severe subcutaneous fat and/or muscle loss  *Mod/severe fluid accumulation  *Stage III or IV decubitus  *Non-healing surgical wound     Provider, please specify diagnosis or diagnoses associated with above clinical findings.    [  ] Mild Protein-Calorie Malnutrition   [  ] Moderate Protein-Calorie Malnutrition   [  ] Cachexia   [  ] Abnormal Weight Loss   [  ] Underweight   [  ] Other Nutritional  Diagnosis (please specify):    [  ] Other:    [ x ] Clinically Undetermined       Please document in your progress notes daily for the duration of treatment until resolved and include in your discharge summary.

## 2019-11-07 NOTE — NURSING
"Pt has repeatedly asked for pain/nausea meds, refused all meals and has asked to speak to nurse supervisor as well as DON, have talked to family members about her care and agreed to call back at a later time, pt has been given pain meds as well as anti nausea meds throughout the day, pain has been addressed by her MD, pt wants narcotics " to better ease her pain " has stated throughout the day she has been in severe pain, all of these pain issues have been addressed, pt is bed, with eyes closed, bed in lowest position, and call light as well as personal belongings within reach.  "

## 2019-11-07 NOTE — PLAN OF CARE
Problem: Oral Intake Inadequate  Goal: Improved Oral Intake  Outcome: Ongoing, Progressing   Recommendations     1. Continue Carb Consistent soft diet with Boost Glucose Control TID.  Goals: 1. Pt's intake meals, supplements >50% by RD follow up.  Nutrition Goal Status: progressing towards goal  Communication of RD Recs: (POC)

## 2019-11-07 NOTE — PROGRESS NOTES
[facility]  Adult Nutrition  Progress Note    SUMMARY       Recommendations     1. Continue Carb Consistent soft diet with Boost Glucose Control TID.  Goals: 1. Pt's intake meals, supplements >50% by RD follow up.  Nutrition Goal Status: progressing towards goal  Communication of RD Recs: (POC)    Reason for Assessment    Reason For Assessment: RD follow-up  Diagnosis: stroke/CVA  Relevant Medical History: IDDM, HTN  Interdisciplinary Rounds: did not attend  General Information Comments: Pt with fair intake meals until last night when she became nauseous; pt is on motility drug and receiving anti-emetics. Pt continues to be at risk for malnutrition, but says she has been eating the softer meals and drinking the Boost ONS. Will monitor.  Nutrition Discharge Planning: Pt to follow higher protein/high kcal diet.    Nutrition Risk Screen    Nutrition Risk Screen: dysphagia or difficulty swallowing    Nutrition/Diet History    Spiritual, Cultural Beliefs, Religion Practices, Values that Affect Care: no  Supplemental Drinks or Food Habits: Ensure Plus  Factors Affecting Nutritional Intake: NPO    Anthropometrics    Temp: 97 °F (36.1 °C)  Height: 5' (152.4 cm)  Height (inches): 60 in  Weight Method: Bed Scale  Weight: 55.5 kg (122 lb 5.7 oz)  Weight (lb): 122.36 lb  Ideal Body Weight (IBW), Female: 100 lb  % Ideal Body Weight, Female (lb): 109.13 lb  BMI (Calculated): 21.4  BMI Grade: 18.5-24.9 - normal  Weight Loss: unintentional  Usual Body Weight (UBW), k kg  % Usual Body Weight: 84.07  % Weight Change From Usual Weight: -16.1 %       Lab/Procedures/Meds    Pertinent Labs Reviewed: reviewed  Pertinent Labs Comments: A1c 8.3%, K 3, Glu 134  Pertinent Medications Reviewed: reviewed  Pertinent Medications Comments: reglan, statin      Estimated/Assessed Needs    Weight Used For Calorie Calculations: 49.5 kg (109 lb 2 oz)  Energy Calorie Requirements (kcal): 1503-7864  Energy Need Method:  Kcal/kg(25-30kcal/kg)  Protein Requirements: 50-60g(1.0-1.2g/kg)  Weight Used For Protein Calculations: 49.5 kg (109 lb 2 oz)  Fluid Requirements (mL): 1mL/kcal or per MD     RDA Method (mL): 1237  CHO Requirement: 175g CHO daily      Nutrition Prescription Ordered    Current Diet Order: Carb Consistent soft diet  Oral Nutrition Supplement: Boost Glucose Control TID    Evaluation of Received Nutrient/Fluid Intake    Comments: LBM 11/06  % Intake of Estimated Energy Needs: 50 - 75 %  % Meal Intake: 25 - 50 %    Nutrition Risk    Level of Risk/Frequency of Follow-up: low     Assessment and Plan    Nutrition Problem  Inadequate energy intake    Related to (etiology):   Poor meal intake    Signs and Symptoms (as evidenced by):   Nausea, previous difficulty chewing    Interventions(treatment strategy):  Collaboration of care to providers.  Commercial beverage: Boost Glucose Control TID.    Nutrition Diagnosis Status:   Continues      Monitor and Evaluation    Food and Nutrient Intake: energy intake, food and beverage intake  Food and Nutrient Adminstration: diet order  Knowledge/Beliefs/Attitudes: food and nutrition knowledge/skill  Anthropometric Measurements: weight, weight change, body mass index  Biochemical Data, Medical Tests and Procedures: electrolyte and renal panel, gastrointestinal profile, glucose/endocrine profile, inflammatory profile, lipid profile  Nutrition-Focused Physical Findings: overall appearance     Malnutrition Assessment             Weight Loss (Malnutrition): (16% in 1 year)  Energy Intake (Malnutrition): (pt reports poor but cannot quantify)   Orbital Region (Subcutaneous Fat Loss): mild depletion  Upper Arm Region (Subcutaneous Fat Loss): well nourished  Thoracic and Lumbar Region: well nourished   Nondenominational Region (Muscle Loss): mild depletion  Clavicle Bone Region (Muscle Loss): moderate depletion  Clavicle and Acromion Bone Region (Muscle Loss): moderate depletion  Scapular Bone Region  (Muscle Loss): moderate depletion  Dorsal Hand (Muscle Loss): moderate depletion  Patellar Region (Muscle Loss): mild depletion  Anterior Thigh Region (Muscle Loss): mild depletion  Posterior Calf Region (Muscle Loss): moderate depletion                 Nutrition Follow-Up    RD Follow-up?: Yes

## 2019-11-07 NOTE — PT/OT/SLP PROGRESS
Physical Therapy Treatment    Patient Name:  Melva Barker   MRN:  [id number]    Recommendations:     Discharge Recommendations:  rehabilitation facility   Discharge Equipment Recommendations: (TBD)   Barriers to discharge: Inaccessible home, Decreased caregiver support and patient below functional baseline    Assessment:     Melva Barker is a 74 y.o. female admitted with a medical diagnosis of Stroke due to embolism of left middle cerebral artery.  She presents with the following impairments/functional limitations:  weakness, impaired endurance, impaired self care skills, impaired functional mobilty, gait instability, impaired balance, decreased lower extremity function, decreased upper extremity function, decreased safety awareness, pain, decreased coordination, abnormal tone, decreased ROM, impaired joint extensibility, impaired coordination. The patient's mobility today was greatly limited by new onset R sided low back pain into her R buttock. The patient denied numbness or tingling, reported sharp pain at the area of R paraspinals L3-sacrum that was reproduced with gentle palpation. Pain was increased with lumbar ROM except flexion. Active and passive movement of R lower extremity into hip and knee flexion further exacerbated pain. Suspect that source of pain may be due to muscle spasms. Due to degree of pain, patient required maximum assistance for supine to sit and squat pivot transfers. Patient unable to tolerate sitting up in chair due to pain. Positioned patient in hooklying with some relief of pain, RN notified. PTA patient was independent with mobility.  Based on the patient's progress with therapy, motivation to participate in treatment, and prior level of independence, they are an excellent candidate for inpatient rehabilitation and they would benefit from rehab to maximize their functional potential.      Rehab Prognosis: Good; patient would benefit from acute skilled PT services to address  "these deficits and reach maximum level of function.    Recent Surgery: * No surgery found *      Plan:     During this hospitalization, patient to be seen 4 x/week to address the identified rehab impairments via gait training, neuromuscular re-education, therapeutic exercises, therapeutic activities and progress toward the following goals:    · Plan of Care Expires:  12/01/19    Subjective     Chief Complaint: "This pain is 10.5 out of 10"; "I was doing so good and now this happens"  Patient/Family Comments/goals: pain relief, rest  Pain/Comfort:  · Pain Rating 1: 10/10  · Location - Side 1: Right  · Location - Orientation 1: lower  · Location 1: back(and R buttock)  · Pain Addressed 1: Pre-medicate for activity, Reposition, Cessation of Activity, Distraction, Nurse notified  · Pain Rating Post-Intervention 1: 10/10      Objective:     Communicated with RN prior to session.  Patient found HOB elevated with peripheral IV, telemetry, SCD upon PT entry to room.     General Precautions: Standard, fall   Orthopedic Precautions:N/A   Braces: N/A     Functional Mobility:    Bed Mobility  Rolling to L: maximum assistance  Supine to Sit:  maximum assistance due to degree of R back pain, cues for log rolling  Sit to supine: total assistance   Transfers Stand pivot bed<>chair: blocking R knee, maximum assistance   Gait  Deferred due to pain          AM-PAC 6 CLICK MOBILITY  Turning over in bed (including adjusting bedclothes, sheets and blankets)?: 2  Sitting down on and standing up from a chair with arms (e.g., wheelchair, bedside commode, etc.): 2  Moving from lying on back to sitting on the side of the bed?: 2  Moving to and from a bed to a chair (including a wheelchair)?: 2  Need to walk in hospital room?: 1  Climbing 3-5 steps with a railing?: 1  Basic Mobility Total Score: 10       Therapeutic Activities and Exercises:  Increased back pain with any R leg movement AROM/PROM. Increased pain with Lumbar rotation, sidebend, " extension within 10% of ROM; no increase in pain with forward flexion. Patient unable to tolerate sitting up in chair due to pain. Positioned in hooklying with R knee supported.    Patient educated on role of therapy, goals of session, benefits of out of bed mobility. Patient agreeable to mobilize with therapy.  Discussed PT plan of care during hospitalization. Patient educated that they need to call for assistance to mobilize out of bed. Whiteboard updated as appropriate. Patient educated on how their diagnosis impacts their mobility within PT scope of practice.     Patient left HOB elevated with all lines intact, call button in reach and RN notified..    GOALS:   Multidisciplinary Problems     Physical Therapy Goals        Problem: Physical Therapy Goal    Goal Priority Disciplines Outcome Goal Variances Interventions   Physical Therapy Goal     PT, PT/OT Ongoing, Progressing     Description:  Goals to be met by:      Patient will increase functional independence with mobility by performin. Supine to sit with Stand-by Assistance met 2019  2. Sit to supine with Stand-by Assistance   3. Sit to stand transfer with Stand-by Assistance  4. Gait  X 25 feet with Minimal Assistance using least restrictive device.   5. Stand for 1 minutes with Contact Guard Assistance using  least restrictive device or no AD  6. Lower extremity exercise program x20 reps per handout, with independence to improve strength and activity tolerance.                        Time Tracking:     PT Received On: 19  PT Start Time: 1035     PT Stop Time: 1055  PT Total Time (min): 20 min     Billable Minutes: Therapeutic Activity 20    Treatment Type: Treatment  PT/PTA: PT     PTA Visit Number: 0     Hortencia Umaña, PT  2019

## 2019-11-07 NOTE — PLAN OF CARE
Problem: Physical Therapy Goal  Goal: Physical Therapy Goal  Description  Goals to be met by:      Patient will increase functional independence with mobility by performin. Supine to sit with Stand-by Assistance met 2019  2. Sit to supine with Stand-by Assistance   3. Sit to stand transfer with Stand-by Assistance  4. Gait  X 25 feet with Minimal Assistance using least restrictive device.   5. Stand for 1 minutes with Contact Guard Assistance using  least restrictive device or no AD  6. Lower extremity exercise program x20 reps per handout, with independence to improve strength and activity tolerance.       Outcome: Ongoing, Progressing   Continue with plan of care.   Hortencia Umaña, PT  2019

## 2019-11-07 NOTE — PLAN OF CARE
POC reviewed with patient. All questions and concerns reviewed. Fall/safety precautions implemented and maintained. Blood glucose monitored. Patient c/o right hip and right leg pain. PRN tylenol 650mg administered with no effect. Patient repositioned and heating pads applied. No relief of pain. Notified stroke team. One time dose of tramadol 50mg ordered. STAT X-ray of pelvis ordered. Please see flow sheet for full assessment and vitals. Bed locked in lowest position. Call bell within reach. Will continue to monitor.

## 2019-11-07 NOTE — PROGRESS NOTES
[facility]  Vascular Neurology  Comprehensive Stroke Center  Progress Note    Assessment/Plan:     * Stroke due to embolism of left middle cerebral artery  Melva Barker is a 74 y.o. female with PMHx of HTN, HLD, and DM who presented to OSH with acute worsening of RSW. Patient had been experiencing RSW x1 month. Yesterday, she began having acute worsening. She was treated with tPA at OSH. CTA without LVO. MRI with infarct in L internal capsule and corona radiata. Etiology likely small vessel disease    Antithrombotics for secondary stroke prevention: Antiplatelets: asa 81 mg + plavix 75 mg   Statins for secondary stroke prevention and hyperlipidemia, if present: Statins: Atorvastatin- 40 mg daily,   Aggressive risk factor modification: HTN, HLD, Diet     Rehab efforts: The patient has been evaluated by a stroke team provider and the therapy needs have been fully considered based off the presenting complaints and exam findings. The following therapy evaluations are needed: PT evaluate and treat, OT evaluate and treat, SLP evaluate and treat, PM&R evaluate for appropriate placement - rehab placement   Diagnostics ordered/pending: None   VTE prophylaxis: Heparin sbq with mechanical SCDs  BP parameters: Infarct: Post tPA, SBP <180        Nausea & vomiting  Patient with episodes of vomiting 11/2 - resolved throughout the day. No episodes of vomiting overnight.   PRN IV Zofran ordered   KUB completed and no abnormality noted  Patient continues to report nausea - some mild middle lower abdominal pain upon palpation  Lipase ordered and WNL  CT abdomen (2016) diverticulosis in the descending colon and a small hiatal hernia. Hx of diabetic gastroparesis, reglan started. Follow up with PCP.  Patient with last BM 11/6 - normal bowel sounds on exam   Sucralfate 1g started 11/5  Continue to monitor     Cytotoxic brain edema  Area of cytotoxic cerebral edema identified when reviewing brain imaging  in the territory of the L middle cerebral artery. There is no mass effect associated with it. We will continue to monitor the patients clinical exam for any worsening of symptoms which may indicate expansion of the stroke or the area of the edema resulting in the clinical change. The pattern is suggestive of small vessel etiology.        Essential hypertension  Stroke risk factor  SBP <180 post tPA  Currently on Benazepril 40 mg daily, HCTZ 12.5 (increased from home dose of 6.25) --> increased 11/3 to 25 mg, and bisoprolol 10 mg daily  Was on combo med at home - Bisoprolol 10 mg + HCTZ 6.25 mg     11/6 hypotensive, held all bp meds,  cc bolus, restart when appropriate.  11/7 hypotension resolving       Sciatica  Started home tizanidine TID     Gastroparesis  Reglan 10 mg TID before meals    Diabetes mellitus type 2 without retinopathy  stroke risk factor, poorly controlled on glargine 17 units daily  HbA1c 8.3  Currently on SSI   Diabetic diet           11/1/19 MRI with small vessel L MCA infarct, therapy recommending rehab  11/2 - Patient stepped down overnight. Adjusting BP regimen. Patient with continuous complaints if nausea. IV Zofran ordered with some relief. Patient has not has BM since 10/30. Ordering KUB to rule out obstruction. Neuro exam unchanged.   11/3 - NAEON. Patient reports she had no episodes of vomiting overnight. Still complaining of nausea. Mild lower abdomen tenderness on exam. Lipase ordered and WNL. Continue to monitor.   11/4 - denies nausea and vomiting. Abd tenderness remains present on palpitation. Continue to monitor. HCTZ increased yesterday. Pending rehab placement.   11/5 -  N/V x1 overnight zofran resolved, sucralfate started. Placement pending   11/6 - hypotensive, held all BP meds,  ml bolus, responded well. Increased BUN/Cr, start NS 75ml/hr.   11/7 patient complaining of sciatica pain.  Restarted home tizanidine.  Patient requesting hydrocodone but educated patient  that nerve pain is not treated with opioid.  Degenerative disease seen on xray but no fracture.        STROKE DOCUMENTATION   Acute Stroke Times   Last Known Normal Date: 10/31/19  Last Known Normal Time: 0630  Symptom Onset Date: 10/31/19  Symptom Onset Time: 0730  Stroke Team Called Date: 10/31/19  Stroke Team Called Time: 0936  Stroke Team Arrival Date: 10/31/19  Stroke Team Arrival Time: 0946  Decision to Treat Time for Alteplase: 1003    NIH Scale:  1a. Level of Consciousness: 0-->Alert, keenly responsive  1b. LOC Questions: 1-->Answers one question correctly  1c. LOC Commands: 0-->Performs both tasks correctly  2. Best Gaze: 0-->Normal  3. Visual: 0-->No visual loss  4. Facial Palsy: 1-->Minor paralysis (flattened nasolabial fold, asymmetry on smiling)  5a. Motor Arm, Left: 0-->No drift, limb holds 90 (or 45) degrees for full 10 secs  5b. Motor Arm, Right: 1-->Drift, limb holds 90 (or 45) degrees, but drifts down before full 10 secs, does not hit bed or other support  6a. Motor Leg, Left: 0-->No drift, leg holds 30 degree position for full 5 secs  6b. Motor Leg, Right: 1-->Drift, leg falls by the end of the 5-sec period but does not hit bed  7. Limb Ataxia: 0-->Absent  8. Sensory: 1-->Mild-to-moderate sensory loss, patient feels pinprick is less sharp or is dull on the affected side, or there is a loss of superficial pain with pinprick, but patient is aware of being touched  9. Best Language: 0-->No aphasia, normal  10. Dysarthria: 0-->Normal  11. Extinction and Inattention (formerly Neglect): 0-->No abnormality  Total (NIH Stroke Scale): 5       Modified Dayanna Score: 0  Nabila Coma Scale:    ABCD2 Score:    [id number] Score:   HAS -BLED Score:   ICH Score:   Hunt & Amaya Classification:      Hemorrhagic change of an Ischemic Stroke: Does this patient have an ischemic stroke with hemorrhagic changes? No     Neurologic Chief Complaint: L MCA    Subjective:     Interval History: Patient is seen for follow-up  neurological assessment and treatment recommendations: patient complaining of sciatica pain.  Restarted home tizanidine.  Patient requesting hydrocodone but educated patient that nerve pain is not treated with opioid.  Degenerative disease seen on xray but no fracture      HPI, Past Medical, Family, and Social History remains the same as documented in the initial encounter.     Review of Systems   Constitutional: Negative for chills and fever.   Respiratory: Negative for cough.    Cardiovascular: Positive for palpitations. Negative for chest pain.   Gastrointestinal: Positive for abdominal pain (mid lower abdominal ). Negative for nausea and vomiting.   Musculoskeletal: Positive for arthralgias.        + right upper buttock pain     Neurological: Positive for facial asymmetry and weakness. Negative for speech difficulty and numbness.     Scheduled Meds:   aspirin  81 mg Oral Daily    atorvastatin  40 mg Oral Daily    clopidogrel  75 mg Oral Daily    gabapentin  600 mg Oral Q8H    heparin (porcine)  5,000 Units Subcutaneous Q8H    lidocaine  2 patch Transdermal Q24H    metoclopramide HCl  10 mg Oral TID AC    senna-docusate 8.6-50 mg  1 tablet Oral Daily    sucralfate  1 g Oral BID     Continuous Infusions:    PRN Meds:acetaminophen, Dextrose 10% Bolus, Dextrose 10% Bolus, glucagon (human recombinant), insulin aspart U-100, ondansetron, ramelteon, sodium chloride 0.9%, tiZANidine    Objective:     Vital Signs (Most Recent):  Temp: 97.6 °F (36.4 °C) (11/07/19 1541)  Pulse: 84 (11/07/19 1541)  Resp: 18 (11/07/19 1541)  BP: (!) 142/79 (11/07/19 1541)  SpO2: 97 % (11/07/19 1541)  BP Location: Right arm    Vital Signs Range (Last 24H):  Temp:  [97 °F (36.1 °C)-98.7 °F (37.1 °C)]   Pulse:  [67-89]   Resp:  [16-18]   BP: ()/(4-84)   SpO2:  [95 %-100 %]   BP Location: Right arm    Physical Exam   Constitutional: She appears well-developed and well-nourished. No distress.   HENT:   Head: Normocephalic and  atraumatic.   Eyes: Pupils are equal, round, and reactive to light. EOM are normal.   Cardiovascular: Normal rate.   Pulmonary/Chest: Effort normal. No respiratory distress.   Abdominal: Soft. Bowel sounds are normal. There is tenderness (bilateral lower abdominal pain ).   Musculoskeletal:   No abnormality seen in right upper buttock region.  Patient is in excruciating pain with light touch.     Neurological: She is alert.   Skin: Skin is warm and dry.   Vitals reviewed.      Neurological Exam:   LOC: alert  Attention Span: Good   Language: No aphasia  Articulation: No dysarthria  Orientation: Person, Time   Visual Fields: Full  EOM (CN III, IV, VI): Full/intact  Pupils (CN II, III): PERRL  Facial Movement (CN VII): Lower facial weakness on the Right  Motor: Arm left  Normal 5/5  Leg left  Normal 5/5  Arm right  Paresis: 3/5 give way weakness   Leg right Paresis: 3/5  Sensation: mild decrease in sensation RUE  Tone: Normal tone throughout    Laboratory:  CMP:   Recent Labs   Lab 11/07/19  0441   CALCIUM 9.4   ALBUMIN 3.7   PROT 7.2      K 3.0*   CO2 23      BUN 16   CREATININE 0.9   ALKPHOS 113   ALT 20   AST 28   BILITOT 0.6     CBC:   Recent Labs   Lab 11/07/19  0441   WBC 8.84   RBC 4.00   HGB 10.6*   HCT 32.8*      MCV 82   MCH 26.5*   MCHC 32.3     Lipid Panel:   No results for input(s): CHOL, LDLCALC, HDL, TRIG in the last 168 hours.  Coagulation:   No results for input(s): PT, INR, APTT in the last 168 hours.  Platelet Aggregation Study: No results for input(s): PLTAGG, PLTAGINTERP, PLTAGREGLACO, ADPPLTAGGREG in the last 168 hours.  Hgb A1C:   No results for input(s): HGBA1C in the last 168 hours.  TSH:   No results for input(s): TSH in the last 168 hours.    Diagnostic Results     Brain Imaging   MRI Brain (11/01/19):  Acute lacunar type infarct coursing through the left posterior limb internal capsule and corona radiata.    CTH (10/31/19):  No acute intracranial pathology    Vessel  Imaging   CTA-MP (10/31/19):  Atherosclerotic disease of the cavernous and supraclinoid ICAs with mild narrowing. No high-grade stenosis or LVO. Less than 50% proximal ICA stenosis. mild-moderate narrowing of the right vertebral artery origin. There is mild left V1 segment narrowing.  No significant focal vertebral artery stenosis or occlusion.    Cardiac Imaging   Echo (11/01/19)  · Increased (hyperdynamic) left ventricular systolic function. The estimated ejection fraction is 75%  · Concentric left ventricular remodeling.  · Normal LV diastolic function.  · No wall motion abnormalities.  · Normal right ventricular systolic function.  · Normal central venous pressure (3 mm Hg).  · Mild tricuspid regurgitation.  · The estimated PA systolic pressure is 26 mm Hg  · Echolucent structure next to the LA, likely representing hiatal hernia. Clinincal correlation is required.      Danya Mancia PA-C  Comprehensive Stroke Center  Department of Vascular Neurology   [facility]

## 2019-11-07 NOTE — SUBJECTIVE & OBJECTIVE
Neurologic Chief Complaint: L MCA    Subjective:     Interval History: Patient is seen for follow-up neurological assessment and treatment recommendations: patient complaining of sciatica pain.  Restarted home tizanidine.  Patient requesting hydrocodone but educated patient that nerve pain is not treated with opioid.  Degenerative disease seen on xray but no fracture      HPI, Past Medical, Family, and Social History remains the same as documented in the initial encounter.     Review of Systems   Constitutional: Negative for chills and fever.   Respiratory: Negative for cough.    Cardiovascular: Positive for palpitations. Negative for chest pain.   Gastrointestinal: Positive for abdominal pain (mid lower abdominal ). Negative for nausea and vomiting.   Musculoskeletal: Positive for arthralgias.        + right upper buttock pain     Neurological: Positive for facial asymmetry and weakness. Negative for speech difficulty and numbness.     Scheduled Meds:   aspirin  81 mg Oral Daily    atorvastatin  40 mg Oral Daily    clopidogrel  75 mg Oral Daily    gabapentin  600 mg Oral Q8H    heparin (porcine)  5,000 Units Subcutaneous Q8H    lidocaine  2 patch Transdermal Q24H    metoclopramide HCl  10 mg Oral TID AC    senna-docusate 8.6-50 mg  1 tablet Oral Daily    sucralfate  1 g Oral BID     Continuous Infusions:    PRN Meds:acetaminophen, Dextrose 10% Bolus, Dextrose 10% Bolus, glucagon (human recombinant), insulin aspart U-100, ondansetron, ramelteon, sodium chloride 0.9%, tiZANidine    Objective:     Vital Signs (Most Recent):  Temp: 97.6 °F (36.4 °C) (11/07/19 1541)  Pulse: 84 (11/07/19 1541)  Resp: 18 (11/07/19 1541)  BP: (!) 142/79 (11/07/19 1541)  SpO2: 97 % (11/07/19 1541)  BP Location: Right arm    Vital Signs Range (Last 24H):  Temp:  [97 °F (36.1 °C)-98.7 °F (37.1 °C)]   Pulse:  [67-89]   Resp:  [16-18]   BP: ()/(4-84)   SpO2:  [95 %-100 %]   BP Location: Right arm    Physical Exam    Constitutional: She appears well-developed and well-nourished. No distress.   HENT:   Head: Normocephalic and atraumatic.   Eyes: Pupils are equal, round, and reactive to light. EOM are normal.   Cardiovascular: Normal rate.   Pulmonary/Chest: Effort normal. No respiratory distress.   Abdominal: Soft. Bowel sounds are normal. There is tenderness (bilateral lower abdominal pain ).   Musculoskeletal:   No abnormality seen in right upper buttock region.  Patient is in excruciating pain with light touch.     Neurological: She is alert.   Skin: Skin is warm and dry.   Vitals reviewed.      Neurological Exam:   LOC: alert  Attention Span: Good   Language: No aphasia  Articulation: No dysarthria  Orientation: Person, Time   Visual Fields: Full  EOM (CN III, IV, VI): Full/intact  Pupils (CN II, III): PERRL  Facial Movement (CN VII): Lower facial weakness on the Right  Motor: Arm left  Normal 5/5  Leg left  Normal 5/5  Arm right  Paresis: 3/5 give way weakness   Leg right Paresis: 3/5  Sensation: mild decrease in sensation RUE  Tone: Normal tone throughout    Laboratory:  CMP:   Recent Labs   Lab 11/07/19  0441   CALCIUM 9.4   ALBUMIN 3.7   PROT 7.2      K 3.0*   CO2 23      BUN 16   CREATININE 0.9   ALKPHOS 113   ALT 20   AST 28   BILITOT 0.6     CBC:   Recent Labs   Lab 11/07/19  0441   WBC 8.84   RBC 4.00   HGB 10.6*   HCT 32.8*      MCV 82   MCH 26.5*   MCHC 32.3     Lipid Panel:   No results for input(s): CHOL, LDLCALC, HDL, TRIG in the last 168 hours.  Coagulation:   No results for input(s): PT, INR, APTT in the last 168 hours.  Platelet Aggregation Study: No results for input(s): PLTAGG, PLTAGINTERP, PLTAGREGLACO, ADPPLTAGGREG in the last 168 hours.  Hgb A1C:   No results for input(s): HGBA1C in the last 168 hours.  TSH:   No results for input(s): TSH in the last 168 hours.    Diagnostic Results     Brain Imaging   MRI Brain (11/01/19):  Acute lacunar type infarct coursing through the left  posterior limb internal capsule and corona radiata.    CTH (10/31/19):  No acute intracranial pathology    Vessel Imaging   CTA-MP (10/31/19):  Atherosclerotic disease of the cavernous and supraclinoid ICAs with mild narrowing. No high-grade stenosis or LVO. Less than 50% proximal ICA stenosis. mild-moderate narrowing of the right vertebral artery origin. There is mild left V1 segment narrowing.  No significant focal vertebral artery stenosis or occlusion.    Cardiac Imaging   Echo (11/01/19)  · Increased (hyperdynamic) left ventricular systolic function. The estimated ejection fraction is 75%  · Concentric left ventricular remodeling.  · Normal LV diastolic function.  · No wall motion abnormalities.  · Normal right ventricular systolic function.  · Normal central venous pressure (3 mm Hg).  · Mild tricuspid regurgitation.  · The estimated PA systolic pressure is 26 mm Hg  · Echolucent structure next to the LA, likely representing hiatal hernia. Clinincal correlation is required.

## 2019-11-08 PROBLEM — S30.1XXA PSOAS HEMATOMA, LEFT, SECONDARY TO ANTICOAGULANT THERAPY: Status: ACTIVE | Noted: 2019-11-08

## 2019-11-08 LAB
BASOPHILS # BLD AUTO: 0.04 K/UL (ref 0–0.2)
BASOPHILS # BLD AUTO: 0.05 K/UL (ref 0–0.2)
BASOPHILS # BLD AUTO: 0.06 K/UL (ref 0–0.2)
BASOPHILS NFR BLD: 0.2 % (ref 0–1.9)
BASOPHILS NFR BLD: 0.4 % (ref 0–1.9)
BASOPHILS NFR BLD: 0.4 % (ref 0–1.9)
DIFFERENTIAL METHOD: ABNORMAL
EOSINOPHIL # BLD AUTO: 0 K/UL (ref 0–0.5)
EOSINOPHIL # BLD AUTO: 0 K/UL (ref 0–0.5)
EOSINOPHIL # BLD AUTO: 0.1 K/UL (ref 0–0.5)
EOSINOPHIL NFR BLD: 0.1 % (ref 0–8)
EOSINOPHIL NFR BLD: 0.3 % (ref 0–8)
EOSINOPHIL NFR BLD: 0.4 % (ref 0–8)
ERYTHROCYTE [DISTWIDTH] IN BLOOD BY AUTOMATED COUNT: 13.9 % (ref 11.5–14.5)
ERYTHROCYTE [DISTWIDTH] IN BLOOD BY AUTOMATED COUNT: 14 % (ref 11.5–14.5)
ERYTHROCYTE [DISTWIDTH] IN BLOOD BY AUTOMATED COUNT: 14 % (ref 11.5–14.5)
HCT VFR BLD AUTO: 26.7 % (ref 37–48.5)
HCT VFR BLD AUTO: 27.2 % (ref 37–48.5)
HCT VFR BLD AUTO: 27.5 % (ref 37–48.5)
HGB BLD-MCNC: 8.6 G/DL (ref 12–16)
HGB BLD-MCNC: 8.7 G/DL (ref 12–16)
HGB BLD-MCNC: 8.8 G/DL (ref 12–16)
IMM GRANULOCYTES # BLD AUTO: 0.09 K/UL (ref 0–0.04)
IMM GRANULOCYTES # BLD AUTO: 0.1 K/UL (ref 0–0.04)
IMM GRANULOCYTES # BLD AUTO: 0.16 K/UL (ref 0–0.04)
IMM GRANULOCYTES NFR BLD AUTO: 0.6 % (ref 0–0.5)
IMM GRANULOCYTES NFR BLD AUTO: 0.7 % (ref 0–0.5)
IMM GRANULOCYTES NFR BLD AUTO: 0.9 % (ref 0–0.5)
LYMPHOCYTES # BLD AUTO: 2.6 K/UL (ref 1–4.8)
LYMPHOCYTES # BLD AUTO: 2.9 K/UL (ref 1–4.8)
LYMPHOCYTES # BLD AUTO: 3.5 K/UL (ref 1–4.8)
LYMPHOCYTES NFR BLD: 18.5 % (ref 18–48)
LYMPHOCYTES NFR BLD: 20.1 % (ref 18–48)
LYMPHOCYTES NFR BLD: 21 % (ref 18–48)
MCH RBC QN AUTO: 26.2 PG (ref 27–31)
MCH RBC QN AUTO: 26.3 PG (ref 27–31)
MCH RBC QN AUTO: 26.9 PG (ref 27–31)
MCHC RBC AUTO-ENTMCNC: 31.6 G/DL (ref 32–36)
MCHC RBC AUTO-ENTMCNC: 32 G/DL (ref 32–36)
MCHC RBC AUTO-ENTMCNC: 32.6 G/DL (ref 32–36)
MCV RBC AUTO: 82 FL (ref 82–98)
MCV RBC AUTO: 82 FL (ref 82–98)
MCV RBC AUTO: 83 FL (ref 82–98)
MONOCYTES # BLD AUTO: 0.9 K/UL (ref 0.3–1)
MONOCYTES # BLD AUTO: 1 K/UL (ref 0.3–1)
MONOCYTES # BLD AUTO: 1.1 K/UL (ref 0.3–1)
MONOCYTES NFR BLD: 6.3 % (ref 4–15)
MONOCYTES NFR BLD: 6.6 % (ref 4–15)
MONOCYTES NFR BLD: 6.9 % (ref 4–15)
NEUTROPHILS # BLD AUTO: 10.5 K/UL (ref 1.8–7.7)
NEUTROPHILS # BLD AUTO: 12.6 K/UL (ref 1.8–7.7)
NEUTROPHILS # BLD AUTO: 9.9 K/UL (ref 1.8–7.7)
NEUTROPHILS NFR BLD: 71.2 % (ref 38–73)
NEUTROPHILS NFR BLD: 72.1 % (ref 38–73)
NEUTROPHILS NFR BLD: 73.3 % (ref 38–73)
NRBC BLD-RTO: 0 /100 WBC
PLATELET # BLD AUTO: 202 K/UL (ref 150–350)
PLATELET # BLD AUTO: 207 K/UL (ref 150–350)
PLATELET # BLD AUTO: 210 K/UL (ref 150–350)
PMV BLD AUTO: 11.9 FL (ref 9.2–12.9)
PMV BLD AUTO: 12 FL (ref 9.2–12.9)
PMV BLD AUTO: 12 FL (ref 9.2–12.9)
POCT GLUCOSE: 206 MG/DL (ref 70–110)
POCT GLUCOSE: 212 MG/DL (ref 70–110)
POCT GLUCOSE: 221 MG/DL (ref 70–110)
POCT GLUCOSE: 246 MG/DL (ref 70–110)
RBC # BLD AUTO: 3.24 M/UL (ref 4–5.4)
RBC # BLD AUTO: 3.28 M/UL (ref 4–5.4)
RBC # BLD AUTO: 3.34 M/UL (ref 4–5.4)
WBC # BLD AUTO: 13.92 K/UL (ref 3.9–12.7)
WBC # BLD AUTO: 14.28 K/UL (ref 3.9–12.7)
WBC # BLD AUTO: 17.44 K/UL (ref 3.9–12.7)

## 2019-11-08 PROCEDURE — A9585 GADOBUTROL INJECTION: HCPCS | Performed by: PSYCHIATRY & NEUROLOGY

## 2019-11-08 PROCEDURE — 97530 THERAPEUTIC ACTIVITIES: CPT

## 2019-11-08 PROCEDURE — 86920 COMPATIBILITY TEST SPIN: CPT

## 2019-11-08 PROCEDURE — 25000003 PHARM REV CODE 250: Performed by: FAMILY MEDICINE

## 2019-11-08 PROCEDURE — 86901 BLOOD TYPING SEROLOGIC RH(D): CPT

## 2019-11-08 PROCEDURE — 25000003 PHARM REV CODE 250: Performed by: PHYSICIAN ASSISTANT

## 2019-11-08 PROCEDURE — 25000003 PHARM REV CODE 250: Performed by: STUDENT IN AN ORGANIZED HEALTH CARE EDUCATION/TRAINING PROGRAM

## 2019-11-08 PROCEDURE — 25000003 PHARM REV CODE 250: Performed by: NURSE PRACTITIONER

## 2019-11-08 PROCEDURE — 63600175 PHARM REV CODE 636 W HCPCS: Performed by: NURSE PRACTITIONER

## 2019-11-08 PROCEDURE — 25500020 PHARM REV CODE 255: Performed by: PSYCHIATRY & NEUROLOGY

## 2019-11-08 PROCEDURE — 20600001 HC STEP DOWN PRIVATE ROOM

## 2019-11-08 PROCEDURE — 85025 COMPLETE CBC W/AUTO DIFF WBC: CPT | Mod: 91

## 2019-11-08 PROCEDURE — 25000003 PHARM REV CODE 250: Performed by: PSYCHIATRY & NEUROLOGY

## 2019-11-08 PROCEDURE — 36415 COLL VENOUS BLD VENIPUNCTURE: CPT

## 2019-11-08 RX ORDER — HYDROCODONE BITARTRATE AND ACETAMINOPHEN 5; 325 MG/1; MG/1
1 TABLET ORAL ONCE
Status: DISCONTINUED | OUTPATIENT
Start: 2019-11-08 | End: 2019-11-08

## 2019-11-08 RX ORDER — GADOBUTROL 604.72 MG/ML
6 INJECTION INTRAVENOUS
Status: COMPLETED | OUTPATIENT
Start: 2019-11-08 | End: 2019-11-08

## 2019-11-08 RX ORDER — HYDROCODONE BITARTRATE AND ACETAMINOPHEN 5; 325 MG/1; MG/1
1 TABLET ORAL ONCE
Status: COMPLETED | OUTPATIENT
Start: 2019-11-08 | End: 2019-11-08

## 2019-11-08 RX ADMIN — GABAPENTIN 600 MG: 300 CAPSULE ORAL at 09:11

## 2019-11-08 RX ADMIN — CLOPIDOGREL BISULFATE 75 MG: 75 TABLET ORAL at 08:11

## 2019-11-08 RX ADMIN — LIDOCAINE 2 PATCH: 50 PATCH TOPICAL at 06:11

## 2019-11-08 RX ADMIN — METOCLOPRAMIDE 10 MG: 10 TABLET ORAL at 05:11

## 2019-11-08 RX ADMIN — SUCRALFATE 1 G: 1 TABLET ORAL at 08:11

## 2019-11-08 RX ADMIN — INSULIN ASPART 4 UNITS: 100 INJECTION, SOLUTION INTRAVENOUS; SUBCUTANEOUS at 05:11

## 2019-11-08 RX ADMIN — GABAPENTIN 600 MG: 300 CAPSULE ORAL at 05:11

## 2019-11-08 RX ADMIN — RAMELTEON 8 MG: 8 TABLET ORAL at 09:11

## 2019-11-08 RX ADMIN — METOCLOPRAMIDE 10 MG: 10 TABLET ORAL at 11:11

## 2019-11-08 RX ADMIN — HEPARIN SODIUM 5000 UNITS: 5000 INJECTION, SOLUTION INTRAVENOUS; SUBCUTANEOUS at 01:11

## 2019-11-08 RX ADMIN — ONDANSETRON 4 MG: 2 INJECTION INTRAMUSCULAR; INTRAVENOUS at 11:11

## 2019-11-08 RX ADMIN — TIZANIDINE 4 MG: 4 TABLET ORAL at 01:11

## 2019-11-08 RX ADMIN — GADOBUTROL 6 ML: 604.72 INJECTION INTRAVENOUS at 04:11

## 2019-11-08 RX ADMIN — ASPIRIN 81 MG: 81 TABLET, COATED ORAL at 08:11

## 2019-11-08 RX ADMIN — GABAPENTIN 600 MG: 300 CAPSULE ORAL at 01:11

## 2019-11-08 RX ADMIN — METOCLOPRAMIDE 10 MG: 10 TABLET ORAL at 04:11

## 2019-11-08 RX ADMIN — SUCRALFATE 1 G: 1 TABLET ORAL at 09:11

## 2019-11-08 RX ADMIN — HYDROCODONE BITARTRATE AND ACETAMINOPHEN 1 TABLET: 5; 325 TABLET ORAL at 09:11

## 2019-11-08 RX ADMIN — ATORVASTATIN CALCIUM 40 MG: 20 TABLET, FILM COATED ORAL at 08:11

## 2019-11-08 RX ADMIN — SENNOSIDES AND DOCUSATE SODIUM 1 TABLET: 8.6; 5 TABLET ORAL at 08:11

## 2019-11-08 RX ADMIN — HEPARIN SODIUM 5000 UNITS: 5000 INJECTION, SOLUTION INTRAVENOUS; SUBCUTANEOUS at 05:11

## 2019-11-08 RX ADMIN — INSULIN ASPART 4 UNITS: 100 INJECTION, SOLUTION INTRAVENOUS; SUBCUTANEOUS at 01:11

## 2019-11-08 RX ADMIN — INSULIN ASPART 2 UNITS: 100 INJECTION, SOLUTION INTRAVENOUS; SUBCUTANEOUS at 11:11

## 2019-11-08 RX ADMIN — TIZANIDINE 4 MG: 4 TABLET ORAL at 04:11

## 2019-11-08 NOTE — PLAN OF CARE
[facility] accepted and received auth, awaiting medical clearance.      11/08/19 1240   Discharge Reassessment   Assessment Type Discharge Planning Reassessment   Discharge Plan A Rehab   Discharge Plan B Skilled Nursing Facility   Anticipated Discharge Disposition Rehab   Post-Acute Status   Post-Acute Authorization Placement   Post-Acute Placement Status Awaiting Internal Medical Clearance

## 2019-11-08 NOTE — NURSING
Pt received a 1 time dose of 50 mg tramadol this morning along with her zanaflex, vs were normal but bp was in the 60s made attempt to arouse pt, using a sternal rub and she responded but took bp again and was 100/53 and pt went back to sleep.

## 2019-11-08 NOTE — SUBJECTIVE & OBJECTIVE
Neurologic Chief Complaint: L MCA    Subjective:     Interval History: Patient is seen for follow-up neurological assessment and treatment recommendations:Patient continues to experience back pain.  Now with leukocytosis and drop in h/h plan for MRI thoracic/lumbar spine to evaluate for epidural hematoma.       HPI, Past Medical, Family, and Social History remains the same as documented in the initial encounter.     Review of Systems   Constitutional: Negative for chills and fever.   Respiratory: Negative for cough.    Cardiovascular: Positive for palpitations. Negative for chest pain.   Gastrointestinal: Positive for abdominal pain (mid lower abdominal ). Negative for nausea and vomiting.   Musculoskeletal: Positive for arthralgias.        + right upper buttock pain  Now in lower spine    Neurological: Positive for facial asymmetry and weakness. Negative for speech difficulty and numbness.     Scheduled Meds:   atorvastatin  40 mg Oral Daily    gabapentin  600 mg Oral Q8H    heparin (porcine)  5,000 Units Subcutaneous Q8H    lidocaine  2 patch Transdermal Q24H    metoclopramide HCl  10 mg Oral TID AC    senna-docusate 8.6-50 mg  1 tablet Oral Daily    sucralfate  1 g Oral BID     Continuous Infusions:    PRN Meds:acetaminophen, Dextrose 10% Bolus, Dextrose 10% Bolus, glucagon (human recombinant), insulin aspart U-100, ondansetron, ramelteon, sodium chloride 0.9%, tiZANidine    Objective:     Vital Signs (Most Recent):  Temp: 99 °F (37.2 °C) (11/08/19 1125)  Pulse: 98 (11/08/19 1236)  Resp: 17 (11/08/19 1125)  BP: 112/60 (11/08/19 1125)  SpO2: 100 % (11/08/19 1125)  BP Location: Right arm    Vital Signs Range (Last 24H):  Temp:  [96.9 °F (36.1 °C)-99 °F (37.2 °C)]   Pulse:  []   Resp:  [17-18]   BP: (100-142)/(55-79)   SpO2:  [96 %-100 %]   BP Location: Right arm    Physical Exam   Constitutional: She appears well-developed and well-nourished. No distress.   HENT:   Head: Normocephalic and atraumatic.    Eyes: Pupils are equal, round, and reactive to light. EOM are normal.   Cardiovascular: Normal rate.   Pulmonary/Chest: Effort normal. No respiratory distress.   Abdominal: Soft. Bowel sounds are normal. There is tenderness (bilateral lower abdominal pain ).   Musculoskeletal:   Pain from lumbar region to back of right leg    Neurological: She is alert.   Skin: Skin is warm and dry.   Vitals reviewed.      Neurological Exam:   LOC: alert  Attention Span: Good   Language: No aphasia  Articulation: No dysarthria  Orientation: Person, Time   Visual Fields: Full  EOM (CN III, IV, VI): Full/intact  Pupils (CN II, III): PERRL  Facial Movement (CN VII): Lower facial weakness on the Right  Motor: Arm left  Normal 5/5  Leg left  Normal 5/5  Arm right  Paresis: 3/5 give way weakness   Leg right Paresis: 3/5  Sensation: mild decrease in sensation RUE  Tone: Normal tone throughout    Laboratory:  CMP:   No results for input(s): GLUCOSE, CALCIUM, ALBUMIN, PROT, NA, K, CO2, CL, BUN, CREATININE, ALKPHOS, ALT, AST, BILITOT in the last 24 hours.  CBC:   Recent Labs   Lab 11/08/19  1209   WBC 14.28*   RBC 3.28*   HGB 8.6*   HCT 27.2*      MCV 83   MCH 26.2*   MCHC 31.6*     Lipid Panel:   No results for input(s): CHOL, LDLCALC, HDL, TRIG in the last 168 hours.  Coagulation:   No results for input(s): PT, INR, APTT in the last 168 hours.  Platelet Aggregation Study: No results for input(s): PLTAGG, PLTAGINTERP, PLTAGREGLACO, ADPPLTAGGREG in the last 168 hours.  Hgb A1C:   No results for input(s): HGBA1C in the last 168 hours.  TSH:   No results for input(s): TSH in the last 168 hours.    Diagnostic Results     Brain Imaging   MRI Brain (11/01/19):  Acute lacunar type infarct coursing through the left posterior limb internal capsule and corona radiata.    CTH (10/31/19):  No acute intracranial pathology    Vessel Imaging   CTA-MP (10/31/19):  Atherosclerotic disease of the cavernous and supraclinoid ICAs with mild narrowing.  No high-grade stenosis or LVO. Less than 50% proximal ICA stenosis. mild-moderate narrowing of the right vertebral artery origin. There is mild left V1 segment narrowing.  No significant focal vertebral artery stenosis or occlusion.    Cardiac Imaging   Echo (11/01/19)  · Increased (hyperdynamic) left ventricular systolic function. The estimated ejection fraction is 75%  · Concentric left ventricular remodeling.  · Normal LV diastolic function.  · No wall motion abnormalities.  · Normal right ventricular systolic function.  · Normal central venous pressure (3 mm Hg).  · Mild tricuspid regurgitation.  · The estimated PA systolic pressure is 26 mm Hg  · Echolucent structure next to the LA, likely representing hiatal hernia. Clinincal correlation is required.

## 2019-11-08 NOTE — PT/OT/SLP PROGRESS
Physical Therapy   Progress Note    Patient Name:  Melva Barker  MRN: [id number]  Recent Surgery: * No surgery found *      Recommendations:     Discharge Recommendations: Inpatient Rehabilitation Facility   Equipment recommendations: TBD at next level of care  Barriers to discharge: Increased level of skilled assistance required and Fall risk     Assessment:     Melva Barker is a 74 y.o. female admitted to [facility] on 10/31/2019 with medical diagnosis of Stroke due to embolism of left middle cerebral artery. Pt presents with RLE pain, weakness, impaired balance, decreased endurance and impaired functional mobility . Pt's activity tolerance limited by dizziness this visit-- attempted to assess orthostatics, but blood pressure not reading while sitting EOB. Pt's /54 mmHg in supine. RN notified. Pt did display improvement in bed mobility this visit, performing sup<>sit with CGA.  Melva Barker would benefit from continued acute PT intervention to address listed functional deficits, provide patient/caregiver education, reduce fall risk, and maximize (I) and safety with functional mobility. Once medically stable, recommending pt discharge to inpatient rehab facility.     Rehab Prognosis: Good; based on positive indicators including potential for functional gains within an intensive rehab program     Plan:     During this hospitalization, patient to be seen 4 x/week to address the identified rehab impairments via gait training, therapeutic activities, therapeutic exercises, neuromuscular re-education and progress towards stated goals.     Plan of Care Expires:  12/01/19  Plan of Care reviewed with: patient    This plan of care has been discussed with the patient/caregiver, who was included in its development and is in agreement with the identified goals and treatment plan.     Subjective     Communicated with RN prior to session.  Patient agreeable to participate. No family/caregviver at bedside.  "    Patient comments/goals: "I just feel funny"    Pain/Comfort:  · Location: RLE  · Pt did not rate pain on numeric scale, reported pain was "better" than before     Patients cultural, spiritual, Shinto conflicts given the current situation: No known conflicts     Objective:     Patient found with: peripheral IV  and telemetry    General Precautions: Fall and Standard  Orthopedic Precautions: N/A  Braces: N/A  Oxygen Device: room air     Cognition:   Pt is alert and able to follow simple commands     Functional Mobility:    Bed Mobility:  · Supine > Sit: Contact Guard Assistance  · Sit > Supine: Contact Guard Assistance   · Use of handrail requires    Sitting Balance:   · Assistance level: Stand-by Assistance- Contact Guard Assistance  · Pt with increased dizziness while EOB; BP unable to provide reading while EOB. RN notified.     Transfers: N/T this visit 2/2 dizziness and light headedness with EOB activity                  Outcome Measure: AM-PAC 6 CLICK MOBILITY  Total Score:13     Patient/Caregiver Education and Therapeutic Activities/Exercises     Educated pt on PT POC, therapy recommendations, and importance of increasing OOB activity with RN and therapy assistance. Progression of activities limited this visit 2/2 pt with dizziness.     Patient/caregiver able to verbalize understanding; will follow-up with pt/caregiver during current admit for additional questions/concerns within scope of practice.     White board updated.     Patient left HOB elevated with all lines intact, call button in reach, bed alarm on and RN notified.    Goals:     Multidisciplinary Problems     Physical Therapy Goals        Problem: Physical Therapy Goal    Goal Priority Disciplines Outcome Goal Variances Interventions   Physical Therapy Goal     PT, PT/OT Ongoing, Progressing     Description:  Goals to be met by: 11/15     Patient will increase functional independence with mobility by performin. Supine to sit with " Stand-by Assistance met 11/6/2019  2. Sit to supine with Stand-by Assistance   3. Sit to stand transfer with Stand-by Assistance  4. Gait  X 25 feet with Minimal Assistance using least restrictive device.   5. Stand for 1 minutes with Contact Guard Assistance using  least restrictive device or no AD  6. Lower extremity exercise program x20 reps per handout, with independence to improve strength and activity tolerance.                         Time Tracking:       PT Received On: 11/08/19  PT Start Time: 0854     PT Stop Time: 0912  PT Total Time (min): 18 min     Billable Minutes: Therapeutic Activity 8 min (Co-tx with OT- time split)     Elinor Escamilla, PT  11/08/2019

## 2019-11-08 NOTE — ASSESSMENT & PLAN NOTE
Melva Barker is a 74 y.o. female with PMHx of HTN, HLD, and DM who presented to OSH with acute worsening of RSW. Patient had been experiencing RSW x1 month. Yesterday, she began having acute worsening. She was treated with tPA at OSH. CTA without LVO. MRI with infarct in L internal capsule and corona radiata. Etiology likely small vessel disease    Antithrombotics for secondary stroke prevention: Antiplatelets: asa 81 mg + plavix 75 mg (holding due to back pain and drop in h/h)  Statins for secondary stroke prevention and hyperlipidemia, if present: Statins: Atorvastatin- 40 mg daily,   Aggressive risk factor modification: HTN, HLD, Diet     Rehab efforts: The patient has been evaluated by a stroke team provider and the therapy needs have been fully considered based off the presenting complaints and exam findings. The following therapy evaluations are needed: PT evaluate and treat, OT evaluate and treat, SLP evaluate and treat, PM&R evaluate for appropriate placement - rehab placement   Diagnostics ordered/pending: None   VTE prophylaxis: Heparin sbq with mechanical SCDs  BP parameters: Infarct: Post tPA, SBP <180

## 2019-11-08 NOTE — PLAN OF CARE
Problem: Physical Therapy Goal  Goal: Physical Therapy Goal  Description  Goals to be met by: 11/15     Patient will increase functional independence with mobility by performin. Supine to sit with Stand-by Assistance met 2019  2. Sit to supine with Stand-by Assistance   3. Sit to stand transfer with Stand-by Assistance  4. Gait  X 25 feet with Minimal Assistance using least restrictive device.   5. Stand for 1 minutes with Contact Guard Assistance using  least restrictive device or no AD  6. Lower extremity exercise program x20 reps per handout, with independence to improve strength and activity tolerance.        Outcome: Ongoing, Progressing     Goals remain appropriate. Continue current POC, progressing as tolerated.     Elinor Escamilla PT, DPT   2019  Pager: [phone]

## 2019-11-08 NOTE — PLAN OF CARE
Goals remain appropriate. POC to 3x/w at this time. Session limited by dizziness with EOB. AURORA Valdez 11/8/2019     Problem: Occupational Therapy Goal  Goal: Occupational Therapy Goal  Description  Updated Goals to be met by: 7 days (11/14/19)     Patient will increase functional independence with ADLs by performing:    UE Dressing with Contact Guard Assistance.  LE Dressing with Minimal Assistance.  Grooming while standing with Minimal Assistance.  Toileting from bedside commode with Minimal Assistance for hygiene and clothing management.   Toilet transfer to bedside commode with Contact Guard Assistance.  Increased functional strength to WFL for ADLs.  Complete functional mobility household distance with CGA using AD as needed.   Outcome: Ongoing, Progressing

## 2019-11-08 NOTE — PT/OT/SLP PROGRESS
Occupational Therapy   Treatment    Name: Melva Barker  MRN: [id number]  Admitting Diagnosis:  Stroke due to embolism of left middle cerebral artery       Recommendations:     Discharge Recommendations: rehabilitation facility  Discharge Equipment Recommendations:  shower chair  Barriers to discharge:  Decreased caregiver support    Assessment:     Melva Barker is a 74 y.o. female with a medical diagnosis of Stroke due to embolism of left middle cerebral artery.  She presents with R hemiparesis. Pt with overall decline in endurance with impaired cardiopulmonary response to activity/EOB on this date-- unable to progress to OOB activity on this date; however, pt reported that she has been getting up to bedside commode with nursing staff. POC decreased to 3x/w at this time. Please continue to encourage progressive mobility with nursing staff at this time. Performance deficits affecting function are weakness, impaired endurance, impaired self care skills, impaired functional mobilty, gait instability, impaired cardiopulmonary response to activity, decreased upper extremity function, decreased lower extremity function, pain.     Rehab Prognosis:  Good; patient would benefit from acute skilled OT services to address these deficits and reach maximum level of function.       Plan:     Patient to be seen 3 x/week to address the above listed problems via self-care/home management, therapeutic activities, therapeutic exercises, neuromuscular re-education  · Plan of Care Expires: 12/01/19  · Plan of Care Reviewed with: patient    Subjective     Pain/Comfort:  · Pain Rating 1: (not rated on numerical scale; reported discomfort and pain in R hip)  · Pain Addressed 1: Pre-medicate for activity, Other (see comments), Nurse notified(heat pack)  · Pain Rating Post-Intervention 1: (remains)    Objective:     Communicated with: RN prior to session. Completed with PT.  Patient found HOB elevated with peripheral IV, telemetry  upon OT entry to room.    General Precautions: Standard, aspiration, fall, dental soft   Orthopedic Precautions:N/A   Braces: N/A     Occupational Performance:     Bed Mobility:    · Patient completed Rolling/Turning to Left with  contact guard assistance and with side rail  · Patient completed Supine to Sit with contact guard assistance and with side rail     Functional Mobility/Transfers:  · Deferred 2/2 dizziness and light headedness with EOB activity     Activities of Daily Living:  · Feeding:  modified independence with set up   · Declined LB dressing, grooming/oral care and UB dressing    AMPA 6 Click ADL: 19    Treatment & Education:  -Pt alert and agreeable to therapy session  -with EOB pt visibly dizzy with reported light headed feeling; attempt to take vitals x2 without success  -return to supine in upright position; BP: 100/54(71); HR: 93; O2 100%  -SCDs donned; heat pack issued for R hip discomfort-- patient to apply   -Communication board updated; questions/concerns addressed within OT scope of practice  -no family at bedside for education     Patient left HOB elevated with all lines intact, call button in reach, bed alarm on and RN notifiedEducation:    *notified  of clock needing to be changed for day light saving time and TV not working    GOALS:   Multidisciplinary Problems     Occupational Therapy Goals        Problem: Occupational Therapy Goal    Goal Priority Disciplines Outcome Interventions   Occupational Therapy Goal     OT, PT/OT Ongoing, Progressing    Description:  Updated Goals to be met by: 7 days (11/14/19)     Patient will increase functional independence with ADLs by performing:    UE Dressing with Contact Guard Assistance.  LE Dressing with Minimal Assistance.  Grooming while standing with Minimal Assistance.  Toileting from bedside commode with Minimal Assistance for hygiene and clothing management.   Toilet transfer to bedside commode with Contact Guard  Assistance.  Increased functional strength to WFL for ADLs.  Complete functional mobility household distance with CGA using AD as needed.                    Time Tracking:     OT Date of Treatment: 11/08/19  OT Start Time: 0855  OT Stop Time: 0913  OT Total Time (min): 18 min    Billable Minutes:Therapeutic Activity 10    AURORA Valdez  11/8/2019

## 2019-11-08 NOTE — ASSESSMENT & PLAN NOTE
History of sciatic nerve pain.  Started home tizanidine TID   Patients states on lumbar/thoracic spine today.  With recent tPA administration and drop in h/h on labs concern for epidural hematoma.

## 2019-11-08 NOTE — PROGRESS NOTES
[facility]  Vascular Neurology  Comprehensive Stroke Center  Progress Note    Assessment/Plan:     * Stroke due to embolism of left middle cerebral artery  Melva Barker is a 74 y.o. female with PMHx of HTN, HLD, and DM who presented to OSH with acute worsening of RSW. Patient had been experiencing RSW x1 month. Yesterday, she began having acute worsening. She was treated with tPA at OSH. CTA without LVO. MRI with infarct in L internal capsule and corona radiata. Etiology likely small vessel disease    Antithrombotics for secondary stroke prevention: Antiplatelets: asa 81 mg + plavix 75 mg (holding due to back pain and drop in h/h)  Statins for secondary stroke prevention and hyperlipidemia, if present: Statins: Atorvastatin- 40 mg daily,   Aggressive risk factor modification: HTN, HLD, Diet     Rehab efforts: The patient has been evaluated by a stroke team provider and the therapy needs have been fully considered based off the presenting complaints and exam findings. The following therapy evaluations are needed: PT evaluate and treat, OT evaluate and treat, SLP evaluate and treat, PM&R evaluate for appropriate placement - rehab placement   Diagnostics ordered/pending: None   VTE prophylaxis: Heparin sbq with mechanical SCDs  BP parameters: Infarct: Post tPA, SBP <180    Cytotoxic brain edema  Area of cytotoxic cerebral edema identified when reviewing brain imaging in the territory of the L middle cerebral artery. There is no mass effect associated with it. We will continue to monitor the patients clinical exam for any worsening of symptoms which may indicate expansion of the stroke or the area of the edema resulting in the clinical change. The pattern is suggestive of small vessel etiology.        Essential hypertension  Stroke risk factor  SBP <180 post tPA  Currently on Benazepril 40 mg daily, HCTZ 12.5 (increased from home dose of 6.25) --> increased 11/3 to 25 mg, and  bisoprolol 10 mg daily  Was on combo med at home - Bisoprolol 10 mg + HCTZ 6.25 mg     11/6 hypotensive, held all bp meds,  cc bolus, restart when appropriate.  11/7 hypotension resolving       Back pain  History of sciatic nerve pain.  Started home tizanidine TID   Patients states on lumbar/thoracic spine today.  With recent tPA administration and drop in h/h on labs concern for epidural hematoma.      Gastroparesis  Reglan 10 mg TID before meals    Diabetes mellitus type 2 without retinopathy  stroke risk factor, poorly controlled on glargine 17 units daily  HbA1c 8.3  Currently on SSI   Diabetic diet    Nausea & vomiting  Patient with episodes of vomiting 11/2 - resolved throughout the day. No episodes of vomiting overnight.   PRN IV Zofran ordered   KUB completed and no abnormality noted  Patient continues to report nausea - some mild middle lower abdominal pain upon palpation  Lipase ordered and WNL  CT abdomen (2016) diverticulosis in the descending colon and a small hiatal hernia. Hx of diabetic gastroparesis, reglan started. Follow up with PCP.  Patient with last BM 11/6 - normal bowel sounds on exam   Sucralfate 1g started 11/5  Continue to monitor          11/1/19 MRI with small vessel L MCA infarct, therapy recommending rehab  11/2 - Patient stepped down overnight. Adjusting BP regimen. Patient with continuous complaints if nausea. IV Zofran ordered with some relief. Patient has not has BM since 10/30. Ordering KUB to rule out obstruction. Neuro exam unchanged.   11/3 - NAEON. Patient reports she had no episodes of vomiting overnight. Still complaining of nausea. Mild lower abdomen tenderness on exam. Lipase ordered and WNL. Continue to monitor.   11/4 - denies nausea and vomiting. Abd tenderness remains present on palpitation. Continue to monitor. HCTZ increased yesterday. Pending rehab placement.   11/5 -  N/V x1 overnight zofran resolved, sucralfate started. Placement pending   11/6 -  hypotensive, held all BP meds,  ml bolus, responded well. Increased BUN/Cr, start NS 75ml/hr.   11/7 patient complaining of sciatica pain.  Restarted home tizanidine.  Patient requesting hydrocodone but educated patient that nerve pain is not treated with opioid.  Degenerative disease seen on xray but no fracture.      11/8 Patient continues to experience back pain.  Now with leukocytosis and drop in h/h plan for MRI thoracic/lumbar spine to evaluate for epidural hematoma.      STROKE DOCUMENTATION   Acute Stroke Times   Last Known Normal Date: 10/31/19  Last Known Normal Time: 0630  Symptom Onset Date: 10/31/19  Symptom Onset Time: 0730  Stroke Team Called Date: 10/31/19  Stroke Team Called Time: 0936  Stroke Team Arrival Date: 10/31/19  Stroke Team Arrival Time: 0946  Decision to Treat Time for Alteplase: 1003    NIH Scale:  1a. Level of Consciousness: 0-->Alert, keenly responsive  1b. LOC Questions: 1-->Answers one question correctly  1c. LOC Commands: 0-->Performs both tasks correctly  2. Best Gaze: 0-->Normal  3. Visual: 0-->No visual loss  4. Facial Palsy: 1-->Minor paralysis (flattened nasolabial fold, asymmetry on smiling)  5a. Motor Arm, Left: 0-->No drift, limb holds 90 (or 45) degrees for full 10 secs  5b. Motor Arm, Right: 1-->Drift, limb holds 90 (or 45) degrees, but drifts down before full 10 secs, does not hit bed or other support  6a. Motor Leg, Left: 0-->No drift, leg holds 30 degree position for full 5 secs  6b. Motor Leg, Right: 1-->Drift, leg falls by the end of the 5-sec period but does not hit bed  7. Limb Ataxia: 0-->Absent  8. Sensory: 1-->Mild-to-moderate sensory loss, patient feels pinprick is less sharp or is dull on the affected side, or there is a loss of superficial pain with pinprick, but patient is aware of being touched  9. Best Language: 0-->No aphasia, normal  10. Dysarthria: 0-->Normal  11. Extinction and Inattention (formerly Neglect): 0-->No abnormality  Total (NIH Stroke  Scale): 5       Modified Prospect Score: 0  [place] Coma Scale:    ABCD2 Score:    [id number] Score:   HAS -BLED Score:   ICH Score:   Hunt & Amaya Classification:      Hemorrhagic change of an Ischemic Stroke: Does this patient have an ischemic stroke with hemorrhagic changes? No     Neurologic Chief Complaint: L MCA    Subjective:     Interval History: Patient is seen for follow-up neurological assessment and treatment recommendations:Patient continues to experience back pain.  Now with leukocytosis and drop in h/h plan for MRI thoracic/lumbar spine to evaluate for epidural hematoma.       HPI, Past Medical, Family, and Social History remains the same as documented in the initial encounter.     Review of Systems   Constitutional: Negative for chills and fever.   Respiratory: Negative for cough.    Cardiovascular: Positive for palpitations. Negative for chest pain.   Gastrointestinal: Positive for abdominal pain (mid lower abdominal ). Negative for nausea and vomiting.   Musculoskeletal: Positive for arthralgias.        + right upper buttock pain  Now in lower spine    Neurological: Positive for facial asymmetry and weakness. Negative for speech difficulty and numbness.     Scheduled Meds:   atorvastatin  40 mg Oral Daily    gabapentin  600 mg Oral Q8H    heparin (porcine)  5,000 Units Subcutaneous Q8H    lidocaine  2 patch Transdermal Q24H    metoclopramide HCl  10 mg Oral TID AC    senna-docusate 8.6-50 mg  1 tablet Oral Daily    sucralfate  1 g Oral BID     Continuous Infusions:    PRN Meds:acetaminophen, Dextrose 10% Bolus, Dextrose 10% Bolus, glucagon (human recombinant), insulin aspart U-100, ondansetron, ramelteon, sodium chloride 0.9%, tiZANidine    Objective:     Vital Signs (Most Recent):  Temp: 99 °F (37.2 °C) (11/08/19 1125)  Pulse: 98 (11/08/19 1236)  Resp: 17 (11/08/19 1125)  BP: 112/60 (11/08/19 1125)  SpO2: 100 % (11/08/19 1125)  BP Location: Right arm    Vital Signs Range (Last 24H):  Temp:   [96.9 °F (36.1 °C)-99 °F (37.2 °C)]   Pulse:  []   Resp:  [17-18]   BP: (100-142)/(55-79)   SpO2:  [96 %-100 %]   BP Location: Right arm    Physical Exam   Constitutional: She appears well-developed and well-nourished. No distress.   HENT:   Head: Normocephalic and atraumatic.   Eyes: Pupils are equal, round, and reactive to light. EOM are normal.   Cardiovascular: Normal rate.   Pulmonary/Chest: Effort normal. No respiratory distress.   Abdominal: Soft. Bowel sounds are normal. There is tenderness (bilateral lower abdominal pain ).   Musculoskeletal:   Pain from lumbar region to back of right leg    Neurological: She is alert.   Skin: Skin is warm and dry.   Vitals reviewed.      Neurological Exam:   LOC: alert  Attention Span: Good   Language: No aphasia  Articulation: No dysarthria  Orientation: Person, Time   Visual Fields: Full  EOM (CN III, IV, VI): Full/intact  Pupils (CN II, III): PERRL  Facial Movement (CN VII): Lower facial weakness on the Right  Motor: Arm left  Normal 5/5  Leg left  Normal 5/5  Arm right  Paresis: 3/5 give way weakness   Leg right Paresis: 3/5  Sensation: mild decrease in sensation RUE  Tone: Normal tone throughout    Laboratory:  CMP:   No results for input(s): GLUCOSE, CALCIUM, ALBUMIN, PROT, NA, K, CO2, CL, BUN, CREATININE, ALKPHOS, ALT, AST, BILITOT in the last 24 hours.  CBC:   Recent Labs   Lab 11/08/19  1209   WBC 14.28*   RBC 3.28*   HGB 8.6*   HCT 27.2*      MCV 83   MCH 26.2*   MCHC 31.6*     Lipid Panel:   No results for input(s): CHOL, LDLCALC, HDL, TRIG in the last 168 hours.  Coagulation:   No results for input(s): PT, INR, APTT in the last 168 hours.  Platelet Aggregation Study: No results for input(s): PLTAGG, PLTAGINTERP, PLTAGREGLACO, ADPPLTAGGREG in the last 168 hours.  Hgb A1C:   No results for input(s): HGBA1C in the last 168 hours.  TSH:   No results for input(s): TSH in the last 168 hours.    Diagnostic Results     Brain Imaging   MRI Brain  (11/01/19):  Acute lacunar type infarct coursing through the left posterior limb internal capsule and corona radiata.    CTH (10/31/19):  No acute intracranial pathology    Vessel Imaging   CTA-MP (10/31/19):  Atherosclerotic disease of the cavernous and supraclinoid ICAs with mild narrowing. No high-grade stenosis or LVO. Less than 50% proximal ICA stenosis. mild-moderate narrowing of the right vertebral artery origin. There is mild left V1 segment narrowing.  No significant focal vertebral artery stenosis or occlusion.    Cardiac Imaging   Echo (11/01/19)  · Increased (hyperdynamic) left ventricular systolic function. The estimated ejection fraction is 75%  · Concentric left ventricular remodeling.  · Normal LV diastolic function.  · No wall motion abnormalities.  · Normal right ventricular systolic function.  · Normal central venous pressure (3 mm Hg).  · Mild tricuspid regurgitation.  · The estimated PA systolic pressure is 26 mm Hg  · Echolucent structure next to the LA, likely representing hiatal hernia. Clinincal correlation is required.      Danya Mancia PA-C  Comprehensive Stroke Center  Department of Vascular Neurology   [facility]

## 2019-11-08 NOTE — NURSING
Patient complaining of right hip pain. Offered patient tylenol for pain. Patient refused tylenol. Notified stroke team about patients pain. Will continue to monitor.

## 2019-11-09 ENCOUNTER — ANESTHESIA (OUTPATIENT)
Dept: NEUROLOGY | Facility: HOSPITAL | Age: 74
DRG: 981 | End: 2019-11-09
Payer: MEDICARE

## 2019-11-09 ENCOUNTER — ANESTHESIA EVENT (OUTPATIENT)
Dept: NEUROLOGY | Facility: HOSPITAL | Age: 74
DRG: 981 | End: 2019-11-09
Payer: MEDICARE

## 2019-11-09 PROBLEM — I63.312 THROMBOTIC STROKE INVOLVING LEFT MIDDLE CEREBRAL ARTERY: Status: ACTIVE | Noted: 2019-10-31

## 2019-11-09 PROBLEM — G93.41 ACUTE METABOLIC ENCEPHALOPATHY: Status: ACTIVE | Noted: 2019-11-09

## 2019-11-09 PROBLEM — R57.8 NONTRAUMATIC HEMORRHAGIC SHOCK: Status: ACTIVE | Noted: 2019-11-09

## 2019-11-09 PROBLEM — S30.1XXA PSOAS HEMATOMA, LEFT, SECONDARY TO ANTICOAGULANT THERAPY: Status: RESOLVED | Noted: 2019-11-08 | Resolved: 2019-11-09

## 2019-11-09 PROBLEM — J96.01 ACUTE HYPOXEMIC RESPIRATORY FAILURE: Status: ACTIVE | Noted: 2019-11-09

## 2019-11-09 PROBLEM — R11.2 NAUSEA & VOMITING: Status: RESOLVED | Noted: 2019-11-02 | Resolved: 2019-11-09

## 2019-11-09 PROBLEM — K68.3 RETROPERITONEAL HEMATOMA: Status: ACTIVE | Noted: 2019-11-08

## 2019-11-09 LAB
ABO + RH BLD: NORMAL
ALBUMIN SERPL BCP-MCNC: 3.3 G/DL (ref 3.5–5.2)
ALLENS TEST: ABNORMAL
ALLENS TEST: ABNORMAL
ALP SERPL-CCNC: 95 U/L (ref 55–135)
ALT SERPL W/O P-5'-P-CCNC: 24 U/L (ref 10–44)
ANION GAP SERPL CALC-SCNC: 12 MMOL/L (ref 8–16)
ANION GAP SERPL CALC-SCNC: 13 MMOL/L (ref 8–16)
ANISOCYTOSIS BLD QL SMEAR: SLIGHT
ANISOCYTOSIS BLD QL SMEAR: SLIGHT
AST SERPL-CCNC: 38 U/L (ref 10–40)
BASOPHILS # BLD AUTO: 0.03 K/UL (ref 0–0.2)
BASOPHILS # BLD AUTO: 0.04 K/UL (ref 0–0.2)
BASOPHILS # BLD AUTO: 0.05 K/UL (ref 0–0.2)
BASOPHILS NFR BLD: 0.1 % (ref 0–1.9)
BASOPHILS NFR BLD: 0.1 % (ref 0–1.9)
BASOPHILS NFR BLD: 0.2 % (ref 0–1.9)
BASOPHILS NFR BLD: 0.2 % (ref 0–1.9)
BASOPHILS NFR BLD: 0.3 % (ref 0–1.9)
BILIRUB SERPL-MCNC: 1.1 MG/DL (ref 0.1–1)
BLD GP AB SCN CELLS X3 SERPL QL: NORMAL
BLD PROD TYP BPU: NORMAL
BLD PROD TYP BPU: NORMAL
BLOOD UNIT EXPIRATION DATE: NORMAL
BLOOD UNIT EXPIRATION DATE: NORMAL
BLOOD UNIT TYPE CODE: 6200
BLOOD UNIT TYPE CODE: 6200
BLOOD UNIT TYPE: NORMAL
BLOOD UNIT TYPE: NORMAL
BUN SERPL-MCNC: 22 MG/DL (ref 8–23)
BUN SERPL-MCNC: 23 MG/DL (ref 8–23)
CALCIUM SERPL-MCNC: 8.5 MG/DL (ref 8.7–10.5)
CALCIUM SERPL-MCNC: 8.6 MG/DL (ref 8.7–10.5)
CHLORIDE SERPL-SCNC: 96 MMOL/L (ref 95–110)
CHLORIDE SERPL-SCNC: 99 MMOL/L (ref 95–110)
CO2 SERPL-SCNC: 18 MMOL/L (ref 23–29)
CO2 SERPL-SCNC: 20 MMOL/L (ref 23–29)
CODING SYSTEM: NORMAL
CODING SYSTEM: NORMAL
CREAT SERPL-MCNC: 1.7 MG/DL (ref 0.5–1.4)
CREAT SERPL-MCNC: 2.2 MG/DL (ref 0.5–1.4)
DELSYS: ABNORMAL
DELSYS: ABNORMAL
DIFFERENTIAL METHOD: ABNORMAL
DISPENSE STATUS: NORMAL
DISPENSE STATUS: NORMAL
EOSINOPHIL # BLD AUTO: 0 K/UL (ref 0–0.5)
EOSINOPHIL NFR BLD: 0 % (ref 0–8)
EOSINOPHIL NFR BLD: 0.1 % (ref 0–8)
EOSINOPHIL NFR BLD: 0.3 % (ref 0–8)
ERYTHROCYTE [DISTWIDTH] IN BLOOD BY AUTOMATED COUNT: 13.9 % (ref 11.5–14.5)
ERYTHROCYTE [DISTWIDTH] IN BLOOD BY AUTOMATED COUNT: 13.9 % (ref 11.5–14.5)
ERYTHROCYTE [DISTWIDTH] IN BLOOD BY AUTOMATED COUNT: 14.3 % (ref 11.5–14.5)
ERYTHROCYTE [DISTWIDTH] IN BLOOD BY AUTOMATED COUNT: 14.4 % (ref 11.5–14.5)
ERYTHROCYTE [DISTWIDTH] IN BLOOD BY AUTOMATED COUNT: 14.4 % (ref 11.5–14.5)
ERYTHROCYTE [SEDIMENTATION RATE] IN BLOOD BY WESTERGREN METHOD: 16 MM/H
ERYTHROCYTE [SEDIMENTATION RATE] IN BLOOD BY WESTERGREN METHOD: 16 MM/H
EST. GFR  (AFRICAN AMERICAN): 24.9 ML/MIN/1.73 M^2
EST. GFR  (AFRICAN AMERICAN): 34 ML/MIN/1.73 M^2
EST. GFR  (NON AFRICAN AMERICAN): 21.6 ML/MIN/1.73 M^2
EST. GFR  (NON AFRICAN AMERICAN): 29.5 ML/MIN/1.73 M^2
FIBRINOGEN PPP-MCNC: 436 MG/DL (ref 182–366)
FIO2: 100
FIO2: 100
GLUCOSE SERPL-MCNC: 258 MG/DL (ref 70–110)
GLUCOSE SERPL-MCNC: 349 MG/DL (ref 70–110)
HCO3 UR-SCNC: 21.7 MMOL/L (ref 24–28)
HCO3 UR-SCNC: 22.1 MMOL/L (ref 24–28)
HCT VFR BLD AUTO: 19.3 % (ref 37–48.5)
HCT VFR BLD AUTO: 20.7 % (ref 37–48.5)
HCT VFR BLD AUTO: 27.6 % (ref 37–48.5)
HCT VFR BLD AUTO: 30.7 % (ref 37–48.5)
HCT VFR BLD AUTO: 30.7 % (ref 37–48.5)
HCT VFR BLD CALC: 20 %PCV (ref 36–54)
HGB BLD-MCNC: 10.1 G/DL (ref 12–16)
HGB BLD-MCNC: 10.1 G/DL (ref 12–16)
HGB BLD-MCNC: 6.2 G/DL (ref 12–16)
HGB BLD-MCNC: 6.7 G/DL (ref 12–16)
HGB BLD-MCNC: 9.4 G/DL (ref 12–16)
IMM GRANULOCYTES # BLD AUTO: 0.09 K/UL (ref 0–0.04)
IMM GRANULOCYTES # BLD AUTO: 0.22 K/UL (ref 0–0.04)
IMM GRANULOCYTES # BLD AUTO: 0.26 K/UL (ref 0–0.04)
IMM GRANULOCYTES # BLD AUTO: 0.26 K/UL (ref 0–0.04)
IMM GRANULOCYTES # BLD AUTO: 0.34 K/UL (ref 0–0.04)
IMM GRANULOCYTES NFR BLD AUTO: 0.6 % (ref 0–0.5)
IMM GRANULOCYTES NFR BLD AUTO: 0.9 % (ref 0–0.5)
IMM GRANULOCYTES NFR BLD AUTO: 1 % (ref 0–0.5)
IMM GRANULOCYTES NFR BLD AUTO: 1 % (ref 0–0.5)
IMM GRANULOCYTES NFR BLD AUTO: 1.9 % (ref 0–0.5)
INR PPP: 1.1 (ref 0.8–1.2)
LDH SERPL L TO P-CCNC: 2.89 MMOL/L (ref 0.36–1.25)
LYMPHOCYTES # BLD AUTO: 2.5 K/UL (ref 1–4.8)
LYMPHOCYTES # BLD AUTO: 2.5 K/UL (ref 1–4.8)
LYMPHOCYTES # BLD AUTO: 3 K/UL (ref 1–4.8)
LYMPHOCYTES # BLD AUTO: 3.2 K/UL (ref 1–4.8)
LYMPHOCYTES # BLD AUTO: 3.7 K/UL (ref 1–4.8)
LYMPHOCYTES NFR BLD: 12.7 % (ref 18–48)
LYMPHOCYTES NFR BLD: 18.6 % (ref 18–48)
LYMPHOCYTES NFR BLD: 20.5 % (ref 18–48)
LYMPHOCYTES NFR BLD: 9.7 % (ref 18–48)
LYMPHOCYTES NFR BLD: 9.7 % (ref 18–48)
MAGNESIUM SERPL-MCNC: 1.8 MG/DL (ref 1.6–2.6)
MCH RBC QN AUTO: 26.4 PG (ref 27–31)
MCH RBC QN AUTO: 26.4 PG (ref 27–31)
MCH RBC QN AUTO: 27.9 PG (ref 27–31)
MCH RBC QN AUTO: 27.9 PG (ref 27–31)
MCH RBC QN AUTO: 28.3 PG (ref 27–31)
MCHC RBC AUTO-ENTMCNC: 32.1 G/DL (ref 32–36)
MCHC RBC AUTO-ENTMCNC: 32.4 G/DL (ref 32–36)
MCHC RBC AUTO-ENTMCNC: 32.9 G/DL (ref 32–36)
MCHC RBC AUTO-ENTMCNC: 32.9 G/DL (ref 32–36)
MCHC RBC AUTO-ENTMCNC: 34.1 G/DL (ref 32–36)
MCV RBC AUTO: 82 FL (ref 82–98)
MCV RBC AUTO: 82 FL (ref 82–98)
MCV RBC AUTO: 83 FL (ref 82–98)
MCV RBC AUTO: 85 FL (ref 82–98)
MCV RBC AUTO: 85 FL (ref 82–98)
MODE: ABNORMAL
MODE: ABNORMAL
MONOCYTES # BLD AUTO: 1 K/UL (ref 0.3–1)
MONOCYTES # BLD AUTO: 1.1 K/UL (ref 0.3–1)
MONOCYTES # BLD AUTO: 1.5 K/UL (ref 0.3–1)
MONOCYTES # BLD AUTO: 1.5 K/UL (ref 0.3–1)
MONOCYTES # BLD AUTO: 2 K/UL (ref 0.3–1)
MONOCYTES NFR BLD: 5.6 % (ref 4–15)
MONOCYTES NFR BLD: 5.6 % (ref 4–15)
MONOCYTES NFR BLD: 6 % (ref 4–15)
MONOCYTES NFR BLD: 6.2 % (ref 4–15)
MONOCYTES NFR BLD: 7.9 % (ref 4–15)
NEUTROPHILS # BLD AUTO: 11.9 K/UL (ref 1.8–7.7)
NEUTROPHILS # BLD AUTO: 12.8 K/UL (ref 1.8–7.7)
NEUTROPHILS # BLD AUTO: 19.9 K/UL (ref 1.8–7.7)
NEUTROPHILS # BLD AUTO: 21.5 K/UL (ref 1.8–7.7)
NEUTROPHILS # BLD AUTO: 21.5 K/UL (ref 1.8–7.7)
NEUTROPHILS NFR BLD: 71 % (ref 38–73)
NEUTROPHILS NFR BLD: 74.3 % (ref 38–73)
NEUTROPHILS NFR BLD: 78.3 % (ref 38–73)
NEUTROPHILS NFR BLD: 83.6 % (ref 38–73)
NEUTROPHILS NFR BLD: 83.6 % (ref 38–73)
NRBC BLD-RTO: 0 /100 WBC
OVALOCYTES BLD QL SMEAR: ABNORMAL
OVALOCYTES BLD QL SMEAR: ABNORMAL
PCO2 BLDA: 28.7 MMHG (ref 35–45)
PCO2 BLDA: 29.4 MMHG (ref 35–45)
PEEP: 5
PEEP: 5
PH SMN: 7.47 [PH] (ref 7.35–7.45)
PH SMN: 7.49 [PH] (ref 7.35–7.45)
PHOSPHATE SERPL-MCNC: 4.7 MG/DL (ref 2.7–4.5)
PLATELET # BLD AUTO: 149 K/UL (ref 150–350)
PLATELET # BLD AUTO: 154 K/UL (ref 150–350)
PLATELET # BLD AUTO: 154 K/UL (ref 150–350)
PLATELET # BLD AUTO: 203 K/UL (ref 150–350)
PLATELET # BLD AUTO: 209 K/UL (ref 150–350)
PLATELET BLD QL SMEAR: ABNORMAL
PLATELET BLD QL SMEAR: ABNORMAL
PMV BLD AUTO: 12 FL (ref 9.2–12.9)
PMV BLD AUTO: 12.3 FL (ref 9.2–12.9)
PMV BLD AUTO: 12.4 FL (ref 9.2–12.9)
PMV BLD AUTO: 13.1 FL (ref 9.2–12.9)
PMV BLD AUTO: 13.1 FL (ref 9.2–12.9)
PO2 BLDA: 552 MMHG (ref 80–100)
PO2 BLDA: 555 MMHG (ref 80–100)
POC BE: -1 MMOL/L
POC BE: -2 MMOL/L
POC IONIZED CALCIUM: 1.12 MMOL/L (ref 1.06–1.42)
POC SATURATED O2: 100 % (ref 95–100)
POC SATURATED O2: 100 % (ref 95–100)
POC TCO2: 23 MMOL/L (ref 23–27)
POC TCO2: 23 MMOL/L (ref 23–27)
POCT GLUCOSE: 256 MG/DL (ref 70–110)
POIKILOCYTOSIS BLD QL SMEAR: SLIGHT
POIKILOCYTOSIS BLD QL SMEAR: SLIGHT
POLYCHROMASIA BLD QL SMEAR: ABNORMAL
POLYCHROMASIA BLD QL SMEAR: ABNORMAL
POTASSIUM BLD-SCNC: 3.4 MMOL/L (ref 3.5–5.1)
POTASSIUM SERPL-SCNC: 3.5 MMOL/L (ref 3.5–5.1)
POTASSIUM SERPL-SCNC: 3.6 MMOL/L (ref 3.5–5.1)
PROT SERPL-MCNC: 6.4 G/DL (ref 6–8.4)
PROTHROMBIN TIME: 11.5 SEC (ref 9–12.5)
RBC # BLD AUTO: 2.35 M/UL (ref 4–5.4)
RBC # BLD AUTO: 2.54 M/UL (ref 4–5.4)
RBC # BLD AUTO: 3.32 M/UL (ref 4–5.4)
RBC # BLD AUTO: 3.62 M/UL (ref 4–5.4)
RBC # BLD AUTO: 3.62 M/UL (ref 4–5.4)
SAMPLE: ABNORMAL
SAMPLE: ABNORMAL
SITE: ABNORMAL
SITE: ABNORMAL
SODIUM BLD-SCNC: 130 MMOL/L (ref 136–145)
SODIUM SERPL-SCNC: 129 MMOL/L (ref 136–145)
SODIUM SERPL-SCNC: 129 MMOL/L (ref 136–145)
SP02: 96
SP02: 96
TRANS ERYTHROCYTES VOL PATIENT: NORMAL ML
TRANS ERYTHROCYTES VOL PATIENT: NORMAL ML
VT: 400
VT: 400
WBC # BLD AUTO: 15.93 K/UL (ref 3.9–12.7)
WBC # BLD AUTO: 18.04 K/UL (ref 3.9–12.7)
WBC # BLD AUTO: 25.35 K/UL (ref 3.9–12.7)
WBC # BLD AUTO: 25.79 K/UL (ref 3.9–12.7)
WBC # BLD AUTO: 25.79 K/UL (ref 3.9–12.7)

## 2019-11-09 PROCEDURE — 94002 VENT MGMT INPAT INIT DAY: CPT

## 2019-11-09 PROCEDURE — 94003 VENT MGMT INPAT SUBQ DAY: CPT

## 2019-11-09 PROCEDURE — 99233 SBSQ HOSP IP/OBS HIGH 50: CPT | Mod: GC,,, | Performed by: PSYCHIATRY & NEUROLOGY

## 2019-11-09 PROCEDURE — 80053 COMPREHEN METABOLIC PANEL: CPT

## 2019-11-09 PROCEDURE — 31500 INSERT EMERGENCY AIRWAY: CPT

## 2019-11-09 PROCEDURE — 25500020 PHARM REV CODE 255: Performed by: INTERNAL MEDICINE

## 2019-11-09 PROCEDURE — 83605 ASSAY OF LACTIC ACID: CPT

## 2019-11-09 PROCEDURE — 36800 PR INSERT CANNULA,VEIN-VEIN: ICD-10-PCS | Mod: ,,, | Performed by: INTERNAL MEDICINE

## 2019-11-09 PROCEDURE — 63600175 PHARM REV CODE 636 W HCPCS: Performed by: NURSE PRACTITIONER

## 2019-11-09 PROCEDURE — P9021 RED BLOOD CELLS UNIT: HCPCS

## 2019-11-09 PROCEDURE — 85025 COMPLETE CBC W/AUTO DIFF WBC: CPT | Mod: 91

## 2019-11-09 PROCEDURE — 36415 COLL VENOUS BLD VENIPUNCTURE: CPT

## 2019-11-09 PROCEDURE — 84132 ASSAY OF SERUM POTASSIUM: CPT

## 2019-11-09 PROCEDURE — 85014 HEMATOCRIT: CPT

## 2019-11-09 PROCEDURE — 37799 UNLISTED PX VASCULAR SURGERY: CPT

## 2019-11-09 PROCEDURE — 94770 HC EXHALED C02 TEST: CPT

## 2019-11-09 PROCEDURE — 99900026 HC AIRWAY MAINTENANCE (STAT)

## 2019-11-09 PROCEDURE — 94761 N-INVAS EAR/PLS OXIMETRY MLT: CPT

## 2019-11-09 PROCEDURE — 63600175 PHARM REV CODE 636 W HCPCS: Performed by: SURGERY

## 2019-11-09 PROCEDURE — 99221 1ST HOSP IP/OBS SF/LOW 40: CPT | Mod: GC,,, | Performed by: SURGERY

## 2019-11-09 PROCEDURE — 82800 BLOOD PH: CPT

## 2019-11-09 PROCEDURE — 27000221 HC OXYGEN, UP TO 24 HOURS

## 2019-11-09 PROCEDURE — 36620 ARTERIAL LINE: ICD-10-PCS | Mod: 59,,, | Performed by: NURSE PRACTITIONER

## 2019-11-09 PROCEDURE — 27200966 HC CLOSED SUCTION SYSTEM

## 2019-11-09 PROCEDURE — 82330 ASSAY OF CALCIUM: CPT

## 2019-11-09 PROCEDURE — 99291 PR CRITICAL CARE, E/M 30-74 MINUTES: ICD-10-PCS | Mod: 25,,, | Performed by: NURSE PRACTITIONER

## 2019-11-09 PROCEDURE — 99221 PR INITIAL HOSPITAL CARE,LEVL I: ICD-10-PCS | Mod: GC,,, | Performed by: SURGERY

## 2019-11-09 PROCEDURE — 63600175 PHARM REV CODE 636 W HCPCS: Performed by: INTERNAL MEDICINE

## 2019-11-09 PROCEDURE — 36800 INSERTION OF CANNULA: CPT | Mod: ,,, | Performed by: INTERNAL MEDICINE

## 2019-11-09 PROCEDURE — 36430 TRANSFUSION BLD/BLD COMPNT: CPT

## 2019-11-09 PROCEDURE — 36620 INSERTION CATHETER ARTERY: CPT | Mod: 59,,, | Performed by: NURSE PRACTITIONER

## 2019-11-09 PROCEDURE — 25000003 PHARM REV CODE 250: Performed by: NURSE PRACTITIONER

## 2019-11-09 PROCEDURE — 85384 FIBRINOGEN ACTIVITY: CPT

## 2019-11-09 PROCEDURE — 99233 PR SUBSEQUENT HOSPITAL CARE,LEVL III: ICD-10-PCS | Mod: GC,,, | Performed by: PSYCHIATRY & NEUROLOGY

## 2019-11-09 PROCEDURE — 63600175 PHARM REV CODE 636 W HCPCS

## 2019-11-09 PROCEDURE — 20000000 HC ICU ROOM

## 2019-11-09 PROCEDURE — 25000003 PHARM REV CODE 250: Performed by: PHYSICIAN ASSISTANT

## 2019-11-09 PROCEDURE — 82803 BLOOD GASES ANY COMBINATION: CPT

## 2019-11-09 PROCEDURE — S0028 INJECTION, FAMOTIDINE, 20 MG: HCPCS | Performed by: NURSE PRACTITIONER

## 2019-11-09 PROCEDURE — 84100 ASSAY OF PHOSPHORUS: CPT

## 2019-11-09 PROCEDURE — 25000003 PHARM REV CODE 250

## 2019-11-09 PROCEDURE — 99900035 HC TECH TIME PER 15 MIN (STAT)

## 2019-11-09 PROCEDURE — 83735 ASSAY OF MAGNESIUM: CPT

## 2019-11-09 PROCEDURE — 36620 INSERTION CATHETER ARTERY: CPT

## 2019-11-09 PROCEDURE — 84295 ASSAY OF SERUM SODIUM: CPT

## 2019-11-09 PROCEDURE — 36556 INSERT NON-TUNNEL CV CATH: CPT

## 2019-11-09 PROCEDURE — 87040 BLOOD CULTURE FOR BACTERIA: CPT

## 2019-11-09 PROCEDURE — 99291 CRITICAL CARE FIRST HOUR: CPT | Mod: 25,,, | Performed by: NURSE PRACTITIONER

## 2019-11-09 PROCEDURE — 85610 PROTHROMBIN TIME: CPT

## 2019-11-09 PROCEDURE — 80048 BASIC METABOLIC PNL TOTAL CA: CPT

## 2019-11-09 RX ORDER — FENTANYL CITRATE 50 UG/ML
50 INJECTION, SOLUTION INTRAMUSCULAR; INTRAVENOUS
Status: DISCONTINUED | OUTPATIENT
Start: 2019-11-10 | End: 2019-11-09

## 2019-11-09 RX ORDER — FENTANYL CITRATE 50 UG/ML
50 INJECTION, SOLUTION INTRAMUSCULAR; INTRAVENOUS ONCE
Status: COMPLETED | OUTPATIENT
Start: 2019-11-09 | End: 2019-11-09

## 2019-11-09 RX ORDER — NOREPINEPHRINE BITARTRATE/D5W 4MG/250ML
0.02 PLASTIC BAG, INJECTION (ML) INTRAVENOUS CONTINUOUS
Status: DISCONTINUED | OUTPATIENT
Start: 2019-11-09 | End: 2019-11-09

## 2019-11-09 RX ORDER — INSULIN ASPART 100 [IU]/ML
0-5 INJECTION, SOLUTION INTRAVENOUS; SUBCUTANEOUS EVERY 6 HOURS PRN
Status: DISCONTINUED | OUTPATIENT
Start: 2019-11-09 | End: 2019-12-03 | Stop reason: HOSPADM

## 2019-11-09 RX ORDER — MIDAZOLAM HYDROCHLORIDE 1 MG/ML
INJECTION INTRAMUSCULAR; INTRAVENOUS
Status: COMPLETED
Start: 2019-11-09 | End: 2019-11-09

## 2019-11-09 RX ORDER — FENTANYL CITRATE 50 UG/ML
50 INJECTION, SOLUTION INTRAMUSCULAR; INTRAVENOUS
Status: DISCONTINUED | OUTPATIENT
Start: 2019-11-09 | End: 2019-11-09

## 2019-11-09 RX ORDER — SODIUM CHLORIDE 9 MG/ML
INJECTION, SOLUTION INTRAVENOUS CONTINUOUS
Status: DISCONTINUED | OUTPATIENT
Start: 2019-11-09 | End: 2019-11-12

## 2019-11-09 RX ORDER — ROCURONIUM BROMIDE 10 MG/ML
INJECTION, SOLUTION INTRAVENOUS
Status: COMPLETED
Start: 2019-11-09 | End: 2019-11-09

## 2019-11-09 RX ORDER — HYDROMORPHONE HYDROCHLORIDE 1 MG/ML
0.5 INJECTION, SOLUTION INTRAMUSCULAR; INTRAVENOUS; SUBCUTANEOUS EVERY 4 HOURS PRN
Status: DISCONTINUED | OUTPATIENT
Start: 2019-11-09 | End: 2019-11-09

## 2019-11-09 RX ORDER — MIDAZOLAM HYDROCHLORIDE 1 MG/ML
2 INJECTION INTRAMUSCULAR; INTRAVENOUS ONCE
Status: COMPLETED | OUTPATIENT
Start: 2019-11-09 | End: 2019-11-09

## 2019-11-09 RX ORDER — HYDROMORPHONE HCL IN 0.9% NACL 6 MG/30 ML
PATIENT CONTROLLED ANALGESIA SYRINGE INTRAVENOUS CONTINUOUS
Status: DISCONTINUED | OUTPATIENT
Start: 2019-11-09 | End: 2019-11-12

## 2019-11-09 RX ORDER — FAMOTIDINE 10 MG/ML
20 INJECTION INTRAVENOUS DAILY
Status: DISCONTINUED | OUTPATIENT
Start: 2019-11-09 | End: 2019-11-10

## 2019-11-09 RX ORDER — HYDROMORPHONE HYDROCHLORIDE 1 MG/ML
1 INJECTION, SOLUTION INTRAMUSCULAR; INTRAVENOUS; SUBCUTANEOUS EVERY 4 HOURS PRN
Status: DISCONTINUED | OUTPATIENT
Start: 2019-11-09 | End: 2019-11-09

## 2019-11-09 RX ORDER — FENTANYL CITRATE-0.9 % NACL/PF 10 MCG/ML
25 PLASTIC BAG, INJECTION (ML) INTRAVENOUS CONTINUOUS
Status: DISCONTINUED | OUTPATIENT
Start: 2019-11-09 | End: 2019-11-09

## 2019-11-09 RX ORDER — HYDROCODONE BITARTRATE AND ACETAMINOPHEN 500; 5 MG/1; MG/1
TABLET ORAL
Status: DISCONTINUED | OUTPATIENT
Start: 2019-11-09 | End: 2019-11-10

## 2019-11-09 RX ORDER — ROCURONIUM BROMIDE 10 MG/ML
5 INJECTION, SOLUTION INTRAVENOUS ONCE
Status: COMPLETED | OUTPATIENT
Start: 2019-11-09 | End: 2019-11-09

## 2019-11-09 RX ORDER — CHLORHEXIDINE GLUCONATE ORAL RINSE 1.2 MG/ML
15 SOLUTION DENTAL 2 TIMES DAILY
Status: DISCONTINUED | OUTPATIENT
Start: 2019-11-09 | End: 2019-11-10

## 2019-11-09 RX ORDER — DEXTROSE MONOHYDRATE 100 MG/ML
12.5 INJECTION, SOLUTION INTRAVENOUS
Status: DISCONTINUED | OUTPATIENT
Start: 2019-11-09 | End: 2019-12-03 | Stop reason: HOSPADM

## 2019-11-09 RX ORDER — HYDROMORPHONE HYDROCHLORIDE 2 MG/ML
INJECTION, SOLUTION INTRAMUSCULAR; INTRAVENOUS; SUBCUTANEOUS
Status: COMPLETED
Start: 2019-11-09 | End: 2019-11-09

## 2019-11-09 RX ORDER — GLUCAGON 1 MG
1 KIT INJECTION
Status: DISCONTINUED | OUTPATIENT
Start: 2019-11-09 | End: 2019-12-03 | Stop reason: HOSPADM

## 2019-11-09 RX ORDER — NALOXONE HCL 0.4 MG/ML
0.02 VIAL (ML) INJECTION
Status: DISCONTINUED | OUTPATIENT
Start: 2019-11-09 | End: 2019-11-12

## 2019-11-09 RX ADMIN — IOHEXOL 75 ML: 350 INJECTION, SOLUTION INTRAVENOUS at 05:11

## 2019-11-09 RX ADMIN — ROCURONIUM BROMIDE 5 MG: 10 INJECTION, SOLUTION INTRAVENOUS at 04:11

## 2019-11-09 RX ADMIN — TIZANIDINE 4 MG: 4 TABLET ORAL at 12:11

## 2019-11-09 RX ADMIN — MIDAZOLAM HYDROCHLORIDE 2 MG: 1 INJECTION INTRAMUSCULAR; INTRAVENOUS at 05:11

## 2019-11-09 RX ADMIN — IOHEXOL 50 ML: 300 INJECTION, SOLUTION INTRAVENOUS at 08:11

## 2019-11-09 RX ADMIN — ROCURONIUM BROMIDE 5 MG: 10 INJECTION INTRAVENOUS at 04:11

## 2019-11-09 RX ADMIN — FAMOTIDINE 20 MG: 10 INJECTION, SOLUTION INTRAVENOUS at 10:11

## 2019-11-09 RX ADMIN — INSULIN ASPART 3 UNITS: 100 INJECTION, SOLUTION INTRAVENOUS; SUBCUTANEOUS at 12:11

## 2019-11-09 RX ADMIN — SODIUM CHLORIDE: 0.9 INJECTION, SOLUTION INTRAVENOUS at 09:11

## 2019-11-09 RX ADMIN — MIDAZOLAM HYDROCHLORIDE 2 MG: 1 INJECTION, SOLUTION INTRAMUSCULAR; INTRAVENOUS at 04:11

## 2019-11-09 RX ADMIN — MIDAZOLAM HYDROCHLORIDE 2 MG: 1 INJECTION INTRAMUSCULAR; INTRAVENOUS at 04:11

## 2019-11-09 RX ADMIN — FENTANYL CITRATE 25 MCG/HR: 50 INJECTION, SOLUTION INTRAMUSCULAR; INTRAVENOUS at 06:11

## 2019-11-09 RX ADMIN — ONDANSETRON 4 MG: 2 INJECTION INTRAMUSCULAR; INTRAVENOUS at 07:11

## 2019-11-09 RX ADMIN — HYDROMORPHONE HYDROCHLORIDE 1 MG: 2 INJECTION INTRAMUSCULAR; INTRAVENOUS; SUBCUTANEOUS at 09:11

## 2019-11-09 RX ADMIN — FENTANYL CITRATE 50 MCG: 50 INJECTION, SOLUTION INTRAMUSCULAR; INTRAVENOUS at 05:11

## 2019-11-09 RX ADMIN — INSULIN ASPART 2 UNITS: 100 INJECTION, SOLUTION INTRAVENOUS; SUBCUTANEOUS at 06:11

## 2019-11-09 RX ADMIN — Medication: at 11:11

## 2019-11-09 RX ADMIN — MIDAZOLAM HYDROCHLORIDE 2 MG: 1 INJECTION, SOLUTION INTRAMUSCULAR; INTRAVENOUS at 05:11

## 2019-11-09 NOTE — NURSING
Patient was found to be lethargic while performing neuro checks. Patient non-verbal, unable to respond to verbal commands. Pupils 2mm and non-reactive; slightly arouses to repeated vigorous tactile stimuli; Bp 70/44, p:97.    03:35 - Paged stroke team and spoke with Dr. Barber, reported patient's current lethargic state and severe hypotension. Also notified the MD the patient received a one time dose of Norco 5mg at 21:45 11/8 and Tizanidine 4mg at 00:52 and patient was complaining of severe pain to right side. Order received to infused LR 1L bolus STAT while she comes to assess patient at bedside; 1L bolus started per order.    03:36 - Paged the Rapid Response team to bedside.  Upon arrival of Dr. Barber and the RR team; patient was now non-responsive, gasping for air with periods of apnea. Patient was intubated at bedside by Anesthesiology and then transferred to [facility] room 9092.

## 2019-11-09 NOTE — HPI
Melva Barker is a 74 y.o. F who was [place] [facility] [place].  She received TPA at outside hospital.  Patient was doing overall well and in line for rehab placement when she had drop in H/H and new onset lower back pain the last 2 days.  Hgb dropped from 12->10->8.8.  Hgb has been stable over the last 24 hours.  Concern for epidural hematoma so MRI was ordered.  Psoas hematoma was noted on MRI.  Surgery was consulted for evaluation.      Upon assessment, patient complains of some Left back pain.  States that it started about 2 days ago.  Denies any leg pain.  She has not had any falls.  No recent cath procedures in her groin.

## 2019-11-09 NOTE — PLAN OF CARE
Pt arrived in  for visceral angiogram. Pt arrived in care of ICU nurse and Respiratory therapist

## 2019-11-09 NOTE — PT/OT/SLP DISCHARGE
Occupational Therapy Discharge Summary    Melva Barker  MRN: [id number]   Principal Problem: Stroke due to embolism of left middle cerebral artery      Patient Discharged from acute Occupational Therapy on 11/9/19.  Please refer to prior OT note dated 11/8/19 for functional status.    Assessment:      Patient was discharged unexpectedly.  Information required to complete an accurate discharge summary is unknown.  Refer to therapy initial evaluation and last progress note for initial and most recent functional status and goal achievement.  Recommendations made may be found in medical record.    Objective:     GOALS:   Multidisciplinary Problems     Occupational Therapy Goals        Problem: Occupational Therapy Goal    Goal Priority Disciplines Outcome Interventions   Occupational Therapy Goal     OT, PT/OT Ongoing, Progressing    Description:  Updated Goals to be met by: 7 days (11/14/19)     Patient will increase functional independence with ADLs by performing:    UE Dressing with Contact Guard Assistance.  LE Dressing with Minimal Assistance.  Grooming while standing with Minimal Assistance.  Toileting from bedside commode with Minimal Assistance for hygiene and clothing management.   Toilet transfer to bedside commode with Contact Guard Assistance.  Increased functional strength to WFL for ADLs.  Complete functional mobility household distance with CGA using AD as needed.                    Reasons for Discontinuation of Therapy Services  Patient is unable to continue work toward goals because of medical or psychosocial complications.      Plan:     Patient Discharged to: the patient was emergently intubated and taken to the ICU for presumed hemorrhagic shock; will require new orders when appropriate for therapy    AURORA Florian  11/9/2019

## 2019-11-09 NOTE — PROCEDURES
Radiology Post-Procedure Note    Pre Op Diagnosis: Retroperitoneal hematoma    Post Op Diagnosis: Same    Procedure: Angiogram and embolizationg    Procedure performed by: Gaagn Atkins MD    Written Informed Consent Obtained: Yes  Specimen Removed: NO  Estimated Blood Loss: Minimal    Findings:   Via rt cfa, Right L2, bilateral L3 and right L4 lumbar arteries were selected and angiograms were obtained. Active extravasation from Right L3 lumbar noted. Right L3 and L2 lumbars were embolized with particles due to possible collateral circulation. No complications. Angioseal to rt cfa.    Patient tolerated procedure well.    Gagan Atkins M.D.  Diagnostic and Interventional Radiologist  Department of Radiology  Pager: [phone]

## 2019-11-09 NOTE — PROCEDURES
Melva Barker is a 74 y.o. female patient.    Temp: 98 °F (36.7 °C) (11/08/19 2311)  Pulse: (!) 112 (11/09/19 0414)  Resp: 17 (11/09/19 0414)  BP: (!) 112/54 (11/08/19 2311)  SpO2: (!) 94 % (11/08/19 2311)  Weight: 55.5 kg (122 lb 5.7 oz) (11/05/19 0600)  Height: 5' (152.4 cm) (11/01/19 1316)       Arterial Line  Date/Time: 11/9/2019 4:45 AM  Location procedure was performed: [facility]  Performed by: Hortencia Villalba NP  Authorized by: Hortencia Villalba NP   Pre-op Diagnosis: hemorrhagic shock  Post-operative diagnosis: hemorrhagic shock  Consent Done: Emergent Situation  Preparation: Patient was prepped and draped in the usual sterile fashion.  Indications: hemodynamic monitoring  Location: left radial  Patient sedated: no  Jonah's test normal: yes  Needle gauge: 20  Seldinger technique: Seldinger technique used  Number of attempts: 1  Complications: No  Estimated blood loss (mL): 1  Specimens: No  Implants: No  Post-procedure: line sutured and dressing applied  Post-procedure CMS: unchanged  Patient tolerance: Patient tolerated the procedure well with no immediate complications          Hortencia Villalba  11/9/2019

## 2019-11-09 NOTE — CONSULTS
[facility]  General Surgery  Consult Note    Patient Name: Melva Barker  MRN: [id number]  Code Status: Full Code  Admission Date: 10/31/2019  Hospital Length of Stay: 8 days  Attending Physician: Venkat Amaro MD  Primary Care Provider: Claire Souza MD    Patient information was obtained from patient and past medical records.     Inpatient consult to General Surgery  Consult performed by: Eb Neely MD  Consult ordered by: Danya Mancia PA-C        Subjective:     Principal Problem: Stroke due to embolism of left middle cerebral artery    History of Present Illness: Melva Barker is a 74 y.o. F who was admitted to [facility] for L MCA stroke.  She received TPA at outside hospital.  Patient was doing overall well and in line for rehab placement when she had drop in H/H and new onset lower back pain the last 2 days.  Hgb dropped from 12->10->8.8.  Hgb has been stable over the last 24 hours.  Concern for epidural hematoma so MRI was ordered.  Psoas hematoma was noted on MRI.  Surgery was consulted for evaluation.      Upon assessment, patient complains of some Left back pain.  States that it started about 2 days ago.  Denies any leg pain.  She has not had any falls.  No recent cath procedures in her groin.      No current facility-administered medications on file prior to encounter.      Current Outpatient Medications on File Prior to Encounter   Medication Sig    benazepril (LOTENSIN) 40 MG tablet Take 40 mg by mouth once daily.    bisoprolol-hydrochlorothiazide (ZIAC) 10-6.25 mg per tablet Take 1 tablet by mouth once daily.    ergocalciferol (VITAMIN D2) 50,000 unit Cap Take 50,000 Units by mouth every 7 days.    fluconazole (DIFLUCAN) 150 MG Tab Take one tablet AFTER completing full course of antibiotics.    gabapentin (NEURONTIN) 600 MG tablet Take 600 mg by mouth 3 (three) times daily.    HYDROcodone-acetaminophen (NORCO)  mg per tablet Take 1 tablet  by mouth.    insulin aspart (NOVOLOG) 100 unit/mL injection Inject into the skin 3 (three) times daily after meals. Patient reports a sliding scale    INSULIN GLARGINE,HUM.REC.ANLOG (TOUJEO SOLOSTAR SUBQ) Inject 17 Units/day into the skin.    mupirocin (BACTROBAN) 2 % ointment Apply topically to affected area three times daily.    pantoprazole (PROTONIX) 40 MG tablet Take 1 tablet (40 mg total) by mouth once daily.    promethazine (PHENERGAN) 25 MG suppository Place 1 suppository (25 mg total) rectally every 6 (six) hours as needed for Nausea.    tiZANidine (ZANAFLEX) 4 MG tablet Take 4 mg by mouth 3 (three) times daily as needed.     TRUE METRIX GLUCOSE METER Misc     TRUE METRIX GLUCOSE TEST STRIP Strp     TRUEPLUS LANCETS 33 gauge Misc        Review of patient's allergies indicates:   Allergen Reactions    Morphine Other (See Comments)     headache    Latex, natural rubber Rash       Past Medical History:   Diagnosis Date    Anxiety     Back pain     chronic    Diabetes mellitus     Gastroparesis     Hypertension     Sciatica      History reviewed. No pertinent surgical history.  Family History     Problem Relation (Age of Onset)    Hyperlipidemia Father    Hypertension Mother        Tobacco Use    Smoking status: Never Smoker    Smokeless tobacco: Never Used   Substance and Sexual Activity    Alcohol use: No     Alcohol/week: 0.0 standard drinks    Drug use: No    Sexual activity: Never     Review of Systems   Constitutional: Negative for chills and fever.   Respiratory: Negative for shortness of breath.    Cardiovascular: Negative for chest pain and leg swelling.   Gastrointestinal: Negative for abdominal pain, constipation, diarrhea, nausea and vomiting.   Genitourinary: Negative for dysuria.   Musculoskeletal: Positive for back pain.   Skin: Negative for rash.   Psychiatric/Behavioral: Negative for agitation and confusion.     Objective:     Vital Signs (Most Recent):  Temp: 96.1 °F  (35.6 °C) (11/08/19 1927)  Pulse: (!) 113 (11/08/19 1927)  Resp: 18 (11/08/19 1927)  BP: 115/68 (11/08/19 1927)  SpO2: 98 % (11/08/19 1927) Vital Signs (24h Range):  Temp:  [96.1 °F (35.6 °C)-99 °F (37.2 °C)] 96.1 °F (35.6 °C)  Pulse:  [] 113  Resp:  [16-18] 18  SpO2:  [96 %-100 %] 98 %  BP: ()/(55-68) 115/68     Weight: 55.5 kg (122 lb 5.7 oz)  Body mass index is 23.9 kg/m².    Physical Exam   Constitutional: She is oriented to person, place, and time. She appears well-developed and well-nourished. No distress.   Cardiovascular: Normal rate.   Pulmonary/Chest: Effort normal. No respiratory distress.   Abdominal: Soft. She exhibits no distension. There is no tenderness.   Ecchymosis to bilateral lower quadrants at site of injection (per patient)   Musculoskeletal:   No BLE edema, no leg pain or tenderness    Neurological: She is alert and oriented to person, place, and time.   Psychiatric: She has a normal mood and affect.       Significant Labs:  CBC:   Recent Labs   Lab 11/08/19  1834   WBC 17.44*   RBC 3.24*   HGB 8.7*   HCT 26.7*      MCV 82   MCH 26.9*   MCHC 32.6       Significant Diagnostics:  Impression       Large hematoma within the right psoas muscle.  Compression and probable thrombosis of the IVC.  Consider contrast enhanced CT with delayed phase.    Multilevel degenerative changes as detailed above, more severe in the lumbar spine.  Irregularity of the T12 through L3 endplates is most likely degenerative.         Assessment/Plan:     Psoas hematoma, left, secondary to anticoagulant therapy  Recommend conservative management  H/H has remained stable throughout the day, continue to trend  If patient becomes symptomatic or shows sign of DVT, can consider IR intervention  No surgical intervention  D/w staff       VTE Risk Mitigation (From admission, onward)         Ordered     Reason for No Pharmacological VTE Prophylaxis  Once      10/31/19 1359     IP VTE HIGH RISK PATIENT  Once       10/31/19 1359     Place sequential compression device  Until discontinued      10/31/19 1359                Thank you for your consult.     Eb Neely MD  General Surgery  [facility]

## 2019-11-09 NOTE — ASSESSMENT & PLAN NOTE
--left psoas hematoma seen on MRI imaging  --CTA shows active contrast extravasation right perinephric  --IR aware, plan for angio this am for intervention

## 2019-11-09 NOTE — ASSESSMENT & PLAN NOTE
--right psoas hematoma with active contrast extrav on CTA  --transfusing 2 units PRBC now; levo weaned off with volume resuscitation  --case discussed with IR, plan for angio with intervention this am  --trending CBC Q6

## 2019-11-09 NOTE — PROGRESS NOTES
[facility]  General Surgery  Progress Note    Subjective:     History of Present Illness:  Melva Barker is a 74 y.o. F who [place] [facility] [place].  She received TPA at outside hospital.  Patient was doing overall well and in line for rehab placement when she had drop in H/H and new onset lower back pain the last 2 days.  Hgb dropped from 12->10->8.8.  Hgb has been stable over the last 24 hours.  Concern for epidural hematoma so MRI was ordered.  Psoas hematoma was noted on MRI.  Surgery was consulted for evaluation.      Upon assessment, patient complains of some Left back pain.  States that it started about 2 days ago.  Denies any leg pain.  She has not had any falls.  No recent cath procedures in her groin.      Post-Op Info:  * No surgery found *         Interval History: Patient seen and examined. S/p IR embolization, awaiting note. Tachycardia improved now 90-100s and BP stable. Extubated this AM, now on 3L.  H/H down to 6.2/19.3, received 2 units pRBC this morning.     Medications:  Continuous Infusions:   sodium chloride 0.9%      hydromorphone in 0.9 % NaCl 6 mg/30 ml       Scheduled Meds:   chlorhexidine  15 mL Mouth/Throat BID    famotidine (PF)  20 mg Intravenous Daily    hydromorphone (PF)         PRN Meds:sodium chloride, dextrose 10 % in water (D10W), glucagon (human recombinant), insulin aspart U-100, naloxone, ondansetron, sodium chloride 0.9%     Review of patient's allergies indicates:   Allergen Reactions    Morphine Other (See Comments)     headache    Latex, natural rubber Rash     Objective:     Vital Signs (Most Recent):  Temp: 98.2 °F (36.8 °C) (11/09/19 0602)  Pulse: 106 (11/09/19 0904)  Resp: (!) 23 (11/09/19 0904)  BP: 137/82 (11/09/19 0904)  SpO2: 100 % (11/09/19 0904) Vital Signs (24h Range):  Temp:  [96.1 °F (35.6 °C)-99 °F (37.2 °C)] 98.2 °F (36.8 °C)  Pulse:  [] 106  Resp:  [12-28] 23  SpO2:  [92 %-100 %] 100 %  BP:  ()/(42-84) 137/82  Arterial Line BP: (126-197)/(65-75) 156/75     Weight: 56.2 kg (123 lb 14.4 oz)  Body mass index is 24.2 kg/m².    Intake/Output - Last 3 Shifts       11/07 0700 - 11/08 0659 11/08 0700 - 11/09 0659 11/09 0700 - 11/10 0659    P.O. 240 580     I.V. (mL/kg)       Blood  482     Total Intake(mL/kg) 240 (4.3) 1062 (18.9)     Urine (mL/kg/hr)  200 (0.1)     Other  0     Total Output  200     Net +240 +862            Urine Occurrence 2 x 2 x     Stool Occurrence 0 x      Emesis Occurrence  1 x           Physical Exam   Constitutional: She is oriented to person, place, and time. She appears well-developed and well-nourished. No distress.   Cardiovascular: Normal rate.   Pulmonary/Chest: Effort normal. No respiratory distress.   Abdominal: Soft. She exhibits no distension. There is no tenderness.   Ecchymosis to bilateral lower quadrants at site of injection  Musculoskeletal:   No BLE edema, no leg pain or tenderness    Neurological: She is alert and oriented to person, place, and time.   Psychiatric: She has a normal mood and affect.     Significant Labs:  CBC:   Recent Labs   Lab 11/09/19 0445   WBC 18.04*   RBC 2.35*   HGB 6.2*   HCT 19.3*      MCV 82   MCH 26.4*   MCHC 32.1     CMP:   Recent Labs   Lab 11/09/19 0445   *   CALCIUM 8.6*   ALBUMIN 3.3*   PROT 6.4   *   K 3.5   CO2 20*   CL 96   BUN 22   CREATININE 2.2*   ALKPHOS 95   ALT 24   AST 38   BILITOT 1.1*     Coagulation:   Recent Labs   Lab 11/09/19 0445   LABPROT 11.5   INR 1.1       Significant Diagnostics:  CTA A/P:  1. Large right retroperitoneal hematoma with findings concerning for active arterial extravasation/hemorrhage as detailed above.  There is associated hemorrhage extending throughout the right abdomen and pelvis/pericolic gutter with associated mass effect on the right kidney, which is anteriorly displaced.  2. Decreased opacification of the infahepatic and infrarenal IVC, possibly secondary to  underlying mass effect versus partial thrombosis.  3. Nonspecific cecal and right colonic wall thickening, possibly reactive due to hemoperitoneum.    Assessment/Plan:     Retroperitoneal hematoma  73 yo female with recent hx of CVA s/p TPA now with right retroperitoneal hematoma    Active extrav on CTA S/p IR embolization   Serial H/H. S/p 2 pRBC 11/9  Will continue to follow         Man Torres MD  General Surgery  [facility]

## 2019-11-09 NOTE — H&P
Radiology History & Physical      SUBJECTIVE:     Chief Complaint: Hypotension.    History of Present Illness:  Melva Barker is a 74 y.o. female with recent CVA treated with s/p TPA with recent drop in HH. MRI Spine yseterday with rt psoas abscess. Patient with acute decompensation this AM. CTA abdomen revealed active bleed. IR consulted for further evaluation.    Past Medical History:   Diagnosis Date    Anxiety     Back pain     chronic    Diabetes mellitus     Gastroparesis     Hypertension     Sciatica      History reviewed. No pertinent surgical history.    Home Meds:   Prior to Admission medications    Medication Sig Start Date End Date Taking? Authorizing Provider   benazepril (LOTENSIN) 40 MG tablet Take 40 mg by mouth once daily.    Historical Provider, MD   bisoprolol-hydrochlorothiazide (ZIAC) 10-6.25 mg per tablet Take 1 tablet by mouth once daily.    Historical Provider, MD   ergocalciferol (VITAMIN D2) 50,000 unit Cap Take 50,000 Units by mouth every 7 days.    Historical Provider, MD   fluconazole (DIFLUCAN) 150 MG Tab Take one tablet AFTER completing full course of antibiotics. 6/21/19   Robert Otto NP   gabapentin (NEURONTIN) 600 MG tablet Take 600 mg by mouth 3 (three) times daily.    Historical Provider, MD   HYDROcodone-acetaminophen (NORCO)  mg per tablet Take 1 tablet by mouth.    Historical Provider, MD   insulin aspart (NOVOLOG) 100 unit/mL injection Inject into the skin 3 (three) times daily after meals. Patient reports a sliding scale    Historical Provider, MD   INSULIN GLARGINE,HUM.REC.ANLOG (TOUJEO SOLOSTAR SUBQ) Inject 17 Units/day into the skin.    Historical Provider, MD   mupirocin (BACTROBAN) 2 % ointment Apply topically to affected area three times daily. 6/21/19   Robert Otto NP   pantoprazole (PROTONIX) 40 MG tablet Take 1 tablet (40 mg total) by mouth once daily. 4/24/17 4/24/18  Emi Tripathi NP   promethazine (PHENERGAN) 25 MG  suppository Place 1 suppository (25 mg total) rectally every 6 (six) hours as needed for Nausea. 5/29/19   Jeevan Blum Jr., MD   tiZANidine (ZANAFLEX) 4 MG tablet Take 4 mg by mouth 3 (three) times daily as needed.  4/24/19   Historical Provider, MD   TRUE METRIX GLUCOSE METER [facility]  2/28/19   Historical Provider, MD   TRUE METRIX GLUCOSE TEST STRIP Strp  2/28/19   Historical Provider, MD   TRUEPLUS LANCETS 33 gauge [facility]  2/28/19   Historical Provider, MD     Anticoagulants/Antiplatelets: no anticoagulation    Allergies:   Review of patient's allergies indicates:   Allergen Reactions    Morphine Other (See Comments)     headache    Latex, natural rubber Rash     Sedation History:  no adverse reactions    Review of Systems:   Hematological: no known coagulopathies  Respiratory: no shortness of breath  Cardiovascular: no chest pain  Gastrointestinal: no abdominal pain  Genito-Urinary: no dysuria  Musculoskeletal: negative  Neurological: no TIA or stroke symptoms  positive for - weakness         OBJECTIVE:     Vital Signs (Most Recent)  Temp: 98.2 °F (36.8 °C) (11/09/19 0602)  Pulse: (!) 121 (11/09/19 0602)  Resp: 20 (11/09/19 0602)  BP: (!) 158/74 (11/09/19 0602)  SpO2: 100 % (11/09/19 0602)    Physical Exam:  ASA/Mallampati: Intubated.    General: no acute distress  Mental Status: alert and oriented to person, place and time  HEENT: normocephalic, atraumatic  Chest: unlabored breathing  Heart: regular heart rate  Abdomen: nondistended  Extremity: moves all extremities    Laboratory  Lab Results   Component Value Date    INR 1.1 11/09/2019       Lab Results   Component Value Date    WBC 18.04 (H) 11/09/2019    HGB 6.2 (L) 11/09/2019    HCT 19.3 (LL) 11/09/2019    MCV 82 11/09/2019     11/09/2019      Lab Results   Component Value Date     (H) 11/09/2019     (L) 11/09/2019    K 3.5 11/09/2019    CL 96 11/09/2019    CO2 20 (L) 11/09/2019    BUN 22 11/09/2019    CREATININE 2.2 (H) 11/09/2019     CALCIUM 8.6 (L) 11/09/2019    MG 1.8 11/09/2019    ALT 24 11/09/2019    AST 38 11/09/2019    ALBUMIN 3.3 (L) 11/09/2019    BILITOT 1.1 (H) 11/09/2019       ASSESSMENT/PLAN:     Sedation Plan: Intubated.  Patient will undergo visceral angiogram.Risk of worsening renal functioning requiring dialysis was discussed with patient.    Kahlil Terry MD  PGY-3 Radiology Resident  [address] [facility]  Pager: [phone]

## 2019-11-09 NOTE — ANESTHESIA PROCEDURE NOTES
Intubation  Performed by: Rose King MD  Authorized by: Apolinar Blue MD     Intubation:     Induction:  Intravenous    Intubated:  Postinduction    Mask Ventilation:  Easy with oral airway    Attempts:  1    Attempted By:  Resident anesthesiologist    Method of Intubation:  Direct    Blade:  Kvng 3    Laryngeal View Grade: Grade I - full view of chords      Difficult Airway Encountered?: No      Complications:  None    Airway Device:  Oral endotracheal tube    Airway Device Size:  7.0    Style/Cuff Inflation:  Cuffed    Tube secured:  21    Secured at:  The lips    Placement Verified By:  Colorimetric ETCO2 device and Revisualization with laryngoscopy    Complicating Factors:  None    Findings Post-Intubation:  BS equal bilateral

## 2019-11-09 NOTE — PROGRESS NOTES
Job of Critical care team notified of pt HR sustaining above 120. Ordered to do EKG. Will continue to monitor

## 2019-11-09 NOTE — SIGNIFICANT EVENT
RAPID RESPONSE NURSE NOTE     Admit Date: 10/31/2019  LOS: 9  Code Status: Full Code   Date of Consult: 2019  : 1945  Age: 74 y.o.  Weight:   Wt Readings from Last 1 Encounters:   19 55.5 kg (122 lb 5.7 oz)     Sex: female  Race: Black or    Bed: 70/70 A:   MRN: [id number]  Time Rapid Response Team page Received: 0350  Time Rapid Response Team at Bedside: 0330  Time Rapid Response Team left Bedside: 0415  Was the patient discharged from an ICU this admission?   yes  Was the patient discharged from a PACU within last 24 hours?  no  Did the patient receive conscious sedation/general anesthesia within last 24 hours?  no  Was the patient in the ED within the past 24 hours?  no  Was the patient started on NIPPV within the past 24 hours?  no  Did this progress into an ARC or CPA:  yes  Attending Physician: Venkat Amaro MD  Primary Service: Networked reference to record PCT   Consult Requested By: Venkat Amaro MD     SITUATION     Reason for Call: Hypotension 70/45    Called to evaluate the patient for Circulatory    BACKGROUND     Why is the patient in the hospital?: Stroke due to embolism of left middle cerebral artery    Patient has a past medical history of Anxiety, Back pain, Diabetes mellitus, Gastroparesis, Hypertension, and Sciatica.    ASSESSMENT/INTERVENTIONS     BP (!) 112/54 (BP Location: Right arm, Patient Position: Lying)   Pulse 103   Temp 98 °F (36.7 °C) (Oral)   Resp 18   Ht 5' (1.524 m)   Wt 55.5 kg (122 lb 5.7 oz)   SpO2 (!) 94%   Breastfeeding? No   BMI 23.90 kg/m²     What did you find: Called to BS for marked hypotension. On arrival, pt minimally responsive to gross painful stimuli, multiple episodes of apnea lasting longer than 30 sec, subsequently requiring bag mask.  Nisha team, Dr. Coffman, present at BS . Anesthesia notified for intubation. Unable to obtain bp. Subsequently, Levophed infusion was started.    RECOMMENDATIONS     We recommend:  ICU transfer    FOLLOW-UP/CONTINGENCY PLAN     Patient needs a second visit at :     Call the Rapid Response Nurse, Ita Arrington RN at [phone] for additional questions or concerns.    PHYSICIAN ESCALATION     Orders received and case discussed with Hortencia Villalba NP MICU.    Disposition: Tx in ICU bed 9048.

## 2019-11-09 NOTE — SUBJECTIVE & OBJECTIVE
Past Medical History:   Diagnosis Date    Anxiety     Back pain     chronic    Diabetes mellitus     Gastroparesis     Hypertension     Sciatica        History reviewed. No pertinent surgical history.    Review of patient's allergies indicates:   Allergen Reactions    Morphine Other (See Comments)     headache    Latex, natural rubber Rash       Family History     Problem Relation (Age of Onset)    Hyperlipidemia Father    Hypertension Mother        Tobacco Use    Smoking status: Never Smoker    Smokeless tobacco: Never Used   Substance and Sexual Activity    Alcohol use: No     Alcohol/week: 0.0 standard drinks    Drug use: No    Sexual activity: Never      Review of Systems   Unable to perform ROS: Patient unresponsive     Objective:     Vital Signs (Most Recent):  Temp: 98 °F (36.7 °C) (11/09/19 0450)  Pulse: (!) 116 (11/09/19 0502)  Resp: 20 (11/09/19 0502)  BP: 133/63 (11/09/19 0502)  SpO2: 98 % (11/09/19 0502) Vital Signs (24h Range):  Temp:  [96.1 °F (35.6 °C)-99 °F (37.2 °C)] 98 °F (36.7 °C)  Pulse:  [] 116  Resp:  [16-20] 20  SpO2:  [92 %-100 %] 98 %  BP: ()/(42-71) 133/63  Arterial Line BP: (126-127)/(65-67) 126/67   Weight: 56.2 kg (123 lb 14.4 oz)  Body mass index is 24.2 kg/m².      Intake/Output Summary (Last 24 hours) at 11/9/2019 0555  Last data filed at 11/8/2019 2145  Gross per 24 hour   Intake 580 ml   Output 200 ml   Net 380 ml       Physical Exam   Constitutional: She appears well-developed and well-nourished. She appears distressed.   HENT:   Head: Normocephalic and atraumatic.   Right Ear: External ear normal.   Left Ear: External ear normal.   Nose: Nose normal.   Mouth/Throat: Oropharynx is clear and moist.   Eyes: Pupils are equal, round, and reactive to light. Conjunctivae and EOM are normal.   Neck: Normal range of motion. Neck supple.   Cardiovascular: Normal heart sounds. Tachycardia present.   Sinus tach on monitor. Weak/thready peripheral pulses. Femoral pulse  present   Pulmonary/Chest: Breath sounds normal. She has no wheezes. She has no rales.   Respirations assisted with BVM   Abdominal: Soft. Bowel sounds are normal. She exhibits no distension.   Genitourinary: Vagina normal.   Musculoskeletal: She exhibits no edema, tenderness or deformity.   Neurological: She is unresponsive. GCS eye subscore is 1. GCS verbal subscore is 1. GCS motor subscore is 4.   Skin: Capillary refill takes 2 to 3 seconds. She is not diaphoretic.   Psychiatric: Cognition and memory are impaired.   Nursing note and vitals reviewed.      Vents:  Vent Mode: A/C (11/09/19 0501)  Ventilator Initiated: Yes (11/09/19 0414)  Set Rate: 20 bmp (11/09/19 0501)  Vt Set: 320 mL (11/09/19 0501)  Pressure Support: 0 cmH20 (11/09/19 0501)  PEEP/CPAP: 8 cmH20 (11/09/19 0501)  Oxygen Concentration (%): 50 (11/09/19 0502)  Peak Airway Pressure: 26 cmH2O (11/09/19 0501)  Plateau Pressure: 0 cmH20 (11/09/19 0501)  Total Ve: 6.72 mL (11/09/19 0501)  F/VT Ratio<105 (RSBI): (!) 59.35 (11/09/19 0501)  Lines/Drains/Airways     Central Venous Catheter Line                 Trialysis (Dialysis) Catheter 11/09/19 0420 right internal jugular less than 1 day          Airway                 Airway - Non-Surgical 11/09/19 0514 less than 1 day         Airway - Non-Surgical 11/09/19 Endotracheal Tube less than 1 day          Arterial Line                 Arterial Line 11/09/19 0420 Left Radial less than 1 day          Peripheral Intravenous Line                 Peripheral IV - Single Lumen 11/06/19 1226 20 G;1 3/4 in Left;Anterior Forearm 2 days              Significant Labs:    CBC/Anemia Profile:  Recent Labs   Lab 11/08/19  1834 11/09/19  0019 11/09/19  0437 11/09/19  0445   WBC 17.44* 15.93*  --  18.04*   HGB 8.7* 6.7*  --  6.2*   HCT 26.7* 20.7* 20* 19.3*    203  --  209   MCV 82 82  --  82   RDW 13.9 13.9  --  13.9        Chemistries:  Recent Labs   Lab 11/07/19  0856 11/09/19  0445   NA  --  129*   K  --  3.5   CL   --  96   CO2  --  20*   BUN  --  22   CREATININE  --  2.2*   CALCIUM  --  8.6*   ALBUMIN  --  3.3*   PROT  --  6.4   BILITOT  --  1.1*   ALKPHOS  --  95   ALT  --  24   AST  --  38   MG 1.9 1.8   PHOS  --  4.7*       All pertinent labs within the past 24 hours have been reviewed.    Significant Imaging: I have reviewed and interpreted all pertinent imaging results/findings within the past 24 hours.

## 2019-11-09 NOTE — HPI
Patient is a 74 y.o. female with significant past medical history of IDDM and HTN presented to hospital on 10/31 as a stroke code from [facility]. The patient had been having right sided weakness since September with acute worsening starting at 6am on day of admit and TPA was administered at 10:30 following a negative head CT. The patient was admitted to Neuro Critical Care for further management. MRI confirmed small infarct L posterior limb internal capsule, acute/subacute as well as surrounding cytotoxic cerebral edema without mass effect. The patient was stepped down to Neuro Stepdown Unit/Stroke service on 11/2. For the following few days the patient worked with PT/OT and placement was being pursued. She had a CT abd for persistent N/V and abdominal tenderness which revealed diverticulosis in the descending colon and a small hiatal hernia. On 11/8 the patient developed thoracolumbar back pain and primary service became concerned for epidural hematoma prompting MRI of the spine which showed a large right psoas hematoma with compression and possible thrombosis of the IVC. General Surgery was consulted who recommended conservative management. The am of 11/9, the patient's hgb dropped 2 gm, she became hypotensive (60s/30s) and unresponsive. Rapid Response was called, the patient was emergently intubated and taken to the ICU for presumed hemorrhagic shock.

## 2019-11-09 NOTE — ASSESSMENT & PLAN NOTE
73 yo female with recent hx of CVA s/p TPA now with right retroperitoneal hematoma    Active extrav on CTA S/p IR embolization   Serial H/H. S/p 2 pRBC 11/9  Will continue to follow

## 2019-11-09 NOTE — PROGRESS NOTES
Pt care transferred to Tomy RN. Pt currently on table in IR for procedure. Will continue to monitor

## 2019-11-09 NOTE — SUBJECTIVE & OBJECTIVE
Interval History: Patient seen and examined. S/p IR embolization, awaiting note. Tachycardia improved now 90-100s and BP stable. Extubated this AM, now on 3L.  H/H down to 6.2/19.3, received 2 units pRBC this morning.     Medications:  Continuous Infusions:   sodium chloride 0.9%      hydromorphone in 0.9 % NaCl 6 mg/30 ml       Scheduled Meds:   chlorhexidine  15 mL Mouth/Throat BID    famotidine (PF)  20 mg Intravenous Daily    hydromorphone (PF)         PRN Meds:sodium chloride, dextrose 10 % in water (D10W), glucagon (human recombinant), insulin aspart U-100, naloxone, ondansetron, sodium chloride 0.9%     Review of patient's allergies indicates:   Allergen Reactions    Morphine Other (See Comments)     headache    Latex, natural rubber Rash     Objective:     Vital Signs (Most Recent):  Temp: 98.2 °F (36.8 °C) (11/09/19 0602)  Pulse: 106 (11/09/19 0904)  Resp: (!) 23 (11/09/19 0904)  BP: 137/82 (11/09/19 0904)  SpO2: 100 % (11/09/19 0904) Vital Signs (24h Range):  Temp:  [96.1 °F (35.6 °C)-99 °F (37.2 °C)] 98.2 °F (36.8 °C)  Pulse:  [] 106  Resp:  [12-28] 23  SpO2:  [92 %-100 %] 100 %  BP: ()/(42-84) 137/82  Arterial Line BP: (126-197)/(65-75) 156/75     Weight: 56.2 kg (123 lb 14.4 oz)  Body mass index is 24.2 kg/m².    Intake/Output - Last 3 Shifts       11/07 0700 - 11/08 0659 11/08 0700 - 11/09 0659 11/09 0700 - 11/10 0659    P.O. 240 580     I.V. (mL/kg)       Blood  482     Total Intake(mL/kg) 240 (4.3) 1062 (18.9)     Urine (mL/kg/hr)  200 (0.1)     Other  0     Total Output  200     Net +240 +862            Urine Occurrence 2 x 2 x     Stool Occurrence 0 x      Emesis Occurrence  1 x           Physical Exam   Constitutional: She is oriented to person, place, and time. She appears well-developed and well-nourished. No distress.   Cardiovascular: Normal rate.   Pulmonary/Chest: Effort normal. No respiratory distress.   Abdominal: Soft. She exhibits no distension. There is no tenderness.    Ecchymosis to bilateral lower quadrants at site of injection  Musculoskeletal:   No BLE edema, no leg pain or tenderness    Neurological: She is alert and oriented to person, place, and time.   Psychiatric: She has a normal mood and affect.     Significant Labs:  CBC:   Recent Labs   Lab 11/09/19 0445   WBC 18.04*   RBC 2.35*   HGB 6.2*   HCT 19.3*      MCV 82   MCH 26.4*   MCHC 32.1     CMP:   Recent Labs   Lab 11/09/19 0445   *   CALCIUM 8.6*   ALBUMIN 3.3*   PROT 6.4   *   K 3.5   CO2 20*   CL 96   BUN 22   CREATININE 2.2*   ALKPHOS 95   ALT 24   AST 38   BILITOT 1.1*     Coagulation:   Recent Labs   Lab 11/09/19 0445   LABPROT 11.5   INR 1.1       Significant Diagnostics:  CTA A/P:  1. Large right retroperitoneal hematoma with findings concerning for active arterial extravasation/hemorrhage as detailed above.  There is associated hemorrhage extending throughout the right abdomen and pelvis/pericolic gutter with associated mass effect on the right kidney, which is anteriorly displaced.  2. Decreased opacification of the infahepatic and infrarenal IVC, possibly secondary to underlying mass effect versus partial thrombosis.  3. Nonspecific cecal and right colonic wall thickening, possibly reactive due to hemoperitoneum.

## 2019-11-09 NOTE — PLAN OF CARE
Procedure complete, pt tolerated well. 6 Georgian angioseal closure device deployed at 0820. Site clean dry intact no bleeding no hematoma. Report given to patient's RN; Rn remained with patient during procedure. Pt to return to ICU under care of RN and Respiratory therapist

## 2019-11-09 NOTE — CONSULTS
Patient transferred emergently to ICU for hypoxemic respiratory failure and hemorrhagic shock. Full H&P to follow.    Hortencia Villalba NP  Critical Care Medicine

## 2019-11-09 NOTE — H&P
[facility]  Critical Care Medicine  History & Physical    Patient Name: Melva Barker  MRN: [id number]  Admission Date: 10/31/2019  Hospital Length of Stay: 9 days  Code Status: Full Code  Attending Physician: Gavino Sosa MD   Primary Care Provider: Claire Souza MD   Principal Problem: Stroke due to embolism of left middle cerebral artery    Subjective:     HPI:  Patient is a 74 y.o. female with significant past medical history of IDDM and HTN presented to hospital on 10/31 as a stroke code from [facility]. The patient had been having right sided weakness since September with acute worsening starting at 6am on day of admit and TPA was administered at 10:30 following a negative head CT. The patient was admitted to Neuro Critical Care for further management. MRI confirmed small infarct L posterior limb internal capsule, acute/subacute as well as surrounding cytotoxic cerebral edema without mass effect. The patient was stepped down to Neuro Stepdown Unit/Stroke service on 11/2. For the following few days the patient worked with PT/OT and placement was being pursued. She had a CT abd for persistent N/V and abdominal tenderness which revealed diverticulosis in the descending colon and a small hiatal hernia. On 11/8 the patient developed thoracolumbar back pain and primary service became concerned for epidural hematoma prompting MRI of the spine which showed a large right psoas hematoma with compression and possible thrombosis of the IVC. General Surgery was consulted who recommended conservative management. The am of 11/9, the patient's hgb dropped 2 gm, she became hypotensive (60s/30s) and unresponsive. Rapid Response was called, the patient was emergently intubated and taken to the ICU for presumed hemorrhagic shock.          Hospital/ICU Course:  No notes on file     Past Medical History:   Diagnosis Date    Anxiety     Back pain     chronic    Diabetes mellitus      Gastroparesis     Hypertension     Sciatica        History reviewed. No pertinent surgical history.    Review of patient's allergies indicates:   Allergen Reactions    Morphine Other (See Comments)     headache    Latex, natural rubber Rash       Family History     Problem Relation (Age of Onset)    Hyperlipidemia Father    Hypertension Mother        Tobacco Use    Smoking status: Never Smoker    Smokeless tobacco: Never Used   Substance and Sexual Activity    Alcohol use: No     Alcohol/week: 0.0 standard drinks    Drug use: No    Sexual activity: Never      Review of Systems   Unable to perform ROS: Patient unresponsive     Objective:     Vital Signs (Most Recent):  Temp: 98 °F (36.7 °C) (11/09/19 0450)  Pulse: (!) 116 (11/09/19 0502)  Resp: 20 (11/09/19 0502)  BP: 133/63 (11/09/19 0502)  SpO2: 98 % (11/09/19 0502) Vital Signs (24h Range):  Temp:  [96.1 °F (35.6 °C)-99 °F (37.2 °C)] 98 °F (36.7 °C)  Pulse:  [] 116  Resp:  [16-20] 20  SpO2:  [92 %-100 %] 98 %  BP: ()/(42-71) 133/63  Arterial Line BP: (126-127)/(65-67) 126/67   Weight: 56.2 kg (123 lb 14.4 oz)  Body mass index is 24.2 kg/m².      Intake/Output Summary (Last 24 hours) at 11/9/2019 0555  Last data filed at 11/8/2019 2145  Gross per 24 hour   Intake 580 ml   Output 200 ml   Net 380 ml       Physical Exam   Constitutional: She appears well-developed and well-nourished. She appears distressed.   HENT:   Head: Normocephalic and atraumatic.   Right Ear: External ear normal.   Left Ear: External ear normal.   Nose: Nose normal.   Mouth/Throat: Oropharynx is clear and moist.   Eyes: Pupils are equal, round, and reactive to light. Conjunctivae and EOM are normal.   Neck: Normal range of motion. Neck supple.   Cardiovascular: Normal heart sounds. Tachycardia present.   Sinus tach on monitor. Weak/thready peripheral pulses. Femoral pulse present   Pulmonary/Chest: Breath sounds normal. She has no wheezes. She has no rales.   Respirations  assisted with BVM   Abdominal: Soft. Bowel sounds are normal. She exhibits no distension.   Genitourinary: Vagina normal.   Musculoskeletal: She exhibits no edema, tenderness or deformity.   Neurological: She is unresponsive. GCS eye subscore is 1. GCS verbal subscore is 1. GCS motor subscore is 4.   Skin: Capillary refill takes 2 to 3 seconds. She is not diaphoretic.   Psychiatric: Cognition and memory are impaired.   Nursing note and vitals reviewed.      Vents:  Vent Mode: A/C (11/09/19 0501)  Ventilator Initiated: Yes (11/09/19 0414)  Set Rate: 20 bmp (11/09/19 0501)  Vt Set: 320 mL (11/09/19 0501)  Pressure Support: 0 cmH20 (11/09/19 0501)  PEEP/CPAP: 8 cmH20 (11/09/19 0501)  Oxygen Concentration (%): 50 (11/09/19 0502)  Peak Airway Pressure: 26 cmH2O (11/09/19 0501)  Plateau Pressure: 0 cmH20 (11/09/19 0501)  Total Ve: 6.72 mL (11/09/19 0501)  F/VT Ratio<105 (RSBI): (!) 59.35 (11/09/19 0501)  Lines/Drains/Airways     Central Venous Catheter Line                 Trialysis (Dialysis) Catheter 11/09/19 0420 right internal jugular less than 1 day          Airway                 Airway - Non-Surgical 11/09/19 0514 less than 1 day         Airway - Non-Surgical 11/09/19 Endotracheal Tube less than 1 day          Arterial Line                 Arterial Line 11/09/19 0420 Left Radial less than 1 day          Peripheral Intravenous Line                 Peripheral IV - Single Lumen 11/06/19 1226 20 G;1 3/4 in Left;Anterior Forearm 2 days              Significant Labs:    CBC/Anemia Profile:  Recent Labs   Lab 11/08/19  1834 11/09/19  0019 11/09/19  0437 11/09/19  0445   WBC 17.44* 15.93*  --  18.04*   HGB 8.7* 6.7*  --  6.2*   HCT 26.7* 20.7* 20* 19.3*    203  --  209   MCV 82 82  --  82   RDW 13.9 13.9  --  13.9        Chemistries:  Recent Labs   Lab 11/07/19  0856 11/09/19  0445   NA  --  129*   K  --  3.5   CL  --  96   CO2  --  20*   BUN  --  22   CREATININE  --  2.2*   CALCIUM  --  8.6*   ALBUMIN  --  3.3*    PROT  --  6.4   BILITOT  --  1.1*   ALKPHOS  --  95   ALT  --  24   AST  --  38   MG 1.9 1.8   PHOS  --  4.7*       All pertinent labs within the past 24 hours have been reviewed.    Significant Imaging: I have reviewed and interpreted all pertinent imaging results/findings within the past 24 hours.    Assessment/Plan:     Neuro  * Stroke due to embolism of left middle cerebral artery  --Vascular Neurology following  --s/p TPA on 10/31  --cytotoxic edema on imaging without mass effect  --DAPT on hold due to hemorrhagic shock, restart when clinically appropriate  --statin on hold in acute setting, restart when clinically appropriate    Acute metabolic encephalopathy  --secondary to hemorrhagic shock  --expect improvement once shock improves    Pulmonary  Acute hypoxemic respiratory failure  --secondary to hemorrhagic shock  --intubated emergently for obtundation and failure to protect airway  --wean vent as tolerated    Cardiac/Vascular  Essential hypertension  --antihypertensives on hold due to hemorrhagic shock; restart when clinically appropriate    Oncology  Leukocytosis  --suspect reactive secondary to acute bleeding as patient has not been febrile  --Bcx drawn; f/u    Endocrine  Diabetes mellitus type 2 without retinopathy  --accuchecks Q6 with low dose correctional SSI; titrate prn    GI  Gastroparesis  --previously on reglan; now on hold due to intubation/NPO status    Orthopedic  Psoas hematoma, left, secondary to anticoagulant therapy  --left psoas hematoma seen on MRI imaging  --CTA shows active contrast extravasation right perinephric  --IR aware, plan for angio this am for intervention    Other  Nontraumatic hemorrhagic shock  --right psoas hematoma with active contrast extrav on CTA  --transfusing 2 units PRBC now; levo weaned off with volume resuscitation  --case discussed with IR, plan for angio with intervention this am  --trending CBC Q6      Critical Care Daily Checklist:    A: Awake: RASS  Goal/Actual Goal: RASS Goal: 0-->alert and calm  Actual: Greer Agitation Sedation Scale (RASS): Deep sedation   B: Spontaneous Breathing Trial Performed?  not appropriate at this time   C: SAT & SBT Coordinated?  n/a                      D: Delirium: CAM-ICU Overall CAM-ICU: Positive   E: Early Mobility Performed? Yes   F: Feeding Goal: Goals: 1. Pt's intake meals, supplements >50% by RD follow up.  Status: Nutrition Goal Status: progressing towards goal   Current Diet Order   Procedures    Diet NPO      AS: Analgesia/Sedation fentanyl   T: Thromboembolic Prophylaxis scds   H: HOB > 300 Yes   U: Stress Ulcer Prophylaxis (if needed) pepcid   G: Glucose Control ssi   B: Bowel Function Stool Occurrence: 0   I: Indwelling Catheter (Lines & Hendricks) Necessity Hendricks, PIV x1, RIJ trialysis, art line   D: De-escalation of Antimicrobials/Pharmacotherapies n/a    Plan for the day/ETD tx active bleeding    Code Status:  Family/Goals of Care: Full Code  Unable to get in touch with patient's son Paolo (voicemail left). Spoke with pt's niece Aliyah Nguyen and apprised of clinical decline and plan of care.      Case discussed with Dr. Loida Moss.     Critical Care Time: 60 minutes  Critical secondary to Patient has a condition that poses threat to life and bodily function: hemorrhagic shock     Critical care was time spent personally by me on the following activities: development of treatment plan with patient or surrogate and bedside caregivers, discussions with consultants, evaluation of patient's response to treatment, examination of patient, ordering and performing treatments and interventions, ordering and review of laboratory studies, ordering and review of radiographic studies, pulse oximetry, re-evaluation of patient's condition. This critical care time did not overlap with that of any other provider or involve time for any procedures.     Hortencia Villalba, NP  Critical Care Medicine  [facility]

## 2019-11-09 NOTE — CARE UPDATE
Patient is unavailable for ventilator check at the moment, currently in IR. Will continue as scheduled when returned to room.

## 2019-11-09 NOTE — ASSESSMENT & PLAN NOTE
--Vascular Neurology following  --s/p TPA on 10/31  --cytotoxic edema on imaging without mass effect  --DAPT on hold due to hemorrhagic shock, restart when clinically appropriate  --statin on hold in acute setting, restart when clinically appropriate

## 2019-11-09 NOTE — CARE UPDATE
Pt extubated per MD order. Pt is on 3 LPM NC tolerating well. Team is at bedside. Will continue to monitor.

## 2019-11-09 NOTE — ASSESSMENT & PLAN NOTE
Recommend conservative management  H/H has remained stable throughout the day, continue to trend  If patient becomes symptomatic or shows sign of DVT, can consider IR intervention  No surgical intervention  D/w staff

## 2019-11-09 NOTE — PLAN OF CARE
POC reviewed with pt and family at 1400. Pt verbalized understanding. Questions and concerns addressed. No acute events today. Pt extubated during shift. Pt placed on PCA pump for pain management. Pt pain adequately managed. Pt procedure site remains without complications. Hendricks placed. Pt progressing toward goals. Will continue to monitor. See flowsheets for full assessment and VS info.

## 2019-11-09 NOTE — SUBJECTIVE & OBJECTIVE
No current facility-administered medications on file prior to encounter.      Current Outpatient Medications on File Prior to Encounter   Medication Sig    benazepril (LOTENSIN) 40 MG tablet Take 40 mg by mouth once daily.    bisoprolol-hydrochlorothiazide (ZIAC) 10-6.25 mg per tablet Take 1 tablet by mouth once daily.    ergocalciferol (VITAMIN D2) 50,000 unit Cap Take 50,000 Units by mouth every 7 days.    fluconazole (DIFLUCAN) 150 MG Tab Take one tablet AFTER completing full course of antibiotics.    gabapentin (NEURONTIN) 600 MG tablet Take 600 mg by mouth 3 (three) times daily.    HYDROcodone-acetaminophen (NORCO)  mg per tablet Take 1 tablet by mouth.    insulin aspart (NOVOLOG) 100 unit/mL injection Inject into the skin 3 (three) times daily after meals. Patient reports a sliding scale    INSULIN GLARGINE,HUM.REC.ANLOG (TOUJEO SOLOSTAR SUBQ) Inject 17 Units/day into the skin.    mupirocin (BACTROBAN) 2 % ointment Apply topically to affected area three times daily.    pantoprazole (PROTONIX) 40 MG tablet Take 1 tablet (40 mg total) by mouth once daily.    promethazine (PHENERGAN) 25 MG suppository Place 1 suppository (25 mg total) rectally every 6 (six) hours as needed for Nausea.    tiZANidine (ZANAFLEX) 4 MG tablet Take 4 mg by mouth 3 (three) times daily as needed.     TRUE METRIX GLUCOSE METER Misc     TRUE METRIX GLUCOSE TEST STRIP Strp     TRUEPLUS LANCETS 33 gauge Misc        Review of patient's allergies indicates:   Allergen Reactions    Morphine Other (See Comments)     headache    Latex, natural rubber Rash       Past Medical History:   Diagnosis Date    Anxiety     Back pain     chronic    Diabetes mellitus     Gastroparesis     Hypertension     Sciatica      History reviewed. No pertinent surgical history.  Family History     Problem Relation (Age of Onset)    Hyperlipidemia Father    Hypertension Mother        Tobacco Use    Smoking status: Never Smoker     Smokeless tobacco: Never Used   Substance and Sexual Activity    Alcohol use: No     Alcohol/week: 0.0 standard drinks    Drug use: No    Sexual activity: Never     Review of Systems   Constitutional: Negative for chills and fever.   Respiratory: Negative for shortness of breath.    Cardiovascular: Negative for chest pain and leg swelling.   Gastrointestinal: Negative for abdominal pain, constipation, diarrhea, nausea and vomiting.   Genitourinary: Negative for dysuria.   Musculoskeletal: Positive for back pain.   Skin: Negative for rash.   Psychiatric/Behavioral: Negative for agitation and confusion.     Objective:     Vital Signs (Most Recent):  Temp: 96.1 °F (35.6 °C) (11/08/19 1927)  Pulse: (!) 113 (11/08/19 1927)  Resp: 18 (11/08/19 1927)  BP: 115/68 (11/08/19 1927)  SpO2: 98 % (11/08/19 1927) Vital Signs (24h Range):  Temp:  [96.1 °F (35.6 °C)-99 °F (37.2 °C)] 96.1 °F (35.6 °C)  Pulse:  [] 113  Resp:  [16-18] 18  SpO2:  [96 %-100 %] 98 %  BP: ()/(55-68) 115/68     Weight: 55.5 kg (122 lb 5.7 oz)  Body mass index is 23.9 kg/m².    Physical Exam   Constitutional: She is oriented to person, place, and time. She appears well-developed and well-nourished. No distress.   Cardiovascular: Normal rate.   Pulmonary/Chest: Effort normal. No respiratory distress.   Abdominal: Soft. She exhibits no distension. There is no tenderness.   Ecchymosis to bilateral lower quadrants at site of injection (per patient)   Musculoskeletal:   No BLE edema, no leg pain or tenderness    Neurological: She is alert and oriented to person, place, and time.   Psychiatric: She has a normal mood and affect.       Significant Labs:  CBC:   Recent Labs   Lab 11/08/19  1834   WBC 17.44*   RBC 3.24*   HGB 8.7*   HCT 26.7*      MCV 82   MCH 26.9*   MCHC 32.6       Significant Diagnostics:  Impression       Large hematoma within the right psoas muscle.  Compression and probable thrombosis of the IVC.  Consider contrast enhanced  CT with delayed phase.    Multilevel degenerative changes as detailed above, more severe in the lumbar spine.  Irregularity of the T12 through L3 endplates is most likely degenerative.

## 2019-11-09 NOTE — SIGNIFICANT EVENT
Patient with drop in H/H within the last 2 days (13 -> 8.7), noted to have a large psoas hematoma on lumbar MRI on the evening of 11/8. General surgery was consulted and recommended to monitor H/H and vital signs.   I was called 3:35 AM on 11/9 with concerns for patient being lethargic, with BP 70/44, repeat Hb 6.7 now. updated general surgery who recommended contacting IR for embolization. ordered 2 units of pRBC STAT and LR 1L bolus in the meanwhile. Examined patient at the bedside, unresponsive to verbal and tactile stimuli, gasping with long episodes of apnea. Rapid response team called to the bedside as well as anesthesiology and MICU for impending intubation (which is ongoing). Patient will be transferred to MICU afterwards. Called patient's son to give him an update but there was no response.      Meghna Cotter MD  PGY-III, Neurology  Pager: [phone]

## 2019-11-09 NOTE — CARE UPDATE
RRTs called to patient's bedside for intubation. Patient was successfully intubated with a 7.0 ETT secured with a commerical tube aggarwal. Equal breath sounds heard, with color change. Patient was transfered to Neuro ICU room 9092 via ambu bag and 2 oxygen tanks.

## 2019-11-09 NOTE — CARE UPDATE
Pt admitted from the [facility] to  9092.  Pt onV: Vent   Placed on vent with the following settings:  RR 16, , Peep 5. FIO2 100, ETT size 7 @ 22 cm measured at [facility] [place], Will continue to monitor.

## 2019-11-09 NOTE — PROCEDURES
"Melva Barker is a 74 y.o. female patient.    Temp: 98.2 °F (36.8 °C) (11/09/19 0602)  Pulse: (!) 121 (11/09/19 0602)  Resp: 20 (11/09/19 0602)  BP: (!) 158/74 (11/09/19 0602)  SpO2: 100 % (11/09/19 0602)  Weight: 56.2 kg (123 lb 14.4 oz) (11/09/19 0450)  Height: 5' (152.4 cm) (11/09/19 0450)       Central Line  Date/Time: 11/9/2019 6:14 AM  Location procedure was performed: [facility] CRITICAL CARE MEDICINE  Performed by: Loida Moss MD  Consent Done: Emergent Situation  Time out: Immediately prior to procedure a "time out" was called to verify the correct patient, procedure, equipment, support staff and site/side marked as required.  Indications: vascular access and med administration  Anesthesia: local infiltration    Anesthesia:  Local Anesthetic: lidocaine 1% without epinephrine  Anesthetic total: 2 mL  Preparation: skin prepped with ChloraPrep  Skin prep agent dried: skin prep agent completely dried prior to procedure  Sterile barriers: all five maximum sterile barriers used - cap, mask, sterile gown, sterile gloves, and large sterile sheet  Hand hygiene: hand hygiene performed prior to central venous catheter insertion  Location details: right internal jugular  Catheter type: triple lumen  Catheter size: 12 Fr  Catheter Length: 15cm    Ultrasound guidance: yes  Vessel Caliber: medium, patent, compressibility normal  Needle advanced into vessel with real time Ultrasound guidance.  Guidewire confirmed in vessel.  Sterile sheath used.  Number of attempts: 1  Assessment: placement verified by x-ray  Complications: none  Specimens: No  Implants: No  Post-procedure: line sutured,  chlorhexidine patch,  sterile dressing applied and blood return through all ports  Complications: No          Loida Moss  11/9/2019  "

## 2019-11-09 NOTE — ASSESSMENT & PLAN NOTE
--secondary to hemorrhagic shock  --intubated emergently for obtundation and failure to protect airway  --wean vent as tolerated

## 2019-11-09 NOTE — NURSING
Pt was alert today and has been encouraged to drink more fluids due to only urinating 1 time this shift, MD notified and states to just monitor and encourage pt is in bed alert/ o x4 with wheels locked, bed in lowest position and call light and personal items within reach and has been informed to call for assistance when needed. Pt continues to refuse to eat as well as scd application.

## 2019-11-10 PROBLEM — G93.41 ACUTE METABOLIC ENCEPHALOPATHY: Status: RESOLVED | Noted: 2019-11-09 | Resolved: 2019-11-10

## 2019-11-10 LAB
ALBUMIN SERPL BCP-MCNC: 3.1 G/DL (ref 3.5–5.2)
ALP SERPL-CCNC: 79 U/L (ref 55–135)
ALT SERPL W/O P-5'-P-CCNC: 23 U/L (ref 10–44)
ANION GAP SERPL CALC-SCNC: 12 MMOL/L (ref 8–16)
ANISOCYTOSIS BLD QL SMEAR: SLIGHT
ANISOCYTOSIS BLD QL SMEAR: SLIGHT
AST SERPL-CCNC: 41 U/L (ref 10–40)
BASO STIPL BLD QL SMEAR: ABNORMAL
BASOPHILS # BLD AUTO: 0.02 K/UL (ref 0–0.2)
BASOPHILS # BLD AUTO: 0.03 K/UL (ref 0–0.2)
BASOPHILS # BLD AUTO: 0.03 K/UL (ref 0–0.2)
BASOPHILS # BLD AUTO: 0.04 K/UL (ref 0–0.2)
BASOPHILS NFR BLD: 0.1 % (ref 0–1.9)
BASOPHILS NFR BLD: 0.2 % (ref 0–1.9)
BILIRUB SERPL-MCNC: 0.9 MG/DL (ref 0.1–1)
BUN SERPL-MCNC: 20 MG/DL (ref 8–23)
BURR CELLS BLD QL SMEAR: ABNORMAL
BURR CELLS BLD QL SMEAR: ABNORMAL
CALCIUM SERPL-MCNC: 8.7 MG/DL (ref 8.7–10.5)
CHLORIDE SERPL-SCNC: 104 MMOL/L (ref 95–110)
CO2 SERPL-SCNC: 17 MMOL/L (ref 23–29)
CREAT SERPL-MCNC: 1.1 MG/DL (ref 0.5–1.4)
DIFFERENTIAL METHOD: ABNORMAL
EOSINOPHIL # BLD AUTO: 0.1 K/UL (ref 0–0.5)
EOSINOPHIL NFR BLD: 0.2 % (ref 0–8)
EOSINOPHIL NFR BLD: 0.3 % (ref 0–8)
EOSINOPHIL NFR BLD: 0.3 % (ref 0–8)
EOSINOPHIL NFR BLD: 0.4 % (ref 0–8)
ERYTHROCYTE [DISTWIDTH] IN BLOOD BY AUTOMATED COUNT: 14.5 % (ref 11.5–14.5)
ERYTHROCYTE [DISTWIDTH] IN BLOOD BY AUTOMATED COUNT: 14.5 % (ref 11.5–14.5)
ERYTHROCYTE [DISTWIDTH] IN BLOOD BY AUTOMATED COUNT: 14.6 % (ref 11.5–14.5)
ERYTHROCYTE [DISTWIDTH] IN BLOOD BY AUTOMATED COUNT: 14.9 % (ref 11.5–14.5)
EST. GFR  (AFRICAN AMERICAN): 57.6 ML/MIN/1.73 M^2
EST. GFR  (NON AFRICAN AMERICAN): 49.9 ML/MIN/1.73 M^2
GIANT PLATELETS BLD QL SMEAR: PRESENT
GLUCOSE SERPL-MCNC: 190 MG/DL (ref 70–110)
HCT VFR BLD AUTO: 23.1 % (ref 37–48.5)
HCT VFR BLD AUTO: 23.8 % (ref 37–48.5)
HCT VFR BLD AUTO: 24.6 % (ref 37–48.5)
HCT VFR BLD AUTO: 25.1 % (ref 37–48.5)
HGB BLD-MCNC: 7.6 G/DL (ref 12–16)
HGB BLD-MCNC: 7.8 G/DL (ref 12–16)
HGB BLD-MCNC: 8.1 G/DL (ref 12–16)
HGB BLD-MCNC: 8.5 G/DL (ref 12–16)
HYPOCHROMIA BLD QL SMEAR: ABNORMAL
IMM GRANULOCYTES # BLD AUTO: 0.08 K/UL (ref 0–0.04)
IMM GRANULOCYTES # BLD AUTO: 0.2 K/UL (ref 0–0.04)
IMM GRANULOCYTES # BLD AUTO: 0.24 K/UL (ref 0–0.04)
IMM GRANULOCYTES # BLD AUTO: 0.3 K/UL (ref 0–0.04)
IMM GRANULOCYTES NFR BLD AUTO: 0.6 % (ref 0–0.5)
IMM GRANULOCYTES NFR BLD AUTO: 1 % (ref 0–0.5)
IMM GRANULOCYTES NFR BLD AUTO: 1 % (ref 0–0.5)
IMM GRANULOCYTES NFR BLD AUTO: 1.3 % (ref 0–0.5)
LYMPHOCYTES # BLD AUTO: 1.6 K/UL (ref 1–4.8)
LYMPHOCYTES # BLD AUTO: 2.8 K/UL (ref 1–4.8)
LYMPHOCYTES # BLD AUTO: 3.2 K/UL (ref 1–4.8)
LYMPHOCYTES # BLD AUTO: 3.3 K/UL (ref 1–4.8)
LYMPHOCYTES NFR BLD: 10.9 % (ref 18–48)
LYMPHOCYTES NFR BLD: 13.5 % (ref 18–48)
LYMPHOCYTES NFR BLD: 13.9 % (ref 18–48)
LYMPHOCYTES NFR BLD: 13.9 % (ref 18–48)
MAGNESIUM SERPL-MCNC: 1.8 MG/DL (ref 1.6–2.6)
MCH RBC QN AUTO: 27.9 PG (ref 27–31)
MCH RBC QN AUTO: 28.6 PG (ref 27–31)
MCHC RBC AUTO-ENTMCNC: 31.7 G/DL (ref 32–36)
MCHC RBC AUTO-ENTMCNC: 32.9 G/DL (ref 32–36)
MCHC RBC AUTO-ENTMCNC: 33.9 G/DL (ref 32–36)
MCHC RBC AUTO-ENTMCNC: 34 G/DL (ref 32–36)
MCV RBC AUTO: 84 FL (ref 82–98)
MCV RBC AUTO: 85 FL (ref 82–98)
MCV RBC AUTO: 87 FL (ref 82–98)
MCV RBC AUTO: 88 FL (ref 82–98)
MONOCYTES # BLD AUTO: 1.3 K/UL (ref 0.3–1)
MONOCYTES # BLD AUTO: 1.9 K/UL (ref 0.3–1)
MONOCYTES # BLD AUTO: 2.3 K/UL (ref 0.3–1)
MONOCYTES # BLD AUTO: 2.4 K/UL (ref 0.3–1)
MONOCYTES NFR BLD: 10.5 % (ref 4–15)
MONOCYTES NFR BLD: 8.9 % (ref 4–15)
MONOCYTES NFR BLD: 9.4 % (ref 4–15)
MONOCYTES NFR BLD: 9.7 % (ref 4–15)
NEUTROPHILS # BLD AUTO: 11.5 K/UL (ref 1.8–7.7)
NEUTROPHILS # BLD AUTO: 15.5 K/UL (ref 1.8–7.7)
NEUTROPHILS # BLD AUTO: 17.1 K/UL (ref 1.8–7.7)
NEUTROPHILS # BLD AUTO: 17.6 K/UL (ref 1.8–7.7)
NEUTROPHILS NFR BLD: 73.9 % (ref 38–73)
NEUTROPHILS NFR BLD: 75.1 % (ref 38–73)
NEUTROPHILS NFR BLD: 75.6 % (ref 38–73)
NEUTROPHILS NFR BLD: 79.1 % (ref 38–73)
NRBC BLD-RTO: 0 /100 WBC
OVALOCYTES BLD QL SMEAR: ABNORMAL
OVALOCYTES BLD QL SMEAR: ABNORMAL
PHOSPHATE SERPL-MCNC: 3.5 MG/DL (ref 2.7–4.5)
PLATELET # BLD AUTO: 123 K/UL (ref 150–350)
PLATELET # BLD AUTO: 145 K/UL (ref 150–350)
PLATELET # BLD AUTO: 148 K/UL (ref 150–350)
PLATELET # BLD AUTO: 151 K/UL (ref 150–350)
PLATELET BLD QL SMEAR: ABNORMAL
PLATELET BLD QL SMEAR: ABNORMAL
PMV BLD AUTO: 12.2 FL (ref 9.2–12.9)
PMV BLD AUTO: 12.2 FL (ref 9.2–12.9)
PMV BLD AUTO: 12.3 FL (ref 9.2–12.9)
PMV BLD AUTO: 12.5 FL (ref 9.2–12.9)
POCT GLUCOSE: 187 MG/DL (ref 70–110)
POCT GLUCOSE: 190 MG/DL (ref 70–110)
POCT GLUCOSE: 219 MG/DL (ref 70–110)
POIKILOCYTOSIS BLD QL SMEAR: ABNORMAL
POIKILOCYTOSIS BLD QL SMEAR: SLIGHT
POLYCHROMASIA BLD QL SMEAR: ABNORMAL
POLYCHROMASIA BLD QL SMEAR: ABNORMAL
POTASSIUM SERPL-SCNC: 3.7 MMOL/L (ref 3.5–5.1)
PROT SERPL-MCNC: 6.4 G/DL (ref 6–8.4)
RBC # BLD AUTO: 2.66 M/UL (ref 4–5.4)
RBC # BLD AUTO: 2.8 M/UL (ref 4–5.4)
RBC # BLD AUTO: 2.83 M/UL (ref 4–5.4)
RBC # BLD AUTO: 2.97 M/UL (ref 4–5.4)
SODIUM SERPL-SCNC: 133 MMOL/L (ref 136–145)
WBC # BLD AUTO: 14.53 K/UL (ref 3.9–12.7)
WBC # BLD AUTO: 20.47 K/UL (ref 3.9–12.7)
WBC # BLD AUTO: 23.16 K/UL (ref 3.9–12.7)
WBC # BLD AUTO: 23.49 K/UL (ref 3.9–12.7)

## 2019-11-10 PROCEDURE — 27000221 HC OXYGEN, UP TO 24 HOURS

## 2019-11-10 PROCEDURE — 20000000 HC ICU ROOM

## 2019-11-10 PROCEDURE — 85025 COMPLETE CBC W/AUTO DIFF WBC: CPT | Mod: 91

## 2019-11-10 PROCEDURE — 63600175 PHARM REV CODE 636 W HCPCS: Performed by: NURSE PRACTITIONER

## 2019-11-10 PROCEDURE — 99291 CRITICAL CARE FIRST HOUR: CPT | Mod: ,,, | Performed by: NURSE PRACTITIONER

## 2019-11-10 PROCEDURE — 25000003 PHARM REV CODE 250: Performed by: NURSE PRACTITIONER

## 2019-11-10 PROCEDURE — S0028 INJECTION, FAMOTIDINE, 20 MG: HCPCS | Performed by: NURSE PRACTITIONER

## 2019-11-10 PROCEDURE — 25500020 PHARM REV CODE 255: Performed by: INTERNAL MEDICINE

## 2019-11-10 PROCEDURE — 84100 ASSAY OF PHOSPHORUS: CPT

## 2019-11-10 PROCEDURE — 80053 COMPREHEN METABOLIC PANEL: CPT

## 2019-11-10 PROCEDURE — 63600175 PHARM REV CODE 636 W HCPCS: Performed by: RADIOLOGY

## 2019-11-10 PROCEDURE — 94761 N-INVAS EAR/PLS OXIMETRY MLT: CPT

## 2019-11-10 PROCEDURE — 99900035 HC TECH TIME PER 15 MIN (STAT)

## 2019-11-10 PROCEDURE — 94770 HC EXHALED C02 TEST: CPT

## 2019-11-10 PROCEDURE — 83735 ASSAY OF MAGNESIUM: CPT

## 2019-11-10 PROCEDURE — 99291 PR CRITICAL CARE, E/M 30-74 MINUTES: ICD-10-PCS | Mod: ,,, | Performed by: NURSE PRACTITIONER

## 2019-11-10 RX ORDER — FENTANYL CITRATE 50 UG/ML
INJECTION, SOLUTION INTRAMUSCULAR; INTRAVENOUS CODE/TRAUMA/SEDATION MEDICATION
Status: COMPLETED | OUTPATIENT
Start: 2019-11-10 | End: 2019-11-10

## 2019-11-10 RX ORDER — HYDROCODONE BITARTRATE AND ACETAMINOPHEN 500; 5 MG/1; MG/1
TABLET ORAL
Status: DISCONTINUED | OUTPATIENT
Start: 2019-11-10 | End: 2019-11-10

## 2019-11-10 RX ADMIN — ONDANSETRON 4 MG: 2 INJECTION INTRAMUSCULAR; INTRAVENOUS at 06:11

## 2019-11-10 RX ADMIN — Medication: at 09:11

## 2019-11-10 RX ADMIN — ONDANSETRON 4 MG: 2 INJECTION INTRAMUSCULAR; INTRAVENOUS at 09:11

## 2019-11-10 RX ADMIN — FENTANYL CITRATE 50 MCG: 50 INJECTION, SOLUTION INTRAMUSCULAR; INTRAVENOUS at 04:11

## 2019-11-10 RX ADMIN — IOHEXOL 160 ML: 300 INJECTION, SOLUTION INTRAVENOUS at 06:11

## 2019-11-10 RX ADMIN — IOHEXOL 75 ML: 350 INJECTION, SOLUTION INTRAVENOUS at 01:11

## 2019-11-10 RX ADMIN — FAMOTIDINE 20 MG: 10 INJECTION, SOLUTION INTRAVENOUS at 08:11

## 2019-11-10 RX ADMIN — FENTANYL CITRATE 50 MCG: 50 INJECTION, SOLUTION INTRAMUSCULAR; INTRAVENOUS at 05:11

## 2019-11-10 NOTE — ASSESSMENT & PLAN NOTE
73 yo female with recent hx of CVA s/p TPA now with right retroperitoneal hematoma    Active extrav on CTA S/p IR embolization of L2, L3  Believe that she is stable from H/H standpoint, however if primary is concerned can get repeat CTA to look for blush. And would recommend repeat IR embolization if extrav is seen  Recommend to continue trend serial H/H   If patient decompensates, please call surgery on call.   Please call with questions

## 2019-11-10 NOTE — SUBJECTIVE & OBJECTIVE
Neurologic Chief Complaint: R-sided weakness -- L MCA    Subjective:     Interval History: Patient is seen for follow-up neurological assessment and treatment recommendations: DARREN. MRI Lumbar spine had demonstrated large R psoas hematoma with possible IVC compression; General Surgery consulted. Patient was then with acute decompensation overnight; became hypotensive, tachycardic, and apneic requiring intubation, blood transfusion, and medical ICU transfer. CTA abd/pelvis showed active extravasation/hemorrhage with hemoperitoneum. Pt now s/p successful lumbar artery embolization in IR. She was extubated on arrival back to ICU and tolerated well.    HPI, Past Medical, Family, and Social History remains the same as documented in the initial encounter.     Review of Systems   Constitutional: Negative for chills and fever.   Respiratory: Positive for apnea and shortness of breath.    Cardiovascular: Positive for palpitations. Negative for chest pain.   Musculoskeletal: Positive for arthralgias and back pain.        + right upper buttock pain  Now in lower spine    Neurological: Positive for facial asymmetry, speech difficulty and weakness.     Scheduled Meds:   chlorhexidine  15 mL Mouth/Throat BID    famotidine (PF)  20 mg Intravenous Daily     Continuous Infusions:   sodium chloride 0.9% 75 mL/hr at 11/09/19 1910    hydromorphone in 0.9 % NaCl 6 mg/30 ml       PRN Meds:sodium chloride, dextrose 10 % in water (D10W), glucagon (human recombinant), insulin aspart U-100, naloxone, ondansetron, sodium chloride 0.9%    Objective:     Vital Signs (Most Recent):  Temp: 98.5 °F (36.9 °C) (11/09/19 1900)  Pulse: (!) 130 (11/09/19 1900)  Resp: (!) 24 (11/09/19 1900)  BP: (!) 152/78 (11/09/19 1900)  SpO2: 98 % (11/09/19 1900)  BP Location: Right arm    Vital Signs Range (Last 24H):  Temp:  [97.5 °F (36.4 °C)-98.5 °F (36.9 °C)]   Pulse:  []   Resp:  [12-30]   BP: ()/(42-95)   SpO2:  [92 %-100 %]   Arterial Line  BP: (112-197)/()   BP Location: Right arm    Physical Exam   Constitutional: She appears well-developed and well-nourished. No distress.   HENT:   Head: Normocephalic and atraumatic.   Eyes: Conjunctivae and EOM are normal.   Cardiovascular: Tachycardia present.   Pulmonary/Chest: No respiratory distress.   On NC   Musculoskeletal: She exhibits no edema or deformity.   Pain from lumbar region to back of right leg    Neurological: A sensory deficit is present. She exhibits normal muscle tone.   Skin: Skin is warm and dry.   Psychiatric: Cognition and memory are impaired. She is inattentive.   Vitals reviewed.      Neurological Exam:   LOC: Drowsy  Attention Span: Poor 2/2 drowsiness likely related to pain medication  Language: No aphasia  Articulation: Mild dysarthria  Orientation: Person, Not oriented to Time   Visual Fields: Full  EOM (CN III, IV, VI): Full/intact  Facial Movement (CN VII): Lower facial weakness on the Right  Motor: Arm left  Normal 5/5  Leg left  Normal 5/5  Arm right  Paresis: 3/5   Leg right Paresis: 2/5 - Limited 2/2 pain  Sensation: mild decrease in sensation RUE  Tone: Normal tone throughout    Laboratory:  CMP:   Recent Labs   Lab 11/09/19  0445 11/09/19  0958   CALCIUM 8.6* 8.5*   ALBUMIN 3.3*  --    PROT 6.4  --    * 129*   K 3.5 3.6   CO2 20* 18*   CL 96 99   BUN 22 23   CREATININE 2.2* 1.7*   ALKPHOS 95  --    ALT 24  --    AST 38  --    BILITOT 1.1*  --      CBC:   Recent Labs   Lab 11/09/19  1823   WBC 25.35*   RBC 3.32*   HGB 9.4*   HCT 27.6*   *   MCV 83   MCH 28.3   MCHC 34.1     Lipid Panel:   No results for input(s): CHOL, LDLCALC, HDL, TRIG in the last 168 hours.  Coagulation:   Recent Labs   Lab 11/09/19  0445   INR 1.1     Platelet Aggregation Study: No results for input(s): PLTAGG, PLTAGINTERP, PLTAGREGLACO, ADPPLTAGGREG in the last 168 hours.  Hgb A1C:   No results for input(s): HGBA1C in the last 168 hours.  TSH:   No results for input(s): TSH in the last  168 hours.      Diagnostic Results     Brain Imaging     MRI Brain (11/01/19):  Acute lacunar type infarct coursing through the left posterior limb internal capsule and corona radiata.      CTH (10/31/19):  No acute intracranial pathology      Vessel Imaging     CTA Multiphase (10/31/19):  CTA head: Atherosclerotic disease of the cavernous and supraclinoid ICAs with mild narrowing.  Otherwise unremarkable CTA of the head specifically without evidence for proximal significant stenosis or occlusion.  CTA neck: Less than 50% proximal ICA stenosis by NASCET criteria.  Atherosclerotic disease with mild moderate narrowing of the right vertebral artery origin.  There is mild left V1 segment narrowing.  No significant focal vertebral artery stenosis or occlusion.  Interlobular septal thickening with tree in bud micro nodularity visualized upper lobes bilaterally which may represent inflammatory/infectious process clinical correlation and correlation with dedicated CT thorax recommended.  CT head: Bandlike region hypoattenuation left posterior limb internal capsule unchanged from prior suggestive for possible recent to subacute age infarction.  No evidence for hydrocephalus or acute intracranial hemorrhage.  Clinical correlation and further evaluation with MRI if patient compatible.      Cardiac Imaging   Echo (11/01/19)  · Increased (hyperdynamic) left ventricular systolic function. The estimated ejection fraction is 75%.  · Concentric left ventricular remodeling.  · Normal LV diastolic function.  · No wall motion abnormalities.  · Normal right ventricular systolic function.  · Normal central venous pressure (3 mm Hg).  · Mild tricuspid regurgitation.  · The estimated PA systolic pressure is 26 mm Hg  · Echolucent structure next to the LA, likely representing hiatal hernia. Clinincal correlation is required.      Miscellaneous Imaging    IR Angiogram Visceral 11/9/19 pending    CTA Abdomen/Pelvis 11/9/19  1. Large right  retroperitoneal hematoma with findings concerning for active arterial extravasation/hemorrhage as detailed above.  There is associated hemorrhage extending throughout the right abdomen and pelvis/pericolic gutter with associated mass effect on the right kidney, which is anteriorly displaced.  2. Decreased opacification of the infahepatic and infrarenal IVC, possibly secondary to underlying mass effect versus partial thrombosis.  3. Nonspecific cecal and right colonic wall thickening, possibly reactive due to hemoperitoneum.  4. Additional findings as detailed above.    MRI Thoracic/Lumbar Spine W WO Contr 11/8/19  Large hematoma within the right psoas muscle.  Compression and probable thrombosis of the IVC. Consider contrast enhanced CT with delayed phase.  Multilevel degenerative changes as detailed above, more severe in the lumbar spine.  Irregularity of the T12 through L3 endplates is most likely degenerative.

## 2019-11-10 NOTE — PROGRESS NOTES
"IR Note    S/P lumbar embo for presumed RP bleed. Called regarding fall in Hb from 10 to 8 over last 24hr. Tachycardic as well range . SBPs 131-193. No BP meds. Pt seen and examined. C/O new 5/10 RLQ pain. Otherwise, says she feels "better."     /85   Pulse (!) 116   Temp 98.5 °F (36.9 °C) (Oral)   Resp 16   Ht 5' (1.524 m)   Wt 56.4 kg (124 lb 5.4 oz)   SpO2 96%   Breastfeeding? No   BMI 24.28 kg/m²      Afebrile  Awake and talking  Breathing unlabored  Abdomen soft and non-distended but exquisitely TTP with guarding in RLQ and positive bed bump. Small ecchymoses in bilateral lower quadrants. R CFA access C/D/I. No hematoma. R DP 2+.       A/P:  Possible equilibration/delayed drop. Less likely collateral hemorrhage as post embo images showed essentially complete stasis in target vessels. Exam is concerning and should further evaluate.     -Repeat CTA abdomen and pelvis without and with IV contrast, GI bleed protocol. GFR 58. Cr 1.1. Primary team to discuss renal risks of IV contrast.  -Consider transfusing 2U   -Will f/u CTA    Discussed with IR staff, Dr. Atkins.    Nasim Hester MD  Department of Radiology  Pager: [phone]      "

## 2019-11-10 NOTE — H&P
Radiology History & Physical      SUBJECTIVE:     Chief Complaint: active retroperitoneal hemorrhage    History of Present Illness:  Melva Barker is a 74 y.o. female who presents for right renal and pelvic angiogram and possible embolization.      Past Medical History:   Diagnosis Date    Anxiety     Back pain     chronic    Diabetes mellitus     Gastroparesis     Hypertension     Sciatica        History reviewed. No pertinent surgical history.    Home Meds:   Prior to Admission medications    Medication Sig Start Date End Date Taking? Authorizing Provider   benazepril (LOTENSIN) 40 MG tablet Take 40 mg by mouth once daily.    Historical Provider, MD   bisoprolol-hydrochlorothiazide (ZIAC) 10-6.25 mg per tablet Take 1 tablet by mouth once daily.    Historical Provider, MD   ergocalciferol (VITAMIN D2) 50,000 unit Cap Take 50,000 Units by mouth every 7 days.    Historical Provider, MD   fluconazole (DIFLUCAN) 150 MG Tab Take one tablet AFTER completing full course of antibiotics. 6/21/19   Robert Otto NP   gabapentin (NEURONTIN) 600 MG tablet Take 600 mg by mouth 3 (three) times daily.    Historical Provider, MD   HYDROcodone-acetaminophen (NORCO)  mg per tablet Take 1 tablet by mouth.    Historical Provider, MD   insulin aspart (NOVOLOG) 100 unit/mL injection Inject into the skin 3 (three) times daily after meals. Patient reports a sliding scale    Historical Provider, MD   INSULIN GLARGINE,HUM.REC.ANLOG (TOUJEO SOLOSTAR SUBQ) Inject 17 Units/day into the skin.    Historical Provider, MD   mupirocin (BACTROBAN) 2 % ointment Apply topically to affected area three times daily. 6/21/19   Robert Otto NP   pantoprazole (PROTONIX) 40 MG tablet Take 1 tablet (40 mg total) by mouth once daily. 4/24/17 4/24/18  Emi Tripathi NP   promethazine (PHENERGAN) 25 MG suppository Place 1 suppository (25 mg total) rectally every 6 (six) hours as needed for Nausea. 5/29/19   Jeevan Blum Jr.,  MD   tiZANidine (ZANAFLEX) 4 MG tablet Take 4 mg by mouth 3 (three) times daily as needed.  4/24/19   Historical Provider, MD   TRUE METRIX GLUCOSE METER [facility]  2/28/19   Historical Provider, MD   TRUE METRIX GLUCOSE TEST STRIP [facility]  2/28/19   Historical Provider, MD   TRUEPLUS LANCETS 33 gauge [facility] [place]  2/28/19   Historical Provider, MD       Anticoagulants/Antiplatelets: no anticoagulation    Allergies:   Review of patient's allergies indicates:   Allergen Reactions    Morphine Other (See Comments)     headache    Latex, natural rubber Rash       Sedation History:  no adverse reactions    Review of Systems:   As documented in primary provider H&P.      OBJECTIVE:     Vital Signs (Most Recent)  Temp: 98.5 °F (36.9 °C) (11/10/19 1502)  Pulse: 108 (11/10/19 1600)  Resp: 19 (11/10/19 1600)  BP: (!) 153/71 (11/10/19 1600)  SpO2: 98 % (11/10/19 1600)    Physical Exam:  ASA: 3, gen anesthesia possible - defer to them if they are  Mallampati: 2      Laboratory  Lab Results   Component Value Date    INR 1.1 11/09/2019       Lab Results   Component Value Date    WBC 23.16 (H) 11/10/2019    HGB 8.1 (L) 11/10/2019    HCT 23.8 (L) 11/10/2019    MCV 84 11/10/2019     (L) 11/10/2019      Lab Results   Component Value Date     (H) 11/10/2019     (L) 11/10/2019    K 3.7 11/10/2019     11/10/2019    CO2 17 (L) 11/10/2019    BUN 20 11/10/2019    CREATININE 1.1 11/10/2019    CALCIUM 8.7 11/10/2019    MG 1.8 11/10/2019    ALT 23 11/10/2019    AST 41 (H) 11/10/2019    ALBUMIN 3.1 (L) 11/10/2019    BILITOT 0.9 11/10/2019       ASSESSMENT/PLAN:     Sedation Plan: moderate, possible per anesthesia  Patient will undergo right renal and pelvic angiogram, possible embolization      Nasim Hester MD  Department of Radiology  Pager: [phone]

## 2019-11-10 NOTE — SUBJECTIVE & OBJECTIVE
Interval History/Significant Events: repeat angiogram negative for arterial bleed    Review of Systems   Constitutional: Positive for fatigue. Negative for diaphoresis.   Respiratory: Negative for cough, chest tightness and shortness of breath.    Cardiovascular: Negative for chest pain and palpitations.   Gastrointestinal: Positive for abdominal pain.   Musculoskeletal: Positive for back pain and myalgias.     Objective:     Vital Signs (Most Recent):  Temp: 98.5 °F (36.9 °C) (11/10/19 0702)  Pulse: (!) 123 (11/10/19 0800)  Resp: 17 (11/10/19 0800)  BP: 131/85 (11/10/19 0800)  SpO2: 100 % (11/10/19 0800) Vital Signs (24h Range):  Temp:  [97.5 °F (36.4 °C)-98.8 °F (37.1 °C)] 98.5 °F (36.9 °C)  Pulse:  [] 123  Resp:  [6-30] 17  SpO2:  [95 %-100 %] 100 %  BP: (131-193)/(61-95) 131/85  Arterial Line BP: (112-194)/() 149/133   Weight: 56.4 kg (124 lb 5.4 oz)  Body mass index is 24.28 kg/m².      Intake/Output Summary (Last 24 hours) at 11/10/2019 0834  Last data filed at 11/10/2019 0800  Gross per 24 hour   Intake 1652.5 ml   Output 735 ml   Net 917.5 ml       Physical Exam   Constitutional: She is oriented to person, place, and time. She appears well-developed. She is cooperative. She has a sickly appearance. No distress. Nasal cannula in place.   HENT:   Head: Normocephalic and atraumatic.   Eyes: Pupils are equal, round, and reactive to light. Conjunctivae are normal. Right eye exhibits no exudate. Left eye exhibits no exudate. Right conjunctiva has no hemorrhage. Left conjunctiva has no hemorrhage. No scleral icterus. Right eye exhibits no nystagmus. Left eye exhibits no nystagmus.   Neck: Trachea normal. No neck rigidity. No tracheal deviation present.   Cardiovascular: Regular rhythm and normal heart sounds. Tachycardia present. PMI is not displaced. Exam reveals no gallop and no friction rub.   No murmur heard.  Pulses:       Radial pulses are 2+ on the right side, and 2+ on the left side.         Dorsalis pedis pulses are 2+ on the right side, and 2+ on the left side.   Pulmonary/Chest: Effort normal and breath sounds normal. No accessory muscle usage. No respiratory distress. She has no wheezes. She has no rhonchi. She has no rales.   Abdominal: Soft. Normal appearance and bowel sounds are normal. There is generalized tenderness. There is guarding and CVA tenderness (R side). There is no rigidity and no rebound.   Musculoskeletal: Normal range of motion.   Neurological: She is alert and oriented to person, place, and time. No cranial nerve deficit. GCS eye subscore is 4. GCS verbal subscore is 5. GCS motor subscore is 6.   Pt alert and follows commands, answers Y/N questions, spontaneously moves BUE 3/5, BLE 3/5, no focal deficits to note.      Skin: Skin is warm and dry. Capillary refill takes 2 to 3 seconds. She is not diaphoretic. No cyanosis. Nails show no clubbing.   Nursing note and vitals reviewed.      Vents:  Vent Mode: Spont (11/09/19 0904)  Ventilator Initiated: Yes (11/09/19 0414)  Set Rate: 0 bmp (11/09/19 0904)  Vt Set: 320 mL (11/09/19 0904)  Pressure Support: 5 cmH20 (11/09/19 0904)  PEEP/CPAP: 5 cmH20 (11/09/19 0904)  Oxygen Concentration (%): 30 (11/09/19 0904)  Peak Airway Pressure: 11 cmH2O (11/09/19 0904)  Plateau Pressure: 0 cmH20 (11/09/19 0904)  Total Ve: 9.45 mL (11/09/19 0904)  F/VT Ratio<105 (RSBI): (!) 99.57 (11/09/19 0904)  Lines/Drains/Airways     Central Venous Catheter Line                 Trialysis (Dialysis) Catheter 11/09/19 0420 right internal jugular 1 day          Drain                 Urethral Catheter 11/09/19 1000 less than 1 day          Arterial Line                 Arterial Line 11/09/19 0420 Left Radial 1 day          Peripheral Intravenous Line                 Peripheral IV - Single Lumen 11/06/19 1226 20 G;1 3/4 in Left;Anterior Forearm 3 days              Significant Labs:    CBC/Anemia Profile:  Recent Labs   Lab 11/09/19  1823 11/10/19  0005 11/10/19  0336    WBC 25.35* 23.49* 23.16*   HGB 9.4* 8.5* 8.1*   HCT 27.6* 25.1* 23.8*   * 151 145*   MCV 83 85 84   RDW 14.3 14.5 14.5        Chemistries:  Recent Labs   Lab 11/09/19  0445 11/09/19  0958 11/10/19  0336   * 129* 133*   K 3.5 3.6 3.7   CL 96 99 104   CO2 20* 18* 17*   BUN 22 23 20   CREATININE 2.2* 1.7* 1.1   CALCIUM 8.6* 8.5* 8.7   ALBUMIN 3.3*  --  3.1*   PROT 6.4  --  6.4   BILITOT 1.1*  --  0.9   ALKPHOS 95  --  79   ALT 24  --  23   AST 38  --  41*   MG 1.8  --  1.8   PHOS 4.7*  --  3.5       All pertinent labs within the past 24 hours have been reviewed.    Significant Imaging:  I have reviewed all pertinent imaging results/findings within the past 24 hours.

## 2019-11-10 NOTE — ASSESSMENT & PLAN NOTE
Melva Barker is a 74 y.o. female with PMHx of HTN, HLD, and DM who presented to OSH with acute worsening of R-sided weakness after having experienced R-sided weakness x1 month. She was treated with tPA at OSH. CTA without LVO. MRI with infarct in L internal capsule and corona radiata. Etiology likely small vessel disease.    Patient stepped up to Medical ICU overnight 11/9 due to complications associated with retroperitoneal hematoma (see below.) Extubated 11/9 and tolerating well.      Antithrombotics for secondary stroke prevention: Antiplatelets: ASA 81 mg + Plavix 75 mg -- Acutely HELD due to hematoma  Statins for secondary stroke prevention and hyperlipidemia, if present: Statins: Atorvastatin- 40 mg daily,   Aggressive risk factor modification: HTN, HLD, Diet  Rehab efforts: The patient has been evaluated by a stroke team provider and the therapy needs have been fully considered based off the presenting complaints and exam findings. The following therapy evaluations are needed: PT evaluate and treat, OT evaluate and treat, SLP evaluate and treat, PM&R evaluate for appropriate placement - Dispo inpatient rehab; Can d/c once medically ready.  Diagnostics ordered/pending: None   VTE prophylaxis: Mechanical SCDs only - Pharmacologic ppx acutely held due to hematoma.  BP parameters: Infarct: Post tPA, SBP <180; Avoid hypotension

## 2019-11-10 NOTE — ASSESSMENT & PLAN NOTE
Stroke risk factor  SBP goal normotension; Avoid hypotension  Patient had been on Benazepril 40 mg daily, HCTZ 25mg (increased from home dose of 6.25mg), and Bisoprolol 10 mg daily [combo med at home - Bisoprolol 10 mg + HCTZ 6.25 mg]  Then had started with episodes of hypotension and all BP meds were held with intermittent bolus requirement. Hypotension had appeared to be improving as of 11/7.    Pt hypotensive to 70/44 with acute decline on 11/9. Pt required fluid resuscitation, blood transfusion, and pressor support (temporarily.)  Per primary team

## 2019-11-10 NOTE — HOSPITAL COURSE
Patient returned from IR s/p embolization of L3 and L2 lumbar arteries intubated. She was extubated shortly after to NC and complained of severe pain. A PCA pump was initiated with ETCO2 detector in place. Will monitor CBC q6. Hgb continues to downtrend overnight, prompting repeat CTA which showed questionable foci of active extravasation within the inferior portion of previous bleed. Angiogram 11/11 without evidence of continued bleed.

## 2019-11-10 NOTE — PROGRESS NOTES
[facility]  General Surgery  Progress Note    Subjective:     History of Present Illness:  Melva Barker is a 74 y.o. F who was [place] [facility] [place].  She received TPA at outside hospital.  Patient was doing overall well and in line for rehab placement when she had drop in H/H and new onset lower back pain the last 2 days.  Hgb dropped from 12->10->8.8.  Hgb has been stable over the last 24 hours.  Concern for epidural hematoma so MRI was ordered.  Psoas hematoma was noted on MRI.  Surgery was consulted for evaluation.      Upon assessment, patient complains of some Left back pain.  States that it started about 2 days ago.  Denies any leg pain.  She has not had any falls.  No recent cath procedures in her groin.      Post-Op Info:  * No surgery found *         Interval History: NAEON. Tachycardiac. BP stable. More than expected response after 2 units pRBC from 6.2 to 10.1, now H/H at 8.1-8.5 which seems appropriate for amount of blood given. No more transfusions. Continues to complain of back pain.     Medications:  Continuous Infusions:   sodium chloride 0.9% 75 mL/hr at 11/10/19 0900    hydromorphone in 0.9 % NaCl 6 mg/30 ml       Scheduled Meds:   chlorhexidine  15 mL Mouth/Throat BID    famotidine (PF)  20 mg Intravenous Daily     PRN Meds:dextrose 10 % in water (D10W), glucagon (human recombinant), insulin aspart U-100, naloxone, ondansetron, sodium chloride 0.9%     Review of patient's allergies indicates:   Allergen Reactions    Morphine Other (See Comments)     headache    Latex, natural rubber Rash     Objective:     Vital Signs (Most Recent):  Temp: 98.5 °F (36.9 °C) (11/10/19 0702)  Pulse: (!) 117 (11/10/19 0902)  Resp: 14 (11/10/19 0902)  BP: (!) 178/81 (11/10/19 0902)  SpO2: (!) 91 % (11/10/19 0902) Vital Signs (24h Range):  Temp:  [97.7 °F (36.5 °C)-98.8 °F (37.1 °C)] 98.5 °F (36.9 °C)  Pulse:  [104-130] 117  Resp:  [6-30] 14  SpO2:  [91 %-100 %] 91 %  BP:  (131-193)/(61-95) 178/81  Arterial Line BP: (112-194)/() 175/129     Weight: 56.4 kg (124 lb 5.4 oz)  Body mass index is 24.28 kg/m².    Intake/Output - Last 3 Shifts       11/08 0700 - 11/09 0659 11/09 0700 - 11/10 0659 11/10 0700 - 11/11 0659    P.O. 580      I.V. (mL/kg)  1502.5 (26.6) 225 (4)    Blood 482      Total Intake(mL/kg) 1062 (18.9) 1502.5 (26.6) 225 (4)    Urine (mL/kg/hr) 200 (0.1) 695 (0.5) 130 (0.9)    Other 0      Stool  0 0    Total Output 200 695 130    Net +862 +807.5 +95           Urine Occurrence 2 x      Stool Occurrence  0 x 0 x    Emesis Occurrence 1 x            Physical Exam   Constitutional: She is oriented to person, place, and time. She appears well-developed and well-nourished. No distress.   Cardiovascular: Normal rate.   Pulmonary/Chest: Effort normal. No respiratory distress.   Abdominal: Soft. She exhibits no distension. There is no tenderness.   Ecchymosis to bilateral lower quadrants at site of injection  Musculoskeletal:   No BLE edema, no leg pain or tenderness    No right groin hematoma   Neurological: She is alert and oriented to person, place    Significant Labs:  CBC:   Recent Labs   Lab 11/10/19  0336   WBC 23.16*   RBC 2.83*   HGB 8.1*   HCT 23.8*   *   MCV 84   MCH 28.6   MCHC 34.0     CMP:   Recent Labs   Lab 11/10/19  0336   *   CALCIUM 8.7   ALBUMIN 3.1*   PROT 6.4   *   K 3.7   CO2 17*      BUN 20   CREATININE 1.1   ALKPHOS 79   ALT 23   AST 41*   BILITOT 0.9     Coagulation:   Recent Labs   Lab 11/09/19  0445   LABPROT 11.5   INR 1.1       Significant Diagnostics:  I have reviewed all pertinent imaging results/findings within the past 24 hours.    Assessment/Plan:     Retroperitoneal hematoma  75 yo female with recent hx of CVA s/p TPA now with right retroperitoneal hematoma    Active extrav on CTA S/p IR embolization of L2, L3  Believe that she is stable from H/H standpoint, however if primary is concerned can get repeat CTA to look  for blush. And would recommend repeat IR embolization if extrav is seen  Recommend to continue trend serial H/H   If patient decompensates, please call surgery on call.   Please call with questions         Man Torres MD  General Surgery  [facility]

## 2019-11-10 NOTE — ASSESSMENT & PLAN NOTE
History of sciatic nerve pain. Had started home Tizanidine TID.  See Retroperitoneal hematoma section below

## 2019-11-10 NOTE — SUBJECTIVE & OBJECTIVE
Interval History: NAEON. Tachycardiac. BP stable. More than expected response after 2 units pRBC from 6.2 to 10.1, now H/H at 8.1-8.5 which seems appropriate for amount of blood given. No more transfusions. Continues to complain of back pain.     Medications:  Continuous Infusions:   sodium chloride 0.9% 75 mL/hr at 11/10/19 0900    hydromorphone in 0.9 % NaCl 6 mg/30 ml       Scheduled Meds:   chlorhexidine  15 mL Mouth/Throat BID    famotidine (PF)  20 mg Intravenous Daily     PRN Meds:dextrose 10 % in water (D10W), glucagon (human recombinant), insulin aspart U-100, naloxone, ondansetron, sodium chloride 0.9%     Review of patient's allergies indicates:   Allergen Reactions    Morphine Other (See Comments)     headache    Latex, natural rubber Rash     Objective:     Vital Signs (Most Recent):  Temp: 98.5 °F (36.9 °C) (11/10/19 0702)  Pulse: (!) 117 (11/10/19 0902)  Resp: 14 (11/10/19 0902)  BP: (!) 178/81 (11/10/19 0902)  SpO2: (!) 91 % (11/10/19 0902) Vital Signs (24h Range):  Temp:  [97.7 °F (36.5 °C)-98.8 °F (37.1 °C)] 98.5 °F (36.9 °C)  Pulse:  [104-130] 117  Resp:  [6-30] 14  SpO2:  [91 %-100 %] 91 %  BP: (131-193)/(61-95) 178/81  Arterial Line BP: (112-194)/() 175/129     Weight: 56.4 kg (124 lb 5.4 oz)  Body mass index is 24.28 kg/m².    Intake/Output - Last 3 Shifts       11/08 0700 - 11/09 0659 11/09 0700 - 11/10 0659 11/10 0700 - 11/11 0659    P.O. 580      I.V. (mL/kg)  1502.5 (26.6) 225 (4)    Blood 482      Total Intake(mL/kg) 1062 (18.9) 1502.5 (26.6) 225 (4)    Urine (mL/kg/hr) 200 (0.1) 695 (0.5) 130 (0.9)    Other 0      Stool  0 0    Total Output 200 695 130    Net +862 +807.5 +95           Urine Occurrence 2 x      Stool Occurrence  0 x 0 x    Emesis Occurrence 1 x            Physical Exam   Constitutional: She is oriented to person, place, and time. She appears well-developed and well-nourished. No distress.   Cardiovascular: Normal rate.   Pulmonary/Chest: Effort normal.  No respiratory distress.   Abdominal: Soft. She exhibits no distension. There is no tenderness.   Ecchymosis to bilateral lower quadrants at site of injection  Musculoskeletal:   No BLE edema, no leg pain or tenderness    No right groin hematoma   Neurological: She is alert and oriented to person, place    Significant Labs:  CBC:   Recent Labs   Lab 11/10/19  0336   WBC 23.16*   RBC 2.83*   HGB 8.1*   HCT 23.8*   *   MCV 84   MCH 28.6   MCHC 34.0     CMP:   Recent Labs   Lab 11/10/19  0336   *   CALCIUM 8.7   ALBUMIN 3.1*   PROT 6.4   *   K 3.7   CO2 17*      BUN 20   CREATININE 1.1   ALKPHOS 79   ALT 23   AST 41*   BILITOT 0.9     Coagulation:   Recent Labs   Lab 11/09/19  0445   LABPROT 11.5   INR 1.1       Significant Diagnostics:  I have reviewed all pertinent imaging results/findings within the past 24 hours.

## 2019-11-10 NOTE — ASSESSMENT & PLAN NOTE
+R psoas hematoma with active contrast extravasation on CTA  Pt received 2U pRBCs. Went to IR for embolization 11/9.  Monitoring CBC

## 2019-11-10 NOTE — PROGRESS NOTES
This patient's chart was reviewed by a stroke team provider.  Patient is with non-neurologic issues of greater urgency. Patient with retroperitoneal hematoma s/p lumbar artery embolization 11/9, now with new exam findings of significant RLQ tenderness to palpation/guarding, and BLQ ecchymosis this morning. Plan is for repeat CTA Abd/Pelvis with possible blood transfusion.     There is no new imaging to review. Pending diagnostics to follow up on include: CTA, possible IR angiogram.      There are no new recommendations at this time; for other recommendations please see our previous note completed on 11/9/19.   We will continue to follow peripherally until patient is ready for step down back to the floor. Discussed patient with staff. Please contact stroke team for any questions or concerns.        Carina Schwartz PA-C  [facility] - [id number]  Department of Vascular Neurology   [facility]

## 2019-11-10 NOTE — ASSESSMENT & PLAN NOTE
Cr 0.9 > 2.2 > 1.7  Likely related to multiple administrations of contrast in the last 24 hrs.  Per primary  Will follow CMPs

## 2019-11-10 NOTE — ASSESSMENT & PLAN NOTE
Pt had been c/o back pain in recent days, complicated by her hx of chronic back and sciatic nerve pain with use of Tizanidine and opiates at home.  Due to Hb downtrend and recent tPA administration, MRI Lumbar/Thoracic spine was ordered 11/8 which demonstrated large R psoas hematoma with IVC compression and probable thrombosis.   General Surgery was consulted; No acute intervention pursued.     Patient was then with acute decompensation overnight 11/9; became hypotensive, tachycardic, and apneic with Hb down to 6.7. Rapid Response was called; patient requiring intubation, blood transfusion, and transfer to medical ICU.   CTA abd/pelvis 11/9 showed hematoma with active extravasation/hemorrhage with IVC compression, hemoperitoneum.   Pt went to IR and is now s/p successful R L2 & L3 artery embolization.   Pt doing well clinically; was able to be extubated though is groggy from pain medications.  Hb now stable; Will continue to monitor.

## 2019-11-10 NOTE — PLAN OF CARE
POC reviewed with pt and family. Family verbalized understanding. Questions and concerns addressed. CBC trending indicated H&H dropping. CT of abdomen ordered. Bleeding of the abdomen found. Pt sent to IR to stop the bleeding. Will continue to monitor. See flowsheets for full assessment and VS info.

## 2019-11-10 NOTE — PROGRESS NOTES
[facility]  Vascular Neurology  Comprehensive Stroke Center  Progress Note    Assessment/Plan:     * Thrombotic stroke involving left middle cerebral artery  Melva Barker is a 74 y.o. female with PMHx of HTN, HLD, and DM who presented to OSH with acute worsening of R-sided weakness after having experienced R-sided weakness x1 month. She was treated with tPA at OSH. CTA without LVO. MRI with infarct in L internal capsule and corona radiata. Etiology likely small vessel disease.    Patient stepped up to Medical ICU overnight 11/9 due to complications associated with retroperitoneal hematoma (see below.) Extubated 11/9 and tolerating well.      Antithrombotics for secondary stroke prevention: Antiplatelets: ASA 81 mg + Plavix 75 mg -- Acutely HELD due to hematoma  Statins for secondary stroke prevention and hyperlipidemia, if present: Statins: Atorvastatin- 40 mg daily,   Aggressive risk factor modification: HTN, HLD, Diet  Rehab efforts: The patient has been evaluated by a stroke team provider and the therapy needs have been fully considered based off the presenting complaints and exam findings. The following therapy evaluations are needed: PT evaluate and treat, OT evaluate and treat, SLP evaluate and treat, PM&R evaluate for appropriate placement - Dispo inpatient rehab; Can d/c once medically ready.  Diagnostics ordered/pending: None   VTE prophylaxis: Mechanical SCDs only - Pharmacologic ppx acutely held due to hematoma.  BP parameters: Infarct: Post tPA, SBP <180; Avoid hypotension    Retroperitoneal hematoma  Pt had been c/o back pain in recent days, complicated by her hx of chronic back and sciatic nerve pain with use of Tizanidine and opiates at home.  Due to Hb downtrend and recent tPA administration, MRI Lumbar/Thoracic spine was ordered 11/8 which demonstrated large R psoas hematoma with IVC compression and probable thrombosis.   General Surgery was consulted; No acute  intervention pursued.     Patient was then with acute decompensation overnight 11/9; became hypotensive, tachycardic, and apneic with Hb down to 6.7. Rapid Response was called; patient requiring intubation, blood transfusion, and transfer to medical ICU.   CTA abd/pelvis 11/9 showed hematoma with active extravasation/hemorrhage with IVC compression, hemoperitoneum.   Pt went to IR and is now s/p successful R L2 & L3 artery embolization.   Pt doing well clinically; was able to be extubated though is groggy from pain medications.  Hb now stable; Will continue to monitor.    Nontraumatic hemorrhagic shock  +R psoas hematoma with active contrast extravasation on CTA  Pt received 2U pRBCs. Went to IR for embolization 11/9.  Monitoring CBC    Acute metabolic encephalopathy  2/2 hemorrhagic shock  Appeared to be improving as shock improved though pt now somewhat groggy in the setting of pain meds via PCA pump.  Will continue to monitor    Acute hypoxemic respiratory failure  2/2 hemorrhagic shock  Pt had required emergent intubation prior to ICU transfer.  Now extubated post-IR 11/9 and tolerating well.  Per primary    Leukocytosis  Likely acute reactive 2/2 active bleed  BCx 11/9 NGTD  Will monitor      FALLON (acute kidney injury)  Cr 0.9 > 2.2 > 1.7  Likely related to multiple administrations of contrast in the last 24 hrs.  Per primary  Will follow CMPs    Essential hypertension  Stroke risk factor  SBP goal normotension; Avoid hypotension  Patient had been on Benazepril 40 mg daily, HCTZ 25mg (increased from home dose of 6.25mg), and Bisoprolol 10 mg daily [combo med at home - Bisoprolol 10 mg + HCTZ 6.25 mg]  Then had started with episodes of hypotension and all BP meds were held with intermittent bolus requirement. Hypotension had appeared to be improving as of 11/7.    Pt hypotensive to 70/44 with acute decline on 11/9. Pt required fluid resuscitation, blood transfusion, and pressor support (temporarily.)  Per primary  team    Cytotoxic cerebral edema  Area of cytotoxic cerebral edema identified when reviewing brain imaging in the territory of the L middle cerebral artery. There is no mass effect associated with it. We will continue to monitor the patients clinical exam for any worsening of symptoms which may indicate expansion of the stroke or the area of the edema resulting in the clinical change. The pattern is suggestive of small vessel etiology.    Back pain  History of sciatic nerve pain. Had started home Tizanidine TID.  See Retroperitoneal hematoma section below    Diabetes mellitus type 2 without retinopathy  Stroke risk factor, poorly controlled on glargine 17 units daily  HbA1c 8.3%  Recommend tight glucose control  Currently on SSI   Diabetic diet    Gastroparesis  Reglan 10 mg TID before meals       11/1/19 MRI with small vessel L MCA infarct, therapy recommending rehab  11/2 - Patient stepped down overnight. Adjusting BP regimen. Patient with continuous complaints if nausea. IV Zofran ordered with some relief. Patient has not has BM since 10/30. Ordering KUB to rule out obstruction. Neuro exam unchanged.   11/3 - NAEON. Patient reports she had no episodes of vomiting overnight. Still complaining of nausea. Mild lower abdomen tenderness on exam. Lipase ordered and WNL. Continue to monitor.   11/4 - denies nausea and vomiting. Abd tenderness remains present on palpitation. Continue to monitor. HCTZ increased yesterday. Pending rehab placement.   11/5 -  N/V x1 overnight zofran resolved, sucralfate started. Placement pending   11/6 - hypotensive, held all BP meds,  ml bolus, responded well. Increased BUN/Cr, start NS 75ml/hr.   11/7 patient complaining of sciatica pain.  Restarted home tizanidine.  Patient requesting hydrocodone but educated patient that nerve pain is not treated with opioid.  Degenerative disease seen on xray but no fracture.      11/8 Patient continues to experience back pain.  Now with  leukocytosis and drop in h/h plan for MRI thoracic/lumbar spine to evaluate for epidural hematoma.    11/9: MRI Lumbar spine had demonstrated large R psoas hematoma with possible IVC compression; General Surgery consulted. Patient was then with acute decompensation overnight; became hypotensive, tachycardic, and apneic requiring intubation, blood transfusion, and medical ICU transfer. CTA abd/pelvis showed active extravasation/hemorrhage with hemoperitoneum. Pt now s/p successful lumbar artery embolization in IR. She was extubated on arrival back to ICU and tolerated well.    STROKE DOCUMENTATION   Acute Stroke Times   Last Known Normal Date: 10/31/19  Last Known Normal Time: 0630  Symptom Onset Date: 10/31/19  Symptom Onset Time: 0730  Stroke Team Called Date: 10/31/19  Stroke Team Called Time: 0936  Stroke Team Arrival Date: 10/31/19  Stroke Team Arrival Time: 0946  Decision to Treat Time for Alteplase: 1003    NIH Scale:  1a. Level of Consciousness: 0-->Alert, keenly responsive  1b. LOC Questions: 1-->Answers one question correctly  1c. LOC Commands: 0-->Performs both tasks correctly  2. Best Gaze: 0-->Normal  3. Visual: 0-->No visual loss  4. Facial Palsy: 1-->Minor paralysis (flattened nasolabial fold, asymmetry on smiling)  5a. Motor Arm, Left: 0-->No drift, limb holds 90 (or 45) degrees for full 10 secs  5b. Motor Arm, Right: 2-->Some effort against gravity, limb cannot get to or maintain (if cued) 90 (or 45) degrees, drifts down to bed, but has some effort against gravity  6a. Motor Leg, Left: 0-->No drift, leg holds 30 degree position for full 5 secs  6b. Motor Leg, Right: 3-->No effort against gravity, leg falls to bed immediately  7. Limb Ataxia: 0-->Absent  8. Sensory: 1-->Mild-to-moderate sensory loss, patient feels pinprick is less sharp or is dull on the affected side, or there is a loss of superficial pain with pinprick, but patient is aware of being touched  9. Best Language: 0-->No aphasia,  normal  10. Dysarthria: 1-->Mild-to-moderate dysarthria, patient slurs at least some words and, at worst, can be understood with some difficulty  11. Extinction and Inattention (formerly Neglect): 0-->No abnormality  Total (NIH Stroke Scale): 9       Modified Dayanna Score: 0  [place] Coma Scale:    ABCD2 Score:    [id number] Score:   HAS -BLED Score:   ICH Score:   Hunt & Amaya Classification:      Hemorrhagic change of an Ischemic Stroke: Does this patient have an ischemic stroke with hemorrhagic changes? No     Neurologic Chief Complaint: R-sided weakness -- L MCA    Subjective:     Interval History: Patient is seen for follow-up neurological assessment and treatment recommendations: DARREN. MRI Lumbar spine had demonstrated large R psoas hematoma with possible IVC compression; General Surgery consulted. Patient was then with acute decompensation overnight; became hypotensive, tachycardic, and apneic requiring intubation, blood transfusion, and medical ICU transfer. CTA abd/pelvis showed active extravasation/hemorrhage with hemoperitoneum. Pt now s/p successful lumbar artery embolization in IR. She was extubated on arrival back to ICU and tolerated well.    HPI, Past Medical, Family, and Social History remains the same as documented in the initial encounter.     Review of Systems   Constitutional: Negative for chills and fever.   Respiratory: Positive for apnea and shortness of breath.    Cardiovascular: Positive for palpitations. Negative for chest pain.   Musculoskeletal: Positive for arthralgias and back pain.        + right upper buttock pain  Now in lower spine    Neurological: Positive for facial asymmetry, speech difficulty and weakness.     Scheduled Meds:   chlorhexidine  15 mL Mouth/Throat BID    famotidine (PF)  20 mg Intravenous Daily     Continuous Infusions:   sodium chloride 0.9% 75 mL/hr at 11/09/19 1910    hydromorphone in 0.9 % NaCl 6 mg/30 ml       PRN Meds:sodium chloride, dextrose 10 % in  water (D10W), glucagon (human recombinant), insulin aspart U-100, naloxone, ondansetron, sodium chloride 0.9%    Objective:     Vital Signs (Most Recent):  Temp: 98.5 °F (36.9 °C) (11/09/19 1900)  Pulse: (!) 130 (11/09/19 1900)  Resp: (!) 24 (11/09/19 1900)  BP: (!) 152/78 (11/09/19 1900)  SpO2: 98 % (11/09/19 1900)  BP Location: Right arm    Vital Signs Range (Last 24H):  Temp:  [97.5 °F (36.4 °C)-98.5 °F (36.9 °C)]   Pulse:  []   Resp:  [12-30]   BP: ()/(42-95)   SpO2:  [92 %-100 %]   Arterial Line BP: (112-197)/()   BP Location: Right arm    Physical Exam   Constitutional: She appears well-developed and well-nourished. No distress.   HENT:   Head: Normocephalic and atraumatic.   Eyes: Conjunctivae and EOM are normal.   Cardiovascular: Tachycardia present.   Pulmonary/Chest: No respiratory distress.   On NC   Musculoskeletal: She exhibits no edema or deformity.   Pain from lumbar region to back of right leg    Neurological: A sensory deficit is present. She exhibits normal muscle tone.   Skin: Skin is warm and dry.   Psychiatric: Cognition and memory are impaired. She is inattentive.   Vitals reviewed.      Neurological Exam:   LOC: Drowsy  Attention Span: Poor 2/2 drowsiness likely related to pain medication  Language: No aphasia  Articulation: Mild dysarthria  Orientation: Person, Not oriented to Time   Visual Fields: Full  EOM (CN III, IV, VI): Full/intact  Facial Movement (CN VII): Lower facial weakness on the Right  Motor: Arm left  Normal 5/5  Leg left  Normal 5/5  Arm right  Paresis: 3/5   Leg right Paresis: 2/5 - Limited 2/2 pain  Sensation: mild decrease in sensation RUE  Tone: Normal tone throughout    Laboratory:  CMP:   Recent Labs   Lab 11/09/19  0445 11/09/19  0958   CALCIUM 8.6* 8.5*   ALBUMIN 3.3*  --    PROT 6.4  --    * 129*   K 3.5 3.6   CO2 20* 18*   CL 96 99   BUN 22 23   CREATININE 2.2* 1.7*   ALKPHOS 95  --    ALT 24  --    AST 38  --    BILITOT 1.1*  --      CBC:    Recent Labs   Lab 11/09/19  1823   WBC 25.35*   RBC 3.32*   HGB 9.4*   HCT 27.6*   *   MCV 83   MCH 28.3   MCHC 34.1     Lipid Panel:   No results for input(s): CHOL, LDLCALC, HDL, TRIG in the last 168 hours.  Coagulation:   Recent Labs   Lab 11/09/19  0445   INR 1.1     Platelet Aggregation Study: No results for input(s): PLTAGG, PLTAGINTERP, PLTAGREGLACO, ADPPLTAGGREG in the last 168 hours.  Hgb A1C:   No results for input(s): HGBA1C in the last 168 hours.  TSH:   No results for input(s): TSH in the last 168 hours.      Diagnostic Results     Brain Imaging     MRI Brain (11/01/19):  Acute lacunar type infarct coursing through the left posterior limb internal capsule and corona radiata.      CTH (10/31/19):  No acute intracranial pathology      Vessel Imaging     CTA Multiphase (10/31/19):  CTA head: Atherosclerotic disease of the cavernous and supraclinoid ICAs with mild narrowing.  Otherwise unremarkable CTA of the head specifically without evidence for proximal significant stenosis or occlusion.  CTA neck: Less than 50% proximal ICA stenosis by NASCET criteria.  Atherosclerotic disease with mild moderate narrowing of the right vertebral artery origin.  There is mild left V1 segment narrowing.  No significant focal vertebral artery stenosis or occlusion.  Interlobular septal thickening with tree in bud micro nodularity visualized upper lobes bilaterally which may represent inflammatory/infectious process clinical correlation and correlation with dedicated CT thorax recommended.  CT head: Bandlike region hypoattenuation left posterior limb internal capsule unchanged from prior suggestive for possible recent to subacute age infarction.  No evidence for hydrocephalus or acute intracranial hemorrhage.  Clinical correlation and further evaluation with MRI if patient compatible.      Cardiac Imaging   Echo (11/01/19)  · Increased (hyperdynamic) left ventricular systolic function. The estimated ejection  fraction is 75%.  · Concentric left ventricular remodeling.  · Normal LV diastolic function.  · No wall motion abnormalities.  · Normal right ventricular systolic function.  · Normal central venous pressure (3 mm Hg).  · Mild tricuspid regurgitation.  · The estimated PA systolic pressure is 26 mm Hg  · Echolucent structure next to the LA, likely representing hiatal hernia. Clinincal correlation is required.      Miscellaneous Imaging    IR Angiogram Visceral 11/9/19 pending    CTA Abdomen/Pelvis 11/9/19  1. Large right retroperitoneal hematoma with findings concerning for active arterial extravasation/hemorrhage as detailed above.  There is associated hemorrhage extending throughout the right abdomen and pelvis/pericolic gutter with associated mass effect on the right kidney, which is anteriorly displaced.  2. Decreased opacification of the infahepatic and infrarenal IVC, possibly secondary to underlying mass effect versus partial thrombosis.  3. Nonspecific cecal and right colonic wall thickening, possibly reactive due to hemoperitoneum.  4. Additional findings as detailed above.    MRI Thoracic/Lumbar Spine W WO Contr 11/8/19  Large hematoma within the right psoas muscle.  Compression and probable thrombosis of the IVC. Consider contrast enhanced CT with delayed phase.  Multilevel degenerative changes as detailed above, more severe in the lumbar spine.  Irregularity of the T12 through L3 endplates is most likely degenerative.      Carina Schwartz PA-C  Comprehensive Stroke Center  Department of Vascular Neurology   [facility]

## 2019-11-10 NOTE — ASSESSMENT & PLAN NOTE
2/2 hemorrhagic shock  Appeared to be improving as shock improved though pt now somewhat groggy in the setting of pain meds via PCA pump.  Will continue to monitor

## 2019-11-10 NOTE — ASSESSMENT & PLAN NOTE
Stroke risk factor, poorly controlled on glargine 17 units daily  HbA1c 8.3%  Recommend tight glucose control  Currently on SSI   Diabetic diet

## 2019-11-10 NOTE — CARE UPDATE
Patient returned from IR s/p embolization of L3 and L2 lumbar arteries intubated. She was extubated shortly after to NC and complained of severe pain. A PCA pump was initiated with ETCO2 detector in place. Will monitor CBC q6.    Critical care time 15 minutes      Job Rodriguez NP  Critical Care Medicine  [facility]

## 2019-11-10 NOTE — ASSESSMENT & PLAN NOTE
2/2 hemorrhagic shock  Pt had required emergent intubation prior to ICU transfer.  Now extubated post-IR 11/9 and tolerating well.  Per primary

## 2019-11-11 LAB
ALBUMIN SERPL BCP-MCNC: 3 G/DL (ref 3.5–5.2)
ALP SERPL-CCNC: 85 U/L (ref 55–135)
ALT SERPL W/O P-5'-P-CCNC: 23 U/L (ref 10–44)
ANION GAP SERPL CALC-SCNC: 11 MMOL/L (ref 8–16)
AST SERPL-CCNC: 48 U/L (ref 10–40)
BASOPHILS # BLD AUTO: 0.01 K/UL (ref 0–0.2)
BASOPHILS # BLD AUTO: 0.02 K/UL (ref 0–0.2)
BASOPHILS # BLD AUTO: 0.03 K/UL (ref 0–0.2)
BASOPHILS # BLD AUTO: 0.04 K/UL (ref 0–0.2)
BASOPHILS # BLD AUTO: 0.08 K/UL (ref 0–0.2)
BASOPHILS NFR BLD: 0.1 % (ref 0–1.9)
BASOPHILS NFR BLD: 0.2 % (ref 0–1.9)
BASOPHILS NFR BLD: 0.2 % (ref 0–1.9)
BASOPHILS NFR BLD: 0.3 % (ref 0–1.9)
BASOPHILS NFR BLD: 0.6 % (ref 0–1.9)
BILIRUB SERPL-MCNC: 0.7 MG/DL (ref 0.1–1)
BLD PROD TYP BPU: NORMAL
BLOOD UNIT EXPIRATION DATE: NORMAL
BLOOD UNIT TYPE CODE: 6200
BLOOD UNIT TYPE: NORMAL
BUN SERPL-MCNC: 11 MG/DL (ref 8–23)
CALCIUM SERPL-MCNC: 8.5 MG/DL (ref 8.7–10.5)
CHLORIDE SERPL-SCNC: 106 MMOL/L (ref 95–110)
CO2 SERPL-SCNC: 20 MMOL/L (ref 23–29)
CODING SYSTEM: NORMAL
CREAT SERPL-MCNC: 0.8 MG/DL (ref 0.5–1.4)
DIFFERENTIAL METHOD: ABNORMAL
DISPENSE STATUS: NORMAL
EOSINOPHIL # BLD AUTO: 0.1 K/UL (ref 0–0.5)
EOSINOPHIL NFR BLD: 0.4 % (ref 0–8)
EOSINOPHIL NFR BLD: 0.6 % (ref 0–8)
EOSINOPHIL NFR BLD: 0.6 % (ref 0–8)
EOSINOPHIL NFR BLD: 0.7 % (ref 0–8)
EOSINOPHIL NFR BLD: 1.1 % (ref 0–8)
ERYTHROCYTE [DISTWIDTH] IN BLOOD BY AUTOMATED COUNT: 14.6 % (ref 11.5–14.5)
ERYTHROCYTE [DISTWIDTH] IN BLOOD BY AUTOMATED COUNT: 14.8 % (ref 11.5–14.5)
ERYTHROCYTE [DISTWIDTH] IN BLOOD BY AUTOMATED COUNT: 14.9 % (ref 11.5–14.5)
ERYTHROCYTE [DISTWIDTH] IN BLOOD BY AUTOMATED COUNT: 15 % (ref 11.5–14.5)
ERYTHROCYTE [DISTWIDTH] IN BLOOD BY AUTOMATED COUNT: 15.1 % (ref 11.5–14.5)
EST. GFR  (AFRICAN AMERICAN): >60 ML/MIN/1.73 M^2
EST. GFR  (NON AFRICAN AMERICAN): >60 ML/MIN/1.73 M^2
GLUCOSE SERPL-MCNC: 146 MG/DL (ref 70–110)
HCT VFR BLD AUTO: 20.3 % (ref 37–48.5)
HCT VFR BLD AUTO: 21 % (ref 37–48.5)
HCT VFR BLD AUTO: 24.6 % (ref 37–48.5)
HCT VFR BLD AUTO: 25.2 % (ref 37–48.5)
HCT VFR BLD AUTO: 26.1 % (ref 37–48.5)
HGB BLD-MCNC: 6.7 G/DL (ref 12–16)
HGB BLD-MCNC: 6.7 G/DL (ref 12–16)
HGB BLD-MCNC: 8.1 G/DL (ref 12–16)
HGB BLD-MCNC: 8.2 G/DL (ref 12–16)
HGB BLD-MCNC: 8.2 G/DL (ref 12–16)
IMM GRANULOCYTES # BLD AUTO: 0.07 K/UL (ref 0–0.04)
IMM GRANULOCYTES # BLD AUTO: 0.09 K/UL (ref 0–0.04)
IMM GRANULOCYTES # BLD AUTO: 0.11 K/UL (ref 0–0.04)
IMM GRANULOCYTES # BLD AUTO: 0.13 K/UL (ref 0–0.04)
IMM GRANULOCYTES # BLD AUTO: 0.14 K/UL (ref 0–0.04)
IMM GRANULOCYTES NFR BLD AUTO: 0.6 % (ref 0–0.5)
IMM GRANULOCYTES NFR BLD AUTO: 0.6 % (ref 0–0.5)
IMM GRANULOCYTES NFR BLD AUTO: 0.8 % (ref 0–0.5)
IMM GRANULOCYTES NFR BLD AUTO: 0.8 % (ref 0–0.5)
IMM GRANULOCYTES NFR BLD AUTO: 1 % (ref 0–0.5)
LYMPHOCYTES # BLD AUTO: 1.5 K/UL (ref 1–4.8)
LYMPHOCYTES # BLD AUTO: 1.7 K/UL (ref 1–4.8)
LYMPHOCYTES # BLD AUTO: 1.9 K/UL (ref 1–4.8)
LYMPHOCYTES # BLD AUTO: 1.9 K/UL (ref 1–4.8)
LYMPHOCYTES # BLD AUTO: 2.1 K/UL (ref 1–4.8)
LYMPHOCYTES NFR BLD: 10.4 % (ref 18–48)
LYMPHOCYTES NFR BLD: 11 % (ref 18–48)
LYMPHOCYTES NFR BLD: 13.2 % (ref 18–48)
LYMPHOCYTES NFR BLD: 14.5 % (ref 18–48)
LYMPHOCYTES NFR BLD: 16.3 % (ref 18–48)
MAGNESIUM SERPL-MCNC: 2.1 MG/DL (ref 1.6–2.6)
MCH RBC QN AUTO: 28.2 PG (ref 27–31)
MCH RBC QN AUTO: 28.6 PG (ref 27–31)
MCH RBC QN AUTO: 28.8 PG (ref 27–31)
MCH RBC QN AUTO: 29 PG (ref 27–31)
MCH RBC QN AUTO: 29.4 PG (ref 27–31)
MCHC RBC AUTO-ENTMCNC: 31.4 G/DL (ref 32–36)
MCHC RBC AUTO-ENTMCNC: 31.9 G/DL (ref 32–36)
MCHC RBC AUTO-ENTMCNC: 32.1 G/DL (ref 32–36)
MCHC RBC AUTO-ENTMCNC: 33 G/DL (ref 32–36)
MCHC RBC AUTO-ENTMCNC: 33.3 G/DL (ref 32–36)
MCV RBC AUTO: 87 FL (ref 82–98)
MCV RBC AUTO: 88 FL (ref 82–98)
MCV RBC AUTO: 88 FL (ref 82–98)
MCV RBC AUTO: 89 FL (ref 82–98)
MCV RBC AUTO: 92 FL (ref 82–98)
MONOCYTES # BLD AUTO: 1.1 K/UL (ref 0.3–1)
MONOCYTES # BLD AUTO: 1.2 K/UL (ref 0.3–1)
MONOCYTES # BLD AUTO: 1.3 K/UL (ref 0.3–1)
MONOCYTES # BLD AUTO: 1.4 K/UL (ref 0.3–1)
MONOCYTES # BLD AUTO: 1.5 K/UL (ref 0.3–1)
MONOCYTES NFR BLD: 8.9 % (ref 4–15)
MONOCYTES NFR BLD: 9 % (ref 4–15)
MONOCYTES NFR BLD: 9.2 % (ref 4–15)
MONOCYTES NFR BLD: 9.7 % (ref 4–15)
MONOCYTES NFR BLD: 9.9 % (ref 4–15)
NEUTROPHILS # BLD AUTO: 10.4 K/UL (ref 1.8–7.7)
NEUTROPHILS # BLD AUTO: 10.7 K/UL (ref 1.8–7.7)
NEUTROPHILS # BLD AUTO: 11.1 K/UL (ref 1.8–7.7)
NEUTROPHILS # BLD AUTO: 13.2 K/UL (ref 1.8–7.7)
NEUTROPHILS # BLD AUTO: 8.4 K/UL (ref 1.8–7.7)
NEUTROPHILS NFR BLD: 72.1 % (ref 38–73)
NEUTROPHILS NFR BLD: 73.9 % (ref 38–73)
NEUTROPHILS NFR BLD: 76.5 % (ref 38–73)
NEUTROPHILS NFR BLD: 77.9 % (ref 38–73)
NEUTROPHILS NFR BLD: 78.9 % (ref 38–73)
NRBC BLD-RTO: 0 /100 WBC
PHOSPHATE SERPL-MCNC: 2.8 MG/DL (ref 2.7–4.5)
PLATELET # BLD AUTO: 112 K/UL (ref 150–350)
PLATELET # BLD AUTO: 119 K/UL (ref 150–350)
PLATELET # BLD AUTO: 131 K/UL (ref 150–350)
PLATELET # BLD AUTO: 133 K/UL (ref 150–350)
PLATELET # BLD AUTO: 139 K/UL (ref 150–350)
PMV BLD AUTO: 11.3 FL (ref 9.2–12.9)
PMV BLD AUTO: 11.3 FL (ref 9.2–12.9)
PMV BLD AUTO: 11.4 FL (ref 9.2–12.9)
PMV BLD AUTO: 11.4 FL (ref 9.2–12.9)
PMV BLD AUTO: 11.7 FL (ref 9.2–12.9)
POCT GLUCOSE: 131 MG/DL (ref 70–110)
POTASSIUM SERPL-SCNC: 3.6 MMOL/L (ref 3.5–5.1)
PROT SERPL-MCNC: 6.2 G/DL (ref 6–8.4)
RBC # BLD AUTO: 2.33 M/UL (ref 4–5.4)
RBC # BLD AUTO: 2.38 M/UL (ref 4–5.4)
RBC # BLD AUTO: 2.79 M/UL (ref 4–5.4)
RBC # BLD AUTO: 2.83 M/UL (ref 4–5.4)
RBC # BLD AUTO: 2.83 M/UL (ref 4–5.4)
SODIUM SERPL-SCNC: 137 MMOL/L (ref 136–145)
TRANS ERYTHROCYTES VOL PATIENT: NORMAL ML
WBC # BLD AUTO: 11.59 K/UL (ref 3.9–12.7)
WBC # BLD AUTO: 13.33 K/UL (ref 3.9–12.7)
WBC # BLD AUTO: 14.44 K/UL (ref 3.9–12.7)
WBC # BLD AUTO: 14.46 K/UL (ref 3.9–12.7)
WBC # BLD AUTO: 16.69 K/UL (ref 3.9–12.7)

## 2019-11-11 PROCEDURE — 94761 N-INVAS EAR/PLS OXIMETRY MLT: CPT

## 2019-11-11 PROCEDURE — 99233 PR SUBSEQUENT HOSPITAL CARE,LEVL III: ICD-10-PCS | Mod: GC,,, | Performed by: PSYCHIATRY & NEUROLOGY

## 2019-11-11 PROCEDURE — 99291 PR CRITICAL CARE, E/M 30-74 MINUTES: ICD-10-PCS | Mod: ,,, | Performed by: NURSE PRACTITIONER

## 2019-11-11 PROCEDURE — 25000003 PHARM REV CODE 250: Performed by: NURSE PRACTITIONER

## 2019-11-11 PROCEDURE — 20000000 HC ICU ROOM

## 2019-11-11 PROCEDURE — 99900035 HC TECH TIME PER 15 MIN (STAT)

## 2019-11-11 PROCEDURE — 99233 SBSQ HOSP IP/OBS HIGH 50: CPT | Mod: GC,,, | Performed by: PSYCHIATRY & NEUROLOGY

## 2019-11-11 PROCEDURE — 36415 COLL VENOUS BLD VENIPUNCTURE: CPT

## 2019-11-11 PROCEDURE — 63600175 PHARM REV CODE 636 W HCPCS: Performed by: NURSE PRACTITIONER

## 2019-11-11 PROCEDURE — 84100 ASSAY OF PHOSPHORUS: CPT

## 2019-11-11 PROCEDURE — 27000221 HC OXYGEN, UP TO 24 HOURS

## 2019-11-11 PROCEDURE — 80053 COMPREHEN METABOLIC PANEL: CPT

## 2019-11-11 PROCEDURE — 99291 CRITICAL CARE FIRST HOUR: CPT | Mod: ,,, | Performed by: NURSE PRACTITIONER

## 2019-11-11 PROCEDURE — 85025 COMPLETE CBC W/AUTO DIFF WBC: CPT | Mod: 91

## 2019-11-11 PROCEDURE — P9021 RED BLOOD CELLS UNIT: HCPCS

## 2019-11-11 PROCEDURE — 83735 ASSAY OF MAGNESIUM: CPT

## 2019-11-11 PROCEDURE — 94770 HC EXHALED C02 TEST: CPT

## 2019-11-11 RX ORDER — POLYETHYLENE GLYCOL 3350 17 G/17G
17 POWDER, FOR SOLUTION ORAL DAILY
Status: DISCONTINUED | OUTPATIENT
Start: 2019-11-12 | End: 2019-12-03 | Stop reason: HOSPADM

## 2019-11-11 RX ORDER — OXYCODONE HYDROCHLORIDE 5 MG/1
5 TABLET ORAL EVERY 6 HOURS PRN
Status: DISCONTINUED | OUTPATIENT
Start: 2019-11-11 | End: 2019-11-12

## 2019-11-11 RX ORDER — HYDROCODONE BITARTRATE AND ACETAMINOPHEN 500; 5 MG/1; MG/1
TABLET ORAL
Status: DISCONTINUED | OUTPATIENT
Start: 2019-11-11 | End: 2019-11-21

## 2019-11-11 RX ADMIN — OXYCODONE HYDROCHLORIDE 5 MG: 5 TABLET ORAL at 04:11

## 2019-11-11 RX ADMIN — ONDANSETRON 4 MG: 2 INJECTION INTRAMUSCULAR; INTRAVENOUS at 12:11

## 2019-11-11 RX ADMIN — Medication: at 02:11

## 2019-11-11 RX ADMIN — ONDANSETRON 4 MG: 2 INJECTION INTRAMUSCULAR; INTRAVENOUS at 05:11

## 2019-11-11 RX ADMIN — Medication: at 08:11

## 2019-11-11 NOTE — ASSESSMENT & PLAN NOTE
73 yo female with recent hx of CVA s/p TPA now with right retroperitoneal hematoma    Recommend to continue trend serial H/H, with transfusion as needed.   No surgical concerns as this time   If patient decompensates, please call surgery on call.   Please call with questions

## 2019-11-11 NOTE — PHYSICIAN QUERY
"PT Name: Melva Barker  MR #: [id number]    Physician Query Form - Hematology Clarification      CDS/: Mary Steel RN, CDS               Contact information: [email or web address]    This form is a permanent document in the medical record.      Query Date: November 11, 2019    By submitting this query, we are merely seeking further clarification of documentation. Please utilize your independent clinical judgment when addressing the question(s) below.    The Medical record contains the following:   Indicators  Supporting Clinical Findings Location in Medical Record    "Anemia" documented     x H & H = --H/H: 13/42.1->10.6/32.8->8.8/27.5->6.2/20.7   Labs 11/6->11/9   x BP =                     HR= --BP: 108/55->90/55->80/43  --HR: 94->113->123   VS flowsheet 11/8    "GI bleeding" documented     x Acute bleeding (Non GI site) --noted to have a large psoas hematoma on lumbar MRI on the evening of 11/8.    --Retroperitoneal hematoma Vas Neuro Event Note 11/9      Vas Neuro Note 11/9     x Transfusion(s) --s/p PRBC x 2 Units   Blood bank flowsheet   x Treatment: --returned from IR s/p embolization of L3 and L2 lumbar arteries    [facility] Note 11/9   x Other:  --Nontraumatic hemorrhagic shock Vas neuro note 11/9       Provider, please specify diagnosis or diagnoses associated with above clinical findings.    [ x ] Acute blood loss anemia   [ x ] Precipitous drop in Hematocrit     [  ] Other Hematological Diagnosis (please specify):     [  ] Clinically Undetermined       Please document in your progress notes daily for the duration of treatment, until resolved, and include in your discharge summary.                                                                                                      "

## 2019-11-11 NOTE — ASSESSMENT & PLAN NOTE
--left psoas hematoma seen on MRI imaging  --CTA shows active contrast extravasation right perinephric  --to IR for angiogram on 11/09  --s/p embolization of L2/L3 arteries  --Hgb continues to downtrend repeat CTA and angiogram on 11/10 without evidence of continued arterial bleed  --transfuse RBC with FFP/plt for Hgb <7  --CBC q6

## 2019-11-11 NOTE — PROGRESS NOTES
[facility]  General Surgery  Progress Note    Subjective:     History of Present Illness:  Melva Barker is a 74 y.o. F who was admitted to [facility] [place] for L MCA stroke.  She received TPA at outside hospital.  Patient was doing overall well and in line for rehab placement when she had drop in H/H and new onset lower back pain the last 2 days.  Hgb dropped from 12->10->8.8.  Hgb has been stable over the last 24 hours.  Concern for epidural hematoma so MRI was ordered.  Psoas hematoma was noted on MRI.  Surgery was consulted for evaluation.      Upon assessment, patient complains of some Left back pain.  States that it started about 2 days ago.  Denies any leg pain.  She has not had any falls.  No recent cath procedures in her groin.      Post-Op Info:  * No surgery found *         Interval History: NAEON. Mild tachycardia, currently low 100s. BP stable. Received 1uPRBC overnight due to down trending h/h. Continues to complain of back pain and right sided abdominal pain.    Medications:  Continuous Infusions:   sodium chloride 0.9% 50 mL/hr at 11/11/19 0500    hydromorphone in 0.9 % NaCl 6 mg/30 ml       Scheduled Meds:    PRN Meds:sodium chloride, dextrose 10 % in water (D10W), glucagon (human recombinant), insulin aspart U-100, naloxone, ondansetron, sodium chloride 0.9%     Review of patient's allergies indicates:   Allergen Reactions    Morphine Other (See Comments)     headache    Latex, natural rubber Rash     Objective:     Vital Signs (Most Recent):  Temp: 98.5 °F (36.9 °C) (11/11/19 0415)  Pulse: 102 (11/11/19 0600)  Resp: 15 (11/11/19 0600)  BP: (!) 154/74 (11/11/19 0600)  SpO2: 100 % (11/11/19 0600) Vital Signs (24h Range):  Temp:  [98.4 °F (36.9 °C)-98.8 °F (37.1 °C)] 98.5 °F (36.9 °C)  Pulse:  [100-123] 102  Resp:  [7-28] 15  SpO2:  [69 %-100 %] 100 %  BP: (131-178)/(60-99) 154/74  Arterial Line BP: ()/() 126/91     Weight: 56.3 kg (124 lb 1.9 oz)  Body mass index is  24.24 kg/m².    Intake/Output - Last 3 Shifts       11/09 0700 - 11/10 0659 11/10 0700 - 11/11 0659    I.V. (mL/kg) 1502.5 (26.6) 722.5 (12.8)    Blood  300    Total Intake(mL/kg) 1502.5 (26.6) 1022.5 (18.2)    Urine (mL/kg/hr) 695 (0.5) 1313 (1)    Stool 0 0    Total Output 695 1313    Net +807.5 -290.5          Stool Occurrence 0 x 0 x          Physical Exam     Constitutional: She is oriented to person, place, and time. She appears well-developed and well-nourished. No distress.   Cardiovascular: Normal rate.   Pulmonary/Chest: Effort normal. No respiratory distress.   Abdominal: Soft. She exhibits no distension. Right sided abdominal and back pain- tenderness to palpation.    Ecchymosis to bilateral lower quadrants at site of injection  Musculoskeletal:   No BLE edema, no leg pain or tenderness    No right groin hematoma   Neurological: She is alert and oriented to person, place    Significant Labs:  CBC:   Recent Labs   Lab 11/11/19  0558   WBC 16.69*   RBC 2.83*   HGB 8.1*   HCT 25.2*   *   MCV 89   MCH 28.6   MCHC 32.1     CMP:   Recent Labs   Lab 11/11/19  0216   *   CALCIUM 8.5*   ALBUMIN 3.0*   PROT 6.2      K 3.6   CO2 20*      BUN 11   CREATININE 0.8   ALKPHOS 85   ALT 23   AST 48*   BILITOT 0.7     Coagulation:   Recent Labs   Lab 11/09/19  0445   LABPROT 11.5   INR 1.1       Significant Diagnostics:  I have reviewed all pertinent imaging results/findings within the past 24 hours.    Assessment/Plan:     Retroperitoneal hematoma  75 yo female with recent hx of CVA s/p TPA now with right retroperitoneal hematoma    Recommend to continue trend serial H/H, with transfusion as needed.   No surgical concerns as this time   If patient decompensates, please call surgery on call.   Please call with questions         Keila Sun MD  General Surgery  [facility]

## 2019-11-11 NOTE — ASSESSMENT & PLAN NOTE
Melva Barker is a 74 y.o. female with PMHx of HTN, HLD, and DM who presented to OSH with acute worsening of R-sided weakness after having experienced R-sided weakness x1 month. She was treated with tPA at OSH. CTA without LVO. MRI with infarct in L internal capsule and corona radiata. Etiology likely small vessel disease.    Patient stepped up to Medical ICU overnight 11/9 due to complications associated with retroperitoneal hematoma (see below.) Extubated 11/9 and tolerating well.    Patient underwent IR re-evaluation on 11/10. No arterial bleed evident on IR angiography. Patient neuro exam stable.    Antithrombotics for secondary stroke prevention: Antiplatelets: ASA 81 mg + Plavix 75 mg -- Acutely HELD due to hematoma  Statins for secondary stroke prevention and hyperlipidemia, if present: Statins: Atorvastatin- 40 mg daily,   Aggressive risk factor modification: HTN, HLD, Diet  Rehab efforts: The patient has been evaluated by a stroke team provider and the therapy needs have been fully considered based off the presenting complaints and exam findings. The following therapy evaluations are needed: PT evaluate and treat, OT evaluate and treat, SLP evaluate and treat, PM&R evaluate for appropriate placement - Dispo inpatient rehab; Can d/c once medically ready.  Diagnostics ordered/pending: None   VTE prophylaxis: Mechanical SCDs only - Pharmacologic ppx acutely held due to hematoma.  BP parameters: Infarct: Post tPA, SBP <180; Avoid hypotension

## 2019-11-11 NOTE — PROCEDURES
Radiology Post-Procedure Note    Pre Op Diagnosis: Retroperitoneal hematoma  Post Op Diagnosis: Same    Procedure: Angiogram    Procedure performed by: Dixon Day MD    Written Informed Consent Obtained: Yes  Specimen Removed: NO  Estimated Blood Loss: Minimal    Findings:   L CFA access.  5F sheath placed.  Selective catheterization of the right renal artery, lower pole segmental branch, SMA, multiple right lumbar branches, and abdominal aortogram.  No evidence of active contrast extravasation seen to suggest recurrent bleeding.  CBCT also performed x2, again showing no evidence of contrast extravasation to suggest bleeding.  Catheters and sheath removed.  Hemostasis with Angioseal closure device.  No complications.    Patient tolerated procedure well.    Dixon Day MD  Diagnostic and Interventional Radiologist  Department of Radiology  Pager: [phone]

## 2019-11-11 NOTE — PLAN OF CARE
Procedure complete, pt tolerated well. Closure device deployed at 1803. Site clean dry intact. Pt to lie flat for two hours. Pt returning to room in care of ICU RN. Pedal pulses same as prior to procedure; right +2; left +1

## 2019-11-11 NOTE — ASSESSMENT & PLAN NOTE
Pt had been c/o back pain in recent days, complicated by her hx of chronic back and sciatic nerve pain with use of Tizanidine and opiates at home.  Due to Hb downtrend and recent tPA administration, MRI Lumbar/Thoracic spine was ordered 11/8 which demonstrated large R psoas hematoma with IVC compression and probable thrombosis.   General Surgery was consulted; No acute intervention pursued.     Patient was then with acute decompensation overnight 11/9; became hypotensive, tachycardic, and apneic with Hb down to 6.7. Rapid Response was called; patient requiring intubation, blood transfusion, and transfer to medical ICU.   CTA abd/pelvis 11/9 showed hematoma with active extravasation/hemorrhage with IVC compression, hemoperitoneum.   Pt went to IR and is now s/p successful R L2 & L3 artery embolization.   Pt doing well clinically; was able to be extubated though is groggy from pain medications.  Patient underwent IR re-evaluation on 11/11. No arterial bleed evident on IR angiography.    Hb now stable; Will continue to monitor.

## 2019-11-11 NOTE — PROGRESS NOTES
[facility]  Vascular Neurology  Comprehensive Stroke Center  Progress Note    Assessment/Plan:     * Thrombotic stroke involving left middle cerebral artery  Melva Barker is a 74 y.o. female with PMHx of HTN, HLD, and DM who presented to OSH with acute worsening of R-sided weakness after having experienced R-sided weakness x1 month. She was treated with tPA at OSH. CTA without LVO. MRI with infarct in L internal capsule and corona radiata. Etiology likely small vessel disease.    Patient stepped up to Medical ICU overnight 11/9 due to complications associated with retroperitoneal hematoma (see below.) Extubated 11/9 and tolerating well.    Patient underwent IR re-evaluation on 11/10. No arterial bleed evident on IR angiography. Patient neuro exam stable.    Antithrombotics for secondary stroke prevention: Antiplatelets: ASA 81 mg + Plavix 75 mg -- Acutely HELD due to hematoma  Statins for secondary stroke prevention and hyperlipidemia, if present: Statins: Atorvastatin- 40 mg daily,   Aggressive risk factor modification: HTN, HLD, Diet  Rehab efforts: The patient has been evaluated by a stroke team provider and the therapy needs have been fully considered based off the presenting complaints and exam findings. The following therapy evaluations are needed: PT evaluate and treat, OT evaluate and treat, SLP evaluate and treat, PM&R evaluate for appropriate placement - Dispo inpatient rehab; Can d/c once medically ready.  Diagnostics ordered/pending: None   VTE prophylaxis: Mechanical SCDs only - Pharmacologic ppx acutely held due to hematoma.  BP parameters: Infarct: Post tPA, SBP <180; Avoid hypotension    Nontraumatic hemorrhagic shock  +R psoas hematoma with active contrast extravasation on CTA  Pt received 2U pRBCs. Went to IR for embolization 11/9.  1 unit transfused on 11/11  Monitoring CBC    Acute hypoxemic respiratory failure  2/2 hemorrhagic shock  Pt had required emergent  intubation prior to ICU transfer.  Now extubated post-IR 11/9 and tolerating well.  Per primary    Retroperitoneal hematoma  Pt had been c/o back pain in recent days, complicated by her hx of chronic back and sciatic nerve pain with use of Tizanidine and opiates at home.  Due to Hb downtrend and recent tPA administration, MRI Lumbar/Thoracic spine was ordered 11/8 which demonstrated large R psoas hematoma with IVC compression and probable thrombosis.   General Surgery was consulted; No acute intervention pursued.     Patient was then with acute decompensation overnight 11/9; became hypotensive, tachycardic, and apneic with Hb down to 6.7. Rapid Response was called; patient requiring intubation, blood transfusion, and transfer to medical ICU.   CTA abd/pelvis 11/9 showed hematoma with active extravasation/hemorrhage with IVC compression, hemoperitoneum.   Pt went to IR and is now s/p successful R L2 & L3 artery embolization.   Pt doing well clinically; was able to be extubated though is groggy from pain medications.  Patient underwent IR re-evaluation on 11/11. No arterial bleed evident on IR angiography.    Hb now stable; Will continue to monitor.    FALLON (acute kidney injury)  Cr now WNL  Likely related to multiple administrations of contrast in the last 24 hrs.  Per primary  Will follow CMPs    Cytotoxic cerebral edema  Area of cytotoxic cerebral edema identified when reviewing brain imaging in the territory of the L middle cerebral artery. There is no mass effect associated with it. We will continue to monitor the patients clinical exam for any worsening of symptoms which may indicate expansion of the stroke or the area of the edema resulting in the clinical change. The pattern is suggestive of small vessel etiology.    Essential hypertension  Stroke risk factor  SBP goal normotension; Avoid hypotension  Patient had been on Benazepril 40 mg daily, HCTZ 25mg (increased from home dose of 6.25mg), and Bisoprolol 10  mg daily [combo med at home - Bisoprolol 10 mg + HCTZ 6.25 mg]  Then had started with episodes of hypotension and all BP meds were held with intermittent bolus requirement. Hypotension had appeared to be improving as of 11/7.    Pt hypotensive to 70/44 with acute decline on 11/9. Pt required fluid resuscitation, blood transfusion, and pressor support (temporarily.)    11/11: -182  Per primary team    Back pain  History of sciatic nerve pain. On PCA pump  See Retroperitoneal hematoma section below    Gastroparesis  Previously on Reglan 10 mg TID before meals    Diabetes mellitus type 2 without retinopathy  Stroke risk factor, poorly controlled on glargine 17 units daily  HbA1c 8.3%  Recommend tight glucose control  Currently on SSI   Diabetic diet    Leukocytosis  Likely acute reactive 2/2 active bleed  BCx 11/9 NGTD  Trending downward            11/1/19 MRI with small vessel L MCA infarct, therapy recommending rehab  11/2 - Patient stepped down overnight. Adjusting BP regimen. Patient with continuous complaints if nausea. IV Zofran ordered with some relief. Patient has not has BM since 10/30. Ordering KUB to rule out obstruction. Neuro exam unchanged.   11/3 - NAEON. Patient reports she had no episodes of vomiting overnight. Still complaining of nausea. Mild lower abdomen tenderness on exam. Lipase ordered and WNL. Continue to monitor.   11/4 - denies nausea and vomiting. Abd tenderness remains present on palpitation. Continue to monitor. HCTZ increased yesterday. Pending rehab placement.   11/5 -  N/V x1 overnight zofran resolved, sucralfate started. Placement pending   11/6 - hypotensive, held all BP meds,  ml bolus, responded well. Increased BUN/Cr, start NS 75ml/hr.   11/7 patient complaining of sciatica pain.  Restarted home tizanidine.  Patient requesting hydrocodone but educated patient that nerve pain is not treated with opioid.  Degenerative disease seen on xray but no fracture.      11/8  Patient continues to experience back pain.  Now with leukocytosis and drop in h/h plan for MRI thoracic/lumbar spine to evaluate for epidural hematoma.    11/9: MRI Lumbar spine had demonstrated large R psoas hematoma with possible IVC compression; General Surgery consulted. Patient was then with acute decompensation overnight; became hypotensive, tachycardic, and apneic requiring intubation, blood transfusion, and medical ICU transfer. CTA abd/pelvis showed active extravasation/hemorrhage with hemoperitoneum. Pt now s/p successful lumbar artery embolization in IR. She was extubated on arrival back to ICU and tolerated well.    11/11: Patient underwent IR re-evaluation yesterday. No arterial bleed evident on IR angiography. Patient neuro exam stable.     STROKE DOCUMENTATION   Acute Stroke Times   Last Known Normal Date: 10/31/19  Last Known Normal Time: 0630  Symptom Onset Date: 10/31/19  Symptom Onset Time: 0730  Stroke Team Called Date: 10/31/19  Stroke Team Called Time: 0936  Stroke Team Arrival Date: 10/31/19  Stroke Team Arrival Time: 0946  Decision to Treat Time for Alteplase: 1003    NIH Scale:  1a. Level of Consciousness: 0-->Alert, keenly responsive  1b. LOC Questions: 1-->Answers one question correctly  1c. LOC Commands: 0-->Performs both tasks correctly  2. Best Gaze: 0-->Normal  3. Visual: 0-->No visual loss  4. Facial Palsy: 1-->Minor paralysis (flattened nasolabial fold, asymmetry on smiling)  5a. Motor Arm, Left: 0-->No drift, limb holds 90 (or 45) degrees for full 10 secs  5b. Motor Arm, Right: 2-->Some effort against gravity, limb cannot get to or maintain (if cued) 90 (or 45) degrees, drifts down to bed, but has some effort against gravity  6a. Motor Leg, Left: 0-->No drift, leg holds 30 degree position for full 5 secs  6b. Motor Leg, Right: 3-->No effort against gravity, leg falls to bed immediately  7. Limb Ataxia: 0-->Absent  8. Sensory: 1-->Mild-to-moderate sensory loss, patient feels pinprick  is less sharp or is dull on the affected side, or there is a loss of superficial pain with pinprick, but patient is aware of being touched  9. Best Language: 0-->No aphasia, normal  10. Dysarthria: 1-->Mild-to-moderate dysarthria, patient slurs at least some words and, at worst, can be understood with some difficulty  11. Extinction and Inattention (formerly Neglect): 0-->No abnormality  Total (NIH Stroke Scale): 9       Modified Spalding Score: 0  Tabor Coma Scale:    ABCD2 Score:    [id number] Score:   HAS -BLED Score:   ICH Score:   Hunt & Amaya Classification:      Hemorrhagic change of an Ischemic Stroke: Does this patient have an ischemic stroke with hemorrhagic changes? No     Neurologic Chief Complaint: R-sided weakness -- L MCA    Subjective:     Interval History: Patient is seen for follow-up neurological assessment and treatment recommendations:     Patient underwent IR re-evaluation yesterday. No arterial bleed evident on IR angiography. Patient neuro exam stable.      HPI, Past Medical, Family, and Social History remains the same as documented in the initial encounter.     Review of Systems   Constitutional: Negative for chills and fever.   Respiratory: Negative for apnea, shortness of breath and wheezing.    Cardiovascular: Negative for chest pain and palpitations.   Musculoskeletal: Positive for arthralgias and back pain.   Neurological: Positive for facial asymmetry, speech difficulty and weakness.     Scheduled Meds:   [START ON 11/12/2019] polyethylene glycol  17 g Oral Daily     Continuous Infusions:   sodium chloride 0.9% 50 mL/hr at 11/11/19 1610    hydromorphone in 0.9 % NaCl 6 mg/30 ml       PRN Meds:sodium chloride, dextrose 10 % in water (D10W), glucagon (human recombinant), insulin aspart U-100, naloxone, ondansetron, oxyCODONE, sodium chloride 0.9%    Objective:     Vital Signs (Most Recent):  Temp: 98.2 °F (36.8 °C) (11/11/19 1600)  Pulse: 103 (11/11/19 1600)  Resp: 19 (11/11/19  1600)  BP: (!) 175/87 (11/11/19 1600)  SpO2: 100 % (11/11/19 1600)  BP Location: Right arm    Vital Signs Range (Last 24H):  Temp:  [98.2 °F (36.8 °C)-98.8 °F (37.1 °C)]   Pulse:  []   Resp:  [7-28]   BP: (139-182)/(60-92)   SpO2:  [95 %-100 %]   Arterial Line BP: ()/()   BP Location: Right arm    Physical Exam   Constitutional: She appears well-developed and well-nourished. No distress.   HENT:   Head: Normocephalic and atraumatic.   Eyes: Conjunctivae and EOM are normal.   Cardiovascular: Tachycardia present.   Pulmonary/Chest: No respiratory distress.   On NC   Musculoskeletal: She exhibits no edema or deformity.   Pain- lumbar region    Neurological: A sensory deficit is present. She exhibits normal muscle tone.   Skin: Skin is warm and dry.   Psychiatric: Cognition and memory are impaired. She is inattentive.   Vitals reviewed.      Neurological Exam:   LOC: Drowsy  Attention Span: good  Language: No aphasia  Articulation: Mild dysarthria  Orientation: Person, Not oriented to Time   Visual Fields: Full  EOM (CN III, IV, VI): Full/intact  Facial Movement (CN VII): Lower facial weakness on the Right  Motor: Arm left  Normal 5/5  Leg left  Normal 5/5  Arm right  Paresis: 3/5   Leg right Paresis: 2/5 - Limited 2/2 pain  Sensation: mild decrease in sensation RUE  Tone: Normal tone throughout    Laboratory:  CMP:   Recent Labs   Lab 11/11/19  0216   CALCIUM 8.5*   ALBUMIN 3.0*   PROT 6.2      K 3.6   CO2 20*      BUN 11   CREATININE 0.8   ALKPHOS 85   ALT 23   AST 48*   BILITOT 0.7     CBC:   Recent Labs   Lab 11/11/19  1238   WBC 13.33*   RBC 2.83*   HGB 8.2*   HCT 26.1*   *   MCV 92   MCH 29.0   MCHC 31.4*     Lipid Panel:   No results for input(s): CHOL, LDLCALC, HDL, TRIG in the last 168 hours.  Coagulation:   Recent Labs   Lab 11/09/19  0445   INR 1.1     Platelet Aggregation Study: No results for input(s): PLTAGG, PLTAGINTERP, PLTAGREGLACO, ADPPLTAGGREG in the last 168  hours.  Hgb A1C:   No results for input(s): HGBA1C in the last 168 hours.  TSH:   No results for input(s): TSH in the last 168 hours.      Diagnostic Results     Brain Imaging     MRI Brain (11/01/19):  Acute lacunar type infarct coursing through the left posterior limb internal capsule and corona radiata.      CTH (10/31/19):  No acute intracranial pathology      Vessel Imaging     CTA Multiphase (10/31/19):  CTA head: Atherosclerotic disease of the cavernous and supraclinoid ICAs with mild narrowing.  Otherwise unremarkable CTA of the head specifically without evidence for proximal significant stenosis or occlusion.  CTA neck: Less than 50% proximal ICA stenosis by NASCET criteria.  Atherosclerotic disease with mild moderate narrowing of the right vertebral artery origin.  There is mild left V1 segment narrowing.  No significant focal vertebral artery stenosis or occlusion.  Interlobular septal thickening with tree in bud micro nodularity visualized upper lobes bilaterally which may represent inflammatory/infectious process clinical correlation and correlation with dedicated CT thorax recommended.  CT head: Bandlike region hypoattenuation left posterior limb internal capsule unchanged from prior suggestive for possible recent to subacute age infarction.  No evidence for hydrocephalus or acute intracranial hemorrhage.  Clinical correlation and further evaluation with MRI if patient compatible.      Cardiac Imaging   Echo (11/01/19)  · Increased (hyperdynamic) left ventricular systolic function. The estimated ejection fraction is 75%.  · Concentric left ventricular remodeling.  · Normal LV diastolic function.  · No wall motion abnormalities.  · Normal right ventricular systolic function.  · Normal central venous pressure (3 mm Hg).  · Mild tricuspid regurgitation.  · The estimated PA systolic pressure is 26 mm Hg  · Echolucent structure next to the LA, likely representing hiatal hernia. Clinincal correlation is  required.      Miscellaneous Imaging    IR Angiogram Visceral 11/9/19 pending    CTA Abdomen/Pelvis 11/9/19  1. Large right retroperitoneal hematoma with findings concerning for active arterial extravasation/hemorrhage as detailed above.  There is associated hemorrhage extending throughout the right abdomen and pelvis/pericolic gutter with associated mass effect on the right kidney, which is anteriorly displaced.  2. Decreased opacification of the infahepatic and infrarenal IVC, possibly secondary to underlying mass effect versus partial thrombosis.  3. Nonspecific cecal and right colonic wall thickening, possibly reactive due to hemoperitoneum.  4. Additional findings as detailed above.    MRI Thoracic/Lumbar Spine W WO Contr 11/8/19  Large hematoma within the right psoas muscle.  Compression and probable thrombosis of the IVC. Consider contrast enhanced CT with delayed phase.  Multilevel degenerative changes as detailed above, more severe in the lumbar spine.  Irregularity of the T12 through L3 endplates is most likely degenerative.      Gumaro Muro NP  Comprehensive Stroke Center  Department of Vascular Neurology   [facility]

## 2019-11-11 NOTE — SUBJECTIVE & OBJECTIVE
Interval History/Significant Events: 1 unit pRBCs transfused overnight for Hgb 6.7 with adequate response.     Review of Systems   Constitutional: Positive for fatigue. Negative for diaphoresis and fever.   Eyes: Negative for visual disturbance.   Respiratory: Negative for cough, chest tightness and shortness of breath.    Cardiovascular: Negative for chest pain, palpitations and leg swelling.   Gastrointestinal: Positive for nausea. Negative for abdominal pain, diarrhea and vomiting.   Genitourinary: Negative for dysuria.   Musculoskeletal: Positive for back pain and myalgias.   Neurological: Positive for weakness and headaches.     Objective:     Vital Signs (Most Recent):  Temp: 98.3 °F (36.8 °C) (11/11/19 0700)  Pulse: 98 (11/11/19 0900)  Resp: 12 (11/11/19 0900)  BP: (!) 161/77 (11/11/19 0900)  SpO2: 100 % (11/11/19 0900) Vital Signs (24h Range):  Temp:  [98.3 °F (36.8 °C)-98.8 °F (37.1 °C)] 98.3 °F (36.8 °C)  Pulse:  [] 98  Resp:  [7-28] 12  SpO2:  [69 %-100 %] 100 %  BP: (139-177)/(60-99) 161/77  Arterial Line BP: ()/() 113/90   Weight: 56.3 kg (124 lb 1.9 oz)  Body mass index is 24.24 kg/m².      Intake/Output Summary (Last 24 hours) at 11/11/2019 1012  Last data filed at 11/11/2019 0900  Gross per 24 hour   Intake 1710 ml   Output 1263 ml   Net 447 ml       Physical Exam   Constitutional: She is oriented to person, place, and time. She appears well-developed. She is cooperative. No distress.   HENT:   Head: Normocephalic and atraumatic.   Eyes: Pupils are equal, round, and reactive to light. Conjunctivae are normal. No scleral icterus.   Neck: Trachea normal and normal range of motion. No tracheal deviation present.   Cardiovascular: Regular rhythm and normal heart sounds. Tachycardia present.   No murmur heard.  Pulses:       Radial pulses are 2+ on the right side, and 2+ on the left side.        Dorsalis pedis pulses are 2+ on the right side, and 2+ on the left side.   Pulmonary/Chest:  Effort normal and breath sounds normal. No accessory muscle usage. No respiratory distress. She has no wheezes. She has no rhonchi. She has no rales.   Abdominal: Soft. Normal appearance and bowel sounds are normal. There is generalized tenderness. There is CVA tenderness (R side). There is no rigidity and no guarding.   Genitourinary:   Genitourinary Comments: Hendricks in place with clear yellow output   Musculoskeletal: Normal range of motion.   Neurological: She is alert and oriented to person, place, and time. No cranial nerve deficit. GCS eye subscore is 4. GCS verbal subscore is 5. GCS motor subscore is 6.   PERRL. AAO. Follows commands, moves all extremities spontaneously. RIght sided weakness noted: RUE 2/5, LUE 5/5, RLE 2/5, LLE 5/5.   Skin: Skin is warm and dry. Capillary refill takes 2 to 3 seconds. She is not diaphoretic. No cyanosis. Nails show no clubbing.   Nursing note and vitals reviewed.      Vents:  Vent Mode: Spont (11/09/19 0904)  Ventilator Initiated: Yes (11/09/19 0414)  Set Rate: 0 bmp (11/09/19 0904)  Vt Set: 320 mL (11/09/19 0904)  Pressure Support: 5 cmH20 (11/09/19 0904)  PEEP/CPAP: 5 cmH20 (11/09/19 0904)  Oxygen Concentration (%): 28 (11/10/19 2146)  Peak Airway Pressure: 11 cmH2O (11/09/19 0904)  Plateau Pressure: 0 cmH20 (11/09/19 0904)  Total Ve: 9.45 mL (11/09/19 0904)  F/VT Ratio<105 (RSBI): (!) 99.57 (11/09/19 0904)  Lines/Drains/Airways     Central Venous Catheter Line                 Trialysis (Dialysis) Catheter 11/09/19 0420 right internal jugular 2 days          Drain                 Urethral Catheter 11/09/19 1000 2 days          Arterial Line                 Arterial Line 11/09/19 0420 Left Radial 2 days          Peripheral Intravenous Line                 Peripheral IV - Single Lumen 11/09/19 20 G Anterior;Right Ankle 2 days              Significant Labs:    CBC/Anemia Profile:  Recent Labs   Lab 11/11/19  0001 11/11/19  0216 11/11/19  0558   WBC 14.46* 14.44* 16.69*   HGB  6.7* 6.7* 8.1*   HCT 20.3* 21.0* 25.2*   * 139* 131*   MCV 87 88 89   RDW 14.9* 15.0* 15.1*        Chemistries:  Recent Labs   Lab 11/10/19  0336 11/11/19  0216   * 137   K 3.7 3.6    106   CO2 17* 20*   BUN 20 11   CREATININE 1.1 0.8   CALCIUM 8.7 8.5*   ALBUMIN 3.1* 3.0*   PROT 6.4 6.2   BILITOT 0.9 0.7   ALKPHOS 79 85   ALT 23 23   AST 41* 48*   MG 1.8 2.1   PHOS 3.5 2.8       All pertinent labs within the past 24 hours have been reviewed.    Significant Imaging:  I have reviewed all pertinent imaging results/findings within the past 24 hours.

## 2019-11-11 NOTE — SUBJECTIVE & OBJECTIVE
Neurologic Chief Complaint: R-sided weakness -- L MCA    Subjective:     Interval History: Patient is seen for follow-up neurological assessment and treatment recommendations:     Patient underwent IR re-evaluation yesterday. No arterial bleed evident on IR angiography. Patient neuro exam stable.      HPI, Past Medical, Family, and Social History remains the same as documented in the initial encounter.     Review of Systems   Constitutional: Negative for chills and fever.   Respiratory: Negative for apnea, shortness of breath and wheezing.    Cardiovascular: Negative for chest pain and palpitations.   Musculoskeletal: Positive for arthralgias and back pain.   Neurological: Positive for facial asymmetry, speech difficulty and weakness.     Scheduled Meds:   [START ON 11/12/2019] polyethylene glycol  17 g Oral Daily     Continuous Infusions:   sodium chloride 0.9% 50 mL/hr at 11/11/19 1610    hydromorphone in 0.9 % NaCl 6 mg/30 ml       PRN Meds:sodium chloride, dextrose 10 % in water (D10W), glucagon (human recombinant), insulin aspart U-100, naloxone, ondansetron, oxyCODONE, sodium chloride 0.9%    Objective:     Vital Signs (Most Recent):  Temp: 98.2 °F (36.8 °C) (11/11/19 1600)  Pulse: 103 (11/11/19 1600)  Resp: 19 (11/11/19 1600)  BP: (!) 175/87 (11/11/19 1600)  SpO2: 100 % (11/11/19 1600)  BP Location: Right arm    Vital Signs Range (Last 24H):  Temp:  [98.2 °F (36.8 °C)-98.8 °F (37.1 °C)]   Pulse:  []   Resp:  [7-28]   BP: (139-182)/(60-92)   SpO2:  [95 %-100 %]   Arterial Line BP: ()/()   BP Location: Right arm    Physical Exam   Constitutional: She appears well-developed and well-nourished. No distress.   HENT:   Head: Normocephalic and atraumatic.   Eyes: Conjunctivae and EOM are normal.   Cardiovascular: Tachycardia present.   Pulmonary/Chest: No respiratory distress.   On NC   Musculoskeletal: She exhibits no edema or deformity.   Pain- lumbar region    Neurological: A sensory deficit  is present. She exhibits normal muscle tone.   Skin: Skin is warm and dry.   Psychiatric: Cognition and memory are impaired. She is inattentive.   Vitals reviewed.      Neurological Exam:   LOC: Drowsy  Attention Span: good  Language: No aphasia  Articulation: Mild dysarthria  Orientation: Person, Not oriented to Time   Visual Fields: Full  EOM (CN III, IV, VI): Full/intact  Facial Movement (CN VII): Lower facial weakness on the Right  Motor: Arm left  Normal 5/5  Leg left  Normal 5/5  Arm right  Paresis: 3/5   Leg right Paresis: 2/5 - Limited 2/2 pain  Sensation: mild decrease in sensation RUE  Tone: Normal tone throughout    Laboratory:  CMP:   Recent Labs   Lab 11/11/19  0216   CALCIUM 8.5*   ALBUMIN 3.0*   PROT 6.2      K 3.6   CO2 20*      BUN 11   CREATININE 0.8   ALKPHOS 85   ALT 23   AST 48*   BILITOT 0.7     CBC:   Recent Labs   Lab 11/11/19  1238   WBC 13.33*   RBC 2.83*   HGB 8.2*   HCT 26.1*   *   MCV 92   MCH 29.0   MCHC 31.4*     Lipid Panel:   No results for input(s): CHOL, LDLCALC, HDL, TRIG in the last 168 hours.  Coagulation:   Recent Labs   Lab 11/09/19  0445   INR 1.1     Platelet Aggregation Study: No results for input(s): PLTAGG, PLTAGINTERP, PLTAGREGLACO, ADPPLTAGGREG in the last 168 hours.  Hgb A1C:   No results for input(s): HGBA1C in the last 168 hours.  TSH:   No results for input(s): TSH in the last 168 hours.      Diagnostic Results     Brain Imaging     MRI Brain (11/01/19):  Acute lacunar type infarct coursing through the left posterior limb internal capsule and corona radiata.      CTH (10/31/19):  No acute intracranial pathology      Vessel Imaging     CTA Multiphase (10/31/19):  CTA head: Atherosclerotic disease of the cavernous and supraclinoid ICAs with mild narrowing.  Otherwise unremarkable CTA of the head specifically without evidence for proximal significant stenosis or occlusion.  CTA neck: Less than 50% proximal ICA stenosis by NASCET  criteria.  Atherosclerotic disease with mild moderate narrowing of the right vertebral artery origin.  There is mild left V1 segment narrowing.  No significant focal vertebral artery stenosis or occlusion.  Interlobular septal thickening with tree in bud micro nodularity visualized upper lobes bilaterally which may represent inflammatory/infectious process clinical correlation and correlation with dedicated CT thorax recommended.  CT head: Bandlike region hypoattenuation left posterior limb internal capsule unchanged from prior suggestive for possible recent to subacute age infarction.  No evidence for hydrocephalus or acute intracranial hemorrhage.  Clinical correlation and further evaluation with MRI if patient compatible.      Cardiac Imaging   Echo (11/01/19)  · Increased (hyperdynamic) left ventricular systolic function. The estimated ejection fraction is 75%.  · Concentric left ventricular remodeling.  · Normal LV diastolic function.  · No wall motion abnormalities.  · Normal right ventricular systolic function.  · Normal central venous pressure (3 mm Hg).  · Mild tricuspid regurgitation.  · The estimated PA systolic pressure is 26 mm Hg  · Echolucent structure next to the LA, likely representing hiatal hernia. Clinincal correlation is required.      Miscellaneous Imaging    IR Angiogram Visceral 11/9/19 pending    CTA Abdomen/Pelvis 11/9/19  1. Large right retroperitoneal hematoma with findings concerning for active arterial extravasation/hemorrhage as detailed above.  There is associated hemorrhage extending throughout the right abdomen and pelvis/pericolic gutter with associated mass effect on the right kidney, which is anteriorly displaced.  2. Decreased opacification of the infahepatic and infrarenal IVC, possibly secondary to underlying mass effect versus partial thrombosis.  3. Nonspecific cecal and right colonic wall thickening, possibly reactive due to hemoperitoneum.  4. Additional findings as  detailed above.    MRI Thoracic/Lumbar Spine W WO Contr 11/8/19  Large hematoma within the right psoas muscle.  Compression and probable thrombosis of the IVC. Consider contrast enhanced CT with delayed phase.  Multilevel degenerative changes as detailed above, more severe in the lumbar spine.  Irregularity of the T12 through L3 endplates is most likely degenerative.

## 2019-11-11 NOTE — ASSESSMENT & PLAN NOTE
+R psoas hematoma with active contrast extravasation on CTA  Pt received 2U pRBCs. Went to IR for embolization 11/9.  1 unit transfused on 11/11  Monitoring CBC

## 2019-11-11 NOTE — PROGRESS NOTES
[facility]  Critical Care Medicine  Progress Note    Patient Name: Melva Barker  MRN: [id number]  Admission Date: 10/31/2019  Hospital Length of Stay: 10 days  Code Status: Full Code  Attending Provider: Gavino Sosa MD  Primary Care Provider: Claire Souza MD   Principal Problem: Thrombotic stroke involving left middle cerebral artery    Subjective:     HPI:  Patient is a 74 y.o. female with significant past medical history of IDDM and HTN presented to hospital on 10/31 as a stroke code from [facility]. The patient had been having right sided weakness since September with acute worsening starting at 6am on day of admit and TPA was administered at 10:30 following a negative head CT. The patient was admitted to Neuro Critical Care for further management. MRI confirmed small infarct L posterior limb internal capsule, acute/subacute as well as surrounding cytotoxic cerebral edema without mass effect. The patient was stepped down to Neuro Stepdown Unit/Stroke service on 11/2. For the following few days the patient worked with PT/OT and placement was being pursued. She had a CT abd for persistent N/V and abdominal tenderness which revealed diverticulosis in the descending colon and a small hiatal hernia. On 11/8 the patient developed thoracolumbar back pain and primary service became concerned for epidural hematoma prompting MRI of the spine which showed a large right psoas hematoma with compression and possible thrombosis of the IVC. General Surgery was consulted who recommended conservative management. The am of 11/9, the patient's hgb dropped 2 gm, she became hypotensive (60s/30s) and unresponsive. Rapid Response was called, the patient was emergently intubated and taken to the ICU for presumed hemorrhagic shock.          Hospital/ICU Course:  Patient returned from IR s/p embolization of L3 and L2 lumbar arteries intubated. She was extubated shortly after to NC and  complained of severe pain. A PCA pump was initiated with ETCO2 detector in place. Will monitor CBC q6. Hgb continues to downtrend overnight, prompting repeat CTA which showed questionable foci of active extravasation within the inferior portion of previous bleed. Angiogram 11/11 without evidence of continued bleed.     Interval History/Significant Events: repeat angiogram negative for arterial bleed    Review of Systems   Constitutional: Positive for fatigue. Negative for diaphoresis.   Respiratory: Negative for cough, chest tightness and shortness of breath.    Cardiovascular: Negative for chest pain and palpitations.   Gastrointestinal: Positive for abdominal pain.   Musculoskeletal: Positive for back pain and myalgias.     Objective:     Vital Signs (Most Recent):  Temp: 98.5 °F (36.9 °C) (11/10/19 0702)  Pulse: (!) 123 (11/10/19 0800)  Resp: 17 (11/10/19 0800)  BP: 131/85 (11/10/19 0800)  SpO2: 100 % (11/10/19 0800) Vital Signs (24h Range):  Temp:  [97.5 °F (36.4 °C)-98.8 °F (37.1 °C)] 98.5 °F (36.9 °C)  Pulse:  [] 123  Resp:  [6-30] 17  SpO2:  [95 %-100 %] 100 %  BP: (131-193)/(61-95) 131/85  Arterial Line BP: (112-194)/() 149/133   Weight: 56.4 kg (124 lb 5.4 oz)  Body mass index is 24.28 kg/m².      Intake/Output Summary (Last 24 hours) at 11/10/2019 0834  Last data filed at 11/10/2019 0800  Gross per 24 hour   Intake 1652.5 ml   Output 735 ml   Net 917.5 ml       Physical Exam   Constitutional: She is oriented to person, place, and time. She appears well-developed. She is cooperative. She has a sickly appearance. No distress. Nasal cannula in place.   HENT:   Head: Normocephalic and atraumatic.   Eyes: Pupils are equal, round, and reactive to light. Conjunctivae are normal. Right eye exhibits no exudate. Left eye exhibits no exudate. Right conjunctiva has no hemorrhage. Left conjunctiva has no hemorrhage. No scleral icterus. Right eye exhibits no nystagmus. Left eye exhibits no nystagmus.   Neck:  Trachea normal. No neck rigidity. No tracheal deviation present.   Cardiovascular: Regular rhythm and normal heart sounds. Tachycardia present. PMI is not displaced. Exam reveals no gallop and no friction rub.   No murmur heard.  Pulses:       Radial pulses are 2+ on the right side, and 2+ on the left side.        Dorsalis pedis pulses are 2+ on the right side, and 2+ on the left side.   Pulmonary/Chest: Effort normal and breath sounds normal. No accessory muscle usage. No respiratory distress. She has no wheezes. She has no rhonchi. She has no rales.   Abdominal: Soft. Normal appearance and bowel sounds are normal. There is generalized tenderness. There is guarding and CVA tenderness (R side). There is no rigidity and no rebound.   Musculoskeletal: Normal range of motion.   Neurological: She is alert and oriented to person, place, and time. No cranial nerve deficit. GCS eye subscore is 4. GCS verbal subscore is 5. GCS motor subscore is 6.   Pt alert and follows commands, answers Y/N questions, spontaneously moves BUE 3/5, BLE 3/5, no focal deficits to note.      Skin: Skin is warm and dry. Capillary refill takes 2 to 3 seconds. She is not diaphoretic. No cyanosis. Nails show no clubbing.   Nursing note and vitals reviewed.      Vents:  Vent Mode: Spont (11/09/19 0904)  Ventilator Initiated: Yes (11/09/19 0414)  Set Rate: 0 bmp (11/09/19 0904)  Vt Set: 320 mL (11/09/19 0904)  Pressure Support: 5 cmH20 (11/09/19 0904)  PEEP/CPAP: 5 cmH20 (11/09/19 0904)  Oxygen Concentration (%): 30 (11/09/19 0904)  Peak Airway Pressure: 11 cmH2O (11/09/19 0904)  Plateau Pressure: 0 cmH20 (11/09/19 0904)  Total Ve: 9.45 mL (11/09/19 0904)  F/VT Ratio<105 (RSBI): (!) 99.57 (11/09/19 0904)  Lines/Drains/Airways     Central Venous Catheter Line                 Trialysis (Dialysis) Catheter 11/09/19 0420 right internal jugular 1 day          Drain                 Urethral Catheter 11/09/19 1000 less than 1 day          Arterial Line                  Arterial Line 11/09/19 0420 Left Radial 1 day          Peripheral Intravenous Line                 Peripheral IV - Single Lumen 11/06/19 1226 20 G;1 3/4 in Left;Anterior Forearm 3 days              Significant Labs:    CBC/Anemia Profile:  Recent Labs   Lab 11/09/19  1823 11/10/19  0005 11/10/19  0336   WBC 25.35* 23.49* 23.16*   HGB 9.4* 8.5* 8.1*   HCT 27.6* 25.1* 23.8*   * 151 145*   MCV 83 85 84   RDW 14.3 14.5 14.5        Chemistries:  Recent Labs   Lab 11/09/19  0445 11/09/19  0958 11/10/19  0336   * 129* 133*   K 3.5 3.6 3.7   CL 96 99 104   CO2 20* 18* 17*   BUN 22 23 20   CREATININE 2.2* 1.7* 1.1   CALCIUM 8.6* 8.5* 8.7   ALBUMIN 3.3*  --  3.1*   PROT 6.4  --  6.4   BILITOT 1.1*  --  0.9   ALKPHOS 95  --  79   ALT 24  --  23   AST 38  --  41*   MG 1.8  --  1.8   PHOS 4.7*  --  3.5       All pertinent labs within the past 24 hours have been reviewed.    Significant Imaging:  I have reviewed all pertinent imaging results/findings within the past 24 hours.      ABG  Recent Labs   Lab 11/09/19  0437   PH 7.494*   PO2 552*   PCO2 28.7*   HCO3 22.1*   BE -1     Assessment/Plan:     Neuro  * Thrombotic stroke involving left middle cerebral artery  --Vascular Neurology following  --s/p TPA on 10/31  --cytotoxic edema on imaging without mass effect  --DAPT on hold due to hemorrhagic shock, restart when clinically appropriate  --statin on hold in acute setting, restart when clinically appropriate    Pulmonary  Acute hypoxemic respiratory failure  --secondary to hemorrhagic shock  --intubated emergently for obtundation and failure to protect airway  --wean vent as tolerated  --Extubated on 11/09 following angiogram  --currently on nasal cannula with EtCO2 monitoring    Cardiac/Vascular  Essential hypertension  --antihypertensives on hold due to hemorrhagic shock; restart when clinically appropriate    Oncology  Leukocytosis  --suspect reactive secondary to acute bleeding as patient has not been  febrile  --Bcx 11/09 NGTD    Endocrine  Diabetes mellitus type 2 without retinopathy  --accuchecks Q6   --low dose correctional SSI  --BG goal 140-180    GI  Retroperitoneal hematoma  --left psoas hematoma seen on MRI imaging  --CTA shows active contrast extravasation right perinephric  --to IR for angiogram on 11/09  --s/p embolization of L2/L3 arteries  --Hgb continues to downtrend repeat CTA and angiogram on 11/10 without evidence of continued arterial bleed  --transfuse RBC with FFP/plt for Hgb <7  --CBC q6    Gastroparesis  --previously on reglan; restart when appropriate    Other  Nontraumatic hemorrhagic shock  See retroperitoneal hematoma        Critical Care Time: 70 minutes  Critical secondary to Patient has a condition that poses threat to life and bodily function: Acute retroperitoneal bleed    Critical care was time spent personally by me on the following activities: development of treatment plan with patient or surrogate and bedside caregivers, discussions with consultants, evaluation of patient's response to treatment, examination of patient, ordering and performing treatments and interventions, ordering and review of laboratory studies, ordering and review of radiographic studies, pulse oximetry, re-evaluation of patient's condition. This critical care time did not overlap with that of any other provider or involve time for any procedures.     Job Rodriguez NP  Critical Care Medicine  [facility]

## 2019-11-11 NOTE — ASSESSMENT & PLAN NOTE
Stroke risk factor  SBP goal normotension; Avoid hypotension  Patient had been on Benazepril 40 mg daily, HCTZ 25mg (increased from home dose of 6.25mg), and Bisoprolol 10 mg daily [combo med at home - Bisoprolol 10 mg + HCTZ 6.25 mg]  Then had started with episodes of hypotension and all BP meds were held with intermittent bolus requirement. Hypotension had appeared to be improving as of 11/7.    Pt hypotensive to 70/44 with acute decline on 11/9. Pt required fluid resuscitation, blood transfusion, and pressor support (temporarily.)    11/11: -182  Per primary team

## 2019-11-11 NOTE — PROGRESS NOTES
CCS notified of pt H/H results. 1unit PRBC's ordered, CCS @ BS to consent pt for transfusion. Orders noted and initiated. VSS no acute distress noted. Will continue to monitor.

## 2019-11-11 NOTE — ASSESSMENT & PLAN NOTE
Cr now WNL  Likely related to multiple administrations of contrast in the last 24 hrs.  Per primary  Will follow CMPs

## 2019-11-11 NOTE — SUBJECTIVE & OBJECTIVE
Interval History: NAEON. Mild tachycardia, currently low 100s. BP stable. Received 1uPRBC overnight due to down trending h/h. Continues to complain of back pain and right sided abdominal pain.    Medications:  Continuous Infusions:   sodium chloride 0.9% 50 mL/hr at 11/11/19 0500    hydromorphone in 0.9 % NaCl 6 mg/30 ml       Scheduled Meds:    PRN Meds:sodium chloride, dextrose 10 % in water (D10W), glucagon (human recombinant), insulin aspart U-100, naloxone, ondansetron, sodium chloride 0.9%     Review of patient's allergies indicates:   Allergen Reactions    Morphine Other (See Comments)     headache    Latex, natural rubber Rash     Objective:     Vital Signs (Most Recent):  Temp: 98.5 °F (36.9 °C) (11/11/19 0415)  Pulse: 102 (11/11/19 0600)  Resp: 15 (11/11/19 0600)  BP: (!) 154/74 (11/11/19 0600)  SpO2: 100 % (11/11/19 0600) Vital Signs (24h Range):  Temp:  [98.4 °F (36.9 °C)-98.8 °F (37.1 °C)] 98.5 °F (36.9 °C)  Pulse:  [100-123] 102  Resp:  [7-28] 15  SpO2:  [69 %-100 %] 100 %  BP: (131-178)/(60-99) 154/74  Arterial Line BP: ()/() 126/91     Weight: 56.3 kg (124 lb 1.9 oz)  Body mass index is 24.24 kg/m².    Intake/Output - Last 3 Shifts       11/09 0700 - 11/10 0659 11/10 0700 - 11/11 0659    I.V. (mL/kg) 1502.5 (26.6) 722.5 (12.8)    Blood  300    Total Intake(mL/kg) 1502.5 (26.6) 1022.5 (18.2)    Urine (mL/kg/hr) 695 (0.5) 1313 (1)    Stool 0 0    Total Output 695 1313    Net +807.5 -290.5          Stool Occurrence 0 x 0 x          Physical Exam     Constitutional: She is oriented to person, place, and time. She appears well-developed and well-nourished. No distress.   Cardiovascular: Normal rate.   Pulmonary/Chest: Effort normal. No respiratory distress.   Abdominal: Soft. She exhibits no distension. Right sided abdominal and back pain- tenderness to palpation.    Ecchymosis to bilateral lower quadrants at site of injection  Musculoskeletal:   No BLE edema, no leg pain or  tenderness    No right groin hematoma   Neurological: She is alert and oriented to person, place    Significant Labs:  CBC:   Recent Labs   Lab 11/11/19  0558   WBC 16.69*   RBC 2.83*   HGB 8.1*   HCT 25.2*   *   MCV 89   MCH 28.6   MCHC 32.1     CMP:   Recent Labs   Lab 11/11/19  0216   *   CALCIUM 8.5*   ALBUMIN 3.0*   PROT 6.2      K 3.6   CO2 20*      BUN 11   CREATININE 0.8   ALKPHOS 85   ALT 23   AST 48*   BILITOT 0.7     Coagulation:   Recent Labs   Lab 11/09/19  0445   LABPROT 11.5   INR 1.1       Significant Diagnostics:  I have reviewed all pertinent imaging results/findings within the past 24 hours.

## 2019-11-11 NOTE — PLAN OF CARE
CM at bedside performing discharge planning assessment.  Patient sitting in bed answering questions.  No discharge needs are noted at this time.  My health packet given, after informed of it.  Patient verbalized understanding.      Claire Souza MD  [address] [facility] / [place] [id number]      [facility] [place] [address]  [address]  [place] [address]  Phone: [phone] Fax: [phone]      Payor: [employer] MANAGED MEDICARE / Plan: [employer] MEDICARE HMO / Product Type: Capitation /     Extended Emergency Contact Information  Primary Emergency Contact: Aliyah Nguyen   [facility]  Home Phone: [phone]  Mobile Phone: [phone]  Relation: Relative  Secondary Emergency Contact: TAMMY RUIZ  Home Phone: [phone]  Mobile Phone: [phone]  Relation: Friend  Preferred language: English   needed? No    No future appointments.       11/11/19 1340   Discharge Assessment   Assessment Type Discharge Planning Assessment   Confirmed/corrected address and phone number on facesheet? Yes   Assessment information obtained from? Caregiver;Medical Record   Prior to hospitilization cognitive status: Alert/Oriented   Prior to hospitalization functional status:   (unable to assess)   Current cognitive status: Alert/Oriented   Current Functional Status:   (unable to assess)   Facility Arrived From: [facility]   Lives With other (see comments)  (family)   Able to Return to Prior Arrangements other (see comments)  (TBD)   Is patient able to care for self after discharge? Unable to determine at this time (comments)   Who are your caregiver(s) and their phone number(s)? Aliyah Nguyen (relative)- [phone]; Tmamy Ruiz (friend)- [phone]   Readmission Within the Last 30 Days unable to assess   Patient currently being followed by outpatient case management? No   Patient currently receives any other outside agency services? No   Equipment  Currently Used at Home other (see comments)  (unable to assess)   Do you have any problems affording any of your prescribed medications? No   Is the patient taking medications as prescribed? yes   Does the patient have transportation home? Yes   Transportation Anticipated family or friend will provide   Dialysis Name and Scheduled days N/A   Does the patient receive services at the Coumadin Clinic? No   Discharge Plan A Rehab   Discharge Plan B Home Health   DME Needed Upon Discharge  other (see comments)  (TBD)   Patient/Family in Agreement with Plan yes   Does the patient have transportation to healthcare appointments? Yes     Adeola Bay MPH, RN, CM  Ext. [phone]

## 2019-11-12 ENCOUNTER — ANESTHESIA (OUTPATIENT)
Dept: CARDIOLOGY | Facility: HOSPITAL | Age: 74
DRG: 981 | End: 2019-11-12
Payer: MEDICARE

## 2019-11-12 ENCOUNTER — ANESTHESIA EVENT (OUTPATIENT)
Dept: CARDIOLOGY | Facility: HOSPITAL | Age: 74
DRG: 981 | End: 2019-11-12
Payer: MEDICARE

## 2019-11-12 LAB
ABO + RH BLD: NORMAL
ALBUMIN SERPL BCP-MCNC: 2.9 G/DL (ref 3.5–5.2)
ALP SERPL-CCNC: 74 U/L (ref 55–135)
ALT SERPL W/O P-5'-P-CCNC: 23 U/L (ref 10–44)
ANION GAP SERPL CALC-SCNC: 11 MMOL/L (ref 8–16)
AST SERPL-CCNC: 57 U/L (ref 10–40)
BASOPHILS # BLD AUTO: 0.02 K/UL (ref 0–0.2)
BASOPHILS # BLD AUTO: 0.03 K/UL (ref 0–0.2)
BASOPHILS NFR BLD: 0.2 % (ref 0–1.9)
BASOPHILS NFR BLD: 0.3 % (ref 0–1.9)
BILIRUB SERPL-MCNC: 1.3 MG/DL (ref 0.1–1)
BLD GP AB SCN CELLS X3 SERPL QL: NORMAL
BUN SERPL-MCNC: 5 MG/DL (ref 8–23)
CALCIUM SERPL-MCNC: 8.6 MG/DL (ref 8.7–10.5)
CHLORIDE SERPL-SCNC: 106 MMOL/L (ref 95–110)
CO2 SERPL-SCNC: 21 MMOL/L (ref 23–29)
CREAT SERPL-MCNC: 0.7 MG/DL (ref 0.5–1.4)
DIFFERENTIAL METHOD: ABNORMAL
DIFFERENTIAL METHOD: ABNORMAL
EOSINOPHIL # BLD AUTO: 0.2 K/UL (ref 0–0.5)
EOSINOPHIL # BLD AUTO: 0.2 K/UL (ref 0–0.5)
EOSINOPHIL NFR BLD: 1.4 % (ref 0–8)
EOSINOPHIL NFR BLD: 1.6 % (ref 0–8)
ERYTHROCYTE [DISTWIDTH] IN BLOOD BY AUTOMATED COUNT: 14.6 % (ref 11.5–14.5)
ERYTHROCYTE [DISTWIDTH] IN BLOOD BY AUTOMATED COUNT: 14.7 % (ref 11.5–14.5)
EST. GFR  (AFRICAN AMERICAN): >60 ML/MIN/1.73 M^2
EST. GFR  (NON AFRICAN AMERICAN): >60 ML/MIN/1.73 M^2
GLUCOSE SERPL-MCNC: 117 MG/DL (ref 70–110)
HCT VFR BLD AUTO: 24.7 % (ref 37–48.5)
HCT VFR BLD AUTO: 25.6 % (ref 37–48.5)
HGB BLD-MCNC: 8 G/DL (ref 12–16)
HGB BLD-MCNC: 8.5 G/DL (ref 12–16)
IMM GRANULOCYTES # BLD AUTO: 0.05 K/UL (ref 0–0.04)
IMM GRANULOCYTES # BLD AUTO: 0.1 K/UL (ref 0–0.04)
IMM GRANULOCYTES NFR BLD AUTO: 0.4 % (ref 0–0.5)
IMM GRANULOCYTES NFR BLD AUTO: 0.8 % (ref 0–0.5)
LYMPHOCYTES # BLD AUTO: 1.7 K/UL (ref 1–4.8)
LYMPHOCYTES # BLD AUTO: 1.7 K/UL (ref 1–4.8)
LYMPHOCYTES NFR BLD: 14.1 % (ref 18–48)
LYMPHOCYTES NFR BLD: 14.8 % (ref 18–48)
MAGNESIUM SERPL-MCNC: 1.9 MG/DL (ref 1.6–2.6)
MCH RBC QN AUTO: 28.3 PG (ref 27–31)
MCH RBC QN AUTO: 29.1 PG (ref 27–31)
MCHC RBC AUTO-ENTMCNC: 32.4 G/DL (ref 32–36)
MCHC RBC AUTO-ENTMCNC: 33.2 G/DL (ref 32–36)
MCV RBC AUTO: 87 FL (ref 82–98)
MCV RBC AUTO: 88 FL (ref 82–98)
MONOCYTES # BLD AUTO: 1.2 K/UL (ref 0.3–1)
MONOCYTES # BLD AUTO: 1.2 K/UL (ref 0.3–1)
MONOCYTES NFR BLD: 10 % (ref 4–15)
MONOCYTES NFR BLD: 10.3 % (ref 4–15)
NEUTROPHILS # BLD AUTO: 8.3 K/UL (ref 1.8–7.7)
NEUTROPHILS # BLD AUTO: 9 K/UL (ref 1.8–7.7)
NEUTROPHILS NFR BLD: 72.6 % (ref 38–73)
NEUTROPHILS NFR BLD: 73.5 % (ref 38–73)
NRBC BLD-RTO: 0 /100 WBC
NRBC BLD-RTO: 0 /100 WBC
PHOSPHATE SERPL-MCNC: 2.1 MG/DL (ref 2.7–4.5)
PLATELET # BLD AUTO: 147 K/UL (ref 150–350)
PLATELET # BLD AUTO: 149 K/UL (ref 150–350)
PMV BLD AUTO: 11.1 FL (ref 9.2–12.9)
PMV BLD AUTO: 11.6 FL (ref 9.2–12.9)
POCT GLUCOSE: 107 MG/DL (ref 70–110)
POCT GLUCOSE: 117 MG/DL (ref 70–110)
POCT GLUCOSE: 128 MG/DL (ref 70–110)
POCT GLUCOSE: 144 MG/DL (ref 70–110)
POCT GLUCOSE: 146 MG/DL (ref 70–110)
POTASSIUM SERPL-SCNC: 3 MMOL/L (ref 3.5–5.1)
PROT SERPL-MCNC: 6.3 G/DL (ref 6–8.4)
RBC # BLD AUTO: 2.83 M/UL (ref 4–5.4)
RBC # BLD AUTO: 2.92 M/UL (ref 4–5.4)
SODIUM SERPL-SCNC: 138 MMOL/L (ref 136–145)
WBC # BLD AUTO: 11.46 K/UL (ref 3.9–12.7)
WBC # BLD AUTO: 12.26 K/UL (ref 3.9–12.7)

## 2019-11-12 PROCEDURE — 63600175 PHARM REV CODE 636 W HCPCS: Performed by: NURSE PRACTITIONER

## 2019-11-12 PROCEDURE — 20600001 HC STEP DOWN PRIVATE ROOM

## 2019-11-12 PROCEDURE — 99233 PR SUBSEQUENT HOSPITAL CARE,LEVL III: ICD-10-PCS | Mod: GC,,, | Performed by: PSYCHIATRY & NEUROLOGY

## 2019-11-12 PROCEDURE — 25000003 PHARM REV CODE 250: Performed by: INTERNAL MEDICINE

## 2019-11-12 PROCEDURE — 99900035 HC TECH TIME PER 15 MIN (STAT)

## 2019-11-12 PROCEDURE — 83735 ASSAY OF MAGNESIUM: CPT

## 2019-11-12 PROCEDURE — 99233 SBSQ HOSP IP/OBS HIGH 50: CPT | Mod: GC,,, | Performed by: PSYCHIATRY & NEUROLOGY

## 2019-11-12 PROCEDURE — 25000003 PHARM REV CODE 250: Performed by: PHYSICIAN ASSISTANT

## 2019-11-12 PROCEDURE — 94761 N-INVAS EAR/PLS OXIMETRY MLT: CPT

## 2019-11-12 PROCEDURE — 86901 BLOOD TYPING SEROLOGIC RH(D): CPT

## 2019-11-12 PROCEDURE — 84100 ASSAY OF PHOSPHORUS: CPT

## 2019-11-12 PROCEDURE — 25000003 PHARM REV CODE 250: Performed by: NURSE PRACTITIONER

## 2019-11-12 PROCEDURE — 94770 HC EXHALED C02 TEST: CPT

## 2019-11-12 PROCEDURE — 31500 INSERT EMERGENCY AIRWAY: CPT | Mod: 59,,, | Performed by: ANESTHESIOLOGY

## 2019-11-12 PROCEDURE — 31500 AD HOC INTUBATION: ICD-10-PCS | Mod: 59,,, | Performed by: ANESTHESIOLOGY

## 2019-11-12 PROCEDURE — 80053 COMPREHEN METABOLIC PANEL: CPT

## 2019-11-12 PROCEDURE — 85025 COMPLETE CBC W/AUTO DIFF WBC: CPT | Mod: 91

## 2019-11-12 PROCEDURE — 31500 INSERT EMERGENCY AIRWAY: CPT | Performed by: STUDENT IN AN ORGANIZED HEALTH CARE EDUCATION/TRAINING PROGRAM

## 2019-11-12 RX ORDER — TIZANIDINE 4 MG/1
4 TABLET ORAL EVERY 8 HOURS PRN
Status: DISCONTINUED | OUTPATIENT
Start: 2019-11-12 | End: 2019-11-13

## 2019-11-12 RX ORDER — POTASSIUM CHLORIDE 20 MEQ/15ML
20 SOLUTION ORAL 2 TIMES DAILY
Status: DISCONTINUED | OUTPATIENT
Start: 2019-11-12 | End: 2019-12-02

## 2019-11-12 RX ORDER — OXYCODONE HYDROCHLORIDE 5 MG/1
5 TABLET ORAL EVERY 4 HOURS PRN
Status: DISCONTINUED | OUTPATIENT
Start: 2019-11-12 | End: 2019-11-14

## 2019-11-12 RX ORDER — ATORVASTATIN CALCIUM 20 MG/1
40 TABLET, FILM COATED ORAL NIGHTLY
Status: DISCONTINUED | OUTPATIENT
Start: 2019-11-12 | End: 2019-12-03 | Stop reason: HOSPADM

## 2019-11-12 RX ORDER — POTASSIUM CHLORIDE 20 MEQ/15ML
20 SOLUTION ORAL 3 TIMES DAILY
Status: DISCONTINUED | OUTPATIENT
Start: 2019-11-12 | End: 2019-11-12

## 2019-11-12 RX ORDER — GABAPENTIN 300 MG/1
600 CAPSULE ORAL 3 TIMES DAILY
Status: DISCONTINUED | OUTPATIENT
Start: 2019-11-12 | End: 2019-11-14

## 2019-11-12 RX ORDER — ACETAMINOPHEN 325 MG/1
650 TABLET ORAL EVERY 6 HOURS PRN
Status: DISCONTINUED | OUTPATIENT
Start: 2019-11-12 | End: 2019-11-13

## 2019-11-12 RX ORDER — LISINOPRIL 10 MG/1
10 TABLET ORAL DAILY
Status: DISCONTINUED | OUTPATIENT
Start: 2019-11-12 | End: 2019-11-13

## 2019-11-12 RX ORDER — GABAPENTIN 300 MG/1
600 CAPSULE ORAL 3 TIMES DAILY
Status: DISCONTINUED | OUTPATIENT
Start: 2019-11-12 | End: 2019-11-12

## 2019-11-12 RX ORDER — POTASSIUM CHLORIDE 20 MEQ/1
40 TABLET, EXTENDED RELEASE ORAL EVERY 4 HOURS
Status: DISCONTINUED | OUTPATIENT
Start: 2019-11-12 | End: 2019-11-12

## 2019-11-12 RX ADMIN — OXYCODONE HYDROCHLORIDE 5 MG: 5 TABLET ORAL at 08:11

## 2019-11-12 RX ADMIN — POTASSIUM CHLORIDE 20 MEQ: 20 SOLUTION ORAL at 10:11

## 2019-11-12 RX ADMIN — POTASSIUM CHLORIDE 20 MEQ: 20 SOLUTION ORAL at 08:11

## 2019-11-12 RX ADMIN — ONDANSETRON 4 MG: 2 INJECTION INTRAMUSCULAR; INTRAVENOUS at 12:11

## 2019-11-12 RX ADMIN — ONDANSETRON 4 MG: 2 INJECTION INTRAMUSCULAR; INTRAVENOUS at 10:11

## 2019-11-12 RX ADMIN — GABAPENTIN 600 MG: 300 CAPSULE ORAL at 01:11

## 2019-11-12 RX ADMIN — GABAPENTIN 600 MG: 300 CAPSULE ORAL at 08:11

## 2019-11-12 RX ADMIN — OXYCODONE HYDROCHLORIDE 5 MG: 5 TABLET ORAL at 12:11

## 2019-11-12 RX ADMIN — OXYCODONE HYDROCHLORIDE 5 MG: 5 TABLET ORAL at 04:11

## 2019-11-12 RX ADMIN — POLYETHYLENE GLYCOL 3350 17 G: 17 POWDER, FOR SOLUTION ORAL at 09:11

## 2019-11-12 RX ADMIN — POTASSIUM CHLORIDE 40 MEQ: 1500 TABLET, EXTENDED RELEASE ORAL at 05:11

## 2019-11-12 RX ADMIN — ATORVASTATIN CALCIUM 40 MG: 20 TABLET, FILM COATED ORAL at 08:11

## 2019-11-12 RX ADMIN — LISINOPRIL 10 MG: 10 TABLET ORAL at 09:11

## 2019-11-12 RX ADMIN — OXYCODONE HYDROCHLORIDE 5 MG: 5 TABLET ORAL at 09:11

## 2019-11-12 NOTE — CARE UPDATE
Rapid Response Respiratory Therapy ETCO2 Check     Time of visit:      Code Status: Full Code   : 1945  Age: 74 y.o.  Weight:   Wt Readings from Last 1 Encounters:   19 56.3 kg (124 lb 1.9 oz)     Sex: female  Race: Black or    Bed: 310/310 A:   MRN: [id number]    SITUATION     Evaluated patient for: ETCO2 Compliance     BACKGROUND     Patient has a past medical history of Anxiety, Back pain, Diabetes mellitus, Gastroparesis, Hypertension, and Sciatica.    ASSESSMENT     Is the ETCO2 monitor on? (Yes/No)  Yes   Is the patient wearing a cannula? (Yes/No) Yes  Are ETCO2 orders placed? (Yes/No) Yes  Is the patient on a PCA pump? (Yes/No) Yes  ETCO2 monitored: ETCO2 (mmHg): 35 mmHg  O2 Device/Concentration: Room air   Pulse: 105 Respiratory rate: 20 Temperature: Temp: 98.4 °F (36.9 °C) BP: BP: (!) 155/73 SpO2: 99%  Level of Consciousness: Level of Consciousness (AVPU): alert  Respiratory Effort: Respiratory Effort: Normal, Unlabored  Expansion/Accessory Muscle Usage: Expansion/Accessory Muscles/Retractions: expansion symmetric  All Lung Field Breath Sounds: All Lung Fields Breath Sounds: Anterior:, Lateral:, diminished  Most recent blood gas: No results for input(s): PH, PCO2, PO2, HCO3, POCSATURATED, BE in the last 72 hours.  Ambu at bedside: Ambu bag with the patient?: Yes, Adult Ambu    INTERVENTIONS/RECOMMENDATIONS     Continue plan of care    PHYSICIAN ESCALATION     Physician Escalation (Yes/No): No     Discussed plan of care primary RT, Burak     FOLLOW-UP     Please call back the Rapid Response RT, Shanice Mandujano, MADIE at x [phone] for any questions or concerns.             .

## 2019-11-12 NOTE — ASSESSMENT & PLAN NOTE
Melva Barker is a 74 y.o. female with PMHx of HTN, HLD, and DM who presented to OSH with acute worsening of R-sided weakness after having experienced R-sided weakness x1 month. She was treated with tPA at OSH. CTA without LVO. MRI with infarct in L internal capsule and corona radiata. Etiology likely small vessel disease.    Antithrombotics for secondary stroke prevention: Antiplatelets: ASA 81 mg + Plavix 75 mg -- HELD due to retroperitoneal hematoma.  Recently received blood transfusions  Statins for secondary stroke prevention and hyperlipidemia, if present: Statins: Atorvastatin- 40 mg daily,   Aggressive risk factor modification: HTN, HLD, Diet  Rehab efforts: The patient has been evaluated by a stroke team provider and the therapy needs have been fully considered based off the presenting complaints and exam findings. The following therapy evaluations are needed: PT evaluate and treat, OT evaluate and treat, SLP evaluate and treat, PM&R evaluate for appropriate placement - waiting for therapy reevaluation   Diagnostics ordered/pending: None   VTE prophylaxis: Mechanical SCDs only - Pharmacologic ppx acutely held due to hematoma.  BP parameters: Infarct: Post tPA, SBP <160; Avoid hypotension

## 2019-11-12 NOTE — PROGRESS NOTES
[facility]  Critical Care Medicine  Progress Note    Patient Name: Melva Barker  MRN: [id number]  Admission Date: 10/31/2019  Hospital Length of Stay: 11 days  Code Status: Full Code  Attending Provider: Maxx Vega*  Primary Care Provider: Claire Souza MD   Principal Problem: Thrombotic stroke involving left middle cerebral artery    Subjective:     HPI:  Patient is a 74 y.o. female with significant past medical history of IDDM and HTN presented to hospital on 10/31 as a stroke code from [facility]. The patient had been having right sided weakness since September with acute worsening starting at 6am on day of admit and TPA was administered at 10:30 following a negative head CT. The patient was admitted to Neuro Critical Care for further management. MRI confirmed small infarct L posterior limb internal capsule, acute/subacute as well as surrounding cytotoxic cerebral edema without mass effect. The patient was stepped down to Neuro Stepdown Unit/Stroke service on 11/2. For the following few days the patient worked with PT/OT and placement was being pursued. She had a CT abd for persistent N/V and abdominal tenderness which revealed diverticulosis in the descending colon and a small hiatal hernia. On 11/8 the patient developed thoracolumbar back pain and primary service became concerned for epidural hematoma prompting MRI of the spine which showed a large right psoas hematoma with compression and possible thrombosis of the IVC. General Surgery was consulted who recommended conservative management. The am of 11/9, the patient's hgb dropped 2 gm, she became hypotensive (60s/30s) and unresponsive. Rapid Response was called, the patient was emergently intubated and taken to the ICU for presumed hemorrhagic shock.        Hospital/ICU Course:  Patient returned from IR s/p embolization of L3 and L2 lumbar arteries intubated. She was extubated shortly after to NC and  complained of severe pain. A PCA pump was initiated with ETCO2 detector in place. Will monitor CBC q6. Hgb continues to downtrend overnight, prompting repeat CTA which showed questionable foci of active extravasation within the inferior portion of previous bleed. Angiogram 11/11 without evidence of continued bleed.     Interval History/Significant Events: 1 unit pRBCs transfused overnight for Hgb 6.7 with adequate response.     Review of Systems   Constitutional: Positive for fatigue. Negative for diaphoresis and fever.   Eyes: Negative for visual disturbance.   Respiratory: Negative for cough, chest tightness and shortness of breath.    Cardiovascular: Negative for chest pain, palpitations and leg swelling.   Gastrointestinal: Positive for nausea. Negative for abdominal pain, diarrhea and vomiting.   Genitourinary: Negative for dysuria.   Musculoskeletal: Positive for back pain and myalgias.   Neurological: Positive for weakness and headaches.     Objective:     Vital Signs (Most Recent):  Temp: 98.3 °F (36.8 °C) (11/11/19 0700)  Pulse: 98 (11/11/19 0900)  Resp: 12 (11/11/19 0900)  BP: (!) 161/77 (11/11/19 0900)  SpO2: 100 % (11/11/19 0900) Vital Signs (24h Range):  Temp:  [98.3 °F (36.8 °C)-98.8 °F (37.1 °C)] 98.3 °F (36.8 °C)  Pulse:  [] 98  Resp:  [7-28] 12  SpO2:  [69 %-100 %] 100 %  BP: (139-177)/(60-99) 161/77  Arterial Line BP: ()/() 113/90   Weight: 56.3 kg (124 lb 1.9 oz)  Body mass index is 24.24 kg/m².      Intake/Output Summary (Last 24 hours) at 11/11/2019 1012  Last data filed at 11/11/2019 0900  Gross per 24 hour   Intake 1710 ml   Output 1263 ml   Net 447 ml       Physical Exam   Constitutional: She is oriented to person, place, and time. She appears well-developed. She is cooperative. No distress.   HENT:   Head: Normocephalic and atraumatic.   Eyes: Pupils are equal, round, and reactive to light. Conjunctivae are normal. No scleral icterus.   Neck: Trachea normal and normal range  of motion. No tracheal deviation present.   Cardiovascular: Regular rhythm and normal heart sounds. Tachycardia present.   No murmur heard.  Pulses:       Radial pulses are 2+ on the right side, and 2+ on the left side.        Dorsalis pedis pulses are 2+ on the right side, and 2+ on the left side.   Pulmonary/Chest: Effort normal and breath sounds normal. No accessory muscle usage. No respiratory distress. She has no wheezes. She has no rhonchi. She has no rales.   Abdominal: Soft. Normal appearance and bowel sounds are normal. There is generalized tenderness. There is CVA tenderness (R side). There is no rigidity and no guarding.   Genitourinary:   Genitourinary Comments: Hendricks in place with clear yellow output   Musculoskeletal: Normal range of motion.   Neurological: She is alert and oriented to person, place, and time. No cranial nerve deficit. GCS eye subscore is 4. GCS verbal subscore is 5. GCS motor subscore is 6.   PERRL. AAO. Follows commands, moves all extremities spontaneously. RIght sided weakness noted: RUE 2/5, LUE 5/5, RLE 2/5, LLE 5/5.   Skin: Skin is warm and dry. Capillary refill takes 2 to 3 seconds. She is not diaphoretic. No cyanosis. Nails show no clubbing.   Nursing note and vitals reviewed.      Vents:  Vent Mode: Spont (11/09/19 0904)  Ventilator Initiated: Yes (11/09/19 0414)  Set Rate: 0 bmp (11/09/19 0904)  Vt Set: 320 mL (11/09/19 0904)  Pressure Support: 5 cmH20 (11/09/19 0904)  PEEP/CPAP: 5 cmH20 (11/09/19 0904)  Oxygen Concentration (%): 28 (11/10/19 2146)  Peak Airway Pressure: 11 cmH2O (11/09/19 0904)  Plateau Pressure: 0 cmH20 (11/09/19 0904)  Total Ve: 9.45 mL (11/09/19 0904)  F/VT Ratio<105 (RSBI): (!) 99.57 (11/09/19 0904)  Lines/Drains/Airways     Central Venous Catheter Line                 Trialysis (Dialysis) Catheter 11/09/19 0420 right internal jugular 2 days          Drain                 Urethral Catheter 11/09/19 1000 2 days          Arterial Line                  Arterial Line 11/09/19 0420 Left Radial 2 days          Peripheral Intravenous Line                 Peripheral IV - Single Lumen 11/09/19 20 G Anterior;Right Ankle 2 days              Significant Labs:    CBC/Anemia Profile:  Recent Labs   Lab 11/11/19  0001 11/11/19 0216 11/11/19  0558   WBC 14.46* 14.44* 16.69*   HGB 6.7* 6.7* 8.1*   HCT 20.3* 21.0* 25.2*   * 139* 131*   MCV 87 88 89   RDW 14.9* 15.0* 15.1*        Chemistries:  Recent Labs   Lab 11/10/19  0336 11/11/19  0216   * 137   K 3.7 3.6    106   CO2 17* 20*   BUN 20 11   CREATININE 1.1 0.8   CALCIUM 8.7 8.5*   ALBUMIN 3.1* 3.0*   PROT 6.4 6.2   BILITOT 0.9 0.7   ALKPHOS 79 85   ALT 23 23   AST 41* 48*   MG 1.8 2.1   PHOS 3.5 2.8       All pertinent labs within the past 24 hours have been reviewed.    Significant Imaging:  I have reviewed all pertinent imaging results/findings within the past 24 hours.      ABG  Recent Labs   Lab 11/09/19  0437   PH 7.494*   PO2 552*   PCO2 28.7*   HCO3 22.1*   BE -1     Assessment/Plan:     Neuro  * Thrombotic stroke involving left middle cerebral artery  --Vascular Neurology following  --s/p TPA on 10/31  --cytotoxic edema on imaging without mass effect  --DAPT on hold due to hemorrhagic shock, restart when clinically appropriate  --statin on hold in acute setting, restart when clinically appropriate    Pulmonary  Acute hypoxemic respiratory failure  --secondary to hemorrhagic shock  --intubated emergently for obtundation and failure to protect airway  --wean vent as tolerated  --Extubated on 11/09 following angiogram  --currently on nasal cannula with EtCO2 monitoring  --resolved    Cardiac/Vascular  Essential hypertension  --antihypertensives on hold due to hemorrhagic shock; restart when clinically appropriate    Oncology  Leukocytosis  --suspect reactive secondary to acute bleeding as patient has not been febrile  --Bcx 11/09 NGTD    Endocrine  Diabetes mellitus type 2 without  retinopathy  --accuchecks Q6   --low dose correctional SSI  --BG goal 140-180    GI  Retroperitoneal hematoma  --left psoas hematoma seen on MRI imaging  --CTA shows active contrast extravasation right perinephric  --to IR for angiogram on 11/09, s/p embolization of L2/L3 arteries  --Hgb continues to downtrend repeat CTA and angiogram on 11/10 without evidence of continued arterial bleed  --transfuse pRBC for Hgb <7  --trend CBC     Gastroparesis  --previously on reglan; restart when appropriate    Orthopedic  Back pain  --2/2 retroperitoneal hematoma  --PCA pump + PRN oxycodone     Other  Nontraumatic hemorrhagic shock  See retroperitoneal hematoma      Plan discusses with Dr. Vega.     Critical Care Time: 45 minutes  Critical secondary to Patient has a condition that poses threat to life and bodily function: Retroperitoneal hematoma, L MCA stroke       Critical care was time spent personally by me on the following activities: development of treatment plan with patient or surrogate and bedside caregivers, discussions with consultants, evaluation of patient's response to treatment, examination of patient, ordering and performing treatments and interventions, ordering and review of laboratory studies, ordering and review of radiographic studies, pulse oximetry, re-evaluation of patient's condition. This critical care time did not overlap with that of any other provider or involve time for any procedures.     Soimara Huang NP  Critical Care Medicine  [facility]

## 2019-11-12 NOTE — SUBJECTIVE & OBJECTIVE
Neurologic Chief Complaint: R-sided weakness -- L MCA    Subjective:     Interval History: Patient is seen for follow-up neurological assessment and treatment recommendations: Stepped down from medical ICU this morning.  Patient still experiencing pain.  Anesthesia consulted for pain management.  D/C pain pump and started oral medications for muscle and nerve pain.  Will wean to home regimen. Waiting for repeat CBC, transfused one unit of blood 11/11.   Tachycardic/hypertensive will continue to monitor.    HPI, Past Medical, Family, and Social History remains the same as documented in the initial encounter.     Review of Systems   Constitutional: Negative for chills and fever.   Respiratory: Negative for apnea, shortness of breath and wheezing.    Cardiovascular: Negative for chest pain and palpitations.   Musculoskeletal: Positive for arthralgias and back pain.   Neurological: Positive for facial asymmetry, speech difficulty and weakness.     Scheduled Meds:   gabapentin  600 mg Oral TID    lisinopril  10 mg Oral Daily    polyethylene glycol  17 g Oral Daily    potassium chloride 10%  20 mEq Oral BID     Continuous Infusions:    PRN Meds:sodium chloride, acetaminophen, dextrose 10 % in water (D10W), glucagon (human recombinant), insulin aspart U-100, ondansetron, oxyCODONE, sodium chloride 0.9%, tiZANidine    Objective:     Vital Signs (Most Recent):  Temp: 98.4 °F (36.9 °C) (11/12/19 1158)  Pulse: 106 (11/12/19 1158)  Resp: 16 (11/12/19 1158)  BP: (!) 155/73 (11/12/19 1201)  SpO2: 96 % (11/12/19 1158)  BP Location: Left arm    Vital Signs Range (Last 24H):  Temp:  [98.1 °F (36.7 °C)-98.9 °F (37.2 °C)]   Pulse:  [101-135]   Resp:  [10-19]   BP: (147-188)/()   SpO2:  [96 %-100 %]   Arterial Line BP: (110-204)/()   BP Location: Left arm    Physical Exam   Constitutional: She appears well-developed and well-nourished. No distress.   HENT:   Head: Normocephalic and atraumatic.   Eyes: Conjunctivae and  EOM are normal.   Cardiovascular: Tachycardia present.   Pulmonary/Chest: No respiratory distress.   On NC   Musculoskeletal: She exhibits no edema or deformity.   Pain- lumbar region    Skin: Skin is warm and dry.   Psychiatric: Cognition and memory are impaired. She is inattentive.   Vitals reviewed.      Neurological Exam:   LOC:alert   Attention Span: good  Language: No aphasia  Articulation: Mild dysarthria  Orientation: Person, Not oriented to Time   Visual Fields: Full  EOM (CN III, IV, VI): Full/intact  Facial Movement (CN VII): Lower facial weakness on the Right  Motor: Arm left  Normal 5/5  Leg left  Normal 5/5  Arm right  Paresis: 3/5   Leg right Paresis: 2/5 - Limited 2/2 pain  Sensation: mild decrease in sensation RUE  Tone: Normal tone throughout    Laboratory:  CMP:   Recent Labs   Lab 11/12/19  0225   CALCIUM 8.6*   ALBUMIN 2.9*   PROT 6.3      K 3.0*   CO2 21*      BUN 5*   CREATININE 0.7   ALKPHOS 74   ALT 23   AST 57*   BILITOT 1.3*     CBC:   Recent Labs   Lab 11/12/19  0010   WBC 11.46   RBC 2.83*   HGB 8.0*   HCT 24.7*   *   MCV 87   MCH 28.3   MCHC 32.4     Lipid Panel:   No results for input(s): CHOL, LDLCALC, HDL, TRIG in the last 168 hours.  Coagulation:   Recent Labs   Lab 11/09/19  0445   INR 1.1     Platelet Aggregation Study: No results for input(s): PLTAGG, PLTAGINTERP, PLTAGREGLACO, ADPPLTAGGREG in the last 168 hours.  Hgb A1C:   No results for input(s): HGBA1C in the last 168 hours.  TSH:   No results for input(s): TSH in the last 168 hours.      Diagnostic Results     Brain Imaging     MRI Brain (11/01/19):  Acute lacunar type infarct coursing through the left posterior limb internal capsule and corona radiata.      CTH (10/31/19):  No acute intracranial pathology      Vessel Imaging     CTA Multiphase (10/31/19):  CTA head: Atherosclerotic disease of the cavernous and supraclinoid ICAs with mild narrowing.  Otherwise unremarkable CTA of the head specifically  without evidence for proximal significant stenosis or occlusion.  CTA neck: Less than 50% proximal ICA stenosis by NASCET criteria.  Atherosclerotic disease with mild moderate narrowing of the right vertebral artery origin.  There is mild left V1 segment narrowing.  No significant focal vertebral artery stenosis or occlusion.  Interlobular septal thickening with tree in bud micro nodularity visualized upper lobes bilaterally which may represent inflammatory/infectious process clinical correlation and correlation with dedicated CT thorax recommended.  CT head: Bandlike region hypoattenuation left posterior limb internal capsule unchanged from prior suggestive for possible recent to subacute age infarction.  No evidence for hydrocephalus or acute intracranial hemorrhage.  Clinical correlation and further evaluation with MRI if patient compatible.      Cardiac Imaging   Echo (11/01/19)  · Increased (hyperdynamic) left ventricular systolic function. The estimated ejection fraction is 75%.  · Concentric left ventricular remodeling.  · Normal LV diastolic function.  · No wall motion abnormalities.  · Normal right ventricular systolic function.  · Normal central venous pressure (3 mm Hg).  · Mild tricuspid regurgitation.  · The estimated PA systolic pressure is 26 mm Hg  · Echolucent structure next to the LA, likely representing hiatal hernia. Clinincal correlation is required.      Miscellaneous Imaging    IR Angiogram Visceral 11/9/19 pending    CTA Abdomen/Pelvis 11/9/19  1. Large right retroperitoneal hematoma with findings concerning for active arterial extravasation/hemorrhage as detailed above.  There is associated hemorrhage extending throughout the right abdomen and pelvis/pericolic gutter with associated mass effect on the right kidney, which is anteriorly displaced.  2. Decreased opacification of the infahepatic and infrarenal IVC, possibly secondary to underlying mass effect versus partial thrombosis.  3.  Nonspecific cecal and right colonic wall thickening, possibly reactive due to hemoperitoneum.  4. Additional findings as detailed above.    MRI Thoracic/Lumbar Spine W WO Contr 11/8/19  Large hematoma within the right psoas muscle.  Compression and probable thrombosis of the IVC. Consider contrast enhanced CT with delayed phase.  Multilevel degenerative changes as detailed above, more severe in the lumbar spine.  Irregularity of the T12 through L3 endplates is most likely degenerative.

## 2019-11-12 NOTE — ASSESSMENT & PLAN NOTE
--secondary to hemorrhagic shock  --intubated emergently for obtundation and failure to protect airway  --wean vent as tolerated  --Extubated on 11/09 following angiogram  --currently on nasal cannula with EtCO2 monitoring  --resolved

## 2019-11-12 NOTE — ASSESSMENT & PLAN NOTE
--left psoas hematoma seen on MRI imaging  --CTA shows active contrast extravasation right perinephric  --to IR for angiogram on 11/09, s/p embolization of L2/L3 arteries  --Hgb continues to downtrend repeat CTA and angiogram on 11/10 without evidence of continued arterial bleed  --transfuse pRBC for Hgb <7  --trend CBC

## 2019-11-12 NOTE — PROGRESS NOTES
[facility]  Vascular Neurology  Comprehensive Stroke Center  Progress Note    Assessment/Plan:     * Thrombotic stroke involving left middle cerebral artery  Melva Barker is a 74 y.o. female with PMHx of HTN, HLD, and DM who presented to OSH with acute worsening of R-sided weakness after having experienced R-sided weakness x1 month. She was treated with tPA at OSH. CTA without LVO. MRI with infarct in L internal capsule and corona radiata. Etiology likely small vessel disease.    Antithrombotics for secondary stroke prevention: Antiplatelets: ASA 81 mg + Plavix 75 mg -- HELD due to retroperitoneal hematoma.  Recently received blood transfusions  Statins for secondary stroke prevention and hyperlipidemia, if present: Statins: Atorvastatin- 40 mg daily,   Aggressive risk factor modification: HTN, HLD, Diet  Rehab efforts: The patient has been evaluated by a stroke team provider and the therapy needs have been fully considered based off the presenting complaints and exam findings. The following therapy evaluations are needed: PT evaluate and treat, OT evaluate and treat, SLP evaluate and treat, PM&R evaluate for appropriate placement - waiting for therapy reevaluation   Diagnostics ordered/pending: None   VTE prophylaxis: Mechanical SCDs only - Pharmacologic ppx acutely held due to hematoma.  BP parameters: Infarct: Post tPA, SBP <160; Avoid hypotension    Cytotoxic cerebral edema  Area of cytotoxic cerebral edema identified when reviewing brain imaging in the territory of the L middle cerebral artery. There is no mass effect associated with it. We will continue to monitor the patients clinical exam for any worsening of symptoms which may indicate expansion of the stroke or the area of the edema resulting in the clinical change. The pattern is suggestive of small vessel etiology.    Retroperitoneal hematoma  Pt had been c/o back pain in recent days, complicated by her hx of chronic  back and sciatic nerve pain with use of Tizanidine and opiates at home.  Due to Hb downtrend and recent tPA administration, MRI Lumbar/Thoracic spine was ordered 11/8 which demonstrated large R psoas hematoma with IVC compression and probable thrombosis.   General Surgery was consulted; No acute intervention pursued.     Patient was then with acute decompensation overnight 11/9; became hypotensive, tachycardic, and apneic with Hb down to 6.7. Rapid Response was called; patient requiring intubation, blood transfusion, and transfer to medical ICU.   CTA abd/pelvis 11/9 showed hematoma with active extravasation/hemorrhage with IVC compression, hemoperitoneum.   Pt went to IR and is now s/p successful R L2 & L3 lumbar spinal artery embolization.   Pt doing well clinically; was able to be extubated though is groggy from pain medications.  Patient underwent IR re-evaluation on 11/11. No arterial bleed evident on IR angiography.    Hb now stable; Will continue to monitor.    Nontraumatic hemorrhagic shock  +R psoas hematoma with active contrast extravasation on CTA  Pt received 2U pRBCs. Went to IR for embolization 11/9.  1 unit transfused on 11/11  Monitoring CBC    Acute hypoxemic respiratory failure  2/2 hemorrhagic shock  Pt had required emergent intubation prior to ICU transfer.  Now extubated post-IR 11/9 and tolerating well.      FALLON (acute kidney injury)  Cr now WNL  Likely related to multiple administrations of contrast in the last 24 hrs.  Improving     Essential hypertension  Risk factor for stroke  Now hypertensive   Will continue to monitor and start lisinopril    Back pain  Consulted to anesthesia pain management   Appreciate recommendations  D/c pain pump and change to oxycodone 5mg q 4-6hours prn  History of sciatic nerve pain.  Gabapentin and tizanidine   Tylenol   Will continue to wean as tolerated       Gastroparesis  Previously on Reglan 10 mg TID before meals    Diabetes mellitus type 2 without  retinopathy  Stroke risk factor, poorly controlled on glargine 17 units daily  HbA1c 8.3%  Recommend tight glucose control  Currently on SSI   Diabetic diet       11/1/19 MRI with small vessel L MCA infarct, therapy recommending rehab  11/2 - Patient stepped down overnight. Adjusting BP regimen. Patient with continuous complaints if nausea. IV Zofran ordered with some relief. Patient has not has BM since 10/30. Ordering KUB to rule out obstruction. Neuro exam unchanged.   11/3 - NAEON. Patient reports she had no episodes of vomiting overnight. Still complaining of nausea. Mild lower abdomen tenderness on exam. Lipase ordered and WNL. Continue to monitor.   11/4 - denies nausea and vomiting. Abd tenderness remains present on palpitation. Continue to monitor. HCTZ increased yesterday. Pending rehab placement.   11/5 -  N/V x1 overnight zofran resolved, sucralfate started. Placement pending   11/6 - hypotensive, held all BP meds,  ml bolus, responded well. Increased BUN/Cr, start NS 75ml/hr.   11/7 patient complaining of sciatica pain.  Restarted home tizanidine.  Patient requesting hydrocodone but educated patient that nerve pain is not treated with opioid.  Degenerative disease seen on xray but no fracture.      11/8 Patient continues to experience back pain.  Now with leukocytosis and drop in h/h plan for MRI thoracic/lumbar spine to evaluate for epidural hematoma.    11/9: MRI Lumbar spine had demonstrated large R psoas hematoma with possible IVC compression; General Surgery consulted. Patient was then with acute decompensation overnight; became hypotensive, tachycardic, and apneic requiring intubation, blood transfusion, and medical ICU transfer. CTA abd/pelvis showed active extravasation/hemorrhage with hemoperitoneum. Pt now s/p successful lumbar artery embolization in IR. She was extubated on arrival back to ICU and tolerated well.    11/11: Patient underwent IR re-evaluation yesterday. No arterial bleed  evident on IR angiography. Patient neuro exam stable.   11/12 Stepped down from medical ICU this morning.  Patient still experiencing pain.  Anesthesia consulted for pain management.  D/C pain pump and started oral medications for muscle and nerve pain.  Will wean to home regimen. Waiting for repeat CBC, transfused one unit of blood 11/11.   Tachycardic/hypertensive will continue to monitor.    STROKE DOCUMENTATION   Acute Stroke Times   Last Known Normal Date: 10/31/19  Last Known Normal Time: 0630  Symptom Onset Date: 10/31/19  Symptom Onset Time: 0730  Stroke Team Called Date: 10/31/19  Stroke Team Called Time: 0936  Stroke Team Arrival Date: 10/31/19  Stroke Team Arrival Time: 0946  Decision to Treat Time for Alteplase: 1003    NIH Scale:  1a. Level of Consciousness: 0-->Alert, keenly responsive  1b. LOC Questions: 1-->Answers one question correctly  1c. LOC Commands: 0-->Performs both tasks correctly  2. Best Gaze: 0-->Normal  3. Visual: 0-->No visual loss  4. Facial Palsy: 1-->Minor paralysis (flattened nasolabial fold, asymmetry on smiling)  5a. Motor Arm, Left: 0-->No drift, limb holds 90 (or 45) degrees for full 10 secs  5b. Motor Arm, Right: 0-->No drift, limb holds 90 (or 45) degrees for full 10 secs  6a. Motor Leg, Left: 0-->No drift, leg holds 30 degree position for full 5 secs  6b. Motor Leg, Right: 3-->No effort against gravity, leg falls to bed immediately  7. Limb Ataxia: 0-->Absent  8. Sensory: 1-->Mild-to-moderate sensory loss, patient feels pinprick is less sharp or is dull on the affected side, or there is a loss of superficial pain with pinprick, but patient is aware of being touched  9. Best Language: 0-->No aphasia, normal  10. Dysarthria: 1-->Mild-to-moderate dysarthria, patient slurs at least some words and, at worst, can be understood with some difficulty  11. Extinction and Inattention (formerly Neglect): 0-->No abnormality  Total (NIH Stroke Scale): 7       Modified [place] Score:  0  [place] Coma Scale:    ABCD2 Score:    [id number] Score:   HAS -BLED Score:   ICH Score:   Hunt & Amaya Classification:      Hemorrhagic change of an Ischemic Stroke: Does this patient have an ischemic stroke with hemorrhagic changes? No     Neurologic Chief Complaint: R-sided weakness -- L MCA    Subjective:     Interval History: Patient is seen for follow-up neurological assessment and treatment recommendations: Stepped down from medical ICU this morning.  Patient still experiencing pain.  Anesthesia consulted for pain management.  D/C pain pump and started oral medications for muscle and nerve pain.  Will wean to home regimen. Waiting for repeat CBC, transfused one unit of blood 11/11.   Tachycardic/hypertensive will continue to monitor.    HPI, Past Medical, Family, and Social History remains the same as documented in the initial encounter.     Review of Systems   Constitutional: Negative for chills and fever.   Respiratory: Negative for apnea, shortness of breath and wheezing.    Cardiovascular: Negative for chest pain and palpitations.   Musculoskeletal: Positive for arthralgias and back pain.   Neurological: Positive for facial asymmetry, speech difficulty and weakness.     Scheduled Meds:   gabapentin  600 mg Oral TID    lisinopril  10 mg Oral Daily    polyethylene glycol  17 g Oral Daily    potassium chloride 10%  20 mEq Oral BID     Continuous Infusions:    PRN Meds:sodium chloride, acetaminophen, dextrose 10 % in water (D10W), glucagon (human recombinant), insulin aspart U-100, ondansetron, oxyCODONE, sodium chloride 0.9%, tiZANidine    Objective:     Vital Signs (Most Recent):  Temp: 98.4 °F (36.9 °C) (11/12/19 1158)  Pulse: 106 (11/12/19 1158)  Resp: 16 (11/12/19 1158)  BP: (!) 155/73 (11/12/19 1201)  SpO2: 96 % (11/12/19 1158)  BP Location: Left arm    Vital Signs Range (Last 24H):  Temp:  [98.1 °F (36.7 °C)-98.9 °F (37.2 °C)]   Pulse:  [101-135]   Resp:  [10-19]   BP: (147-188)/()    SpO2:  [96 %-100 %]   Arterial Line BP: (110-204)/()   BP Location: Left arm    Physical Exam   Constitutional: She appears well-developed and well-nourished. No distress.   HENT:   Head: Normocephalic and atraumatic.   Eyes: Conjunctivae and EOM are normal.   Cardiovascular: Tachycardia present.   Pulmonary/Chest: No respiratory distress.   On NC   Musculoskeletal: She exhibits no edema or deformity.   Pain- lumbar region    Skin: Skin is warm and dry.   Psychiatric: Cognition and memory are impaired. She is inattentive.   Vitals reviewed.      Neurological Exam:   LOC:alert   Attention Span: good  Language: No aphasia  Articulation: Mild dysarthria  Orientation: Person, Not oriented to Time   Visual Fields: Full  EOM (CN III, IV, VI): Full/intact  Facial Movement (CN VII): Lower facial weakness on the Right  Motor: Arm left  Normal 5/5  Leg left  Normal 5/5  Arm right  Paresis: 3/5   Leg right Paresis: 2/5 - Limited 2/2 pain  Sensation: mild decrease in sensation RUE  Tone: Normal tone throughout    Laboratory:  CMP:   Recent Labs   Lab 11/12/19  0225   CALCIUM 8.6*   ALBUMIN 2.9*   PROT 6.3      K 3.0*   CO2 21*      BUN 5*   CREATININE 0.7   ALKPHOS 74   ALT 23   AST 57*   BILITOT 1.3*     CBC:   Recent Labs   Lab 11/12/19  0010   WBC 11.46   RBC 2.83*   HGB 8.0*   HCT 24.7*   *   MCV 87   MCH 28.3   MCHC 32.4     Lipid Panel:   No results for input(s): CHOL, LDLCALC, HDL, TRIG in the last 168 hours.  Coagulation:   Recent Labs   Lab 11/09/19  0445   INR 1.1     Platelet Aggregation Study: No results for input(s): PLTAGG, PLTAGINTERP, PLTAGREGLACO, ADPPLTAGGREG in the last 168 hours.  Hgb A1C:   No results for input(s): HGBA1C in the last 168 hours.  TSH:   No results for input(s): TSH in the last 168 hours.      Diagnostic Results     Brain Imaging     MRI Brain (11/01/19):  Acute lacunar type infarct coursing through the left posterior limb internal capsule and corona  radiata.      CTH (10/31/19):  No acute intracranial pathology      Vessel Imaging     CTA Multiphase (10/31/19):  CTA head: Atherosclerotic disease of the cavernous and supraclinoid ICAs with mild narrowing.  Otherwise unremarkable CTA of the head specifically without evidence for proximal significant stenosis or occlusion.  CTA neck: Less than 50% proximal ICA stenosis by NASCET criteria.  Atherosclerotic disease with mild moderate narrowing of the right vertebral artery origin.  There is mild left V1 segment narrowing.  No significant focal vertebral artery stenosis or occlusion.  Interlobular septal thickening with tree in bud micro nodularity visualized upper lobes bilaterally which may represent inflammatory/infectious process clinical correlation and correlation with dedicated CT thorax recommended.  CT head: Bandlike region hypoattenuation left posterior limb internal capsule unchanged from prior suggestive for possible recent to subacute age infarction.  No evidence for hydrocephalus or acute intracranial hemorrhage.  Clinical correlation and further evaluation with MRI if patient compatible.      Cardiac Imaging   Echo (11/01/19)  · Increased (hyperdynamic) left ventricular systolic function. The estimated ejection fraction is 75%.  · Concentric left ventricular remodeling.  · Normal LV diastolic function.  · No wall motion abnormalities.  · Normal right ventricular systolic function.  · Normal central venous pressure (3 mm Hg).  · Mild tricuspid regurgitation.  · The estimated PA systolic pressure is 26 mm Hg  · Echolucent structure next to the LA, likely representing hiatal hernia. Clinincal correlation is required.      Miscellaneous Imaging    IR Angiogram Visceral 11/9/19   Angiography of the lumbar arteries demonstrates active extravasation of the right L2 lumbar artery.  Embolization using beads as described above.    CTA Abdomen/Pelvis 11/9/19  1. Large right retroperitoneal hematoma with findings  concerning for active arterial extravasation/hemorrhage as detailed above.  There is associated hemorrhage extending throughout the right abdomen and pelvis/pericolic gutter with associated mass effect on the right kidney, which is anteriorly displaced.  2. Decreased opacification of the infahepatic and infrarenal IVC, possibly secondary to underlying mass effect versus partial thrombosis.  3. Nonspecific cecal and right colonic wall thickening, possibly reactive due to hemoperitoneum.  4. Additional findings as detailed above.    MRI Thoracic/Lumbar Spine W WO Contr 11/8/19  Large hematoma within the right psoas muscle.  Compression and probable thrombosis of the IVC. Consider contrast enhanced CT with delayed phase.  Multilevel degenerative changes as detailed above, more severe in the lumbar spine.  Irregularity of the T12 through L3 endplates is most likely degenerative.      Danya Mancia PA-C  [facility]  Department of Vascular Neurology   [facility]

## 2019-11-12 NOTE — ANESTHESIA PROCEDURE NOTES
Ad Hoc Intubation  Performed by: Rose King MD  Authorized by: Apolinar Blue MD     Indications:  Respiratory failure and airway protection  Diagnosis:  Respiratory failure  Patient Location:  Floor  Timeout:  11/9/2019 5:12 AM  Procedure Start Time:  11/9/2019 5:13 AM  Procedure End Time:  11/9/2019 5:14 AM  Staff:     patient identified, IV checked, risks and benefits discussed, monitors and equipment checked and timeout performed      Anesthesiologist Present: Yes    Intubation:     Induction:  Intravenous    Intubated:  Postinduction    Mask Ventilation:  Easy mask    Attempts:  1    Attempted By:  Resident anesthesiologist    Method of Intubation:  Direct    Blade:  Kvng 3    Laryngeal View Grade: Grade I - full view of chords      Difficult Airway Encountered?: No      Complications:  Soft tissue trauma    Airway Device:  Oral endotracheal tube    Airway Device Size:  7.0    Style/Cuff Inflation:  Cuffed    Tube secured:  21    Secured at:  The lips    Placement Verified By:  Colorimetric ETCO2 device and Revisualization with laryngoscopy    Complicating Factors:  None    Findings Post-Intubation:  BS equal bilateral

## 2019-11-12 NOTE — ANESTHESIA POST-OP PAIN MANAGEMENT
"Acute Pain Service Consult Note    Melva Barker is a 74 y.o., female, [id number] w/a PMH of IDDM and HTN s/p stroke treated with TPA on 10/31 complicated by a large right psoas hematoma 11/8 s/p embolization of L3 and L2 lumbar arteries now with back pain treated with PCA.    APS consulted for recommendations for PCA weaning    Patient reports history of chronic back pain treated with Gabapentin and "other stuff." "I can't remember but I take them"    Post Op Day #: 2      Problem List:    Active Hospital Problems    Diagnosis  POA    *Thrombotic stroke involving left middle cerebral artery [I63.312]  Yes    Acute hypoxemic respiratory failure [J96.01]  No    Nontraumatic hemorrhagic shock [R57.8]  No    Retroperitoneal hematoma [K66.1]  No    FALLON (acute kidney injury) [N17.9]  No    Neuropathy [G62.9]  Unknown    Stroke [I63.9]  Yes    Cytotoxic cerebral edema [G93.6]  Yes    Debility [R53.81]  Yes    Gastroparesis [K31.84]  Yes    Essential hypertension [I10]  Yes     Chronic    Back pain [M54.9]  Yes    Diabetes mellitus type 2 without retinopathy [E11.9]  Yes     Chronic    Leukocytosis [D72.829]  No      Resolved Hospital Problems    Diagnosis Date Resolved POA    Acute metabolic encephalopathy [G93.41] 11/10/2019 No    Nausea & vomiting [R11.2] 11/09/2019 No       Subjective:     General alert. Endorses back pain.    Pain with rest: 10/10   Pain with movement: 10.5/10    Side Effects    1. Pruritis No    2. Nausea No    3. Motor Blockade No    4. Sedation No    Objective:   Oriented to person, place.       Vitals   Vitals:    11/12/19 1109   BP:    Pulse: (!) 135   Resp:    Temp:         Labs    No results displayed because visit has over 200 results.           Meds   Current Facility-Administered Medications   Medication Dose Route Frequency Provider Last Rate Last Dose    0.9%  NaCl infusion (for blood administration)   Intravenous Q24H PRN Suraj Ureña MD        " dextrose 10 % infusion  12.5 g Intravenous PRN Hortencia Villalba NP        glucagon (human recombinant) injection 1 mg  1 mg Intramuscular PRN Hortencia Villalba NP        HYDROmorphone PCA in 0.9 % NaCl 6 Mg/30 mL (0.2 mg/mL)   Intravenous Continuous Job Rodriguez NP        insulin aspart U-100 pen 0-5 Units  0-5 Units Subcutaneous Q6H PRN Hortencia Villalba NP   2 Units at 11/09/19 1828    lisinopril tablet 10 mg  10 mg Oral Daily Danya Mancia PA-C   10 mg at 11/12/19 0949    naloxone 0.4 mg/mL injection 0.02 mg  0.02 mg Intravenous PRN Gavino Sosa MD        ondansetron injection 4 mg  4 mg Intravenous Q6H PRN Sailaja Carter NP   4 mg at 11/12/19 0034    oxyCODONE immediate release tablet 5 mg  5 mg Oral Q6H PRN Siomara H. Sal, NP   5 mg at 11/12/19 0952    polyethylene glycol packet 17 g  17 g Oral Daily Siomara H. Sal, NP   17 g at 11/12/19 0949    potassium chloride 10% oral solution 20 mEq  20 mEq Oral BID Danya Mancia PA-C   20 mEq at 11/12/19 1014    sodium chloride 0.9% flush 10 mL  10 mL Intravenous PRN Traci Lamb MD           Assessment:    74 y.o., female w/a PMH of IDDM, chronic back pain, and HTN s/p stroke treated with TPA on 10/31 complicated by a large right psoas hematoma 11/8 s/p embolization of L3 and L2 lumbar arteries now with post procedural pain managed with PCA. Oral morphine equivalents over past 24hours 140mg.     Plan:   Discontinue PCA   Resume home Gabapentin and Tizanidine   Schedule Tylenol 1000mg [id number]   Consider Celebrex 200mg qDay if clinically appropriate   Start Oxycodone 5 - 10mg q4h PRN   Re-assess need for central line    APS will sign off      Dionicio Goddard   APS phone [phone]      I have seen the patient, reviewed the Resident's assessment and plan. I have personally interviewed and examined the patient at bedside and: agree with the findings.

## 2019-11-12 NOTE — PLAN OF CARE
POC reviewed with pt at 0500. Pt verbalized understanding. Night bath given. No change in neuro exam overnight. H&H stable. Currently replacing potassium of 3.0. Questions and concerns addressed. No acute events overnight. Pt progressing toward goals. Will continue to monitor. See flowsheets for full assessment and VS info

## 2019-11-12 NOTE — ASSESSMENT & PLAN NOTE
2/2 hemorrhagic shock  Pt had required emergent intubation prior to ICU transfer.  Now extubated post-IR 11/9 and tolerating well.

## 2019-11-12 NOTE — ASSESSMENT & PLAN NOTE
Pt had been c/o back pain in recent days, complicated by her hx of chronic back and sciatic nerve pain with use of Tizanidine and opiates at home.  Due to Hb downtrend and recent tPA administration, MRI Lumbar/Thoracic spine was ordered 11/8 which demonstrated large R psoas hematoma with IVC compression and probable thrombosis.   General Surgery was consulted; No acute intervention pursued.     Patient was then with acute decompensation overnight 11/9; became hypotensive, tachycardic, and apneic with Hb down to 6.7. Rapid Response was called; patient requiring intubation, blood transfusion, and transfer to medical ICU.   CTA abd/pelvis 11/9 showed hematoma with active extravasation/hemorrhage with IVC compression, hemoperitoneum.   Pt went to IR and is now s/p successful R L2 & L3 lumbar spinal artery embolization.   Pt doing well clinically; was able to be extubated though is groggy from pain medications.  Patient underwent IR re-evaluation on 11/11. No arterial bleed evident on IR angiography.    Hb now stable; Will continue to monitor.

## 2019-11-12 NOTE — ASSESSMENT & PLAN NOTE
Consulted to anesthesia pain management   Appreciate recommendations  D/c pain pump and change to oxycodone 5mg q 4-6hours prn  History of sciatic nerve pain.  Gabapentin and tizanidine   Tylenol   Will continue to wean as tolerated

## 2019-11-12 NOTE — ASSESSMENT & PLAN NOTE
Cr now WNL  Likely related to multiple administrations of contrast in the last 24 hrs.  Improving

## 2019-11-12 NOTE — PROGRESS NOTES
Pt seen for wound care rounding. Pt skin intact to bony prominences and beneath medical devices. Wound care nurse to order waffle overlay and heel foals for pressure prevention measures. Assisted pt with repositioning in bed. Pt's nurse aware of supplies ordered.    Nursing to continue pressure prevention interventions as directed.Please consult wound care if further assistance is needed. [id number]

## 2019-11-12 NOTE — NURSING TRANSFER
Nursing Transfer Note      11/12/2019     Transfer To: [id number]  Transfer via bed    Transfer with cardiac monitoring    Transported by Skylar JEREZ      Medicines sent: PCA pump with Hydromorphone syringe w/ volume of 11.6 ml     Chart send with patient: Yes    Notified: son    Patient reassessed at: 11/12/19 @ 0558 with Lashonda JEREZ     Upon arrival to floor: cardiac monitor applied, patient oriented to room, call bell in reach and bed in lowest position

## 2019-11-12 NOTE — RESIDENT HANDOFF
Handoff     Primary Team: [facility] CRITICAL CARE MEDICINE Room Number: [id number] A     Patient Name: Melva Barker MRN: [id number]     Date of Birth: [id number] Allergies: Morphine and Latex, natural rubber     Age: 74 y.o. Admit Date: 10/31/2019     Sex: female  BMI: Body mass index is 24.24 kg/m².     Code Status: Full Code        Illness Level (current clinical status): Watcher - Yes -     Reason for Admission: Thrombotic stroke involving left middle cerebral artery    Brief HPI   Patient is a 74 y.o. female with significant past medical history of IDDM and HTN presented to the hospital on 10/31 as a stroke code from [facility]. TPA was administered. MRI confirmed small infarct L posterior limb internal capsule, acute/subacute as well as surrounding cytotoxic cerebral edema without mass effect. On 11/8, she developed a large right psoas hematoma, S/P intubation and embolization of L3 and L2 lumbar arteries. Then she was extubated and since then, no signs of active bleeding In repeat CTA, Hb stable.    Procedure Date: 11/9    Hospital Course   See H&P    Tasks   Thrombotic stroke involving left middle cerebral artery  Vascular Neurology following  DAPT on hold due to hemorrhagic shock, restart when clinically appropriate  s/p TPA on 10/31    Retroperitoneal hematoma  left psoas hematoma seen on MRI imaging  repeat CTA and angiogram on 11/10 without evidence of continued arterial bleed  Hb stable, F/U serial labs Q6h         Estimated Discharge Date: TBD    Discharge Disposition: Home or Self Care    Mentored By: Dr. Vega

## 2019-11-13 PROBLEM — N17.9 AKI (ACUTE KIDNEY INJURY): Status: RESOLVED | Noted: 2019-11-06 | Resolved: 2019-11-13

## 2019-11-13 PROBLEM — R57.8 NONTRAUMATIC HEMORRHAGIC SHOCK: Status: RESOLVED | Noted: 2019-11-09 | Resolved: 2019-11-13

## 2019-11-13 PROBLEM — J96.01 ACUTE HYPOXEMIC RESPIRATORY FAILURE: Status: RESOLVED | Noted: 2019-11-09 | Resolved: 2019-11-13

## 2019-11-13 LAB
ALBUMIN SERPL BCP-MCNC: 2.9 G/DL (ref 3.5–5.2)
ALP SERPL-CCNC: 76 U/L (ref 55–135)
ALT SERPL W/O P-5'-P-CCNC: 22 U/L (ref 10–44)
ANION GAP SERPL CALC-SCNC: 10 MMOL/L (ref 8–16)
AST SERPL-CCNC: 49 U/L (ref 10–40)
BASOPHILS # BLD AUTO: 0.03 K/UL (ref 0–0.2)
BASOPHILS # BLD AUTO: 0.04 K/UL (ref 0–0.2)
BASOPHILS NFR BLD: 0.2 % (ref 0–1.9)
BASOPHILS NFR BLD: 0.3 % (ref 0–1.9)
BILIRUB SERPL-MCNC: 2.1 MG/DL (ref 0.1–1)
BUN SERPL-MCNC: 5 MG/DL (ref 8–23)
CALCIUM SERPL-MCNC: 8.6 MG/DL (ref 8.7–10.5)
CHLORIDE SERPL-SCNC: 104 MMOL/L (ref 95–110)
CO2 SERPL-SCNC: 23 MMOL/L (ref 23–29)
CREAT SERPL-MCNC: 0.7 MG/DL (ref 0.5–1.4)
DIFFERENTIAL METHOD: ABNORMAL
DIFFERENTIAL METHOD: ABNORMAL
EOSINOPHIL # BLD AUTO: 0.2 K/UL (ref 0–0.5)
EOSINOPHIL # BLD AUTO: 0.3 K/UL (ref 0–0.5)
EOSINOPHIL NFR BLD: 1.5 % (ref 0–8)
EOSINOPHIL NFR BLD: 2.3 % (ref 0–8)
ERYTHROCYTE [DISTWIDTH] IN BLOOD BY AUTOMATED COUNT: 15.2 % (ref 11.5–14.5)
ERYTHROCYTE [DISTWIDTH] IN BLOOD BY AUTOMATED COUNT: 15.5 % (ref 11.5–14.5)
EST. GFR  (AFRICAN AMERICAN): >60 ML/MIN/1.73 M^2
EST. GFR  (NON AFRICAN AMERICAN): >60 ML/MIN/1.73 M^2
GLUCOSE SERPL-MCNC: 170 MG/DL (ref 70–110)
HCT VFR BLD AUTO: 25.9 % (ref 37–48.5)
HCT VFR BLD AUTO: 25.9 % (ref 37–48.5)
HGB BLD-MCNC: 8.3 G/DL (ref 12–16)
HGB BLD-MCNC: 8.4 G/DL (ref 12–16)
IMM GRANULOCYTES # BLD AUTO: 0.09 K/UL (ref 0–0.04)
IMM GRANULOCYTES # BLD AUTO: 0.15 K/UL (ref 0–0.04)
IMM GRANULOCYTES NFR BLD AUTO: 0.7 % (ref 0–0.5)
IMM GRANULOCYTES NFR BLD AUTO: 1.2 % (ref 0–0.5)
LYMPHOCYTES # BLD AUTO: 1.6 K/UL (ref 1–4.8)
LYMPHOCYTES # BLD AUTO: 1.8 K/UL (ref 1–4.8)
LYMPHOCYTES NFR BLD: 11.8 % (ref 18–48)
LYMPHOCYTES NFR BLD: 14.3 % (ref 18–48)
MAGNESIUM SERPL-MCNC: 1.8 MG/DL (ref 1.6–2.6)
MCH RBC QN AUTO: 28.3 PG (ref 27–31)
MCH RBC QN AUTO: 28.4 PG (ref 27–31)
MCHC RBC AUTO-ENTMCNC: 32 G/DL (ref 32–36)
MCHC RBC AUTO-ENTMCNC: 32.4 G/DL (ref 32–36)
MCV RBC AUTO: 87 FL (ref 82–98)
MCV RBC AUTO: 89 FL (ref 82–98)
MONOCYTES # BLD AUTO: 1.4 K/UL (ref 0.3–1)
MONOCYTES # BLD AUTO: 1.9 K/UL (ref 0.3–1)
MONOCYTES NFR BLD: 10.7 % (ref 4–15)
MONOCYTES NFR BLD: 14.6 % (ref 4–15)
NEUTROPHILS # BLD AUTO: 8.7 K/UL (ref 1.8–7.7)
NEUTROPHILS # BLD AUTO: 9.9 K/UL (ref 1.8–7.7)
NEUTROPHILS NFR BLD: 67.3 % (ref 38–73)
NEUTROPHILS NFR BLD: 75.1 % (ref 38–73)
NRBC BLD-RTO: 0 /100 WBC
NRBC BLD-RTO: 0 /100 WBC
PHOSPHATE SERPL-MCNC: 2 MG/DL (ref 2.7–4.5)
PLATELET # BLD AUTO: 149 K/UL (ref 150–350)
PLATELET # BLD AUTO: 159 K/UL (ref 150–350)
PMV BLD AUTO: 10.5 FL (ref 9.2–12.9)
PMV BLD AUTO: 10.8 FL (ref 9.2–12.9)
POCT GLUCOSE: 175 MG/DL (ref 70–110)
POCT GLUCOSE: 195 MG/DL (ref 70–110)
POCT GLUCOSE: 257 MG/DL (ref 70–110)
POCT GLUCOSE: 308 MG/DL (ref 70–110)
POTASSIUM SERPL-SCNC: 3.7 MMOL/L (ref 3.5–5.1)
PROT SERPL-MCNC: 6.4 G/DL (ref 6–8.4)
RBC # BLD AUTO: 2.92 M/UL (ref 4–5.4)
RBC # BLD AUTO: 2.97 M/UL (ref 4–5.4)
SODIUM SERPL-SCNC: 137 MMOL/L (ref 136–145)
WBC # BLD AUTO: 12.9 K/UL (ref 3.9–12.7)
WBC # BLD AUTO: 13.2 K/UL (ref 3.9–12.7)

## 2019-11-13 PROCEDURE — 92610 EVALUATE SWALLOWING FUNCTION: CPT

## 2019-11-13 PROCEDURE — 97168 OT RE-EVAL EST PLAN CARE: CPT

## 2019-11-13 PROCEDURE — 97164 PT RE-EVAL EST PLAN CARE: CPT

## 2019-11-13 PROCEDURE — 93010 EKG 12-LEAD: ICD-10-PCS | Mod: ,,, | Performed by: INTERNAL MEDICINE

## 2019-11-13 PROCEDURE — 25000003 PHARM REV CODE 250: Performed by: PHYSICIAN ASSISTANT

## 2019-11-13 PROCEDURE — 25000003 PHARM REV CODE 250: Performed by: NURSE PRACTITIONER

## 2019-11-13 PROCEDURE — 83735 ASSAY OF MAGNESIUM: CPT

## 2019-11-13 PROCEDURE — 63600175 PHARM REV CODE 636 W HCPCS: Performed by: NURSE PRACTITIONER

## 2019-11-13 PROCEDURE — 99233 PR SUBSEQUENT HOSPITAL CARE,LEVL III: ICD-10-PCS | Mod: GC,,, | Performed by: PSYCHIATRY & NEUROLOGY

## 2019-11-13 PROCEDURE — 93010 ELECTROCARDIOGRAM REPORT: CPT | Mod: ,,, | Performed by: INTERNAL MEDICINE

## 2019-11-13 PROCEDURE — 94761 N-INVAS EAR/PLS OXIMETRY MLT: CPT

## 2019-11-13 PROCEDURE — 36415 COLL VENOUS BLD VENIPUNCTURE: CPT

## 2019-11-13 PROCEDURE — 63600175 PHARM REV CODE 636 W HCPCS: Performed by: PHYSICIAN ASSISTANT

## 2019-11-13 PROCEDURE — 93005 ELECTROCARDIOGRAM TRACING: CPT

## 2019-11-13 PROCEDURE — 85025 COMPLETE CBC W/AUTO DIFF WBC: CPT | Mod: 91

## 2019-11-13 PROCEDURE — 92523 SPEECH SOUND LANG COMPREHEN: CPT

## 2019-11-13 PROCEDURE — 97530 THERAPEUTIC ACTIVITIES: CPT

## 2019-11-13 PROCEDURE — 84100 ASSAY OF PHOSPHORUS: CPT

## 2019-11-13 PROCEDURE — 99233 SBSQ HOSP IP/OBS HIGH 50: CPT | Mod: GC,,, | Performed by: PSYCHIATRY & NEUROLOGY

## 2019-11-13 PROCEDURE — 80053 COMPREHEN METABOLIC PANEL: CPT

## 2019-11-13 PROCEDURE — 20600001 HC STEP DOWN PRIVATE ROOM

## 2019-11-13 RX ORDER — ONDANSETRON 2 MG/ML
8 INJECTION INTRAMUSCULAR; INTRAVENOUS EVERY 6 HOURS PRN
Status: DISCONTINUED | OUTPATIENT
Start: 2019-11-13 | End: 2019-11-14

## 2019-11-13 RX ORDER — TIZANIDINE 4 MG/1
4 TABLET ORAL EVERY 8 HOURS
Status: DISCONTINUED | OUTPATIENT
Start: 2019-11-13 | End: 2019-11-14

## 2019-11-13 RX ORDER — CELECOXIB 200 MG/1
200 CAPSULE ORAL DAILY
Status: COMPLETED | OUTPATIENT
Start: 2019-11-13 | End: 2019-11-15

## 2019-11-13 RX ORDER — CELECOXIB 200 MG/1
200 CAPSULE ORAL DAILY
Status: DISCONTINUED | OUTPATIENT
Start: 2019-11-13 | End: 2019-11-13

## 2019-11-13 RX ORDER — SODIUM,POTASSIUM PHOSPHATES 280-250MG
1 POWDER IN PACKET (EA) ORAL
Status: COMPLETED | OUTPATIENT
Start: 2019-11-13 | End: 2019-11-13

## 2019-11-13 RX ORDER — HYDROCODONE BITARTRATE AND ACETAMINOPHEN 10; 325 MG/1; MG/1
1 TABLET ORAL EVERY 8 HOURS
Status: DISCONTINUED | OUTPATIENT
Start: 2019-11-13 | End: 2019-11-14

## 2019-11-13 RX ORDER — ACETAMINOPHEN 325 MG/1
650 TABLET ORAL EVERY 8 HOURS
Status: DISCONTINUED | OUTPATIENT
Start: 2019-11-13 | End: 2019-11-29

## 2019-11-13 RX ADMIN — HYDROCODONE BITARTRATE AND ACETAMINOPHEN 1 TABLET: 10; 325 TABLET ORAL at 02:11

## 2019-11-13 RX ADMIN — HYDROCODONE BITARTRATE AND ACETAMINOPHEN 1 TABLET: 10; 325 TABLET ORAL at 09:11

## 2019-11-13 RX ADMIN — ONDANSETRON 8 MG: 2 INJECTION INTRAMUSCULAR; INTRAVENOUS at 05:11

## 2019-11-13 RX ADMIN — POTASSIUM & SODIUM PHOSPHATES POWDER PACK 280-160-250 MG 1 PACKET: 280-160-250 PACK at 04:11

## 2019-11-13 RX ADMIN — POTASSIUM & SODIUM PHOSPHATES POWDER PACK 280-160-250 MG 1 PACKET: 280-160-250 PACK at 11:11

## 2019-11-13 RX ADMIN — LISINOPRIL 10 MG: 10 TABLET ORAL at 08:11

## 2019-11-13 RX ADMIN — CELECOXIB 200 MG: 200 CAPSULE ORAL at 11:11

## 2019-11-13 RX ADMIN — OXYCODONE HYDROCHLORIDE 5 MG: 5 TABLET ORAL at 05:11

## 2019-11-13 RX ADMIN — POTASSIUM & SODIUM PHOSPHATES POWDER PACK 280-160-250 MG 1 PACKET: 280-160-250 PACK at 09:11

## 2019-11-13 RX ADMIN — POLYETHYLENE GLYCOL 3350 17 G: 17 POWDER, FOR SOLUTION ORAL at 08:11

## 2019-11-13 RX ADMIN — OXYCODONE HYDROCHLORIDE 5 MG: 5 TABLET ORAL at 06:11

## 2019-11-13 RX ADMIN — SODIUM CHLORIDE 500 ML: 0.9 INJECTION, SOLUTION INTRAVENOUS at 08:11

## 2019-11-13 RX ADMIN — GABAPENTIN 600 MG: 300 CAPSULE ORAL at 02:11

## 2019-11-13 RX ADMIN — ACETAMINOPHEN 650 MG: 325 TABLET ORAL at 02:11

## 2019-11-13 RX ADMIN — ONDANSETRON 4 MG: 2 INJECTION INTRAMUSCULAR; INTRAVENOUS at 08:11

## 2019-11-13 RX ADMIN — OXYCODONE HYDROCHLORIDE 5 MG: 5 TABLET ORAL at 11:11

## 2019-11-13 RX ADMIN — INSULIN ASPART 4 UNITS: 100 INJECTION, SOLUTION INTRAVENOUS; SUBCUTANEOUS at 04:11

## 2019-11-13 RX ADMIN — ONDANSETRON 8 MG: 2 INJECTION INTRAMUSCULAR; INTRAVENOUS at 11:11

## 2019-11-13 RX ADMIN — POTASSIUM CHLORIDE 20 MEQ: 20 SOLUTION ORAL at 08:11

## 2019-11-13 RX ADMIN — TIZANIDINE 4 MG: 4 TABLET ORAL at 09:11

## 2019-11-13 RX ADMIN — OXYCODONE HYDROCHLORIDE 5 MG: 5 TABLET ORAL at 01:11

## 2019-11-13 RX ADMIN — TIZANIDINE 4 MG: 4 TABLET ORAL at 02:11

## 2019-11-13 RX ADMIN — ATORVASTATIN CALCIUM 40 MG: 20 TABLET, FILM COATED ORAL at 09:11

## 2019-11-13 RX ADMIN — ACETAMINOPHEN 650 MG: 325 TABLET ORAL at 09:11

## 2019-11-13 RX ADMIN — GABAPENTIN 600 MG: 300 CAPSULE ORAL at 09:11

## 2019-11-13 RX ADMIN — GABAPENTIN 600 MG: 300 CAPSULE ORAL at 08:11

## 2019-11-13 RX ADMIN — POTASSIUM CHLORIDE 20 MEQ: 20 SOLUTION ORAL at 09:11

## 2019-11-13 NOTE — PROGRESS NOTES
[facility]  Vascular Neurology  Comprehensive Stroke Center  Progress Note    Assessment/Plan:     * Thrombotic stroke involving left middle cerebral artery  Melva Barker is a 74 y.o. female with PMHx of HTN, HLD, and DM who presented to OSH with acute worsening of R-sided weakness after having experienced R-sided weakness x1 month. She was treated with tPA at OSH. CTA without LVO. MRI with infarct in L internal capsule and corona radiata. Etiology likely small vessel disease.    Upgraded diet and adjusting analgesic regimen, as below. Dispo inpatient rehab.      Antithrombotics for secondary stroke prevention: Antiplatelets: ASA 81 mg + Plavix 75 mg -- HELD due to retroperitoneal hematoma.  Recently received blood transfusions  Statins for secondary stroke prevention and hyperlipidemia, if present: Statins: Atorvastatin- 40 mg daily,   Aggressive risk factor modification: HTN, HLD, Diet  Rehab efforts: The patient has been evaluated by a stroke team provider and the therapy needs have been fully considered based off the presenting complaints and exam findings. The following therapy evaluations are needed: PT evaluate and treat, OT evaluate and treat, SLP evaluate and treat, PM&R evaluate for appropriate placement - Dispo inpatient rehab  Diagnostics ordered/pending: None   VTE prophylaxis: Mechanical SCDs only - Pharmacologic ppx acutely held due to hematoma.  BP parameters: Infarct: Post tPA, SBP <160; Avoid hypotension    Retroperitoneal hematoma  Pt had been c/o back pain in recent days, complicated by her hx of chronic back and sciatic nerve pain with use of Tizanidine and opiates at home.  Due to Hb downtrend and recent tPA administration, MRI Lumbar/Thoracic spine was ordered 11/8 which demonstrated large R psoas hematoma with IVC compression and probable thrombosis.   General Surgery was consulted; No acute intervention pursued.     Patient was then with acute decompensation  overnight 11/9; became hypotensive, tachycardic, and apneic with Hb down to 6.7. Rapid Response was called; patient requiring intubation, blood transfusion, and transfer to medical ICU.   CTA abd/pelvis 11/9 showed hematoma with active extravasation/hemorrhage with IVC compression, hemoperitoneum.   Pt went to IR and is now s/p successful R L2 & L3 lumbar spinal artery embolization.   Patient underwent IR re-evaluation on 11/11. No arterial bleed evident on IR angiography.    Hb now stable; Continuing to monitor.    Back pain  Reported hx chronic back pain with associated sciatic nerve pain as well, then complicated by acute retroperitoneal hematoma.  Consulted to anesthesia pain management; Appreciate recommendations.  Pt continues to c/o R back pain on 11/13 and concern that pt's tachycardia could be pain-related.  D/c'd pain pump - Pt now with scheduled Tylenol, Celexa (planned temporary duration), Gabapentin, Norco, and Tizanidine, also with Oxycodone PRN.  Will continue to wean daily toward pt's home regimen as tolerated.     Essential hypertension  Risk factor for stroke  Now hypertensive, started Lisinopril.  Goal SBP < 160; Avoid hypotension  Will continue to monitor    Leukocytosis  Previously suspected acute reactive 2/2 active bleed. WBC now 13.  BCx 11/9 NGTD and pt remains afebrile.  Pt with no other acute complaints besides back pain at this time.  Will monitor for downtrend for now.    Nausea & vomiting  Patient with episodes of vomiting 11/2 - resolved throughout the day. No episodes of vomiting overnight.   PRN IV Zofran ordered   KUB completed and no abnormality noted  Patient continues to report nausea - some mild middle lower abdominal pain upon palpation  Lipase ordered and WNL  CT abdomen (2016) diverticulosis in the descending colon and a small hiatal hernia. Hx of diabetic gastroparesis, reglan started. Follow up with PCP.  Patient with last BM 11/6 - normal bowel sounds on exam   Sucralfate  1g started 11/5. Increased PRN Zofran dose on 11/13.  Will continue to monitor     Debility  2/2 stroke  PT/OT eval & treat - Dispo inpatient rehab    Cytotoxic cerebral edema  Area of cytotoxic cerebral edema identified when reviewing brain imaging in the territory of the L middle cerebral artery. There is no mass effect associated with it. We will continue to monitor the patients clinical exam for any worsening of symptoms which may indicate expansion of the stroke or the area of the edema resulting in the clinical change. The pattern is suggestive of small vessel etiology.    Diabetes mellitus type 2 without retinopathy  Stroke risk factor, poorly controlled on glargine 17 units daily  HbA1c 8.3%  Recommend tight glucose control  Currently on SSI   Diabetic diet    Gastroparesis  Previously on Reglan 10 mg TID before meals         11/1/19 MRI with small vessel L MCA infarct, therapy recommending rehab  11/2 - Patient stepped down overnight. Adjusting BP regimen. Patient with continuous complaints if nausea. IV Zofran ordered with some relief. Patient has not has BM since 10/30. Ordering KUB to rule out obstruction. Neuro exam unchanged.   11/3 - NAEON. Patient reports she had no episodes of vomiting overnight. Still complaining of nausea. Mild lower abdomen tenderness on exam. Lipase ordered and WNL. Continue to monitor.   11/4 - denies nausea and vomiting. Abd tenderness remains present on palpitation. Continue to monitor. HCTZ increased yesterday. Pending rehab placement.   11/5 -  N/V x1 overnight zofran resolved, sucralfate started. Placement pending   11/6 - hypotensive, held all BP meds,  ml bolus, responded well. Increased BUN/Cr, start NS 75ml/hr.   11/7 patient complaining of sciatica pain.  Restarted home tizanidine.  Patient requesting hydrocodone but educated patient that nerve pain is not treated with opioid.  Degenerative disease seen on xray but no fracture.      11/8 Patient continues to  experience back pain.  Now with leukocytosis and drop in h/h plan for MRI thoracic/lumbar spine to evaluate for epidural hematoma.    11/9: MRI Lumbar spine had demonstrated large R psoas hematoma with possible IVC compression; General Surgery consulted. Patient was then with acute decompensation overnight; became hypotensive, tachycardic, and apneic requiring intubation, blood transfusion, and medical ICU transfer. CTA abd/pelvis showed active extravasation/hemorrhage with hemoperitoneum. Pt now s/p successful lumbar artery embolization in IR. She was extubated on arrival back to ICU and tolerated well.    11/11: Patient underwent IR re-evaluation yesterday. No arterial bleed evident on IR angiography. Patient neuro exam stable.   11/12 Stepped down from medical ICU this morning.  Patient still experiencing pain.  Anesthesia consulted for pain management.  D/C pain pump and started oral medications for muscle and nerve pain.  Will wean to home regimen. Waiting for repeat CBC, transfused one unit of blood 11/11.   Tachycardic/hypertensive will continue to monitor.  11/13: Upgraded diet per SLP recs. Pt continues to c/o R back pain, concern that tachycardia could be pain-induced; adjusted analgesic regimen further as well as antiemetic regimen with plans to eventually wean to home regimen. Replaced Phos. Dispo inpatient rehab.    STROKE DOCUMENTATION   Acute Stroke Times   Last Known Normal Date: 10/31/19  Last Known Normal Time: 0630  Symptom Onset Date: 10/31/19  Symptom Onset Time: 0730  Stroke Team Called Date: 10/31/19  Stroke Team Called Time: 0936  Stroke Team Arrival Date: 10/31/19  Stroke Team Arrival Time: 0946  Decision to Treat Time for Alteplase: 1003    NIH Scale:  1a. Level of Consciousness: 0-->Alert, keenly responsive  1b. LOC Questions: 0-->Answers both questions correctly  1c. LOC Commands: 0-->Performs both tasks correctly  2. Best Gaze: 0-->Normal  3. Visual: 0-->No visual loss  4. Facial Palsy:  1-->Minor paralysis (flattened nasolabial fold, asymmetry on smiling)  5a. Motor Arm, Left: 0-->No drift, limb holds 90 (or 45) degrees for full 10 secs  5b. Motor Arm, Right: 1-->Drift, limb holds 90 (or 45) degrees, but drifts down before full 10 secs, does not hit bed or other support  6a. Motor Leg, Left: 0-->No drift, leg holds 30 degree position for full 5 secs  6b. Motor Leg, Right: 3-->No effort against gravity, leg falls to bed immediately  7. Limb Ataxia: 0-->Absent  8. Sensory: 0-->Normal, no sensory loss  9. Best Language: 0-->No aphasia, normal  10. Dysarthria: 1-->Mild-to-moderate dysarthria, patient slurs at least some words and, at worst, can be understood with some difficulty  11. Extinction and Inattention (formerly Neglect): 0-->No abnormality  Total (NIH Stroke Scale): 6       Modified Dayanna Score: 0  [place]verte Coma Scale:    ABCD2 Score:    [id number] Score:   HAS -BLED Score:   ICH Score:   Hunt & Amaya Classification:      Hemorrhagic change of an Ischemic Stroke: Does this patient have an ischemic stroke with hemorrhagic changes? No     Neurologic Chief Complaint: R-sided weakness -- L MCA    Subjective:     Interval History: Patient is seen for follow-up neurological assessment and treatment recommendations: NAEON. Upgraded diet per SLP recs. Pt continues to c/o R back pain, concern that tachycardia could be pain-induced; adjusted analgesic regimen further as well as antiemetic regimen with plans to eventually wean to home regimen. Replaced Phos. Dispo inpatient rehab.    HPI, Past Medical, Family, and Social History remains the same as documented in the initial encounter.     Review of Systems   Constitutional: Negative for chills and fever.   Musculoskeletal: Positive for arthralgias and back pain.   Neurological: Positive for facial asymmetry, speech difficulty and weakness.   Psychiatric/Behavioral: Negative for agitation and confusion.     Scheduled Meds:   acetaminophen  650 mg Oral  Q8H    atorvastatin  40 mg Oral QHS    celecoxib  200 mg Oral Daily    gabapentin  600 mg Oral TID    HYDROcodone-acetaminophen  1 tablet Oral Q8H    lisinopril  10 mg Oral Daily    polyethylene glycol  17 g Oral Daily    potassium chloride 10%  20 mEq Oral BID    potassium, sodium phosphates  1 packet Oral QID (AC & HS)    tiZANidine  4 mg Oral Q8H     Continuous Infusions:    PRN Meds:sodium chloride, dextrose 10 % in water (D10W), glucagon (human recombinant), insulin aspart U-100, ondansetron, oxyCODONE, sodium chloride 0.9%    Objective:     Vital Signs (Most Recent):  Temp: 98.9 °F (37.2 °C) (11/13/19 1110)  Pulse: (!) 112 (11/13/19 1110)  Resp: 17 (11/13/19 1110)  BP: (!) 140/71 (11/13/19 1110)  SpO2: 97 % (11/13/19 1126)  BP Location: Left arm    Vital Signs Range (Last 24H):  Temp:  [98.5 °F (36.9 °C)-99.4 °F (37.4 °C)]   Pulse:  [102-114]   Resp:  [16-19]   BP: (139-170)/(71-82)   SpO2:  [92 %-98 %]   BP Location: Left arm    Physical Exam   Constitutional: She is oriented to person, place, and time. She appears well-developed and well-nourished.   HENT:   Head: Normocephalic and atraumatic.   Eyes: Conjunctivae and EOM are normal.   Cardiovascular: Tachycardia present.   Pulmonary/Chest: Effort normal. No respiratory distress.   Musculoskeletal: She exhibits no edema or deformity.   Pain in R lumbar region    Neurological: She is alert and oriented to person, place, and time. No sensory deficit.   Skin: Skin is warm and dry. She is not diaphoretic.   Psychiatric: Cognition and memory are not impaired. She is attentive.   Vitals reviewed.      Neurological Exam:   LOC:alert   Attention Span: good  Language: No aphasia  Articulation: Mild dysarthria  Orientation: Person, Time  Visual Fields: Full  EOM (CN III, IV, VI): Full/intact  Facial Movement (CN VII): Lower facial weakness on the Right  Motor: Arm left  Normal 5/5  Leg left  Normal 5/5  Arm right  Paresis: 4/5   Leg right Paresis: 2/5 -  Limited 2/2 pain  Sensation: Intact to light touch, temp  Tone: Normal tone throughout    Laboratory:  CMP:   Recent Labs   Lab 11/13/19  0413   CALCIUM 8.6*   ALBUMIN 2.9*   PROT 6.4      K 3.7   CO2 23      BUN 5*   CREATININE 0.7   ALKPHOS 76   ALT 22   AST 49*   BILITOT 2.1*     CBC:   Recent Labs   Lab 11/13/19  0413   WBC 13.20*   RBC 2.97*   HGB 8.4*   HCT 25.9*      MCV 87   MCH 28.3   MCHC 32.4     Lipid Panel:   No results for input(s): CHOL, LDLCALC, HDL, TRIG in the last 168 hours.  Coagulation:   Recent Labs   Lab 11/09/19  0445   INR 1.1     Platelet Aggregation Study: No results for input(s): PLTAGG, PLTAGINTERP, PLTAGREGLACO, ADPPLTAGGREG in the last 168 hours.  Hgb A1C:   No results for input(s): HGBA1C in the last 168 hours.  TSH:   No results for input(s): TSH in the last 168 hours.      Diagnostic Results     Brain Imaging     MRI Brain (11/01/19):  Acute lacunar type infarct coursing through the left posterior limb internal capsule and corona radiata.      CTH (10/31/19):  No acute intracranial pathology      Vessel Imaging     CTA Multiphase (10/31/19):  CTA head: Atherosclerotic disease of the cavernous and supraclinoid ICAs with mild narrowing.  Otherwise unremarkable CTA of the head specifically without evidence for proximal significant stenosis or occlusion.  CTA neck: Less than 50% proximal ICA stenosis by NASCET criteria.  Atherosclerotic disease with mild moderate narrowing of the right vertebral artery origin.  There is mild left V1 segment narrowing.  No significant focal vertebral artery stenosis or occlusion.  Interlobular septal thickening with tree in bud micro nodularity visualized upper lobes bilaterally which may represent inflammatory/infectious process clinical correlation and correlation with dedicated CT thorax recommended.  CT head: Bandlike region hypoattenuation left posterior limb internal capsule unchanged from prior suggestive for possible recent to  subacute age infarction.  No evidence for hydrocephalus or acute intracranial hemorrhage.  Clinical correlation and further evaluation with MRI if patient compatible.      Cardiac Imaging   Echo (11/01/19)  · Increased (hyperdynamic) left ventricular systolic function. The estimated ejection fraction is 75%.  · Concentric left ventricular remodeling.  · Normal LV diastolic function.  · No wall motion abnormalities.  · Normal right ventricular systolic function.  · Normal central venous pressure (3 mm Hg).  · Mild tricuspid regurgitation.  · The estimated PA systolic pressure is 26 mm Hg  · Echolucent structure next to the LA, likely representing hiatal hernia. Clinincal correlation is required.      Miscellaneous Imaging    IR Angiogram Visceral 11/10/19 pending    CTA Abdomen/Pelvis 11/10/19  Right-sided retroperitoneal hematoma with questionable foci of active extravasation within the inferior portion of the collection as described above.    IR Angiogram Visceral 11/9/19  Angiography of the lumbar arteries demonstrates active extravasation of the right L2 lumbar artery. Embolization using beads as described above.  Plan: Transfer patient to ICU.  Continued serial H and H follow-up.    CTA Abdomen/Pelvis 11/9/19  1. Large right retroperitoneal hematoma with findings concerning for active arterial extravasation/hemorrhage as detailed above.  There is associated hemorrhage extending throughout the right abdomen and pelvis/pericolic gutter with associated mass effect on the right kidney, which is anteriorly displaced.  2. Decreased opacification of the infahepatic and infrarenal IVC, possibly secondary to underlying mass effect versus partial thrombosis.  3. Nonspecific cecal and right colonic wall thickening, possibly reactive due to hemoperitoneum.  4. Additional findings as detailed above.    MRI Thoracic/Lumbar Spine W WO Contr 11/8/19  Large hematoma within the right psoas muscle.  Compression and probable  thrombosis of the IVC. Consider contrast enhanced CT with delayed phase.  Multilevel degenerative changes as detailed above, more severe in the lumbar spine.  Irregularity of the T12 through L3 endplates is most likely degenerative.      Carina Schwartz PA-C  [facility]  Department of Vascular Neurology   [facility]

## 2019-11-13 NOTE — ASSESSMENT & PLAN NOTE
Previously suspected acute reactive 2/2 active bleed. WBC now 13.  BCx 11/9 NGTD and pt remains afebrile.  Pt with no other acute complaints besides back pain at this time.  Will monitor for downtrend for now.

## 2019-11-13 NOTE — ASSESSMENT & PLAN NOTE
Reported hx chronic back pain with associated sciatic nerve pain as well, then complicated by acute retroperitoneal hematoma.  Consulted to anesthesia pain management; Appreciate recommendations.  Pt continues to c/o R back pain on 11/13 and concern that pt's tachycardia could be pain-related.  D/c'd pain pump - Pt now with scheduled Tylenol, Celexa (planned temporary duration), Gabapentin, Norco, and Tizanidine, also with Oxycodone PRN.  Will continue to wean daily toward pt's home regimen as tolerated.

## 2019-11-13 NOTE — ASSESSMENT & PLAN NOTE
Risk factor for stroke  Now hypertensive, started Lisinopril.  Goal SBP < 160; Avoid hypotension  Will continue to monitor

## 2019-11-13 NOTE — PLAN OF CARE
Problem: Physical Therapy Goal  Goal: Physical Therapy Goal  Description  Goals to be met by: 11/15     Patient will increase functional independence with mobility by performin. Supine to sit with contact guard assist   2. Sit to supine with contact guard assist    3. Sit to stand transfer with contact guard assistance using least restrictive device   4. Gait  X 25 feet with Minimal Assistance using least restrictive device.   5. Stand for 1 minutes with Contact Guard Assistance using  least restrictive device or no AD  6. Lower extremity exercise program x20 reps per handout, with independence to improve strength and activity tolerance.         Outcome: Ongoing, Progressing  Re-evaluation completed, initiated plan of care.   Hortencia Umaña, PT  2019

## 2019-11-13 NOTE — PT/OT/SLP RE-EVAL
Physical Therapy Re-evaluation    Patient Name:  Melva Barker   MRN:  [id number]    Recommendations:     Discharge Recommendations:  rehabilitation facility   Discharge Equipment Recommendations: (TBD)   Barriers to discharge: Inaccessible home, Decreased caregiver support and patient below functional baseline    Assessment:     Melva Barker is a 74 y.o. female admitted with a medical diagnosis of Thrombotic stroke involving left middle cerebral artery. The patient presents for re-evaluation s/p transfer to ICU on 11/9 and intubation, patient underwent R lumbar artery embolization 11/9. She presents with the following impairments/functional limitations:  weakness, impaired endurance, impaired functional mobilty, gait instability, impaired self care skills, impaired balance, decreased upper extremity function, decreased lower extremity function, decreased safety awareness, decreased coordination, decreased ROM, abnormal tone, impaired coordination, impaired cardiopulmonary response to activity. The patient continues to demonstrate R sided weakness, exacerbated by R back pain that increases with mobility attempts. Her sitting tolerance was limited by dizziness in sitting in spite of 3 attempts, orthostatics negative. PTA she was independent and lived alone. She is not safe to return home based on the above deficits and level of assist she requires for mobility.     Rehab Prognosis:  Good; patient would benefit from acute skilled PT services to address these deficits and reach maximum level of function.      Recent Surgery: * No surgery found *      Plan:     During this hospitalization, patient to be seen 3 x/week to address the above listed problems via gait training, therapeutic activities, therapeutic exercises, neuromuscular re-education  · Plan of Care Expires:  12/12/19   Plan of Care Reviewed with: patient    Subjective     Communicated with RN prior to session.  Patient found HOB elevated with  telemetry, peripheral IV, central line upon PT entry to room, agreeable to evaluation.      Chief Complaint: R back pain, dizziness in sitting  Patient comments/goals: improve strength and activity tolerance  Pain/Comfort:  · Pain Rating 1: (significant R back pain, did not rate)  · Location - Side 1: Right  · Location - Orientation 1: lower  · Location 1: back  · Pain Addressed 1: Reposition, Distraction, Pre-medicate for activity  · Pain Rating Post-Intervention 1: (remained)    Patients cultural, spiritual, Protestant conflicts given the current situation: no      Objective:     Patient found with: telemetry, peripheral IV, central line     General Precautions: Standard, aspiration, fall   Orthopedic Precautions:N/A   Braces: N/A     Exams:    Cognitive Exam  Patient is A&O x4 and follows 100% of one -step commands    Fine Motor Coordination   -       Impaired R heel to shin 2/2 hemiparesis     Postural Exam Patient presented with the following abnormalities:    -       Rounded shoulders  -       Forward head  -       Kyphosis  -       Posterior pelvic tilt  -       Weight shift posterior   Sensation    -       Light touch intact bilateral lower extremities   Skin Integrity/Edema     -       Skin integrity: visibly intact  -       Edema: NA   R LE ROM WFL PROM   R LE Strength 1/5 hip flexion, 2/5 knee ext/flex, and 1/5 ankle DF/PF; limited by pain in back   L LE ROM WFL   L LE Strength  3/5 hip flexion, 4-/5 knee ext/flex, and ankle DF/PF        Balance          Static Sitting stand by assistance    Dynamic Sitting contact guard assist    Static Standing moderate assistance with hand held assist   Dynamic Standing NT             Functional Mobility:    Bed Mobility  Rolling to R: minimum assistance   Supine <> Sit:  moderate assistance, required maximum assistance for mobilizing R leg, cues for sequencing; performed 3 reps due to low sitting tolerance EOB   Transfers Squat pivot bed to chair and back: moderate  assistance to L; maximum assistance to R, blocking R knee; cues for sequencing    Deferred gait due to decreased sitting tolerance, dizziness     Sitting edge of bed 8 minutes, stand by assistance to contact guard assist , weight shift posterior  -Posterior pelvic tilt, kyphosis, cervical flexion  -Visual/verbal/manual cues for midline orientation, thoracic and cervical extension  -Cues for hand placement of R hand in weightbearing for proprioception, wrist and finger extension, shoulder approximation  -Patient c/o dizziness in sitting; supine /82 sitting 139/70  -Attempted sitting EOB 2x, unable to tolerate sitting up in chair due to dizziness  -Educated patient on importance of sitting upright to increased sitting tolerance and mitigate orthostatic hypotension, patient verbalized understanding, insisting on returning to bed.        AM-PAC 6 CLICK MOBILITY  Total Score:11       Therapeutic Activities and Exercises:  Patient encouraged to keep HOB elevated, educated on mechanism of orthostatic hypotension and importance of increasing upright tolerance. Patient expressed understanding, but asking to lower HOB.   Patient educated on role of therapy, goals of session, benefits of out of bed mobility. Patient agreeable to mobilize with therapy.  Discussed PT plan of care during hospitalization. Patient educated that they need to call for assistance to mobilize out of bed. Whiteboard updated as appropriate. Patient educated on how their diagnosis impacts their mobility within PT scope of practice.     Patient left HOB elevated with all lines intact, call button in reach, bed alarm on and RN notified.    GOALS:   Multidisciplinary Problems     Physical Therapy Goals        Problem: Physical Therapy Goal    Goal Priority Disciplines Outcome Goal Variances Interventions   Physical Therapy Goal     PT, PT/OT Ongoing, Progressing     Description:  Goals to be met by: 11/15     Patient will increase functional  independence with mobility by performin. Supine to sit with contact guard assist   2. Sit to supine with contact guard assist    3. Sit to stand transfer with contact guard assistance using least restrictive device   4. Gait  X 25 feet with Minimal Assistance using least restrictive device.   5. Stand for 1 minutes with Contact Guard Assistance using  least restrictive device or no AD  6. Lower extremity exercise program x20 reps per handout, with independence to improve strength and activity tolerance.                          History:     Past Medical History:   Diagnosis Date    Anxiety     Back pain     chronic    Diabetes mellitus     Gastroparesis     Hypertension     Sciatica        History reviewed. No pertinent surgical history.    Time Tracking:     PT Received On: 19  PT Start Time: 40     PT Stop Time: 0904  PT Total Time (min): 24 min     Billable Minutes: Re-eval 14 and Therapeutic Activity 10 (co-eval with OT)      Hortencia Umaña, PT  2019

## 2019-11-13 NOTE — PLAN OF CARE
Pt maintained free from falls/ trauma/ injuries and skin breakdown. PCA pump D/C, and pt receive oxycodone PRN and gabapentin to control pain. Pt on clear liquid diet all day, BP has been elevated and received tizanidine to control BP. K 3.0 replaced. Pt blood glucose has been stable and no PRN insulin is given this shift. Plan of care reviewed. Pt verbalized understanding. All questions and concerns addressed. Will continue to monitor.

## 2019-11-13 NOTE — SUBJECTIVE & OBJECTIVE
Neurologic Chief Complaint: R-sided weakness -- L MCA    Subjective:     Interval History: Patient is seen for follow-up neurological assessment and treatment recommendations: NAEON. Upgraded diet per SLP recs. Pt continues to c/o R back pain, concern that tachycardia could be pain-induced; adjusted analgesic regimen further as well as antiemetic regimen with plans to eventually wean to home regimen. Replaced Phos. Dispo inpatient rehab.    HPI, Past Medical, Family, and Social History remains the same as documented in the initial encounter.     Review of Systems   Constitutional: Negative for chills and fever.   Musculoskeletal: Positive for arthralgias and back pain.   Neurological: Positive for facial asymmetry, speech difficulty and weakness.   Psychiatric/Behavioral: Negative for agitation and confusion.     Scheduled Meds:   acetaminophen  650 mg Oral Q8H    atorvastatin  40 mg Oral QHS    celecoxib  200 mg Oral Daily    gabapentin  600 mg Oral TID    HYDROcodone-acetaminophen  1 tablet Oral Q8H    lisinopril  10 mg Oral Daily    polyethylene glycol  17 g Oral Daily    potassium chloride 10%  20 mEq Oral BID    potassium, sodium phosphates  1 packet Oral QID (AC & HS)    tiZANidine  4 mg Oral Q8H     Continuous Infusions:    PRN Meds:sodium chloride, dextrose 10 % in water (D10W), glucagon (human recombinant), insulin aspart U-100, ondansetron, oxyCODONE, sodium chloride 0.9%    Objective:     Vital Signs (Most Recent):  Temp: 98.9 °F (37.2 °C) (11/13/19 1110)  Pulse: (!) 112 (11/13/19 1110)  Resp: 17 (11/13/19 1110)  BP: (!) 140/71 (11/13/19 1110)  SpO2: 97 % (11/13/19 1126)  BP Location: Left arm    Vital Signs Range (Last 24H):  Temp:  [98.5 °F (36.9 °C)-99.4 °F (37.4 °C)]   Pulse:  [102-114]   Resp:  [16-19]   BP: (139-170)/(71-82)   SpO2:  [92 %-98 %]   BP Location: Left arm    Physical Exam   Constitutional: She is oriented to person, place, and time. She appears well-developed and  well-nourished.   HENT:   Head: Normocephalic and atraumatic.   Eyes: Conjunctivae and EOM are normal.   Cardiovascular: Tachycardia present.   Pulmonary/Chest: Effort normal. No respiratory distress.   Musculoskeletal: She exhibits no edema or deformity.   Pain in R lumbar region    Neurological: She is alert and oriented to person, place, and time. No sensory deficit.   Skin: Skin is warm and dry. She is not diaphoretic.   Psychiatric: Cognition and memory are not impaired. She is attentive.   Vitals reviewed.      Neurological Exam:   LOC:alert   Attention Span: good  Language: No aphasia  Articulation: Mild dysarthria  Orientation: Person, Time  Visual Fields: Full  EOM (CN III, IV, VI): Full/intact  Facial Movement (CN VII): Lower facial weakness on the Right  Motor: Arm left  Normal 5/5  Leg left  Normal 5/5  Arm right  Paresis: 4/5   Leg right Paresis: 2/5 - Limited 2/2 pain  Sensation: Intact to light touch, temp  Tone: Normal tone throughout    Laboratory:  CMP:   Recent Labs   Lab 11/13/19  0413   CALCIUM 8.6*   ALBUMIN 2.9*   PROT 6.4      K 3.7   CO2 23      BUN 5*   CREATININE 0.7   ALKPHOS 76   ALT 22   AST 49*   BILITOT 2.1*     CBC:   Recent Labs   Lab 11/13/19  0413   WBC 13.20*   RBC 2.97*   HGB 8.4*   HCT 25.9*      MCV 87   MCH 28.3   MCHC 32.4     Lipid Panel:   No results for input(s): CHOL, LDLCALC, HDL, TRIG in the last 168 hours.  Coagulation:   Recent Labs   Lab 11/09/19  0445   INR 1.1     Platelet Aggregation Study: No results for input(s): PLTAGG, PLTAGINTERP, PLTAGREGLACO, ADPPLTAGGREG in the last 168 hours.  Hgb A1C:   No results for input(s): HGBA1C in the last 168 hours.  TSH:   No results for input(s): TSH in the last 168 hours.      Diagnostic Results     Brain Imaging     MRI Brain (11/01/19):  Acute lacunar type infarct coursing through the left posterior limb internal capsule and corona radiata.      CTH (10/31/19):  No acute intracranial pathology      Vessel  Imaging     CTA Multiphase (10/31/19):  CTA head: Atherosclerotic disease of the cavernous and supraclinoid ICAs with mild narrowing.  Otherwise unremarkable CTA of the head specifically without evidence for proximal significant stenosis or occlusion.  CTA neck: Less than 50% proximal ICA stenosis by NASCET criteria.  Atherosclerotic disease with mild moderate narrowing of the right vertebral artery origin.  There is mild left V1 segment narrowing.  No significant focal vertebral artery stenosis or occlusion.  Interlobular septal thickening with tree in bud micro nodularity visualized upper lobes bilaterally which may represent inflammatory/infectious process clinical correlation and correlation with dedicated CT thorax recommended.  CT head: Bandlike region hypoattenuation left posterior limb internal capsule unchanged from prior suggestive for possible recent to subacute age infarction.  No evidence for hydrocephalus or acute intracranial hemorrhage.  Clinical correlation and further evaluation with MRI if patient compatible.      Cardiac Imaging   Echo (11/01/19)  · Increased (hyperdynamic) left ventricular systolic function. The estimated ejection fraction is 75%.  · Concentric left ventricular remodeling.  · Normal LV diastolic function.  · No wall motion abnormalities.  · Normal right ventricular systolic function.  · Normal central venous pressure (3 mm Hg).  · Mild tricuspid regurgitation.  · The estimated PA systolic pressure is 26 mm Hg  · Echolucent structure next to the LA, likely representing hiatal hernia. Clinincal correlation is required.      Miscellaneous Imaging    IR Angiogram Visceral 11/10/19 pending    CTA Abdomen/Pelvis 11/10/19  Right-sided retroperitoneal hematoma with questionable foci of active extravasation within the inferior portion of the collection as described above.    IR Angiogram Visceral 11/9/19  Angiography of the lumbar arteries demonstrates active extravasation of the right  L2 lumbar artery. Embolization using beads as described above.  Plan: Transfer patient to ICU.  Continued serial H and H follow-up.    CTA Abdomen/Pelvis 11/9/19  1. Large right retroperitoneal hematoma with findings concerning for active arterial extravasation/hemorrhage as detailed above.  There is associated hemorrhage extending throughout the right abdomen and pelvis/pericolic gutter with associated mass effect on the right kidney, which is anteriorly displaced.  2. Decreased opacification of the infahepatic and infrarenal IVC, possibly secondary to underlying mass effect versus partial thrombosis.  3. Nonspecific cecal and right colonic wall thickening, possibly reactive due to hemoperitoneum.  4. Additional findings as detailed above.    MRI Thoracic/Lumbar Spine W WO Contr 11/8/19  Large hematoma within the right psoas muscle.  Compression and probable thrombosis of the IVC. Consider contrast enhanced CT with delayed phase.  Multilevel degenerative changes as detailed above, more severe in the lumbar spine.  Irregularity of the T12 through L3 endplates is most likely degenerative.

## 2019-11-13 NOTE — PLAN OF CARE
Re-eval and POC set 11/13/19    Problem: Occupational Therapy Goal  Goal: Occupational Therapy Goal  Description  Updated Goals to be met by: 7 days (11/20/19)     Patient will increase functional independence with ADLs by performing:    UE Dressing with Contact Guard Assistance.  LE Dressing with Minimal Assistance.  Grooming while standing with Minimal Assistance.  Toileting from bedside commode with Minimal Assistance for hygiene and clothing management.   Toilet transfer to bedside commode with Contact Guard Assistance.  Increased functional strength to WFL for ADLs.  Complete functional mobility household distance with CGA using AD as needed.     Updated Goals to be met by: 7 days (11/14/19)     Patient will increase functional independence with ADLs by performing:    UE Dressing with Contact Guard Assistance.  LE Dressing with Minimal Assistance.  Grooming while standing with Minimal Assistance.  Toileting from bedside commode with Minimal Assistance for hygiene and clothing management.   Toilet transfer to bedside commode with Contact Guard Assistance.  Increased functional strength to WFL for ADLs.  Complete functional mobility household distance with CGA using AD as needed.    Outcome: Ongoing, Progressing

## 2019-11-13 NOTE — PLAN OF CARE
EDUCATED PT ON POC, PT VERBALIZED UNDERSTANDING. CALL LIGHT IN REACH AT ALL TIMES. PT UP TO BSC WITH ASSISTANCE. CBG ELEVATED BEFORE DINNER INSULIN COVERAGE GIVEN. PT C/O NAUSEA PRN ZOFRAN GIVEN AS PER MD ORDER AND EFFECTIVE. C/O PAIN PRN OXYCODONE GIVEN AND PT STILL IN PAIN MD ORDERED HYDROCODONE SCHEDULED AND EFFECTIVE. SAFETY MAINTAINED WILL CONT POC.

## 2019-11-13 NOTE — PT/OT/SLP EVAL
Speech Language Pathology Evaluation  Cognitive/Bedside Swallow    Patient Name:  Melva Barker   MRN:  [id number]  Admitting Diagnosis: Thrombotic stroke involving left middle cerebral artery    Recommendations:                  General Recommendations:  Cognitive-linguistic therapy and monitor diet tolerance  Diet recommendations:  Mechanical soft, Thin   Aspiration Precautions: 1 bite/sip at a time, Check for pocketing/oral residue, Feed only when awake/alert, Meds whole 1 at a time, Monitor for s/s of aspiration, Small bites/sips and Standard aspiration precautions   General Precautions: Standard, fall, aphasia  Communication strategies:  none    History:     Past Medical History:   Diagnosis Date    Anxiety     Back pain     chronic    Diabetes mellitus     Gastroparesis     Hypertension     Sciatica        History reviewed. No pertinent surgical history.    Social History: Patient lives with her sister and nephew in [place].    Prior Intubation HX:  Intubated for 5 hours on 11/09/19    Modified Barium Swallow: None on file    Chest X-Rays 11/09/19: No evidence of acute intrathoracic abnormality on this single radiographic view of the chest.    Prior diet: dental soft/thin    Occupation/hobbies/homemaking: Patient is retired and takes care of her older sister who has Alzheimer's and her nephew who has COPD.    Subjective     Patient awake and alert during session  Communicated with nurse prior to session     Pain/Comfort:  · Pain Rating 1: 0/10  · Pain Rating Post-Intervention 1: 0/10    Objective:     Cognitive Status:    Arousal/Alertness Appropriate response to stimuli  Attention No obvious deficits observed throughout session  Orientation Oriented x4  Memory Immediate Recall WFL, Delayed Recall: independently recalled 1/4 unrelated objects with a 5-minute delay, increasing to 4/4 with min assist. and recall general information WFL  Simple calculation WFL      Receptive Language:   Comprehension:    Questions Complex yes/no: WFL  Open ended questions mod assist and occasional repetition needed  Commands  Two step basic commands: WFL  multistep basic commands: 1/3 with mod assist    Pragmatics:    WFL    Expressive Language:  Verbal:    Automatic Speech Sentence completion: WFL  Repetition Phrases: WFL  Naming Confrontation: WFL, Convergent: 3/5 with min assist and Divergent responsive: independently named 3 items in a category in 1 minute, increasing to 7 with mod assist  Conversational speech patient with frequent pauses and intermittent substitutions in conversational speech      Motor Speech:  WFL    Voice:   Mildly decreased intensity, clear quality    Visual-Spatial:  TBA    Reading:   TBA     Written Expression:   TBA    Oral Musculature Evaluation  · Oral Musculature: (mild right-sided weakness)  · Dentition: edentulous  · Secretion Management: adequate  · Mucosal Quality: adequate  · Mandibular Strength and Mobility: WFL  · Oral Labial Strength and Mobility: impaired retraction, functional pursing  · Lingual Strength and Mobility: impaired strength, impaired right lateral movement, impaired left lateral movement  · Buccal Strength and Mobility: decreased tone  · Volitional Cough: WFL  · Volitional Swallow: timely; good laryngeal elevation  · Voice Prior to PO Intake: clear quality; mildly reduced intensity    Bedside Swallow Eval:   Consistencies Assessed:  · Thin liquids x6 (via straw)  · Puree x3 (tsp applesauce)  · Solids x3 (ed crackers)     Oral Phase:   · Prolonged mastication with solid trials consistent with lack of dentition    Pharyngeal Phase:   · no overt clinical signs/symptoms of aspiration  · no overt clinical signs/symptoms of pharyngeal dysphagia    Compensatory Strategies  · None    Treatment: Patient seen for a bedside swallow eval and a speech/language/cognitive assessment. She was sitting up in bed during session. Patient acknowledged that she was below her baseline from a  cognitive-linguistic perspective. SLP educated her regarding role of ST in her POC and she verbalized understanding. Whiteboard updated. Patient left in room with call light within reach.    Assessment:     Melva Barker is a 74 y.o. female with an SLP diagnosis of Aphasia and Cognitive-Linguistic Impairment.      Goals:   Multidisciplinary Problems     SLP Goals        Problem: SLP Goal    Goal Priority Disciplines Outcome   SLP Goal     SLP Ongoing, Progressing   Description:  Speech Language Pathology Goals  Goals expected to be met by 11/20  1. Pt will tolerate a mechanical soft diet/thin liquids with no s/s of aspiration.   2. Pt will participate in conversation without cues to express thought.   3. Pt will complete simple functional reasoning tasks with 80% accy given min cues.   4. Pt will name name 13 items in a category in 1 minute with mod assist.  5. Pt will follow multi-step commands with 75% accuracy and min assist.  6. Pt will complete assessment of reading, writing, visual spatial skills to determine need for tx.                     Plan:     · Patient to be seen:  4 x/week   · Plan of Care expires:  12/13/19  · Plan of Care reviewed with:  patient   · SLP Follow-Up:  Yes       Discharge recommendations:  Discharge Facility/Level of Care Needs: (rehab)   Barriers to Discharge:  None    Time Tracking:     SLP Treatment Date:   11/13/19  Speech Start Time:  0950  Speech Stop Time:  1015     Speech Total Time (min):  25 min    Billable Minutes: Eval 15  and Eval Swallow and Oral Function 10    Venu Marquez CCC-SLP  Speech-Language Pathology  Pager: [phone]   11/13/2019

## 2019-11-13 NOTE — ASSESSMENT & PLAN NOTE
Melva Barker is a 74 y.o. female with PMHx of HTN, HLD, and DM who presented to OSH with acute worsening of R-sided weakness after having experienced R-sided weakness x1 month. She was treated with tPA at OSH. CTA without LVO. MRI with infarct in L internal capsule and corona radiata. Etiology likely small vessel disease.    Upgraded diet and adjusting analgesic regimen, as below. Dispo inpatient rehab.      Antithrombotics for secondary stroke prevention: Antiplatelets: ASA 81 mg + Plavix 75 mg -- HELD due to retroperitoneal hematoma.  Recently received blood transfusions  Statins for secondary stroke prevention and hyperlipidemia, if present: Statins: Atorvastatin- 40 mg daily,   Aggressive risk factor modification: HTN, HLD, Diet  Rehab efforts: The patient has been evaluated by a stroke team provider and the therapy needs have been fully considered based off the presenting complaints and exam findings. The following therapy evaluations are needed: PT evaluate and treat, OT evaluate and treat, SLP evaluate and treat, PM&R evaluate for appropriate placement - Dispo inpatient rehab  Diagnostics ordered/pending: None   VTE prophylaxis: Mechanical SCDs only - Pharmacologic ppx acutely held due to hematoma.  BP parameters: Infarct: Post tPA, SBP <160; Avoid hypotension

## 2019-11-13 NOTE — ASSESSMENT & PLAN NOTE
Pt had been c/o back pain in recent days, complicated by her hx of chronic back and sciatic nerve pain with use of Tizanidine and opiates at home.  Due to Hb downtrend and recent tPA administration, MRI Lumbar/Thoracic spine was ordered 11/8 which demonstrated large R psoas hematoma with IVC compression and probable thrombosis.   General Surgery was consulted; No acute intervention pursued.     Patient was then with acute decompensation overnight 11/9; became hypotensive, tachycardic, and apneic with Hb down to 6.7. Rapid Response was called; patient requiring intubation, blood transfusion, and transfer to medical ICU.   CTA abd/pelvis 11/9 showed hematoma with active extravasation/hemorrhage with IVC compression, hemoperitoneum.   Pt went to IR and is now s/p successful R L2 & L3 lumbar spinal artery embolization.   Patient underwent IR re-evaluation on 11/11. No arterial bleed evident on IR angiography.    Hb now stable; Continuing to monitor.

## 2019-11-13 NOTE — NURSING
Pulled out Hendricks Catheter at 1835 this shift. Pt is on close monitor for output. Expected to urinate before 0035.

## 2019-11-13 NOTE — PT/OT/SLP RE-EVAL
Occupational Therapy   Re-evaluation    Name: Melva Barker  MRN: [id number]  Admitting Diagnosis:  Thrombotic stroke involving left middle cerebral artery    Pt transferred to ICU 11/9 and intubated-- large R psoas hematoma, s/p R L2 & L3 lumbar spinal artery embolization    Recommendations:     Discharge Recommendations: rehabilitation facility  Discharge Equipment Recommendations:  shower chair  Barriers to discharge:  Decreased caregiver support    Assessment:     Melva Barker is a 74 y.o. female with a medical diagnosis of Thrombotic stroke involving left middle cerebral artery.  She presents with performance deficits including weakness, impaired endurance, impaired self care skills, impaired functional mobilty, impaired balance, decreased upper extremity function, decreased lower extremity function, decreased coordination, pain, impaired coordination, decreased ROM. Pt limited by back pain and dizziness this date, and tolerated minimal OOB activity. Pt would continue to benefit from OT to increase functional independence and safety. Recommend rehab upon D/C to return to [facility].    Rehab Prognosis: Good; patient would benefit from acute skilled OT services to address these deficits and reach maximum level of function.       Plan:     Patient to be seen 3 x/week to address the above listed problems via self-care/home management, therapeutic exercises, therapeutic activities, neuromuscular re-education  · Plan of Care Expires: 12/13/19  · Plan of Care Reviewed with: patient    Subjective     Chief Complaint: Back pain, dizziness with sitting/standing  Patient/Family stated goals: None stated  Communicated with: RN prior to session.  Pain/Comfort:  · Pain Rating 1: (Did not rate)  · Location 1: back  · Pain Addressed 1: Reposition, Distraction, Cessation of Activity    Objective:     Communicated with: RN prior to session. Patient found supine with: telemetry, central line, peripheral IV, [employer] camera  upon OT entry to room.    General Precautions: Standard, aspiration, fall   Orthopedic Precautions:N/A   Braces: N/A     Occupational Performance:    Bed Mobility:    · Patient completed Supine to Sit with moderate assistance-- completed 3x as pt returnes self to supine due to back pain and feeling dizzy  · Patient completed Sit to Supine with minimal assistance    Functional Mobility/Transfers:  · Patient completed Bed <> Chair Transfer with Max A stand pivot to the R side and Mod A stand pivot to the L side to return to EOB-- unable to tolerate staying up in chair this date    Activities of Daily Living:  · ANTOINE this date    Cognitive/Visual Perceptual:  Cognitive/Psychosocial Skills:     -       Oriented to: Person, Place and Situation-- states it's 1989 and then 2009  -       Follows Commands/attention: Follows one-step commands  -       Communication: clear/fluent-- word-finding difficulty at times  -       Safety awareness/insight to disability: impaired   Visual/Perceptual:      -Intact      Physical Exam:  Balance:    -       good sitting, fair-poor standing standing balance  Sensation: Impaired-- reports decreased sensation and tingling R UE  Dominant hand:    -       Right  Upper Extremity Range of Motion:     -       Right Upper Extremity: shld and elbow flex/ext AROM WFL   -       Left Upper Extremity: WFL  Upper Extremity Strength:    -       Right Upper Extremity: shld 3/5; elbow flex/ext 3/5  -       Left Upper Extremity: WFL   Strength:    -       Right Upper Extremity: impaired  -       Left Upper Extremity: WFL  Fine Motor Coordination: impaired R opposition and finger to nose    AMPAC 6 Click:  AMPAC Total Score: 18    Treatment & Education:  Education:  OT re-eval; educated on OT role and POC and to call for assistance before getting up; reviewed R UE therex and positioning to increase ROM and strength; encouraged elevated bed height to increase tolerance for OOB activity as pt was unable to  tolerate this date due to back pain and feeling dizzy  BP supine 153/82, sitting 139/70 and pt c/o feeling dizzy and needing to lay down    Patient left HOB elevated with all lines intact, call button in reach and RN notified    GOALS:   Multidisciplinary Problems     Occupational Therapy Goals        Problem: Occupational Therapy Goal    Goal Priority Disciplines Outcome Interventions   Occupational Therapy Goal     OT, PT/OT Ongoing, Progressing    Description:  Updated Goals to be met by: 7 days (11/20/19)     Patient will increase functional independence with ADLs by performing:    UE Dressing with Contact Guard Assistance.  LE Dressing with Minimal Assistance.  Grooming while standing with Minimal Assistance.  Toileting from bedside commode with Minimal Assistance for hygiene and clothing management.   Toilet transfer to bedside commode with Contact Guard Assistance.  Increased functional strength to WFL for ADLs.  Complete functional mobility household distance with CGA using AD as needed.     Updated Goals to be met by: 7 days (11/14/19)     Patient will increase functional independence with ADLs by performing:    UE Dressing with Contact Guard Assistance.  LE Dressing with Minimal Assistance.  Grooming while standing with Minimal Assistance.  Toileting from bedside commode with Minimal Assistance for hygiene and clothing management.   Toilet transfer to bedside commode with Contact Guard Assistance.  Increased functional strength to WFL for ADLs.  Complete functional mobility household distance with CGA using AD as needed.                     History:     Past Medical History:   Diagnosis Date    Anxiety     Back pain     chronic    Diabetes mellitus     Gastroparesis     Hypertension     Sciatica        History reviewed. No pertinent surgical history.    Time Tracking:     OT Date of Treatment: 11/13/19  OT Start Time: 0838  OT Stop Time: 0901  OT Total Time (min): 23 min    Billable Minutes:Mi-jori  15  Therapeutic Activity 8    Reta Beth LOTR  11/13/2019

## 2019-11-13 NOTE — ASSESSMENT & PLAN NOTE
Patient with episodes of vomiting 11/2 - resolved throughout the day. No episodes of vomiting overnight.   PRN IV Zofran ordered   KUB completed and no abnormality noted  Patient continues to report nausea - some mild middle lower abdominal pain upon palpation  Lipase ordered and WNL  CT abdomen (2016) diverticulosis in the descending colon and a small hiatal hernia. Hx of diabetic gastroparesis, reglan started. Follow up with PCP.  Patient with last BM 11/6 - normal bowel sounds on exam   Sucralfate 1g started 11/5. Increased PRN Zofran dose on 11/13.  Will continue to monitor

## 2019-11-14 PROBLEM — I95.9 HYPOTENSION: Status: ACTIVE | Noted: 2019-11-14

## 2019-11-14 LAB
ALBUMIN SERPL BCP-MCNC: 2.8 G/DL (ref 3.5–5.2)
ALP SERPL-CCNC: 72 U/L (ref 55–135)
ALT SERPL W/O P-5'-P-CCNC: 18 U/L (ref 10–44)
ANION GAP SERPL CALC-SCNC: 10 MMOL/L (ref 8–16)
AST SERPL-CCNC: 33 U/L (ref 10–40)
BACTERIA #/AREA URNS AUTO: ABNORMAL /HPF
BACTERIA BLD CULT: NORMAL
BACTERIA BLD CULT: NORMAL
BASOPHILS # BLD AUTO: 0.03 K/UL (ref 0–0.2)
BASOPHILS # BLD AUTO: 0.05 K/UL (ref 0–0.2)
BASOPHILS NFR BLD: 0.2 % (ref 0–1.9)
BASOPHILS NFR BLD: 0.4 % (ref 0–1.9)
BILIRUB SERPL-MCNC: 1.7 MG/DL (ref 0.1–1)
BILIRUB UR QL STRIP: NEGATIVE
BUN SERPL-MCNC: 12 MG/DL (ref 8–23)
CALCIUM SERPL-MCNC: 8.3 MG/DL (ref 8.7–10.5)
CHLORIDE SERPL-SCNC: 106 MMOL/L (ref 95–110)
CLARITY UR REFRACT.AUTO: ABNORMAL
CO2 SERPL-SCNC: 22 MMOL/L (ref 23–29)
COLOR UR AUTO: ABNORMAL
CORTIS SERPL-MCNC: 13.6 UG/DL
CREAT SERPL-MCNC: 0.9 MG/DL (ref 0.5–1.4)
DIFFERENTIAL METHOD: ABNORMAL
DIFFERENTIAL METHOD: ABNORMAL
EOSINOPHIL # BLD AUTO: 0.5 K/UL (ref 0–0.5)
EOSINOPHIL # BLD AUTO: 0.5 K/UL (ref 0–0.5)
EOSINOPHIL NFR BLD: 3.7 % (ref 0–8)
EOSINOPHIL NFR BLD: 3.7 % (ref 0–8)
ERYTHROCYTE [DISTWIDTH] IN BLOOD BY AUTOMATED COUNT: 15.8 % (ref 11.5–14.5)
ERYTHROCYTE [DISTWIDTH] IN BLOOD BY AUTOMATED COUNT: 15.9 % (ref 11.5–14.5)
EST. GFR  (AFRICAN AMERICAN): >60 ML/MIN/1.73 M^2
EST. GFR  (NON AFRICAN AMERICAN): >60 ML/MIN/1.73 M^2
GLUCOSE SERPL-MCNC: 137 MG/DL (ref 70–110)
GLUCOSE UR QL STRIP: ABNORMAL
HCT VFR BLD AUTO: 25 % (ref 37–48.5)
HCT VFR BLD AUTO: 25 % (ref 37–48.5)
HGB BLD-MCNC: 7.9 G/DL (ref 12–16)
HGB BLD-MCNC: 8.2 G/DL (ref 12–16)
HGB UR QL STRIP: NEGATIVE
HYALINE CASTS UR QL AUTO: 3 /LPF
IMM GRANULOCYTES # BLD AUTO: 0.14 K/UL (ref 0–0.04)
IMM GRANULOCYTES # BLD AUTO: 0.14 K/UL (ref 0–0.04)
IMM GRANULOCYTES NFR BLD AUTO: 1 % (ref 0–0.5)
IMM GRANULOCYTES NFR BLD AUTO: 1.1 % (ref 0–0.5)
KETONES UR QL STRIP: NEGATIVE
LACTATE SERPL-SCNC: 4 MMOL/L (ref 0.5–2.2)
LEUKOCYTE ESTERASE UR QL STRIP: ABNORMAL
LYMPHOCYTES # BLD AUTO: 1.8 K/UL (ref 1–4.8)
LYMPHOCYTES # BLD AUTO: 1.9 K/UL (ref 1–4.8)
LYMPHOCYTES NFR BLD: 13.7 % (ref 18–48)
LYMPHOCYTES NFR BLD: 13.7 % (ref 18–48)
MAGNESIUM SERPL-MCNC: 1.9 MG/DL (ref 1.6–2.6)
MCH RBC QN AUTO: 28.6 PG (ref 27–31)
MCH RBC QN AUTO: 28.9 PG (ref 27–31)
MCHC RBC AUTO-ENTMCNC: 31.6 G/DL (ref 32–36)
MCHC RBC AUTO-ENTMCNC: 32.8 G/DL (ref 32–36)
MCV RBC AUTO: 88 FL (ref 82–98)
MCV RBC AUTO: 91 FL (ref 82–98)
MICROSCOPIC COMMENT: ABNORMAL
MONOCYTES # BLD AUTO: 1.2 K/UL (ref 0.3–1)
MONOCYTES # BLD AUTO: 1.4 K/UL (ref 0.3–1)
MONOCYTES NFR BLD: 10.3 % (ref 4–15)
MONOCYTES NFR BLD: 8.9 % (ref 4–15)
NEUTROPHILS # BLD AUTO: 9.4 K/UL (ref 1.8–7.7)
NEUTROPHILS # BLD AUTO: 9.7 K/UL (ref 1.8–7.7)
NEUTROPHILS NFR BLD: 71.1 % (ref 38–73)
NEUTROPHILS NFR BLD: 72.2 % (ref 38–73)
NITRITE UR QL STRIP: NEGATIVE
NRBC BLD-RTO: 0 /100 WBC
NRBC BLD-RTO: 0 /100 WBC
PH UR STRIP: 6 [PH] (ref 5–8)
PHOSPHATE SERPL-MCNC: 3.9 MG/DL (ref 2.7–4.5)
PLATELET # BLD AUTO: 152 K/UL (ref 150–350)
PLATELET # BLD AUTO: 155 K/UL (ref 150–350)
PMV BLD AUTO: 11 FL (ref 9.2–12.9)
PMV BLD AUTO: 11.1 FL (ref 9.2–12.9)
POCT GLUCOSE: 137 MG/DL (ref 70–110)
POCT GLUCOSE: 150 MG/DL (ref 70–110)
POCT GLUCOSE: 167 MG/DL (ref 70–110)
POCT GLUCOSE: 177 MG/DL (ref 70–110)
POCT GLUCOSE: 273 MG/DL (ref 70–110)
POTASSIUM SERPL-SCNC: 4.4 MMOL/L (ref 3.5–5.1)
PROCALCITONIN SERPL IA-MCNC: 17.1 NG/ML
PROT SERPL-MCNC: 6.2 G/DL (ref 6–8.4)
PROT UR QL STRIP: NEGATIVE
RBC # BLD AUTO: 2.76 M/UL (ref 4–5.4)
RBC # BLD AUTO: 2.84 M/UL (ref 4–5.4)
RBC #/AREA URNS AUTO: 2 /HPF (ref 0–4)
SODIUM SERPL-SCNC: 138 MMOL/L (ref 136–145)
SP GR UR STRIP: 1.01 (ref 1–1.03)
SQUAMOUS #/AREA URNS AUTO: 5 /HPF
URN SPEC COLLECT METH UR: ABNORMAL
WBC # BLD AUTO: 13.08 K/UL (ref 3.9–12.7)
WBC # BLD AUTO: 13.63 K/UL (ref 3.9–12.7)
WBC #/AREA URNS AUTO: 56 /HPF (ref 0–5)

## 2019-11-14 PROCEDURE — 87077 CULTURE AEROBIC IDENTIFY: CPT

## 2019-11-14 PROCEDURE — 25000003 PHARM REV CODE 250: Performed by: PHYSICIAN ASSISTANT

## 2019-11-14 PROCEDURE — 84100 ASSAY OF PHOSPHORUS: CPT

## 2019-11-14 PROCEDURE — 63600175 PHARM REV CODE 636 W HCPCS: Performed by: PSYCHIATRY & NEUROLOGY

## 2019-11-14 PROCEDURE — 83605 ASSAY OF LACTIC ACID: CPT

## 2019-11-14 PROCEDURE — 81001 URINALYSIS AUTO W/SCOPE: CPT

## 2019-11-14 PROCEDURE — 63600175 PHARM REV CODE 636 W HCPCS: Performed by: NURSE PRACTITIONER

## 2019-11-14 PROCEDURE — 85025 COMPLETE CBC W/AUTO DIFF WBC: CPT | Mod: 91

## 2019-11-14 PROCEDURE — 25000003 PHARM REV CODE 250: Performed by: NURSE PRACTITIONER

## 2019-11-14 PROCEDURE — 99233 SBSQ HOSP IP/OBS HIGH 50: CPT | Mod: GC,,, | Performed by: PSYCHIATRY & NEUROLOGY

## 2019-11-14 PROCEDURE — 87088 URINE BACTERIA CULTURE: CPT

## 2019-11-14 PROCEDURE — 80053 COMPREHEN METABOLIC PANEL: CPT

## 2019-11-14 PROCEDURE — 83735 ASSAY OF MAGNESIUM: CPT

## 2019-11-14 PROCEDURE — 20600001 HC STEP DOWN PRIVATE ROOM

## 2019-11-14 PROCEDURE — 63600175 PHARM REV CODE 636 W HCPCS: Performed by: PHYSICIAN ASSISTANT

## 2019-11-14 PROCEDURE — 87086 URINE CULTURE/COLONY COUNT: CPT

## 2019-11-14 PROCEDURE — 36415 COLL VENOUS BLD VENIPUNCTURE: CPT

## 2019-11-14 PROCEDURE — 87186 SC STD MICRODIL/AGAR DIL: CPT

## 2019-11-14 PROCEDURE — 99233 PR SUBSEQUENT HOSPITAL CARE,LEVL III: ICD-10-PCS | Mod: GC,,, | Performed by: PSYCHIATRY & NEUROLOGY

## 2019-11-14 PROCEDURE — 87040 BLOOD CULTURE FOR BACTERIA: CPT

## 2019-11-14 PROCEDURE — 82533 TOTAL CORTISOL: CPT

## 2019-11-14 PROCEDURE — 92507 TX SP LANG VOICE COMM INDIV: CPT

## 2019-11-14 PROCEDURE — 97803 MED NUTRITION INDIV SUBSEQ: CPT

## 2019-11-14 PROCEDURE — 84145 PROCALCITONIN (PCT): CPT

## 2019-11-14 RX ORDER — TIZANIDINE 2 MG/1
2 TABLET ORAL EVERY 8 HOURS
Status: DISCONTINUED | OUTPATIENT
Start: 2019-11-14 | End: 2019-11-14

## 2019-11-14 RX ORDER — DICLOFENAC SODIUM 10 MG/G
4 GEL TOPICAL DAILY
Status: DISCONTINUED | OUTPATIENT
Start: 2019-11-14 | End: 2019-12-03 | Stop reason: HOSPADM

## 2019-11-14 RX ORDER — SODIUM CHLORIDE 9 MG/ML
INJECTION, SOLUTION INTRAVENOUS CONTINUOUS
Status: ACTIVE | OUTPATIENT
Start: 2019-11-14 | End: 2019-11-15

## 2019-11-14 RX ORDER — GABAPENTIN 300 MG/1
300 CAPSULE ORAL 3 TIMES DAILY
Status: DISCONTINUED | OUTPATIENT
Start: 2019-11-14 | End: 2019-12-03 | Stop reason: HOSPADM

## 2019-11-14 RX ORDER — METOCLOPRAMIDE 5 MG/1
5 TABLET ORAL
Status: DISCONTINUED | OUTPATIENT
Start: 2019-11-14 | End: 2019-11-23

## 2019-11-14 RX ORDER — TALC
6 POWDER (GRAM) TOPICAL NIGHTLY PRN
Status: DISCONTINUED | OUTPATIENT
Start: 2019-11-14 | End: 2019-12-03 | Stop reason: HOSPADM

## 2019-11-14 RX ORDER — CEFEPIME HYDROCHLORIDE 2 G/1
2 INJECTION, POWDER, FOR SOLUTION INTRAVENOUS
Status: DISCONTINUED | OUTPATIENT
Start: 2019-11-14 | End: 2019-11-15

## 2019-11-14 RX ORDER — ONDANSETRON 8 MG/1
8 TABLET, ORALLY DISINTEGRATING ORAL ONCE
Status: COMPLETED | OUTPATIENT
Start: 2019-11-15 | End: 2019-11-14

## 2019-11-14 RX ORDER — LIDOCAINE 50 MG/G
1 PATCH TOPICAL
Status: DISCONTINUED | OUTPATIENT
Start: 2019-11-14 | End: 2019-12-03 | Stop reason: HOSPADM

## 2019-11-14 RX ORDER — DULOXETIN HYDROCHLORIDE 20 MG/1
20 CAPSULE, DELAYED RELEASE ORAL 2 TIMES DAILY
Status: DISCONTINUED | OUTPATIENT
Start: 2019-11-14 | End: 2019-12-03 | Stop reason: HOSPADM

## 2019-11-14 RX ADMIN — ONDANSETRON 8 MG: 2 INJECTION INTRAMUSCULAR; INTRAVENOUS at 11:11

## 2019-11-14 RX ADMIN — DULOXETINE HYDROCHLORIDE 20 MG: 20 CAPSULE, DELAYED RELEASE ORAL at 09:11

## 2019-11-14 RX ADMIN — ACETAMINOPHEN 650 MG: 325 TABLET ORAL at 09:11

## 2019-11-14 RX ADMIN — LIDOCAINE 1 PATCH: 50 PATCH CUTANEOUS at 11:11

## 2019-11-14 RX ADMIN — ATORVASTATIN CALCIUM 40 MG: 20 TABLET, FILM COATED ORAL at 09:11

## 2019-11-14 RX ADMIN — DICLOFENAC 4 G: 10 GEL TOPICAL at 12:11

## 2019-11-14 RX ADMIN — POTASSIUM CHLORIDE 20 MEQ: 20 SOLUTION ORAL at 09:11

## 2019-11-14 RX ADMIN — TRAZODONE HYDROCHLORIDE 25 MG: 50 TABLET ORAL at 09:11

## 2019-11-14 RX ADMIN — CEFEPIME 2 G: 2 INJECTION, POWDER, FOR SOLUTION INTRAVENOUS at 11:11

## 2019-11-14 RX ADMIN — TIZANIDINE 2 MG: 2 TABLET ORAL at 05:11

## 2019-11-14 RX ADMIN — VANCOMYCIN HYDROCHLORIDE 1250 MG: 1.25 INJECTION, POWDER, LYOPHILIZED, FOR SOLUTION INTRAVENOUS at 12:11

## 2019-11-14 RX ADMIN — SODIUM CHLORIDE, SODIUM LACTATE, POTASSIUM CHLORIDE, AND CALCIUM CHLORIDE 500 ML: .6; .31; .03; .02 INJECTION, SOLUTION INTRAVENOUS at 12:11

## 2019-11-14 RX ADMIN — SODIUM CHLORIDE, SODIUM LACTATE, POTASSIUM CHLORIDE, AND CALCIUM CHLORIDE 500 ML: .6; .31; .03; .02 INJECTION, SOLUTION INTRAVENOUS at 01:11

## 2019-11-14 RX ADMIN — ACETAMINOPHEN 650 MG: 325 TABLET ORAL at 05:11

## 2019-11-14 RX ADMIN — METOCLOPRAMIDE HYDROCHLORIDE 5 MG: 5 TABLET ORAL at 05:11

## 2019-11-14 RX ADMIN — SODIUM CHLORIDE: 0.9 INJECTION, SOLUTION INTRAVENOUS at 05:11

## 2019-11-14 RX ADMIN — ONDANSETRON 8 MG: 2 INJECTION INTRAMUSCULAR; INTRAVENOUS at 05:11

## 2019-11-14 RX ADMIN — CELECOXIB 200 MG: 200 CAPSULE ORAL at 10:11

## 2019-11-14 RX ADMIN — ONDANSETRON 8 MG: 8 TABLET, ORALLY DISINTEGRATING ORAL at 11:11

## 2019-11-14 RX ADMIN — GABAPENTIN 300 MG: 300 CAPSULE ORAL at 09:11

## 2019-11-14 NOTE — PLAN OF CARE
Patient is AAO x4. POC reviewed with patient. Patient verbalized understanding. Patient's breathing is unlabored with equal chest expansion. Patient has right sided deficits. Patient denies any numbness and tingling. Patient ambulates to the bedside commode with assistance. Patient complained of back pain and nausea and vomiting; medication given. Bed in lowest position,bed alarm on, side rails up x3, no complaints or signs of distress. WCTM.

## 2019-11-14 NOTE — PROGRESS NOTES
[facility]  Adult Nutrition  Progress Note  Completed by Melvi Salas RD    SUMMARY       Recommendations    1. Continue mechanical soft diet as needed. 2. Continue Boost Glucose Control TID.  Goals: Pt eats >50% of meals, Pt drinks >50% of Boost by RD follow-up  Nutrition Goal Status: goal not met  Communication of RD Recs: other (comment)(POC)    Reason for Assessment    Reason For Assessment: RD follow-up  Diagnosis: stroke/CVA  Relevant Medical History: IDDM, HTN  Interdisciplinary Rounds: did not attend  General Information Comments: Pt has nausea and poor appetite over last 2 days; po intake has been variable throughout stay depending on nausea.  Pt with moderate muscle wasting per NFPE 10/31 continues to be at risk for malnutrition; will follow.   Nutrition Discharge Planning: Pt to follow higher protein/high kcal diet.    Nutrition Risk Screen    Nutrition Risk Screen: dysphagia or difficulty swallowing    Nutrition/Diet History    Patient Reported Diet/Restrictions/Preferences: soft  Spiritual, Cultural Beliefs, Mandaeism Practices, Values that Affect Care: no  Supplemental Drinks or Food Habits: Ensure Plus  Factors Affecting Nutritional Intake: NPO    Anthropometrics    Temp: 98.2 °F (36.8 °C)  Height Method: Measured  Height: 5' (152.4 cm)  Height (inches): 60 in  Weight Method: Bed Scale  Weight: 56.3 kg (124 lb 1.9 oz)  Weight (lb): 124.12 lb  Ideal Body Weight (IBW), Female: 100 lb  % Ideal Body Weight, Female (lb): 123.9 lb  BMI (Calculated): 24.2  BMI Grade: 18.5-24.9 - normal  Weight Loss: unintentional  Usual Body Weight (UBW), k kg  % Usual Body Weight: 84.07  % Weight Change From Usual Weight: -16.1 %       Lab/Procedures/Meds    Pertinent Labs Reviewed: reviewed  Pertinent Labs Comments: [id number], A1C8.3  Pertinent Medications Reviewed: reviewed  Pertinent Medications Comments: statin      Estimated/Assessed Needs    Weight Used For Calorie Calculations: 56 kg (123 lb 7.3  oz)  Energy Calorie Requirements (kcal): 1367  Energy Need Method: Kent-St Jeor  Protein Requirements: 1.25  Weight Used For Protein Calculations: 56 kg (123 lb 7.3 oz)  Fluid Requirements (mL): 1mL/kcal or per MD     RDA Method (mL): 1367  CHO Requirement: 175g CHO daily      Nutrition Prescription Ordered    Current Diet Order: Diabetic mechanical soft  Oral Nutrition Supplement: Boost Glucose Control TID    Evaluation of Received Nutrient/Fluid Intake    Comments: LBM 11/10  % Intake of Estimated Energy Needs: 25 - 50 %  % Meal Intake: 25 - 50 %    Nutrition Risk    Level of Risk/Frequency of Follow-up: high     Assessment and Plan    Nutrition Problem  Inadequate energy intake     Related to (etiology):   Poor meal intake     Signs and Symptoms (as evidenced by):   Nausea, previous difficulty chewing     Interventions(treatment strategy):  Collaboration of care to providers.  Commercial beverage: Boost Glucose Control TID.     Nutrition Diagnosis Status:   Continues     Monitor and Evaluation    Food and Nutrient Intake: food and beverage intake  Food and Nutrient Adminstration: diet order  Knowledge/Beliefs/Attitudes: food and nutrition knowledge/skill  Anthropometric Measurements: weight, weight change, body mass index  Biochemical Data, Medical Tests and Procedures: electrolyte and renal panel, gastrointestinal profile, glucose/endocrine profile, inflammatory profile, lipid profile  Nutrition-Focused Physical Findings: overall appearance     Malnutrition Assessment             Weight Loss (Malnutrition): (16% in 1 year)  Energy Intake (Malnutrition): (pt reports poor but cannot quantify)   Orbital Region (Subcutaneous Fat Loss): mild depletion  Upper Arm Region (Subcutaneous Fat Loss): well nourished  Thoracic and Lumbar Region: well nourished   [place] Region (Muscle Loss): mild depletion  Clavicle Bone Region (Muscle Loss): moderate depletion  Clavicle and Acromion Bone Region (Muscle Loss): moderate  depletion  Scapular Bone Region (Muscle Loss): moderate depletion  Dorsal Hand (Muscle Loss): moderate depletion  Patellar Region (Muscle Loss): mild depletion  Anterior Thigh Region (Muscle Loss): mild depletion  Posterior Calf Region (Muscle Loss): moderate depletion                 Nutrition Follow-Up    RD Follow-up?: Yes

## 2019-11-14 NOTE — NURSING
Phoned Stroke team about patient's BP of 96/54. CARMELA Vicente said to continue to monitor and to hold PRN pain medication as long as her SBP remains less than 100. KHOA.

## 2019-11-14 NOTE — PLAN OF CARE
Problem: SLP Goal  Goal: SLP Goal  Description  Speech Language Pathology Goals  Goals expected to be met by 11/20  1. Pt will tolerate a mechanical soft diet/thin liquids with no s/s of aspiration.   2. Pt will participate in conversation without cues to express thought.   3. Pt will complete simple functional reasoning tasks with 80% accy given min cues.   4. Pt will name name 13 items in a category in 1 minute with mod assist.  5. Pt will follow multi-step commands with 75% accuracy and min assist.  6. Pt will complete assessment of reading, writing, visual spatial skills to determine need for tx.    Outcome: Ongoing, Progressing     Patient seen for ongoing cognitive/linguistic therapy.     Venu Marquez CCC-SLP  Speech-Language Pathology  Pager: [phone]

## 2019-11-14 NOTE — CODE/ RAPID DOCUMENTATION
RAPID RESPONSE NURSE PROACTIVE ROUNDING NOTE     Time of Visit: 0900    Admit Date: 10/31/2019  LOS: 14  Code Status: Full Code   Date of Visit: 2019  : 1945  Age: 74 y.o.  Sex: female  Race: Black or   Bed: [address] [place] [address] A:   MRN: [id number]  Was the patient discharged from an ICU this admission? yes   Was the patient discharged from a PACU within last 24 hours?  no  Did the patient receive conscious sedation/general anesthesia in last 24 hours?  no  Was the patient in the ED within the past 24 hours?  no  Was the patient started on NIPPV within the past 24 hours?  no  Attending Physician: Eugene Heredia MD  Primary Service: Networked reference to record PCT     ASSESSMENT     Diagnosis: Thrombotic stroke involving left middle cerebral artery    Abnormal Vital Signs: BP (!) 71/41 (BP Location: Left arm, Patient Position: Lying)   Pulse 66   Temp 98.2 °F (36.8 °C) (Oral)   Resp 18   Ht 5' (1.524 m)   Wt 56.3 kg (124 lb 1.9 oz)   SpO2 98%   Breastfeeding? No   BMI 24.24 kg/m²      Clinical Issues: Circulatory    Patient  has a past medical history of Anxiety, Back pain, Diabetes mellitus, Gastroparesis, Hypertension, and Sciatica.    Pt AAO in acute distress. Pt states she is feeling well. BP 96/52, HR 95bpm.      INTERVENTIONS/ RECOMMENDATIONS     More frequent VS monitoring.         PHYSICIAN ESCALATION     Yes/No  no        Disposition: Remain in room 703A.    FOLLOW-UP     Call back the Rapid Response Nurse, Willow Montiel RN at [phone] for additional questions or concerns.

## 2019-11-14 NOTE — NURSING
"Phoned stroke team around 12:14am about patient's declining bp of 60/32. Patient was complaining of nausea and saying "I don't feel good". Gregory Vicente put in new orders for fluids. NP stated to watch patient's BP and call if SBP is still less than 100 after infusion. WCTM.  "

## 2019-11-14 NOTE — PROGRESS NOTES
Pharmacist Renal Dose Adjustment Note    Melva Barker is a 74 y.o. female being treated with the medication cefepime    Patient Data:    Vital Signs (Most Recent):  Temp: 98.2 °F (36.8 °C) (11/14/19 0735)  Pulse: 66 (11/14/19 0735)  Resp: 18 (11/14/19 0735)  BP: (!) 71/41 (11/14/19 0735)  SpO2: 98 % (11/14/19 0735)   Vital Signs (72h Range):  Temp:  [98 °F (36.7 °C)-99.4 °F (37.4 °C)]   Pulse:  []   Resp:  [10-20]   BP: ()/()   SpO2:  [92 %-100 %]   Arterial Line BP: (110-204)/()      Recent Labs   Lab 11/12/19  0225 11/13/19  0413 11/14/19  0526   CREATININE 0.7 0.7 0.9     Serum creatinine: 0.9 mg/dL 11/14/19 0526  Estimated creatinine clearance: 43.8 mL/min    Medication:cefepime dose: 2 g  frequency every 8 hours will be changed to medication:cefepime dose:2 g frequency:every 12 hours    Pharmacist's Name: Rita Madrigal  Pharmacist's Extension: [phone]

## 2019-11-14 NOTE — PT/OT/SLP PROGRESS
"Speech Language Pathology Treatment    Patient Name:  Melva Barker   MRN:  [id number]  Admitting Diagnosis: Thrombotic stroke involving left middle cerebral artery    Recommendations:                 General Recommendations:  Cognitive-linguistic therapy and monitor diet tolerance  Diet recommendations:  Mechanical soft, Liquid Diet Level: Thin   Aspiration Precautions: 1 bite/sip at a time, Check for pocketing/oral residue, Feed only when awake/alert, Meds whole 1 at a time, Monitor for s/s of aspiration, Small bites/sips and Standard aspiration precautions   General Precautions: Standard, aspiration, fall  Communication strategies:  none    Subjective     Patient awake and alert during session  "The back pain is constant and now I've got nausea too."  Communicated with nurse prior to session     Pain/Comfort:  · Pain Rating 1: (pt did not rate)  · Location - Side 1: Right  · Location - Orientation 1: lower  · Location 1: back  · Pain Addressed 1: Reposition, Distraction, Nurse notified  · Pain Rating Post-Intervention 1: (pt did not rate)    Objective:     Has the patient been evaluated by SLP for swallowing?   Yes  Keep patient NPO? No   Current Respiratory Status: room air      Patient seen for ongoing cognitive-linguistic therapy. She was side lying to her right during session 2' to back pain. Patient declined PO trials during this session 2' to persistent nausea. She continued to report that she felt like she was below her cognitive-linguistic baseline, but felt that her speech was normal. During spontaneous conversation, patient continued to exhibit perseverations and substitutions with intermittent self-corrections. She was oriented x4 and independently recalled 3 types of therapy she was receiving. She correctly answered 3/5 abstract word-finding questions with mod assist and answered 5/6 complex binary choice questions with min assist. Patient's attention was limited this session 2' to perseveration on " pain and nausea. SLP educated her regarding POC from a ST perspective and she verbalized understanding. Patient left in room with call light within reach and dietician present. Pain and nausea communicated to RN.     Assessment:     Melva Barker is a 74 y.o. female with an SLP diagnosis of Aphasia and Cognitive-Linguistic Impairment.      Goals:   Multidisciplinary Problems     SLP Goals        Problem: SLP Goal    Goal Priority Disciplines Outcome   SLP Goal     SLP Ongoing, Progressing   Description:  Speech Language Pathology Goals  Goals expected to be met by 11/20  1. Pt will tolerate a mechanical soft diet/thin liquids with no s/s of aspiration.   2. Pt will participate in conversation without cues to express thought.   3. Pt will complete simple functional reasoning tasks with 80% accy given min cues.   4. Pt will name name 13 items in a category in 1 minute with mod assist.  5. Pt will follow multi-step commands with 75% accuracy and min assist.  6. Pt will complete assessment of reading, writing, visual spatial skills to determine need for tx.                     Plan:     · Patient to be seen:  4 x/week   · Plan of Care expires:  12/13/19  · Plan of Care reviewed with:  patient   · SLP Follow-Up:  Yes       Discharge recommendations:  rehabilitation facility   Barriers to Discharge:  None    Time Tracking:     SLP Treatment Date:   11/14/19  Speech Start Time:  1140  Speech Stop Time:  1150     Speech Total Time (min):  10 min    Billable Minutes: Speech Therapy Individual 10    Venu Marquez CCC-SLP  Speech-Language Pathology  Pager: [phone]   11/14/2019

## 2019-11-14 NOTE — ASSESSMENT & PLAN NOTE
Pt had been c/o back pain in recent days, complicated by her hx of chronic back and sciatic nerve pain with use of Tizanidine and opiates at home.  Due to Hb downtrend and recent tPA administration, MRI Lumbar/Thoracic spine was ordered 11/8 which demonstrated large R psoas hematoma with IVC compression and probable thrombosis.   General Surgery was consulted; No acute intervention pursued.     Patient was then with acute decompensation overnight 11/9; became hypotensive, tachycardic, and apneic with Hb down to 6.7. Rapid Response was called; patient requiring intubation, blood transfusion, and transfer to medical ICU.   CTA abd/pelvis 11/9 showed hematoma with active extravasation/hemorrhage with IVC compression, hemoperitoneum.   Pt went to IR and is now s/p successful R L2 & L3 lumbar spinal artery embolization.   Patient underwent IR re-evaluation on 11/11. No arterial bleed evident on IR angiography.    Hb now stable with slight downtrending, Continuing to monitor.

## 2019-11-14 NOTE — SUBJECTIVE & OBJECTIVE
Neurologic Chief Complaint: R-sided weakness -- L MCA    Subjective:     Interval History: Patient is seen for follow-up neurological assessment and treatment recommendations: DARREN. Complaining of persistent pain and dizziness    HPI, Past Medical, Family, and Social History remains the same as documented in the initial encounter.     Review of Systems   Constitutional: Negative for chills and fever.   Respiratory: Negative for cough and shortness of breath.    Cardiovascular: Negative for chest pain.   Musculoskeletal: Positive for arthralgias and back pain.   Neurological: Positive for dizziness, facial asymmetry, speech difficulty and weakness.   Psychiatric/Behavioral: Negative for agitation and confusion.     Scheduled Meds:   acetaminophen  650 mg Oral Q8H    atorvastatin  40 mg Oral QHS    ceFEPime (MAXIPIME) IVPB  2 g Intravenous Q12H    celecoxib  200 mg Oral Daily    diclofenac sodium  4 g Topical (Top) Daily    lidocaine  1 patch Transdermal Q24H    polyethylene glycol  17 g Oral Daily    potassium chloride 10%  20 mEq Oral BID    vancomycin 750 mg in normal saline 250 mL IVPB (ready to mix system)  750 mg Intravenous Q12H     Continuous Infusions:    PRN Meds:sodium chloride, dextrose 10 % in water (D10W), glucagon (human recombinant), insulin aspart U-100, ondansetron, sodium chloride 0.9%    Objective:     Vital Signs (Most Recent):  Temp: 98.2 °F (36.8 °C) (11/14/19 0735)  Pulse: 66 (11/14/19 0735)  Resp: 18 (11/14/19 0735)  BP: (!) 71/41 (11/14/19 0735)  SpO2: 98 % (11/14/19 0735)  BP Location: Left arm    Vital Signs Range (Last 24H):  Temp:  [98 °F (36.7 °C)-98.6 °F (37 °C)]   Pulse:  [66-94]   Resp:  [15-18]   BP: ()/(32-63)   SpO2:  [96 %-99 %]   BP Location: Left arm    Physical Exam   Constitutional: She is oriented to person, place, and time. She appears well-developed and well-nourished. No distress.   HENT:   Head: Normocephalic and atraumatic.   Eyes: EOM are normal.    Cardiovascular: Normal rate.   Pulmonary/Chest: Effort normal. No respiratory distress.   Musculoskeletal:   Pain in R lumbar region with tenderness to light palpation   Neurological: She is alert and oriented to person, place, and time.   Skin: Skin is warm and dry. She is not diaphoretic.   Psychiatric: Cognition and memory are not impaired. She is attentive.   Vitals reviewed.      Neurological Exam:   LOC:alert   Attention Span: good  Language: No aphasia  Articulation: Mild dysarthria  Orientation: Person, Time  Visual Fields: Full  EOM (CN III, IV, VI): Full/intact  Facial Movement (CN VII): Lower facial weakness on the Right  Motor: Arm left  Normal 5/5  Leg left  Normal 5/5  Arm right  Paresis: 3/5   Leg right Paresis: 2/5 - Limited 2/2 pain  Sensation: Intact to light touch, temp  Tone: Normal tone throughout    Laboratory:  CMP:   Recent Labs   Lab 11/14/19  0526   CALCIUM 8.3*   ALBUMIN 2.8*   PROT 6.2      K 4.4   CO2 22*      BUN 12   CREATININE 0.9   ALKPHOS 72   ALT 18   AST 33   BILITOT 1.7*     CBC:   Recent Labs   Lab 11/14/19  0905   WBC 13.08*   RBC 2.76*   HGB 7.9*   HCT 25.0*      MCV 91   MCH 28.6   MCHC 31.6*     Lipid Panel:   No results for input(s): CHOL, LDLCALC, HDL, TRIG in the last 168 hours.  Coagulation:   Recent Labs   Lab 11/09/19  0445   INR 1.1     Platelet Aggregation Study: No results for input(s): PLTAGG, PLTAGINTERP, PLTAGREGLACO, ADPPLTAGGREG in the last 168 hours.  Hgb A1C:   No results for input(s): HGBA1C in the last 168 hours.  TSH:   No results for input(s): TSH in the last 168 hours.      Diagnostic Results     Brain Imaging     MRI Brain (11/01/19):  Acute lacunar type infarct coursing through the left posterior limb internal capsule and corona radiata.      CTH (10/31/19):  No acute intracranial pathology      Vessel Imaging     CTA Multiphase (10/31/19):  CTA head: Atherosclerotic disease of the cavernous and supraclinoid ICAs with mild  narrowing.  Otherwise unremarkable CTA of the head specifically without evidence for proximal significant stenosis or occlusion.  CTA neck: Less than 50% proximal ICA stenosis by NASCET criteria.  Atherosclerotic disease with mild moderate narrowing of the right vertebral artery origin.  There is mild left V1 segment narrowing.  No significant focal vertebral artery stenosis or occlusion.  Interlobular septal thickening with tree in bud micro nodularity visualized upper lobes bilaterally which may represent inflammatory/infectious process clinical correlation and correlation with dedicated CT thorax recommended.  CT head: Bandlike region hypoattenuation left posterior limb internal capsule unchanged from prior suggestive for possible recent to subacute age infarction.  No evidence for hydrocephalus or acute intracranial hemorrhage.  Clinical correlation and further evaluation with MRI if patient compatible.      Cardiac Imaging   Echo (11/01/19)  · Increased (hyperdynamic) left ventricular systolic function. The estimated ejection fraction is 75%.  · Concentric left ventricular remodeling.  · Normal LV diastolic function.  · No wall motion abnormalities.  · Normal right ventricular systolic function.  · Normal central venous pressure (3 mm Hg).  · Mild tricuspid regurgitation.  · The estimated PA systolic pressure is 26 mm Hg  · Echolucent structure next to the LA, likely representing hiatal hernia. Clinincal correlation is required.      Miscellaneous Imaging    IR Angiogram Visceral 11/10/19 pending    CTA Abdomen/Pelvis 11/10/19  Right-sided retroperitoneal hematoma with questionable foci of active extravasation within the inferior portion of the collection as described above.    IR Angiogram Visceral 11/9/19  Angiography of the lumbar arteries demonstrates active extravasation of the right L2 lumbar artery. Embolization using beads as described above.  Plan: Transfer patient to ICU.  Continued serial H and H  follow-up.    CTA Abdomen/Pelvis 11/9/19  1. Large right retroperitoneal hematoma with findings concerning for active arterial extravasation/hemorrhage as detailed above.  There is associated hemorrhage extending throughout the right abdomen and pelvis/pericolic gutter with associated mass effect on the right kidney, which is anteriorly displaced.  2. Decreased opacification of the infahepatic and infrarenal IVC, possibly secondary to underlying mass effect versus partial thrombosis.  3. Nonspecific cecal and right colonic wall thickening, possibly reactive due to hemoperitoneum.  4. Additional findings as detailed above.    MRI Thoracic/Lumbar Spine W WO Contr 11/8/19  Large hematoma within the right psoas muscle.  Compression and probable thrombosis of the IVC. Consider contrast enhanced CT with delayed phase.  Multilevel degenerative changes as detailed above, more severe in the lumbar spine.  Irregularity of the T12 through L3 endplates is most likely degenerative.

## 2019-11-14 NOTE — NURSING
Phoned Stroke team about patient's BP of 78/50 after infusion of LRs. CARMELA Vicente gave orders to administer another 500ml bolus of LR. WCTM.

## 2019-11-14 NOTE — PROGRESS NOTES
Pharmacist Renal Dose Adjustment Note    Melva Barker is a 74 y.o. female being treated with the medication cefepime    Patient Data:    Vital Signs (Most Recent):  Temp: 98.2 °F (36.8 °C) (11/14/19 0735)  Pulse: 66 (11/14/19 0735)  Resp: 18 (11/14/19 0735)  BP: (!) 71/41 (11/14/19 0735)  SpO2: 98 % (11/14/19 0735)   Vital Signs (72h Range):  Temp:  [98 °F (36.7 °C)-99.4 °F (37.4 °C)]   Pulse:  []   Resp:  [10-20]   BP: ()/()   SpO2:  [92 %-100 %]   Arterial Line BP: (110-204)/()      Recent Labs   Lab 11/12/19  0225 11/13/19  0413 11/14/19  0526   CREATININE 0.7 0.7 0.9     Serum creatinine: 0.9 mg/dL 11/14/19 0526  Estimated creatinine clearance: 43.8 mL/min    Medication:cefepime dose: 2 g  frequency every 8 hours will be changed to medication:cefepime dose:2 g frequency:every 12 hours    Pharmacist's Name: Rita Madrigal  Pharmacist's Extension: [phone]

## 2019-11-14 NOTE — PROGRESS NOTES
Pharmacokinetic Initial Assessment: IV Vancomycin    Assessment/Plan:    Initiate intravenous vancomycin with loading dose of 1250 mg once followed by a maintenance dose of vancomycin 750mg IV every 24 hours  Desired empiric serum trough concentration is 15 to 20 mcg/mL  Draw vancomycin trough level 30 min prior to fourth dose on 11/18 at approximately 1200  Pharmacy will continue to follow and monitor vancomycin.      Please contact pharmacy at extension [phone] with any questions regarding this assessment.     Thank you for the consult,   Rita Madrigal       Patient brief summary:  Melva Barker is a 74 y.o. female initiated on antimicrobial therapy with IV Vancomycin for treatment of suspected upper respiratory infection    Drug Allergies:   Review of patient's allergies indicates:   Allergen Reactions    Morphine Other (See Comments)     headache    Latex, natural rubber Rash       Actual Body Weight:   56.3 kg    Renal Function:   Estimated Creatinine Clearance: 43.8 mL/min (based on SCr of 0.9 mg/dL).,     Dialysis Method (if applicable):  N/A    CBC (last 72 hours):  Recent Labs   Lab Result Units 11/11/19  1238 11/11/19  1811 11/12/19  0010 11/12/19  1355 11/13/19  0413 11/13/19  2029 11/14/19  0526 11/14/19  0905   WBC K/uL 13.33* 11.59 11.46 12.26 13.20* 12.90* 13.63* 13.08*   Hemoglobin g/dL 8.2* 8.2* 8.0* 8.5* 8.4* 8.3* 8.2* 7.9*   Hematocrit % 26.1* 24.6* 24.7* 25.6* 25.9* 25.9* 25.0* 25.0*   Platelets K/uL 119* 112* 147* 149* 159 149* 152 155   Gran% % 77.9* 72.1 72.6 73.5* 75.1* 67.3 71.1 72.2   Lymph% % 11.0* 16.3* 14.8* 14.1* 11.8* 14.3* 13.7* 13.7*   Mono% % 8.9 9.7 10.3 10.0 10.7 14.6 10.3 8.9   Eosinophil% % 0.6 1.1 1.6 1.4 1.5 2.3 3.7 3.7   Basophil% % 0.6 0.2 0.3 0.2 0.2 0.3 0.2 0.4   Differential Method  Automated Automated Automated Automated Automated Automated Automated Automated       Metabolic Panel (last 72 hours):  Recent Labs   Lab Result Units 11/12/19  0225 11/13/19  0410  11/14/19  0526   Sodium mmol/L 138 137 138   Potassium mmol/L 3.0* 3.7 4.4   Chloride mmol/L 106 104 106   CO2 mmol/L 21* 23 22*   Glucose mg/dL 117* 170* 137*   BUN, Bld mg/dL 5* 5* 12   Creatinine mg/dL 0.7 0.7 0.9   Albumin g/dL 2.9* 2.9* 2.8*   Total Bilirubin mg/dL 1.3* 2.1* 1.7*   Alkaline Phosphatase U/L 74 76 72   AST U/L 57* 49* 33   ALT U/L 23 22 18   Magnesium mg/dL 1.9 1.8 1.9   Phosphorus mg/dL 2.1* 2.0* 3.9       Drug levels (last 3 results):  No results for input(s): VANCOMYCINRA, VANCOMYCINPE, VANCOMYCINTR in the last 72 hours.    Microbiologic Results:  Microbiology Results (last 7 days)     Procedure Component Value Units Date/Time    Blood culture [[id number]] Collected:  11/09/19 0450    Order Status:  Completed Specimen:  Blood from Line, Jugular, Internal Right Updated:  11/14/19 1012     Blood Culture, Routine No growth after 5 days.    Blood culture [[id number]] Collected:  11/09/19 0453    Order Status:  Completed Specimen:  Blood from Radial Arterial Stick, Left Updated:  11/14/19 1012     Blood Culture, Routine No growth after 5 days.    Blood culture [[id number]] Collected:  11/14/19 0905    Order Status:  Sent Specimen:  Blood Updated:  11/14/19 0934    Blood culture [[id number]] Collected:  11/14/19 0905    Order Status:  Sent Specimen:  Blood Updated:  11/14/19 0934

## 2019-11-14 NOTE — ASSESSMENT & PLAN NOTE
Reported hx chronic back pain with associated sciatic nerve pain as well, then complicated by acute retroperitoneal hematoma.  Consulted to anesthesia pain management; Appreciate recommendations.  Pt continues to c/o R back pain on 11/13 and concern that pt's tachycardia could be pain-related.  Pain pump discontinued    All Oral pain medications discontinued on 11/14 except for celecoxib due to hypotension, lidocaine patch and voltaren gel ordered. Will monitor BP and slowly add back medications due to possibility of pain medication causing hypotension. Once safe, will add reduced doses of home pain medications

## 2019-11-14 NOTE — NURSING
Phoned stroke team about patient's BP being 92/54 when taken manually. Danya stated that patient's organs are getting perfused and to continue to monitor patient's BP. Danya also stated to encourage patient to eat and drink during shift. KHOA.

## 2019-11-14 NOTE — PROGRESS NOTES
[facility]  Vascular Neurology  Comprehensive Stroke Center  Progress Note    Assessment/Plan:     * Thrombotic stroke involving left middle cerebral artery  Melva Barker is a 74 y.o. female with PMHx of HTN, HLD, and DM who presented to OSH with acute worsening of R-sided weakness after having experienced R-sided weakness x1 month. She was treated with tPA at OSH. CTA without LVO. MRI with infarct in L internal capsule and corona radiata. Etiology likely small vessel disease.    Upgraded diet and adjusting analgesic regimen, as below. Dispo inpatient rehab.      Antithrombotics for secondary stroke prevention: Antiplatelets: ASA 81 mg + Plavix 75 mg -- HELD due to retroperitoneal hematoma.  Recently received blood transfusions  Statins for secondary stroke prevention and hyperlipidemia, if present: Statins: Atorvastatin- 40 mg daily,   Aggressive risk factor modification: HTN, HLD, Diet  Rehab efforts: The patient has been evaluated by a stroke team provider and the therapy needs have been fully considered based off the presenting complaints and exam findings. The following therapy evaluations are needed: PT evaluate and treat, OT evaluate and treat, SLP evaluate and treat, PM&R evaluate for appropriate placement - Dispo inpatient rehab  Diagnostics ordered/pending: None   VTE prophylaxis: Mechanical SCDs only - Pharmacologic ppx acutely held due to hematoma.  BP parameters: Infarct: Post tPA, SBP <160; Avoid hypotension    Hypotension  Patient hypotensive overnight from 11/13-11/14, continued hypotension on morning of 11/14  Received multiple boluses and pressure responding  Differential includes sepsis vs pain medication     Infectious workup ordered  Elevated procal and lactate with interval development of atelectasis in R lower lung, started on 7 day course of IV cefepime and vanc for possible hospital acquired pneumonia, blood culture pending      Retroperitoneal hematoma  Pt  had been c/o back pain in recent days, complicated by her hx of chronic back and sciatic nerve pain with use of Tizanidine and opiates at home.  Due to Hb downtrend and recent tPA administration, MRI Lumbar/Thoracic spine was ordered 11/8 which demonstrated large R psoas hematoma with IVC compression and probable thrombosis.   General Surgery was consulted; No acute intervention pursued.     Patient was then with acute decompensation overnight 11/9; became hypotensive, tachycardic, and apneic with Hb down to 6.7. Rapid Response was called; patient requiring intubation, blood transfusion, and transfer to medical ICU.   CTA abd/pelvis 11/9 showed hematoma with active extravasation/hemorrhage with IVC compression, hemoperitoneum.   Pt went to IR and is now s/p successful R L2 & L3 lumbar spinal artery embolization.   Patient underwent IR re-evaluation on 11/11. No arterial bleed evident on IR angiography.    Hb now stable with slight downtrending, Continuing to monitor.    Nausea & vomiting  Patient with episodes of vomiting 11/2 - resolved throughout the day. No episodes of vomiting overnight.   PRN IV Zofran ordered   KUB completed and no abnormality noted  Patient continues to report nausea - some mild middle lower abdominal pain upon palpation  Lipase ordered and WNL  CT abdomen (2016) diverticulosis in the descending colon and a small hiatal hernia. Hx of diabetic gastroparesis, reglan started. Follow up with PCP.  Patient with last BM 11/6 - normal bowel sounds on exam   Sucralfate 1g started 11/5. Increased PRN Zofran dose on 11/13.  Will continue to monitor     Debility  2/2 stroke  PT/OT eval & treat - Dispo inpatient rehab    Cytotoxic cerebral edema  Area of cytotoxic cerebral edema identified when reviewing brain imaging in the territory of the L middle cerebral artery. There is no mass effect associated with it. We will continue to monitor the patients clinical exam for any worsening of symptoms which may  indicate expansion of the stroke or the area of the edema resulting in the clinical change. The pattern is suggestive of small vessel etiology.    Essential hypertension  Risk factor for stroke  Holding all BP meds due to hypotension    Back pain  Reported hx chronic back pain with associated sciatic nerve pain as well, then complicated by acute retroperitoneal hematoma.  Consulted to anesthesia pain management; Appreciate recommendations.  Pt continues to c/o R back pain on 11/13 and concern that pt's tachycardia could be pain-related.  Pain pump discontinued    All Oral pain medications discontinued on 11/14 except for celecoxib due to hypotension, lidocaine patch and voltaren gel ordered. Will monitor BP and slowly add back medications due to possibility of pain medication causing hypotension. Once safe, will add reduced doses of home pain medications      Gastroparesis  Previously on Reglan 10 mg TID before meals, will order if needed    Diabetes mellitus type 2 without retinopathy  Stroke risk factor, poorly controlled on glargine 17 units daily  HbA1c 8.3%  Recommend tight glucose control  Currently on SSI   Diabetic diet    Leukocytosis  Previously suspected acute reactive 2/2 active bleed. WBC now 13.  BCx 11/9 NGTD and pt remains afebrile.  Pt with no other acute complaints besides back pain at this time.  Will monitor for downtrend for now.         11/1/19 MRI with small vessel L MCA infarct, therapy recommending rehab  11/2 - Patient stepped down overnight. Adjusting BP regimen. Patient with continuous complaints if nausea. IV Zofran ordered with some relief. Patient has not has BM since 10/30. Ordering KUB to rule out obstruction. Neuro exam unchanged.   11/3 - NAEON. Patient reports she had no episodes of vomiting overnight. Still complaining of nausea. Mild lower abdomen tenderness on exam. Lipase ordered and WNL. Continue to monitor.   11/4 - denies nausea and vomiting. Abd tenderness remains present  on palpitation. Continue to monitor. HCTZ increased yesterday. Pending rehab placement.   11/5 -  N/V x1 overnight zofran resolved, sucralfate started. Placement pending   11/6 - hypotensive, held all BP meds,  ml bolus, responded well. Increased BUN/Cr, start NS 75ml/hr.   11/7 patient complaining of sciatica pain.  Restarted home tizanidine.  Patient requesting hydrocodone but educated patient that nerve pain is not treated with opioid.  Degenerative disease seen on xray but no fracture.      11/8 Patient continues to experience back pain.  Now with leukocytosis and drop in h/h plan for MRI thoracic/lumbar spine to evaluate for epidural hematoma.    11/9: MRI Lumbar spine had demonstrated large R psoas hematoma with possible IVC compression; General Surgery consulted. Patient was then with acute decompensation overnight; became hypotensive, tachycardic, and apneic requiring intubation, blood transfusion, and medical ICU transfer. CTA abd/pelvis showed active extravasation/hemorrhage with hemoperitoneum. Pt now s/p successful lumbar artery embolization in IR. She was extubated on arrival back to ICU and tolerated well.    11/11: Patient underwent IR re-evaluation yesterday. No arterial bleed evident on IR angiography. Patient neuro exam stable.   11/12 Stepped down from medical ICU this morning.  Patient still experiencing pain.  Anesthesia consulted for pain management.  D/C pain pump and started oral medications for muscle and nerve pain.  Will wean to home regimen. Waiting for repeat CBC, transfused one unit of blood 11/11.   Tachycardic/hypertensive will continue to monitor.  11/13: Upgraded diet per SLP recs. Pt continues to c/o R back pain, concern that tachycardia could be pain-induced; adjusted analgesic regimen further as well as antiemetic regimen with plans to eventually wean to home regimen. Replaced Phos. Dispo inpatient rehab.  11/14: patient hypotensive overnight and this morning requiring  fluid boluses. Discontinued all pain medications except for celecoxib in case pain meds are contributing to hypotension. Lidocaine patch and Voltaren gel ordered as well. Infectious workup significant for minor atelectasis in R lower lung with increased procal and lactate. Patient started on IV antibiotics for community acquired pneumonia. Blood cultures pending. ICU notified of patient's hypotension    STROKE DOCUMENTATION   Acute Stroke Times   Last Known Normal Date: 10/31/19  Last Known Normal Time: 0630  Symptom Onset Date: 10/31/19  Symptom Onset Time: 0730  Stroke Team Called Date: 10/31/19  Stroke Team Called Time: 0936  Stroke Team Arrival Date: 10/31/19  Stroke Team Arrival Time: 0946  Decision to Treat Time for Alteplase: 1003    NIH Scale:  1a. Level of Consciousness: 0-->Alert, keenly responsive  1b. LOC Questions: 0-->Answers both questions correctly  1c. LOC Commands: 0-->Performs both tasks correctly  2. Best Gaze: 0-->Normal  3. Visual: 0-->No visual loss  4. Facial Palsy: 1-->Minor paralysis (flattened nasolabial fold, asymmetry on smiling)  5a. Motor Arm, Left: 0-->No drift, limb holds 90 (or 45) degrees for full 10 secs  5b. Motor Arm, Right: 2-->Some effort against gravity, limb cannot get to or maintain (if cued) 90 (or 45) degrees, drifts down to bed, but has some effort against gravity  6a. Motor Leg, Left: 0-->No drift, leg holds 30 degree position for full 5 secs  6b. Motor Leg, Right: 3-->No effort against gravity, leg falls to bed immediately  7. Limb Ataxia: 0-->Absent  8. Sensory: 0-->Normal, no sensory loss  9. Best Language: 0-->No aphasia, normal  10. Dysarthria: 1-->Mild-to-moderate dysarthria, patient slurs at least some words and, at worst, can be understood with some difficulty  11. Extinction and Inattention (formerly Neglect): 0-->No abnormality  Total (NIH Stroke Scale): 7       Modified Dayanna Score: 0  Gilmer Coma Scale:    ABCD2 Score:    [id number] Score:   HAS -BLED  Score:   ICH Score:   Hunt & Amaya Classification:      Hemorrhagic change of an Ischemic Stroke: Does this patient have an ischemic stroke with hemorrhagic changes? No     Neurologic Chief Complaint: R-sided weakness -- L MCA    Subjective:     Interval History: Patient is seen for follow-up neurological assessment and treatment recommendations: DARREN. Complaining of persistent pain and dizziness    HPI, Past Medical, Family, and Social History remains the same as documented in the initial encounter.     Review of Systems   Constitutional: Negative for chills and fever.   Respiratory: Negative for cough and shortness of breath.    Cardiovascular: Negative for chest pain.   Musculoskeletal: Positive for arthralgias and back pain.   Neurological: Positive for dizziness, facial asymmetry, speech difficulty and weakness.   Psychiatric/Behavioral: Negative for agitation and confusion.     Scheduled Meds:   acetaminophen  650 mg Oral Q8H    atorvastatin  40 mg Oral QHS    ceFEPime (MAXIPIME) IVPB  2 g Intravenous Q12H    celecoxib  200 mg Oral Daily    diclofenac sodium  4 g Topical (Top) Daily    lidocaine  1 patch Transdermal Q24H    polyethylene glycol  17 g Oral Daily    potassium chloride 10%  20 mEq Oral BID    vancomycin 750 mg in normal saline 250 mL IVPB (ready to mix system)  750 mg Intravenous Q12H     Continuous Infusions:    PRN Meds:sodium chloride, dextrose 10 % in water (D10W), glucagon (human recombinant), insulin aspart U-100, ondansetron, sodium chloride 0.9%    Objective:     Vital Signs (Most Recent):  Temp: 98.2 °F (36.8 °C) (11/14/19 0735)  Pulse: 66 (11/14/19 0735)  Resp: 18 (11/14/19 0735)  BP: (!) 71/41 (11/14/19 0735)  SpO2: 98 % (11/14/19 0735)  BP Location: Left arm    Vital Signs Range (Last 24H):  Temp:  [98 °F (36.7 °C)-98.6 °F (37 °C)]   Pulse:  [66-94]   Resp:  [15-18]   BP: ()/(32-63)   SpO2:  [96 %-99 %]   BP Location: Left arm    Physical Exam   Constitutional: She is  oriented to person, place, and time. She appears well-developed and well-nourished. No distress.   HENT:   Head: Normocephalic and atraumatic.   Eyes: EOM are normal.   Cardiovascular: Normal rate.   Pulmonary/Chest: Effort normal. No respiratory distress.   Musculoskeletal:   Pain in R lumbar region with tenderness to light palpation   Neurological: She is alert and oriented to person, place, and time.   Skin: Skin is warm and dry. She is not diaphoretic.   Psychiatric: Cognition and memory are not impaired. She is attentive.   Vitals reviewed.      Neurological Exam:   LOC:alert   Attention Span: good  Language: No aphasia  Articulation: Mild dysarthria  Orientation: Person, Time  Visual Fields: Full  EOM (CN III, IV, VI): Full/intact  Facial Movement (CN VII): Lower facial weakness on the Right  Motor: Arm left  Normal 5/5  Leg left  Normal 5/5  Arm right  Paresis: 3/5   Leg right Paresis: 2/5 - Limited 2/2 pain  Sensation: Intact to light touch, temp  Tone: Normal tone throughout    Laboratory:  CMP:   Recent Labs   Lab 11/14/19  0526   CALCIUM 8.3*   ALBUMIN 2.8*   PROT 6.2      K 4.4   CO2 22*      BUN 12   CREATININE 0.9   ALKPHOS 72   ALT 18   AST 33   BILITOT 1.7*     CBC:   Recent Labs   Lab 11/14/19  0905   WBC 13.08*   RBC 2.76*   HGB 7.9*   HCT 25.0*      MCV 91   MCH 28.6   MCHC 31.6*     Lipid Panel:   No results for input(s): CHOL, LDLCALC, HDL, TRIG in the last 168 hours.  Coagulation:   Recent Labs   Lab 11/09/19  0445   INR 1.1     Platelet Aggregation Study: No results for input(s): PLTAGG, PLTAGINTERP, PLTAGREGLACO, ADPPLTAGGREG in the last 168 hours.  Hgb A1C:   No results for input(s): HGBA1C in the last 168 hours.  TSH:   No results for input(s): TSH in the last 168 hours.      Diagnostic Results     Brain Imaging     MRI Brain (11/01/19):  Acute lacunar type infarct coursing through the left posterior limb internal capsule and corona radiata.      CTH (10/31/19):  No acute  intracranial pathology      Vessel Imaging     CTA Multiphase (10/31/19):  CTA head: Atherosclerotic disease of the cavernous and supraclinoid ICAs with mild narrowing.  Otherwise unremarkable CTA of the head specifically without evidence for proximal significant stenosis or occlusion.  CTA neck: Less than 50% proximal ICA stenosis by NASCET criteria.  Atherosclerotic disease with mild moderate narrowing of the right vertebral artery origin.  There is mild left V1 segment narrowing.  No significant focal vertebral artery stenosis or occlusion.  Interlobular septal thickening with tree in bud micro nodularity visualized upper lobes bilaterally which may represent inflammatory/infectious process clinical correlation and correlation with dedicated CT thorax recommended.  CT head: Bandlike region hypoattenuation left posterior limb internal capsule unchanged from prior suggestive for possible recent to subacute age infarction.  No evidence for hydrocephalus or acute intracranial hemorrhage.  Clinical correlation and further evaluation with MRI if patient compatible.      Cardiac Imaging   Echo (11/01/19)  · Increased (hyperdynamic) left ventricular systolic function. The estimated ejection fraction is 75%.  · Concentric left ventricular remodeling.  · Normal LV diastolic function.  · No wall motion abnormalities.  · Normal right ventricular systolic function.  · Normal central venous pressure (3 mm Hg).  · Mild tricuspid regurgitation.  · The estimated PA systolic pressure is 26 mm Hg  · Echolucent structure next to the LA, likely representing hiatal hernia. Clinincal correlation is required.      Miscellaneous Imaging    IR Angiogram Visceral 11/10/19 pending    CTA Abdomen/Pelvis 11/10/19  Right-sided retroperitoneal hematoma with questionable foci of active extravasation within the inferior portion of the collection as described above.    IR Angiogram Visceral 11/9/19  Angiography of the lumbar arteries demonstrates  active extravasation of the right L2 lumbar artery. Embolization using beads as described above.  Plan: Transfer patient to ICU.  Continued serial H and H follow-up.    CTA Abdomen/Pelvis 11/9/19  1. Large right retroperitoneal hematoma with findings concerning for active arterial extravasation/hemorrhage as detailed above.  There is associated hemorrhage extending throughout the right abdomen and pelvis/pericolic gutter with associated mass effect on the right kidney, which is anteriorly displaced.  2. Decreased opacification of the infahepatic and infrarenal IVC, possibly secondary to underlying mass effect versus partial thrombosis.  3. Nonspecific cecal and right colonic wall thickening, possibly reactive due to hemoperitoneum.  4. Additional findings as detailed above.    MRI Thoracic/Lumbar Spine W WO Contr 11/8/19  Large hematoma within the right psoas muscle.  Compression and probable thrombosis of the IVC. Consider contrast enhanced CT with delayed phase.  Multilevel degenerative changes as detailed above, more severe in the lumbar spine.  Irregularity of the T12 through L3 endplates is most likely degenerative.      Emi Murphy PA-C  Comprehensive Stroke Center  Department of Vascular Neurology   [facility]

## 2019-11-15 LAB
ALBUMIN SERPL BCP-MCNC: 2.9 G/DL (ref 3.5–5.2)
ALP SERPL-CCNC: 81 U/L (ref 55–135)
ALT SERPL W/O P-5'-P-CCNC: 25 U/L (ref 10–44)
ANION GAP SERPL CALC-SCNC: 10 MMOL/L (ref 8–16)
AST SERPL-CCNC: 51 U/L (ref 10–40)
BASOPHILS # BLD AUTO: 0.05 K/UL (ref 0–0.2)
BASOPHILS NFR BLD: 0.4 % (ref 0–1.9)
BILIRUB SERPL-MCNC: 1.9 MG/DL (ref 0.1–1)
BUN SERPL-MCNC: 6 MG/DL (ref 8–23)
CALCIUM SERPL-MCNC: 9.1 MG/DL (ref 8.7–10.5)
CHLORIDE SERPL-SCNC: 109 MMOL/L (ref 95–110)
CO2 SERPL-SCNC: 19 MMOL/L (ref 23–29)
CREAT SERPL-MCNC: 0.7 MG/DL (ref 0.5–1.4)
DIFFERENTIAL METHOD: ABNORMAL
EOSINOPHIL # BLD AUTO: 0.4 K/UL (ref 0–0.5)
EOSINOPHIL NFR BLD: 2.8 % (ref 0–8)
ERYTHROCYTE [DISTWIDTH] IN BLOOD BY AUTOMATED COUNT: 15.7 % (ref 11.5–14.5)
EST. GFR  (AFRICAN AMERICAN): >60 ML/MIN/1.73 M^2
EST. GFR  (NON AFRICAN AMERICAN): >60 ML/MIN/1.73 M^2
GLUCOSE SERPL-MCNC: 125 MG/DL (ref 70–110)
HCT VFR BLD AUTO: 27.1 % (ref 37–48.5)
HGB BLD-MCNC: 8.8 G/DL (ref 12–16)
IMM GRANULOCYTES # BLD AUTO: 0.11 K/UL (ref 0–0.04)
IMM GRANULOCYTES NFR BLD AUTO: 0.8 % (ref 0–0.5)
LYMPHOCYTES # BLD AUTO: 1.7 K/UL (ref 1–4.8)
LYMPHOCYTES NFR BLD: 11.9 % (ref 18–48)
MAGNESIUM SERPL-MCNC: 1.8 MG/DL (ref 1.6–2.6)
MCH RBC QN AUTO: 28.6 PG (ref 27–31)
MCHC RBC AUTO-ENTMCNC: 32.5 G/DL (ref 32–36)
MCV RBC AUTO: 88 FL (ref 82–98)
MONOCYTES # BLD AUTO: 1.2 K/UL (ref 0.3–1)
MONOCYTES NFR BLD: 8.4 % (ref 4–15)
NEUTROPHILS # BLD AUTO: 10.7 K/UL (ref 1.8–7.7)
NEUTROPHILS NFR BLD: 75.7 % (ref 38–73)
NRBC BLD-RTO: 0 /100 WBC
PHOSPHATE SERPL-MCNC: 2.6 MG/DL (ref 2.7–4.5)
PLATELET # BLD AUTO: 197 K/UL (ref 150–350)
PMV BLD AUTO: 11.1 FL (ref 9.2–12.9)
POCT GLUCOSE: 137 MG/DL (ref 70–110)
POCT GLUCOSE: 139 MG/DL (ref 70–110)
POCT GLUCOSE: 148 MG/DL (ref 70–110)
POTASSIUM SERPL-SCNC: 4.1 MMOL/L (ref 3.5–5.1)
PROT SERPL-MCNC: 6.9 G/DL (ref 6–8.4)
RBC # BLD AUTO: 3.08 M/UL (ref 4–5.4)
SODIUM SERPL-SCNC: 138 MMOL/L (ref 136–145)
WBC # BLD AUTO: 14.09 K/UL (ref 3.9–12.7)

## 2019-11-15 PROCEDURE — 63600175 PHARM REV CODE 636 W HCPCS: Performed by: PSYCHIATRY & NEUROLOGY

## 2019-11-15 PROCEDURE — 80053 COMPREHEN METABOLIC PANEL: CPT

## 2019-11-15 PROCEDURE — 25000003 PHARM REV CODE 250: Performed by: PHYSICIAN ASSISTANT

## 2019-11-15 PROCEDURE — 83735 ASSAY OF MAGNESIUM: CPT

## 2019-11-15 PROCEDURE — 87449 NOS EACH ORGANISM AG IA: CPT

## 2019-11-15 PROCEDURE — 25000003 PHARM REV CODE 250: Performed by: PSYCHIATRY & NEUROLOGY

## 2019-11-15 PROCEDURE — 85025 COMPLETE CBC W/AUTO DIFF WBC: CPT

## 2019-11-15 PROCEDURE — 94761 N-INVAS EAR/PLS OXIMETRY MLT: CPT

## 2019-11-15 PROCEDURE — 84100 ASSAY OF PHOSPHORUS: CPT

## 2019-11-15 PROCEDURE — 92507 TX SP LANG VOICE COMM INDIV: CPT

## 2019-11-15 PROCEDURE — 87427 SHIGA-LIKE TOXIN AG IA: CPT

## 2019-11-15 PROCEDURE — 25000003 PHARM REV CODE 250: Performed by: NURSE PRACTITIONER

## 2019-11-15 PROCEDURE — 36415 COLL VENOUS BLD VENIPUNCTURE: CPT

## 2019-11-15 PROCEDURE — 87493 C DIFF AMPLIFIED PROBE: CPT

## 2019-11-15 PROCEDURE — 25000003 PHARM REV CODE 250: Performed by: FAMILY MEDICINE

## 2019-11-15 PROCEDURE — 87046 STOOL CULTR AEROBIC BACT EA: CPT

## 2019-11-15 PROCEDURE — 99233 SBSQ HOSP IP/OBS HIGH 50: CPT | Mod: ,,, | Performed by: PSYCHIATRY & NEUROLOGY

## 2019-11-15 PROCEDURE — 87045 FECES CULTURE AEROBIC BACT: CPT

## 2019-11-15 PROCEDURE — 20600001 HC STEP DOWN PRIVATE ROOM

## 2019-11-15 PROCEDURE — 99233 PR SUBSEQUENT HOSPITAL CARE,LEVL III: ICD-10-PCS | Mod: ,,, | Performed by: PSYCHIATRY & NEUROLOGY

## 2019-11-15 PROCEDURE — 97535 SELF CARE MNGMENT TRAINING: CPT

## 2019-11-15 RX ORDER — HYDROCODONE BITARTRATE AND ACETAMINOPHEN 10; 325 MG/1; MG/1
1 TABLET ORAL EVERY 6 HOURS PRN
Status: DISCONTINUED | OUTPATIENT
Start: 2019-11-15 | End: 2019-11-18

## 2019-11-15 RX ORDER — TIZANIDINE 4 MG/1
4 TABLET ORAL EVERY 8 HOURS PRN
Status: DISCONTINUED | OUTPATIENT
Start: 2019-11-15 | End: 2019-11-29

## 2019-11-15 RX ORDER — SODIUM CHLORIDE 9 MG/ML
INJECTION, SOLUTION INTRAVENOUS CONTINUOUS
Status: ACTIVE | OUTPATIENT
Start: 2019-11-15 | End: 2019-11-17

## 2019-11-15 RX ADMIN — METOCLOPRAMIDE HYDROCHLORIDE 5 MG: 5 TABLET ORAL at 04:11

## 2019-11-15 RX ADMIN — HYDROCODONE BITARTRATE AND ACETAMINOPHEN 1 TABLET: 10; 325 TABLET ORAL at 10:11

## 2019-11-15 RX ADMIN — ACETAMINOPHEN 650 MG: 325 TABLET ORAL at 04:11

## 2019-11-15 RX ADMIN — HYDROCODONE BITARTRATE AND ACETAMINOPHEN 1 TABLET: 10; 325 TABLET ORAL at 04:11

## 2019-11-15 RX ADMIN — GABAPENTIN 300 MG: 300 CAPSULE ORAL at 10:11

## 2019-11-15 RX ADMIN — SODIUM CHLORIDE: 0.9 INJECTION, SOLUTION INTRAVENOUS at 04:11

## 2019-11-15 RX ADMIN — Medication 125 MG: at 12:11

## 2019-11-15 RX ADMIN — GABAPENTIN 300 MG: 300 CAPSULE ORAL at 04:11

## 2019-11-15 RX ADMIN — CELECOXIB 200 MG: 200 CAPSULE ORAL at 08:11

## 2019-11-15 RX ADMIN — DICLOFENAC 4 G: 10 GEL TOPICAL at 08:11

## 2019-11-15 RX ADMIN — Medication 125 MG: at 06:11

## 2019-11-15 RX ADMIN — LIDOCAINE 1 PATCH: 50 PATCH CUTANEOUS at 11:11

## 2019-11-15 RX ADMIN — POTASSIUM CHLORIDE 20 MEQ: 20 SOLUTION ORAL at 10:11

## 2019-11-15 RX ADMIN — METOCLOPRAMIDE HYDROCHLORIDE 5 MG: 5 TABLET ORAL at 06:11

## 2019-11-15 RX ADMIN — TRAZODONE HYDROCHLORIDE 25 MG: 50 TABLET ORAL at 10:11

## 2019-11-15 RX ADMIN — ACETAMINOPHEN 650 MG: 325 TABLET ORAL at 06:11

## 2019-11-15 RX ADMIN — POTASSIUM CHLORIDE 20 MEQ: 20 SOLUTION ORAL at 08:11

## 2019-11-15 RX ADMIN — METOCLOPRAMIDE HYDROCHLORIDE 5 MG: 5 TABLET ORAL at 11:11

## 2019-11-15 RX ADMIN — Medication 6 MG: at 12:11

## 2019-11-15 RX ADMIN — DULOXETINE HYDROCHLORIDE 20 MG: 20 CAPSULE, DELAYED RELEASE ORAL at 08:11

## 2019-11-15 RX ADMIN — DULOXETINE HYDROCHLORIDE 20 MG: 20 CAPSULE, DELAYED RELEASE ORAL at 10:11

## 2019-11-15 RX ADMIN — ATORVASTATIN CALCIUM 40 MG: 20 TABLET, FILM COATED ORAL at 10:11

## 2019-11-15 RX ADMIN — GABAPENTIN 300 MG: 300 CAPSULE ORAL at 08:11

## 2019-11-15 NOTE — SUBJECTIVE & OBJECTIVE
Neurologic Chief Complaint: R-sided weakness -- L MCA    Subjective:     Interval History: Patient is seen for follow-up neurological assessment and treatment recommendations: Liquid diarrhea, WBC 14, stopped empiric abx, ordered c. Diff and stool culture, start PO vanc, IVFs. Adjusted pain meds with goal to return to home regimen. Nauseous overnight, zofran given.     HPI, Past Medical, Family, and Social History remains the same as documented in the initial encounter.     Review of Systems   Constitutional: Negative for chills and fever.   Respiratory: Negative for cough and shortness of breath.    Cardiovascular: Negative for chest pain.   Gastrointestinal: Positive for abdominal pain, diarrhea and nausea.   Musculoskeletal: Positive for arthralgias and back pain.   Neurological: Positive for dizziness, facial asymmetry, speech difficulty and weakness.   Psychiatric/Behavioral: Negative for agitation and confusion.     Scheduled Meds:   acetaminophen  650 mg Oral Q8H    atorvastatin  40 mg Oral QHS    diclofenac sodium  4 g Topical (Top) Daily    DULoxetine  20 mg Oral BID    gabapentin  300 mg Oral TID    lidocaine  1 patch Transdermal Q24H    metoclopramide HCl  5 mg Oral TID AC    polyethylene glycol  17 g Oral Daily    potassium chloride 10%  20 mEq Oral BID    traZODone  25 mg Oral QHS    vancomycin  125 mg Oral Q6H     Continuous Infusions:   sodium chloride 0.9%       PRN Meds:sodium chloride, dextrose 10 % in water (D10W), glucagon (human recombinant), HYDROcodone-acetaminophen, insulin aspart U-100, melatonin, sodium chloride 0.9%, tiZANidine    Objective:     Vital Signs (Most Recent):  Temp: 98.9 °F (37.2 °C) (11/15/19 0730)  Pulse: 98 (11/15/19 0730)  Resp: 16 (11/15/19 0730)  BP: (!) 163/76 (11/15/19 0730)  SpO2: 96 % (11/15/19 0730)  BP Location: Left arm    Vital Signs Range (Last 24H):  Temp:  [98 °F (36.7 °C)-99.4 °F (37.4 °C)]   Pulse:  []   Resp:  [16-20]   BP:  (125-163)/(59-77)   SpO2:  [96 %-99 %]   BP Location: Left arm    Physical Exam   Constitutional: She is oriented to person, place, and time. She appears well-developed and well-nourished. No distress.   HENT:   Head: Normocephalic and atraumatic.   Eyes: EOM are normal.   Cardiovascular: Normal rate.   Pulmonary/Chest: Effort normal. No respiratory distress.   Abdominal: Bowel sounds are increased.   Mid lower abdominal pain to palpation    Musculoskeletal:   Pain in R lumbar region with tenderness to light palpation   Neurological: She is alert and oriented to person, place, and time.   Skin: Skin is warm and dry. She is not diaphoretic.   Psychiatric: Cognition and memory are not impaired. She is attentive.   Vitals reviewed.      Neurological Exam:   LOC: alert   Attention Span: good  Language: No aphasia  Articulation: Mild dysarthria  Orientation: Person, Time  Visual Fields: Full  EOM (CN III, IV, VI): Full/intact  Facial Movement (CN VII): Lower facial weakness on the Right  Motor: Arm left  Normal 5/5  Leg left  Normal 5/5  Arm right  Paresis: 3/5   Leg right Paresis: 2/5 - Limited 2/2 pain  Sensation: Intact to light touch, temp  Tone: Normal tone throughout    Laboratory:  CMP:   Recent Labs   Lab 11/15/19  0353   CALCIUM 9.1   ALBUMIN 2.9*   PROT 6.9      K 4.1   CO2 19*      BUN 6*   CREATININE 0.7   ALKPHOS 81   ALT 25   AST 51*   BILITOT 1.9*     CBC:   Recent Labs   Lab 11/15/19  0353   WBC 14.09*   RBC 3.08*   HGB 8.8*   HCT 27.1*      MCV 88   MCH 28.6   MCHC 32.5     Lipid Panel:   No results for input(s): CHOL, LDLCALC, HDL, TRIG in the last 168 hours.  Coagulation:   Recent Labs   Lab 11/09/19  0445   INR 1.1     Platelet Aggregation Study: No results for input(s): PLTAGG, PLTAGINTERP, PLTAGREGLACO, ADPPLTAGGREG in the last 168 hours.  Hgb A1C:   No results for input(s): HGBA1C in the last 168 hours.  TSH:   No results for input(s): TSH in the last 168 hours.      Diagnostic  Results     Brain Imaging     MRI Brain (11/01/19):  Acute lacunar type infarct coursing through the left posterior limb internal capsule and corona radiata.      CTH (10/31/19):  No acute intracranial pathology      Vessel Imaging     CTA Multiphase (10/31/19):  CTA head: Atherosclerotic disease of the cavernous and supraclinoid ICAs with mild narrowing.  Otherwise unremarkable CTA of the head specifically without evidence for proximal significant stenosis or occlusion.  CTA neck: Less than 50% proximal ICA stenosis by NASCET criteria.  Atherosclerotic disease with mild moderate narrowing of the right vertebral artery origin.  There is mild left V1 segment narrowing.  No significant focal vertebral artery stenosis or occlusion.  Interlobular septal thickening with tree in bud micro nodularity visualized upper lobes bilaterally which may represent inflammatory/infectious process clinical correlation and correlation with dedicated CT thorax recommended.  CT head: Bandlike region hypoattenuation left posterior limb internal capsule unchanged from prior suggestive for possible recent to subacute age infarction.  No evidence for hydrocephalus or acute intracranial hemorrhage.  Clinical correlation and further evaluation with MRI if patient compatible.      Cardiac Imaging   Echo (11/01/19)  · Increased (hyperdynamic) left ventricular systolic function. The estimated ejection fraction is 75%.  · Concentric left ventricular remodeling.  · Normal LV diastolic function.  · No wall motion abnormalities.  · Normal right ventricular systolic function.  · Normal central venous pressure (3 mm Hg).  · Mild tricuspid regurgitation.  · The estimated PA systolic pressure is 26 mm Hg  · Echolucent structure next to the LA, likely representing hiatal hernia. Clinincal correlation is required.      Miscellaneous Imaging    IR Angiogram Visceral 11/10/19 pending    CTA Abdomen/Pelvis 11/10/19  Right-sided retroperitoneal hematoma with  questionable foci of active extravasation within the inferior portion of the collection as described above.    IR Angiogram Visceral 11/9/19  Angiography of the lumbar arteries demonstrates active extravasation of the right L2 lumbar artery. Embolization using beads as described above.  Plan: Transfer patient to ICU.  Continued serial H and H follow-up.    CTA Abdomen/Pelvis 11/9/19  1. Large right retroperitoneal hematoma with findings concerning for active arterial extravasation/hemorrhage as detailed above.  There is associated hemorrhage extending throughout the right abdomen and pelvis/pericolic gutter with associated mass effect on the right kidney, which is anteriorly displaced.  2. Decreased opacification of the infahepatic and infrarenal IVC, possibly secondary to underlying mass effect versus partial thrombosis.  3. Nonspecific cecal and right colonic wall thickening, possibly reactive due to hemoperitoneum.  4. Additional findings as detailed above.    MRI Thoracic/Lumbar Spine W WO Contr 11/8/19  Large hematoma within the right psoas muscle.  Compression and probable thrombosis of the IVC. Consider contrast enhanced CT with delayed phase.  Multilevel degenerative changes as detailed above, more severe in the lumbar spine.  Irregularity of the T12 through L3 endplates is most likely degenerative.

## 2019-11-15 NOTE — PROGRESS NOTES
[facility]  Vascular Neurology  Comprehensive Stroke Center  Progress Note    Assessment/Plan:     * Thrombotic stroke involving left middle cerebral artery  Melva Barker is a 74 y.o. female with PMHx of HTN, HLD, and DM who presented to OSH with acute worsening of R-sided weakness after having experienced R-sided weakness x1 month. She was treated with tPA at OSH. CTA without LVO. MRI with infarct in L internal capsule and corona radiata. Etiology likely small vessel disease.    Antithrombotics for secondary stroke prevention: Antiplatelets: ASA 81 mg + Plavix 75 mg -- HELD due to retroperitoneal hematoma.  Recently received blood transfusions. Will consider restarting if H/H remains stable.  Statins for secondary stroke prevention and hyperlipidemia, if present: Statins: Atorvastatin- 40 mg daily,   Aggressive risk factor modification: HTN, HLD, Diet  Rehab efforts: The patient has been evaluated by a stroke team provider and the therapy needs have been fully considered based off the presenting complaints and exam findings. The following therapy evaluations are needed: PT evaluate and treat, OT evaluate and treat, SLP evaluate and treat, PM&R evaluate for appropriate placement - Dispo inpatient rehab  Diagnostics ordered/pending: None   VTE prophylaxis: Mechanical SCDs only - Pharmacologic ppx acutely held due to hematoma.  BP parameters: Infarct: Post tPA, SBP <160; Avoid hypotension    Hypotension  Patient hypotensive overnight from 11/13-11/14, continued hypotension on morning of 11/14  Received multiple boluses and pressure responding  Differential includes sepsis vs pain medication     Infectious workup ordered  Elevated procal and lactate with interval development of atelectasis in R lower lung, started on 7 day course of IV cefepime and vanc for possible hospital acquired pneumonia, blood culture pending    Liquid diarrhea overnight 11/15, WBC 14, stopped empiric abx,  ordered c diff, stool culture, PO vanc 10 day course for possible c. Diff, IVFs.       Retroperitoneal hematoma  Pt had been c/o back pain in recent days, complicated by her hx of chronic back and sciatic nerve pain with use of Tizanidine and opiates at home.  Due to Hb downtrend and recent tPA administration, MRI Lumbar/Thoracic spine was ordered 11/8 which demonstrated large R psoas hematoma with IVC compression and probable thrombosis.   General Surgery was consulted; No acute intervention pursued.     Patient was then with acute decompensation overnight 11/9; became hypotensive, tachycardic, and apneic with Hb down to 6.7. Rapid Response was called; patient requiring intubation, blood transfusion, and transfer to medical ICU.   CTA abd/pelvis 11/9 showed hematoma with active extravasation/hemorrhage with IVC compression, hemoperitoneum.   Pt went to IR and is now s/p successful R L2 & L3 lumbar spinal artery embolization.   Patient underwent IR re-evaluation on 11/11. No arterial bleed evident on IR angiography.    Hb now stable, Continuing to monitor.    Nausea & vomiting  Patient with episodes of vomiting 11/2 - resolved throughout the day. No episodes of vomiting overnight.   PRN IV Zofran ordered   KUB completed and no abnormality noted  Patient continues to report nausea - some mild middle lower abdominal pain upon palpation  Lipase ordered and WNL  CT abdomen (2016) diverticulosis in the descending colon and a small hiatal hernia. Hx of diabetic gastroparesis, reglan started. Follow up with PCP.  Patient with last BM 11/6 - normal bowel sounds on exam   Sucralfate 1g started 11/5. Increased PRN Zofran dose on 11/13.  Will continue to monitor     Debility  2/2 stroke  PT/OT eval & treat - Dispo inpatient rehab    Cytotoxic cerebral edema  Area of cytotoxic cerebral edema identified when reviewing brain imaging in the territory of the L middle cerebral artery. There is no mass effect associated with it. We  will continue to monitor the patients clinical exam for any worsening of symptoms which may indicate expansion of the stroke or the area of the edema resulting in the clinical change. The pattern is suggestive of small vessel etiology.    Essential hypertension  Risk factor for stroke  Holding all BP meds due to hypotension    Back pain  Reported hx chronic back pain with associated sciatic nerve pain as well, then complicated by acute retroperitoneal hematoma.  Consulted to anesthesia pain management; Appreciate recommendations.  Pt continues to c/o R back pain on 11/13 and concern that pt's tachycardia could be pain-related.  Pain pump discontinued    Home regimen: tizanidine 4mg TID prn, gabapentin 600mg TID, Norco  mg Q6hr prn    All Oral pain medications discontinued on 11/14 except for celecoxib due to hypotension, lidocaine patch and voltaren gel ordered.   11/15 - added home tizanidine 4 mg q8hr prn, norco q6hr prn. Consider increasing gabapentin to home dose if needed and if BP allows.     Will monitor BP and slowly add back medications due to possibility of pain medication causing hypotension. Once safe, will add reduced doses of home pain medications      Gastroparesis  Currently on Reglan 5 mg TID  Previously on Reglan 10 mg TID before meals, will order if needed    Diabetes mellitus type 2 without retinopathy  Stroke risk factor, poorly controlled on glargine 17 units daily  HbA1c 8.3%  Recommend tight glucose control  Currently on SSI   Diabetic diet    Leukocytosis  Previously suspected acute reactive 2/2 active bleed. WBC now 14.  BCx 11/9 NGTD and pt remains afebrile.  11/14 - infectious work up. Empiric abx started for possible hospital acquired pneumonia.   11/15 - Liquid diarrhea started overnight, stopped empiric abx, ordered c. Diff and stool culture, start PO vanc for possible c. diff, IVFs.          11/1/19 MRI with small vessel L MCA infarct, therapy recommending rehab  11/2 - Patient  stepped down overnight. Adjusting BP regimen. Patient with continuous complaints if nausea. IV Zofran ordered with some relief. Patient has not has BM since 10/30. Ordering KUB to rule out obstruction. Neuro exam unchanged.   11/3 - NAEON. Patient reports she had no episodes of vomiting overnight. Still complaining of nausea. Mild lower abdomen tenderness on exam. Lipase ordered and WNL. Continue to monitor.   11/4 - denies nausea and vomiting. Abd tenderness remains present on palpitation. Continue to monitor. HCTZ increased yesterday. Pending rehab placement.   11/5 -  N/V x1 overnight zofran resolved, sucralfate started. Placement pending   11/6 - hypotensive, held all BP meds,  ml bolus, responded well. Increased BUN/Cr, start NS 75ml/hr.   11/7 patient complaining of sciatica pain.  Restarted home tizanidine.  Patient requesting hydrocodone but educated patient that nerve pain is not treated with opioid.  Degenerative disease seen on xray but no fracture.      11/8 Patient continues to experience back pain.  Now with leukocytosis and drop in h/h plan for MRI thoracic/lumbar spine to evaluate for epidural hematoma.    11/9: MRI Lumbar spine had demonstrated large R psoas hematoma with possible IVC compression; General Surgery consulted. Patient was then with acute decompensation overnight; became hypotensive, tachycardic, and apneic requiring intubation, blood transfusion, and medical ICU transfer. CTA abd/pelvis showed active extravasation/hemorrhage with hemoperitoneum. Pt now s/p successful lumbar artery embolization in IR. She was extubated on arrival back to ICU and tolerated well.    11/11: Patient underwent IR re-evaluation yesterday. No arterial bleed evident on IR angiography. Patient neuro exam stable.   11/12 Stepped down from medical ICU this morning.  Patient still experiencing pain.  Anesthesia consulted for pain management.  D/C pain pump and started oral medications for muscle and nerve  pain.  Will wean to home regimen. Waiting for repeat CBC, transfused one unit of blood 11/11.   Tachycardic/hypertensive will continue to monitor.  11/13: Upgraded diet per SLP recs. Pt continues to c/o R back pain, concern that tachycardia could be pain-induced; adjusted analgesic regimen further as well as antiemetic regimen with plans to eventually wean to home regimen. Replaced Phos. Dispo inpatient rehab.  11/14: patient hypotensive overnight and this morning requiring fluid boluses. Discontinued all pain medications except for celecoxib in case pain meds are contributing to hypotension. Lidocaine patch and Voltaren gel ordered as well. Infectious workup significant for minor atelectasis in R lower lung with increased procal and lactate. Patient started on IV antibiotics for community acquired pneumonia. Blood cultures pending. ICU notified of patient's hypotension  11/15: Liquid diarrhea, WBC 14, stopped empiric abx, ordered c. Diff and stool culture, start PO vanc, IVFs. Adjusted pain meds with goal to return to home regimen. Nauseous overnight, zofran given.     STROKE DOCUMENTATION   Acute Stroke Times   Last Known Normal Date: 10/31/19  Last Known Normal Time: 0630  Symptom Onset Date: 10/31/19  Symptom Onset Time: 0730  Stroke Team Called Date: 10/31/19  Stroke Team Called Time: 0936  Stroke Team Arrival Date: 10/31/19  Stroke Team Arrival Time: 0946  Decision to Treat Time for Alteplase: 1003    NIH Scale:  1a. Level of Consciousness: 0-->Alert, keenly responsive  1b. LOC Questions: 0-->Answers both questions correctly  1c. LOC Commands: 0-->Performs both tasks correctly  2. Best Gaze: 0-->Normal  3. Visual: 0-->No visual loss  4. Facial Palsy: 1-->Minor paralysis (flattened nasolabial fold, asymmetry on smiling)  5a. Motor Arm, Left: 0-->No drift, limb holds 90 (or 45) degrees for full 10 secs  5b. Motor Arm, Right: 2-->Some effort against gravity, limb cannot get to or maintain (if cued) 90 (or 45)  degrees, drifts down to bed, but has some effort against gravity  6a. Motor Leg, Left: 0-->No drift, leg holds 30 degree position for full 5 secs  6b. Motor Leg, Right: 3-->No effort against gravity, leg falls to bed immediately  7. Limb Ataxia: 0-->Absent  8. Sensory: 0-->Normal, no sensory loss  9. Best Language: 0-->No aphasia, normal  10. Dysarthria: 1-->Mild-to-moderate dysarthria, patient slurs at least some words and, at worst, can be understood with some difficulty  11. Extinction and Inattention (formerly Neglect): 0-->No abnormality  Total (NIH Stroke Scale): 7       Modified [place] Score: 0  Nabila Coma Scale:    ABCD2 Score:    [id number] Score:   HAS -BLED Score:   ICH Score:   Hunt & Amaya Classification:      Hemorrhagic change of an Ischemic Stroke: Does this patient have an ischemic stroke with hemorrhagic changes? No     Neurologic Chief Complaint: R-sided weakness -- L MCA    Subjective:     Interval History: Patient is seen for follow-up neurological assessment and treatment recommendations: Liquid diarrhea, WBC 14, stopped empiric abx, ordered c. Diff and stool culture, start PO vanc, IVFs. Adjusted pain meds with goal to return to home regimen. Nauseous overnight, zofran given.     HPI, Past Medical, Family, and Social History remains the same as documented in the initial encounter.     Review of Systems   Constitutional: Negative for chills and fever.   Respiratory: Negative for cough and shortness of breath.    Cardiovascular: Negative for chest pain.   Gastrointestinal: Positive for abdominal pain, diarrhea and nausea.   Musculoskeletal: Positive for arthralgias and back pain.   Neurological: Positive for dizziness, facial asymmetry, speech difficulty and weakness.   Psychiatric/Behavioral: Negative for agitation and confusion.     Scheduled Meds:   acetaminophen  650 mg Oral Q8H    atorvastatin  40 mg Oral QHS    diclofenac sodium  4 g Topical (Top) Daily    DULoxetine  20 mg Oral BID     gabapentin  300 mg Oral TID    lidocaine  1 patch Transdermal Q24H    metoclopramide HCl  5 mg Oral TID AC    polyethylene glycol  17 g Oral Daily    potassium chloride 10%  20 mEq Oral BID    traZODone  25 mg Oral QHS    vancomycin  125 mg Oral Q6H     Continuous Infusions:   sodium chloride 0.9%       PRN Meds:sodium chloride, dextrose 10 % in water (D10W), glucagon (human recombinant), HYDROcodone-acetaminophen, insulin aspart U-100, melatonin, sodium chloride 0.9%, tiZANidine    Objective:     Vital Signs (Most Recent):  Temp: 98.9 °F (37.2 °C) (11/15/19 0730)  Pulse: 98 (11/15/19 0730)  Resp: 16 (11/15/19 0730)  BP: (!) 163/76 (11/15/19 0730)  SpO2: 96 % (11/15/19 0730)  BP Location: Left arm    Vital Signs Range (Last 24H):  Temp:  [98 °F (36.7 °C)-99.4 °F (37.4 °C)]   Pulse:  []   Resp:  [16-20]   BP: (125-163)/(59-77)   SpO2:  [96 %-99 %]   BP Location: Left arm    Physical Exam   Constitutional: She is oriented to person, place, and time. She appears well-developed and well-nourished. No distress.   HENT:   Head: Normocephalic and atraumatic.   Eyes: EOM are normal.   Cardiovascular: Normal rate.   Pulmonary/Chest: Effort normal. No respiratory distress.   Abdominal: Bowel sounds are increased.   Mid lower abdominal pain to palpation    Musculoskeletal:   Pain in R lumbar region with tenderness to light palpation   Neurological: She is alert and oriented to person, place, and time.   Skin: Skin is warm and dry. She is not diaphoretic.   Psychiatric: Cognition and memory are not impaired. She is attentive.   Vitals reviewed.      Neurological Exam:   LOC: alert   Attention Span: good  Language: No aphasia  Articulation: Mild dysarthria  Orientation: Person, Time  Visual Fields: Full  EOM (CN III, IV, VI): Full/intact  Facial Movement (CN VII): Lower facial weakness on the Right  Motor: Arm left  Normal 5/5  Leg left  Normal 5/5  Arm right  Paresis: 3/5   Leg right Paresis: 2/5 - Limited 2/2  pain  Sensation: Intact to light touch, temp  Tone: Normal tone throughout    Laboratory:  CMP:   Recent Labs   Lab 11/15/19  0353   CALCIUM 9.1   ALBUMIN 2.9*   PROT 6.9      K 4.1   CO2 19*      BUN 6*   CREATININE 0.7   ALKPHOS 81   ALT 25   AST 51*   BILITOT 1.9*     CBC:   Recent Labs   Lab 11/15/19  0353   WBC 14.09*   RBC 3.08*   HGB 8.8*   HCT 27.1*      MCV 88   MCH 28.6   MCHC 32.5     Lipid Panel:   No results for input(s): CHOL, LDLCALC, HDL, TRIG in the last 168 hours.  Coagulation:   Recent Labs   Lab 11/09/19  0445   INR 1.1     Platelet Aggregation Study: No results for input(s): PLTAGG, PLTAGINTERP, PLTAGREGLACO, ADPPLTAGGREG in the last 168 hours.  Hgb A1C:   No results for input(s): HGBA1C in the last 168 hours.  TSH:   No results for input(s): TSH in the last 168 hours.      Diagnostic Results     Brain Imaging     MRI Brain (11/01/19):  Acute lacunar type infarct coursing through the left posterior limb internal capsule and corona radiata.      CTH (10/31/19):  No acute intracranial pathology      Vessel Imaging     CTA Multiphase (10/31/19):  CTA head: Atherosclerotic disease of the cavernous and supraclinoid ICAs with mild narrowing.  Otherwise unremarkable CTA of the head specifically without evidence for proximal significant stenosis or occlusion.  CTA neck: Less than 50% proximal ICA stenosis by NASCET criteria.  Atherosclerotic disease with mild moderate narrowing of the right vertebral artery origin.  There is mild left V1 segment narrowing.  No significant focal vertebral artery stenosis or occlusion.  Interlobular septal thickening with tree in bud micro nodularity visualized upper lobes bilaterally which may represent inflammatory/infectious process clinical correlation and correlation with dedicated CT thorax recommended.  CT head: Bandlike region hypoattenuation left posterior limb internal capsule unchanged from prior suggestive for possible recent to subacute age  infarction.  No evidence for hydrocephalus or acute intracranial hemorrhage.  Clinical correlation and further evaluation with MRI if patient compatible.      Cardiac Imaging   Echo (11/01/19)  · Increased (hyperdynamic) left ventricular systolic function. The estimated ejection fraction is 75%.  · Concentric left ventricular remodeling.  · Normal LV diastolic function.  · No wall motion abnormalities.  · Normal right ventricular systolic function.  · Normal central venous pressure (3 mm Hg).  · Mild tricuspid regurgitation.  · The estimated PA systolic pressure is 26 mm Hg  · Echolucent structure next to the LA, likely representing hiatal hernia. Clinincal correlation is required.      Miscellaneous Imaging    IR Angiogram Visceral 11/10/19 pending    CTA Abdomen/Pelvis 11/10/19  Right-sided retroperitoneal hematoma with questionable foci of active extravasation within the inferior portion of the collection as described above.    IR Angiogram Visceral 11/9/19  Angiography of the lumbar arteries demonstrates active extravasation of the right L2 lumbar artery. Embolization using beads as described above.  Plan: Transfer patient to ICU.  Continued serial H and H follow-up.    CTA Abdomen/Pelvis 11/9/19  1. Large right retroperitoneal hematoma with findings concerning for active arterial extravasation/hemorrhage as detailed above.  There is associated hemorrhage extending throughout the right abdomen and pelvis/pericolic gutter with associated mass effect on the right kidney, which is anteriorly displaced.  2. Decreased opacification of the infahepatic and infrarenal IVC, possibly secondary to underlying mass effect versus partial thrombosis.  3. Nonspecific cecal and right colonic wall thickening, possibly reactive due to hemoperitoneum.  4. Additional findings as detailed above.    MRI Thoracic/Lumbar Spine W WO Contr 11/8/19  Large hematoma within the right psoas muscle.  Compression and probable thrombosis of the  IVC. Consider contrast enhanced CT with delayed phase.  Multilevel degenerative changes as detailed above, more severe in the lumbar spine.  Irregularity of the T12 through L3 endplates is most likely degenerative.      Juan Chen NP  [facility]  Department of Vascular Neurology   [facility]

## 2019-11-15 NOTE — NURSING
Paged stroke team to notify them that patient is refusing iv fluids due the continuous beeping of the pump. Patient did not want to try position changes in order to prevent occlusion of line. Patient states she just wants to sleep. Bp, pulse, and temp were given to NP. NP stated she would rather not stop the fluids. CARMELA Vicente gave orders to hold fluids for now, but to restart fluids at 6:00am. KHOA.

## 2019-11-15 NOTE — PLAN OF CARE
Continue OT plan of care.    Problem: Occupational Therapy Goal  Goal: Occupational Therapy Goal  Description  Updated Goals to be met by: 7 days (11/20/19)     Patient will increase functional independence with ADLs by performing:    UE Dressing with Contact Guard Assistance.  LE Dressing with Minimal Assistance.  Grooming while standing with Minimal Assistance.  Toileting from bedside commode with Minimal Assistance for hygiene and clothing management.   Toilet transfer to bedside commode with Contact Guard Assistance.  Increased functional strength to WFL for ADLs.  Complete functional mobility household distance with CGA using AD as needed.     Updated Goals to be met by: 7 days (11/14/19)     Patient will increase functional independence with ADLs by performing:    UE Dressing with Contact Guard Assistance.  LE Dressing with Minimal Assistance.  Grooming while standing with Minimal Assistance.  Toileting from bedside commode with Minimal Assistance for hygiene and clothing management.   Toilet transfer to bedside commode with Contact Guard Assistance.  Increased functional strength to WFL for ADLs.  Complete functional mobility household distance with CGA using AD as needed.    Outcome: Ongoing, Progressing

## 2019-11-15 NOTE — ASSESSMENT & PLAN NOTE
Patient hypotensive overnight from 11/13-11/14, continued hypotension on morning of 11/14  Received multiple boluses and pressure responding  Differential includes sepsis vs pain medication     Infectious workup ordered  Elevated procal and lactate with interval development of atelectasis in R lower lung, started on 7 day course of IV cefepime and vanc for possible hospital acquired pneumonia, blood culture pending    Liquid diarrhea overnight 11/15, WBC 14, stopped empiric abx, ordered c diff, stool culture, PO vanc 10 day course for possible c. Diff, IVFs.

## 2019-11-15 NOTE — PLAN OF CARE
11/15/19 0912   Post-Acute Status   Post-Acute Authorization Placement   Post-Acute Placement Status Referrals Sent       Pt changed her choice from [facility] to [facility]. Referral made through [facility].  SW in contact with CM and Medical staff. Will continue to follow and offer support as needed.     Demario Ledesma, CHANTE  [facility]   Ext. [phone]

## 2019-11-15 NOTE — ASSESSMENT & PLAN NOTE
Pt had been c/o back pain in recent days, complicated by her hx of chronic back and sciatic nerve pain with use of Tizanidine and opiates at home.  Due to Hb downtrend and recent tPA administration, MRI Lumbar/Thoracic spine was ordered 11/8 which demonstrated large R psoas hematoma with IVC compression and probable thrombosis.   General Surgery was consulted; No acute intervention pursued.     Patient was then with acute decompensation overnight 11/9; became hypotensive, tachycardic, and apneic with Hb down to 6.7. Rapid Response was called; patient requiring intubation, blood transfusion, and transfer to medical ICU.   CTA abd/pelvis 11/9 showed hematoma with active extravasation/hemorrhage with IVC compression, hemoperitoneum.   Pt went to IR and is now s/p successful R L2 & L3 lumbar spinal artery embolization.   Patient underwent IR re-evaluation on 11/11. No arterial bleed evident on IR angiography.    Hb now stable, Continuing to monitor.

## 2019-11-15 NOTE — PT/OT/SLP PROGRESS
Occupational Therapy   Treatment    Name: Melva Barker  MRN: [id number]  Admitting Diagnosis:  Thrombotic stroke involving left middle cerebral artery       Recommendations:     Discharge Recommendations: nursing facility, skilled  Discharge Equipment Recommendations:  (TBD)  Barriers to discharge:  Decreased caregiver support    Assessment:     Melva Barker is a 74 y.o. female with a medical diagnosis of Thrombotic stroke involving left middle cerebral artery.  She presents with performance deficits including weakness, impaired endurance, impaired functional mobilty, impaired self care skills, impaired balance, gait instability, decreased upper extremity function, decreased lower extremity function, pain, decreased safety awareness, decreased ROM. Pt is limited by pain and able to tolerate minimal OOB activity-- reports her back pain does not decrease with repositioning. Pt would continue to benefit from OT to increase functional independence and safety. Recommend SNF upon D/C due to decreased activity tolerance and pt is unsafe to return home at current functional level.    Rehab Prognosis:  Fair; patient would benefit from acute skilled OT services to address these deficits and reach maximum level of function.       Plan:     Patient to be seen 3 x/week to address the above listed problems via self-care/home management, therapeutic activities, therapeutic exercises, neuromuscular re-education  · Plan of Care Expires: 12/13/19  · Plan of Care Reviewed with: patient    Subjective     Pain/Comfort:  · Pain Rating 1: 9/10  · Location - Side 1: Right  · Location - Orientation 1: lower  · Location 1: back  · Pain Addressed 1: Pre-medicate for activity, Reposition, Cessation of Activity(RN present)  · Pain Rating Post-Intervention 1: (remained throughout)    Objective:     Communicated with: RN prior to session. Patient found supine with telemetry, central line, peripheral IV, [employer] camera upon OT entry to  room, RN present.    General Precautions: Standard, fall, aspiration   Orthopedic Precautions:N/A   Braces: N/A     Occupational Performance:     Bed Mobility:    · Patient completed Scooting with contact guard assistance in supine to HOB  · Patient completed rolling L with contact guard assistance  · Patient completed Supine to Sit with minimum assistance with increased time due to pain  · Patient completed Sit to Supine with minimum assistance     Functional Mobility/Transfers:  · Patient completed Sit <> Stand Transfer with moderate assistance with hand-held assist from EOB and BSC  · Functional Mobility: Few steps stand pivot to BSC and back to EOB with moderate assistance; declined to attempt further mobility    Activities of Daily Living:  · Toileting: total assistance; 1 person assist to maintain static standing balance, 2nd person assist for standing hygiene following BM on BSC        AMPAC 6 Click ADL: 17    Treatment & Education:  Pt requires max encouragement to participate in therapy due to right lower back pain; able to sit EOB with SBA-- assisted to BSC and for standing hygiene and returned to EOB; pt declined attempting further ADLs or standing trials due to back pain and returned self to supine; discussed importance of OOB activity and pt will require ongoing education and encouragement    Patient left left sidelying with all lines intact, call button in reach and RN presentEducation:      GOALS:   Multidisciplinary Problems     Occupational Therapy Goals        Problem: Occupational Therapy Goal    Goal Priority Disciplines Outcome Interventions   Occupational Therapy Goal     OT, PT/OT Ongoing, Progressing    Description:  Updated Goals to be met by: 7 days (11/20/19)     Patient will increase functional independence with ADLs by performing:    UE Dressing with Contact Guard Assistance.  LE Dressing with Minimal Assistance.  Grooming while standing with Minimal Assistance.  Toileting from bedside  commode with Minimal Assistance for hygiene and clothing management.   Toilet transfer to bedside commode with Contact Guard Assistance.  Increased functional strength to WFL for ADLs.  Complete functional mobility household distance with CGA using AD as needed.     Updated Goals to be met by: 7 days (11/14/19)     Patient will increase functional independence with ADLs by performing:    UE Dressing with Contact Guard Assistance.  LE Dressing with Minimal Assistance.  Grooming while standing with Minimal Assistance.  Toileting from bedside commode with Minimal Assistance for hygiene and clothing management.   Toilet transfer to bedside commode with Contact Guard Assistance.  Increased functional strength to WFL for ADLs.  Complete functional mobility household distance with CGA using AD as needed.                     Time Tracking:     OT Date of Treatment: 11/15/19  OT Start Time: 0858  OT Stop Time: 0912  OT Total Time (min): 14 min    Billable Minutes:Self Care/Home Management 14 minutes    AURORA Florian  11/15/2019

## 2019-11-15 NOTE — PLAN OF CARE
Problem: SLP Goal  Goal: SLP Goal  Description  Speech Language Pathology Goals  Goals expected to be met by 11/20  1. Pt will tolerate a mechanical soft diet/thin liquids with no s/s of aspiration.   2. Pt will participate in conversation without cues to express thought.   3. Pt will complete simple functional reasoning tasks with 80% accy given min cues.   4. Pt will name name 13 items in a category in 1 minute with mod assist.  5. Pt will follow multi-step commands with 75% accuracy and min assist.  6. Pt will complete assessment of reading, writing, visual spatial skills to determine need for tx.    Outcome: Ongoing, Progressing     Patient seen for ongoing cognitive-linguistic therapy.     Venu Marquez CCC-SLP  Speech-Language Pathology  Pager: [phone]

## 2019-11-15 NOTE — PT/OT/SLP PROGRESS
"Speech Language Pathology Treatment    Patient Name:  Melva Barker   MRN:  [id number]  Admitting Diagnosis: Thrombotic stroke involving left middle cerebral artery    Recommendations:                 General Recommendations:  Cognitive-linguistic therapy and monitor diet tolerance  Diet recommendations:  Mechanical soft, Liquid Diet Level: Thin   Aspiration Precautions: 1 bite/sip at a time, Check for pocketing/oral residue, Feed only when awake/alert, Meds whole 1 at a time, Monitor for s/s of aspiration, Small bites/sips and Standard aspiration precautions   General Precautions: Standard, fall  Communication strategies:  none    Subjective     Patient awake and alert during session  Patient with increased confusion during this session  "They're not giving me the medication I need."  Communicated with nurse prior to session    Pain/Comfort:  Pain Rating 1: (pt did not rate)  Location - Side 1: Right  Location - Orientation 1: lower  Location 1: back  Pain Addressed 1: Reposition, Distraction, Nurse notified, Cessation of Activity  Pain Rating Post-Intervention 1: (pt did not rate)    Objective:     Has the patient been evaluated by SLP for swallowing?   Yes  Keep patient NPO? No   Current Respiratory Status: room air      Patient seen for ongoing cognitive-linguistic therapy. She was side lying to the left 2' to back pain. Upon first attempt by SLP, patient reported she needed the bathroom and session was immediately ended. Upon SLP entry this session, patient stated that she needed the bedside commode despite having rectal tube in place. Patient demonstrated good orientation and was knowledgable regarding plans for therapy following discharge, but she demonstrated confabulation regarding certain doctors telling her she would be getting certain pain meds. SLP attempted to complete cognitive exercises with patient, but she perseverated on receiving pain meds and would not attend to tasks. She continued to " decline PO trials 2' to nausea. SLP educated her regarding POC from a ST perspective and she verbalized understanding. Patient left in room with call light within reach.    Assessment:     Melva Barker is a 74 y.o. female with an SLP diagnosis of Cognitive-Linguistic Impairment.      Goals:   Multidisciplinary Problems     SLP Goals        Problem: SLP Goal    Goal Priority Disciplines Outcome   SLP Goal     SLP Ongoing, Progressing   Description:  Speech Language Pathology Goals  Goals expected to be met by 11/20  1. Pt will tolerate a mechanical soft diet/thin liquids with no s/s of aspiration.   2. Pt will participate in conversation without cues to express thought.   3. Pt will complete simple functional reasoning tasks with 80% accy given min cues.   4. Pt will name name 13 items in a category in 1 minute with mod assist.  5. Pt will follow multi-step commands with 75% accuracy and min assist.  6. Pt will complete assessment of reading, writing, visual spatial skills to determine need for tx.                     Plan:     · Patient to be seen:  4 x/week   · Plan of Care expires:  12/13/19  · Plan of Care reviewed with:  patient   · SLP Follow-Up:  Yes       Discharge recommendations:  nursing facility, skilled   Barriers to Discharge:  None    Time Tracking:     SLP Treatment Date:   11/15/19  Speech Start Time:  1411  Speech Stop Time:  1424     Speech Total Time (min):  13 min    Billable Minutes: Speech Therapy Individual 13    Venu Marquez CCC-SLP  Speech-Language Pathology  Pager: [phone]   11/15/2019

## 2019-11-15 NOTE — PT/OT/SLP PROGRESS
Physical Therapy      Patient Name:  Melva Barker   MRN:  [id number]    Patient not seen today secondary to Patient unwilling to participate, Pain, Patient fatigue. Patient reported 9/10 R low back pain. Patient pre-medicated for pain per RN, had worked with OT earlier in AM. Patient educated on importance of mobility and consequences of immobility, continued to refuse. Will follow-up as medically appropriate.    Hortencia Umaña, PT

## 2019-11-15 NOTE — PLAN OF CARE
POC reviewed with pt and family at 1700. Pt verbalized understanding. NS at 100 ml/hr. Questions and concerns addressed. No acute events today. Pt progressing toward goals. Will continue to monitor. See flowsheets for full assessment and VS info.

## 2019-11-15 NOTE — ASSESSMENT & PLAN NOTE
Previously suspected acute reactive 2/2 active bleed. WBC now 14.  BCx 11/9 NGTD and pt remains afebrile.  11/14 - infectious work up. Empiric abx started for possible hospital acquired pneumonia.   11/15 - Liquid diarrhea started overnight, stopped empiric abx, ordered c. Diff and stool culture, start PO vanc for possible c. diff, IVFs.

## 2019-11-15 NOTE — NURSING
Phoned stroke team about patient's complaint of nausea and feeling sick to her stomach, asking if they could prescribe her something for her sickness. CARMELA Vicente said that she would check patient's chart and get back with me. KHOA.

## 2019-11-15 NOTE — ASSESSMENT & PLAN NOTE
Reported hx chronic back pain with associated sciatic nerve pain as well, then complicated by acute retroperitoneal hematoma.  Consulted to anesthesia pain management; Appreciate recommendations.  Pt continues to c/o R back pain on 11/13 and concern that pt's tachycardia could be pain-related.  Pain pump discontinued    Home regimen: tizanidine 4mg TID prn, gabapentin 600mg TID, Norco  mg Q6hr prn    All Oral pain medications discontinued on 11/14 except for celecoxib due to hypotension, lidocaine patch and voltaren gel ordered.   11/15 - added home tizanidine 4 mg q8hr prn, norco q6hr prn. Consider increasing gabapentin to home dose if needed and if BP allows.     Will monitor BP and slowly add back medications due to possibility of pain medication causing hypotension. Once safe, will add reduced doses of home pain medications

## 2019-11-15 NOTE — PLAN OF CARE
Patient now would like to choose a rehab closer to her home. Patient chooses [facility]. Referral submitted.      11/15/19 0910   Discharge Reassessment   Assessment Type Discharge Planning Reassessment   Discharge Plan A Rehab   Discharge Plan B Skilled Nursing Facility   Anticipated Discharge Disposition Rehab   Post-Acute Status   Post-Acute Authorization Placement   Post-Acute Placement Status Referrals Sent

## 2019-11-15 NOTE — ASSESSMENT & PLAN NOTE
Melva Barker is a 74 y.o. female with PMHx of HTN, HLD, and DM who presented to OSH with acute worsening of R-sided weakness after having experienced R-sided weakness x1 month. She was treated with tPA at OSH. CTA without LVO. MRI with infarct in L internal capsule and corona radiata. Etiology likely small vessel disease.    Antithrombotics for secondary stroke prevention: Antiplatelets: ASA 81 mg + Plavix 75 mg -- HELD due to retroperitoneal hematoma.  Recently received blood transfusions. Will consider restarting if H/H remains stable.  Statins for secondary stroke prevention and hyperlipidemia, if present: Statins: Atorvastatin- 40 mg daily,   Aggressive risk factor modification: HTN, HLD, Diet  Rehab efforts: The patient has been evaluated by a stroke team provider and the therapy needs have been fully considered based off the presenting complaints and exam findings. The following therapy evaluations are needed: PT evaluate and treat, OT evaluate and treat, SLP evaluate and treat, PM&R evaluate for appropriate placement - Dispo inpatient rehab  Diagnostics ordered/pending: None   VTE prophylaxis: Mechanical SCDs only - Pharmacologic ppx acutely held due to hematoma.  BP parameters: Infarct: Post tPA, SBP <160; Avoid hypotension

## 2019-11-15 NOTE — PLAN OF CARE
Patient is AAO x4. POC reviewed with patient. Patient verbalized understanding. Patient's breathing is unlabored with equal chest expansion. Patient has right sided deficits. Patient denies any numbness and tingling. Patient ambulates to the bedside commode with assistance. Patient complained of back pain and nausea;PRN medication given. Bed in lowest position,bed alarm on, side rails up x3, no complaints or signs of distress. WCTM.

## 2019-11-15 NOTE — PLAN OF CARE
POC reviewed with pt and family at 1700. NS at 75 ml/hr. Flexi in place. Pt on special contact precautions. Pt verbalized understanding. Questions and concerns addressed. No acute events today. Pt progressing toward goals. Will continue to monitor. See flowsheets for full assessment and VS info.

## 2019-11-15 NOTE — CARE UPDATE
Notified by the patient's nurse of patient's c/o nausea despite being on scheduled reglan.  The nurse states the patient is also c/o abdominal pain.  On my arrival to the patient's room the patient is awake.  She states she is very nauseated and is having lower abdominal pain.  She is also c/o inability to sleep.  The patient endorses having a bowel movement very recently that was normal for her.   Bowel sounds hyperactive.  Mild suprapubic tenderness.  Patient recently had a UA + for UTI, culture pending.  She also had an elevated lactate and was started on cefepime and vancomycin.  Will order a one time dose of Zofran.  Will also order Melatonin for sleep.      Cinthia Mckeon DNP, NP  Vascular Neurology

## 2019-11-16 PROBLEM — R00.0 TACHYCARDIA: Status: ACTIVE | Noted: 2019-11-16

## 2019-11-16 LAB
ABO + RH BLD: NORMAL
ALBUMIN SERPL BCP-MCNC: 3 G/DL (ref 3.5–5.2)
ALP SERPL-CCNC: 85 U/L (ref 55–135)
ALT SERPL W/O P-5'-P-CCNC: 28 U/L (ref 10–44)
ANION GAP SERPL CALC-SCNC: 12 MMOL/L (ref 8–16)
AST SERPL-CCNC: 51 U/L (ref 10–40)
BACTERIA UR CULT: ABNORMAL
BASOPHILS # BLD AUTO: 0.05 K/UL (ref 0–0.2)
BASOPHILS NFR BLD: 0.4 % (ref 0–1.9)
BILIRUB SERPL-MCNC: 2.1 MG/DL (ref 0.1–1)
BLD GP AB SCN CELLS X3 SERPL QL: NORMAL
BUN SERPL-MCNC: 4 MG/DL (ref 8–23)
C DIFF GDH STL QL: POSITIVE
C DIFF TOX A+B STL QL IA: NEGATIVE
C DIFF TOX GENS STL QL NAA+PROBE: NEGATIVE
CALCIUM SERPL-MCNC: 9.1 MG/DL (ref 8.7–10.5)
CHLORIDE SERPL-SCNC: 105 MMOL/L (ref 95–110)
CO2 SERPL-SCNC: 19 MMOL/L (ref 23–29)
CREAT SERPL-MCNC: 0.6 MG/DL (ref 0.5–1.4)
DIFFERENTIAL METHOD: ABNORMAL
EOSINOPHIL # BLD AUTO: 0.4 K/UL (ref 0–0.5)
EOSINOPHIL NFR BLD: 3.2 % (ref 0–8)
ERYTHROCYTE [DISTWIDTH] IN BLOOD BY AUTOMATED COUNT: 15.6 % (ref 11.5–14.5)
EST. GFR  (AFRICAN AMERICAN): >60 ML/MIN/1.73 M^2
EST. GFR  (NON AFRICAN AMERICAN): >60 ML/MIN/1.73 M^2
GLUCOSE SERPL-MCNC: 102 MG/DL (ref 70–110)
HCT VFR BLD AUTO: 28.8 % (ref 37–48.5)
HGB BLD-MCNC: 9.3 G/DL (ref 12–16)
IMM GRANULOCYTES # BLD AUTO: 0.1 K/UL (ref 0–0.04)
IMM GRANULOCYTES NFR BLD AUTO: 0.7 % (ref 0–0.5)
LYMPHOCYTES # BLD AUTO: 2.1 K/UL (ref 1–4.8)
LYMPHOCYTES NFR BLD: 15.1 % (ref 18–48)
MAGNESIUM SERPL-MCNC: 1.8 MG/DL (ref 1.6–2.6)
MCH RBC QN AUTO: 28.2 PG (ref 27–31)
MCHC RBC AUTO-ENTMCNC: 32.3 G/DL (ref 32–36)
MCV RBC AUTO: 87 FL (ref 82–98)
MONOCYTES # BLD AUTO: 1 K/UL (ref 0.3–1)
MONOCYTES NFR BLD: 7.4 % (ref 4–15)
NEUTROPHILS # BLD AUTO: 10 K/UL (ref 1.8–7.7)
NEUTROPHILS NFR BLD: 73.2 % (ref 38–73)
NRBC BLD-RTO: 0 /100 WBC
PHOSPHATE SERPL-MCNC: 2.7 MG/DL (ref 2.7–4.5)
PLATELET # BLD AUTO: 232 K/UL (ref 150–350)
PMV BLD AUTO: 11.3 FL (ref 9.2–12.9)
POCT GLUCOSE: 103 MG/DL (ref 70–110)
POCT GLUCOSE: 106 MG/DL (ref 70–110)
POCT GLUCOSE: 127 MG/DL (ref 70–110)
POCT GLUCOSE: 143 MG/DL (ref 70–110)
POTASSIUM SERPL-SCNC: 3.8 MMOL/L (ref 3.5–5.1)
PROT SERPL-MCNC: 7.4 G/DL (ref 6–8.4)
RBC # BLD AUTO: 3.3 M/UL (ref 4–5.4)
SODIUM SERPL-SCNC: 136 MMOL/L (ref 136–145)
WBC # BLD AUTO: 13.61 K/UL (ref 3.9–12.7)

## 2019-11-16 PROCEDURE — 85025 COMPLETE CBC W/AUTO DIFF WBC: CPT

## 2019-11-16 PROCEDURE — 25000003 PHARM REV CODE 250: Performed by: PHYSICIAN ASSISTANT

## 2019-11-16 PROCEDURE — 80053 COMPREHEN METABOLIC PANEL: CPT

## 2019-11-16 PROCEDURE — 63600175 PHARM REV CODE 636 W HCPCS: Performed by: FAMILY MEDICINE

## 2019-11-16 PROCEDURE — 63600175 PHARM REV CODE 636 W HCPCS: Performed by: PSYCHIATRY & NEUROLOGY

## 2019-11-16 PROCEDURE — 84100 ASSAY OF PHOSPHORUS: CPT

## 2019-11-16 PROCEDURE — 86901 BLOOD TYPING SEROLOGIC RH(D): CPT

## 2019-11-16 PROCEDURE — 99233 PR SUBSEQUENT HOSPITAL CARE,LEVL III: ICD-10-PCS | Mod: ,,, | Performed by: PSYCHIATRY & NEUROLOGY

## 2019-11-16 PROCEDURE — 36415 COLL VENOUS BLD VENIPUNCTURE: CPT

## 2019-11-16 PROCEDURE — 99233 SBSQ HOSP IP/OBS HIGH 50: CPT | Mod: ,,, | Performed by: PSYCHIATRY & NEUROLOGY

## 2019-11-16 PROCEDURE — 25000003 PHARM REV CODE 250: Performed by: PSYCHIATRY & NEUROLOGY

## 2019-11-16 PROCEDURE — 20600001 HC STEP DOWN PRIVATE ROOM

## 2019-11-16 PROCEDURE — 83735 ASSAY OF MAGNESIUM: CPT

## 2019-11-16 PROCEDURE — 25000003 PHARM REV CODE 250: Performed by: FAMILY MEDICINE

## 2019-11-16 RX ORDER — ONDANSETRON 2 MG/ML
4 INJECTION INTRAMUSCULAR; INTRAVENOUS EVERY 6 HOURS PRN
Status: DISCONTINUED | OUTPATIENT
Start: 2019-11-16 | End: 2019-12-03 | Stop reason: HOSPADM

## 2019-11-16 RX ORDER — BISOPROLOL FUMARATE 5 MG/1
10 TABLET, FILM COATED ORAL DAILY
Status: DISCONTINUED | OUTPATIENT
Start: 2019-11-16 | End: 2019-11-16

## 2019-11-16 RX ADMIN — SODIUM CHLORIDE: 0.9 INJECTION, SOLUTION INTRAVENOUS at 03:11

## 2019-11-16 RX ADMIN — SODIUM CHLORIDE 250 ML: 0.9 INJECTION, SOLUTION INTRAVENOUS at 12:11

## 2019-11-16 RX ADMIN — Medication 125 MG: at 06:11

## 2019-11-16 RX ADMIN — HYDROCODONE BITARTRATE AND ACETAMINOPHEN 1 TABLET: 10; 325 TABLET ORAL at 02:11

## 2019-11-16 RX ADMIN — HYDROCODONE BITARTRATE AND ACETAMINOPHEN 1 TABLET: 10; 325 TABLET ORAL at 10:11

## 2019-11-16 RX ADMIN — Medication 125 MG: at 12:11

## 2019-11-16 RX ADMIN — ONDANSETRON 4 MG: 2 INJECTION INTRAMUSCULAR; INTRAVENOUS at 05:11

## 2019-11-16 RX ADMIN — TRAZODONE HYDROCHLORIDE 25 MG: 50 TABLET ORAL at 09:11

## 2019-11-16 RX ADMIN — GABAPENTIN 300 MG: 300 CAPSULE ORAL at 02:11

## 2019-11-16 RX ADMIN — POTASSIUM CHLORIDE 20 MEQ: 20 SOLUTION ORAL at 09:11

## 2019-11-16 RX ADMIN — ACETAMINOPHEN 650 MG: 325 TABLET ORAL at 09:11

## 2019-11-16 RX ADMIN — Medication 125 MG: at 05:11

## 2019-11-16 RX ADMIN — METOCLOPRAMIDE HYDROCHLORIDE 5 MG: 5 TABLET ORAL at 05:11

## 2019-11-16 RX ADMIN — GABAPENTIN 300 MG: 300 CAPSULE ORAL at 08:11

## 2019-11-16 RX ADMIN — TIZANIDINE 4 MG: 4 TABLET ORAL at 06:11

## 2019-11-16 RX ADMIN — DICLOFENAC 4 G: 10 GEL TOPICAL at 08:11

## 2019-11-16 RX ADMIN — POTASSIUM CHLORIDE 20 MEQ: 20 SOLUTION ORAL at 08:11

## 2019-11-16 RX ADMIN — METOCLOPRAMIDE HYDROCHLORIDE 5 MG: 5 TABLET ORAL at 06:11

## 2019-11-16 RX ADMIN — BISOPROLOL FUMARATE 10 MG: 5 TABLET ORAL at 10:11

## 2019-11-16 RX ADMIN — GABAPENTIN 300 MG: 300 CAPSULE ORAL at 09:11

## 2019-11-16 RX ADMIN — HYDROCODONE BITARTRATE AND ACETAMINOPHEN 1 TABLET: 10; 325 TABLET ORAL at 08:11

## 2019-11-16 RX ADMIN — ATORVASTATIN CALCIUM 40 MG: 20 TABLET, FILM COATED ORAL at 09:11

## 2019-11-16 RX ADMIN — DULOXETINE HYDROCHLORIDE 20 MG: 20 CAPSULE, DELAYED RELEASE ORAL at 09:11

## 2019-11-16 RX ADMIN — LIDOCAINE 1 PATCH: 50 PATCH CUTANEOUS at 10:11

## 2019-11-16 RX ADMIN — METOCLOPRAMIDE HYDROCHLORIDE 5 MG: 5 TABLET ORAL at 10:11

## 2019-11-16 RX ADMIN — DULOXETINE HYDROCHLORIDE 20 MG: 20 CAPSULE, DELAYED RELEASE ORAL at 08:11

## 2019-11-16 NOTE — SUBJECTIVE & OBJECTIVE
Neurologic Chief Complaint: R-sided weakness -- L MCA    Subjective:     Interval History: Patient is seen for follow-up neurological assessment and treatment recommendations:    CT abdomen and pelvis to f/u retroperitoneal hematoma prior to starting DAPT, H/H stable. T max 99.4, tachy, WBC 13, C. Diff and stool cultures pending, increased  ml/hr, labile BP, continue to monitor. Remove central line.     HPI, Past Medical, Family, and Social History remains the same as documented in the initial encounter.     Review of Systems   Constitutional: Negative for chills and fever.   Respiratory: Negative for cough and shortness of breath.    Cardiovascular: Negative for chest pain.   Gastrointestinal: Positive for abdominal pain (to palpation) and diarrhea. Negative for nausea.   Musculoskeletal: Positive for arthralgias and back pain.   Neurological: Positive for facial asymmetry, speech difficulty and weakness. Negative for dizziness.   Psychiatric/Behavioral: Negative for agitation and confusion.     Scheduled Meds:   acetaminophen  650 mg Oral Q8H    atorvastatin  40 mg Oral QHS    diclofenac sodium  4 g Topical (Top) Daily    DULoxetine  20 mg Oral BID    gabapentin  300 mg Oral TID    lidocaine  1 patch Transdermal Q24H    metoclopramide HCl  5 mg Oral TID AC    polyethylene glycol  17 g Oral Daily    potassium chloride 10%  20 mEq Oral BID    traZODone  25 mg Oral QHS    vancomycin  125 mg Oral Q6H     Continuous Infusions:   sodium chloride 0.9% 100 mL/hr at 11/16/19 0904     PRN Meds:sodium chloride, dextrose 10 % in water (D10W), glucagon (human recombinant), HYDROcodone-acetaminophen, insulin aspart U-100, melatonin, sodium chloride 0.9%, tiZANidine    Objective:     Vital Signs (Most Recent):  Temp: 96.9 °F (36.1 °C) (11/16/19 1327)  Pulse: 81 (11/16/19 1327)  Resp: 18 (11/16/19 1327)  BP: (!) 188/86 (11/16/19 1327)  SpO2: 98 % (11/16/19 1327)  BP Location: Right arm    Vital Signs Range  (Last 24H):  Temp:  [96.9 °F (36.1 °C)-99.4 °F (37.4 °C)]   Pulse:  []   Resp:  [18-20]   BP: ()/(55-92)   SpO2:  [97 %-100 %]   BP Location: Right arm    Physical Exam   Constitutional: She is oriented to person, place, and time. She appears well-developed and well-nourished. No distress.   HENT:   Head: Normocephalic and atraumatic.   Eyes: EOM are normal.   Cardiovascular: Normal rate.   Pulmonary/Chest: Effort normal. No respiratory distress.   Abdominal: Bowel sounds are increased.   Mid lower abdominal pain to palpation    Musculoskeletal:   Pain in R lumbar region with tenderness to light palpation   Neurological: She is alert and oriented to person, place, and time.   Skin: Skin is warm and dry. She is not diaphoretic.   Psychiatric: Cognition and memory are not impaired. She is attentive.   Vitals reviewed.      Neurological Exam:   LOC: alert   Attention Span: good  Language: No aphasia  Articulation: Mild dysarthria  Orientation: Person, Time, Place  Visual Fields: Full  EOM (CN III, IV, VI): Full/intact  Facial Movement (CN VII): Lower facial weakness on the Right  Motor: Arm left  Normal 5/5  Leg left  Normal 5/5  Arm right  Paresis: 4/5   Leg right Paresis: 1/5  Sensation: Intact to light touch, temp  Tone: Normal tone throughout    Laboratory:  CMP:   Recent Labs   Lab 11/16/19  0352   CALCIUM 9.1   ALBUMIN 3.0*   PROT 7.4      K 3.8   CO2 19*      BUN 4*   CREATININE 0.6   ALKPHOS 85   ALT 28   AST 51*   BILITOT 2.1*     CBC:   Recent Labs   Lab 11/16/19  0352   WBC 13.61*   RBC 3.30*   HGB 9.3*   HCT 28.8*      MCV 87   MCH 28.2   MCHC 32.3     Lipid Panel:   No results for input(s): CHOL, LDLCALC, HDL, TRIG in the last 168 hours.  Coagulation:   No results for input(s): PT, INR, APTT in the last 168 hours.  Platelet Aggregation Study: No results for input(s): PLTAGG, PLTAGINTERP, PLTAGREGLACO, ADPPLTAGGREG in the last 168 hours.  Hgb A1C:   No results for input(s):  HGBA1C in the last 168 hours.  TSH:   No results for input(s): TSH in the last 168 hours.      Diagnostic Results     Brain Imaging     MRI Brain (11/01/19):  Acute lacunar type infarct coursing through the left posterior limb internal capsule and corona radiata.      CTH (10/31/19):  No acute intracranial pathology      Vessel Imaging     CTA Multiphase (10/31/19):  CTA head: Atherosclerotic disease of the cavernous and supraclinoid ICAs with mild narrowing.  Otherwise unremarkable CTA of the head specifically without evidence for proximal significant stenosis or occlusion.  CTA neck: Less than 50% proximal ICA stenosis by NASCET criteria.  Atherosclerotic disease with mild moderate narrowing of the right vertebral artery origin.  There is mild left V1 segment narrowing.  No significant focal vertebral artery stenosis or occlusion.  Interlobular septal thickening with tree in bud micro nodularity visualized upper lobes bilaterally which may represent inflammatory/infectious process clinical correlation and correlation with dedicated CT thorax recommended.  CT head: Bandlike region hypoattenuation left posterior limb internal capsule unchanged from prior suggestive for possible recent to subacute age infarction.  No evidence for hydrocephalus or acute intracranial hemorrhage.  Clinical correlation and further evaluation with MRI if patient compatible.      Cardiac Imaging   Echo (11/01/19)  · Increased (hyperdynamic) left ventricular systolic function. The estimated ejection fraction is 75%.  · Concentric left ventricular remodeling.  · Normal LV diastolic function.  · No wall motion abnormalities.  · Normal right ventricular systolic function.  · Normal central venous pressure (3 mm Hg).  · Mild tricuspid regurgitation.  · The estimated PA systolic pressure is 26 mm Hg  · Echolucent structure next to the LA, likely representing hiatal hernia. Clinincal correlation is required.      Miscellaneous Imaging    IR  Angiogram Visceral 11/10/19 pending    CTA Abdomen/Pelvis 11/10/19  Right-sided retroperitoneal hematoma with questionable foci of active extravasation within the inferior portion of the collection as described above.    IR Angiogram Visceral 11/9/19  Angiography of the lumbar arteries demonstrates active extravasation of the right L2 lumbar artery. Embolization using beads as described above.  Plan: Transfer patient to ICU.  Continued serial H and H follow-up.    CTA Abdomen/Pelvis 11/9/19  1. Large right retroperitoneal hematoma with findings concerning for active arterial extravasation/hemorrhage as detailed above.  There is associated hemorrhage extending throughout the right abdomen and pelvis/pericolic gutter with associated mass effect on the right kidney, which is anteriorly displaced.  2. Decreased opacification of the infahepatic and infrarenal IVC, possibly secondary to underlying mass effect versus partial thrombosis.  3. Nonspecific cecal and right colonic wall thickening, possibly reactive due to hemoperitoneum.  4. Additional findings as detailed above.    MRI Thoracic/Lumbar Spine W WO Contr 11/8/19  Large hematoma within the right psoas muscle.  Compression and probable thrombosis of the IVC. Consider contrast enhanced CT with delayed phase.  Multilevel degenerative changes as detailed above, more severe in the lumbar spine.  Irregularity of the T12 through L3 endplates is most likely degenerative.

## 2019-11-16 NOTE — ASSESSMENT & PLAN NOTE
Melva Barker is a 74 y.o. female with PMHx of HTN, HLD, and DM who presented to OSH with acute worsening of R-sided weakness after having experienced R-sided weakness x1 month. She was treated with tPA at OSH. CTA without LVO. MRI with infarct in L internal capsule and corona radiata. Etiology likely small vessel disease.    CT abdomen/pelvis to evaluate retroperitoneal hematoma prior to starting DAPT. H/H stable.     Antithrombotics for secondary stroke prevention: Antiplatelets: ASA 81 mg + Plavix 75 mg -- HELD due to retroperitoneal hematoma.  Recently received blood transfusions. Will consider restarting if H/H and CT abd/pelvis remains stable.  Statins for secondary stroke prevention and hyperlipidemia, if present: Statins: Atorvastatin- 40 mg daily,   Aggressive risk factor modification: HTN, HLD, Diet  Rehab efforts: The patient has been evaluated by a stroke team provider and the therapy needs have been fully considered based off the presenting complaints and exam findings. The following therapy evaluations are needed: PT evaluate and treat, OT evaluate and treat, SLP evaluate and treat, PM&R evaluate for appropriate placement - Dispo inpatient rehab  Diagnostics ordered/pending: None   VTE prophylaxis: Mechanical SCDs only - Pharmacologic ppx acutely held due to hematoma.  BP parameters: Infarct: Post tPA, SBP <160; Avoid hypotension

## 2019-11-16 NOTE — ASSESSMENT & PLAN NOTE
Reported hx chronic back pain with associated sciatic nerve pain as well, then complicated by acute retroperitoneal hematoma.  Consulted to anesthesia pain management; Appreciate recommendations.  Pt continues to c/o R back pain on 11/13 and concern that pt's tachycardia could be pain-related.  Pain pump discontinued    Home regimen: tizanidine 4mg TID prn, gabapentin 600mg TID, Norco  mg Q6hr prn    All Oral pain medications discontinued on 11/14 except for celecoxib due to hypotension, lidocaine patch and voltaren gel ordered.   11/15 - added home tizanidine 4 mg q8hr prn, norco q6hr prn. Consider increasing gabapentin to home dose if needed and if BP allows.     Will monitor BP and slowly add back medications due to possibility of pain medication causing hypotension. Once safe, will add reduced doses of home pain medications. Pain currently improved.

## 2019-11-16 NOTE — PROGRESS NOTES
[facility]  Vascular Neurology  Comprehensive Stroke Center  Progress Note    Assessment/Plan:     * Thrombotic stroke involving left middle cerebral artery  Melva Barker is a 74 y.o. female with PMHx of HTN, HLD, and DM who presented to OSH with acute worsening of R-sided weakness after having experienced R-sided weakness x1 month. She was treated with tPA at OSH. CTA without LVO. MRI with infarct in L internal capsule and corona radiata. Etiology likely small vessel disease.    CT abdomen/pelvis to evaluate retroperitoneal hematoma prior to starting DAPT. H/H stable.     Antithrombotics for secondary stroke prevention: Antiplatelets: ASA 81 mg + Plavix 75 mg -- HELD due to retroperitoneal hematoma.  Recently received blood transfusions. Will consider restarting if H/H and CT abd/pelvis remains stable.  Statins for secondary stroke prevention and hyperlipidemia, if present: Statins: Atorvastatin- 40 mg daily,   Aggressive risk factor modification: HTN, HLD, Diet  Rehab efforts: The patient has been evaluated by a stroke team provider and the therapy needs have been fully considered based off the presenting complaints and exam findings. The following therapy evaluations are needed: PT evaluate and treat, OT evaluate and treat, SLP evaluate and treat, PM&R evaluate for appropriate placement - Dispo inpatient rehab  Diagnostics ordered/pending: None   VTE prophylaxis: Mechanical SCDs only - Pharmacologic ppx acutely held due to hematoma.  BP parameters: Infarct: Post tPA, SBP <160; Avoid hypotension    Tachycardia  Possibly related to pain vs infection.  Infectious work up pending. PO vanc for possible C. Diff  Increased IVFs    Hypotension  Patient hypotensive overnight from 11/13-11/14, continued hypotension on morning of 11/14  Received multiple boluses and pressure responding  Differential includes sepsis vs pain medication     Infectious workup ordered  Elevated procal and  lactate with interval development of atelectasis in R lower lung, started on 7 day course of IV cefepime and vanc for possible hospital acquired pneumonia, blood culture pending    Liquid diarrhea overnight 11/15, WBC 14, stopped empiric abx, ordered c diff, stool culture, PO vanc 10 day course for possible c. Diff, IVFs, remove central line.     Cultures pending.         Retroperitoneal hematoma  Pt had been c/o back pain in recent days, complicated by her hx of chronic back and sciatic nerve pain with use of Tizanidine and opiates at home.  Due to Hb downtrend and recent tPA administration, MRI Lumbar/Thoracic spine was ordered 11/8 which demonstrated large R psoas hematoma with IVC compression and probable thrombosis.   General Surgery was consulted; No acute intervention pursued.     Patient was then with acute decompensation overnight 11/9; became hypotensive, tachycardic, and apneic with Hb down to 6.7. Rapid Response was called; patient requiring intubation, blood transfusion, and transfer to medical ICU.   CTA abd/pelvis 11/9 showed hematoma with active extravasation/hemorrhage with IVC compression, hemoperitoneum.   Pt went to IR and is now s/p successful R L2 & L3 lumbar spinal artery embolization.   Patient underwent IR re-evaluation on 11/11. No arterial bleed evident on IR angiography.    Hb now stable, Continuing to monitor.    Nausea & vomiting  Patient with episodes of vomiting 11/2 - resolved throughout the day. No episodes of vomiting overnight.   PRN IV Zofran ordered   KUB completed and no abnormality noted  Patient continues to report nausea - some mild middle lower abdominal pain upon palpation  Lipase ordered and WNL  CT abdomen (2016) diverticulosis in the descending colon and a small hiatal hernia. Hx of diabetic gastroparesis, reglan started. Follow up with PCP.  Patient with last BM 11/6 - normal bowel sounds on exam   Sucralfate 1g started 11/5. Increased PRN Zofran dose on 11/13.  Will  continue to monitor     Debility  2/2 stroke  PT/OT eval & treat - Dispo inpatient rehab    Cytotoxic cerebral edema  Area of cytotoxic cerebral edema identified when reviewing brain imaging in the territory of the L middle cerebral artery. There is no mass effect associated with it. We will continue to monitor the patients clinical exam for any worsening of symptoms which may indicate expansion of the stroke or the area of the edema resulting in the clinical change. The pattern is suggestive of small vessel etiology.    Essential hypertension  Risk factor for stroke  Holding all BP meds due to hypotension    Back pain  Reported hx chronic back pain with associated sciatic nerve pain as well, then complicated by acute retroperitoneal hematoma.  Consulted to anesthesia pain management; Appreciate recommendations.  Pt continues to c/o R back pain on 11/13 and concern that pt's tachycardia could be pain-related.  Pain pump discontinued    Home regimen: tizanidine 4mg TID prn, gabapentin 600mg TID, Norco  mg Q6hr prn    All Oral pain medications discontinued on 11/14 except for celecoxib due to hypotension, lidocaine patch and voltaren gel ordered.   11/15 - added home tizanidine 4 mg q8hr prn, norco q6hr prn. Consider increasing gabapentin to home dose if needed and if BP allows.     Will monitor BP and slowly add back medications due to possibility of pain medication causing hypotension. Once safe, will add reduced doses of home pain medications. Pain currently improved.         Gastroparesis  Currently on Reglan 5 mg TID  Previously on Reglan 10 mg TID before meals, will order if needed    Diabetes mellitus type 2 without retinopathy  Stroke risk factor, poorly controlled on glargine 17 units daily  HbA1c 8.3%  Recommend tight glucose control  Currently on SSI   Diabetic diet    Leukocytosis  Previously suspected acute reactive 2/2 active bleed. WBC now 14.  BCx 11/9 NGTD and pt remains afebrile.  11/14 -  infectious work up. Empiric abx started for possible hospital acquired pneumonia.   11/15 - Liquid diarrhea started overnight, stopped empiric abx, ordered c. Diff and stool culture, start PO vanc for possible c. diff, IVFs. Orders to remove central line.     Cultures pending. Diarrhea persists. Tachycardic, increased IVFs. Spoke to RN, central line to be removed today.          11/1/19 MRI with small vessel L MCA infarct, therapy recommending rehab  11/2 - Patient stepped down overnight. Adjusting BP regimen. Patient with continuous complaints if nausea. IV Zofran ordered with some relief. Patient has not has BM since 10/30. Ordering KUB to rule out obstruction. Neuro exam unchanged.   11/3 - NAEON. Patient reports she had no episodes of vomiting overnight. Still complaining of nausea. Mild lower abdomen tenderness on exam. Lipase ordered and WNL. Continue to monitor.   11/4 - denies nausea and vomiting. Abd tenderness remains present on palpitation. Continue to monitor. HCTZ increased yesterday. Pending rehab placement.   11/5 -  N/V x1 overnight zofran resolved, sucralfate started. Placement pending   11/6 - hypotensive, held all BP meds,  ml bolus, responded well. Increased BUN/Cr, start NS 75ml/hr.   11/7 patient complaining of sciatica pain.  Restarted home tizanidine.  Patient requesting hydrocodone but educated patient that nerve pain is not treated with opioid.  Degenerative disease seen on xray but no fracture.      11/8 Patient continues to experience back pain.  Now with leukocytosis and drop in h/h plan for MRI thoracic/lumbar spine to evaluate for epidural hematoma.    11/9: MRI Lumbar spine had demonstrated large R psoas hematoma with possible IVC compression; General Surgery consulted. Patient was then with acute decompensation overnight; became hypotensive, tachycardic, and apneic requiring intubation, blood transfusion, and medical ICU transfer. CTA abd/pelvis showed active  extravasation/hemorrhage with hemoperitoneum. Pt now s/p successful lumbar artery embolization in IR. She was extubated on arrival back to ICU and tolerated well.    11/11: Patient underwent IR re-evaluation yesterday. No arterial bleed evident on IR angiography. Patient neuro exam stable.   11/12 Stepped down from medical ICU this morning.  Patient still experiencing pain.  Anesthesia consulted for pain management.  D/C pain pump and started oral medications for muscle and nerve pain.  Will wean to home regimen. Waiting for repeat CBC, transfused one unit of blood 11/11.   Tachycardic/hypertensive will continue to monitor.  11/13: Upgraded diet per SLP recs. Pt continues to c/o R back pain, concern that tachycardia could be pain-induced; adjusted analgesic regimen further as well as antiemetic regimen with plans to eventually wean to home regimen. Replaced Phos. Dispo inpatient rehab.  11/14: patient hypotensive overnight and this morning requiring fluid boluses. Discontinued all pain medications except for celecoxib in case pain meds are contributing to hypotension. Lidocaine patch and Voltaren gel ordered as well. Infectious workup significant for minor atelectasis in R lower lung with increased procal and lactate. Patient started on IV antibiotics for community acquired pneumonia. Blood cultures pending. ICU notified of patient's hypotension  11/15: Liquid diarrhea, WBC 14, stopped empiric abx, ordered c. Diff and stool culture, start PO vanc, IVFs. Adjusted pain meds with goal to return to home regimen. Nauseous overnight, zofran given.   11/16: CT abdomen and pelvis to f/u retroperitoneal hematoma prior to starting DAPT, H/H stable. T max 99.4, tachy, WBC 13, C. Diff and stool cultures pending, increased  ml/hr, labile BP, continue to monitor. Remove central line.     STROKE DOCUMENTATION   Acute Stroke Times   Last Known Normal Date: 10/31/19  Last Known Normal Time: 0630  Symptom Onset Date:  10/31/19  Symptom Onset Time: 0730  Stroke Team Called Date: 10/31/19  Stroke Team Called Time: 0936  Stroke Team Arrival Date: 10/31/19  Stroke Team Arrival Time: 0946  Decision to Treat Time for Alteplase: 1003    NIH Scale:  1a. Level of Consciousness: 0-->Alert, keenly responsive  1b. LOC Questions: 0-->Answers both questions correctly  1c. LOC Commands: 0-->Performs both tasks correctly  2. Best Gaze: 0-->Normal  3. Visual: 0-->No visual loss  4. Facial Palsy: 1-->Minor paralysis (flattened nasolabial fold, asymmetry on smiling)  5a. Motor Arm, Left: 0-->No drift, limb holds 90 (or 45) degrees for full 10 secs  5b. Motor Arm, Right: 1-->Drift, limb holds 90 (or 45) degrees, but drifts down before full 10 secs, does not hit bed or other support  6a. Motor Leg, Left: 0-->No drift, leg holds 30 degree position for full 5 secs  6b. Motor Leg, Right: 3-->No effort against gravity, leg falls to bed immediately  7. Limb Ataxia: 0-->Absent  8. Sensory: 0-->Normal, no sensory loss  9. Best Language: 0-->No aphasia, normal  10. Dysarthria: 1-->Mild-to-moderate dysarthria, patient slurs at least some words and, at worst, can be understood with some difficulty  11. Extinction and Inattention (formerly Neglect): 0-->No abnormality  Total (NIH Stroke Scale): 6       Modified Dayanna Score: 0  Kake Coma Scale:    ABCD2 Score:    [id number] Score:   HAS -BLED Score:   ICH Score:   Hunt & Amaya Classification:      Hemorrhagic change of an Ischemic Stroke: Does this patient have an ischemic stroke with hemorrhagic changes? No     Neurologic Chief Complaint: R-sided weakness -- L MCA    Subjective:     Interval History: Patient is seen for follow-up neurological assessment and treatment recommendations:    CT abdomen and pelvis to f/u retroperitoneal hematoma prior to starting DAPT, H/H stable. T max 99.4, tachy, WBC 13, C. Diff and stool cultures pending, increased  ml/hr, labile BP, continue to monitor. Remove  central line.     HPI, Past Medical, Family, and Social History remains the same as documented in the initial encounter.     Review of Systems   Constitutional: Negative for chills and fever.   Respiratory: Negative for cough and shortness of breath.    Cardiovascular: Negative for chest pain.   Gastrointestinal: Positive for abdominal pain (to palpation) and diarrhea. Negative for nausea.   Musculoskeletal: Positive for arthralgias and back pain.   Neurological: Positive for facial asymmetry, speech difficulty and weakness. Negative for dizziness.   Psychiatric/Behavioral: Negative for agitation and confusion.     Scheduled Meds:   acetaminophen  650 mg Oral Q8H    atorvastatin  40 mg Oral QHS    diclofenac sodium  4 g Topical (Top) Daily    DULoxetine  20 mg Oral BID    gabapentin  300 mg Oral TID    lidocaine  1 patch Transdermal Q24H    metoclopramide HCl  5 mg Oral TID AC    polyethylene glycol  17 g Oral Daily    potassium chloride 10%  20 mEq Oral BID    traZODone  25 mg Oral QHS    vancomycin  125 mg Oral Q6H     Continuous Infusions:   sodium chloride 0.9% 100 mL/hr at 11/16/19 0904     PRN Meds:sodium chloride, dextrose 10 % in water (D10W), glucagon (human recombinant), HYDROcodone-acetaminophen, insulin aspart U-100, melatonin, sodium chloride 0.9%, tiZANidine    Objective:     Vital Signs (Most Recent):  Temp: 96.9 °F (36.1 °C) (11/16/19 1327)  Pulse: 81 (11/16/19 1327)  Resp: 18 (11/16/19 1327)  BP: (!) 188/86 (11/16/19 1327)  SpO2: 98 % (11/16/19 1327)  BP Location: Right arm    Vital Signs Range (Last 24H):  Temp:  [96.9 °F (36.1 °C)-99.4 °F (37.4 °C)]   Pulse:  []   Resp:  [18-20]   BP: ()/(55-92)   SpO2:  [97 %-100 %]   BP Location: Right arm    Physical Exam   Constitutional: She is oriented to person, place, and time. She appears well-developed and well-nourished. No distress.   HENT:   Head: Normocephalic and atraumatic.   Eyes: EOM are normal.   Cardiovascular: Normal  rate.   Pulmonary/Chest: Effort normal. No respiratory distress.   Abdominal: Bowel sounds are increased.   Mid lower abdominal pain to palpation    Musculoskeletal:   Pain in R lumbar region with tenderness to light palpation   Neurological: She is alert and oriented to person, place, and time.   Skin: Skin is warm and dry. She is not diaphoretic.   Psychiatric: Cognition and memory are not impaired. She is attentive.   Vitals reviewed.      Neurological Exam:   LOC: alert   Attention Span: good  Language: No aphasia  Articulation: Mild dysarthria  Orientation: Person, Time, Place  Visual Fields: Full  EOM (CN III, IV, VI): Full/intact  Facial Movement (CN VII): Lower facial weakness on the Right  Motor: Arm left  Normal 5/5  Leg left  Normal 5/5  Arm right  Paresis: 4/5   Leg right Paresis: 1/5  Sensation: Intact to light touch, temp  Tone: Normal tone throughout    Laboratory:  CMP:   Recent Labs   Lab 11/16/19  0352   CALCIUM 9.1   ALBUMIN 3.0*   PROT 7.4      K 3.8   CO2 19*      BUN 4*   CREATININE 0.6   ALKPHOS 85   ALT 28   AST 51*   BILITOT 2.1*     CBC:   Recent Labs   Lab 11/16/19  0352   WBC 13.61*   RBC 3.30*   HGB 9.3*   HCT 28.8*      MCV 87   MCH 28.2   MCHC 32.3     Lipid Panel:   No results for input(s): CHOL, LDLCALC, HDL, TRIG in the last 168 hours.  Coagulation:   No results for input(s): PT, INR, APTT in the last 168 hours.  Platelet Aggregation Study: No results for input(s): PLTAGG, PLTAGINTERP, PLTAGREGLACO, ADPPLTAGGREG in the last 168 hours.  Hgb A1C:   No results for input(s): HGBA1C in the last 168 hours.  TSH:   No results for input(s): TSH in the last 168 hours.      Diagnostic Results     Brain Imaging     MRI Brain (11/01/19):  Acute lacunar type infarct coursing through the left posterior limb internal capsule and corona radiata.      CTH (10/31/19):  No acute intracranial pathology      Vessel Imaging     CTA Multiphase (10/31/19):  CTA head: Atherosclerotic  disease of the cavernous and supraclinoid ICAs with mild narrowing.  Otherwise unremarkable CTA of the head specifically without evidence for proximal significant stenosis or occlusion.  CTA neck: Less than 50% proximal ICA stenosis by NASCET criteria.  Atherosclerotic disease with mild moderate narrowing of the right vertebral artery origin.  There is mild left V1 segment narrowing.  No significant focal vertebral artery stenosis or occlusion.  Interlobular septal thickening with tree in bud micro nodularity visualized upper lobes bilaterally which may represent inflammatory/infectious process clinical correlation and correlation with dedicated CT thorax recommended.  CT head: Bandlike region hypoattenuation left posterior limb internal capsule unchanged from prior suggestive for possible recent to subacute age infarction.  No evidence for hydrocephalus or acute intracranial hemorrhage.  Clinical correlation and further evaluation with MRI if patient compatible.      Cardiac Imaging   Echo (11/01/19)  · Increased (hyperdynamic) left ventricular systolic function. The estimated ejection fraction is 75%.  · Concentric left ventricular remodeling.  · Normal LV diastolic function.  · No wall motion abnormalities.  · Normal right ventricular systolic function.  · Normal central venous pressure (3 mm Hg).  · Mild tricuspid regurgitation.  · The estimated PA systolic pressure is 26 mm Hg  · Echolucent structure next to the LA, likely representing hiatal hernia. Clinincal correlation is required.      Miscellaneous Imaging    IR Angiogram Visceral 11/10/19 pending    CTA Abdomen/Pelvis 11/10/19  Right-sided retroperitoneal hematoma with questionable foci of active extravasation within the inferior portion of the collection as described above.    IR Angiogram Visceral 11/9/19  Angiography of the lumbar arteries demonstrates active extravasation of the right L2 lumbar artery. Embolization using beads as described  above.  Plan: Transfer patient to ICU.  Continued serial H and H follow-up.    CTA Abdomen/Pelvis 11/9/19  1. Large right retroperitoneal hematoma with findings concerning for active arterial extravasation/hemorrhage as detailed above.  There is associated hemorrhage extending throughout the right abdomen and pelvis/pericolic gutter with associated mass effect on the right kidney, which is anteriorly displaced.  2. Decreased opacification of the infahepatic and infrarenal IVC, possibly secondary to underlying mass effect versus partial thrombosis.  3. Nonspecific cecal and right colonic wall thickening, possibly reactive due to hemoperitoneum.  4. Additional findings as detailed above.    MRI Thoracic/Lumbar Spine W WO Contr 11/8/19  Large hematoma within the right psoas muscle.  Compression and probable thrombosis of the IVC. Consider contrast enhanced CT with delayed phase.  Multilevel degenerative changes as detailed above, more severe in the lumbar spine.  Irregularity of the T12 through L3 endplates is most likely degenerative.      Juan Chen NP  [facility] [place] [facility]  Department of Vascular Neurology   [facility]

## 2019-11-16 NOTE — PLAN OF CARE
Patient is AAO x4. POC reviewed with patient. Patient verbalized understanding. Patient's breathing is unlabored with equal chest expansion. Patient has right sided deficits. Patient denies any numbness and tingling. Patient ambulates to the bedside commode with assistance. Patient has a flexi-seal in place. Patient complained of back pain and nausea;PRN medication given. Bed in lowest position,bed alarm on, side rails up x3, no complaints or signs of distress. WCTM.

## 2019-11-16 NOTE — ASSESSMENT & PLAN NOTE
Possibly related to pain vs infection.  Infectious work up pending. PO vanc for possible C. Diff  Increased IVFs

## 2019-11-16 NOTE — ASSESSMENT & PLAN NOTE
Patient hypotensive overnight from 11/13-11/14, continued hypotension on morning of 11/14  Received multiple boluses and pressure responding  Differential includes sepsis vs pain medication     Infectious workup ordered  Elevated procal and lactate with interval development of atelectasis in R lower lung, started on 7 day course of IV cefepime and vanc for possible hospital acquired pneumonia, blood culture pending    Liquid diarrhea overnight 11/15, WBC 14, stopped empiric abx, ordered c diff, stool culture, PO vanc 10 day course for possible c. Diff, IVFs, remove central line.     Cultures pending.

## 2019-11-16 NOTE — ASSESSMENT & PLAN NOTE
Previously suspected acute reactive 2/2 active bleed. WBC now 14.  BCx 11/9 NGTD and pt remains afebrile.  11/14 - infectious work up. Empiric abx started for possible hospital acquired pneumonia.   11/15 - Liquid diarrhea started overnight, stopped empiric abx, ordered c. Diff and stool culture, start PO vanc for possible c. diff, IVFs. Orders to remove central line.     Cultures pending. Diarrhea persists. Tachycardic, increased IVFs. Spoke to RN, central line to be removed today.

## 2019-11-17 LAB
ALBUMIN SERPL BCP-MCNC: 3 G/DL (ref 3.5–5.2)
ALP SERPL-CCNC: 86 U/L (ref 55–135)
ALT SERPL W/O P-5'-P-CCNC: 23 U/L (ref 10–44)
ANION GAP SERPL CALC-SCNC: 10 MMOL/L (ref 8–16)
ANISOCYTOSIS BLD QL SMEAR: SLIGHT
AST SERPL-CCNC: 34 U/L (ref 10–40)
BASOPHILS # BLD AUTO: 0.07 K/UL (ref 0–0.2)
BASOPHILS NFR BLD: 0.6 % (ref 0–1.9)
BILIRUB SERPL-MCNC: 1.8 MG/DL (ref 0.1–1)
BUN SERPL-MCNC: 3 MG/DL (ref 8–23)
BURR CELLS BLD QL SMEAR: ABNORMAL
CALCIUM SERPL-MCNC: 9.3 MG/DL (ref 8.7–10.5)
CHLORIDE SERPL-SCNC: 104 MMOL/L (ref 95–110)
CO2 SERPL-SCNC: 20 MMOL/L (ref 23–29)
CREAT SERPL-MCNC: 0.6 MG/DL (ref 0.5–1.4)
DIFFERENTIAL METHOD: ABNORMAL
E COLI SXT1 STL QL IA: NEGATIVE
E COLI SXT2 STL QL IA: NEGATIVE
EOSINOPHIL # BLD AUTO: 0.4 K/UL (ref 0–0.5)
EOSINOPHIL NFR BLD: 2.9 % (ref 0–8)
ERYTHROCYTE [DISTWIDTH] IN BLOOD BY AUTOMATED COUNT: 15.5 % (ref 11.5–14.5)
EST. GFR  (AFRICAN AMERICAN): >60 ML/MIN/1.73 M^2
EST. GFR  (NON AFRICAN AMERICAN): >60 ML/MIN/1.73 M^2
GLUCOSE SERPL-MCNC: 148 MG/DL (ref 70–110)
HCT VFR BLD AUTO: 31.5 % (ref 37–48.5)
HGB BLD-MCNC: 10 G/DL (ref 12–16)
HYPOCHROMIA BLD QL SMEAR: ABNORMAL
IMM GRANULOCYTES # BLD AUTO: 0.08 K/UL (ref 0–0.04)
IMM GRANULOCYTES NFR BLD AUTO: 0.6 % (ref 0–0.5)
LYMPHOCYTES # BLD AUTO: 2 K/UL (ref 1–4.8)
LYMPHOCYTES NFR BLD: 15.9 % (ref 18–48)
MCH RBC QN AUTO: 28.7 PG (ref 27–31)
MCHC RBC AUTO-ENTMCNC: 31.7 G/DL (ref 32–36)
MCV RBC AUTO: 91 FL (ref 82–98)
MONOCYTES # BLD AUTO: 1 K/UL (ref 0.3–1)
MONOCYTES NFR BLD: 8 % (ref 4–15)
NEUTROPHILS # BLD AUTO: 9 K/UL (ref 1.8–7.7)
NEUTROPHILS NFR BLD: 72 % (ref 38–73)
NRBC BLD-RTO: 0 /100 WBC
PLATELET # BLD AUTO: 315 K/UL (ref 150–350)
PLATELET BLD QL SMEAR: ABNORMAL
PMV BLD AUTO: 10.2 FL (ref 9.2–12.9)
POCT GLUCOSE: 128 MG/DL (ref 70–110)
POCT GLUCOSE: 157 MG/DL (ref 70–110)
POCT GLUCOSE: 162 MG/DL (ref 70–110)
POIKILOCYTOSIS BLD QL SMEAR: SLIGHT
POTASSIUM SERPL-SCNC: 4.1 MMOL/L (ref 3.5–5.1)
PROT SERPL-MCNC: 7.6 G/DL (ref 6–8.4)
RBC # BLD AUTO: 3.48 M/UL (ref 4–5.4)
SODIUM SERPL-SCNC: 134 MMOL/L (ref 136–145)
WBC # BLD AUTO: 12.54 K/UL (ref 3.9–12.7)

## 2019-11-17 PROCEDURE — 25000003 PHARM REV CODE 250: Performed by: PHYSICIAN ASSISTANT

## 2019-11-17 PROCEDURE — 25000003 PHARM REV CODE 250: Performed by: PSYCHIATRY & NEUROLOGY

## 2019-11-17 PROCEDURE — 80053 COMPREHEN METABOLIC PANEL: CPT

## 2019-11-17 PROCEDURE — 63600175 PHARM REV CODE 636 W HCPCS: Performed by: FAMILY MEDICINE

## 2019-11-17 PROCEDURE — 99233 SBSQ HOSP IP/OBS HIGH 50: CPT | Mod: ,,, | Performed by: PSYCHIATRY & NEUROLOGY

## 2019-11-17 PROCEDURE — 36415 COLL VENOUS BLD VENIPUNCTURE: CPT

## 2019-11-17 PROCEDURE — 20600001 HC STEP DOWN PRIVATE ROOM

## 2019-11-17 PROCEDURE — 25000003 PHARM REV CODE 250: Performed by: FAMILY MEDICINE

## 2019-11-17 PROCEDURE — 85025 COMPLETE CBC W/AUTO DIFF WBC: CPT

## 2019-11-17 PROCEDURE — 99233 PR SUBSEQUENT HOSPITAL CARE,LEVL III: ICD-10-PCS | Mod: ,,, | Performed by: PSYCHIATRY & NEUROLOGY

## 2019-11-17 RX ORDER — ASPIRIN 81 MG/1
81 TABLET ORAL DAILY
Status: DISCONTINUED | OUTPATIENT
Start: 2019-11-17 | End: 2019-12-03 | Stop reason: HOSPADM

## 2019-11-17 RX ORDER — CLOPIDOGREL BISULFATE 75 MG/1
75 TABLET ORAL DAILY
Status: DISCONTINUED | OUTPATIENT
Start: 2019-11-17 | End: 2019-12-03 | Stop reason: HOSPADM

## 2019-11-17 RX ADMIN — Medication 125 MG: at 05:11

## 2019-11-17 RX ADMIN — GABAPENTIN 300 MG: 300 CAPSULE ORAL at 08:11

## 2019-11-17 RX ADMIN — ACETAMINOPHEN 650 MG: 325 TABLET ORAL at 10:11

## 2019-11-17 RX ADMIN — HYDROCODONE BITARTRATE AND ACETAMINOPHEN 1 TABLET: 10; 325 TABLET ORAL at 03:11

## 2019-11-17 RX ADMIN — METOCLOPRAMIDE HYDROCHLORIDE 5 MG: 5 TABLET ORAL at 11:11

## 2019-11-17 RX ADMIN — TRAZODONE HYDROCHLORIDE 25 MG: 50 TABLET ORAL at 10:11

## 2019-11-17 RX ADMIN — METOCLOPRAMIDE HYDROCHLORIDE 5 MG: 5 TABLET ORAL at 06:11

## 2019-11-17 RX ADMIN — POTASSIUM CHLORIDE 20 MEQ: 20 SOLUTION ORAL at 09:11

## 2019-11-17 RX ADMIN — ATORVASTATIN CALCIUM 40 MG: 20 TABLET, FILM COATED ORAL at 10:11

## 2019-11-17 RX ADMIN — LIDOCAINE 1 PATCH: 50 PATCH CUTANEOUS at 11:11

## 2019-11-17 RX ADMIN — Medication 125 MG: at 12:11

## 2019-11-17 RX ADMIN — HYDROCODONE BITARTRATE AND ACETAMINOPHEN 1 TABLET: 10; 325 TABLET ORAL at 08:11

## 2019-11-17 RX ADMIN — POTASSIUM CHLORIDE 20 MEQ: 20 SOLUTION ORAL at 08:11

## 2019-11-17 RX ADMIN — HYDROCODONE BITARTRATE AND ACETAMINOPHEN 1 TABLET: 10; 325 TABLET ORAL at 10:11

## 2019-11-17 RX ADMIN — Medication 125 MG: at 06:11

## 2019-11-17 RX ADMIN — CLOPIDOGREL BISULFATE 75 MG: 75 TABLET ORAL at 11:11

## 2019-11-17 RX ADMIN — DULOXETINE HYDROCHLORIDE 20 MG: 20 CAPSULE, DELAYED RELEASE ORAL at 10:11

## 2019-11-17 RX ADMIN — GABAPENTIN 300 MG: 300 CAPSULE ORAL at 02:11

## 2019-11-17 RX ADMIN — GABAPENTIN 300 MG: 300 CAPSULE ORAL at 10:11

## 2019-11-17 RX ADMIN — HYDROCODONE BITARTRATE AND ACETAMINOPHEN 1 TABLET: 10; 325 TABLET ORAL at 01:11

## 2019-11-17 RX ADMIN — Medication 125 MG: at 11:11

## 2019-11-17 RX ADMIN — SODIUM CHLORIDE: 0.9 INJECTION, SOLUTION INTRAVENOUS at 12:11

## 2019-11-17 RX ADMIN — DICLOFENAC 4 G: 10 GEL TOPICAL at 08:11

## 2019-11-17 RX ADMIN — ASPIRIN 81 MG: 81 TABLET, COATED ORAL at 12:11

## 2019-11-17 RX ADMIN — METOCLOPRAMIDE HYDROCHLORIDE 5 MG: 5 TABLET ORAL at 04:11

## 2019-11-17 RX ADMIN — TIZANIDINE 4 MG: 4 TABLET ORAL at 05:11

## 2019-11-17 RX ADMIN — DULOXETINE HYDROCHLORIDE 20 MG: 20 CAPSULE, DELAYED RELEASE ORAL at 08:11

## 2019-11-17 NOTE — ASSESSMENT & PLAN NOTE
Pt had been c/o back pain in recent days, complicated by her hx of chronic back and sciatic nerve pain with use of Tizanidine and opiates at home.  Due to Hb downtrend and recent tPA administration, MRI Lumbar/Thoracic spine was ordered 11/8 which demonstrated large R psoas hematoma with IVC compression and probable thrombosis.   General Surgery was consulted; No acute intervention pursued.     Patient was then with acute decompensation overnight 11/9; became hypotensive, tachycardic, and apneic with Hb down to 6.7. Rapid Response was called; patient requiring intubation, blood transfusion, and transfer to medical ICU.   CTA abd/pelvis 11/9 showed hematoma with active extravasation/hemorrhage with IVC compression, hemoperitoneum.   Pt went to IR and is now s/p successful R L2 & L3 lumbar spinal artery embolization.   Patient underwent IR re-evaluation on 11/11. No arterial bleed evident on IR angiography.  CT abd/pelvis 11/17: Redemonstration of large right retroperitoneal hematoma which is mildly decreased in size compared to most recent     Hb now stable, Continuing to monitor.

## 2019-11-17 NOTE — ASSESSMENT & PLAN NOTE
Previously suspected acute reactive 2/2 active bleed. WBC now 14.  BCx 11/9 NGTD and pt remains afebrile.  11/14 - infectious work up. Empiric abx started for possible hospital acquired pneumonia.   11/15 - Liquid diarrhea started overnight, stopped empiric abx, ordered c. Diff and stool culture, start PO vanc for possible c. diff, IVFs. Orders to remove central line.       C. Diff EIA antigen positive, toxins negative; C diff toxin PCR, negative. Diarrhea improving, thicker. WBC improved, A febrile.

## 2019-11-17 NOTE — ASSESSMENT & PLAN NOTE
Patient hypotensive overnight from 11/13-11/14, continued hypotension on morning of 11/14  Received multiple boluses and pressure responding  Differential includes sepsis vs pain medication     Infectious workup ordered  Elevated procal and lactate with interval development of atelectasis in R lower lung, started on 7 day course of IV cefepime and vanc for possible hospital acquired pneumonia, blood culture pending    Liquid diarrhea overnight 11/15, WBC 14, stopped empiric abx, ordered c diff, stool culture, PO vanc 10 day course for possible c. Diff, IVFs, remove central line.     BP labile. WBC and tachycardia improved, A febrile.

## 2019-11-17 NOTE — PROGRESS NOTES
[facility]  Vascular Neurology  Comprehensive Stroke Center  Progress Note    Assessment/Plan:     * Thrombotic stroke involving left middle cerebral artery  Melva Barker is a 74 y.o. female with PMHx of HTN, HLD, and DM who presented to OSH with acute worsening of R-sided weakness after having experienced R-sided weakness x1 month. She was treated with tPA at OSH. CTA without LVO. MRI with infarct in L internal capsule and corona radiata. Etiology likely small vessel disease.    CT abdomen/pelvis again with large retroperitoneal hematoma however decreased in size, H/H uptrend, resume DAPT today.    Antithrombotics for secondary stroke prevention: Antiplatelets: ASA 81 mg + Plavix 75 mg -- held due to retroperitoneal hematoma. Resumed DAPT 11/17  Statins for secondary stroke prevention and hyperlipidemia, if present: Statins: Atorvastatin- 40 mg daily,   Aggressive risk factor modification: HTN, HLD, Diet  Rehab efforts: The patient has been evaluated by a stroke team provider and the therapy needs have been fully considered based off the presenting complaints and exam findings. The following therapy evaluations are needed: PT evaluate and treat, OT evaluate and treat, SLP evaluate and treat, PM&R evaluate for appropriate placement - Dispo inpatient rehab  Diagnostics ordered/pending: None   VTE prophylaxis: Mechanical SCDs only - Pharmacologic ppx acutely held due to hematoma.  BP parameters: Infarct: Post tPA, SBP <160; Avoid hypotension    Tachycardia  Possibly related to pain vs infection.  Infectious work up pending. PO vanc for possible C. Diff  Improved, continue IVFs due to diarrhea, encourage PO intake.     Hypotension  Patient hypotensive overnight from 11/13-11/14, continued hypotension on morning of 11/14  Received multiple boluses and pressure responding  Differential includes sepsis vs pain medication     Infectious workup ordered  Elevated procal and lactate with  interval development of atelectasis in R lower lung, started on 7 day course of IV cefepime and vanc for possible hospital acquired pneumonia, blood culture pending    Liquid diarrhea overnight 11/15, WBC 14, stopped empiric abx, ordered c diff, stool culture, PO vanc 10 day course for possible c. Diff, IVFs, remove central line.     BP labile. WBC and tachycardia improved, A febrile.           Retroperitoneal hematoma  Pt had been c/o back pain in recent days, complicated by her hx of chronic back and sciatic nerve pain with use of Tizanidine and opiates at home.  Due to Hb downtrend and recent tPA administration, MRI Lumbar/Thoracic spine was ordered 11/8 which demonstrated large R psoas hematoma with IVC compression and probable thrombosis.   General Surgery was consulted; No acute intervention pursued.     Patient was then with acute decompensation overnight 11/9; became hypotensive, tachycardic, and apneic with Hb down to 6.7. Rapid Response was called; patient requiring intubation, blood transfusion, and transfer to medical ICU.   CTA abd/pelvis 11/9 showed hematoma with active extravasation/hemorrhage with IVC compression, hemoperitoneum.   Pt went to IR and is now s/p successful R L2 & L3 lumbar spinal artery embolization.   Patient underwent IR re-evaluation on 11/11. No arterial bleed evident on IR angiography.  CT abd/pelvis 11/17: Redemonstration of large right retroperitoneal hematoma which is mildly decreased in size compared to most recent     Hb now stable, Continuing to monitor.    Nausea & vomiting  Patient with episodes of vomiting 11/2 - resolved throughout the day. No episodes of vomiting overnight.   PRN IV Zofran ordered   KUB completed and no abnormality noted  Patient continues to report nausea - some mild middle lower abdominal pain upon palpation  Lipase ordered and WNL  CT abdomen (2016) diverticulosis in the descending colon and a small hiatal hernia. Hx of diabetic gastroparesis, reglan  started. Follow up with PCP.  Patient with last BM 11/6 - normal bowel sounds on exam   Sucralfate 1g started 11/5. Increased PRN Zofran dose on 11/13.  Will continue to monitor     Debility  2/2 stroke  PT/OT eval & treat - Dispo inpatient rehab    Cytotoxic cerebral edema  Area of cytotoxic cerebral edema identified when reviewing brain imaging in the territory of the L middle cerebral artery. There is no mass effect associated with it. We will continue to monitor the patients clinical exam for any worsening of symptoms which may indicate expansion of the stroke or the area of the edema resulting in the clinical change. The pattern is suggestive of small vessel etiology.    Essential hypertension  Risk factor for stroke  Holding all BP meds due to hypotension    Back pain  Reported hx chronic back pain with associated sciatic nerve pain as well, then complicated by acute retroperitoneal hematoma.  Consulted to anesthesia pain management; Appreciate recommendations.  Pt continues to c/o R back pain on 11/13 and concern that pt's tachycardia could be pain-related.  Pain pump discontinued    Home regimen: tizanidine 4mg TID prn, gabapentin 600mg TID, Norco  mg Q6hr prn    All Oral pain medications discontinued on 11/14 except for celecoxib due to hypotension, lidocaine patch and voltaren gel ordered.   11/15 - added home tizanidine 4 mg q8hr prn, norco q6hr prn. Consider increasing gabapentin to home dose if needed and if BP allows.     Will monitor BP and slowly add back medications due to possibility of pain medication causing hypotension. Once safe, will add reduced doses of home pain medications. Pain currently improved.         Gastroparesis  Currently on Reglan 5 mg TID  Previously on Reglan 10 mg TID before meals, will order if needed    Diabetes mellitus type 2 without retinopathy  Stroke risk factor, poorly controlled on glargine 17 units daily  HbA1c 8.3%  Recommend tight glucose control  Currently  on SSI   Diabetic diet    Leukocytosis  Previously suspected acute reactive 2/2 active bleed. WBC now 14.  BCx 11/9 NGTD and pt remains afebrile.  11/14 - infectious work up. Empiric abx started for possible hospital acquired pneumonia.   11/15 - Liquid diarrhea started overnight, stopped empiric abx, ordered c. Diff and stool culture, start PO vanc for possible c. diff, IVFs. Orders to remove central line.       C. Diff EIA antigen positive, toxins negative; C diff toxin PCR, negative. Diarrhea improving, thicker. WBC improved, A febrile.          11/1/19 MRI with small vessel L MCA infarct, therapy recommending rehab  11/2 - Patient stepped down overnight. Adjusting BP regimen. Patient with continuous complaints if nausea. IV Zofran ordered with some relief. Patient has not has BM since 10/30. Ordering KUB to rule out obstruction. Neuro exam unchanged.   11/3 - NAEON. Patient reports she had no episodes of vomiting overnight. Still complaining of nausea. Mild lower abdomen tenderness on exam. Lipase ordered and WNL. Continue to monitor.   11/4 - denies nausea and vomiting. Abd tenderness remains present on palpitation. Continue to monitor. HCTZ increased yesterday. Pending rehab placement.   11/5 -  N/V x1 overnight zofran resolved, sucralfate started. Placement pending   11/6 - hypotensive, held all BP meds,  ml bolus, responded well. Increased BUN/Cr, start NS 75ml/hr.   11/7 patient complaining of sciatica pain.  Restarted home tizanidine.  Patient requesting hydrocodone but educated patient that nerve pain is not treated with opioid.  Degenerative disease seen on xray but no fracture.      11/8 Patient continues to experience back pain.  Now with leukocytosis and drop in h/h plan for MRI thoracic/lumbar spine to evaluate for epidural hematoma.    11/9: MRI Lumbar spine had demonstrated large R psoas hematoma with possible IVC compression; General Surgery consulted. Patient was then with acute  decompensation overnight; became hypotensive, tachycardic, and apneic requiring intubation, blood transfusion, and medical ICU transfer. CTA abd/pelvis showed active extravasation/hemorrhage with hemoperitoneum. Pt now s/p successful lumbar artery embolization in IR. She was extubated on arrival back to ICU and tolerated well.    11/11: Patient underwent IR re-evaluation yesterday. No arterial bleed evident on IR angiography. Patient neuro exam stable.   11/12 Stepped down from medical ICU this morning.  Patient still experiencing pain.  Anesthesia consulted for pain management.  D/C pain pump and started oral medications for muscle and nerve pain.  Will wean to home regimen. Waiting for repeat CBC, transfused one unit of blood 11/11.   Tachycardic/hypertensive will continue to monitor.  11/13: Upgraded diet per SLP recs. Pt continues to c/o R back pain, concern that tachycardia could be pain-induced; adjusted analgesic regimen further as well as antiemetic regimen with plans to eventually wean to home regimen. Replaced Phos. Dispo inpatient rehab.  11/14: patient hypotensive overnight and this morning requiring fluid boluses. Discontinued all pain medications except for celecoxib in case pain meds are contributing to hypotension. Lidocaine patch and Voltaren gel ordered as well. Infectious workup significant for minor atelectasis in R lower lung with increased procal and lactate. Patient started on IV antibiotics for community acquired pneumonia. Blood cultures pending. ICU notified of patient's hypotension  11/15: Liquid diarrhea, WBC 14, stopped empiric abx, ordered c. Diff and stool culture, start PO vanc, IVFs. Adjusted pain meds with goal to return to home regimen. Nauseous overnight, zofran given.   11/16: CT abdomen and pelvis to f/u retroperitoneal hematoma prior to starting DAPT, H/H stable. T max 99.4, tachy, WBC 13, C. Diff and stool cultures pending, increased  ml/hr, labile BP, continue to  monitor. Remove central line.   11/17: CT abdomen/pelvis again with large retroperitoneal hematoma  but decreased in size, H/H uptrend, resume DAPT today. Diarrhea becoming thicker, WBC improved, a febrile, c. Diff antigen positive, toxins negative. Pain well controlled.     STROKE DOCUMENTATION   Acute Stroke Times   Last Known Normal Date: 10/31/19  Last Known Normal Time: 0630  Symptom Onset Date: 10/31/19  Symptom Onset Time: 0730  Stroke Team Called Date: 10/31/19  Stroke Team Called Time: 0936  Stroke Team Arrival Date: 10/31/19  Stroke Team Arrival Time: 0946  Decision to Treat Time for Alteplase: 1003    NIH Scale:  1a. Level of Consciousness: 0-->Alert, keenly responsive  1b. LOC Questions: 0-->Answers both questions correctly  1c. LOC Commands: 0-->Performs both tasks correctly  2. Best Gaze: 0-->Normal  3. Visual: 0-->No visual loss  4. Facial Palsy: 1-->Minor paralysis (flattened nasolabial fold, asymmetry on smiling)  5a. Motor Arm, Left: 0-->No drift, limb holds 90 (or 45) degrees for full 10 secs  5b. Motor Arm, Right: 2-->Some effort against gravity, limb cannot get to or maintain (if cued) 90 (or 45) degrees, drifts down to bed, but has some effort against gravity  6a. Motor Leg, Left: 0-->No drift, leg holds 30 degree position for full 5 secs  6b. Motor Leg, Right: 3-->No effort against gravity, leg falls to bed immediately  7. Limb Ataxia: 0-->Absent  8. Sensory: 0-->Normal, no sensory loss  9. Best Language: 0-->No aphasia, normal  10. Dysarthria: 1-->Mild-to-moderate dysarthria, patient slurs at least some words and, at worst, can be understood with some difficulty  11. Extinction and Inattention (formerly Neglect): 0-->No abnormality  Total (NIH Stroke Scale): 7       Modified Murphy Score: 0  Nabila Coma Scale:    ABCD2 Score:    [id number] Score:   HAS -BLED Score:   ICH Score:   Hunt & Amaya Classification:      Hemorrhagic change of an Ischemic Stroke: Does this patient have an ischemic  stroke with hemorrhagic changes? No     Neurologic Chief Complaint: R-sided weakness -- L MCA    Subjective:     Interval History: Patient is seen for follow-up neurological assessment and treatment recommendations:     CT abdomen/pelvis again with large retroperitoneal hematoma  but decreased in size, H/H uptrend, resume DAPT today. Diarrhea becoming thicker, WBC improved, a febrile, c. Diff antigen positive, toxins negative. Pain well controlled.     HPI, Past Medical, Family, and Social History remains the same as documented in the initial encounter.     Review of Systems   Constitutional: Negative for chills and fever.   Respiratory: Negative for cough and shortness of breath.    Cardiovascular: Negative for chest pain.   Gastrointestinal: Positive for abdominal pain (to palpation) and diarrhea. Negative for nausea.   Musculoskeletal: Positive for arthralgias and back pain.   Neurological: Positive for facial asymmetry, speech difficulty and weakness. Negative for dizziness.   Psychiatric/Behavioral: Negative for agitation and confusion.     Scheduled Meds:   acetaminophen  650 mg Oral Q8H    aspirin  81 mg Oral Daily    atorvastatin  40 mg Oral QHS    clopidogrel  75 mg Oral Daily    diclofenac sodium  4 g Topical (Top) Daily    DULoxetine  20 mg Oral BID    gabapentin  300 mg Oral TID    lidocaine  1 patch Transdermal Q24H    metoclopramide HCl  5 mg Oral TID AC    polyethylene glycol  17 g Oral Daily    potassium chloride 10%  20 mEq Oral BID    traZODone  25 mg Oral QHS    vancomycin  125 mg Oral Q6H     Continuous Infusions:   sodium chloride 0.9% 75 mL/hr at 11/17/19 0832     PRN Meds:sodium chloride, dextrose 10 % in water (D10W), glucagon (human recombinant), HYDROcodone-acetaminophen, insulin aspart U-100, melatonin, ondansetron, sodium chloride 0.9%, tiZANidine    Objective:     Vital Signs (Most Recent):  Temp: 98.7 °F (37.1 °C) (11/17/19 1139)  Pulse: 91 (11/17/19 1139)  Resp: 18  (11/17/19 1139)  BP: (!) 160/86 (11/17/19 1139)  SpO2: 98 % (11/17/19 1139)  BP Location: Right arm    Vital Signs Range (Last 24H):  Temp:  [96.9 °F (36.1 °C)-98.9 °F (37.2 °C)]   Pulse:  []   Resp:  [16-18]   BP: (101-208)/()   SpO2:  [95 %-98 %]   BP Location: Right arm    Physical Exam   Constitutional: She is oriented to person, place, and time. She appears well-developed and well-nourished. No distress.   HENT:   Head: Normocephalic and atraumatic.   Eyes: EOM are normal.   Cardiovascular: Normal rate.   Pulmonary/Chest: Effort normal. No respiratory distress.   Abdominal: Bowel sounds are increased.   Mid lower abdominal pain to palpation    Musculoskeletal:   Pain in R lumbar region with tenderness to light palpation   Neurological: She is alert and oriented to person, place, and time.   Skin: Skin is warm and dry. She is not diaphoretic.   Psychiatric: Cognition and memory are not impaired. She is attentive.   Vitals reviewed.      Neurological Exam:   LOC: alert   Attention Span: good  Language: No aphasia  Articulation: Mild dysarthria  Orientation: Person, Time, Place  Visual Fields: Full  EOM (CN III, IV, VI): Full/intact  Facial Movement (CN VII): Lower facial weakness on the Right  Motor: Arm left  Normal 5/5  Leg left  Normal 5/5  Arm right  Paresis: +3/5   Leg right Paresis: 1/5  Sensation: Intact to light touch, temp  Tone: Normal tone throughout    Laboratory:  CMP:   Recent Labs   Lab 11/17/19  0403   CALCIUM 9.3   ALBUMIN 3.0*   PROT 7.6   *   K 4.1   CO2 20*      BUN 3*   CREATININE 0.6   ALKPHOS 86   ALT 23   AST 34   BILITOT 1.8*     CBC:   Recent Labs   Lab 11/17/19  0403   WBC 12.54   RBC 3.48*   HGB 10.0*   HCT 31.5*      MCV 91   MCH 28.7   MCHC 31.7*     Lipid Panel:   No results for input(s): CHOL, LDLCALC, HDL, TRIG in the last 168 hours.  Coagulation:   No results for input(s): PT, INR, APTT in the last 168 hours.  Platelet Aggregation Study: No results  for input(s): PLTAGG, PLTAGINTERP, PLTAGREGLACO, ADPPLTAGGREG in the last 168 hours.  Hgb A1C:   No results for input(s): HGBA1C in the last 168 hours.  TSH:   No results for input(s): TSH in the last 168 hours.      Diagnostic Results     Brain Imaging     MRI Brain (11/01/19):  Acute lacunar type infarct coursing through the left posterior limb internal capsule and corona radiata.      CTH (10/31/19):  No acute intracranial pathology      Vessel Imaging     CTA Multiphase (10/31/19):  CTA head: Atherosclerotic disease of the cavernous and supraclinoid ICAs with mild narrowing.  Otherwise unremarkable CTA of the head specifically without evidence for proximal significant stenosis or occlusion.  CTA neck: Less than 50% proximal ICA stenosis by NASCET criteria.  Atherosclerotic disease with mild moderate narrowing of the right vertebral artery origin.  There is mild left V1 segment narrowing.  No significant focal vertebral artery stenosis or occlusion.  Interlobular septal thickening with tree in bud micro nodularity visualized upper lobes bilaterally which may represent inflammatory/infectious process clinical correlation and correlation with dedicated CT thorax recommended.  CT head: Bandlike region hypoattenuation left posterior limb internal capsule unchanged from prior suggestive for possible recent to subacute age infarction.  No evidence for hydrocephalus or acute intracranial hemorrhage.  Clinical correlation and further evaluation with MRI if patient compatible.      Cardiac Imaging   Echo (11/01/19)  · Increased (hyperdynamic) left ventricular systolic function. The estimated ejection fraction is 75%.  · Concentric left ventricular remodeling.  · Normal LV diastolic function.  · No wall motion abnormalities.  · Normal right ventricular systolic function.  · Normal central venous pressure (3 mm Hg).  · Mild tricuspid regurgitation.  · The estimated PA systolic pressure is 26 mm Hg  · Echolucent structure  next to the LA, likely representing hiatal hernia. Clinincal correlation is required.      Miscellaneous Imaging  CT abdomen/pelvis 11/16/19  1. Redemonstration of large right retroperitoneal hematoma which is mildly decreased in size compared to most recent examination of 11/10/2019.  Continued mass effect on the right kidney, which is displaced anteriorly.  2. Soft tissue induration/fat stranding involving the left inguinal region, likely relating to recent vascular catheterization.  Correlation with physical exam is advised.  3. Small right pleural effusion with bibasilar atelectasis.  4. Air within the urinary bladder, presumably secondary to recent urinary catheterization although clinical correlation is advised.  5. Multiple additional findings as detailed above.    IR Angiogram Visceral 11/10/19 pending    CTA Abdomen/Pelvis 11/10/19  Right-sided retroperitoneal hematoma with questionable foci of active extravasation within the inferior portion of the collection as described above.    IR Angiogram Visceral 11/9/19  Angiography of the lumbar arteries demonstrates active extravasation of the right L2 lumbar artery. Embolization using beads as described above.  Plan: Transfer patient to ICU.  Continued serial H and H follow-up.    CTA Abdomen/Pelvis 11/9/19  1. Large right retroperitoneal hematoma with findings concerning for active arterial extravasation/hemorrhage as detailed above.  There is associated hemorrhage extending throughout the right abdomen and pelvis/pericolic gutter with associated mass effect on the right kidney, which is anteriorly displaced.  2. Decreased opacification of the infahepatic and infrarenal IVC, possibly secondary to underlying mass effect versus partial thrombosis.  3. Nonspecific cecal and right colonic wall thickening, possibly reactive due to hemoperitoneum.  4. Additional findings as detailed above.    MRI Thoracic/Lumbar Spine W WO Contr 11/8/19  Large hematoma within the  right psoas muscle.  Compression and probable thrombosis of the IVC. Consider contrast enhanced CT with delayed phase.  Multilevel degenerative changes as detailed above, more severe in the lumbar spine.  Irregularity of the T12 through L3 endplates is most likely degenerative.      Juan Chen NP  [facility]  Department of Vascular Neurology   [facility]

## 2019-11-17 NOTE — ASSESSMENT & PLAN NOTE
Melva Barker is a 74 y.o. female with PMHx of HTN, HLD, and DM who presented to OSH with acute worsening of R-sided weakness after having experienced R-sided weakness x1 month. She was treated with tPA at OSH. CTA without LVO. MRI with infarct in L internal capsule and corona radiata. Etiology likely small vessel disease.    CT abdomen/pelvis again with large retroperitoneal hematoma however decreased in size, H/H uptrend, resume DAPT today.    Antithrombotics for secondary stroke prevention: Antiplatelets: ASA 81 mg + Plavix 75 mg -- held due to retroperitoneal hematoma. Resumed DAPT 11/17  Statins for secondary stroke prevention and hyperlipidemia, if present: Statins: Atorvastatin- 40 mg daily,   Aggressive risk factor modification: HTN, HLD, Diet  Rehab efforts: The patient has been evaluated by a stroke team provider and the therapy needs have been fully considered based off the presenting complaints and exam findings. The following therapy evaluations are needed: PT evaluate and treat, OT evaluate and treat, SLP evaluate and treat, PM&R evaluate for appropriate placement - Dispo inpatient rehab  Diagnostics ordered/pending: None   VTE prophylaxis: Mechanical SCDs only - Pharmacologic ppx acutely held due to hematoma.  BP parameters: Infarct: Post tPA, SBP <160; Avoid hypotension

## 2019-11-17 NOTE — NURSING
POC reviewed with patient. Patient verbalized understanding. VSS. Patient denies headaches, but complained of pain throughout shift. No signs of distress noted. Medication for pain given. Patient states she is still in pain. Doctor notified. Safety measures maintained. Bed locked in lowest position, side rails up x 2. Call light within reach. Will continue to monitor.

## 2019-11-17 NOTE — SUBJECTIVE & OBJECTIVE
Neurologic Chief Complaint: R-sided weakness -- L MCA    Subjective:     Interval History: Patient is seen for follow-up neurological assessment and treatment recommendations:     CT abdomen/pelvis again with large retroperitoneal hematoma  but decreased in size, H/H uptrend, resume DAPT today. Diarrhea becoming thicker, WBC improved, a febrile, c. Diff antigen positive, toxins negative. Pain well controlled.     HPI, Past Medical, Family, and Social History remains the same as documented in the initial encounter.     Review of Systems   Constitutional: Negative for chills and fever.   Respiratory: Negative for cough and shortness of breath.    Cardiovascular: Negative for chest pain.   Gastrointestinal: Positive for abdominal pain (to palpation) and diarrhea. Negative for nausea.   Musculoskeletal: Positive for arthralgias and back pain.   Neurological: Positive for facial asymmetry, speech difficulty and weakness. Negative for dizziness.   Psychiatric/Behavioral: Negative for agitation and confusion.     Scheduled Meds:   acetaminophen  650 mg Oral Q8H    aspirin  81 mg Oral Daily    atorvastatin  40 mg Oral QHS    clopidogrel  75 mg Oral Daily    diclofenac sodium  4 g Topical (Top) Daily    DULoxetine  20 mg Oral BID    gabapentin  300 mg Oral TID    lidocaine  1 patch Transdermal Q24H    metoclopramide HCl  5 mg Oral TID AC    polyethylene glycol  17 g Oral Daily    potassium chloride 10%  20 mEq Oral BID    traZODone  25 mg Oral QHS    vancomycin  125 mg Oral Q6H     Continuous Infusions:   sodium chloride 0.9% 75 mL/hr at 11/17/19 0832     PRN Meds:sodium chloride, dextrose 10 % in water (D10W), glucagon (human recombinant), HYDROcodone-acetaminophen, insulin aspart U-100, melatonin, ondansetron, sodium chloride 0.9%, tiZANidine    Objective:     Vital Signs (Most Recent):  Temp: 98.7 °F (37.1 °C) (11/17/19 1139)  Pulse: 91 (11/17/19 1139)  Resp: 18 (11/17/19 1139)  BP: (!) 160/86 (11/17/19  1139)  SpO2: 98 % (11/17/19 1139)  BP Location: Right arm    Vital Signs Range (Last 24H):  Temp:  [96.9 °F (36.1 °C)-98.9 °F (37.2 °C)]   Pulse:  []   Resp:  [16-18]   BP: (101-208)/()   SpO2:  [95 %-98 %]   BP Location: Right arm    Physical Exam   Constitutional: She is oriented to person, place, and time. She appears well-developed and well-nourished. No distress.   HENT:   Head: Normocephalic and atraumatic.   Eyes: EOM are normal.   Cardiovascular: Normal rate.   Pulmonary/Chest: Effort normal. No respiratory distress.   Abdominal: Bowel sounds are increased.   Mid lower abdominal pain to palpation    Musculoskeletal:   Pain in R lumbar region with tenderness to light palpation   Neurological: She is alert and oriented to person, place, and time.   Skin: Skin is warm and dry. She is not diaphoretic.   Psychiatric: Cognition and memory are not impaired. She is attentive.   Vitals reviewed.      Neurological Exam:   LOC: alert   Attention Span: good  Language: No aphasia  Articulation: Mild dysarthria  Orientation: Person, Time, Place  Visual Fields: Full  EOM (CN III, IV, VI): Full/intact  Facial Movement (CN VII): Lower facial weakness on the Right  Motor: Arm left  Normal 5/5  Leg left  Normal 5/5  Arm right  Paresis: +3/5   Leg right Paresis: 1/5  Sensation: Intact to light touch, temp  Tone: Normal tone throughout    Laboratory:  CMP:   Recent Labs   Lab 11/17/19  0403   CALCIUM 9.3   ALBUMIN 3.0*   PROT 7.6   *   K 4.1   CO2 20*      BUN 3*   CREATININE 0.6   ALKPHOS 86   ALT 23   AST 34   BILITOT 1.8*     CBC:   Recent Labs   Lab 11/17/19  0403   WBC 12.54   RBC 3.48*   HGB 10.0*   HCT 31.5*      MCV 91   MCH 28.7   MCHC 31.7*     Lipid Panel:   No results for input(s): CHOL, LDLCALC, HDL, TRIG in the last 168 hours.  Coagulation:   No results for input(s): PT, INR, APTT in the last 168 hours.  Platelet Aggregation Study: No results for input(s): PLTAGG, PLTAGINTERP,  PLTAGREGLACO, ADPPLTAGGREG in the last 168 hours.  Hgb A1C:   No results for input(s): HGBA1C in the last 168 hours.  TSH:   No results for input(s): TSH in the last 168 hours.      Diagnostic Results     Brain Imaging     MRI Brain (11/01/19):  Acute lacunar type infarct coursing through the left posterior limb internal capsule and corona radiata.      CTH (10/31/19):  No acute intracranial pathology      Vessel Imaging     CTA Multiphase (10/31/19):  CTA head: Atherosclerotic disease of the cavernous and supraclinoid ICAs with mild narrowing.  Otherwise unremarkable CTA of the head specifically without evidence for proximal significant stenosis or occlusion.  CTA neck: Less than 50% proximal ICA stenosis by NASCET criteria.  Atherosclerotic disease with mild moderate narrowing of the right vertebral artery origin.  There is mild left V1 segment narrowing.  No significant focal vertebral artery stenosis or occlusion.  Interlobular septal thickening with tree in bud micro nodularity visualized upper lobes bilaterally which may represent inflammatory/infectious process clinical correlation and correlation with dedicated CT thorax recommended.  CT head: Bandlike region hypoattenuation left posterior limb internal capsule unchanged from prior suggestive for possible recent to subacute age infarction.  No evidence for hydrocephalus or acute intracranial hemorrhage.  Clinical correlation and further evaluation with MRI if patient compatible.      Cardiac Imaging   Echo (11/01/19)  · Increased (hyperdynamic) left ventricular systolic function. The estimated ejection fraction is 75%.  · Concentric left ventricular remodeling.  · Normal LV diastolic function.  · No wall motion abnormalities.  · Normal right ventricular systolic function.  · Normal central venous pressure (3 mm Hg).  · Mild tricuspid regurgitation.  · The estimated PA systolic pressure is 26 mm Hg  · Echolucent structure next to the LA, likely representing  hiatal hernia. Clinincal correlation is required.      Miscellaneous Imaging  CT abdomen/pelvis 11/16/19  1. Redemonstration of large right retroperitoneal hematoma which is mildly decreased in size compared to most recent examination of 11/10/2019.  Continued mass effect on the right kidney, which is displaced anteriorly.  2. Soft tissue induration/fat stranding involving the left inguinal region, likely relating to recent vascular catheterization.  Correlation with physical exam is advised.  3. Small right pleural effusion with bibasilar atelectasis.  4. Air within the urinary bladder, presumably secondary to recent urinary catheterization although clinical correlation is advised.  5. Multiple additional findings as detailed above.    IR Angiogram Visceral 11/10/19 pending    CTA Abdomen/Pelvis 11/10/19  Right-sided retroperitoneal hematoma with questionable foci of active extravasation within the inferior portion of the collection as described above.    IR Angiogram Visceral 11/9/19  Angiography of the lumbar arteries demonstrates active extravasation of the right L2 lumbar artery. Embolization using beads as described above.  Plan: Transfer patient to ICU.  Continued serial H and H follow-up.    CTA Abdomen/Pelvis 11/9/19  1. Large right retroperitoneal hematoma with findings concerning for active arterial extravasation/hemorrhage as detailed above.  There is associated hemorrhage extending throughout the right abdomen and pelvis/pericolic gutter with associated mass effect on the right kidney, which is anteriorly displaced.  2. Decreased opacification of the infahepatic and infrarenal IVC, possibly secondary to underlying mass effect versus partial thrombosis.  3. Nonspecific cecal and right colonic wall thickening, possibly reactive due to hemoperitoneum.  4. Additional findings as detailed above.    MRI Thoracic/Lumbar Spine W WO Contr 11/8/19  Large hematoma within the right psoas muscle.  Compression and  probable thrombosis of the IVC. Consider contrast enhanced CT with delayed phase.  Multilevel degenerative changes as detailed above, more severe in the lumbar spine.  Irregularity of the T12 through L3 endplates is most likely degenerative.

## 2019-11-17 NOTE — NURSING
POC reviewed with patient. Patient verbalized understanding. B/P low this am. Bolus given. VSS after bolus.No signs of distress noted.Patient denies headaches. Patient treated for pain. Safety measures maintained. Bed locked in lowest position, side rails up x 2. Call light within reach. Will continue to monitor.

## 2019-11-17 NOTE — ASSESSMENT & PLAN NOTE
Possibly related to pain vs infection.  Infectious work up pending. PO vanc for possible C. Diff  Improved, continue IVFs due to diarrhea, encourage PO intake.

## 2019-11-18 PROBLEM — I95.9 HYPOTENSION: Status: RESOLVED | Noted: 2019-11-14 | Resolved: 2019-11-18

## 2019-11-18 PROBLEM — R00.0 TACHYCARDIA: Status: RESOLVED | Noted: 2019-11-16 | Resolved: 2019-11-18

## 2019-11-18 LAB
ALBUMIN SERPL BCP-MCNC: 2.8 G/DL (ref 3.5–5.2)
ALP SERPL-CCNC: 77 U/L (ref 55–135)
ALT SERPL W/O P-5'-P-CCNC: 17 U/L (ref 10–44)
ANION GAP SERPL CALC-SCNC: 9 MMOL/L (ref 8–16)
AST SERPL-CCNC: 24 U/L (ref 10–40)
BACTERIA STL CULT: NORMAL
BASOPHILS # BLD AUTO: 0.05 K/UL (ref 0–0.2)
BASOPHILS NFR BLD: 0.4 % (ref 0–1.9)
BILIRUB SERPL-MCNC: 1.4 MG/DL (ref 0.1–1)
BUN SERPL-MCNC: 4 MG/DL (ref 8–23)
CALCIUM SERPL-MCNC: 8.9 MG/DL (ref 8.7–10.5)
CHLORIDE SERPL-SCNC: 108 MMOL/L (ref 95–110)
CO2 SERPL-SCNC: 19 MMOL/L (ref 23–29)
CREAT SERPL-MCNC: 0.7 MG/DL (ref 0.5–1.4)
DIFFERENTIAL METHOD: ABNORMAL
EOSINOPHIL # BLD AUTO: 0.3 K/UL (ref 0–0.5)
EOSINOPHIL NFR BLD: 2.4 % (ref 0–8)
ERYTHROCYTE [DISTWIDTH] IN BLOOD BY AUTOMATED COUNT: 15.5 % (ref 11.5–14.5)
EST. GFR  (AFRICAN AMERICAN): >60 ML/MIN/1.73 M^2
EST. GFR  (NON AFRICAN AMERICAN): >60 ML/MIN/1.73 M^2
GLUCOSE SERPL-MCNC: 151 MG/DL (ref 70–110)
HCT VFR BLD AUTO: 27.7 % (ref 37–48.5)
HGB BLD-MCNC: 9.1 G/DL (ref 12–16)
IMM GRANULOCYTES # BLD AUTO: 0.09 K/UL (ref 0–0.04)
IMM GRANULOCYTES NFR BLD AUTO: 0.6 % (ref 0–0.5)
LYMPHOCYTES # BLD AUTO: 2.6 K/UL (ref 1–4.8)
LYMPHOCYTES NFR BLD: 18.4 % (ref 18–48)
MCH RBC QN AUTO: 28.3 PG (ref 27–31)
MCHC RBC AUTO-ENTMCNC: 32.9 G/DL (ref 32–36)
MCV RBC AUTO: 86 FL (ref 82–98)
MONOCYTES # BLD AUTO: 1.2 K/UL (ref 0.3–1)
MONOCYTES NFR BLD: 8.4 % (ref 4–15)
NEUTROPHILS # BLD AUTO: 9.7 K/UL (ref 1.8–7.7)
NEUTROPHILS NFR BLD: 69.8 % (ref 38–73)
NRBC BLD-RTO: 0 /100 WBC
PLATELET # BLD AUTO: 227 K/UL (ref 150–350)
PMV BLD AUTO: 11.6 FL (ref 9.2–12.9)
POCT GLUCOSE: 131 MG/DL (ref 70–110)
POCT GLUCOSE: 133 MG/DL (ref 70–110)
POCT GLUCOSE: 135 MG/DL (ref 70–110)
POTASSIUM SERPL-SCNC: 4.6 MMOL/L (ref 3.5–5.1)
PROT SERPL-MCNC: 6.7 G/DL (ref 6–8.4)
RBC # BLD AUTO: 3.22 M/UL (ref 4–5.4)
SODIUM SERPL-SCNC: 136 MMOL/L (ref 136–145)
WBC # BLD AUTO: 13.86 K/UL (ref 3.9–12.7)

## 2019-11-18 PROCEDURE — 25000003 PHARM REV CODE 250: Performed by: PHYSICIAN ASSISTANT

## 2019-11-18 PROCEDURE — 97530 THERAPEUTIC ACTIVITIES: CPT

## 2019-11-18 PROCEDURE — 25000003 PHARM REV CODE 250: Performed by: NURSE PRACTITIONER

## 2019-11-18 PROCEDURE — 80053 COMPREHEN METABOLIC PANEL: CPT

## 2019-11-18 PROCEDURE — 99233 PR SUBSEQUENT HOSPITAL CARE,LEVL III: ICD-10-PCS | Mod: ,,, | Performed by: PSYCHIATRY & NEUROLOGY

## 2019-11-18 PROCEDURE — 63600175 PHARM REV CODE 636 W HCPCS: Performed by: FAMILY MEDICINE

## 2019-11-18 PROCEDURE — 92507 TX SP LANG VOICE COMM INDIV: CPT

## 2019-11-18 PROCEDURE — 20600001 HC STEP DOWN PRIVATE ROOM

## 2019-11-18 PROCEDURE — 25000003 PHARM REV CODE 250: Performed by: FAMILY MEDICINE

## 2019-11-18 PROCEDURE — 99233 SBSQ HOSP IP/OBS HIGH 50: CPT | Mod: ,,, | Performed by: PSYCHIATRY & NEUROLOGY

## 2019-11-18 PROCEDURE — 25000003 PHARM REV CODE 250: Performed by: PSYCHIATRY & NEUROLOGY

## 2019-11-18 PROCEDURE — 85025 COMPLETE CBC W/AUTO DIFF WBC: CPT

## 2019-11-18 PROCEDURE — 36415 COLL VENOUS BLD VENIPUNCTURE: CPT

## 2019-11-18 RX ORDER — ACETAMINOPHEN 325 MG/1
650 TABLET ORAL EVERY 8 HOURS
Refills: 0
Start: 2019-11-18 | End: 2019-12-03 | Stop reason: HOSPADM

## 2019-11-18 RX ORDER — INSULIN ASPART 100 [IU]/ML
0-5 INJECTION, SOLUTION INTRAVENOUS; SUBCUTANEOUS EVERY 6 HOURS PRN
Refills: 0
Start: 2019-11-18 | End: 2020-11-17

## 2019-11-18 RX ORDER — HYDROCODONE BITARTRATE AND ACETAMINOPHEN 10; 325 MG/1; MG/1
1 TABLET ORAL EVERY 8 HOURS PRN
Status: DISCONTINUED | OUTPATIENT
Start: 2019-11-18 | End: 2019-11-20

## 2019-11-18 RX ORDER — POLYETHYLENE GLYCOL 3350 17 G/17G
17 POWDER, FOR SOLUTION ORAL DAILY
Refills: 0
Start: 2019-11-19

## 2019-11-18 RX ORDER — CLOPIDOGREL BISULFATE 75 MG/1
75 TABLET ORAL DAILY
Qty: 30 TABLET | Refills: 0
Start: 2019-11-19 | End: 2020-11-18

## 2019-11-18 RX ORDER — ASPIRIN 81 MG/1
81 TABLET ORAL DAILY
Refills: 0
Start: 2019-11-19 | End: 2020-11-18

## 2019-11-18 RX ORDER — TIZANIDINE 4 MG/1
4 TABLET ORAL EVERY 8 HOURS PRN
Start: 2019-11-18 | End: 2019-12-03 | Stop reason: HOSPADM

## 2019-11-18 RX ORDER — POTASSIUM CHLORIDE 20 MEQ/15ML
20 SOLUTION ORAL 2 TIMES DAILY
Refills: 0
Start: 2019-11-18 | End: 2019-12-03 | Stop reason: HOSPADM

## 2019-11-18 RX ORDER — TALC
6 POWDER (GRAM) TOPICAL NIGHTLY PRN
Refills: 0
Start: 2019-11-18

## 2019-11-18 RX ORDER — METOCLOPRAMIDE 5 MG/1
5 TABLET ORAL
Start: 2019-11-18 | End: 2019-12-03 | Stop reason: HOSPADM

## 2019-11-18 RX ORDER — LIDOCAINE 50 MG/G
1 PATCH TOPICAL DAILY
Refills: 0
Start: 2019-11-18

## 2019-11-18 RX ORDER — HYDROCODONE BITARTRATE AND ACETAMINOPHEN 10; 325 MG/1; MG/1
1 TABLET ORAL EVERY 8 HOURS PRN
Refills: 0
Start: 2019-11-18 | End: 2019-12-03 | Stop reason: HOSPADM

## 2019-11-18 RX ORDER — TRAZODONE HYDROCHLORIDE 50 MG/1
25 TABLET ORAL NIGHTLY
Qty: 15 TABLET | Refills: 0
Start: 2019-11-18 | End: 2020-11-17

## 2019-11-18 RX ORDER — DULOXETIN HYDROCHLORIDE 20 MG/1
20 CAPSULE, DELAYED RELEASE ORAL 2 TIMES DAILY
Qty: 60 CAPSULE | Refills: 0
Start: 2019-11-18 | End: 2020-11-17

## 2019-11-18 RX ORDER — ATORVASTATIN CALCIUM 40 MG/1
40 TABLET, FILM COATED ORAL NIGHTLY
Qty: 90 TABLET | Refills: 0
Start: 2019-11-18 | End: 2020-11-17

## 2019-11-18 RX ORDER — GABAPENTIN 300 MG/1
300 CAPSULE ORAL 3 TIMES DAILY
Qty: 90 CAPSULE | Refills: 0
Start: 2019-11-18 | End: 2020-11-17

## 2019-11-18 RX ORDER — ONDANSETRON 2 MG/ML
4 INJECTION INTRAMUSCULAR; INTRAVENOUS EVERY 6 HOURS PRN
Start: 2019-11-18

## 2019-11-18 RX ORDER — DICLOFENAC SODIUM 10 MG/G
4 GEL TOPICAL DAILY
Start: 2019-11-19

## 2019-11-18 RX ADMIN — Medication 6 MG: at 09:11

## 2019-11-18 RX ADMIN — HYDROCODONE BITARTRATE AND ACETAMINOPHEN 1 TABLET: 10; 325 TABLET ORAL at 04:11

## 2019-11-18 RX ADMIN — TIZANIDINE 4 MG: 4 TABLET ORAL at 11:11

## 2019-11-18 RX ADMIN — ASPIRIN 81 MG: 81 TABLET, COATED ORAL at 08:11

## 2019-11-18 RX ADMIN — METOCLOPRAMIDE HYDROCHLORIDE 5 MG: 5 TABLET ORAL at 11:11

## 2019-11-18 RX ADMIN — LIDOCAINE 1 PATCH: 50 PATCH CUTANEOUS at 11:11

## 2019-11-18 RX ADMIN — POTASSIUM CHLORIDE 20 MEQ: 20 SOLUTION ORAL at 09:11

## 2019-11-18 RX ADMIN — DULOXETINE HYDROCHLORIDE 20 MG: 20 CAPSULE, DELAYED RELEASE ORAL at 09:11

## 2019-11-18 RX ADMIN — CLOPIDOGREL BISULFATE 75 MG: 75 TABLET ORAL at 08:11

## 2019-11-18 RX ADMIN — TIZANIDINE 4 MG: 4 TABLET ORAL at 03:11

## 2019-11-18 RX ADMIN — METOCLOPRAMIDE HYDROCHLORIDE 5 MG: 5 TABLET ORAL at 04:11

## 2019-11-18 RX ADMIN — TRAZODONE HYDROCHLORIDE 25 MG: 50 TABLET ORAL at 09:11

## 2019-11-18 RX ADMIN — ACETAMINOPHEN 650 MG: 325 TABLET ORAL at 09:11

## 2019-11-18 RX ADMIN — GABAPENTIN 300 MG: 300 CAPSULE ORAL at 09:11

## 2019-11-18 RX ADMIN — HYDROCODONE BITARTRATE AND ACETAMINOPHEN 1 TABLET: 10; 325 TABLET ORAL at 08:11

## 2019-11-18 RX ADMIN — Medication 125 MG: at 06:11

## 2019-11-18 RX ADMIN — TIZANIDINE 4 MG: 4 TABLET ORAL at 09:11

## 2019-11-18 RX ADMIN — GABAPENTIN 300 MG: 300 CAPSULE ORAL at 04:11

## 2019-11-18 RX ADMIN — METOCLOPRAMIDE HYDROCHLORIDE 5 MG: 5 TABLET ORAL at 07:11

## 2019-11-18 RX ADMIN — ONDANSETRON 4 MG: 2 INJECTION INTRAMUSCULAR; INTRAVENOUS at 06:11

## 2019-11-18 RX ADMIN — POTASSIUM CHLORIDE 20 MEQ: 20 SOLUTION ORAL at 08:11

## 2019-11-18 RX ADMIN — HYDROCODONE BITARTRATE AND ACETAMINOPHEN 1 TABLET: 10; 325 TABLET ORAL at 03:11

## 2019-11-18 RX ADMIN — ATORVASTATIN CALCIUM 40 MG: 20 TABLET, FILM COATED ORAL at 09:11

## 2019-11-18 RX ADMIN — DICLOFENAC 4 G: 10 GEL TOPICAL at 09:11

## 2019-11-18 RX ADMIN — Medication 125 MG: at 12:11

## 2019-11-18 NOTE — PROGRESS NOTES
[facility]  Vascular Neurology  Comprehensive Stroke Center  Progress Note    Assessment/Plan:     * Thrombotic stroke involving left middle cerebral artery  Melva Barker is a 74 y.o. female with PMHx of HTN, HLD, and DM who presented to OSH with acute worsening of R-sided weakness after having experienced R-sided weakness x1 month. She was treated with tPA at OSH. CTA without LVO. MRI with infarct in L internal capsule and corona radiata. Etiology likely small vessel disease.    CT abdomen/pelvis again with large retroperitoneal hematoma however decreased in size, H/H uptrend, resume DAPT today.    Antithrombotics for secondary stroke prevention: Antiplatelets: ASA 81 mg + Plavix 75 mg -- held due to retroperitoneal hematoma. Resumed DAPT 11/17  Statins for secondary stroke prevention and hyperlipidemia, if present: Statins: Atorvastatin- 40 mg daily,   Aggressive risk factor modification: HTN, HLD, Diet  Rehab efforts: The patient has been evaluated by a stroke team provider and the therapy needs have been fully considered based off the presenting complaints and exam findings. The following therapy evaluations are needed: PT evaluate and treat, OT evaluate and treat, SLP evaluate and treat, PM&R evaluate for appropriate placement - Dispo inpatient rehab  Diagnostics ordered/pending: None   VTE prophylaxis: Mechanical SCDs only, heparin 5000 units q 8 hours   BP parameters: Infarct: Post tPA, SBP <160; Avoid hypotension    Cytotoxic cerebral edema  Area of cytotoxic cerebral edema identified when reviewing brain imaging in the territory of the L middle cerebral artery. There is no mass effect associated with it. We will continue to monitor the patients clinical exam for any worsening of symptoms which may indicate expansion of the stroke or the area of the edema resulting in the clinical change. The pattern is suggestive of small vessel etiology.    Retroperitoneal hematoma  Pt  had been c/o back pain in recent days, complicated by her hx of chronic back and sciatic nerve pain with use of Tizanidine and opiates at home.  Due to Hb downtrend and recent tPA administration, MRI Lumbar/Thoracic spine was ordered 11/8 which demonstrated large R psoas hematoma with IVC compression and probable thrombosis.   General Surgery was consulted; No acute intervention pursued.     Patient was then with acute decompensation overnight 11/9; became hypotensive, tachycardic, and apneic with Hb down to 6.7. Rapid Response was called; patient requiring intubation, blood transfusion, and transfer to medical ICU.   CTA abd/pelvis 11/9 showed hematoma with active extravasation/hemorrhage with IVC compression, hemoperitoneum.   Pt went to IR and is now s/p successful R L2 & L3 lumbar spinal artery embolization.   Patient underwent IR re-evaluation on 11/11. No arterial bleed evident on IR angiography.  CT abd/pelvis 11/17: Redemonstration of large right retroperitoneal hematoma which is mildly decreased in size compared to most recent     Hb now stable, Continuing to monitor.    Nausea & vomiting  Patient with episodes of vomiting 11/2 - resolved throughout the day. No episodes of vomiting overnight.   PRN IV Zofran ordered   KUB completed and no abnormality noted  Patient continues to report nausea - some mild middle lower abdominal pain upon palpation  Lipase ordered and WNL  CT abdomen (2016) diverticulosis in the descending colon and a small hiatal hernia. Hx of diabetic gastroparesis, reglan started. Follow up with PCP.  Patient with last BM 11/6 - normal bowel sounds on exam   Sucralfate 1g started 11/5. Increased PRN Zofran dose on 11/13.  Will continue to monitor     Debility  2/2 stroke  PT/OT eval & treat - Dispo inpatient rehab    Essential hypertension  Risk factor for stroke  Holding all BP meds due to hypotension    Back pain  Reported hx chronic back pain with associated sciatic nerve pain as well,  then complicated by acute retroperitoneal hematoma.  Consulted to anesthesia pain management    Controlled on gabapentin,duloxetine,  trazodone, hydrcodone-acetaminophen (prn), tizanidine (prn)     Gastroparesis  Currently on Reglan 5 mg TID  Previously on Reglan 10 mg TID before meals, will order if needed    Diabetes mellitus type 2 without retinopathy  Stroke risk factor, poorly controlled on glargine 17 units daily  HbA1c 8.3%  Recommend tight glucose control  Currently on SSI   Diabetic diet           11/1/19 MRI with small vessel L MCA infarct, therapy recommending rehab  11/2 - Patient stepped down overnight. Adjusting BP regimen. Patient with continuous complaints if nausea. IV Zofran ordered with some relief. Patient has not has BM since 10/30. Ordering KUB to rule out obstruction. Neuro exam unchanged.   11/3 - NAEON. Patient reports she had no episodes of vomiting overnight. Still complaining of nausea. Mild lower abdomen tenderness on exam. Lipase ordered and WNL. Continue to monitor.   11/4 - denies nausea and vomiting. Abd tenderness remains present on palpitation. Continue to monitor. HCTZ increased yesterday. Pending rehab placement.   11/5 -  N/V x1 overnight zofran resolved, sucralfate started. Placement pending   11/6 - hypotensive, held all BP meds,  ml bolus, responded well. Increased BUN/Cr, start NS 75ml/hr.   11/7 patient complaining of sciatica pain.  Restarted home tizanidine.  Patient requesting hydrocodone but educated patient that nerve pain is not treated with opioid.  Degenerative disease seen on xray but no fracture.      11/8 Patient continues to experience back pain.  Now with leukocytosis and drop in h/h plan for MRI thoracic/lumbar spine to evaluate for epidural hematoma.    11/9: MRI Lumbar spine had demonstrated large R psoas hematoma with possible IVC compression; General Surgery consulted. Patient was then with acute decompensation overnight; became hypotensive,  tachycardic, and apneic requiring intubation, blood transfusion, and medical ICU transfer. CTA abd/pelvis showed active extravasation/hemorrhage with hemoperitoneum. Pt now s/p successful lumbar artery embolization in IR. She was extubated on arrival back to ICU and tolerated well.    11/11: Patient underwent IR re-evaluation yesterday. No arterial bleed evident on IR angiography. Patient neuro exam stable.   11/12 Stepped down from medical ICU this morning.  Patient still experiencing pain.  Anesthesia consulted for pain management.  D/C pain pump and started oral medications for muscle and nerve pain.  Will wean to home regimen. Waiting for repeat CBC, transfused one unit of blood 11/11.   Tachycardic/hypertensive will continue to monitor.  11/13: Upgraded diet per SLP recs. Pt continues to c/o R back pain, concern that tachycardia could be pain-induced; adjusted analgesic regimen further as well as antiemetic regimen with plans to eventually wean to home regimen. Replaced Phos. Dispo inpatient rehab.  11/14: patient hypotensive overnight and this morning requiring fluid boluses. Discontinued all pain medications except for celecoxib in case pain meds are contributing to hypotension. Lidocaine patch and Voltaren gel ordered as well. Infectious workup significant for minor atelectasis in R lower lung with increased procal and lactate. Patient started on IV antibiotics for community acquired pneumonia. Blood cultures pending. ICU notified of patient's hypotension  11/15: Liquid diarrhea, WBC 14, stopped empiric abx, ordered c. Diff and stool culture, start PO vanc, IVFs. Adjusted pain meds with goal to return to home regimen. Nauseous overnight, zofran given.   11/16: CT abdomen and pelvis to f/u retroperitoneal hematoma prior to starting DAPT, H/H stable. T max 99.4, tachy, WBC 13, C. Diff and stool cultures pending, increased  ml/hr, labile BP, continue to monitor. Remove central line.   11/17: CT  abdomen/pelvis again with large retroperitoneal hematoma  but decreased in size, H/H uptrend, resume DAPT today. Diarrhea becoming thicker, WBC improved, a febrile, c. Diff antigen positive, toxins negative. Pain well controlled.   11/18 PCR negative, c.diff precautions and vancomycin d/c.  Neurologically stable waiting for placement.      STROKE DOCUMENTATION   Acute Stroke Times   Last Known Normal Date: 10/31/19  Last Known Normal Time: 0630  Symptom Onset Date: 10/31/19  Symptom Onset Time: 0730  Stroke Team Called Date: 10/31/19  Stroke Team Called Time: 0936  Stroke Team Arrival Date: 10/31/19  Stroke Team Arrival Time: 0946  Decision to Treat Time for Alteplase: 1003    NIH Scale:  1a. Level of Consciousness: 0-->Alert, keenly responsive  1b. LOC Questions: 0-->Answers both questions correctly  1c. LOC Commands: 0-->Performs both tasks correctly  2. Best Gaze: 0-->Normal  3. Visual: 0-->No visual loss  4. Facial Palsy: 1-->Minor paralysis (flattened nasolabial fold, asymmetry on smiling)  5a. Motor Arm, Left: 0-->No drift, limb holds 90 (or 45) degrees for full 10 secs  5b. Motor Arm, Right: 2-->Some effort against gravity, limb cannot get to or maintain (if cued) 90 (or 45) degrees, drifts down to bed, but has some effort against gravity  6a. Motor Leg, Left: 0-->No drift, leg holds 30 degree position for full 5 secs  6b. Motor Leg, Right: 3-->No effort against gravity, leg falls to bed immediately  7. Limb Ataxia: 0-->Absent  8. Sensory: 0-->Normal, no sensory loss  9. Best Language: 0-->No aphasia, normal  10. Dysarthria: 1-->Mild-to-moderate dysarthria, patient slurs at least some words and, at worst, can be understood with some difficulty  11. Extinction and Inattention (formerly Neglect): 0-->No abnormality  Total (NIH Stroke Scale): 7       Modified Estill Score: 0  [place] Coma Scale:    ABCD2 Score:    [id number] Score:   HAS -BLED Score:   ICH Score:   Hunt & Amaya Classification:      Hemorrhagic  change of an Ischemic Stroke: Does this patient have an ischemic stroke with hemorrhagic changes? No     Neurologic Chief Complaint: R-sided weakness -- L MCA    Subjective:     Interval History: Patient is seen for follow-up neurological assessment and treatment recommendations: PCR negative, c.diff precautions and vancomycin d/c.  Neurologically stable waiting for placement      HPI, Past Medical, Family, and Social History remains the same as documented in the initial encounter.     Review of Systems   Constitutional: Negative for chills and fever.   Respiratory: Negative for cough and shortness of breath.    Cardiovascular: Negative for chest pain.   Musculoskeletal: Positive for arthralgias and back pain.   Neurological: Positive for facial asymmetry, speech difficulty and weakness. Negative for dizziness.   Psychiatric/Behavioral: Negative for agitation and confusion.     Scheduled Meds:   acetaminophen  650 mg Oral Q8H    aspirin  81 mg Oral Daily    atorvastatin  40 mg Oral QHS    clopidogrel  75 mg Oral Daily    diclofenac sodium  4 g Topical (Top) Daily    DULoxetine  20 mg Oral BID    gabapentin  300 mg Oral TID    lidocaine  1 patch Transdermal Q24H    metoclopramide HCl  5 mg Oral TID AC    polyethylene glycol  17 g Oral Daily    potassium chloride 10%  20 mEq Oral BID    traZODone  25 mg Oral QHS     Continuous Infusions:    PRN Meds:sodium chloride, dextrose 10 % in water (D10W), glucagon (human recombinant), HYDROcodone-acetaminophen, insulin aspart U-100, melatonin, ondansetron, sodium chloride 0.9%, tiZANidine    Objective:     Vital Signs (Most Recent):  Temp: 98.7 °F (37.1 °C) (11/18/19 1547)  Pulse: 94 (11/18/19 1547)  Resp: 18 (11/18/19 1547)  BP: (!) 155/83 (11/18/19 1547)  SpO2: 97 % (11/18/19 1547)  BP Location: Right arm    Vital Signs Range (Last 24H):  Temp:  [97 °F (36.1 °C)-99.9 °F (37.7 °C)]   Pulse:  []   Resp:  [16-18]   BP: (114-178)/(64-84)   SpO2:  [93 %-99 %]    BP Location: Right arm    Physical Exam   Constitutional: She is oriented to person, place, and time. She appears well-developed and well-nourished. No distress.   HENT:   Head: Normocephalic and atraumatic.   Eyes: EOM are normal.   Cardiovascular: Normal rate.   Pulmonary/Chest: Effort normal. No respiratory distress.   Abdominal: Bowel sounds are increased.   Neurological: She is alert and oriented to person, place, and time.   Skin: Skin is warm and dry. She is not diaphoretic.   Psychiatric: Cognition and memory are not impaired. She is attentive.   Vitals reviewed.      Neurological Exam:   LOC: alert   Attention Span: good  Language: No aphasia  Articulation: Mild dysarthria  Orientation: Person, Time, Place  Visual Fields: Full  EOM (CN III, IV, VI): Full/intact  Facial Movement (CN VII): Lower facial weakness on the Right  Motor: Arm left  Normal 5/5  Leg left  Normal 5/5  Arm right  Paresis: +3/5   Leg right Paresis: 1/5  Sensation: Intact to light touch, temp  Tone: Normal tone throughout    Laboratory:  CMP:   Recent Labs   Lab 11/18/19  0504   CALCIUM 8.9   ALBUMIN 2.8*   PROT 6.7      K 4.6   CO2 19*      BUN 4*   CREATININE 0.7   ALKPHOS 77   ALT 17   AST 24   BILITOT 1.4*     CBC:   Recent Labs   Lab 11/18/19  0505   WBC 13.86*   RBC 3.22*   HGB 9.1*   HCT 27.7*      MCV 86   MCH 28.3   MCHC 32.9     Lipid Panel:   No results for input(s): CHOL, LDLCALC, HDL, TRIG in the last 168 hours.  Coagulation:   No results for input(s): PT, INR, APTT in the last 168 hours.  Platelet Aggregation Study: No results for input(s): PLTAGG, PLTAGINTERP, PLTAGREGLACO, ADPPLTAGGREG in the last 168 hours.  Hgb A1C:   No results for input(s): HGBA1C in the last 168 hours.  TSH:   No results for input(s): TSH in the last 168 hours.      Diagnostic Results     Brain Imaging     MRI Brain (11/01/19):  Acute lacunar type infarct coursing through the left posterior limb internal capsule and corona  radiata.  Cytotoxic cerebral edema        CTH (10/31/19):  No acute intracranial pathology      Vessel Imaging     CTA Multiphase (10/31/19):  CTA head: Atherosclerotic disease of the cavernous and supraclinoid ICAs with mild narrowing.  Otherwise unremarkable CTA of the head specifically without evidence for proximal significant stenosis or occlusion.  CTA neck: Less than 50% proximal ICA stenosis by NASCET criteria.  Atherosclerotic disease with mild moderate narrowing of the right vertebral artery origin.  There is mild left V1 segment narrowing.  No significant focal vertebral artery stenosis or occlusion.  Interlobular septal thickening with tree in bud micro nodularity visualized upper lobes bilaterally which may represent inflammatory/infectious process clinical correlation and correlation with dedicated CT thorax recommended.  CT head: Bandlike region hypoattenuation left posterior limb internal capsule unchanged from prior suggestive for possible recent to subacute age infarction.  No evidence for hydrocephalus or acute intracranial hemorrhage.  Clinical correlation and further evaluation with MRI if patient compatible.      Cardiac Imaging   Echo (11/01/19)  · Increased (hyperdynamic) left ventricular systolic function. The estimated ejection fraction is 75%.  · Concentric left ventricular remodeling.  · Normal LV diastolic function.  · No wall motion abnormalities.  · Normal right ventricular systolic function.  · Normal central venous pressure (3 mm Hg).  · Mild tricuspid regurgitation.  · The estimated PA systolic pressure is 26 mm Hg  · Echolucent structure next to the LA, likely representing hiatal hernia. Clinincal correlation is required.      Miscellaneous Imaging  CT abdomen/pelvis 11/16/19  1. Redemonstration of large right retroperitoneal hematoma which is mildly decreased in size compared to most recent examination of 11/10/2019.  Continued mass effect on the right kidney, which is displaced  anteriorly.  2. Soft tissue induration/fat stranding involving the left inguinal region, likely relating to recent vascular catheterization.  Correlation with physical exam is advised.  3. Small right pleural effusion with bibasilar atelectasis.  4. Air within the urinary bladder, presumably secondary to recent urinary catheterization although clinical correlation is advised.  5. Multiple additional findings as detailed above.    IR Angiogram Visceral 11/10/19 pending    CTA Abdomen/Pelvis 11/10/19  Right-sided retroperitoneal hematoma with questionable foci of active extravasation within the inferior portion of the collection as described above.    IR Angiogram Visceral 11/9/19  Angiography of the lumbar arteries demonstrates active extravasation of the right L2 lumbar artery. Embolization using beads as described above.  Plan: Transfer patient to ICU.  Continued serial H and H follow-up.    CTA Abdomen/Pelvis 11/9/19  1. Large right retroperitoneal hematoma with findings concerning for active arterial extravasation/hemorrhage as detailed above.  There is associated hemorrhage extending throughout the right abdomen and pelvis/pericolic gutter with associated mass effect on the right kidney, which is anteriorly displaced.  2. Decreased opacification of the infahepatic and infrarenal IVC, possibly secondary to underlying mass effect versus partial thrombosis.  3. Nonspecific cecal and right colonic wall thickening, possibly reactive due to hemoperitoneum.  4. Additional findings as detailed above.    MRI Thoracic/Lumbar Spine W WO Contr 11/8/19  Large hematoma within the right psoas muscle.  Compression and probable thrombosis of the IVC. Consider contrast enhanced CT with delayed phase.  Multilevel degenerative changes as detailed above, more severe in the lumbar spine.  Irregularity of the T12 through L3 endplates is most likely degenerative.      Danya Mancia PA-C  [facility]  Department of  Vascular Neurology   [facility]

## 2019-11-18 NOTE — PT/OT/SLP PROGRESS
"Physical Therapy Treatment    Patient Name:  Melva Barker   MRN:  [id number]    Recommendations:     Discharge Recommendations:  nursing facility, skilled   Discharge Equipment Recommendations: (TBD)   Barriers to discharge: Inaccessible home, Decreased caregiver support and patient below functional baseline    Assessment:     Melva Barker is a 74 y.o. female admitted with a medical diagnosis of Thrombotic stroke involving left middle cerebral artery.  She presents with the following impairments/functional limitations:  weakness, impaired endurance, impaired self care skills, impaired functional mobilty, decreased coordination, impaired balance, impaired cognition, gait instability, decreased upper extremity function, decreased lower extremity function, decreased safety awareness, pain, abnormal tone, impaired fine motor, impaired coordination, decreased ROM. The patient demonstrates R hemiparesis. Her back pain is exacerbated in sitting and with use of R LE.     Rehab Prognosis: Fair; patient would benefit from acute skilled PT services to address these deficits and reach maximum level of function.    Recent Surgery: * No surgery found *      Plan:     During this hospitalization, patient to be seen 3 x/week to address the identified rehab impairments via gait training, therapeutic activities, therapeutic exercises, neuromuscular re-education and progress toward the following goals:    · Plan of Care Expires:  12/12/19    Subjective     Chief Complaint: "You should have made sure I had pain medicine before you came to see me", "I'll get up to the chair later, when my pain medicine kicks in"  Patient/Family Comments/goals: sit up in chair, pain management  Pain/Comfort:  · Pain Rating 1: (significant R sided back pain)  · Location - Side 1: Right  · Location - Orientation 1: lower  · Location 1: back  · Pain Addressed 1: Pre-medicate for activity, Reposition, Distraction, Nurse notified, Cessation of " Activity(patient recieved pain meds during session)  · Pain Rating Post-Intervention 1: (decreased pain in supine)      Objective:     Communicated with RN prior to session.  Patient found HOB elevated with telemetry, peripheral IV upon PT entry to room.     General Precautions: Standard, fall   Orthopedic Precautions:N/A   Braces: N/A     Functional Mobility:    Bed Mobility  Rolling to R: minimum assistance   Supine to Sit:  minimum assistance, cues for sequencing  Scoot to edge of bed: stand by assistance   Transfers Sit to Stand:  moderate assistance, blocking R knee, cues for sequencing  Step transfer bed <> chair: moderate assistance, facilitation for weight shift, short shuffling steps especially on R side, decreased weight bearing on R due to pain.           AM-PAC 6 CLICK MOBILITY  Turning over in bed (including adjusting bedclothes, sheets and blankets)?: 3  Sitting down on and standing up from a chair with arms (e.g., wheelchair, bedside commode, etc.): 2  Moving from lying on back to sitting on the side of the bed?: 3  Moving to and from a bed to a chair (including a wheelchair)?: 2  Need to walk in hospital room?: 1  Climbing 3-5 steps with a railing?: 1  Basic Mobility Total Score: 12       Therapeutic Activities and Exercises:   Extensive education on importance of mobility to improve strength and independence. Patient educated on benefits of sitting up in chair. Patient demo'd understanding, refused to stay in chair at this time due to pain. Patient agreed to call RN to request to transfer to chair in 30 min, RN alerted and is in agreement to transfer patient later on. Patient sat EOB 8 minutes with contact guard assist, limited due to increasing pain, dizziness.  Performed LAQ, 10 reps on R with minimum assistance, limited due to pain- to improve neuromuscular control and strengthen LE to increase standing tolerance.   Patient  educated on role of therapy, goals of session, benefits of out of bed  mobility. Patient agreeable to mobilize with therapy.  Discussed PT plan of care during hospitalization. Patient educated that they need to call for assistance to mobilize out of bed. Whiteboard updated as appropriate. Patient educated on how their diagnosis impacts their mobility within PT scope of practice.     Patient left HOB elevated with all lines intact, call button in reach, bed alarm on and RN notified..    GOALS:   Multidisciplinary Problems     Physical Therapy Goals        Problem: Physical Therapy Goal    Goal Priority Disciplines Outcome Goal Variances Interventions   Physical Therapy Goal     PT, PT/OT Ongoing, Progressing     Description:  Goals to be met by:     Patient will increase functional independence with mobility by performin. Supine to sit with contact guard assist   2. Sit to supine with contact guard assist    3. Sit to stand transfer with contact guard assistance using least restrictive device   4. Gait  X 25 feet with Minimal Assistance using least restrictive device.   5. Stand for 1 minutes with Contact Guard Assistance using  least restrictive device or no AD  6. Lower extremity exercise program x20 reps per handout, with independence to improve strength and activity tolerance.                           Time Tracking:     PT Received On: 19  PT Start Time: 907     PT Stop Time: 930  PT Total Time (min): 23 min     Billable Minutes: Therapeutic Activity 12 (co-tx with OT due to limited activity tolerance)    Treatment Type: Treatment  PT/PTA: PT     PTA Visit Number: 0     Hortencia Umaña, PT  2019

## 2019-11-18 NOTE — PT/OT/SLP PROGRESS
Occupational Therapy   Treatment    Name: Melva Barker  MRN: [id number]  Admitting Diagnosis:  Thrombotic stroke involving left middle cerebral artery       Recommendations:     Discharge Recommendations: nursing facility, skilled  Discharge Equipment Recommendations:  (TBD next level of care)  Barriers to discharge:  Decreased caregiver support    Assessment:     Melva Barker is a 74 y.o. female with a medical diagnosis of Thrombotic stroke involving left middle cerebral artery.  She presents with performance deficits including weakness, impaired endurance, impaired functional mobilty, impaired self care skills, impaired balance, decreased coordination, decreased upper extremity function, decreased lower extremity function, pain, decreased ROM. Pt continues to be limited by pain and able only to tolerate minimal OOB activity. Pt would continue to benefit from OT to increase functional independence and safety. Recommend SNF upon D/C due to decreased activity tolerance and pt is unsafe to return home at current functional level.    Rehab Prognosis:  Good; patient would benefit from acute skilled OT services to address these deficits and reach maximum level of function.       Plan:     Patient to be seen 3 x/week to address the above listed problems via self-care/home management, therapeutic exercises, neuromuscular re-education  · Plan of Care Expires: 12/13/19  · Plan of Care Reviewed with: patient    Subjective     Pain/Comfort:  · Pain Rating 1: 9/10  · Location - Side 1: Right  · Location - Orientation 1: lower  · Location 1: back  · Pain Addressed 1: Pre-medicate for activity, Reposition, Cessation of Activity, Nurse notified, Distraction  · Pain Rating Post-Intervention 1: (remained throughout)    Objective:     Communicated with: RN prior to session. Patient found supine with peripheral IV, telemetry, Avasys camera upon OT entry to room.    General Precautions: Standard, fall, contact   Orthopedic  Precautions:N/A   Braces: N/A     Occupational Performance:     Bed Mobility:    · Patient completed Supine to Sit with minimum assistance with cues for technique  · Patient completed Sit to Supine with minimum assistance   · Patient completed Scooting to EOB while seated with SBA    Functional Mobility/Transfers:  · Patient completed Sit <> Stand Transfer with moderate assistance with hand-held assist from EOB  · Functional Mobility: Few steps to and from bedside chair with moderate assistance with hand-held assist    Activities of Daily Living:  · Lower Body Dressing: minimum assistance to don socks      [facility] 6 Click ADL: 17    Treatment & Education:  Pt able to sit EOB with SBA while taking medication with RN present-- able to use B hands functionally; practiced T/F to bedside chair and pt able to tolerate sitting up ~5 minutes while completing LE and UE therex and then requesting to return to bed due to pain-- pt pre-medicated and declined attempting to remain sitting up despite encouragement; returned to supine with HOB elevated and reviewed UE therex and to call for assistance before getting up    Patient left HOB elevated with all lines intact, call button in reach, bed alarm on and RN notifiedEducation:      GOALS:   Multidisciplinary Problems     Occupational Therapy Goals        Problem: Occupational Therapy Goal    Goal Priority Disciplines Outcome Interventions   Occupational Therapy Goal     OT, PT/OT Ongoing, Progressing    Description:  Updated Goals to be met by: 7 days (11/20/19)     Patient will increase functional independence with ADLs by performing:    UE Dressing with Contact Guard Assistance.  LE Dressing with Minimal Assistance.  Grooming while standing with Minimal Assistance.  Toileting from bedside commode with Minimal Assistance for hygiene and clothing management.   Toilet transfer to bedside commode with Contact Guard Assistance.  Increased functional strength to WFL for  ADLs.  Complete functional mobility household distance with CGA using AD as needed.     Updated Goals to be met by: 7 days (11/14/19)     Patient will increase functional independence with ADLs by performing:    UE Dressing with Contact Guard Assistance.  LE Dressing with Minimal Assistance.  Grooming while standing with Minimal Assistance.  Toileting from bedside commode with Minimal Assistance for hygiene and clothing management.   Toilet transfer to bedside commode with Contact Guard Assistance.  Increased functional strength to WFL for ADLs.  Complete functional mobility household distance with CGA using AD as needed.                     Time Tracking:     OT Date of Treatment: 11/18/19  OT Start Time: 0906  OT Stop Time: 0929  OT Total Time (min): 23 min (co-treat with PT due to decreased activity tolerance)    Billable Minutes:Therapeutic Activity 12 minutes    AURORA Florian  11/18/2019

## 2019-11-18 NOTE — PLAN OF CARE
11/18/19 1053   Post-Acute Status   Post-Acute Authorization Placement   Post-Acute Placement Status Additional Clinical Requested       SW sent updated data to [facility] for pt. Updates.     Virgie Wheat LMSW   PRN

## 2019-11-18 NOTE — PLAN OF CARE
[facility]    FACILITY TRANSFER ORDERS      Patient Name: Melva Barker  YOB: 1945    PCP: Claire Souza MD   PCP Address: [address] [place] [id number]  PCP Phone Number: [phone]  PCP Fax: [phone]    Encounter Date: 11/18/2019    Admit to: Inpatient Rehab    Vital Signs:  Routine     Diagnoses:   Active Hospital Problems    Diagnosis  POA    *Thrombotic stroke involving left middle cerebral artery [I63.312]  Yes     Priority: 1 - High    Cytotoxic cerebral edema [G93.6]  Yes     Priority: 1 - High    Retroperitoneal hematoma [K66.1]  No     Priority: 2     Nausea & vomiting [R11.2]  No    Neuropathy [G62.9]  Unknown    Debility [R53.81]  Yes    Gastroparesis [K31.84]  Yes    Essential hypertension [I10]  Yes     Chronic    Back pain [M54.9]  Yes    Diabetes mellitus type 2 without retinopathy [E11.9]  Yes     Chronic    Leukocytosis [D72.829]  No      Resolved Hospital Problems    Diagnosis Date Resolved POA    Tachycardia [R00.0] 11/18/2019 No    Hypotension [I95.9] 11/18/2019 No    Acute hypoxemic respiratory failure [J96.01] 11/13/2019 No    Acute metabolic encephalopathy [G93.41] 11/10/2019 No    Nontraumatic hemorrhagic shock [R57.8] 11/13/2019 No    FALLON (acute kidney injury) [N17.9] 11/13/2019 No       Allergies:  Review of patient's allergies indicates:   Allergen Reactions    Morphine Other (See Comments)     headache    Latex, natural rubber Rash       Diet: mechanical soft cardiac diet with thin liquids     Activities: Activity as tolerated    Nursing: per facility protocol, up in chair and ambulation as tolerated      Labs: CBC 11/21    CONSULTS:    Physical Therapy to evaluate and treat. , Occupational Therapy to evaluate and treat., Speech Therapy to evaluate and treat for Language, Swallowing and Cognition. and  to evaluate for community resources/long-range planning.    MISCELLANEOUS CARE:  Routine Skin for  Bedridden Patients: Apply moisture barrier cream to all skin folds and wet areas in perineal area daily and after baths and all bowel movements.    WOUND CARE ORDERS  None    Medications: Review discharge medications with patient and family and provide education.      Current Discharge Medication List      START taking these medications    Details   acetaminophen (TYLENOL) 325 MG tablet Take 2 tablets (650 mg total) by mouth every 8 (eight) hours.  Refills: 0      aspirin (ECOTRIN) 81 MG EC tablet Take 1 tablet (81 mg total) by mouth once daily.  Refills: 0      atorvastatin (LIPITOR) 40 MG tablet Take 1 tablet (40 mg total) by mouth every evening.  Qty: 90 tablet, Refills: 0      clopidogrel (PLAVIX) 75 mg tablet Take 1 tablet (75 mg total) by mouth once daily.  Qty: 30 tablet, Refills: 0      diclofenac sodium (VOLTAREN) 1 % Gel Apply 4 g topically once daily.      DULoxetine (CYMBALTA) 20 MG capsule Take 1 capsule (20 mg total) by mouth 2 (two) times daily.  Qty: 60 capsule, Refills: 0      gabapentin (NEURONTIN) 300 MG capsule Take 1 capsule (300 mg total) by mouth 3 (three) times daily.  Qty: 90 capsule, Refills: 0      insulin aspart U-100 (NOVOLOG) 100 unit/mL (3 mL) InPn pen Inject 0-5 Units into the skin every 6 (six) hours as needed (Hyperglycemia).  Refills: 0      lidocaine (LIDODERM) 5 % Place 1 patch onto the skin once daily. Remove & Discard patch within 12 hours or as directed by MD  Refills: 0      melatonin (MELATIN) Take 2 tablets (6 mg total) by mouth nightly as needed for Insomnia.  Refills: 0      metoclopramide HCl (REGLAN) 5 MG tablet Take 1 tablet (5 mg total) by mouth 3 (three) times daily before meals.      ondansetron 4 mg/2 mL Soln Inject 4 mg into the vein every 6 (six) hours as needed.      polyethylene glycol (GLYCOLAX) 17 gram PwPk Take 17 g by mouth once daily.  Refills: 0      potassium chloride 10% (KAYCIEL) 20 mEq/15 mL oral solution Take 15 mLs (20 mEq total) by mouth 2 (two)  times daily.  Refills: 0      traZODone (DESYREL) 50 MG tablet Take 0.5 tablets (25 mg total) by mouth every evening.  Qty: 15 tablet, Refills: 0         CONTINUE these medications which have CHANGED    Details   HYDROcodone-acetaminophen (NORCO)  mg per tablet Take 1 tablet by mouth every 8 (eight) hours as needed.  Refills: 0         CONTINUE these medications which have NOT CHANGED    Details   ergocalciferol (VITAMIN D2) 50,000 unit Cap Take 50,000 Units by mouth every 7 days.      TRUE METRIX GLUCOSE METER Misc       TRUE METRIX GLUCOSE TEST STRIP Strp       TRUEPLUS LANCETS 33 gauge Misc          STOP taking these medications       benazepril (LOTENSIN) 40 MG tablet Comments:   Reason for Stopping:         bisoprolol-hydrochlorothiazide (ZIAC) 10-6.25 mg per tablet Comments:   Reason for Stopping:         fluconazole (DIFLUCAN) 150 MG Tab Comments:   Reason for Stopping:         gabapentin (NEURONTIN) 600 MG tablet Comments:   Reason for Stopping:         insulin aspart (NOVOLOG) 100 unit/mL injection Comments:   Reason for Stopping:         INSULIN GLARGINE,HUM.REC.ANLOG (TOUJEO SOLOSTAR SUBQ) Comments:   Reason for Stopping:         mupirocin (BACTROBAN) 2 % ointment Comments:   Reason for Stopping:         pantoprazole (PROTONIX) 40 MG tablet Comments:   Reason for Stopping:         promethazine (PHENERGAN) 25 MG suppository Comments:   Reason for Stopping:         tiZANidine (ZANAFLEX) 4 MG tablet Comments:   Reason for Stopping:                    _________________________________  Danya Mancia PA-C  11/18/2019

## 2019-11-18 NOTE — PHYSICIAN QUERY
PT Name: Melva Barker  MR #: [id number]     Physician Query Form - Documentation Clarification      CDS/: Mary Steel RN, CDS               Contact information: [email or web address]    This form is a permanent document in the medical record.     Query Date: November 18, 2019    By submitting this query, we are merely seeking further clarification of documentation. Please utilize your independent clinical judgment when addressing the question(s) below.    The Medical record reflects the following:    Supporting Clinical Findings Location in Medical Record     --Hypotension requiring iv fluid boluses. Some dizziness. Elevated lactic acid but afebrile,white count and H/H stable.         -Differential includes sepsis vs pain medication          -Infectious workup ordered         -atelectasis in R lower lung, started on 7 day course of IV cefepime and vanc for possible hospital acquired pneumonia, blood culture pending    --Notified by the patient's nurse of patient's c/o nausea despite being on scheduled reglan.  The nurse states the patient is also c/o abdominal pain.           -Mild suprapubic tenderness.  Patient recently had a UA + for UTI, culture pending    --Leukocytosis           -Infectious workup significant for minor atelectasis in R lower lung with increased procal and lactate. Patient started on IV antibiotics for community acquired pneumonia.           -11/15 - Liquid diarrhea started overnight, stopped empiric abx, ordered c. Diff and stool culture, start PO vanc for possible c. diff       Vas neuro Note 11/14              Vas Neuro care Update 11/14          Vas neuro Note 11/15     --Tachycardia        -Possibly related to pain vs infection.        -Infectious work up pending. PO vanc for possible C. Diff  Improved, continue IVFs due to diarrhea, encourage PO intake.            -BP labile. WBC and tachycardia improved, A febrile  --C. Diff EIA antigen positive, toxins negative;  C diff toxin PCR, negative. Diarrhea improving, thicker. WBC improved, A febrile.    --WBC: 11.46->13.63->14.09->13.86      --C. diff PCR: Negative  C. diff Antigen Positive    C difficile Toxins A+B, EIA Negative      Urine Culture, Routine Abnormal    ESCHERICHIA COLI   10,000 - 49,999 cfu/ml      Blood Culture, Routine No Growth to date      --Vancomycin 1250mg IV x 1 (11/14)  --Cefepime 2g IV q12h (11/14 x 2)  --Vancomycin 125mg PO q6h (11/15-11/18)     Vas neuro Note 11/17              Labs: 11/12->11/18    Labs 11/15          Lab 11/14        Lab 11/14    MAR                                                                        Doctor, Please specify diagnosis or diagnoses associated with above clinical findings.      Please clarify/specify all infectious diagnosis(es) ruled in:    Provider Use Only    [  X] Hospital Acquired Pneumonia (please specify the type): __unspecified________    [ X ] E. Coli UTI  [  ] Unspecified UTI    [  ] Clostridium Difficile    [  ] Sepsis due to (please specify if known): ____________    [  ] Other diagnosis (please specify):__________                                                                                                               [  ] Clinically Undetermined

## 2019-11-18 NOTE — NURSING
POC reviewed with patient. Patient verbalized understanding. VSS. No signs of distress noted. Patient treated for unrelieved pain. Safety measures maintained. Bed locked in lowest position, side rails up x 2. Call light within reach. Will continue to monitor.

## 2019-11-18 NOTE — PT/OT/SLP PROGRESS
"Speech Language Pathology Treatment    Patient Name:  Melva Barker   MRN:  [id number]  Admitting Diagnosis: Thrombotic stroke involving left middle cerebral artery    Recommendations:                 General Recommendations:  Cognitive-linguistic therapy  Diet recommendations:  Mechanical soft, Liquid Diet Level: Thin   Aspiration Precautions: 1 bite/sip at a time, Feed only when awake/alert and Standard aspiration precautions   General Precautions: Standard, fall  Communication strategies:  none    Subjective     Patient awake and alert during session  "I'm doing better since I'm not in as much pain now." Patient referring to improved cognition  Communicated with nurse prior to session     Pain/Comfort:  · Pain Rating 1: 0/10  · Pain Rating Post-Intervention 1: 0/10    Objective:     Has the patient been evaluated by SLP for swallowing?   Yes  Keep patient NPO? No   Current Respiratory Status: room air      Patient seen for ongoing cognitive therapy and education regarding ST following discharge. She was reclined in bed during session with avasys in place. Patient reported that she was below baseline from a cognitive standpoint, but that she improved from prior week. She was planned for discharge today and demonstrated adequate knowledge of different rehab therapies and their purposes. She was WFL for simple problem solving questions involving safety awareness at home. She answered 2/3 4-part mental manipulation questions independently during session, increasing to 3/3 with mod assist. Patient reported no difficulties with current diet, but declined PO trials on this date. SLP educated her regarding role of ST in her POC and she verbalized understanding. Patient left in room with call light within reach and avasys present.     Assessment:     Melva Barker is a 74 y.o. female with an SLP diagnosis of Cognitive-Linguistic Impairment.      Goals:   Multidisciplinary Problems     SLP Goals        Problem: " SLP Goal    Goal Priority Disciplines Outcome   SLP Goal     SLP Ongoing, Progressing   Description:  Speech Language Pathology Goals  Goals expected to be met by 11/20  1. Pt will tolerate a mechanical soft diet/thin liquids with no s/s of aspiration.   2. Pt will participate in conversation without cues to express thought.   3. Pt will complete simple functional reasoning tasks with 80% accy given min cues.   4. Pt will name name 13 items in a category in 1 minute with mod assist.  5. Pt will follow multi-step commands with 75% accuracy and min assist.  6. Pt will complete assessment of reading, writing, visual spatial skills to determine need for tx.                     Plan:     · Patient to be seen:  3 x/week   · Plan of Care expires:  12/13/19  · Plan of Care reviewed with:  patient   · SLP Follow-Up:  Yes       Discharge recommendations:  nursing facility, skilled   Barriers to Discharge:  None    Time Tracking:     SLP Treatment Date:   11/18/19  Speech Start Time:  1243  Speech Stop Time:  1251     Speech Total Time (min):  8 min    Billable Minutes: Speech Therapy Individual 8    Venu Marquez CCC-SLP  Speech-Language Pathology  Pager: [phone]   11/18/2019

## 2019-11-18 NOTE — PLAN OF CARE
Problem: Physical Therapy Goal  Goal: Physical Therapy Goal  Description  Goals to be met by:     Patient will increase functional independence with mobility by performin. Supine to sit with contact guard assist   2. Sit to supine with contact guard assist    3. Sit to stand transfer with contact guard assistance using least restrictive device   4. Gait  X 25 feet with Minimal Assistance using least restrictive device.   5. Stand for 1 minutes with Contact Guard Assistance using  least restrictive device or no AD  6. Lower extremity exercise program x20 reps per handout, with independence to improve strength and activity tolerance.          Outcome: Ongoing, Progressing   Continue with plan of care.   Hortencia Umaña, PT  2019

## 2019-11-18 NOTE — PLAN OF CARE
Problem: SLP Goal  Goal: SLP Goal  Description  Speech Language Pathology Goals  Goals expected to be met by 11/20  1. Pt will tolerate a mechanical soft diet/thin liquids with no s/s of aspiration.   2. Pt will participate in conversation without cues to express thought.   3. Pt will complete simple functional reasoning tasks with 80% accy given min cues.   4. Pt will name name 13 items in a category in 1 minute with mod assist.  5. Pt will follow multi-step commands with 75% accuracy and min assist.  6. Pt will complete assessment of reading, writing, visual spatial skills to determine need for tx.    Outcome: Ongoing, Progressing     Patient seen for ongoing cognitive-linguistic therapy and education.    Venu Marquez CCC-SLP  Speech-Language Pathology  Pager: [phone]

## 2019-11-18 NOTE — ASSESSMENT & PLAN NOTE
Melva Barker is a 74 y.o. female with PMHx of HTN, HLD, and DM who presented to OSH with acute worsening of R-sided weakness after having experienced R-sided weakness x1 month. She was treated with tPA at OSH. CTA without LVO. MRI with infarct in L internal capsule and corona radiata. Etiology likely small vessel disease.    CT abdomen/pelvis again with large retroperitoneal hematoma however decreased in size, H/H uptrend, resume DAPT today.    Antithrombotics for secondary stroke prevention: Antiplatelets: ASA 81 mg + Plavix 75 mg -- held due to retroperitoneal hematoma. Resumed DAPT 11/17  Statins for secondary stroke prevention and hyperlipidemia, if present: Statins: Atorvastatin- 40 mg daily,   Aggressive risk factor modification: HTN, HLD, Diet  Rehab efforts: The patient has been evaluated by a stroke team provider and the therapy needs have been fully considered based off the presenting complaints and exam findings. The following therapy evaluations are needed: PT evaluate and treat, OT evaluate and treat, SLP evaluate and treat, PM&R evaluate for appropriate placement - Dispo inpatient rehab  Diagnostics ordered/pending: None   VTE prophylaxis: Mechanical SCDs only, heparin 5000 units q 8 hours   BP parameters: Infarct: Post tPA, SBP <160; Avoid hypotension

## 2019-11-18 NOTE — SUBJECTIVE & OBJECTIVE
Neurologic Chief Complaint: R-sided weakness -- L MCA    Subjective:     Interval History: Patient is seen for follow-up neurological assessment and treatment recommendations: PCR negative, c.diff precautions and vancomycin d/c.  Neurologically stable waiting for placement      HPI, Past Medical, Family, and Social History remains the same as documented in the initial encounter.     Review of Systems   Constitutional: Negative for chills and fever.   Respiratory: Negative for cough and shortness of breath.    Cardiovascular: Negative for chest pain.   Musculoskeletal: Positive for arthralgias and back pain.   Neurological: Positive for facial asymmetry, speech difficulty and weakness. Negative for dizziness.   Psychiatric/Behavioral: Negative for agitation and confusion.     Scheduled Meds:   acetaminophen  650 mg Oral Q8H    aspirin  81 mg Oral Daily    atorvastatin  40 mg Oral QHS    clopidogrel  75 mg Oral Daily    diclofenac sodium  4 g Topical (Top) Daily    DULoxetine  20 mg Oral BID    gabapentin  300 mg Oral TID    lidocaine  1 patch Transdermal Q24H    metoclopramide HCl  5 mg Oral TID AC    polyethylene glycol  17 g Oral Daily    potassium chloride 10%  20 mEq Oral BID    traZODone  25 mg Oral QHS     Continuous Infusions:    PRN Meds:sodium chloride, dextrose 10 % in water (D10W), glucagon (human recombinant), HYDROcodone-acetaminophen, insulin aspart U-100, melatonin, ondansetron, sodium chloride 0.9%, tiZANidine    Objective:     Vital Signs (Most Recent):  Temp: 98.7 °F (37.1 °C) (11/18/19 1547)  Pulse: 94 (11/18/19 1547)  Resp: 18 (11/18/19 1547)  BP: (!) 155/83 (11/18/19 1547)  SpO2: 97 % (11/18/19 1547)  BP Location: Right arm    Vital Signs Range (Last 24H):  Temp:  [97 °F (36.1 °C)-99.9 °F (37.7 °C)]   Pulse:  []   Resp:  [16-18]   BP: (114-178)/(64-84)   SpO2:  [93 %-99 %]   BP Location: Right arm    Physical Exam   Constitutional: She is oriented to person, place, and time. She  appears well-developed and well-nourished. No distress.   HENT:   Head: Normocephalic and atraumatic.   Eyes: EOM are normal.   Cardiovascular: Normal rate.   Pulmonary/Chest: Effort normal. No respiratory distress.   Abdominal: Bowel sounds are increased.   Neurological: She is alert and oriented to person, place, and time.   Skin: Skin is warm and dry. She is not diaphoretic.   Psychiatric: Cognition and memory are not impaired. She is attentive.   Vitals reviewed.      Neurological Exam:   LOC: alert   Attention Span: good  Language: No aphasia  Articulation: Mild dysarthria  Orientation: Person, Time, Place  Visual Fields: Full  EOM (CN III, IV, VI): Full/intact  Facial Movement (CN VII): Lower facial weakness on the Right  Motor: Arm left  Normal 5/5  Leg left  Normal 5/5  Arm right  Paresis: +3/5   Leg right Paresis: 1/5  Sensation: Intact to light touch, temp  Tone: Normal tone throughout    Laboratory:  CMP:   Recent Labs   Lab 11/18/19  0504   CALCIUM 8.9   ALBUMIN 2.8*   PROT 6.7      K 4.6   CO2 19*      BUN 4*   CREATININE 0.7   ALKPHOS 77   ALT 17   AST 24   BILITOT 1.4*     CBC:   Recent Labs   Lab 11/18/19  0505   WBC 13.86*   RBC 3.22*   HGB 9.1*   HCT 27.7*      MCV 86   MCH 28.3   MCHC 32.9     Lipid Panel:   No results for input(s): CHOL, LDLCALC, HDL, TRIG in the last 168 hours.  Coagulation:   No results for input(s): PT, INR, APTT in the last 168 hours.  Platelet Aggregation Study: No results for input(s): PLTAGG, PLTAGINTERP, PLTAGREGLACO, ADPPLTAGGREG in the last 168 hours.  Hgb A1C:   No results for input(s): HGBA1C in the last 168 hours.  TSH:   No results for input(s): TSH in the last 168 hours.      Diagnostic Results     Brain Imaging     MRI Brain (11/01/19):  Acute lacunar type infarct coursing through the left posterior limb internal capsule and corona radiata.  Cytotoxic cerebral edema        CTH (10/31/19):  No acute intracranial pathology      Vessel Imaging      CTA Multiphase (10/31/19):  CTA head: Atherosclerotic disease of the cavernous and supraclinoid ICAs with mild narrowing.  Otherwise unremarkable CTA of the head specifically without evidence for proximal significant stenosis or occlusion.  CTA neck: Less than 50% proximal ICA stenosis by NASCET criteria.  Atherosclerotic disease with mild moderate narrowing of the right vertebral artery origin.  There is mild left V1 segment narrowing.  No significant focal vertebral artery stenosis or occlusion.  Interlobular septal thickening with tree in bud micro nodularity visualized upper lobes bilaterally which may represent inflammatory/infectious process clinical correlation and correlation with dedicated CT thorax recommended.  CT head: Bandlike region hypoattenuation left posterior limb internal capsule unchanged from prior suggestive for possible recent to subacute age infarction.  No evidence for hydrocephalus or acute intracranial hemorrhage.  Clinical correlation and further evaluation with MRI if patient compatible.      Cardiac Imaging   Echo (11/01/19)  · Increased (hyperdynamic) left ventricular systolic function. The estimated ejection fraction is 75%.  · Concentric left ventricular remodeling.  · Normal LV diastolic function.  · No wall motion abnormalities.  · Normal right ventricular systolic function.  · Normal central venous pressure (3 mm Hg).  · Mild tricuspid regurgitation.  · The estimated PA systolic pressure is 26 mm Hg  · Echolucent structure next to the LA, likely representing hiatal hernia. Clinincal correlation is required.      Miscellaneous Imaging  CT abdomen/pelvis 11/16/19  1. Redemonstration of large right retroperitoneal hematoma which is mildly decreased in size compared to most recent examination of 11/10/2019.  Continued mass effect on the right kidney, which is displaced anteriorly.  2. Soft tissue induration/fat stranding involving the left inguinal region, likely relating to  recent vascular catheterization.  Correlation with physical exam is advised.  3. Small right pleural effusion with bibasilar atelectasis.  4. Air within the urinary bladder, presumably secondary to recent urinary catheterization although clinical correlation is advised.  5. Multiple additional findings as detailed above.    IR Angiogram Visceral 11/10/19 pending    CTA Abdomen/Pelvis 11/10/19  Right-sided retroperitoneal hematoma with questionable foci of active extravasation within the inferior portion of the collection as described above.    IR Angiogram Visceral 11/9/19  Angiography of the lumbar arteries demonstrates active extravasation of the right L2 lumbar artery. Embolization using beads as described above.  Plan: Transfer patient to ICU.  Continued serial H and H follow-up.    CTA Abdomen/Pelvis 11/9/19  1. Large right retroperitoneal hematoma with findings concerning for active arterial extravasation/hemorrhage as detailed above.  There is associated hemorrhage extending throughout the right abdomen and pelvis/pericolic gutter with associated mass effect on the right kidney, which is anteriorly displaced.  2. Decreased opacification of the infahepatic and infrarenal IVC, possibly secondary to underlying mass effect versus partial thrombosis.  3. Nonspecific cecal and right colonic wall thickening, possibly reactive due to hemoperitoneum.  4. Additional findings as detailed above.    MRI Thoracic/Lumbar Spine W WO Contr 11/8/19  Large hematoma within the right psoas muscle.  Compression and probable thrombosis of the IVC. Consider contrast enhanced CT with delayed phase.  Multilevel degenerative changes as detailed above, more severe in the lumbar spine.  Irregularity of the T12 through L3 endplates is most likely degenerative.

## 2019-11-18 NOTE — ASSESSMENT & PLAN NOTE
Reported hx chronic back pain with associated sciatic nerve pain as well, then complicated by acute retroperitoneal hematoma.  Consulted to anesthesia pain management    Controlled on gabapentin,duloxetine,  trazodone, hydrcodone-acetaminophen (prn), tizanidine (prn)

## 2019-11-19 PROBLEM — R11.2 NAUSEA & VOMITING: Status: RESOLVED | Noted: 2019-11-02 | Resolved: 2019-11-19

## 2019-11-19 PROBLEM — E44.0 MODERATE MALNUTRITION: Status: ACTIVE | Noted: 2019-11-19

## 2019-11-19 LAB
ALBUMIN SERPL BCP-MCNC: 2.9 G/DL (ref 3.5–5.2)
ALP SERPL-CCNC: 79 U/L (ref 55–135)
ALT SERPL W/O P-5'-P-CCNC: 16 U/L (ref 10–44)
ANION GAP SERPL CALC-SCNC: 10 MMOL/L (ref 8–16)
AST SERPL-CCNC: 21 U/L (ref 10–40)
BACTERIA BLD CULT: NORMAL
BACTERIA BLD CULT: NORMAL
BASOPHILS # BLD AUTO: 0.05 K/UL (ref 0–0.2)
BASOPHILS NFR BLD: 0.4 % (ref 0–1.9)
BILIRUB SERPL-MCNC: 1.6 MG/DL (ref 0.1–1)
BUN SERPL-MCNC: 7 MG/DL (ref 8–23)
CALCIUM SERPL-MCNC: 9.4 MG/DL (ref 8.7–10.5)
CHLORIDE SERPL-SCNC: 106 MMOL/L (ref 95–110)
CO2 SERPL-SCNC: 21 MMOL/L (ref 23–29)
CREAT SERPL-MCNC: 0.7 MG/DL (ref 0.5–1.4)
DIFFERENTIAL METHOD: ABNORMAL
EOSINOPHIL # BLD AUTO: 0.3 K/UL (ref 0–0.5)
EOSINOPHIL NFR BLD: 2.2 % (ref 0–8)
ERYTHROCYTE [DISTWIDTH] IN BLOOD BY AUTOMATED COUNT: 15.5 % (ref 11.5–14.5)
EST. GFR  (AFRICAN AMERICAN): >60 ML/MIN/1.73 M^2
EST. GFR  (NON AFRICAN AMERICAN): >60 ML/MIN/1.73 M^2
GLUCOSE SERPL-MCNC: 114 MG/DL (ref 70–110)
HCT VFR BLD AUTO: 29.5 % (ref 37–48.5)
HGB BLD-MCNC: 9.2 G/DL (ref 12–16)
IMM GRANULOCYTES # BLD AUTO: 0.06 K/UL (ref 0–0.04)
IMM GRANULOCYTES NFR BLD AUTO: 0.5 % (ref 0–0.5)
LYMPHOCYTES # BLD AUTO: 2 K/UL (ref 1–4.8)
LYMPHOCYTES NFR BLD: 18 % (ref 18–48)
MCH RBC QN AUTO: 27.9 PG (ref 27–31)
MCHC RBC AUTO-ENTMCNC: 31.2 G/DL (ref 32–36)
MCV RBC AUTO: 89 FL (ref 82–98)
MONOCYTES # BLD AUTO: 1 K/UL (ref 0.3–1)
MONOCYTES NFR BLD: 8.8 % (ref 4–15)
NEUTROPHILS # BLD AUTO: 7.9 K/UL (ref 1.8–7.7)
NEUTROPHILS NFR BLD: 70.1 % (ref 38–73)
NRBC BLD-RTO: 0 /100 WBC
PLATELET # BLD AUTO: 332 K/UL (ref 150–350)
PMV BLD AUTO: 10.6 FL (ref 9.2–12.9)
POCT GLUCOSE: 111 MG/DL (ref 70–110)
POCT GLUCOSE: 112 MG/DL (ref 70–110)
POCT GLUCOSE: 162 MG/DL (ref 70–110)
POCT GLUCOSE: 201 MG/DL (ref 70–110)
POTASSIUM SERPL-SCNC: 4.4 MMOL/L (ref 3.5–5.1)
PROT SERPL-MCNC: 7.2 G/DL (ref 6–8.4)
RBC # BLD AUTO: 3.3 M/UL (ref 4–5.4)
SODIUM SERPL-SCNC: 137 MMOL/L (ref 136–145)
WBC # BLD AUTO: 11.31 K/UL (ref 3.9–12.7)

## 2019-11-19 PROCEDURE — 25000003 PHARM REV CODE 250: Performed by: FAMILY MEDICINE

## 2019-11-19 PROCEDURE — 99233 SBSQ HOSP IP/OBS HIGH 50: CPT | Mod: ,,, | Performed by: PSYCHIATRY & NEUROLOGY

## 2019-11-19 PROCEDURE — 25000003 PHARM REV CODE 250: Performed by: PHYSICIAN ASSISTANT

## 2019-11-19 PROCEDURE — 86580 TB INTRADERMAL TEST: CPT | Performed by: PHYSICIAN ASSISTANT

## 2019-11-19 PROCEDURE — 94761 N-INVAS EAR/PLS OXIMETRY MLT: CPT

## 2019-11-19 PROCEDURE — 99233 PR SUBSEQUENT HOSPITAL CARE,LEVL III: ICD-10-PCS | Mod: ,,, | Performed by: PSYCHIATRY & NEUROLOGY

## 2019-11-19 PROCEDURE — 36415 COLL VENOUS BLD VENIPUNCTURE: CPT

## 2019-11-19 PROCEDURE — 85025 COMPLETE CBC W/AUTO DIFF WBC: CPT

## 2019-11-19 PROCEDURE — 30200315 PPD INTRADERMAL TEST REV CODE 302: Performed by: PHYSICIAN ASSISTANT

## 2019-11-19 PROCEDURE — 20600001 HC STEP DOWN PRIVATE ROOM

## 2019-11-19 PROCEDURE — 25000003 PHARM REV CODE 250: Performed by: PSYCHIATRY & NEUROLOGY

## 2019-11-19 PROCEDURE — 97803 MED NUTRITION INDIV SUBSEQ: CPT

## 2019-11-19 PROCEDURE — 63600175 PHARM REV CODE 636 W HCPCS: Performed by: FAMILY MEDICINE

## 2019-11-19 PROCEDURE — 80053 COMPREHEN METABOLIC PANEL: CPT

## 2019-11-19 RX ADMIN — DULOXETINE HYDROCHLORIDE 20 MG: 20 CAPSULE, DELAYED RELEASE ORAL at 08:11

## 2019-11-19 RX ADMIN — TUBERCULIN PURIFIED PROTEIN DERIVATIVE 5 UNITS: 5 INJECTION, SOLUTION INTRADERMAL at 03:11

## 2019-11-19 RX ADMIN — ACETAMINOPHEN 650 MG: 325 TABLET ORAL at 10:11

## 2019-11-19 RX ADMIN — POTASSIUM CHLORIDE 20 MEQ: 20 SOLUTION ORAL at 08:11

## 2019-11-19 RX ADMIN — DICLOFENAC 4 G: 10 GEL TOPICAL at 08:11

## 2019-11-19 RX ADMIN — GABAPENTIN 300 MG: 300 CAPSULE ORAL at 01:11

## 2019-11-19 RX ADMIN — TRAZODONE HYDROCHLORIDE 25 MG: 50 TABLET ORAL at 08:11

## 2019-11-19 RX ADMIN — ONDANSETRON 4 MG: 2 INJECTION INTRAMUSCULAR; INTRAVENOUS at 03:11

## 2019-11-19 RX ADMIN — TIZANIDINE 4 MG: 4 TABLET ORAL at 10:11

## 2019-11-19 RX ADMIN — TIZANIDINE 4 MG: 4 TABLET ORAL at 06:11

## 2019-11-19 RX ADMIN — HYDROCODONE BITARTRATE AND ACETAMINOPHEN 1 TABLET: 10; 325 TABLET ORAL at 08:11

## 2019-11-19 RX ADMIN — METOCLOPRAMIDE HYDROCHLORIDE 5 MG: 5 TABLET ORAL at 04:11

## 2019-11-19 RX ADMIN — CLOPIDOGREL BISULFATE 75 MG: 75 TABLET ORAL at 08:11

## 2019-11-19 RX ADMIN — ATORVASTATIN CALCIUM 40 MG: 20 TABLET, FILM COATED ORAL at 08:11

## 2019-11-19 RX ADMIN — ASPIRIN 81 MG: 81 TABLET, COATED ORAL at 08:11

## 2019-11-19 RX ADMIN — LIDOCAINE 1 PATCH: 50 PATCH CUTANEOUS at 12:11

## 2019-11-19 RX ADMIN — METOCLOPRAMIDE HYDROCHLORIDE 5 MG: 5 TABLET ORAL at 06:11

## 2019-11-19 RX ADMIN — ACETAMINOPHEN 650 MG: 325 TABLET ORAL at 06:11

## 2019-11-19 RX ADMIN — ACETAMINOPHEN 650 MG: 325 TABLET ORAL at 01:11

## 2019-11-19 RX ADMIN — HYDROCODONE BITARTRATE AND ACETAMINOPHEN 1 TABLET: 10; 325 TABLET ORAL at 12:11

## 2019-11-19 RX ADMIN — METOCLOPRAMIDE HYDROCHLORIDE 5 MG: 5 TABLET ORAL at 12:11

## 2019-11-19 RX ADMIN — GABAPENTIN 300 MG: 300 CAPSULE ORAL at 08:11

## 2019-11-19 NOTE — SUBJECTIVE & OBJECTIVE
Neurologic Chief Complaint: R-sided weakness -- L MCA    Subjective:     Interval History: Patient is seen for follow-up neurological assessment and treatment recommendations: Patient now recommending SNF.     HPI, Past Medical, Family, and Social History remains the same as documented in the initial encounter.     Review of Systems   Constitutional: Negative for chills and fever.   Respiratory: Negative for cough and shortness of breath.    Cardiovascular: Negative for chest pain.   Musculoskeletal: Positive for arthralgias and back pain.   Neurological: Positive for facial asymmetry, speech difficulty and weakness. Negative for dizziness.   Psychiatric/Behavioral: Negative for agitation and confusion.     Scheduled Meds:   acetaminophen  650 mg Oral Q8H    aspirin  81 mg Oral Daily    atorvastatin  40 mg Oral QHS    clopidogrel  75 mg Oral Daily    diclofenac sodium  4 g Topical (Top) Daily    DULoxetine  20 mg Oral BID    gabapentin  300 mg Oral TID    lidocaine  1 patch Transdermal Q24H    metoclopramide HCl  5 mg Oral TID AC    polyethylene glycol  17 g Oral Daily    potassium chloride 10%  20 mEq Oral BID    traZODone  25 mg Oral QHS     Continuous Infusions:    PRN Meds:sodium chloride, dextrose 10 % in water (D10W), glucagon (human recombinant), HYDROcodone-acetaminophen, insulin aspart U-100, melatonin, ondansetron, sodium chloride 0.9%, tiZANidine    Objective:     Vital Signs (Most Recent):  Temp: 97.6 °F (36.4 °C) (11/19/19 0433)  Pulse: 82 (11/19/19 0723)  Resp: 16 (11/19/19 0433)  BP: 137/63 (11/19/19 0433)  SpO2: 99 % (11/19/19 0433)  BP Location: Right arm    Vital Signs Range (Last 24H):  Temp:  [97.6 °F (36.4 °C)-99.5 °F (37.5 °C)]   Pulse:  []   Resp:  [16-18]   BP: (101-174)/(56-84)   SpO2:  [95 %-99 %]   BP Location: Right arm    Physical Exam   Constitutional: She is oriented to person, place, and time. She appears well-developed and well-nourished. No distress.   HENT:    Head: Normocephalic and atraumatic.   Eyes: EOM are normal.   Cardiovascular: Normal rate.   Pulmonary/Chest: Effort normal. No respiratory distress.   Neurological: She is alert and oriented to person, place, and time.   Skin: Skin is warm and dry. She is not diaphoretic.   Psychiatric: Cognition and memory are not impaired. She is attentive.   Vitals reviewed.      Neurological Exam:   LOC: alert   Attention Span: good  Language: No aphasia  Articulation: Mild dysarthria  Orientation: Person, Time, Place  Visual Fields: Full  EOM (CN III, IV, VI): Full/intact  Facial Movement (CN VII): Lower facial weakness on the Right  Motor: Arm left  Normal 5/5  Leg left  Normal 5/5  Arm right  Paresis: +3/5   Leg right Paresis: 2/5  Sensation: Intact to light touch, temp  Tone: Normal tone throughout    Laboratory:  CMP:   Recent Labs   Lab 11/19/19  0451   CALCIUM 9.4   ALBUMIN 2.9*   PROT 7.2      K 4.4   CO2 21*      BUN 7*   CREATININE 0.7   ALKPHOS 79   ALT 16   AST 21   BILITOT 1.6*     CBC:   Recent Labs   Lab 11/19/19  0451   WBC 11.31   RBC 3.30*   HGB 9.2*   HCT 29.5*      MCV 89   MCH 27.9   MCHC 31.2*     Lipid Panel:   No results for input(s): CHOL, LDLCALC, HDL, TRIG in the last 168 hours.  Coagulation:   No results for input(s): PT, INR, APTT in the last 168 hours.  Platelet Aggregation Study: No results for input(s): PLTAGG, PLTAGINTERP, PLTAGREGLACO, ADPPLTAGGREG in the last 168 hours.  Hgb A1C:   No results for input(s): HGBA1C in the last 168 hours.  TSH:   No results for input(s): TSH in the last 168 hours.      Diagnostic Results     Brain Imaging     MRI Brain (11/01/19):  Acute lacunar type infarct coursing through the left posterior limb internal capsule and corona radiata.  Cytotoxic cerebral edema        CTH (10/31/19):  No acute intracranial pathology      Vessel Imaging     CTA Multiphase (10/31/19):  CTA head: Atherosclerotic disease of the cavernous and supraclinoid ICAs with  mild narrowing.  Otherwise unremarkable CTA of the head specifically without evidence for proximal significant stenosis or occlusion.  CTA neck: Less than 50% proximal ICA stenosis by NASCET criteria.  Atherosclerotic disease with mild moderate narrowing of the right vertebral artery origin.  There is mild left V1 segment narrowing.  No significant focal vertebral artery stenosis or occlusion.  Interlobular septal thickening with tree in bud micro nodularity visualized upper lobes bilaterally which may represent inflammatory/infectious process clinical correlation and correlation with dedicated CT thorax recommended.  CT head: Bandlike region hypoattenuation left posterior limb internal capsule unchanged from prior suggestive for possible recent to subacute age infarction.  No evidence for hydrocephalus or acute intracranial hemorrhage.  Clinical correlation and further evaluation with MRI if patient compatible.      Cardiac Imaging   Echo (11/01/19)  · Increased (hyperdynamic) left ventricular systolic function. The estimated ejection fraction is 75%.  · Concentric left ventricular remodeling.  · Normal LV diastolic function.  · No wall motion abnormalities.  · Normal right ventricular systolic function.  · Normal central venous pressure (3 mm Hg).  · Mild tricuspid regurgitation.  · The estimated PA systolic pressure is 26 mm Hg  · Echolucent structure next to the LA, likely representing hiatal hernia. Clinincal correlation is required.      Miscellaneous Imaging  CT abdomen/pelvis 11/16/19  1. Redemonstration of large right retroperitoneal hematoma which is mildly decreased in size compared to most recent examination of 11/10/2019.  Continued mass effect on the right kidney, which is displaced anteriorly.  2. Soft tissue induration/fat stranding involving the left inguinal region, likely relating to recent vascular catheterization.  Correlation with physical exam is advised.  3. Small right pleural effusion with  bibasilar atelectasis.  4. Air within the urinary bladder, presumably secondary to recent urinary catheterization although clinical correlation is advised.  5. Multiple additional findings as detailed above.    IR Angiogram Visceral 11/10/19 pending    CTA Abdomen/Pelvis 11/10/19  Right-sided retroperitoneal hematoma with questionable foci of active extravasation within the inferior portion of the collection as described above.    IR Angiogram Visceral 11/9/19  Angiography of the lumbar arteries demonstrates active extravasation of the right L2 lumbar artery. Embolization using beads as described above.  Plan: Transfer patient to ICU.  Continued serial H and H follow-up.    CTA Abdomen/Pelvis 11/9/19  1. Large right retroperitoneal hematoma with findings concerning for active arterial extravasation/hemorrhage as detailed above.  There is associated hemorrhage extending throughout the right abdomen and pelvis/pericolic gutter with associated mass effect on the right kidney, which is anteriorly displaced.  2. Decreased opacification of the infahepatic and infrarenal IVC, possibly secondary to underlying mass effect versus partial thrombosis.  3. Nonspecific cecal and right colonic wall thickening, possibly reactive due to hemoperitoneum.  4. Additional findings as detailed above.    MRI Thoracic/Lumbar Spine W WO Contr 11/8/19  Large hematoma within the right psoas muscle.  Compression and probable thrombosis of the IVC. Consider contrast enhanced CT with delayed phase.  Multilevel degenerative changes as detailed above, more severe in the lumbar spine.  Irregularity of the T12 through L3 endplates is most likely degenerative.

## 2019-11-19 NOTE — PLAN OF CARE
11/19/19 1401   Post-Acute Status   Post-Acute Authorization Placement   Post-Acute Placement Status Referrals Sent       Referral [place] to [place].  Awaiting acceptance.  SW in contact with CM and Medical staff. Will continue to follow and offer support as needed.     Demario Ledesma, CHANTE  [facility]   Ext. [phone]

## 2019-11-19 NOTE — PLAN OF CARE
Problem: Malnutrition  Goal: Improved Nutritional Intake  Outcome: Ongoing, Progressing   Recommendations    1. Continue mechanical soft diet with Boost Glucose Control TID.  Goals: Pt eats >50% of meals, Pt drinks >50% of Boost by RD follow-up  Nutrition Goal Status: goal met  Communication of RD Recs: other (comment)(POC)

## 2019-11-19 NOTE — PLAN OF CARE
POC and DC status reviewed w/ patient.  All questions answered and reviewed. Verbalized understanding. Neuro exam unchanged. Pain controlled w/ PRN medications. VSS. Repositioned for comfort. Call light in reach, bed locked in low position, side rails up x2. Fall precautions remained. Advised to call staff for assistance. Will continue to monitor.

## 2019-11-19 NOTE — ASSESSMENT & PLAN NOTE
Melva Barker is a 74 y.o. female with PMHx of HTN, HLD, and DM who presented to OSH with acute worsening of R-sided weakness after having experienced R-sided weakness x1 month. She was treated with tPA at OSH. CTA without LVO. MRI with infarct in L internal capsule and corona radiata. Etiology likely small vessel disease.    CT abdomen/pelvis again with large retroperitoneal hematoma however decreased in size, H/H stable     Antithrombotics for secondary stroke prevention: Antiplatelets: ASA 81 mg + Plavix 75 mg -- held due to retroperitoneal hematoma. Resumed DAPT 11/17  Statins for secondary stroke prevention and hyperlipidemia, if present: Statins: Atorvastatin- 40 mg daily,   Aggressive risk factor modification: HTN, HLD, Diet  Rehab efforts: The patient has been evaluated by a stroke team provider and the therapy needs have been fully considered based off the presenting complaints and exam findings. The following therapy evaluations are needed: PT evaluate and treat, OT evaluate and treat, SLP evaluate and treat, PM&R evaluate for appropriate placement - Dispo inpatient rehab  Diagnostics ordered/pending: None   VTE prophylaxis: Mechanical SCDs, heparin 5000 units q 8 hours   BP parameters: Infarct: Post tPA, SBP <160; Avoid hypotension

## 2019-11-19 NOTE — PROGRESS NOTES
[facility]  Vascular Neurology  Comprehensive Stroke Center  Progress Note    Assessment/Plan:     * Thrombotic stroke involving left middle cerebral artery  Melva Barker is a 74 y.o. female with PMHx of HTN, HLD, and DM who presented to OSH with acute worsening of R-sided weakness after having experienced R-sided weakness x1 month. She was treated with tPA at OSH. CTA without LVO. MRI with infarct in L internal capsule and corona radiata. Etiology likely small vessel disease.    CT abdomen/pelvis again with large retroperitoneal hematoma however decreased in size, H/H stable     Antithrombotics for secondary stroke prevention: Antiplatelets: ASA 81 mg + Plavix 75 mg -- held due to retroperitoneal hematoma. Resumed DAPT 11/17  Statins for secondary stroke prevention and hyperlipidemia, if present: Statins: Atorvastatin- 40 mg daily,   Aggressive risk factor modification: HTN, HLD, Diet  Rehab efforts: The patient has been evaluated by a stroke team provider and the therapy needs have been fully considered based off the presenting complaints and exam findings. The following therapy evaluations are needed: PT evaluate and treat, OT evaluate and treat, SLP evaluate and treat, PM&R evaluate for appropriate placement - Dispo inpatient rehab  Diagnostics ordered/pending: None   VTE prophylaxis: Mechanical SCDs, heparin 5000 units q 8 hours   BP parameters: Infarct: Post tPA, SBP <160; Avoid hypotension    Cytotoxic cerebral edema  Area of cytotoxic cerebral edema identified when reviewing brain imaging in the territory of the L middle cerebral artery. There is no mass effect associated with it. We will continue to monitor the patients clinical exam for any worsening of symptoms which may indicate expansion of the stroke or the area of the edema resulting in the clinical change. The pattern is suggestive of small vessel etiology.    Retroperitoneal hematoma  Pt had been c/o back pain in  recent days, complicated by her hx of chronic back and sciatic nerve pain with use of Tizanidine and opiates at home.  Due to Hb downtrend and recent tPA administration, MRI Lumbar/Thoracic spine was ordered 11/8 which demonstrated large R psoas hematoma with IVC compression and probable thrombosis.   General Surgery was consulted; No acute intervention pursued.     Patient was then with acute decompensation overnight 11/9; became hypotensive, tachycardic, and apneic with Hb down to 6.7. Rapid Response was called; patient requiring intubation, blood transfusion, and transfer to medical ICU.   CTA abd/pelvis 11/9 showed hematoma with active extravasation/hemorrhage with IVC compression, hemoperitoneum.   Pt went to IR and is now s/p successful R L2 & L3 lumbar spinal artery embolization.   Patient underwent IR re-evaluation on 11/11. No arterial bleed evident on IR angiography.  CT abd/pelvis 11/17: Redemonstration of large right retroperitoneal hematoma which is mildly decreased in size compared to most recent     Hb now stable, Continuing to monitor.    Debility  2/2 stroke  PT/OT eval & treat - Dispo inpatient rehab    Essential hypertension  Risk factor for stroke  Holding all BP meds due to hypotension    Back pain  Reported hx chronic back pain with associated sciatic nerve pain as well, then complicated by acute retroperitoneal hematoma.  Consulted to anesthesia pain management    Controlled on gabapentin,duloxetine,  trazodone, hydrcodone-acetaminophen (prn), tizanidine (prn)     Gastroparesis  Currently on Reglan 5 mg TID  Previously on Reglan 10 mg TID before meals, will order if needed    Diabetes mellitus type 2 without retinopathy  Stroke risk factor, poorly controlled on glargine 17 units daily  HbA1c 8.3%  Recommend tight glucose control  Currently on SSI   Diabetic diet    Leukocytosis  Previously suspected acute reactive 2/2 active bleed. WBC now 14.  BCx 11/9 NGTD and pt remains afebrile.  11/14 -  infectious work up. Empiric abx started for possible hospital acquired pneumonia.   11/15 - Liquid diarrhea started overnight, stopped empiric abx, ordered c. Diff and stool culture, start PO vanc for possible c. diff, IVFs. Orders to remove central line.       C. Diff EIA antigen positive, toxins negative; C diff toxin PCR, negative. Diarrhea improving, thicker. WBC improved, A febrile.          11/1/19 MRI with small vessel L MCA infarct, therapy recommending rehab  11/2 - Patient stepped down overnight. Adjusting BP regimen. Patient with continuous complaints if nausea. IV Zofran ordered with some relief. Patient has not has BM since 10/30. Ordering KUB to rule out obstruction. Neuro exam unchanged.   11/3 - NAEON. Patient reports she had no episodes of vomiting overnight. Still complaining of nausea. Mild lower abdomen tenderness on exam. Lipase ordered and WNL. Continue to monitor.   11/4 - denies nausea and vomiting. Abd tenderness remains present on palpitation. Continue to monitor. HCTZ increased yesterday. Pending rehab placement.   11/5 -  N/V x1 overnight zofran resolved, sucralfate started. Placement pending   11/6 - hypotensive, held all BP meds,  ml bolus, responded well. Increased BUN/Cr, start NS 75ml/hr.   11/7 patient complaining of sciatica pain.  Restarted home tizanidine.  Patient requesting hydrocodone but educated patient that nerve pain is not treated with opioid.  Degenerative disease seen on xray but no fracture.      11/8 Patient continues to experience back pain.  Now with leukocytosis and drop in h/h plan for MRI thoracic/lumbar spine to evaluate for epidural hematoma.    11/9: MRI Lumbar spine had demonstrated large R psoas hematoma with possible IVC compression; General Surgery consulted. Patient was then with acute decompensation overnight; became hypotensive, tachycardic, and apneic requiring intubation, blood transfusion, and medical ICU transfer. CTA abd/pelvis showed active  extravasation/hemorrhage with hemoperitoneum. Pt now s/p successful lumbar artery embolization in IR. She was extubated on arrival back to ICU and tolerated well.    11/11: Patient underwent IR re-evaluation yesterday. No arterial bleed evident on IR angiography. Patient neuro exam stable.   11/12 Stepped down from medical ICU this morning.  Patient still experiencing pain.  Anesthesia consulted for pain management.  D/C pain pump and started oral medications for muscle and nerve pain.  Will wean to home regimen. Waiting for repeat CBC, transfused one unit of blood 11/11.   Tachycardic/hypertensive will continue to monitor.  11/13: Upgraded diet per SLP recs. Pt continues to c/o R back pain, concern that tachycardia could be pain-induced; adjusted analgesic regimen further as well as antiemetic regimen with plans to eventually wean to home regimen. Replaced Phos. Dispo inpatient rehab.  11/14: patient hypotensive overnight and this morning requiring fluid boluses. Discontinued all pain medications except for celecoxib in case pain meds are contributing to hypotension. Lidocaine patch and Voltaren gel ordered as well. Infectious workup significant for minor atelectasis in R lower lung with increased procal and lactate. Patient started on IV antibiotics for community acquired pneumonia. Blood cultures pending. ICU notified of patient's hypotension  11/15: Liquid diarrhea, WBC 14, stopped empiric abx, ordered c. Diff and stool culture, start PO vanc, IVFs. Adjusted pain meds with goal to return to home regimen. Nauseous overnight, zofran given.   11/16: CT abdomen and pelvis to f/u retroperitoneal hematoma prior to starting DAPT, H/H stable. T max 99.4, tachy, WBC 13, C. Diff and stool cultures pending, increased  ml/hr, labile BP, continue to monitor. Remove central line.   11/17: CT abdomen/pelvis again with large retroperitoneal hematoma  but decreased in size, H/H uptrend, resume DAPT today. Diarrhea  becoming thicker, WBC improved, a febrile, c. Diff antigen positive, toxins negative. Pain well controlled.   11/18 PCR negative, c.diff precautions and vancomycin d/c.  Neurologically stable waiting for placement.    11/19 Patient now recommending SNF.     STROKE DOCUMENTATION   Acute Stroke Times   Last Known Normal Date: 10/31/19  Last Known Normal Time: 0630  Symptom Onset Date: 10/31/19  Symptom Onset Time: 0730  Stroke Team Called Date: 10/31/19  Stroke Team Called Time: 0936  Stroke Team Arrival Date: 10/31/19  Stroke Team Arrival Time: 0946  Decision to Treat Time for Alteplase: 1003    NIH Scale:  1a. Level of Consciousness: 0-->Alert, keenly responsive  1b. LOC Questions: 0-->Answers both questions correctly  1c. LOC Commands: 0-->Performs both tasks correctly  2. Best Gaze: 0-->Normal  3. Visual: 0-->No visual loss  4. Facial Palsy: 1-->Minor paralysis (flattened nasolabial fold, asymmetry on smiling)  5a. Motor Arm, Left: 0-->No drift, limb holds 90 (or 45) degrees for full 10 secs  5b. Motor Arm, Right: 1-->Drift, limb holds 90 (or 45) degrees, but drifts down before full 10 secs, does not hit bed or other support  6a. Motor Leg, Left: 0-->No drift, leg holds 30 degree position for full 5 secs  6b. Motor Leg, Right: 3-->No effort against gravity, leg falls to bed immediately  7. Limb Ataxia: 0-->Absent  8. Sensory: 0-->Normal, no sensory loss  9. Best Language: 0-->No aphasia, normal  10. Dysarthria: 1-->Mild-to-moderate dysarthria, patient slurs at least some words and, at worst, can be understood with some difficulty  11. Extinction and Inattention (formerly Neglect): 0-->No abnormality  Total (NIH Stroke Scale): 6       Modified Kenedy Score: 0  Crockett Coma Scale:    ABCD2 Score:    [id number] Score:   HAS -BLED Score:   ICH Score:   Hunt & Amaya Classification:      Hemorrhagic change of an Ischemic Stroke: Does this patient have an ischemic stroke with hemorrhagic changes? No     Neurologic Chief  Complaint: R-sided weakness -- L MCA    Subjective:     Interval History: Patient is seen for follow-up neurological assessment and treatment recommendations: Patient now recommending SNF.     HPI, Past Medical, Family, and Social History remains the same as documented in the initial encounter.     Review of Systems   Constitutional: Negative for chills and fever.   Respiratory: Negative for cough and shortness of breath.    Cardiovascular: Negative for chest pain.   Musculoskeletal: Positive for arthralgias and back pain.   Neurological: Positive for facial asymmetry, speech difficulty and weakness. Negative for dizziness.   Psychiatric/Behavioral: Negative for agitation and confusion.     Scheduled Meds:   acetaminophen  650 mg Oral Q8H    aspirin  81 mg Oral Daily    atorvastatin  40 mg Oral QHS    clopidogrel  75 mg Oral Daily    diclofenac sodium  4 g Topical (Top) Daily    DULoxetine  20 mg Oral BID    gabapentin  300 mg Oral TID    lidocaine  1 patch Transdermal Q24H    metoclopramide HCl  5 mg Oral TID AC    polyethylene glycol  17 g Oral Daily    potassium chloride 10%  20 mEq Oral BID    traZODone  25 mg Oral QHS     Continuous Infusions:    PRN Meds:sodium chloride, dextrose 10 % in water (D10W), glucagon (human recombinant), HYDROcodone-acetaminophen, insulin aspart U-100, melatonin, ondansetron, sodium chloride 0.9%, tiZANidine    Objective:     Vital Signs (Most Recent):  Temp: 97.6 °F (36.4 °C) (11/19/19 0433)  Pulse: 82 (11/19/19 0723)  Resp: 16 (11/19/19 0433)  BP: 137/63 (11/19/19 0433)  SpO2: 99 % (11/19/19 0433)  BP Location: Right arm    Vital Signs Range (Last 24H):  Temp:  [97.6 °F (36.4 °C)-99.5 °F (37.5 °C)]   Pulse:  []   Resp:  [16-18]   BP: (101-174)/(56-84)   SpO2:  [95 %-99 %]   BP Location: Right arm    Physical Exam   Constitutional: She is oriented to person, place, and time. She appears well-developed and well-nourished. No distress.   HENT:   Head: Normocephalic  and atraumatic.   Eyes: EOM are normal.   Cardiovascular: Normal rate.   Pulmonary/Chest: Effort normal. No respiratory distress.   Neurological: She is alert and oriented to person, place, and time.   Skin: Skin is warm and dry. She is not diaphoretic.   Psychiatric: Cognition and memory are not impaired. She is attentive.   Vitals reviewed.      Neurological Exam:   LOC: alert   Attention Span: good  Language: No aphasia  Articulation: Mild dysarthria  Orientation: Person, Time, Place  Visual Fields: Full  EOM (CN III, IV, VI): Full/intact  Facial Movement (CN VII): Lower facial weakness on the Right  Motor: Arm left  Normal 5/5  Leg left  Normal 5/5  Arm right  Paresis: +3/5   Leg right Paresis: 2/5  Sensation: Intact to light touch, temp  Tone: Normal tone throughout    Laboratory:  CMP:   Recent Labs   Lab 11/19/19  0451   CALCIUM 9.4   ALBUMIN 2.9*   PROT 7.2      K 4.4   CO2 21*      BUN 7*   CREATININE 0.7   ALKPHOS 79   ALT 16   AST 21   BILITOT 1.6*     CBC:   Recent Labs   Lab 11/19/19  0451   WBC 11.31   RBC 3.30*   HGB 9.2*   HCT 29.5*      MCV 89   MCH 27.9   MCHC 31.2*     Lipid Panel:   No results for input(s): CHOL, LDLCALC, HDL, TRIG in the last 168 hours.  Coagulation:   No results for input(s): PT, INR, APTT in the last 168 hours.  Platelet Aggregation Study: No results for input(s): PLTAGG, PLTAGINTERP, PLTAGREGLACO, ADPPLTAGGREG in the last 168 hours.  Hgb A1C:   No results for input(s): HGBA1C in the last 168 hours.  TSH:   No results for input(s): TSH in the last 168 hours.      Diagnostic Results     Brain Imaging     MRI Brain (11/01/19):  Acute lacunar type infarct coursing through the left posterior limb internal capsule and corona radiata.  Cytotoxic cerebral edema        CTH (10/31/19):  No acute intracranial pathology      Vessel Imaging     CTA Multiphase (10/31/19):  CTA head: Atherosclerotic disease of the cavernous and supraclinoid ICAs with mild narrowing.   Otherwise unremarkable CTA of the head specifically without evidence for proximal significant stenosis or occlusion.  CTA neck: Less than 50% proximal ICA stenosis by NASCET criteria.  Atherosclerotic disease with mild moderate narrowing of the right vertebral artery origin.  There is mild left V1 segment narrowing.  No significant focal vertebral artery stenosis or occlusion.  Interlobular septal thickening with tree in bud micro nodularity visualized upper lobes bilaterally which may represent inflammatory/infectious process clinical correlation and correlation with dedicated CT thorax recommended.  CT head: Bandlike region hypoattenuation left posterior limb internal capsule unchanged from prior suggestive for possible recent to subacute age infarction.  No evidence for hydrocephalus or acute intracranial hemorrhage.  Clinical correlation and further evaluation with MRI if patient compatible.      Cardiac Imaging   Echo (11/01/19)  · Increased (hyperdynamic) left ventricular systolic function. The estimated ejection fraction is 75%.  · Concentric left ventricular remodeling.  · Normal LV diastolic function.  · No wall motion abnormalities.  · Normal right ventricular systolic function.  · Normal central venous pressure (3 mm Hg).  · Mild tricuspid regurgitation.  · The estimated PA systolic pressure is 26 mm Hg  · Echolucent structure next to the LA, likely representing hiatal hernia. Clinincal correlation is required.      Miscellaneous Imaging  CT abdomen/pelvis 11/16/19  1. Redemonstration of large right retroperitoneal hematoma which is mildly decreased in size compared to most recent examination of 11/10/2019.  Continued mass effect on the right kidney, which is displaced anteriorly.  2. Soft tissue induration/fat stranding involving the left inguinal region, likely relating to recent vascular catheterization.  Correlation with physical exam is advised.  3. Small right pleural effusion with bibasilar  atelectasis.  4. Air within the urinary bladder, presumably secondary to recent urinary catheterization although clinical correlation is advised.  5. Multiple additional findings as detailed above.    IR Angiogram Visceral 11/10/19 pending    CTA Abdomen/Pelvis 11/10/19  Right-sided retroperitoneal hematoma with questionable foci of active extravasation within the inferior portion of the collection as described above.    IR Angiogram Visceral 11/9/19  Angiography of the lumbar arteries demonstrates active extravasation of the right L2 lumbar artery. Embolization using beads as described above.  Plan: Transfer patient to ICU.  Continued serial H and H follow-up.    CTA Abdomen/Pelvis 11/9/19  1. Large right retroperitoneal hematoma with findings concerning for active arterial extravasation/hemorrhage as detailed above.  There is associated hemorrhage extending throughout the right abdomen and pelvis/pericolic gutter with associated mass effect on the right kidney, which is anteriorly displaced.  2. Decreased opacification of the infahepatic and infrarenal IVC, possibly secondary to underlying mass effect versus partial thrombosis.  3. Nonspecific cecal and right colonic wall thickening, possibly reactive due to hemoperitoneum.  4. Additional findings as detailed above.    MRI Thoracic/Lumbar Spine W WO Contr 11/8/19  Large hematoma within the right psoas muscle.  Compression and probable thrombosis of the IVC. Consider contrast enhanced CT with delayed phase.  Multilevel degenerative changes as detailed above, more severe in the lumbar spine.  Irregularity of the T12 through L3 endplates is most likely degenerative.      Danya Mancia PA-C  [facility]  Department of Vascular Neurology   [facility]

## 2019-11-19 NOTE — PROGRESS NOTES
[facility]  Adult Nutrition  Progress Note    SUMMARY       Recommendations    1. Continue mechanical soft diet with Boost Glucose Control TID.  Goals: Pt eats >50% of meals, Pt drinks >50% of Boost by RD follow-up  Nutrition Goal Status: goal met  Communication of RD Recs: other (comment)(POC)    Reason for Assessment    Reason For Assessment: RD follow-up  Diagnosis: stroke/CVA  Relevant Medical History: IDDM, HTN  Interdisciplinary Rounds: did not attend  General Information Comments: Pt with moderate muscle wasting per NFPE 10/31 and intermittent poor po intake continues with poor intake; <50% lunch eaten on tray and none of the Boost. Although her wt appears to be stable, pt reports she is still nauseous and her back pain is interfering with her appetite. Repeated NFPE: pt has moderate muscle wasting in LEs and mild overall fat and muscle depletion; pt meets criteria for acute moderate protein-calorie malnutrition.  Nutrition Discharge Planning: Pt to follow higher protein/high kcal diet.    Nutrition Risk Screen    Nutrition Risk Screen: dysphagia or difficulty swallowing    Nutrition/Diet History    Patient Reported Diet/Restrictions/Preferences: soft  Spiritual, Cultural Beliefs, Adventist Practices, Values that Affect Care: no  Supplemental Drinks or Food Habits: Ensure Plus  Factors Affecting Nutritional Intake: NPO    Anthropometrics    Temp: 98.1 °F (36.7 °C)  Height Method: Measured  Height: 5' (152.4 cm)  Height (inches): 60 in  Weight Method: Bed Scale  Weight: 56.3 kg (124 lb 1.9 oz)  Weight (lb): 124.12 lb  Ideal Body Weight (IBW), Female: 100 lb  % Ideal Body Weight, Female (lb): 124.12 lb  BMI (Calculated): 24.2  BMI Grade: 18.5-24.9 - normal  Weight Loss: unintentional  Usual Body Weight (UBW), k kg  % Usual Body Weight: 84.07  % Weight Change From Usual Weight: -16.1 %       Lab/Procedures/Meds    Pertinent Labs Reviewed: reviewed  Pertinent Labs Comments: CO2 21, Glu  114, T. Bili 1.6, A1c 8.3%  Pertinent Medications Reviewed: reviewed  Pertinent Medications Comments: Reglan, statin    Estimated/Assessed Needs    Weight Used For Calorie Calculations: 56 kg (123 lb 7.3 oz)  Energy Calorie Requirements (kcal): 1367  Energy Need Method: Evant-St Jeor  Protein Requirements: 1.25  Weight Used For Protein Calculations: 56 kg (123 lb 7.3 oz)  Fluid Requirements (mL): 1mL/kcal or per MD     RDA Method (mL): 1367  CHO Requirement: 175g CHO daily      Nutrition Prescription Ordered    Current Diet Order: Diabetic mechanical soft  Oral Nutrition Supplement: Boost Glucose Control TID    Evaluation of Received Nutrient/Fluid Intake    Comments: LBM 11/18  % Intake of Estimated Energy Needs: 50 - 75 %  % Meal Intake: 50 - 75 %    Nutrition Risk    Level of Risk/Frequency of Follow-up: low     Assessment and Plan    Nutrition Problem:  Moderate Protein-Calorie Malnutrition  Malnutrition in the context of Acute Illness/Injury    Related to (etiology):  Nausea, poor appetite    Signs and Symptoms (as evidenced by):  Energy Intake: <50% of estimated energy requirement for 7+ days  Body Fat Depletion: mild depletion of triceps, orbitals   Muscle Mass Depletion: moderate depletion of hands, lower extremities    Interventions/Recommendations (treatment strategy):  Collaboration of care to providers.  Commercial beverage: Boost Glucose Control TID    Nutrition Diagnosis Status:  New     Monitor and Evaluation    Food and Nutrient Intake: food and beverage intake  Food and Nutrient Adminstration: diet order  Knowledge/Beliefs/Attitudes: food and nutrition knowledge/skill  Anthropometric Measurements: weight, weight change, body mass index  Biochemical Data, Medical Tests and Procedures: electrolyte and renal panel, gastrointestinal profile, glucose/endocrine profile, inflammatory profile, lipid profile  Nutrition-Focused Physical Findings: overall appearance     Malnutrition Assessment              Weight Loss (Malnutrition): (16% in 1 year)  Energy Intake (Malnutrition): less than or equal to 50% for greater than or equal to 5 days   Orbital Region (Subcutaneous Fat Loss): mild depletion  Upper Arm Region (Subcutaneous Fat Loss): mild depletion  Thoracic and Lumbar Region: mild depletion   [place] Region (Muscle Loss): mild depletion  Clavicle Bone Region (Muscle Loss): mild depletion  Clavicle and Acromion Bone Region (Muscle Loss): mild depletion  Scapular Bone Region (Muscle Loss): mild depletion  Dorsal Hand (Muscle Loss): moderate depletion  Patellar Region (Muscle Loss): moderate depletion  Anterior Thigh Region (Muscle Loss): moderate depletion  Posterior Calf Region (Muscle Loss): moderate depletion                 Nutrition Follow-Up    RD Follow-up?: Yes

## 2019-11-19 NOTE — PLAN OF CARE
Therapy recs changed from rehab to snf. Son called and gave choice of [facility] SNF. SW to send referral. Will call son to obtain more choices.      11/19/19 1594   Discharge Reassessment   Assessment Type Discharge Planning Reassessment   Discharge Plan A Skilled Nursing Facility   Discharge Plan B Rehab   Anticipated Discharge Disposition SNF   Post-Acute Status   Post-Acute Authorization Placement   Post-Acute Placement Status Referrals Sent

## 2019-11-20 LAB
POCT GLUCOSE: 114 MG/DL (ref 70–110)
POCT GLUCOSE: 128 MG/DL (ref 70–110)
POCT GLUCOSE: 132 MG/DL (ref 70–110)
POCT GLUCOSE: 149 MG/DL (ref 70–110)

## 2019-11-20 PROCEDURE — 25000003 PHARM REV CODE 250: Performed by: PHYSICIAN ASSISTANT

## 2019-11-20 PROCEDURE — 25000003 PHARM REV CODE 250: Performed by: PSYCHIATRY & NEUROLOGY

## 2019-11-20 PROCEDURE — 92507 TX SP LANG VOICE COMM INDIV: CPT

## 2019-11-20 PROCEDURE — 63600175 PHARM REV CODE 636 W HCPCS: Performed by: NURSE PRACTITIONER

## 2019-11-20 PROCEDURE — 99233 SBSQ HOSP IP/OBS HIGH 50: CPT | Mod: ,,, | Performed by: PSYCHIATRY & NEUROLOGY

## 2019-11-20 PROCEDURE — 99233 PR SUBSEQUENT HOSPITAL CARE,LEVL III: ICD-10-PCS | Mod: ,,, | Performed by: PSYCHIATRY & NEUROLOGY

## 2019-11-20 PROCEDURE — 63600175 PHARM REV CODE 636 W HCPCS: Performed by: FAMILY MEDICINE

## 2019-11-20 PROCEDURE — 25000003 PHARM REV CODE 250: Performed by: NURSE PRACTITIONER

## 2019-11-20 PROCEDURE — 20600001 HC STEP DOWN PRIVATE ROOM

## 2019-11-20 PROCEDURE — 25000003 PHARM REV CODE 250: Performed by: FAMILY MEDICINE

## 2019-11-20 RX ORDER — HYDROCODONE BITARTRATE AND ACETAMINOPHEN 10; 325 MG/1; MG/1
1 TABLET ORAL EVERY 6 HOURS PRN
Status: DISCONTINUED | OUTPATIENT
Start: 2019-11-20 | End: 2019-11-24

## 2019-11-20 RX ADMIN — LIDOCAINE 1 PATCH: 50 PATCH CUTANEOUS at 12:11

## 2019-11-20 RX ADMIN — ACETAMINOPHEN 650 MG: 325 TABLET ORAL at 01:11

## 2019-11-20 RX ADMIN — ASPIRIN 81 MG: 81 TABLET, COATED ORAL at 08:11

## 2019-11-20 RX ADMIN — ONDANSETRON 4 MG: 2 INJECTION INTRAMUSCULAR; INTRAVENOUS at 09:11

## 2019-11-20 RX ADMIN — HYDROCODONE BITARTRATE AND ACETAMINOPHEN 1 TABLET: 10; 325 TABLET ORAL at 08:11

## 2019-11-20 RX ADMIN — METOCLOPRAMIDE HYDROCHLORIDE 5 MG: 5 TABLET ORAL at 04:11

## 2019-11-20 RX ADMIN — POTASSIUM CHLORIDE 20 MEQ: 20 SOLUTION ORAL at 08:11

## 2019-11-20 RX ADMIN — TRAZODONE HYDROCHLORIDE 25 MG: 50 TABLET ORAL at 08:11

## 2019-11-20 RX ADMIN — HYDROCODONE BITARTRATE AND ACETAMINOPHEN 1 TABLET: 10; 325 TABLET ORAL at 01:11

## 2019-11-20 RX ADMIN — DICLOFENAC 4 G: 10 GEL TOPICAL at 08:11

## 2019-11-20 RX ADMIN — GABAPENTIN 300 MG: 300 CAPSULE ORAL at 01:11

## 2019-11-20 RX ADMIN — GABAPENTIN 300 MG: 300 CAPSULE ORAL at 08:11

## 2019-11-20 RX ADMIN — ATORVASTATIN CALCIUM 40 MG: 20 TABLET, FILM COATED ORAL at 08:11

## 2019-11-20 RX ADMIN — CLOPIDOGREL BISULFATE 75 MG: 75 TABLET ORAL at 08:11

## 2019-11-20 RX ADMIN — SODIUM CHLORIDE, SODIUM LACTATE, POTASSIUM CHLORIDE, AND CALCIUM CHLORIDE 500 ML: .6; .31; .03; .02 INJECTION, SOLUTION INTRAVENOUS at 01:11

## 2019-11-20 RX ADMIN — DULOXETINE HYDROCHLORIDE 20 MG: 20 CAPSULE, DELAYED RELEASE ORAL at 08:11

## 2019-11-20 RX ADMIN — METOCLOPRAMIDE HYDROCHLORIDE 5 MG: 5 TABLET ORAL at 12:11

## 2019-11-20 RX ADMIN — METOCLOPRAMIDE HYDROCHLORIDE 5 MG: 5 TABLET ORAL at 05:11

## 2019-11-20 NOTE — NURSING
Patient c/o of chest pain at 2028. Patient asked to point to pain location. Patient pointed to right epigastric area and states it radiates to her back and down her right leg. PRN medications given for pain and reassessed. Still w/ complaints of pain. States best pain ever achieved is a 7 1/2.

## 2019-11-20 NOTE — PLAN OF CARE
POC reviewed w/ patient.  All questions answered and reviewed. Verbalized understanding. Neuro exam unchanged. Pain unable to be controlled despite all PRN meds given. Asymptomatic hypotn resolved w/ LR 500ml Bolus. Educated on importance of avoiding hypotn episodes. VSS. Repositioned for comfort. Call light in reach, bed locked in low position, side rails up x2. Fall precautions remained. Advised to call staff for assistance. Will continue to monitor.

## 2019-11-20 NOTE — PLAN OF CARE
CM spoke with son regarding additional choices, states does not but states will look at list and call back. CM left 2 messages with [facility] SNF.

## 2019-11-20 NOTE — PROGRESS NOTES
[facility]  Vascular Neurology  Comprehensive Stroke Center  Progress Note    Assessment/Plan:     * Thrombotic stroke involving left middle cerebral artery  Melva Barker is a 74 y.o. female with PMHx of HTN, HLD, and DM who presented to OSH with acute worsening of R-sided weakness after having experienced R-sided weakness x1 month. She was treated with tPA at OSH. CTA without LVO. MRI with infarct in L internal capsule and corona radiata. Etiology likely small vessel disease.    CT abdomen/pelvis again with large retroperitoneal hematoma however decreased in size, H/H stable     Antithrombotics for secondary stroke prevention: Antiplatelets: ASA 81 mg + Plavix 75 mg -- held due to retroperitoneal hematoma. Resumed DAPT 11/17  Statins for secondary stroke prevention and hyperlipidemia, if present: Statins: Atorvastatin- 40 mg daily,   Aggressive risk factor modification: HTN, HLD, Diet  Rehab efforts: The patient has been evaluated by a stroke team provider and the therapy needs have been fully considered based off the presenting complaints and exam findings. The following therapy evaluations are needed: PT evaluate and treat, OT evaluate and treat, SLP evaluate and treat, PM&R evaluate for appropriate placement - Dispo inpatient rehab  Diagnostics ordered/pending: None   VTE prophylaxis: Mechanical SCDs, heparin 5000 units q 8 hours   BP parameters: Infarct: Post tPA, SBP <160; Avoid hypotension    Cytotoxic cerebral edema  Area of cytotoxic cerebral edema identified when reviewing brain imaging in the territory of the L middle cerebral artery. There is no mass effect associated with it. We will continue to monitor the patients clinical exam for any worsening of symptoms which may indicate expansion of the stroke or the area of the edema resulting in the clinical change. The pattern is suggestive of small vessel etiology.    Retroperitoneal hematoma  Pt had been c/o back pain in  recent days, complicated by her hx of chronic back and sciatic nerve pain with use of Tizanidine and opiates at home.  Due to Hb downtrend and recent tPA administration, MRI Lumbar/Thoracic spine was ordered 11/8 which demonstrated large R psoas hematoma with IVC compression and probable thrombosis.   General Surgery was consulted; No acute intervention pursued.     Patient was then with acute decompensation overnight 11/9; became hypotensive, tachycardic, and apneic with Hb down to 6.7. Rapid Response was called; patient requiring intubation, blood transfusion, and transfer to medical ICU.   CTA abd/pelvis 11/9 showed hematoma with active extravasation/hemorrhage with IVC compression, hemoperitoneum.   Pt went to IR and is now s/p successful R L2 & L3 lumbar spinal artery embolization.   Patient underwent IR re-evaluation on 11/11. No arterial bleed evident on IR angiography.  CT abd/pelvis 11/17: Redemonstration of large right retroperitoneal hematoma which is mildly decreased in size compared to most recent     Hb now stable, Continuing to monitor.    Debility  2/2 stroke  PT/OT eval & treat - Dispo inpatient rehab    Essential hypertension  Risk factor for stroke  Holding all BP meds due to hypotension    Back pain  Reported hx chronic back pain with associated sciatic nerve pain as well, then complicated by acute retroperitoneal hematoma.  Consulted to anesthesia pain management    Controlled on gabapentin,duloxetine,  trazodone, hydrcodone-acetaminophen (prn), tizanidine (prn)   Stroke attending increased frequency of hydrocodone-acetaminophen from TID prn to QID prn     Gastroparesis  Currently on Reglan 5 mg TID  Previously on Reglan 10 mg TID before meals, will order if needed    Diabetes mellitus type 2 without retinopathy  Stroke risk factor, poorly controlled on glargine 17 units daily  HbA1c 8.3%  Recommend tight glucose control  Currently on SSI   Diabetic diet      11/1/19 MRI with small vessel L MCA  infarct, therapy recommending rehab  11/2 - Patient stepped down overnight. Adjusting BP regimen. Patient with continuous complaints if nausea. IV Zofran ordered with some relief. Patient has not has BM since 10/30. Ordering KUB to rule out obstruction. Neuro exam unchanged.   11/3 - NAEON. Patient reports she had no episodes of vomiting overnight. Still complaining of nausea. Mild lower abdomen tenderness on exam. Lipase ordered and WNL. Continue to monitor.   11/4 - denies nausea and vomiting. Abd tenderness remains present on palpitation. Continue to monitor. HCTZ increased yesterday. Pending rehab placement.   11/5 -  N/V x1 overnight zofran resolved, sucralfate started. Placement pending   11/6 - hypotensive, held all BP meds,  ml bolus, responded well. Increased BUN/Cr, start NS 75ml/hr.   11/7 patient complaining of sciatica pain.  Restarted home tizanidine.  Patient requesting hydrocodone but educated patient that nerve pain is not treated with opioid.  Degenerative disease seen on xray but no fracture.      11/8 Patient continues to experience back pain.  Now with leukocytosis and drop in h/h plan for MRI thoracic/lumbar spine to evaluate for epidural hematoma.    11/9: MRI Lumbar spine had demonstrated large R psoas hematoma with possible IVC compression; General Surgery consulted. Patient was then with acute decompensation overnight; became hypotensive, tachycardic, and apneic requiring intubation, blood transfusion, and medical ICU transfer. CTA abd/pelvis showed active extravasation/hemorrhage with hemoperitoneum. Pt now s/p successful lumbar artery embolization in IR. She was extubated on arrival back to ICU and tolerated well.    11/11: Patient underwent IR re-evaluation yesterday. No arterial bleed evident on IR angiography. Patient neuro exam stable.   11/12 Stepped down from medical ICU this morning.  Patient still experiencing pain.  Anesthesia consulted for pain management.  D/C pain pump  and started oral medications for muscle and nerve pain.  Will wean to home regimen. Waiting for repeat CBC, transfused one unit of blood 11/11.   Tachycardic/hypertensive will continue to monitor.  11/13: Upgraded diet per SLP recs. Pt continues to c/o R back pain, concern that tachycardia could be pain-induced; adjusted analgesic regimen further as well as antiemetic regimen with plans to eventually wean to home regimen. Replaced Phos. Dispo inpatient rehab.  11/14: patient hypotensive overnight and this morning requiring fluid boluses. Discontinued all pain medications except for celecoxib in case pain meds are contributing to hypotension. Lidocaine patch and Voltaren gel ordered as well. Infectious workup significant for minor atelectasis in R lower lung with increased procal and lactate. Patient started on IV antibiotics for community acquired pneumonia. Blood cultures pending. ICU notified of patient's hypotension  11/15: Liquid diarrhea, WBC 14, stopped empiric abx, ordered c. Diff and stool culture, start PO vanc, IVFs. Adjusted pain meds with goal to return to home regimen. Nauseous overnight, zofran given.   11/16: CT abdomen and pelvis to f/u retroperitoneal hematoma prior to starting DAPT, H/H stable. T max 99.4, tachy, WBC 13, C. Diff and stool cultures pending, increased  ml/hr, labile BP, continue to monitor. Remove central line.   11/17: CT abdomen/pelvis again with large retroperitoneal hematoma  but decreased in size, H/H uptrend, resume DAPT today. Diarrhea becoming thicker, WBC improved, a febrile, c. Diff antigen positive, toxins negative. Pain well controlled.   11/18 PCR negative, c.diff precautions and vancomycin d/c.  Neurologically stable waiting for placement.    11/19 Patient now recommending SNF.   11/20 Neurologically stable.  Waiting for placement.  Hypotensive episodes overnight given some fluids.       STROKE DOCUMENTATION   Acute Stroke Times   Last Known Normal Date:  10/31/19  Last Known Normal Time: 0630  Symptom Onset Date: 10/31/19  Symptom Onset Time: 0730  Stroke Team Called Date: 10/31/19  Stroke Team Called Time: 0936  Stroke Team Arrival Date: 10/31/19  Stroke Team Arrival Time: 0946  Decision to Treat Time for Alteplase: 1003    NIH Scale:  1a. Level of Consciousness: 0-->Alert, keenly responsive  1b. LOC Questions: 0-->Answers both questions correctly  1c. LOC Commands: 0-->Performs both tasks correctly  2. Best Gaze: 0-->Normal  3. Visual: 0-->No visual loss  4. Facial Palsy: 1-->Minor paralysis (flattened nasolabial fold, asymmetry on smiling)  5a. Motor Arm, Left: 0-->No drift, limb holds 90 (or 45) degrees for full 10 secs  5b. Motor Arm, Right: 1-->Drift, limb holds 90 (or 45) degrees, but drifts down before full 10 secs, does not hit bed or other support  6a. Motor Leg, Left: 0-->No drift, leg holds 30 degree position for full 5 secs  6b. Motor Leg, Right: 3-->No effort against gravity, leg falls to bed immediately  7. Limb Ataxia: 0-->Absent  8. Sensory: 0-->Normal, no sensory loss  9. Best Language: 0-->No aphasia, normal  10. Dysarthria: 1-->Mild-to-moderate dysarthria, patient slurs at least some words and, at worst, can be understood with some difficulty  11. Extinction and Inattention (formerly Neglect): 0-->No abnormality  Total (NIH Stroke Scale): 6       Modified Dayanna Score: 0  [place] Coma Scale:    ABCD2 Score:    [id number] Score:   HAS -BLED Score:   ICH Score:   Hunt & Amaya Classification:      Hemorrhagic change of an Ischemic Stroke: Does this patient have an ischemic stroke with hemorrhagic changes? No     Neurologic Chief Complaint: R-sided weakness -- L MCA    Subjective:     Interval History: Patient is seen for follow-up neurological assessment and treatment recommendations: Neurologically stable.  Waiting for placement.  Hypotensive episodes overnight given some fluids.     HPI, Past Medical, Family, and Social History remains the same  as documented in the initial encounter.     Review of Systems   Constitutional: Negative for chills and fever.   Respiratory: Negative for cough and shortness of breath.    Cardiovascular: Negative for chest pain.   Musculoskeletal: Positive for arthralgias and back pain.   Neurological: Positive for facial asymmetry, speech difficulty and weakness. Negative for dizziness.   Psychiatric/Behavioral: Negative for agitation and confusion.     Scheduled Meds:   acetaminophen  650 mg Oral Q8H    aspirin  81 mg Oral Daily    atorvastatin  40 mg Oral QHS    clopidogrel  75 mg Oral Daily    diclofenac sodium  4 g Topical (Top) Daily    DULoxetine  20 mg Oral BID    gabapentin  300 mg Oral TID    lidocaine  1 patch Transdermal Q24H    metoclopramide HCl  5 mg Oral TID AC    polyethylene glycol  17 g Oral Daily    potassium chloride 10%  20 mEq Oral BID    traZODone  25 mg Oral QHS     Continuous Infusions:    PRN Meds:sodium chloride, dextrose 10 % in water (D10W), glucagon (human recombinant), HYDROcodone-acetaminophen, insulin aspart U-100, melatonin, ondansetron, sodium chloride 0.9%, tiZANidine    Objective:     Vital Signs (Most Recent):  Temp: 99.4 °F (37.4 °C) (11/20/19 1137)  Pulse: 94 (11/20/19 1137)  Resp: 18 (11/20/19 1137)  BP: (!) 160/95 (11/20/19 1137)  SpO2: 98 % (11/20/19 1137)  BP Location: Left arm    Vital Signs Range (Last 24H):  Temp:  [96.7 °F (35.9 °C)-99.4 °F (37.4 °C)]   Pulse:  []   Resp:  [16-26]   BP: ()/(58-95)   SpO2:  [96 %-98 %]   BP Location: Left arm    Physical Exam   Constitutional: She is oriented to person, place, and time. She appears well-developed and well-nourished. No distress.   HENT:   Head: Normocephalic and atraumatic.   Eyes: EOM are normal.   Cardiovascular: Normal rate.   Pulmonary/Chest: Effort normal. No respiratory distress.   Neurological: She is alert and oriented to person, place, and time.   Skin: Skin is warm and dry. She is not diaphoretic.    Psychiatric: Cognition and memory are not impaired. She is attentive.   Vitals reviewed.      Neurological Exam:   LOC: alert   Attention Span: good  Language: No aphasia  Articulation: Mild dysarthria  Orientation: Person, Time, Place  Visual Fields: Full  EOM (CN III, IV, VI): Full/intact  Facial Movement (CN VII): Lower facial weakness on the Right  Motor: Arm left  Normal 5/5  Leg left  Normal 5/5  Arm right  Paresis: +3/5   Leg right Paresis: 2/5  Sensation: Intact to light touch, temp  Tone: Normal tone throughout    Laboratory:  CMP:   No results for input(s): GLUCOSE, CALCIUM, ALBUMIN, PROT, NA, K, CO2, CL, BUN, CREATININE, ALKPHOS, ALT, AST, BILITOT in the last 24 hours.  CBC:   Recent Labs   Lab 11/19/19  0451   WBC 11.31   RBC 3.30*   HGB 9.2*   HCT 29.5*      MCV 89   MCH 27.9   MCHC 31.2*     Lipid Panel:   No results for input(s): CHOL, LDLCALC, HDL, TRIG in the last 168 hours.  Coagulation:   No results for input(s): PT, INR, APTT in the last 168 hours.  Platelet Aggregation Study: No results for input(s): PLTAGG, PLTAGINTERP, PLTAGREGLACO, ADPPLTAGGREG in the last 168 hours.  Hgb A1C:   No results for input(s): HGBA1C in the last 168 hours.  TSH:   No results for input(s): TSH in the last 168 hours.      Diagnostic Results     Brain Imaging     MRI Brain (11/01/19):  Acute lacunar type infarct coursing through the left posterior limb internal capsule and corona radiata.  Cytotoxic cerebral edema        CTH (10/31/19):  No acute intracranial pathology      Vessel Imaging     CTA Multiphase (10/31/19):  CTA head: Atherosclerotic disease of the cavernous and supraclinoid ICAs with mild narrowing.  Otherwise unremarkable CTA of the head specifically without evidence for proximal significant stenosis or occlusion.  CTA neck: Less than 50% proximal ICA stenosis by NASCET criteria.  Atherosclerotic disease with mild moderate narrowing of the right vertebral artery origin.  There is mild left V1  segment narrowing.  No significant focal vertebral artery stenosis or occlusion.  Interlobular septal thickening with tree in bud micro nodularity visualized upper lobes bilaterally which may represent inflammatory/infectious process clinical correlation and correlation with dedicated CT thorax recommended.  CT head: Bandlike region hypoattenuation left posterior limb internal capsule unchanged from prior suggestive for possible recent to subacute age infarction.  No evidence for hydrocephalus or acute intracranial hemorrhage.  Clinical correlation and further evaluation with MRI if patient compatible.      Cardiac Imaging   Echo (11/01/19)  · Increased (hyperdynamic) left ventricular systolic function. The estimated ejection fraction is 75%.  · Concentric left ventricular remodeling.  · Normal LV diastolic function.  · No wall motion abnormalities.  · Normal right ventricular systolic function.  · Normal central venous pressure (3 mm Hg).  · Mild tricuspid regurgitation.  · The estimated PA systolic pressure is 26 mm Hg  · Echolucent structure next to the LA, likely representing hiatal hernia. Clinincal correlation is required.      Miscellaneous Imaging  CT abdomen/pelvis 11/16/19  1. Redemonstration of large right retroperitoneal hematoma which is mildly decreased in size compared to most recent examination of 11/10/2019.  Continued mass effect on the right kidney, which is displaced anteriorly.  2. Soft tissue induration/fat stranding involving the left inguinal region, likely relating to recent vascular catheterization.  Correlation with physical exam is advised.  3. Small right pleural effusion with bibasilar atelectasis.  4. Air within the urinary bladder, presumably secondary to recent urinary catheterization although clinical correlation is advised.  5. Multiple additional findings as detailed above.    IR Angiogram Visceral 11/10/19 pending    CTA Abdomen/Pelvis 11/10/19  Right-sided retroperitoneal  hematoma with questionable foci of active extravasation within the inferior portion of the collection as described above.    IR Angiogram Visceral 11/9/19  Angiography of the lumbar arteries demonstrates active extravasation of the right L2 lumbar artery. Embolization using beads as described above.  Plan: Transfer patient to ICU.  Continued serial H and H follow-up.    CTA Abdomen/Pelvis 11/9/19  1. Large right retroperitoneal hematoma with findings concerning for active arterial extravasation/hemorrhage as detailed above.  There is associated hemorrhage extending throughout the right abdomen and pelvis/pericolic gutter with associated mass effect on the right kidney, which is anteriorly displaced.  2. Decreased opacification of the infahepatic and infrarenal IVC, possibly secondary to underlying mass effect versus partial thrombosis.  3. Nonspecific cecal and right colonic wall thickening, possibly reactive due to hemoperitoneum.  4. Additional findings as detailed above.    MRI Thoracic/Lumbar Spine W WO Contr 11/8/19  Large hematoma within the right psoas muscle.  Compression and probable thrombosis of the IVC. Consider contrast enhanced CT with delayed phase.  Multilevel degenerative changes as detailed above, more severe in the lumbar spine.  Irregularity of the T12 through L3 endplates is most likely degenerative.      Danya Mancia PA-C  Comprehensive Stroke Center  Department of Vascular Neurology   [facility]

## 2019-11-20 NOTE — SUBJECTIVE & OBJECTIVE
Neurologic Chief Complaint: R-sided weakness -- L MCA    Subjective:     Interval History: Patient is seen for follow-up neurological assessment and treatment recommendations: Neurologically stable.  Waiting for placement.  Hypotensive episodes overnight given some fluids.     HPI, Past Medical, Family, and Social History remains the same as documented in the initial encounter.     Review of Systems   Constitutional: Negative for chills and fever.   Respiratory: Negative for cough and shortness of breath.    Cardiovascular: Negative for chest pain.   Musculoskeletal: Positive for arthralgias and back pain.   Neurological: Positive for facial asymmetry, speech difficulty and weakness. Negative for dizziness.   Psychiatric/Behavioral: Negative for agitation and confusion.     Scheduled Meds:   acetaminophen  650 mg Oral Q8H    aspirin  81 mg Oral Daily    atorvastatin  40 mg Oral QHS    clopidogrel  75 mg Oral Daily    diclofenac sodium  4 g Topical (Top) Daily    DULoxetine  20 mg Oral BID    gabapentin  300 mg Oral TID    lidocaine  1 patch Transdermal Q24H    metoclopramide HCl  5 mg Oral TID AC    polyethylene glycol  17 g Oral Daily    potassium chloride 10%  20 mEq Oral BID    traZODone  25 mg Oral QHS     Continuous Infusions:    PRN Meds:sodium chloride, dextrose 10 % in water (D10W), glucagon (human recombinant), HYDROcodone-acetaminophen, insulin aspart U-100, melatonin, ondansetron, sodium chloride 0.9%, tiZANidine    Objective:     Vital Signs (Most Recent):  Temp: 99.4 °F (37.4 °C) (11/20/19 1137)  Pulse: 94 (11/20/19 1137)  Resp: 18 (11/20/19 1137)  BP: (!) 160/95 (11/20/19 1137)  SpO2: 98 % (11/20/19 1137)  BP Location: Left arm    Vital Signs Range (Last 24H):  Temp:  [96.7 °F (35.9 °C)-99.4 °F (37.4 °C)]   Pulse:  []   Resp:  [16-26]   BP: ()/(58-95)   SpO2:  [96 %-98 %]   BP Location: Left arm    Physical Exam   Constitutional: She is oriented to person, place, and time. She  appears well-developed and well-nourished. No distress.   HENT:   Head: Normocephalic and atraumatic.   Eyes: EOM are normal.   Cardiovascular: Normal rate.   Pulmonary/Chest: Effort normal. No respiratory distress.   Neurological: She is alert and oriented to person, place, and time.   Skin: Skin is warm and dry. She is not diaphoretic.   Psychiatric: Cognition and memory are not impaired. She is attentive.   Vitals reviewed.      Neurological Exam:   LOC: alert   Attention Span: good  Language: No aphasia  Articulation: Mild dysarthria  Orientation: Person, Time, Place  Visual Fields: Full  EOM (CN III, IV, VI): Full/intact  Facial Movement (CN VII): Lower facial weakness on the Right  Motor: Arm left  Normal 5/5  Leg left  Normal 5/5  Arm right  Paresis: +3/5   Leg right Paresis: 2/5  Sensation: Intact to light touch, temp  Tone: Normal tone throughout    Laboratory:  CMP:   No results for input(s): GLUCOSE, CALCIUM, ALBUMIN, PROT, NA, K, CO2, CL, BUN, CREATININE, ALKPHOS, ALT, AST, BILITOT in the last 24 hours.  CBC:   Recent Labs   Lab 11/19/19  0451   WBC 11.31   RBC 3.30*   HGB 9.2*   HCT 29.5*      MCV 89   MCH 27.9   MCHC 31.2*     Lipid Panel:   No results for input(s): CHOL, LDLCALC, HDL, TRIG in the last 168 hours.  Coagulation:   No results for input(s): PT, INR, APTT in the last 168 hours.  Platelet Aggregation Study: No results for input(s): PLTAGG, PLTAGINTERP, PLTAGREGLACO, ADPPLTAGGREG in the last 168 hours.  Hgb A1C:   No results for input(s): HGBA1C in the last 168 hours.  TSH:   No results for input(s): TSH in the last 168 hours.      Diagnostic Results     Brain Imaging     MRI Brain (11/01/19):  Acute lacunar type infarct coursing through the left posterior limb internal capsule and corona radiata.  Cytotoxic cerebral edema        CTH (10/31/19):  No acute intracranial pathology      Vessel Imaging     CTA Multiphase (10/31/19):  CTA head: Atherosclerotic disease of the cavernous and  supraclinoid ICAs with mild narrowing.  Otherwise unremarkable CTA of the head specifically without evidence for proximal significant stenosis or occlusion.  CTA neck: Less than 50% proximal ICA stenosis by NASCET criteria.  Atherosclerotic disease with mild moderate narrowing of the right vertebral artery origin.  There is mild left V1 segment narrowing.  No significant focal vertebral artery stenosis or occlusion.  Interlobular septal thickening with tree in bud micro nodularity visualized upper lobes bilaterally which may represent inflammatory/infectious process clinical correlation and correlation with dedicated CT thorax recommended.  CT head: Bandlike region hypoattenuation left posterior limb internal capsule unchanged from prior suggestive for possible recent to subacute age infarction.  No evidence for hydrocephalus or acute intracranial hemorrhage.  Clinical correlation and further evaluation with MRI if patient compatible.      Cardiac Imaging   Echo (11/01/19)  · Increased (hyperdynamic) left ventricular systolic function. The estimated ejection fraction is 75%.  · Concentric left ventricular remodeling.  · Normal LV diastolic function.  · No wall motion abnormalities.  · Normal right ventricular systolic function.  · Normal central venous pressure (3 mm Hg).  · Mild tricuspid regurgitation.  · The estimated PA systolic pressure is 26 mm Hg  · Echolucent structure next to the LA, likely representing hiatal hernia. Clinincal correlation is required.      Miscellaneous Imaging  CT abdomen/pelvis 11/16/19  1. Redemonstration of large right retroperitoneal hematoma which is mildly decreased in size compared to most recent examination of 11/10/2019.  Continued mass effect on the right kidney, which is displaced anteriorly.  2. Soft tissue induration/fat stranding involving the left inguinal region, likely relating to recent vascular catheterization.  Correlation with physical exam is advised.  3. Small right  pleural effusion with bibasilar atelectasis.  4. Air within the urinary bladder, presumably secondary to recent urinary catheterization although clinical correlation is advised.  5. Multiple additional findings as detailed above.    IR Angiogram Visceral 11/10/19 pending    CTA Abdomen/Pelvis 11/10/19  Right-sided retroperitoneal hematoma with questionable foci of active extravasation within the inferior portion of the collection as described above.    IR Angiogram Visceral 11/9/19  Angiography of the lumbar arteries demonstrates active extravasation of the right L2 lumbar artery. Embolization using beads as described above.  Plan: Transfer patient to ICU.  Continued serial H and H follow-up.    CTA Abdomen/Pelvis 11/9/19  1. Large right retroperitoneal hematoma with findings concerning for active arterial extravasation/hemorrhage as detailed above.  There is associated hemorrhage extending throughout the right abdomen and pelvis/pericolic gutter with associated mass effect on the right kidney, which is anteriorly displaced.  2. Decreased opacification of the infahepatic and infrarenal IVC, possibly secondary to underlying mass effect versus partial thrombosis.  3. Nonspecific cecal and right colonic wall thickening, possibly reactive due to hemoperitoneum.  4. Additional findings as detailed above.    MRI Thoracic/Lumbar Spine W WO Contr 11/8/19  Large hematoma within the right psoas muscle.  Compression and probable thrombosis of the IVC. Consider contrast enhanced CT with delayed phase.  Multilevel degenerative changes as detailed above, more severe in the lumbar spine.  Irregularity of the T12 through L3 endplates is most likely degenerative.

## 2019-11-20 NOTE — ASSESSMENT & PLAN NOTE
Reported hx chronic back pain with associated sciatic nerve pain as well, then complicated by acute retroperitoneal hematoma.  Consulted to anesthesia pain management    Controlled on gabapentin,duloxetine,  trazodone, hydrcodone-acetaminophen (prn), tizanidine (prn)   Stroke attending increased frequency of hydrocodone-acetaminophen from TID prn to QID prn

## 2019-11-20 NOTE — PT/OT/SLP PROGRESS
Occupational Therapy      Patient Name:  Melva Barker   MRN:  [id number]    Patient not seen today secondary to patient unwilling to participate due to back pain; reports she just returned to bed from using BSC and sitting in chair briefly; OT unable to return for 2nd attempt. Will follow-up as scheduled and appropriate.    AURORA Florian  11/20/2019

## 2019-11-20 NOTE — PT/OT/SLP PROGRESS
"Physical Therapy      Patient Name:  Melva Barker   MRN:  [id number]    Patient not seen today secondary to Patient unwilling to participate, "I've been getting up already today, I'm not doing it again". Will follow-up 11/21/19.    Lavonne Madrid PTA    "

## 2019-11-20 NOTE — PT/OT/SLP PROGRESS
Speech Language Pathology Treatment    Patient Name:  Melva Barker   MRN:  [id number]   703/703 A    Admitting Diagnosis: Thrombotic stroke involving left middle cerebral artery    Recommendations:                 General Recommendations:  Cognitive-linguistic therapy  Diet recommendations:  Mechanical soft, Liquid Diet Level: Thin   Aspiration Precautions: 1 bite/sip at a time, Feed only when awake/alert and Standard aspiration precautions   General Precautions: Standard, fall  Communication strategies:  none    Subjective     Patient awake and alert during session  Patient initially requesting SLP return later 2/2 just taking pain meds, however, agreed to therapy with min encouragement.     Objective:     Has the patient been evaluated by SLP for swallowing?   Yes  Keep patient NPO? No   Current Respiratory Status: room air      Patient seen for ongoing cognitive therapy. Patient with no noted word finding impairments in conversation. Patient required mod cues to identify 1 similarity and 1 different between two objects with 75% acc. She required max cues to answer hypothetical reasoning questions. SLP provided thought organizational skills to improve divergent naming tasks. Patient named 5 fruits no cues, and 13 fruits given mod cues and no time constraints. Patient followed multi-unit commands with 70% acc given 1 repetition. Patient with adequate insight into deficits and in agreement with need for continue ST s/p discharge.     Assessment:     Melva Barker is a 74 y.o. female with an SLP diagnosis of Cognitive-Linguistic Impairment.      Goals:   Multidisciplinary Problems     SLP Goals        Problem: SLP Goal    Goal Priority Disciplines Outcome   SLP Goal     SLP Ongoing, Progressing   Description:  Speech Language Pathology Goals  Goals expected to be met by 11/20; goals remain appropriate- continue until 12/4  1. Pt will tolerate a mechanical soft diet/thin liquids with no s/s of aspiration.    2. Pt will participate in conversation without cues to express thought.   3. Pt will complete simple functional reasoning tasks with 80% accy given min cues.   4. Pt will name name 13 items in a category in 1 minute with mod assist.  5. Pt will follow multi-step commands with 75% accuracy and min assist.  6. Pt will complete assessment of reading, writing, visual spatial skills to determine need for tx.                      Plan:     · Patient to be seen:  3 x/week   · Plan of Care expires:  12/13/19  · Plan of Care reviewed with:  patient   · SLP Follow-Up:  Yes       Discharge recommendations:  nursing facility, skilled   Barriers to Discharge:  None    Time Tracking:     SLP Treatment Date:   11/20/19  Speech Start Time:  0830  Speech Stop Time:  0853     Speech Total Time (min):  23 min    Billable Minutes: Speech Therapy Individual 23    BELIA Stewart, CCC-SLP  Pager: [phone]  11/20/2019

## 2019-11-20 NOTE — PLAN OF CARE
Problem: SLP Goal  Goal: SLP Goal  Description  Speech Language Pathology Goals  Goals expected to be met by 11/20; goals remain appropriate- continue until 12/4  1. Pt will tolerate a mechanical soft diet/thin liquids with no s/s of aspiration.   2. Pt will participate in conversation without cues to express thought.   3. Pt will complete simple functional reasoning tasks with 80% accy given min cues.   4. Pt will name name 13 items in a category in 1 minute with mod assist.  5. Pt will follow multi-step commands with 75% accuracy and min assist.  6. Pt will complete assessment of reading, writing, visual spatial skills to determine need for tx.     Outcome: Ongoing, Progressing   Goals remain appropriate. ST will continue to follow.   MARITO Stewart., CCC-SLP  Pager: [phone]  11/20/2019

## 2019-11-21 PROBLEM — I95.1 ORTHOSTATIC HYPOTENSION: Status: ACTIVE | Noted: 2019-11-14

## 2019-11-21 LAB
POCT GLUCOSE: 158 MG/DL (ref 70–110)
POCT GLUCOSE: 160 MG/DL (ref 70–110)
TB INDURATION 48 - 72 HR READ: 0 MM

## 2019-11-21 PROCEDURE — 25000003 PHARM REV CODE 250: Performed by: PHYSICIAN ASSISTANT

## 2019-11-21 PROCEDURE — 11000001 HC ACUTE MED/SURG PRIVATE ROOM

## 2019-11-21 PROCEDURE — 93010 EKG 12-LEAD: ICD-10-PCS | Mod: ,,, | Performed by: INTERNAL MEDICINE

## 2019-11-21 PROCEDURE — 25000003 PHARM REV CODE 250: Performed by: FAMILY MEDICINE

## 2019-11-21 PROCEDURE — 99233 PR SUBSEQUENT HOSPITAL CARE,LEVL III: ICD-10-PCS | Mod: ,,, | Performed by: PSYCHIATRY & NEUROLOGY

## 2019-11-21 PROCEDURE — 25000003 PHARM REV CODE 250: Performed by: NURSE PRACTITIONER

## 2019-11-21 PROCEDURE — 63600175 PHARM REV CODE 636 W HCPCS: Performed by: FAMILY MEDICINE

## 2019-11-21 PROCEDURE — 99233 SBSQ HOSP IP/OBS HIGH 50: CPT | Mod: ,,, | Performed by: PSYCHIATRY & NEUROLOGY

## 2019-11-21 PROCEDURE — 93005 ELECTROCARDIOGRAM TRACING: CPT

## 2019-11-21 PROCEDURE — 93010 ELECTROCARDIOGRAM REPORT: CPT | Mod: ,,, | Performed by: INTERNAL MEDICINE

## 2019-11-21 PROCEDURE — 25000003 PHARM REV CODE 250: Performed by: PSYCHIATRY & NEUROLOGY

## 2019-11-21 RX ORDER — TIZANIDINE 4 MG/1
TABLET ORAL
Status: COMPLETED
Start: 2019-11-21 | End: 2019-11-21

## 2019-11-21 RX ORDER — SODIUM CHLORIDE 9 MG/ML
INJECTION, SOLUTION INTRAVENOUS CONTINUOUS
Status: DISCONTINUED | OUTPATIENT
Start: 2019-11-21 | End: 2019-11-21

## 2019-11-21 RX ORDER — HEPARIN SODIUM 5000 [USP'U]/ML
5000 INJECTION, SOLUTION INTRAVENOUS; SUBCUTANEOUS EVERY 8 HOURS
Status: DISCONTINUED | OUTPATIENT
Start: 2019-11-21 | End: 2019-12-03 | Stop reason: HOSPADM

## 2019-11-21 RX ADMIN — ACETAMINOPHEN 650 MG: 325 TABLET ORAL at 05:11

## 2019-11-21 RX ADMIN — HEPARIN SODIUM 5000 UNITS: 5000 INJECTION INTRAVENOUS; SUBCUTANEOUS at 02:11

## 2019-11-21 RX ADMIN — HYDROCODONE BITARTRATE AND ACETAMINOPHEN 1 TABLET: 10; 325 TABLET ORAL at 04:11

## 2019-11-21 RX ADMIN — GABAPENTIN 300 MG: 300 CAPSULE ORAL at 02:11

## 2019-11-21 RX ADMIN — SODIUM CHLORIDE: 0.9 INJECTION, SOLUTION INTRAVENOUS at 11:11

## 2019-11-21 RX ADMIN — ACETAMINOPHEN 650 MG: 325 TABLET ORAL at 02:11

## 2019-11-21 RX ADMIN — POTASSIUM CHLORIDE 20 MEQ: 20 SOLUTION ORAL at 09:11

## 2019-11-21 RX ADMIN — TRAZODONE HYDROCHLORIDE 25 MG: 50 TABLET ORAL at 09:11

## 2019-11-21 RX ADMIN — METOCLOPRAMIDE HYDROCHLORIDE 5 MG: 5 TABLET ORAL at 05:11

## 2019-11-21 RX ADMIN — METOCLOPRAMIDE HYDROCHLORIDE 5 MG: 5 TABLET ORAL at 11:11

## 2019-11-21 RX ADMIN — ACETAMINOPHEN 650 MG: 325 TABLET ORAL at 09:11

## 2019-11-21 RX ADMIN — HYDROCODONE BITARTRATE AND ACETAMINOPHEN 1 TABLET: 10; 325 TABLET ORAL at 03:11

## 2019-11-21 RX ADMIN — HEPARIN SODIUM 5000 UNITS: 5000 INJECTION INTRAVENOUS; SUBCUTANEOUS at 09:11

## 2019-11-21 RX ADMIN — HYDROCODONE BITARTRATE AND ACETAMINOPHEN 1 TABLET: 10; 325 TABLET ORAL at 09:11

## 2019-11-21 RX ADMIN — Medication 6 MG: at 12:11

## 2019-11-21 RX ADMIN — TIZANIDINE 4 MG: 4 TABLET ORAL at 11:11

## 2019-11-21 RX ADMIN — GABAPENTIN 300 MG: 300 CAPSULE ORAL at 09:11

## 2019-11-21 RX ADMIN — ATORVASTATIN CALCIUM 40 MG: 20 TABLET, FILM COATED ORAL at 09:11

## 2019-11-21 RX ADMIN — DULOXETINE HYDROCHLORIDE 20 MG: 20 CAPSULE, DELAYED RELEASE ORAL at 09:11

## 2019-11-21 RX ADMIN — LIDOCAINE 1 PATCH: 50 PATCH CUTANEOUS at 11:11

## 2019-11-21 RX ADMIN — HYDROCODONE BITARTRATE AND ACETAMINOPHEN 1 TABLET: 10; 325 TABLET ORAL at 11:11

## 2019-11-21 RX ADMIN — METOCLOPRAMIDE HYDROCHLORIDE 5 MG: 5 TABLET ORAL at 04:11

## 2019-11-21 RX ADMIN — ASPIRIN 81 MG: 81 TABLET, COATED ORAL at 09:11

## 2019-11-21 RX ADMIN — CLOPIDOGREL BISULFATE 75 MG: 75 TABLET ORAL at 09:11

## 2019-11-21 NOTE — ASSESSMENT & PLAN NOTE
Patient hypotensive overnight from 11/13-11/14, continued hypotension on morning of 11/14  Received multiple boluses and pressure responding  Differential includes sepsis vs pain medication     Infectious workup ordered  Elevated procal and lactate with interval development of atelectasis in R lower lung, started on 7 day course of IV cefepime and vanc for possible hospital acquired pneumonia, blood culture pending    Liquid diarrhea overnight 11/15, WBC 14, stopped empiric abx, ordered c diff, stool culture, PO vanc 10 day course for possible c. Diff, IVFs, remove central line.     BP labile. WBC and tachycardia improved, A febrile.     Orthostatic VS 11/20 HR 120s to 90s standing to lying, IVFs 1L over 10 hours ordered 11/21.

## 2019-11-21 NOTE — PLAN OF CARE
POC reviewed w/ patient.  All questions answered and reviewed. Verbalized understanding. Neuro exam unchanged. Patient continues to complain of pain at 9-10 on a scale of 0-10. All PRN pain medications have been given w/o relief. Patient states her best pain score is 7 1/2 at home. VSS. Repositioned for comfort. Call light in reach, bed locked in low position, side rails up x2. Fall precautions remained. Advised to call staff for assistance. Will continue to monitor.

## 2019-11-21 NOTE — ASSESSMENT & PLAN NOTE
Patient hypotensive overnight from 11/13 and into the AM 11/14. Received multiple boluses and pressure responsive.  Concern was for sepsis vs pain medication-related.   See infection risk section above    Pt then with orthostatic vital signs on 11/20 -- HR 120s to 90s from standing to lying, s/p IVFs 1L over 10 hours ordered on 11/21.   Nursing reported AFib on tele on 11/20, however EKG showed sinus tachycardia.   Encouraging pt toward aggressive PO hydration. Hb remains stable.   Will continue monitoring.

## 2019-11-21 NOTE — SUBJECTIVE & OBJECTIVE
Neurologic Chief Complaint: R-sided weakness -- L MCA    Subjective:     Interval History: Patient is seen for follow-up neurological assessment and treatment recommendations:     Orthostatic yesterday, HR 120s to 90s standing to lying, IVFs 1L over 10 hours ordered.     HPI, Past Medical, Family, and Social History remains the same as documented in the initial encounter.     Review of Systems   Constitutional: Negative for chills and fever.   Respiratory: Negative for cough and shortness of breath.    Cardiovascular: Negative for chest pain.   Musculoskeletal: Positive for arthralgias and back pain.   Neurological: Positive for facial asymmetry, speech difficulty and weakness. Negative for dizziness.   Psychiatric/Behavioral: Negative for agitation and confusion.     Scheduled Meds:   acetaminophen  650 mg Oral Q8H    aspirin  81 mg Oral Daily    atorvastatin  40 mg Oral QHS    clopidogrel  75 mg Oral Daily    diclofenac sodium  4 g Topical (Top) Daily    DULoxetine  20 mg Oral BID    gabapentin  300 mg Oral TID    heparin (porcine)  5,000 Units Subcutaneous Q8H    lidocaine  1 patch Transdermal Q24H    metoclopramide HCl  5 mg Oral TID AC    polyethylene glycol  17 g Oral Daily    potassium chloride 10%  20 mEq Oral BID    traZODone  25 mg Oral QHS     Continuous Infusions:   sodium chloride 0.9% 100 mL/hr at 11/21/19 1144     PRN Meds:dextrose 10 % in water (D10W), glucagon (human recombinant), HYDROcodone-acetaminophen, insulin aspart U-100, melatonin, ondansetron, sodium chloride 0.9%, tiZANidine    Objective:     Vital Signs (Most Recent):  Temp: 98 °F (36.7 °C) (11/21/19 1113)  Pulse: 108 (11/21/19 1115)  Resp: 18 (11/21/19 1113)  BP: (!) 149/80 (11/21/19 1115)  SpO2: 99 % (11/21/19 1113)  BP Location: Left arm    Vital Signs Range (Last 24H):  Temp:  [98 °F (36.7 °C)-99.5 °F (37.5 °C)]   Pulse:  []   Resp:  [16-18]   BP: (137-175)/(60-95)   SpO2:  [94 %-99 %]   BP Location: Left  arm    Physical Exam   Constitutional: She is oriented to person, place, and time. She appears well-developed and well-nourished. No distress.   HENT:   Head: Normocephalic and atraumatic.   Eyes: EOM are normal.   Cardiovascular: Normal rate.   Pulmonary/Chest: Effort normal. No respiratory distress.   Neurological: She is alert and oriented to person, place, and time.   Skin: Skin is warm and dry. She is not diaphoretic.   Psychiatric: Cognition and memory are not impaired. She is attentive.   Vitals reviewed.      Neurological Exam:   LOC: alert   Attention Span: good  Language: No aphasia  Articulation: Mild dysarthria  Orientation: Person, Time, Place  Visual Fields: Full  EOM (CN III, IV, VI): Full/intact  Facial Movement (CN VII): Lower facial weakness on the Right  Motor: Arm left  Normal 5/5  Leg left  Normal 5/5  Arm right  Paresis: +3/5   Leg right Paresis: 2/5  Sensation: Intact to light touch, temp  Tone: Normal tone throughout    Laboratory:  CMP:   No results for input(s): GLUCOSE, CALCIUM, ALBUMIN, PROT, NA, K, CO2, CL, BUN, CREATININE, ALKPHOS, ALT, AST, BILITOT in the last 24 hours.  CBC:   Recent Labs   Lab 11/19/19  0451   WBC 11.31   RBC 3.30*   HGB 9.2*   HCT 29.5*      MCV 89   MCH 27.9   MCHC 31.2*     Lipid Panel:   No results for input(s): CHOL, LDLCALC, HDL, TRIG in the last 168 hours.  Coagulation:   No results for input(s): PT, INR, APTT in the last 168 hours.  Platelet Aggregation Study: No results for input(s): PLTAGG, PLTAGINTERP, PLTAGREGLACO, ADPPLTAGGREG in the last 168 hours.  Hgb A1C:   No results for input(s): HGBA1C in the last 168 hours.  TSH:   No results for input(s): TSH in the last 168 hours.      Diagnostic Results     Brain Imaging     MRI Brain (11/01/19):  Acute lacunar type infarct coursing through the left posterior limb internal capsule and corona radiata.  Cytotoxic cerebral edema        CTH (10/31/19):  No acute intracranial pathology      Vessel Imaging      CTA Multiphase (10/31/19):  CTA head: Atherosclerotic disease of the cavernous and supraclinoid ICAs with mild narrowing.  Otherwise unremarkable CTA of the head specifically without evidence for proximal significant stenosis or occlusion.  CTA neck: Less than 50% proximal ICA stenosis by NASCET criteria.  Atherosclerotic disease with mild moderate narrowing of the right vertebral artery origin.  There is mild left V1 segment narrowing.  No significant focal vertebral artery stenosis or occlusion.  Interlobular septal thickening with tree in bud micro nodularity visualized upper lobes bilaterally which may represent inflammatory/infectious process clinical correlation and correlation with dedicated CT thorax recommended.  CT head: Bandlike region hypoattenuation left posterior limb internal capsule unchanged from prior suggestive for possible recent to subacute age infarction.  No evidence for hydrocephalus or acute intracranial hemorrhage.  Clinical correlation and further evaluation with MRI if patient compatible.      Cardiac Imaging   Echo (11/01/19)  · Increased (hyperdynamic) left ventricular systolic function. The estimated ejection fraction is 75%.  · Concentric left ventricular remodeling.  · Normal LV diastolic function.  · No wall motion abnormalities.  · Normal right ventricular systolic function.  · Normal central venous pressure (3 mm Hg).  · Mild tricuspid regurgitation.  · The estimated PA systolic pressure is 26 mm Hg  · Echolucent structure next to the LA, likely representing hiatal hernia. Clinincal correlation is required.      Miscellaneous Imaging  CT abdomen/pelvis 11/16/19  1. Redemonstration of large right retroperitoneal hematoma which is mildly decreased in size compared to most recent examination of 11/10/2019.  Continued mass effect on the right kidney, which is displaced anteriorly.  2. Soft tissue induration/fat stranding involving the left inguinal region, likely relating to  recent vascular catheterization.  Correlation with physical exam is advised.  3. Small right pleural effusion with bibasilar atelectasis.  4. Air within the urinary bladder, presumably secondary to recent urinary catheterization although clinical correlation is advised.  5. Multiple additional findings as detailed above.    IR Angiogram Visceral 11/10/19 pending    CTA Abdomen/Pelvis 11/10/19  Right-sided retroperitoneal hematoma with questionable foci of active extravasation within the inferior portion of the collection as described above.    IR Angiogram Visceral 11/9/19  Angiography of the lumbar arteries demonstrates active extravasation of the right L2 lumbar artery. Embolization using beads as described above.  Plan: Transfer patient to ICU.  Continued serial H and H follow-up.    CTA Abdomen/Pelvis 11/9/19  1. Large right retroperitoneal hematoma with findings concerning for active arterial extravasation/hemorrhage as detailed above.  There is associated hemorrhage extending throughout the right abdomen and pelvis/pericolic gutter with associated mass effect on the right kidney, which is anteriorly displaced.  2. Decreased opacification of the infahepatic and infrarenal IVC, possibly secondary to underlying mass effect versus partial thrombosis.  3. Nonspecific cecal and right colonic wall thickening, possibly reactive due to hemoperitoneum.  4. Additional findings as detailed above.    MRI Thoracic/Lumbar Spine W WO Contr 11/8/19  Large hematoma within the right psoas muscle.  Compression and probable thrombosis of the IVC. Consider contrast enhanced CT with delayed phase.  Multilevel degenerative changes as detailed above, more severe in the lumbar spine.  Irregularity of the T12 through L3 endplates is most likely degenerative.

## 2019-11-21 NOTE — PLAN OF CARE
11/21/19 1115   Post-Acute Status   Post-Acute Authorization Placement   Post-Acute Placement Status Pending State Certification       PASSR completed and 142 called in to the state. Awaiting 142 to be sent to the  to complete referrals for NH placement.  SW in contact with CM and Medical staff. Will continue to follow and offer support as needed.     Victor Blanchard, LMSW [facility]   Ext. [phone]      Received 142. Sent to NH.  SW in contact with CM and Medical staff. Will continue to follow and offer support as needed.     Victor Blanchard, LMSW [facility]   Ext. [phone]

## 2019-11-21 NOTE — ASSESSMENT & PLAN NOTE
Reported hx chronic back pain with associated sciatic nerve pain as well, then complicated by acute retroperitoneal hematoma.  Consulted to anesthesia pain management    Controlled on gabapentin,duloxetine,  trazodone, hydrcodone-acetaminophen (prn), tizanidine (prn)   Stroke attending increased frequency of hydrocodone-acetaminophen from TID prn to QID prn   Pain well controlled today

## 2019-11-21 NOTE — PROGRESS NOTES
[facility]  Vascular Neurology  Comprehensive Stroke Center  Progress Note    Assessment/Plan:     * Thrombotic stroke involving left middle cerebral artery  Melva Barker is a 74 y.o. female with PMHx of HTN, HLD, and DM who presented to OSH with acute worsening of R-sided weakness after having experienced R-sided weakness x1 month. She was treated with tPA at OSH. CTA without LVO. MRI with infarct in L internal capsule and corona radiata. Etiology likely small vessel disease.    CT abdomen/pelvis again with large retroperitoneal hematoma however decreased in size, H/H stable     Antithrombotics for secondary stroke prevention: Antiplatelets: ASA 81 mg + Plavix 75 mg -- held due to retroperitoneal hematoma. Resumed DAPT 11/17  Statins for secondary stroke prevention and hyperlipidemia, if present: Statins: Atorvastatin- 40 mg daily,   Aggressive risk factor modification: HTN, HLD, Diet  Rehab efforts: The patient has been evaluated by a stroke team provider and the therapy needs have been fully considered based off the presenting complaints and exam findings. The following therapy evaluations are needed: PT evaluate and treat, OT evaluate and treat, SLP evaluate and treat, PM&R evaluate for appropriate placement - Dispo inpatient rehab  Diagnostics ordered/pending: None   VTE prophylaxis: Mechanical SCDs, heparin 5000 units q 8 hours   BP parameters: Infarct: Post tPA, SBP <160; Avoid hypotension    Orthostatic hypotension  Patient hypotensive overnight from 11/13-11/14, continued hypotension on morning of 11/14  Received multiple boluses and pressure responding  Differential includes sepsis vs pain medication     Infectious workup ordered  Elevated procal and lactate with interval development of atelectasis in R lower lung, started on 7 day course of IV cefepime and vanc for possible hospital acquired pneumonia, blood culture pending    Liquid diarrhea overnight 11/15, WBC 14,  stopped empiric abx, ordered c diff, stool culture, PO vanc 10 day course for possible c. Diff, IVFs, remove central line.     BP labile. WBC and tachycardia improved, A febrile.     Orthostatic VS 11/20 HR 120s to 90s standing to lying, IVFs 1L over 10 hours ordered 11/21.           Retroperitoneal hematoma  Pt had been c/o back pain in recent days, complicated by her hx of chronic back and sciatic nerve pain with use of Tizanidine and opiates at home.  Due to Hb downtrend and recent tPA administration, MRI Lumbar/Thoracic spine was ordered 11/8 which demonstrated large R psoas hematoma with IVC compression and probable thrombosis.   General Surgery was consulted; No acute intervention pursued.     Patient was then with acute decompensation overnight 11/9; became hypotensive, tachycardic, and apneic with Hb down to 6.7. Rapid Response was called; patient requiring intubation, blood transfusion, and transfer to medical ICU.   CTA abd/pelvis 11/9 showed hematoma with active extravasation/hemorrhage with IVC compression, hemoperitoneum.   Pt went to IR and is now s/p successful R L2 & L3 lumbar spinal artery embolization.   Patient underwent IR re-evaluation on 11/11. No arterial bleed evident on IR angiography.  CT abd/pelvis 11/17: Redemonstration of large right retroperitoneal hematoma which is mildly decreased in size compared to most recent     Hb now stable, Continuing to monitor.    Debility  2/2 stroke  PT/OT eval & treat - Dispo inpatient rehab    Cytotoxic cerebral edema  Area of cytotoxic cerebral edema identified when reviewing brain imaging in the territory of the L middle cerebral artery. There is no mass effect associated with it. We will continue to monitor the patients clinical exam for any worsening of symptoms which may indicate expansion of the stroke or the area of the edema resulting in the clinical change. The pattern is suggestive of small vessel etiology.    Essential hypertension  Risk  factor for stroke  Holding all BP meds due to hypotension    Back pain  Reported hx chronic back pain with associated sciatic nerve pain as well, then complicated by acute retroperitoneal hematoma.  Consulted to anesthesia pain management    Controlled on gabapentin,duloxetine,  trazodone, hydrcodone-acetaminophen (prn), tizanidine (prn)   Stroke attending increased frequency of hydrocodone-acetaminophen from TID prn to QID prn   Pain well controlled today     Gastroparesis  Currently on Reglan 5 mg TID  Previously on Reglan 10 mg TID before meals, will order if needed    Diabetes mellitus type 2 without retinopathy  Stroke risk factor, poorly controlled on glargine 17 units daily  HbA1c 8.3%  Recommend tight glucose control  Currently on SSI   Diabetic diet    Leukocytosis  Previously suspected acute reactive 2/2 active bleed. WBC now 14.  BCx 11/9 NGTD and pt remains afebrile.  11/14 - infectious work up. Empiric abx started for possible hospital acquired pneumonia.   11/15 - Liquid diarrhea started overnight, stopped empiric abx, ordered c. Diff and stool culture, start PO vanc for possible c. diff, IVFs. Orders to remove central line.       C. Diff EIA antigen positive, toxins negative; C diff toxin PCR, negative. Diarrhea improving, thicker. WBC improved, A febrile.          11/1/19 MRI with small vessel L MCA infarct, therapy recommending rehab  11/2 - Patient stepped down overnight. Adjusting BP regimen. Patient with continuous complaints if nausea. IV Zofran ordered with some relief. Patient has not has BM since 10/30. Ordering KUB to rule out obstruction. Neuro exam unchanged.   11/3 - NAEON. Patient reports she had no episodes of vomiting overnight. Still complaining of nausea. Mild lower abdomen tenderness on exam. Lipase ordered and WNL. Continue to monitor.   11/4 - denies nausea and vomiting. Abd tenderness remains present on palpitation. Continue to monitor. HCTZ increased yesterday. Pending rehab  placement.   11/5 -  N/V x1 overnight zofran resolved, sucralfate started. Placement pending   11/6 - hypotensive, held all BP meds,  ml bolus, responded well. Increased BUN/Cr, start NS 75ml/hr.   11/7 patient complaining of sciatica pain.  Restarted home tizanidine.  Patient requesting hydrocodone but educated patient that nerve pain is not treated with opioid.  Degenerative disease seen on xray but no fracture.      11/8 Patient continues to experience back pain.  Now with leukocytosis and drop in h/h plan for MRI thoracic/lumbar spine to evaluate for epidural hematoma.    11/9: MRI Lumbar spine had demonstrated large R psoas hematoma with possible IVC compression; General Surgery consulted. Patient was then with acute decompensation overnight; became hypotensive, tachycardic, and apneic requiring intubation, blood transfusion, and medical ICU transfer. CTA abd/pelvis showed active extravasation/hemorrhage with hemoperitoneum. Pt now s/p successful lumbar artery embolization in IR. She was extubated on arrival back to ICU and tolerated well.    11/11: Patient underwent IR re-evaluation yesterday. No arterial bleed evident on IR angiography. Patient neuro exam stable.   11/12 Stepped down from medical ICU this morning.  Patient still experiencing pain.  Anesthesia consulted for pain management.  D/C pain pump and started oral medications for muscle and nerve pain.  Will wean to home regimen. Waiting for repeat CBC, transfused one unit of blood 11/11.   Tachycardic/hypertensive will continue to monitor.  11/13: Upgraded diet per SLP recs. Pt continues to c/o R back pain, concern that tachycardia could be pain-induced; adjusted analgesic regimen further as well as antiemetic regimen with plans to eventually wean to home regimen. Replaced Phos. Dispo inpatient rehab.  11/14: patient hypotensive overnight and this morning requiring fluid boluses. Discontinued all pain medications except for celecoxib in case  pain meds are contributing to hypotension. Lidocaine patch and Voltaren gel ordered as well. Infectious workup significant for minor atelectasis in R lower lung with increased procal and lactate. Patient started on IV antibiotics for community acquired pneumonia. Blood cultures pending. ICU notified of patient's hypotension  11/15: Liquid diarrhea, WBC 14, stopped empiric abx, ordered c. Diff and stool culture, start PO vanc, IVFs. Adjusted pain meds with goal to return to home regimen. Nauseous overnight, zofran given.   11/16: CT abdomen and pelvis to f/u retroperitoneal hematoma prior to starting DAPT, H/H stable. T max 99.4, tachy, WBC 13, C. Diff and stool cultures pending, increased  ml/hr, labile BP, continue to monitor. Remove central line.   11/17: CT abdomen/pelvis again with large retroperitoneal hematoma  but decreased in size, H/H uptrend, resume DAPT today. Diarrhea becoming thicker, WBC improved, a febrile, c. Diff antigen positive, toxins negative. Pain well controlled.   11/18 PCR negative, c.diff precautions and vancomycin d/c.  Neurologically stable waiting for placement.    11/19 Patient now recommending SNF.   11/20 Neurologically stable.  Waiting for placement.  Hypotensive episodes overnight given some fluids.     11/21 Orthostatic yesterday, HR 120s to 90s standing to lying, IVFs 1L over 10 hours ordered.     STROKE DOCUMENTATION   Acute Stroke Times   Last Known Normal Date: 10/31/19  Last Known Normal Time: 0630  Symptom Onset Date: 10/31/19  Symptom Onset Time: 0730  Stroke Team Called Date: 10/31/19  Stroke Team Called Time: 0936  Stroke Team Arrival Date: 10/31/19  Stroke Team Arrival Time: 0946  Decision to Treat Time for Alteplase: 1003    NIH Scale:  1a. Level of Consciousness: 0-->Alert, keenly responsive  1b. LOC Questions: 0-->Answers both questions correctly  1c. LOC Commands: 0-->Performs both tasks correctly  2. Best Gaze: 0-->Normal  3. Visual: 0-->No visual loss  4.  Facial Palsy: 1-->Minor paralysis (flattened nasolabial fold, asymmetry on smiling)  5a. Motor Arm, Left: 0-->No drift, limb holds 90 (or 45) degrees for full 10 secs  5b. Motor Arm, Right: 1-->Drift, limb holds 90 (or 45) degrees, but drifts down before full 10 secs, does not hit bed or other support  6a. Motor Leg, Left: 0-->No drift, leg holds 30 degree position for full 5 secs  6b. Motor Leg, Right: 2-->Some effort against gravity, leg falls to bed by 5 secs, but has some effort against gravity  7. Limb Ataxia: 0-->Absent  8. Sensory: 0-->Normal, no sensory loss  9. Best Language: 0-->No aphasia, normal  10. Dysarthria: 0-->Normal  11. Extinction and Inattention (formerly Neglect): 0-->No abnormality  Total (NIH Stroke Scale): 4       Modified [place] Score: 0  [place] Coma Scale:    ABCD2 Score:    [id number] Score:   HAS -BLED Score:   ICH Score:   Hunt & Amaya Classification:      Hemorrhagic change of an Ischemic Stroke: Does this patient have an ischemic stroke with hemorrhagic changes? No     Neurologic Chief Complaint: R-sided weakness -- L MCA    Subjective:     Interval History: Patient is seen for follow-up neurological assessment and treatment recommendations:     Orthostatic yesterday, HR 120s to 90s standing to lying, IVFs 1L over 10 hours ordered.     HPI, Past Medical, Family, and Social History remains the same as documented in the initial encounter.     Review of Systems   Constitutional: Negative for chills and fever.   Respiratory: Negative for cough and shortness of breath.    Cardiovascular: Negative for chest pain.   Musculoskeletal: Positive for arthralgias and back pain.   Neurological: Positive for facial asymmetry, speech difficulty and weakness. Negative for dizziness.   Psychiatric/Behavioral: Negative for agitation and confusion.     Scheduled Meds:   acetaminophen  650 mg Oral Q8H    aspirin  81 mg Oral Daily    atorvastatin  40 mg Oral QHS    clopidogrel  75 mg Oral Daily     diclofenac sodium  4 g Topical (Top) Daily    DULoxetine  20 mg Oral BID    gabapentin  300 mg Oral TID    heparin (porcine)  5,000 Units Subcutaneous Q8H    lidocaine  1 patch Transdermal Q24H    metoclopramide HCl  5 mg Oral TID AC    polyethylene glycol  17 g Oral Daily    potassium chloride 10%  20 mEq Oral BID    traZODone  25 mg Oral QHS     Continuous Infusions:   sodium chloride 0.9% 100 mL/hr at 11/21/19 1144     PRN Meds:dextrose 10 % in water (D10W), glucagon (human recombinant), HYDROcodone-acetaminophen, insulin aspart U-100, melatonin, ondansetron, sodium chloride 0.9%, tiZANidine    Objective:     Vital Signs (Most Recent):  Temp: 98 °F (36.7 °C) (11/21/19 1113)  Pulse: 108 (11/21/19 1115)  Resp: 18 (11/21/19 1113)  BP: (!) 149/80 (11/21/19 1115)  SpO2: 99 % (11/21/19 1113)  BP Location: Left arm    Vital Signs Range (Last 24H):  Temp:  [98 °F (36.7 °C)-99.5 °F (37.5 °C)]   Pulse:  []   Resp:  [16-18]   BP: (137-175)/(60-95)   SpO2:  [94 %-99 %]   BP Location: Left arm    Physical Exam   Constitutional: She is oriented to person, place, and time. She appears well-developed and well-nourished. No distress.   HENT:   Head: Normocephalic and atraumatic.   Eyes: EOM are normal.   Cardiovascular: Normal rate.   Pulmonary/Chest: Effort normal. No respiratory distress.   Neurological: She is alert and oriented to person, place, and time.   Skin: Skin is warm and dry. She is not diaphoretic.   Psychiatric: Cognition and memory are not impaired. She is attentive.   Vitals reviewed.      Neurological Exam:   LOC: alert   Attention Span: good  Language: No aphasia  Articulation: Mild dysarthria  Orientation: Person, Time, Place  Visual Fields: Full  EOM (CN III, IV, VI): Full/intact  Facial Movement (CN VII): Lower facial weakness on the Right  Motor: Arm left  Normal 5/5  Leg left  Normal 5/5  Arm right  Paresis: +3/5   Leg right Paresis: 2/5  Sensation: Intact to light touch, temp  Tone: Normal  tone throughout    Laboratory:  CMP:   No results for input(s): GLUCOSE, CALCIUM, ALBUMIN, PROT, NA, K, CO2, CL, BUN, CREATININE, ALKPHOS, ALT, AST, BILITOT in the last 24 hours.  CBC:   Recent Labs   Lab 11/19/19  0451   WBC 11.31   RBC 3.30*   HGB 9.2*   HCT 29.5*      MCV 89   MCH 27.9   MCHC 31.2*     Lipid Panel:   No results for input(s): CHOL, LDLCALC, HDL, TRIG in the last 168 hours.  Coagulation:   No results for input(s): PT, INR, APTT in the last 168 hours.  Platelet Aggregation Study: No results for input(s): PLTAGG, PLTAGINTERP, PLTAGREGLACO, ADPPLTAGGREG in the last 168 hours.  Hgb A1C:   No results for input(s): HGBA1C in the last 168 hours.  TSH:   No results for input(s): TSH in the last 168 hours.      Diagnostic Results     Brain Imaging     MRI Brain (11/01/19):  Acute lacunar type infarct coursing through the left posterior limb internal capsule and corona radiata.  Cytotoxic cerebral edema        CTH (10/31/19):  No acute intracranial pathology      Vessel Imaging     CTA Multiphase (10/31/19):  CTA head: Atherosclerotic disease of the cavernous and supraclinoid ICAs with mild narrowing.  Otherwise unremarkable CTA of the head specifically without evidence for proximal significant stenosis or occlusion.  CTA neck: Less than 50% proximal ICA stenosis by NASCET criteria.  Atherosclerotic disease with mild moderate narrowing of the right vertebral artery origin.  There is mild left V1 segment narrowing.  No significant focal vertebral artery stenosis or occlusion.  Interlobular septal thickening with tree in bud micro nodularity visualized upper lobes bilaterally which may represent inflammatory/infectious process clinical correlation and correlation with dedicated CT thorax recommended.  CT head: Bandlike region hypoattenuation left posterior limb internal capsule unchanged from prior suggestive for possible recent to subacute age infarction.  No evidence for hydrocephalus or acute  intracranial hemorrhage.  Clinical correlation and further evaluation with MRI if patient compatible.      Cardiac Imaging   Echo (11/01/19)  · Increased (hyperdynamic) left ventricular systolic function. The estimated ejection fraction is 75%.  · Concentric left ventricular remodeling.  · Normal LV diastolic function.  · No wall motion abnormalities.  · Normal right ventricular systolic function.  · Normal central venous pressure (3 mm Hg).  · Mild tricuspid regurgitation.  · The estimated PA systolic pressure is 26 mm Hg  · Echolucent structure next to the LA, likely representing hiatal hernia. Clinincal correlation is required.      Miscellaneous Imaging  CT abdomen/pelvis 11/16/19  1. Redemonstration of large right retroperitoneal hematoma which is mildly decreased in size compared to most recent examination of 11/10/2019.  Continued mass effect on the right kidney, which is displaced anteriorly.  2. Soft tissue induration/fat stranding involving the left inguinal region, likely relating to recent vascular catheterization.  Correlation with physical exam is advised.  3. Small right pleural effusion with bibasilar atelectasis.  4. Air within the urinary bladder, presumably secondary to recent urinary catheterization although clinical correlation is advised.  5. Multiple additional findings as detailed above.    IR Angiogram Visceral 11/10/19 pending    CTA Abdomen/Pelvis 11/10/19  Right-sided retroperitoneal hematoma with questionable foci of active extravasation within the inferior portion of the collection as described above.    IR Angiogram Visceral 11/9/19  Angiography of the lumbar arteries demonstrates active extravasation of the right L2 lumbar artery. Embolization using beads as described above.  Plan: Transfer patient to ICU.  Continued serial H and H follow-up.    CTA Abdomen/Pelvis 11/9/19  1. Large right retroperitoneal hematoma with findings concerning for active arterial extravasation/hemorrhage as  detailed above.  There is associated hemorrhage extending throughout the right abdomen and pelvis/pericolic gutter with associated mass effect on the right kidney, which is anteriorly displaced.  2. Decreased opacification of the infahepatic and infrarenal IVC, possibly secondary to underlying mass effect versus partial thrombosis.  3. Nonspecific cecal and right colonic wall thickening, possibly reactive due to hemoperitoneum.  4. Additional findings as detailed above.    MRI Thoracic/Lumbar Spine W WO Contr 11/8/19  Large hematoma within the right psoas muscle.  Compression and probable thrombosis of the IVC. Consider contrast enhanced CT with delayed phase.  Multilevel degenerative changes as detailed above, more severe in the lumbar spine.  Irregularity of the T12 through L3 endplates is most likely degenerative.      Juan Chen, CARMELA  Comprehensive Stroke Center  Department of Vascular Neurology   [facility]

## 2019-11-22 PROBLEM — Z91.89 AT HIGH RISK FOR INFECTION: Status: ACTIVE | Noted: 2019-11-22

## 2019-11-22 LAB
BASOPHILS # BLD AUTO: 0.09 K/UL (ref 0–0.2)
BASOPHILS NFR BLD: 0.8 % (ref 0–1.9)
DIFFERENTIAL METHOD: ABNORMAL
EOSINOPHIL # BLD AUTO: 0.3 K/UL (ref 0–0.5)
EOSINOPHIL NFR BLD: 2.5 % (ref 0–8)
ERYTHROCYTE [DISTWIDTH] IN BLOOD BY AUTOMATED COUNT: 15.6 % (ref 11.5–14.5)
HCT VFR BLD AUTO: 31.5 % (ref 37–48.5)
HGB BLD-MCNC: 9.8 G/DL (ref 12–16)
IMM GRANULOCYTES # BLD AUTO: 0.09 K/UL (ref 0–0.04)
IMM GRANULOCYTES NFR BLD AUTO: 0.8 % (ref 0–0.5)
LYMPHOCYTES # BLD AUTO: 1.8 K/UL (ref 1–4.8)
LYMPHOCYTES NFR BLD: 15.9 % (ref 18–48)
MCH RBC QN AUTO: 27.7 PG (ref 27–31)
MCHC RBC AUTO-ENTMCNC: 31.1 G/DL (ref 32–36)
MCV RBC AUTO: 89 FL (ref 82–98)
MONOCYTES # BLD AUTO: 0.9 K/UL (ref 0.3–1)
MONOCYTES NFR BLD: 8.3 % (ref 4–15)
NEUTROPHILS # BLD AUTO: 8 K/UL (ref 1.8–7.7)
NEUTROPHILS NFR BLD: 71.7 % (ref 38–73)
NRBC BLD-RTO: 0 /100 WBC
PLATELET # BLD AUTO: 414 K/UL (ref 150–350)
PMV BLD AUTO: 10.3 FL (ref 9.2–12.9)
POCT GLUCOSE: 136 MG/DL (ref 70–110)
RBC # BLD AUTO: 3.54 M/UL (ref 4–5.4)
WBC # BLD AUTO: 11.1 K/UL (ref 3.9–12.7)

## 2019-11-22 PROCEDURE — 25000003 PHARM REV CODE 250: Performed by: PHYSICIAN ASSISTANT

## 2019-11-22 PROCEDURE — 36415 COLL VENOUS BLD VENIPUNCTURE: CPT

## 2019-11-22 PROCEDURE — 92507 TX SP LANG VOICE COMM INDIV: CPT

## 2019-11-22 PROCEDURE — 25000003 PHARM REV CODE 250: Performed by: FAMILY MEDICINE

## 2019-11-22 PROCEDURE — 99233 PR SUBSEQUENT HOSPITAL CARE,LEVL III: ICD-10-PCS | Mod: ,,, | Performed by: PSYCHIATRY & NEUROLOGY

## 2019-11-22 PROCEDURE — 63600175 PHARM REV CODE 636 W HCPCS: Performed by: FAMILY MEDICINE

## 2019-11-22 PROCEDURE — 99233 SBSQ HOSP IP/OBS HIGH 50: CPT | Mod: ,,, | Performed by: PSYCHIATRY & NEUROLOGY

## 2019-11-22 PROCEDURE — 11000001 HC ACUTE MED/SURG PRIVATE ROOM

## 2019-11-22 PROCEDURE — 85025 COMPLETE CBC W/AUTO DIFF WBC: CPT

## 2019-11-22 PROCEDURE — 25000003 PHARM REV CODE 250: Performed by: PSYCHIATRY & NEUROLOGY

## 2019-11-22 PROCEDURE — 97530 THERAPEUTIC ACTIVITIES: CPT

## 2019-11-22 RX ORDER — LIDOCAINE 50 MG/G
PATCH TOPICAL
Status: DISPENSED
Start: 2019-11-22 | End: 2019-11-23

## 2019-11-22 RX ADMIN — HYDROCODONE BITARTRATE AND ACETAMINOPHEN 1 TABLET: 10; 325 TABLET ORAL at 12:11

## 2019-11-22 RX ADMIN — HYDROCODONE BITARTRATE AND ACETAMINOPHEN 1 TABLET: 10; 325 TABLET ORAL at 05:11

## 2019-11-22 RX ADMIN — ACETAMINOPHEN 650 MG: 325 TABLET ORAL at 03:11

## 2019-11-22 RX ADMIN — GABAPENTIN 300 MG: 300 CAPSULE ORAL at 09:11

## 2019-11-22 RX ADMIN — TIZANIDINE 4 MG: 4 TABLET ORAL at 05:11

## 2019-11-22 RX ADMIN — GABAPENTIN 300 MG: 300 CAPSULE ORAL at 08:11

## 2019-11-22 RX ADMIN — TRAZODONE HYDROCHLORIDE 25 MG: 50 TABLET ORAL at 09:11

## 2019-11-22 RX ADMIN — METOCLOPRAMIDE HYDROCHLORIDE 5 MG: 5 TABLET ORAL at 12:11

## 2019-11-22 RX ADMIN — TIZANIDINE 4 MG: 4 TABLET ORAL at 08:11

## 2019-11-22 RX ADMIN — DULOXETINE HYDROCHLORIDE 20 MG: 20 CAPSULE, DELAYED RELEASE ORAL at 09:11

## 2019-11-22 RX ADMIN — POTASSIUM CHLORIDE 20 MEQ: 20 SOLUTION ORAL at 09:11

## 2019-11-22 RX ADMIN — HEPARIN SODIUM 5000 UNITS: 5000 INJECTION INTRAVENOUS; SUBCUTANEOUS at 09:11

## 2019-11-22 RX ADMIN — HYDROCODONE BITARTRATE AND ACETAMINOPHEN 1 TABLET: 10; 325 TABLET ORAL at 08:11

## 2019-11-22 RX ADMIN — ASPIRIN 81 MG: 81 TABLET, COATED ORAL at 08:11

## 2019-11-22 RX ADMIN — HEPARIN SODIUM 5000 UNITS: 5000 INJECTION INTRAVENOUS; SUBCUTANEOUS at 03:11

## 2019-11-22 RX ADMIN — METOCLOPRAMIDE HYDROCHLORIDE 5 MG: 5 TABLET ORAL at 05:11

## 2019-11-22 RX ADMIN — DULOXETINE HYDROCHLORIDE 20 MG: 20 CAPSULE, DELAYED RELEASE ORAL at 08:11

## 2019-11-22 RX ADMIN — METOCLOPRAMIDE HYDROCHLORIDE 5 MG: 5 TABLET ORAL at 03:11

## 2019-11-22 RX ADMIN — GABAPENTIN 300 MG: 300 CAPSULE ORAL at 03:11

## 2019-11-22 RX ADMIN — ACETAMINOPHEN 325 MG: 325 TABLET ORAL at 09:11

## 2019-11-22 RX ADMIN — HEPARIN SODIUM 5000 UNITS: 5000 INJECTION INTRAVENOUS; SUBCUTANEOUS at 05:11

## 2019-11-22 RX ADMIN — POTASSIUM CHLORIDE 20 MEQ: 20 SOLUTION ORAL at 08:11

## 2019-11-22 RX ADMIN — CLOPIDOGREL BISULFATE 75 MG: 75 TABLET ORAL at 08:11

## 2019-11-22 RX ADMIN — DICLOFENAC 4 G: 10 GEL TOPICAL at 08:11

## 2019-11-22 RX ADMIN — LIDOCAINE 1 PATCH: 50 PATCH CUTANEOUS at 12:11

## 2019-11-22 RX ADMIN — ATORVASTATIN CALCIUM 40 MG: 20 TABLET, FILM COATED ORAL at 09:11

## 2019-11-22 NOTE — ASSESSMENT & PLAN NOTE
Patient hypotensive overnight from 11/13 and into the AM 11/14. Received multiple boluses and pressure responsive.  Concern was for sepsis vs pain medication-related.   Infectious workup was ordered -- Elevated procal and lactate with interval development of atelectasis in R lower lung.  Started 7-day course of IV Cefepime and Vanc for possible hospital acquired pneumonia. BCx NGTD.    Then with liquid diarrhea overnight 11/15; WBC 14, stopped empiric abx and ordered CDiff, stool culture. PO Vanc x 10 day course for possible C.diff. IVFs given and central line removed.  WBC then improved, pt remaining afebrile.     Will continue to monitor patient, labs, vitals for any signs or symptoms concerning for infection.

## 2019-11-22 NOTE — PLAN OF CARE
Discussed education and care plan with pt. Pt resting in bed, call light in reach, bed in lowest position, wheels locked, room clutter free, pt educated on use of call light, pt turned q2 hours, fluids offered for comfort. Emergency equipment bedside. No concerns at this time, will continue to monitor and report any concerns.

## 2019-11-22 NOTE — ASSESSMENT & PLAN NOTE
Pt had been c/o back pain in recent days, complicated by her hx of chronic back and sciatic nerve pain with use of Tizanidine and opiates at home.  Due to Hb downtrend and recent tPA administration, MRI Lumbar/Thoracic spine was ordered 11/8 which demonstrated large R psoas hematoma with IVC compression and probable thrombosis.   General Surgery was consulted; No acute intervention pursued.     Patient was then with acute decompensation overnight 11/9; became hypotensive, tachycardic, and apneic with Hb down to 6.7. Rapid Response was called; patient requiring intubation, blood transfusion, and transfer to medical ICU.   CTA abd/pelvis 11/9 showed hematoma with active extravasation/hemorrhage with IVC compression, hemoperitoneum.   Pt went to IR and is now s/p successful R L2 & L3 lumbar spinal artery embolization.   Patient underwent IR re-evaluation on 11/11. No arterial bleed evident on IR angiography.  CT abd/pelvis 11/17: Redemonstration of large right retroperitoneal hematoma which is mildly decreased in size compared to most recent     Hb remains stable, Continuing to monitor.

## 2019-11-22 NOTE — ASSESSMENT & PLAN NOTE
Currently on Reglan 5 mg TID  Previously on Reglan 10 mg TID before meals. Will increase dosing if needed.

## 2019-11-22 NOTE — ASSESSMENT & PLAN NOTE
Melva Barker is a 74 y.o. female with PMHx of HTN, HLD, and DM who presented to OSH with acute worsening of R-sided weakness after having experienced R-sided weakness x1 month. She was treated with tPA at OSH. CTA without LVO. MRI with infarct in L internal capsule and corona radiata. Etiology likely small vessel disease.    Encouraging aggressive PO hydration. Pending placement.      Antithrombotics for secondary stroke prevention: Antiplatelets: ASA 81 mg + Plavix 75 mg -- Initially held due to retroperitoneal hematoma. Resumed DAPT on 11/17.  Statins for secondary stroke prevention and hyperlipidemia, if present: Statins: Atorvastatin- 40 mg daily,   Aggressive risk factor modification: HTN, HLD, Diet  Rehab efforts: The patient has been evaluated by a stroke team provider and the therapy needs have been fully considered based off the presenting complaints and exam findings. The following therapy evaluations are needed: PT evaluate and treat, OT evaluate and treat, SLP evaluate and treat, PM&R evaluate for appropriate placement - Dispo SNF  Diagnostics ordered/pending: None   VTE prophylaxis: Mechanical SCDs, heparin 5000 units q 8 hours   BP parameters: Infarct: Post tPA, SBP <160; Avoid hypotension

## 2019-11-22 NOTE — SUBJECTIVE & OBJECTIVE
Neurologic Chief Complaint: R-sided weakness -- L MCA    Subjective:     Interval History: Patient is seen for follow-up neurological assessment and treatment recommendations: DARREN. Nurse encouraging pt toward aggressive PO hydration in the setting of recent orthostatic hypotension. Hb remains stable.    HPI, Past Medical, Family, and Social History remains the same as documented in the initial encounter.     Review of Systems   Constitutional: Negative for chills and fever.   Musculoskeletal: Positive for arthralgias and back pain.   Neurological: Positive for facial asymmetry, speech difficulty and weakness.   Psychiatric/Behavioral: Negative for agitation and confusion.     Scheduled Meds:   acetaminophen  650 mg Oral Q8H    aspirin  81 mg Oral Daily    atorvastatin  40 mg Oral QHS    clopidogrel  75 mg Oral Daily    diclofenac sodium  4 g Topical (Top) Daily    DULoxetine  20 mg Oral BID    gabapentin  300 mg Oral TID    heparin (porcine)  5,000 Units Subcutaneous Q8H    lidocaine        lidocaine  1 patch Transdermal Q24H    metoclopramide HCl  5 mg Oral TID AC    polyethylene glycol  17 g Oral Daily    potassium chloride 10%  20 mEq Oral BID    traZODone  25 mg Oral QHS     Continuous Infusions:    PRN Meds:dextrose 10 % in water (D10W), glucagon (human recombinant), HYDROcodone-acetaminophen, insulin aspart U-100, melatonin, ondansetron, sodium chloride 0.9%, tiZANidine    Objective:     Vital Signs (Most Recent):  Temp: 97.7 °F (36.5 °C) (11/22/19 1126)  Pulse: 81 (11/22/19 1126)  Resp: 16 (11/22/19 1126)  BP: (!) 102/57 (11/22/19 1126)  SpO2: 100 % (11/22/19 1126)  BP Location: Right arm    Vital Signs Range (Last 24H):  Temp:  [97.7 °F (36.5 °C)-98.9 °F (37.2 °C)]   Pulse:  []   Resp:  [16-18]   BP: (102-137)/(57-77)   SpO2:  [96 %-100 %]   BP Location: Right arm    Physical Exam   Constitutional: She is oriented to person, place, and time. She appears well-developed and  well-nourished.   HENT:   Head: Normocephalic and atraumatic.   Eyes: Conjunctivae and EOM are normal.   Cardiovascular: Normal rate.   Pulmonary/Chest: Effort normal. No respiratory distress.   Musculoskeletal: She exhibits no edema or deformity.   Neurological: She is alert and oriented to person, place, and time. She exhibits normal muscle tone.   Skin: Skin is warm and dry. She is not diaphoretic.   Psychiatric: Cognition and memory are not impaired. She is attentive.   Vitals reviewed.      Neurological Exam:   LOC: alert   Attention Span: good  Language: No aphasia  Articulation: Mild dysarthria  Orientation: Person, Time, Place  Visual Fields: Full  EOM (CN III, IV, VI): Full/intact  Facial Movement (CN VII): Lower facial weakness on the Right  Motor: Arm left  Normal 5/5  Leg left  Normal 5/5  Arm right  Paresis: 3+/5   Leg right Paresis: 3/5  Sensation: Intact to light touch, temp  Tone: Normal tone throughout      Laboratory:  CMP:   No results for input(s): GLUCOSE, CALCIUM, ALBUMIN, PROT, NA, K, CO2, CL, BUN, CREATININE, ALKPHOS, ALT, AST, BILITOT in the last 24 hours.  CBC:   Recent Labs   Lab 11/22/19  0529   WBC 11.10   RBC 3.54*   HGB 9.8*   HCT 31.5*   *   MCV 89   MCH 27.7   MCHC 31.1*     Lipid Panel:   No results for input(s): CHOL, LDLCALC, HDL, TRIG in the last 168 hours.  Coagulation:   No results for input(s): PT, INR, APTT in the last 168 hours.  Platelet Aggregation Study: No results for input(s): PLTAGG, PLTAGINTERP, PLTAGREGLACO, ADPPLTAGGREG in the last 168 hours.  Hgb A1C:   No results for input(s): HGBA1C in the last 168 hours.  TSH:   No results for input(s): TSH in the last 168 hours.      Diagnostic Results      Brain Imaging      MRI Brain (11/01/19):  Acute lacunar type infarct coursing through the left posterior limb internal capsule and corona radiata.       CTH (10/31/19):  No acute intracranial pathology        Vessel Imaging      CTA Multiphase (10/31/19):  CTA head:  Atherosclerotic disease of the cavernous and supraclinoid ICAs with mild narrowing.  Otherwise unremarkable CTA of the head specifically without evidence for proximal significant stenosis or occlusion.  CTA neck: Less than 50% proximal ICA stenosis by NASCET criteria.  Atherosclerotic disease with mild moderate narrowing of the right vertebral artery origin.  There is mild left V1 segment narrowing.  No significant focal vertebral artery stenosis or occlusion.  Interlobular septal thickening with tree in bud micro nodularity visualized upper lobes bilaterally which may represent inflammatory/infectious process clinical correlation and correlation with dedicated CT thorax recommended.  CT head: Bandlike region hypoattenuation left posterior limb internal capsule unchanged from prior suggestive for possible recent to subacute age infarction.  No evidence for hydrocephalus or acute intracranial hemorrhage.  Clinical correlation and further evaluation with MRI if patient compatible.        Cardiac Imaging     Echo (11/01/19)  · Increased (hyperdynamic) left ventricular systolic function. The estimated ejection fraction is 75%.  · Concentric left ventricular remodeling.  · Normal LV diastolic function.  · No wall motion abnormalities.  · Normal right ventricular systolic function.  · Normal central venous pressure (3 mm Hg).  · Mild tricuspid regurgitation.  · The estimated PA systolic pressure is 26 mm Hg  · Echolucent structure next to the LA, likely representing hiatal hernia. Clinincal correlation is required.        Miscellaneous Imaging     CT Abdomen Pelvis WO Contrast 11/16/19  Angiography of the right renal vasculature, SMA, and multiple lumbar arteries demonstrates no evidence of active bleeding.    IR Angiogram Visceral 11/10/19  Angiography of the right renal vasculature, SMA, and multiple lumbar arteries demonstrates no evidence of active bleeding.     CTA Abdomen/Pelvis 11/10/19  Right-sided retroperitoneal  hematoma with questionable foci of active extravasation within the inferior portion of the collection as described above.     IR Angiogram Visceral 11/9/19  Angiography of the lumbar arteries demonstrates active extravasation of the right L2 lumbar artery. Embolization using beads as described above.  Plan: Transfer patient to ICU.  Continued serial H and H follow-up.     CTA Abdomen/Pelvis 11/9/19  1. Large right retroperitoneal hematoma with findings concerning for active arterial extravasation/hemorrhage as detailed above.  There is associated hemorrhage extending throughout the right abdomen and pelvis/pericolic gutter with associated mass effect on the right kidney, which is anteriorly displaced.  2. Decreased opacification of the infahepatic and infrarenal IVC, possibly secondary to underlying mass effect versus partial thrombosis.  3. Nonspecific cecal and right colonic wall thickening, possibly reactive due to hemoperitoneum.  4. Additional findings as detailed above.     MRI Thoracic/Lumbar Spine W WO Contr 11/8/19  Large hematoma within the right psoas muscle.  Compression and probable thrombosis of the IVC. Consider contrast enhanced CT with delayed phase.  Multilevel degenerative changes as detailed above, more severe in the lumbar spine.  Irregularity of the T12 through L3 endplates is most likely degenerative.

## 2019-11-22 NOTE — PLAN OF CARE
Continue OT plan of care.    Problem: Occupational Therapy Goal  Goal: Occupational Therapy Goal  Description  Updated Goals to be met by: 7 days (11/29/19)     Patient will increase functional independence with ADLs by performing:    UE Dressing with Contact Guard Assistance.  LE Dressing with Minimal Assistance.  Grooming while standing with Minimal Assistance.  Toileting from bedside commode with Minimal Assistance for hygiene and clothing management.   Toilet transfer to bedside commode with Contact Guard Assistance.  Increased functional strength to WFL for ADLs.  Complete functional mobility household distance with CGA using AD as needed.     Updated Goals to be met by: 7 days (11/20/19)     Patient will increase functional independence with ADLs by performing:    UE Dressing with Contact Guard Assistance.  LE Dressing with Minimal Assistance.  Grooming while standing with Minimal Assistance.  Toileting from bedside commode with Minimal Assistance for hygiene and clothing management.   Toilet transfer to bedside commode with Contact Guard Assistance.  Increased functional strength to WFL for ADLs.  Complete functional mobility household distance with CGA using AD as needed.     Updated Goals to be met by: 7 days (11/14/19)     Patient will increase functional independence with ADLs by performing:    UE Dressing with Contact Guard Assistance.  LE Dressing with Minimal Assistance.  Grooming while standing with Minimal Assistance.  Toileting from bedside commode with Minimal Assistance for hygiene and clothing management.   Toilet transfer to bedside commode with Contact Guard Assistance.  Increased functional strength to WFL for ADLs.  Complete functional mobility household distance with CGA using AD as needed.     Outcome: Ongoing, Progressing

## 2019-11-22 NOTE — PROGRESS NOTES
[facility]  Vascular Neurology  Comprehensive Stroke Center  Progress Note    Assessment/Plan:     * Thrombotic stroke involving left middle cerebral artery  Melva Barker is a 74 y.o. female with PMHx of HTN, HLD, and DM who presented to OSH with acute worsening of R-sided weakness after having experienced R-sided weakness x1 month. She was treated with tPA at OSH. CTA without LVO. MRI with infarct in L internal capsule and corona radiata. Etiology likely small vessel disease.    Encouraging aggressive PO hydration. Pending placement.      Antithrombotics for secondary stroke prevention: Antiplatelets: ASA 81 mg + Plavix 75 mg -- Initially held due to retroperitoneal hematoma. Resumed DAPT on 11/17.  Statins for secondary stroke prevention and hyperlipidemia, if present: Statins: Atorvastatin- 40 mg daily,   Aggressive risk factor modification: HTN, HLD, Diet  Rehab efforts: The patient has been evaluated by a stroke team provider and the therapy needs have been fully considered based off the presenting complaints and exam findings. The following therapy evaluations are needed: PT evaluate and treat, OT evaluate and treat, SLP evaluate and treat, PM&R evaluate for appropriate placement - Dispo SNF  Diagnostics ordered/pending: None   VTE prophylaxis: Mechanical SCDs, heparin 5000 units q 8 hours   BP parameters: Infarct: Post tPA, SBP <160; Avoid hypotension    Orthostatic hypotension  Patient hypotensive overnight from 11/13 and into the AM 11/14. Received multiple boluses and pressure responsive.  Concern was for sepsis vs pain medication-related.   See infection risk section above    Pt then with orthostatic vital signs on 11/20 -- HR 120s to 90s from standing to lying, s/p IVFs 1L over 10 hours ordered on 11/21.   Nursing reported AFib on tele on 11/20, however EKG showed sinus tachycardia.   Encouraging pt toward aggressive PO hydration. Hb remains stable.   Will continue  "monitoring.    Retroperitoneal hematoma  Pt had been c/o back pain in recent days, complicated by her hx of chronic back and sciatic nerve pain with use of Tizanidine and opiates at home.  Due to Hb downtrend and recent tPA administration, MRI Lumbar/Thoracic spine was ordered 11/8 which demonstrated large R psoas hematoma with IVC compression and probable thrombosis.   General Surgery was consulted; No acute intervention pursued.     Patient was then with acute decompensation overnight 11/9; became hypotensive, tachycardic, and apneic with Hb down to 6.7. Rapid Response was called; patient requiring intubation, blood transfusion, and transfer to medical ICU.   CTA abd/pelvis 11/9 showed hematoma with active extravasation/hemorrhage with IVC compression, hemoperitoneum.   Pt went to IR and is now s/p successful R L2 & L3 lumbar spinal artery embolization.   Patient underwent IR re-evaluation on 11/11. No arterial bleed evident on IR angiography.  CT abd/pelvis 11/17: Redemonstration of large right retroperitoneal hematoma which is mildly decreased in size compared to most recent     Hb remains stable, Continuing to monitor.    Back pain  Reported hx chronic back pain with associated sciatic nerve pain as well, then complicated by acute retroperitoneal hematoma.  Consulted to anesthesia pain management    Pain now being controlled with gabapentin, duloxetine, trazodone, diclofenac gel, lidocaine patch, hydrcodone-acetaminophen (prn), tizanidine (prn.)   Stroke attending increased frequency of hydrocodone-acetaminophen from TID prn to QID prn.    Nurse reporting pt continues to request pain medication "around the clock." Of note, pt reported to night nurse that her best pain score at home is usually 7 1/2.    Essential hypertension  Risk factor for stroke  Holding all BP meds due to hypotension    At high risk for infection  Patient hypotensive overnight from 11/13 and into the AM 11/14. Received multiple boluses and " pressure responsive.  Concern was for sepsis vs pain medication-related.   Infectious workup was ordered -- Elevated procal and lactate with interval development of atelectasis in R lower lung.  Started 7-day course of IV Cefepime and Vanc for possible hospital acquired pneumonia. BCx NGTD.    Then with liquid diarrhea overnight 11/15; WBC 14, stopped empiric abx and ordered CDiff, stool culture. PO Vanc x 10 day course for possible C.diff. IVFs given and central line removed.  WBC then improved, pt remaining afebrile.     Will continue to monitor patient, labs, vitals for any signs or symptoms concerning for infection.    Debility  2/2 stroke  PT/OT eval & treat - Dispo SNF    Cytotoxic cerebral edema  Area of cytotoxic cerebral edema identified when reviewing brain imaging in the territory of the L middle cerebral artery. There is no mass effect associated with it. We will continue to monitor the patients clinical exam for any worsening of symptoms which may indicate expansion of the stroke or the area of the edema resulting in the clinical change. The pattern is suggestive of small vessel etiology.    Diabetes mellitus type 2 without retinopathy  Stroke risk factor, poorly controlled on glargine 17 units daily  HbA1c 8.3%  Recommend tight glucose control  Currently on SSI   Diabetic diet    Moderate malnutrition  Appreciate Nutrition/RD assistance  Ordered Boost Glucose Control TID    Gastroparesis  Currently on Reglan 5 mg TID  Previously on Reglan 10 mg TID before meals. Will increase dosing if needed.       11/1/19 MRI with small vessel L MCA infarct, therapy recommending rehab  11/2 - Patient stepped down overnight. Adjusting BP regimen. Patient with continuous complaints if nausea. IV Zofran ordered with some relief. Patient has not has BM since 10/30. Ordering KUB to rule out obstruction. Neuro exam unchanged.   11/3 - NAEON. Patient reports she had no episodes of vomiting overnight. Still complaining of  nausea. Mild lower abdomen tenderness on exam. Lipase ordered and WNL. Continue to monitor.   11/4 - denies nausea and vomiting. Abd tenderness remains present on palpitation. Continue to monitor. HCTZ increased yesterday. Pending rehab placement.   11/5 -  N/V x1 overnight zofran resolved, sucralfate started. Placement pending   11/6 - hypotensive, held all BP meds,  ml bolus, responded well. Increased BUN/Cr, start NS 75ml/hr.   11/7 patient complaining of sciatica pain.  Restarted home tizanidine.  Patient requesting hydrocodone but educated patient that nerve pain is not treated with opioid.  Degenerative disease seen on xray but no fracture.      11/8 Patient continues to experience back pain.  Now with leukocytosis and drop in h/h plan for MRI thoracic/lumbar spine to evaluate for epidural hematoma.    11/9: MRI Lumbar spine had demonstrated large R psoas hematoma with possible IVC compression; General Surgery consulted. Patient was then with acute decompensation overnight; became hypotensive, tachycardic, and apneic requiring intubation, blood transfusion, and medical ICU transfer. CTA abd/pelvis showed active extravasation/hemorrhage with hemoperitoneum. Pt now s/p successful lumbar artery embolization in IR. She was extubated on arrival back to ICU and tolerated well.    11/11: Patient underwent IR re-evaluation yesterday. No arterial bleed evident on IR angiography. Patient neuro exam stable.   11/12 Stepped down from medical ICU this morning.  Patient still experiencing pain.  Anesthesia consulted for pain management.  D/C pain pump and started oral medications for muscle and nerve pain.  Will wean to home regimen. Waiting for repeat CBC, transfused one unit of blood 11/11.   Tachycardic/hypertensive will continue to monitor.  11/13: Upgraded diet per SLP recs. Pt continues to c/o R back pain, concern that tachycardia could be pain-induced; adjusted analgesic regimen further as well as antiemetic  regimen with plans to eventually wean to home regimen. Replaced Phos. Dispo inpatient rehab.  11/14: patient hypotensive overnight and this morning requiring fluid boluses. Discontinued all pain medications except for celecoxib in case pain meds are contributing to hypotension. Lidocaine patch and Voltaren gel ordered as well. Infectious workup significant for minor atelectasis in R lower lung with increased procal and lactate. Patient started on IV antibiotics for community acquired pneumonia. Blood cultures pending. ICU notified of patient's hypotension  11/15: Liquid diarrhea, WBC 14, stopped empiric abx, ordered c. Diff and stool culture, start PO vanc, IVFs. Adjusted pain meds with goal to return to home regimen. Nauseous overnight, zofran given.   11/16: CT abdomen and pelvis to f/u retroperitoneal hematoma prior to starting DAPT, H/H stable. T max 99.4, tachy, WBC 13, C. Diff and stool cultures pending, increased  ml/hr, labile BP, continue to monitor. Remove central line.   11/17: CT abdomen/pelvis again with large retroperitoneal hematoma  but decreased in size, H/H uptrend, resume DAPT today. Diarrhea becoming thicker, WBC improved, a febrile, c. Diff antigen positive, toxins negative. Pain well controlled.   11/18 PCR negative, c.diff precautions and vancomycin d/c.  Neurologically stable waiting for placement.    11/19 Patient now recommending SNF.   11/20 Neurologically stable.  Waiting for placement.  Hypotensive episodes overnight given some fluids.     11/21 Orthostatic yesterday, HR 120s to 90s standing to lying, IVFs 1L over 10 hours ordered.   11/22: Nurse encouraging pt toward aggressive PO hydration in the setting of recent orthostatic hypotension. Hb remains stable.    STROKE DOCUMENTATION   Acute Stroke Times   Last Known Normal Date: 10/31/19  Last Known Normal Time: 0630  Symptom Onset Date: 10/31/19  Symptom Onset Time: 0730  Stroke Team Called Date: 10/31/19  Stroke Team Called  Time: 0936  Stroke Team Arrival Date: 10/31/19  Stroke Team Arrival Time: 0946  Decision to Treat Time for Alteplase: 1003    NIH Scale:  1a. Level of Consciousness: 0-->Alert, keenly responsive  1b. LOC Questions: 0-->Answers both questions correctly  1c. LOC Commands: 0-->Performs both tasks correctly  2. Best Gaze: 0-->Normal  3. Visual: 0-->No visual loss  4. Facial Palsy: 1-->Minor paralysis (flattened nasolabial fold, asymmetry on smiling)  5a. Motor Arm, Left: 0-->No drift, limb holds 90 (or 45) degrees for full 10 secs  5b. Motor Arm, Right: 1-->Drift, limb holds 90 (or 45) degrees, but drifts down before full 10 secs, does not hit bed or other support  6a. Motor Leg, Left: 0-->No drift, leg holds 30 degree position for full 5 secs  6b. Motor Leg, Right: 2-->Some effort against gravity, leg falls to bed by 5 secs, but has some effort against gravity  7. Limb Ataxia: 0-->Absent  8. Sensory: 0-->Normal, no sensory loss  9. Best Language: 0-->No aphasia, normal  10. Dysarthria: 1-->Mild-to-moderate dysarthria, patient slurs at least some words and, at worst, can be understood with some difficulty  11. Extinction and Inattention (formerly Neglect): 0-->No abnormality  Total (NIH Stroke Scale): 5       Modified Dayanna Score: 0  [place] Coma Scale:    ABCD2 Score:    [id number] Score:   HAS -BLED Score:   ICH Score:   Hunt & Amaya Classification:      Hemorrhagic change of an Ischemic Stroke: Does this patient have an ischemic stroke with hemorrhagic changes? No     Neurologic Chief Complaint: R-sided weakness -- L MCA    Subjective:     Interval History: Patient is seen for follow-up neurological assessment and treatment recommendations: DARREN. Nurse encouraging pt toward aggressive PO hydration in the setting of recent orthostatic hypotension. Hb remains stable.    HPI, Past Medical, Family, and Social History remains the same as documented in the initial encounter.     Review of Systems   Constitutional:  Negative for chills and fever.   Musculoskeletal: Positive for arthralgias and back pain.   Neurological: Positive for facial asymmetry, speech difficulty and weakness.   Psychiatric/Behavioral: Negative for agitation and confusion.     Scheduled Meds:   acetaminophen  650 mg Oral Q8H    aspirin  81 mg Oral Daily    atorvastatin  40 mg Oral QHS    clopidogrel  75 mg Oral Daily    diclofenac sodium  4 g Topical (Top) Daily    DULoxetine  20 mg Oral BID    gabapentin  300 mg Oral TID    heparin (porcine)  5,000 Units Subcutaneous Q8H    lidocaine        lidocaine  1 patch Transdermal Q24H    metoclopramide HCl  5 mg Oral TID AC    polyethylene glycol  17 g Oral Daily    potassium chloride 10%  20 mEq Oral BID    traZODone  25 mg Oral QHS     Continuous Infusions:    PRN Meds:dextrose 10 % in water (D10W), glucagon (human recombinant), HYDROcodone-acetaminophen, insulin aspart U-100, melatonin, ondansetron, sodium chloride 0.9%, tiZANidine    Objective:     Vital Signs (Most Recent):  Temp: 97.7 °F (36.5 °C) (11/22/19 1126)  Pulse: 81 (11/22/19 1126)  Resp: 16 (11/22/19 1126)  BP: (!) 102/57 (11/22/19 1126)  SpO2: 100 % (11/22/19 1126)  BP Location: Right arm    Vital Signs Range (Last 24H):  Temp:  [97.7 °F (36.5 °C)-98.9 °F (37.2 °C)]   Pulse:  []   Resp:  [16-18]   BP: (102-137)/(57-77)   SpO2:  [96 %-100 %]   BP Location: Right arm    Physical Exam   Constitutional: She is oriented to person, place, and time. She appears well-developed and well-nourished.   HENT:   Head: Normocephalic and atraumatic.   Eyes: Conjunctivae and EOM are normal.   Cardiovascular: Normal rate.   Pulmonary/Chest: Effort normal. No respiratory distress.   Musculoskeletal: She exhibits no edema or deformity.   Neurological: She is alert and oriented to person, place, and time. She exhibits normal muscle tone.   Skin: Skin is warm and dry. She is not diaphoretic.   Psychiatric: Cognition and memory are not impaired. She  is attentive.   Vitals reviewed.      Neurological Exam:   LOC: alert   Attention Span: good  Language: No aphasia  Articulation: Mild dysarthria  Orientation: Person, Time, Place  Visual Fields: Full  EOM (CN III, IV, VI): Full/intact  Facial Movement (CN VII): Lower facial weakness on the Right  Motor: Arm left  Normal 5/5  Leg left  Normal 5/5  Arm right  Paresis: 3+/5   Leg right Paresis: 3/5  Sensation: Intact to light touch, temp  Tone: Normal tone throughout      Laboratory:  CMP:   No results for input(s): GLUCOSE, CALCIUM, ALBUMIN, PROT, NA, K, CO2, CL, BUN, CREATININE, ALKPHOS, ALT, AST, BILITOT in the last 24 hours.  CBC:   Recent Labs   Lab 11/22/19  0529   WBC 11.10   RBC 3.54*   HGB 9.8*   HCT 31.5*   *   MCV 89   MCH 27.7   MCHC 31.1*     Lipid Panel:   No results for input(s): CHOL, LDLCALC, HDL, TRIG in the last 168 hours.  Coagulation:   No results for input(s): PT, INR, APTT in the last 168 hours.  Platelet Aggregation Study: No results for input(s): PLTAGG, PLTAGINTERP, PLTAGREGLACO, ADPPLTAGGREG in the last 168 hours.  Hgb A1C:   No results for input(s): HGBA1C in the last 168 hours.  TSH:   No results for input(s): TSH in the last 168 hours.      Diagnostic Results      Brain Imaging      MRI Brain (11/01/19):  Acute lacunar type infarct coursing through the left posterior limb internal capsule and corona radiata.       CTH (10/31/19):  No acute intracranial pathology        Vessel Imaging      CTA Multiphase (10/31/19):  CTA head: Atherosclerotic disease of the cavernous and supraclinoid ICAs with mild narrowing.  Otherwise unremarkable CTA of the head specifically without evidence for proximal significant stenosis or occlusion.  CTA neck: Less than 50% proximal ICA stenosis by NASCET criteria.  Atherosclerotic disease with mild moderate narrowing of the right vertebral artery origin.  There is mild left V1 segment narrowing.  No significant focal vertebral artery stenosis or  occlusion.  Interlobular septal thickening with tree in bud micro nodularity visualized upper lobes bilaterally which may represent inflammatory/infectious process clinical correlation and correlation with dedicated CT thorax recommended.  CT head: Bandlike region hypoattenuation left posterior limb internal capsule unchanged from prior suggestive for possible recent to subacute age infarction.  No evidence for hydrocephalus or acute intracranial hemorrhage.  Clinical correlation and further evaluation with MRI if patient compatible.        Cardiac Imaging     Echo (11/01/19)  · Increased (hyperdynamic) left ventricular systolic function. The estimated ejection fraction is 75%.  · Concentric left ventricular remodeling.  · Normal LV diastolic function.  · No wall motion abnormalities.  · Normal right ventricular systolic function.  · Normal central venous pressure (3 mm Hg).  · Mild tricuspid regurgitation.  · The estimated PA systolic pressure is 26 mm Hg  · Echolucent structure next to the LA, likely representing hiatal hernia. Clinincal correlation is required.        Miscellaneous Imaging     CT Abdomen Pelvis WO Contrast 11/16/19  Angiography of the right renal vasculature, SMA, and multiple lumbar arteries demonstrates no evidence of active bleeding.    IR Angiogram Visceral 11/10/19  Angiography of the right renal vasculature, SMA, and multiple lumbar arteries demonstrates no evidence of active bleeding.     CTA Abdomen/Pelvis 11/10/19  Right-sided retroperitoneal hematoma with questionable foci of active extravasation within the inferior portion of the collection as described above.     IR Angiogram Visceral 11/9/19  Angiography of the lumbar arteries demonstrates active extravasation of the right L2 lumbar artery. Embolization using beads as described above.  Plan: Transfer patient to ICU.  Continued serial H and H follow-up.     CTA Abdomen/Pelvis 11/9/19  1. Large right retroperitoneal hematoma with  findings concerning for active arterial extravasation/hemorrhage as detailed above.  There is associated hemorrhage extending throughout the right abdomen and pelvis/pericolic gutter with associated mass effect on the right kidney, which is anteriorly displaced.  2. Decreased opacification of the infahepatic and infrarenal IVC, possibly secondary to underlying mass effect versus partial thrombosis.  3. Nonspecific cecal and right colonic wall thickening, possibly reactive due to hemoperitoneum.  4. Additional findings as detailed above.     MRI Thoracic/Lumbar Spine W WO Contr 11/8/19  Large hematoma within the right psoas muscle.  Compression and probable thrombosis of the IVC. Consider contrast enhanced CT with delayed phase.  Multilevel degenerative changes as detailed above, more severe in the lumbar spine.  Irregularity of the T12 through L3 endplates is most likely degenerative.      Carina Schwartz PA-C  [facility]  Department of Vascular Neurology   [facility]

## 2019-11-22 NOTE — PT/OT/SLP PROGRESS
"Occupational Therapy   Treatment    Name: Melva Barker  MRN: [id number]  Admitting Diagnosis:  Thrombotic stroke involving left middle cerebral artery       Recommendations:     Discharge Recommendations: nursing facility, skilled  Discharge Equipment Recommendations:  (TBD)  Barriers to discharge:  Decreased caregiver support    Assessment:     Melva Barker is a 74 y.o. female with a medical diagnosis of Thrombotic stroke involving left middle cerebral artery.  She presents with performance deficits including gait instability, impaired endurance, weakness, impaired self care skills, impaired functional mobilty, impaired balance, decreased safety awareness, decreased upper extremity function, decreased lower extremity function, impaired cardiopulmonary response to activity. Pt limited by orthostatic hypotension and unable to tolerate OOB activity. Pt would continue to benefit from OT to increase functional independence and safety. Recommend SNF upon D/C as pt is unsafe to return home at current functional level.    Rehab Prognosis:  Good; patient would benefit from acute skilled OT services to address these deficits and reach maximum level of function.       Plan:     Patient to be seen 3 x/week to address the above listed problems via self-care/home management, therapeutic activities, therapeutic exercises, neuromuscular re-education  · Plan of Care Expires: 12/13/19  · Plan of Care Reviewed with: patient    Subjective     Pain/Comfort:  · Pain Rating 1: (Did not rate)  · Location 1: back  · Pain Addressed 1: Pre-medicate for activity, Reposition    Objective:     Communicated with: RN prior to session. Patient found right sidelying with telemetry, peripheral IV, bed alarm upon OT entry to room.  "I'm ready to get up. I walked to the door with a walker the other day."    General Precautions: Standard, fall   Orthopedic Precautions:N/A   Braces: N/A     Occupational Performance:     Bed Mobility:  "   · Patient completed Supine to Sit with minimum assistance  · Patient completed Sit to Supine with minimum assistance     Functional Mobility/Transfers:  · Patient completed Sit <> Stand Transfer with minimum assistance with hand-held assist from EOB  · Functional Mobility: Unable this date due to dizziness and hypotension    Activities of Daily Living:  · Not completed this date      [facility] 6 Click ADL: 18    Treatment & Education:  Pt agreeable to OT; able to sit EOB with SBA and compete one standing trial; pt reported feeling dizzy while standing and in sitting-- bp while sitting EOB 66/44, after 5 min once returned to supine bp 70/41 although pt reported improvement in symptoms; RN was notified    Patient left supine with all lines intact, call button in reach, bed alarm on and RN notifiedEducation:      GOALS:   Multidisciplinary Problems     Occupational Therapy Goals        Problem: Occupational Therapy Goal    Goal Priority Disciplines Outcome Interventions   Occupational Therapy Goal     OT, PT/OT Ongoing, Progressing    Description:  Updated Goals to be met by: 7 days (11/29/19)     Patient will increase functional independence with ADLs by performing:    UE Dressing with Contact Guard Assistance.  LE Dressing with Minimal Assistance.  Grooming while standing with Minimal Assistance.  Toileting from bedside commode with Minimal Assistance for hygiene and clothing management.   Toilet transfer to bedside commode with Contact Guard Assistance.  Increased functional strength to WFL for ADLs.  Complete functional mobility household distance with CGA using AD as needed.     Updated Goals to be met by: 7 days (11/20/19)     Patient will increase functional independence with ADLs by performing:    UE Dressing with Contact Guard Assistance.  LE Dressing with Minimal Assistance.  Grooming while standing with Minimal Assistance.  Toileting from bedside commode with Minimal Assistance for hygiene and clothing  management.   Toilet transfer to bedside commode with Contact Guard Assistance.  Increased functional strength to WFL for ADLs.  Complete functional mobility household distance with CGA using AD as needed.     Updated Goals to be met by: 7 days (11/14/19)     Patient will increase functional independence with ADLs by performing:    UE Dressing with Contact Guard Assistance.  LE Dressing with Minimal Assistance.  Grooming while standing with Minimal Assistance.  Toileting from bedside commode with Minimal Assistance for hygiene and clothing management.   Toilet transfer to bedside commode with Contact Guard Assistance.  Increased functional strength to WFL for ADLs.  Complete functional mobility household distance with CGA using AD as needed.                      Time Tracking:     OT Date of Treatment: 11/22/19  OT Start Time: 0902  OT Stop Time: 0920  OT Total Time (min): 18 min    Billable Minutes:Therapeutic Activity 18 minutes    AURORA Florian  11/22/2019

## 2019-11-22 NOTE — ASSESSMENT & PLAN NOTE
"Reported hx chronic back pain with associated sciatic nerve pain as well, then complicated by acute retroperitoneal hematoma.  Consulted to anesthesia pain management    Pain now being controlled with gabapentin, duloxetine, trazodone, diclofenac gel, lidocaine patch, hydrcodone-acetaminophen (prn), tizanidine (prn.)   Stroke attending increased frequency of hydrocodone-acetaminophen from TID prn to QID prn.    Nurse reporting pt continues to request pain medication "around the clock." Of note, pt reported to night nurse that her best pain score at home is usually 7 1/2.  "

## 2019-11-22 NOTE — PLAN OF CARE
[facility] accepted. Pending insurance auth. Pt and son updated.      11/22/19 2698   Discharge Reassessment   Assessment Type Discharge Planning Reassessment   Discharge Plan A Skilled Nursing Facility   Discharge Plan B Rehab   Anticipated Discharge Disposition Rehab   Post-Acute Status   Post-Acute Authorization Placement   Post-Acute Placement Status Pending Payor Review

## 2019-11-22 NOTE — PLAN OF CARE
Problem: SLP Goal  Goal: SLP Goal  Description  Speech Language Pathology Goals  Goals expected to be met by 11/27; goals remain appropriate- continue until 12/4  1. Pt will tolerate a mechanical soft diet/thin liquids with no s/s of aspiration. /met  2. Pt will participate in conversation without cues to express thought. /met  3. Pt will complete simple functional reasoning tasks with 90% accy given min cues.   4. Pt will name name 13 items in a category in 1 minute with mod assist.  5. Pt will follow multi-step commands with 75% accuracy and min assist./met  6. Pt will complete assessment of reading, writing, visual spatial skills to determine need for tx.   /met  Outcome: Ongoing, Progressing

## 2019-11-22 NOTE — PT/OT/SLP PROGRESS
"Speech Language Pathology Treatment    Patient Name:  Melva Barker   MRN:  [id number]  Admitting Diagnosis: Thrombotic stroke involving left middle cerebral artery    Recommendations:                 General Recommendations:  Cognitive-linguistic therapy  Diet recommendations:  Mechanical soft, Liquid Diet Level: Thin   Aspiration Precautions: Standard aspiration precautions   General Precautions: Standard, fall  Communication strategies:  none    Subjective     "I am not quite back to normal...my short term memory"   Patient goals: home    Pain/Comfort:  · Pain Rating 1: 0/10  · Pain Rating Post-Intervention 1: 0/10    Objective:     Has the patient been evaluated by SLP for swallowing?   Yes  Keep patient NPO? No   Current Respiratory Status: room air      Pt. Seen at bedside and was oriented to time and place with fair - good recall of recent events.  She responded to mental manipulation tasks with 90% accuracy with no repetititon needed.  Pt. Compared and contrasted objects with 100% accuracy and generated multiple solutions on 5/5 trials.  She responded to functional math and time calculations with 100% accuracy.  Education provided re poc.     Assessment:     Melva Barker is a 74 y.o. female with an SLP diagnosis of Cognitive-Linguistic Impairment.  She presents with mild memory impairment.    Goals:   Multidisciplinary Problems     SLP Goals        Problem: SLP Goal    Goal Priority Disciplines Outcome   SLP Goal     SLP Ongoing, Progressing   Description:  Speech Language Pathology Goals  Goals expected to be met by 11/20; goals remain appropriate- continue until 12/4  1. Pt will tolerate a mechanical soft diet/thin liquids with no s/s of aspiration.   2. Pt will participate in conversation without cues to express thought.   3. Pt will complete simple functional reasoning tasks with 80% accy given min cues.   4. Pt will name name 13 items in a category in 1 minute with mod assist.  5. Pt will " follow multi-step commands with 75% accuracy and min assist.  6. Pt will complete assessment of reading, writing, visual spatial skills to determine need for tx.                      Plan:     · Patient to be seen:  3 x/week   · Plan of Care expires:  12/13/19  · Plan of Care reviewed with:  patient   · SLP Follow-Up:  Yes       Discharge recommendations:  nursing facility, skilled       Time Tracking:     SLP Treatment Date:   11/22/19  Speech Start Time:  0945  Speech Stop Time:  1000     Speech Total Time (min):  15 min    Billable Minutes: Speech Therapy Individual 15    Cherry Pandey MA, CCC-SLP  11/22/2019

## 2019-11-23 LAB
POCT GLUCOSE: 193 MG/DL (ref 70–110)
POCT GLUCOSE: 92 MG/DL (ref 70–110)
POCT GLUCOSE: 98 MG/DL (ref 70–110)

## 2019-11-23 PROCEDURE — 63600175 PHARM REV CODE 636 W HCPCS: Performed by: FAMILY MEDICINE

## 2019-11-23 PROCEDURE — 99233 SBSQ HOSP IP/OBS HIGH 50: CPT | Mod: ,,, | Performed by: PSYCHIATRY & NEUROLOGY

## 2019-11-23 PROCEDURE — 25000003 PHARM REV CODE 250: Performed by: PSYCHIATRY & NEUROLOGY

## 2019-11-23 PROCEDURE — 25000003 PHARM REV CODE 250

## 2019-11-23 PROCEDURE — 25000003 PHARM REV CODE 250: Performed by: PHYSICIAN ASSISTANT

## 2019-11-23 PROCEDURE — 25000003 PHARM REV CODE 250: Performed by: FAMILY MEDICINE

## 2019-11-23 PROCEDURE — 11000001 HC ACUTE MED/SURG PRIVATE ROOM

## 2019-11-23 PROCEDURE — 99233 PR SUBSEQUENT HOSPITAL CARE,LEVL III: ICD-10-PCS | Mod: ,,, | Performed by: PSYCHIATRY & NEUROLOGY

## 2019-11-23 RX ORDER — GABAPENTIN 300 MG/1
CAPSULE ORAL
Status: COMPLETED
Start: 2019-11-23 | End: 2019-11-23

## 2019-11-23 RX ORDER — GABAPENTIN 100 MG/1
CAPSULE ORAL
Status: DISCONTINUED
Start: 2019-11-23 | End: 2019-11-23 | Stop reason: WASHOUT

## 2019-11-23 RX ADMIN — POTASSIUM CHLORIDE 20 MEQ: 20 SOLUTION ORAL at 09:11

## 2019-11-23 RX ADMIN — GABAPENTIN 300 MG: 300 CAPSULE ORAL at 03:11

## 2019-11-23 RX ADMIN — GABAPENTIN 300 MG: 300 CAPSULE ORAL at 09:11

## 2019-11-23 RX ADMIN — ACETAMINOPHEN 650 MG: 325 TABLET ORAL at 03:11

## 2019-11-23 RX ADMIN — TIZANIDINE 4 MG: 4 TABLET ORAL at 06:11

## 2019-11-23 RX ADMIN — HYDROCODONE BITARTRATE AND ACETAMINOPHEN 1 TABLET: 10; 325 TABLET ORAL at 09:11

## 2019-11-23 RX ADMIN — TRAZODONE HYDROCHLORIDE 25 MG: 50 TABLET ORAL at 09:11

## 2019-11-23 RX ADMIN — GABAPENTIN: 300 CAPSULE ORAL at 10:11

## 2019-11-23 RX ADMIN — HEPARIN SODIUM 5000 UNITS: 5000 INJECTION INTRAVENOUS; SUBCUTANEOUS at 09:11

## 2019-11-23 RX ADMIN — HYDROCODONE BITARTRATE AND ACETAMINOPHEN 1 TABLET: 10; 325 TABLET ORAL at 03:11

## 2019-11-23 RX ADMIN — ATORVASTATIN CALCIUM 40 MG: 20 TABLET, FILM COATED ORAL at 09:11

## 2019-11-23 RX ADMIN — DULOXETINE HYDROCHLORIDE 20 MG: 20 CAPSULE, DELAYED RELEASE ORAL at 09:11

## 2019-11-23 RX ADMIN — CLOPIDOGREL BISULFATE 75 MG: 75 TABLET ORAL at 09:11

## 2019-11-23 RX ADMIN — GABAPENTIN 300 MG: 300 CAPSULE ORAL at 10:11

## 2019-11-23 RX ADMIN — ASPIRIN 81 MG: 81 TABLET, COATED ORAL at 09:11

## 2019-11-23 RX ADMIN — HEPARIN SODIUM 5000 UNITS: 5000 INJECTION INTRAVENOUS; SUBCUTANEOUS at 03:11

## 2019-11-23 RX ADMIN — ACETAMINOPHEN 650 MG: 325 TABLET ORAL at 09:11

## 2019-11-23 RX ADMIN — HEPARIN SODIUM 5000 UNITS: 5000 INJECTION INTRAVENOUS; SUBCUTANEOUS at 05:11

## 2019-11-23 RX ADMIN — HYDROCODONE BITARTRATE AND ACETAMINOPHEN 1 TABLET: 10; 325 TABLET ORAL at 02:11

## 2019-11-23 RX ADMIN — METOCLOPRAMIDE HYDROCHLORIDE 5 MG: 5 TABLET ORAL at 11:11

## 2019-11-23 RX ADMIN — LIDOCAINE 1 PATCH: 50 PATCH CUTANEOUS at 11:11

## 2019-11-23 RX ADMIN — DICLOFENAC 4 G: 10 GEL TOPICAL at 09:11

## 2019-11-23 RX ADMIN — TIZANIDINE 4 MG: 4 TABLET ORAL at 01:11

## 2019-11-23 RX ADMIN — TIZANIDINE 4 MG: 4 TABLET ORAL at 11:11

## 2019-11-23 RX ADMIN — ACETAMINOPHEN 650 MG: 325 TABLET ORAL at 05:11

## 2019-11-23 RX ADMIN — METOCLOPRAMIDE HYDROCHLORIDE 5 MG: 5 TABLET ORAL at 05:11

## 2019-11-23 NOTE — ASSESSMENT & PLAN NOTE
Previously on Reglan 10 mg TID before meals , reglan then decreased to 5 mg TID. Reglan discontinued on 11/23, will monitor closely and reinitiate if necessary.

## 2019-11-23 NOTE — SUBJECTIVE & OBJECTIVE
Neurologic Chief Complaint: R-sided weakness -- L MCA    Subjective:     Interval History: Patient is seen for follow-up neurological assessment and treatment recommendations:     NAEON. Reglan discontinued.  Importance of oral hydration reinforced with patient. H&H remain stable. Patient accepted to [facility], awaiting insurance auth.       HPI, Past Medical, Family, and Social History remains the same as documented in the initial encounter.     Review of Systems   Constitutional: Negative for chills and fever.   Musculoskeletal: Positive for arthralgias and back pain.   Neurological: Positive for facial asymmetry, speech difficulty and weakness.   Psychiatric/Behavioral: Negative for agitation and confusion.     Scheduled Meds:   acetaminophen  650 mg Oral Q8H    aspirin  81 mg Oral Daily    atorvastatin  40 mg Oral QHS    clopidogrel  75 mg Oral Daily    diclofenac sodium  4 g Topical (Top) Daily    DULoxetine  20 mg Oral BID    gabapentin  300 mg Oral TID    heparin (porcine)  5,000 Units Subcutaneous Q8H    lidocaine  1 patch Transdermal Q24H    polyethylene glycol  17 g Oral Daily    potassium chloride 10%  20 mEq Oral BID    traZODone  25 mg Oral QHS     Continuous Infusions:    PRN Meds:dextrose 10 % in water (D10W), glucagon (human recombinant), HYDROcodone-acetaminophen, insulin aspart U-100, melatonin, ondansetron, sodium chloride 0.9%, tiZANidine    Objective:     Vital Signs (Most Recent):  Temp: 98.6 °F (37 °C) (11/23/19 1112)  Pulse: 92 (11/23/19 1157)  Resp: 18 (11/23/19 1112)  BP: (!) 166/74 (11/23/19 1112)  SpO2: (!) 93 % (11/23/19 1112)  BP Location: Left arm    Vital Signs Range (Last 24H):  Temp:  [98.2 °F (36.8 °C)-99.1 °F (37.3 °C)]   Pulse:  []   Resp:  [18]   BP: (105-181)/(57-88)   SpO2:  [93 %-98 %]   BP Location: Left arm    Physical Exam   Constitutional: She is oriented to person, place, and time. She appears well-developed and well-nourished.   HENT:   Head:  Normocephalic and atraumatic.   Eyes: Conjunctivae and EOM are normal.   Cardiovascular: Normal rate.   Pulmonary/Chest: Effort normal. No respiratory distress.   Musculoskeletal: She exhibits no edema or deformity.   Neurological: She is alert and oriented to person, place, and time. She exhibits normal muscle tone.   Skin: Skin is warm and dry. She is not diaphoretic.   Psychiatric: Cognition and memory are not impaired. She is attentive.   Vitals reviewed.      Neurological Exam:   LOC: alert   Attention Span: good  Language: No aphasia  Articulation: Mild dysarthria  Orientation: Person, Time, Place  Visual Fields: Full  EOM (CN III, IV, VI): Full/intact  Facial Movement (CN VII): Lower facial weakness on the Right- mild  Motor: Arm left  Normal 5/5  Leg left  Normal 5/5  Arm right  Paresis: 3+/5   Leg right Paresis: 2/5  Sensation: Intact to light touch, temp  Tone: Normal tone throughout      Laboratory:  CMP:   No results for input(s): GLUCOSE, CALCIUM, ALBUMIN, PROT, NA, K, CO2, CL, BUN, CREATININE, ALKPHOS, ALT, AST, BILITOT in the last 24 hours.  CBC:   Recent Labs   Lab 11/22/19  0529   WBC 11.10   RBC 3.54*   HGB 9.8*   HCT 31.5*   *   MCV 89   MCH 27.7   MCHC 31.1*     Lipid Panel:   No results for input(s): CHOL, LDLCALC, HDL, TRIG in the last 168 hours.  Coagulation:   No results for input(s): PT, INR, APTT in the last 168 hours.  Platelet Aggregation Study: No results for input(s): PLTAGG, PLTAGINTERP, PLTAGREGLACO, ADPPLTAGGREG in the last 168 hours.  Hgb A1C:   No results for input(s): HGBA1C in the last 168 hours.  TSH:   No results for input(s): TSH in the last 168 hours.      Diagnostic Results      Brain Imaging      MRI Brain (11/01/19):  Acute lacunar type infarct coursing through the left posterior limb internal capsule and corona radiata.       CTH (10/31/19):  No acute intracranial pathology        Vessel Imaging      CTA Multiphase (10/31/19):  CTA head: Atherosclerotic disease of  the cavernous and supraclinoid ICAs with mild narrowing.  Otherwise unremarkable CTA of the head specifically without evidence for proximal significant stenosis or occlusion.  CTA neck: Less than 50% proximal ICA stenosis by NASCET criteria.  Atherosclerotic disease with mild moderate narrowing of the right vertebral artery origin.  There is mild left V1 segment narrowing.  No significant focal vertebral artery stenosis or occlusion.  Interlobular septal thickening with tree in bud micro nodularity visualized upper lobes bilaterally which may represent inflammatory/infectious process clinical correlation and correlation with dedicated CT thorax recommended.  CT head: Bandlike region hypoattenuation left posterior limb internal capsule unchanged from prior suggestive for possible recent to subacute age infarction.  No evidence for hydrocephalus or acute intracranial hemorrhage.  Clinical correlation and further evaluation with MRI if patient compatible.        Cardiac Imaging     Echo (11/01/19)  · Increased (hyperdynamic) left ventricular systolic function. The estimated ejection fraction is 75%.  · Concentric left ventricular remodeling.  · Normal LV diastolic function.  · No wall motion abnormalities.  · Normal right ventricular systolic function.  · Normal central venous pressure (3 mm Hg).  · Mild tricuspid regurgitation.  · The estimated PA systolic pressure is 26 mm Hg  · Echolucent structure next to the LA, likely representing hiatal hernia. Clinincal correlation is required.        Miscellaneous Imaging     CT Abdomen Pelvis WO Contrast 11/16/19  Angiography of the right renal vasculature, SMA, and multiple lumbar arteries demonstrates no evidence of active bleeding.    IR Angiogram Visceral 11/10/19  Angiography of the right renal vasculature, SMA, and multiple lumbar arteries demonstrates no evidence of active bleeding.     CTA Abdomen/Pelvis 11/10/19  Right-sided retroperitoneal hematoma with questionable  foci of active extravasation within the inferior portion of the collection as described above.     IR Angiogram Visceral 11/9/19  Angiography of the lumbar arteries demonstrates active extravasation of the right L2 lumbar artery. Embolization using beads as described above.  Plan: Transfer patient to ICU.  Continued serial H and H follow-up.     CTA Abdomen/Pelvis 11/9/19  1. Large right retroperitoneal hematoma with findings concerning for active arterial extravasation/hemorrhage as detailed above.  There is associated hemorrhage extending throughout the right abdomen and pelvis/pericolic gutter with associated mass effect on the right kidney, which is anteriorly displaced.  2. Decreased opacification of the infahepatic and infrarenal IVC, possibly secondary to underlying mass effect versus partial thrombosis.  3. Nonspecific cecal and right colonic wall thickening, possibly reactive due to hemoperitoneum.  4. Additional findings as detailed above.     MRI Thoracic/Lumbar Spine W WO Contr 11/8/19  Large hematoma within the right psoas muscle.  Compression and probable thrombosis of the IVC. Consider contrast enhanced CT with delayed phase.  Multilevel degenerative changes as detailed above, more severe in the lumbar spine.  Irregularity of the T12 through L3 endplates is most likely degenerative.

## 2019-11-23 NOTE — ASSESSMENT & PLAN NOTE
Risk factor for stroke  Holding all BP meds due to hypotension    11/23: BP labile last 24 hours -181.

## 2019-11-23 NOTE — ASSESSMENT & PLAN NOTE
Melva Barker is a 74 y.o. female with PMHx of HTN, HLD, and DM who presented to OSH with acute worsening of R-sided weakness after having experienced R-sided weakness x1 month. She was treated with tPA at OSH. CTA without LVO. MRI with infarct in L internal capsule and corona radiata. Etiology likely small vessel disease.      Antithrombotics for secondary stroke prevention: Antiplatelets: ASA 81 mg + Plavix 75 mg -- Initially held due to retroperitoneal hematoma. Resumed DAPT on 11/17.  Statins for secondary stroke prevention and hyperlipidemia, if present: Statins: Atorvastatin- 40 mg daily,   Aggressive risk factor modification: HTN, HLD, Diet  Rehab efforts: The patient has been evaluated by a stroke team provider and the therapy needs have been fully considered based off the presenting complaints and exam findings. The following therapy evaluations are needed: PT evaluate and treat, OT evaluate and treat, SLP evaluate and treat, PM&R evaluate for appropriate placement - Dispo SNF  Diagnostics ordered/pending: None   VTE prophylaxis: Mechanical SCDs, heparin 5000 units q 8 hours   BP parameters: Infarct: Post tPA, SBP <160; Avoid hypotension

## 2019-11-23 NOTE — PROGRESS NOTES
[facility]  Vascular Neurology  Comprehensive Stroke Center  Progress Note    Assessment/Plan:     * Thrombotic stroke involving left middle cerebral artery  Melva Barker is a 74 y.o. female with PMHx of HTN, HLD, and DM who presented to OSH with acute worsening of R-sided weakness after having experienced R-sided weakness x1 month. She was treated with tPA at OSH. CTA without LVO. MRI with infarct in L internal capsule and corona radiata. Etiology likely small vessel disease.      Antithrombotics for secondary stroke prevention: Antiplatelets: ASA 81 mg + Plavix 75 mg -- Initially held due to retroperitoneal hematoma. Resumed DAPT on 11/17.  Statins for secondary stroke prevention and hyperlipidemia, if present: Statins: Atorvastatin- 40 mg daily,   Aggressive risk factor modification: HTN, HLD, Diet  Rehab efforts: The patient has been evaluated by a stroke team provider and the therapy needs have been fully considered based off the presenting complaints and exam findings. The following therapy evaluations are needed: PT evaluate and treat, OT evaluate and treat, SLP evaluate and treat, PM&R evaluate for appropriate placement - Dispo SNF  Diagnostics ordered/pending: None   VTE prophylaxis: Mechanical SCDs, heparin 5000 units q 8 hours   BP parameters: Infarct: Post tPA, SBP <160; Avoid hypotension    At high risk for infection  Patient hypotensive overnight from 11/13 and into the AM 11/14. Received multiple boluses and pressure responsive.  Concern was for sepsis vs pain medication-related.   Infectious workup was ordered -- Elevated procal and lactate with interval development of atelectasis in R lower lung.  Started 7-day course of IV Cefepime and Vanc for possible hospital acquired pneumonia. BCx NGTD.    Then with liquid diarrhea overnight 11/15; WBC 14, stopped empiric abx and ordered CDiff, stool culture. PO Vanc x 10 day course for possible C.diff. IVFs given and central  line removed.  WBC then improved, pt remaining afebrile.     Will continue to monitor patient, labs, vitals for any signs or symptoms concerning for infection.    Moderate malnutrition  Appreciate Nutrition/RD assistance  Ordered Boost Glucose Control TID    Orthostatic hypotension  Patient hypotensive overnight from 11/13 and into the AM 11/14. Received multiple boluses and pressure responsive.  Concern was for sepsis vs pain medication-related.   See infection risk section above    Pt then with orthostatic vital signs on 11/20 -- HR 120s to 90s from standing to lying, s/p IVFs 1L over 10 hours ordered on 11/21.   Nursing reported AFib on tele on 11/20, however EKG showed sinus tachycardia.   Encouraging pt toward aggressive PO hydration. Hb remains stable.   Will continue monitoring.    Retroperitoneal hematoma  Pt had been c/o back pain in recent days, complicated by her hx of chronic back and sciatic nerve pain with use of Tizanidine and opiates at home.  Due to Hb downtrend and recent tPA administration, MRI Lumbar/Thoracic spine was ordered 11/8 which demonstrated large R psoas hematoma with IVC compression and probable thrombosis.   General Surgery was consulted; No acute intervention pursued.     Patient was then with acute decompensation overnight 11/9; became hypotensive, tachycardic, and apneic with Hb down to 6.7. Rapid Response was called; patient requiring intubation, blood transfusion, and transfer to medical ICU.   CTA abd/pelvis 11/9 showed hematoma with active extravasation/hemorrhage with IVC compression, hemoperitoneum.   Pt went to IR and is now s/p successful R L2 & L3 lumbar spinal artery embolization.   Patient underwent IR re-evaluation on 11/11. No arterial bleed evident on IR angiography.  CT abd/pelvis 11/17: Redemonstration of large right retroperitoneal hematoma which is mildly decreased in size compared to most recent     Hb remains stable, Continuing to monitor.    Debility  2/2  "stroke  PT/OT eval & treat - Dispo SNF    Cytotoxic cerebral edema  Area of cytotoxic cerebral edema identified when reviewing brain imaging in the territory of the L middle cerebral artery. There is no mass effect associated with it. We will continue to monitor the patients clinical exam for any worsening of symptoms which may indicate expansion of the stroke or the area of the edema resulting in the clinical change. The pattern is suggestive of small vessel etiology.    Essential hypertension  Risk factor for stroke  Holding all BP meds due to hypotension    11/23: BP labile last 24 hours -181.     Back pain  Reported hx chronic back pain with associated sciatic nerve pain as well, then complicated by acute retroperitoneal hematoma.  Consulted to anesthesia pain management    Pain now being controlled with gabapentin, duloxetine, trazodone, diclofenac gel, lidocaine patch, hydrcodone-acetaminophen (prn), tizanidine (prn.)   Stroke attending increased frequency of hydrocodone-acetaminophen from TID prn to QID prn.    Nurse reporting pt continues to request pain medication "around the clock." Of note, pt reported to night nurse that her best pain score at home is usually 7 1/2.        Gastroparesis  Previously on Reglan 10 mg TID before meals , reglan then decreased to 5 mg TID. Reglan discontinued on 11/23, will monitor closely and reinitiate if necessary.     Diabetes mellitus type 2 without retinopathy  Stroke risk factor, poorly controlled on glargine 17 units daily  HbA1c 8.3%  Recommend tight glucose control  Currently on SSI   Diabetic diet       11/1/19 MRI with small vessel L MCA infarct, therapy recommending rehab  11/2 - Patient stepped down overnight. Adjusting BP regimen. Patient with continuous complaints if nausea. IV Zofran ordered with some relief. Patient has not has BM since 10/30. Ordering KUB to rule out obstruction. Neuro exam unchanged.   11/3 - NAEON. Patient reports she had no " episodes of vomiting overnight. Still complaining of nausea. Mild lower abdomen tenderness on exam. Lipase ordered and WNL. Continue to monitor.   11/4 - denies nausea and vomiting. Abd tenderness remains present on palpitation. Continue to monitor. HCTZ increased yesterday. Pending rehab placement.   11/5 -  N/V x1 overnight zofran resolved, sucralfate started. Placement pending   11/6 - hypotensive, held all BP meds,  ml bolus, responded well. Increased BUN/Cr, start NS 75ml/hr.   11/7 patient complaining of sciatica pain.  Restarted home tizanidine.  Patient requesting hydrocodone but educated patient that nerve pain is not treated with opioid.  Degenerative disease seen on xray but no fracture.      11/8 Patient continues to experience back pain.  Now with leukocytosis and drop in h/h plan for MRI thoracic/lumbar spine to evaluate for epidural hematoma.    11/9: MRI Lumbar spine had demonstrated large R psoas hematoma with possible IVC compression; General Surgery consulted. Patient was then with acute decompensation overnight; became hypotensive, tachycardic, and apneic requiring intubation, blood transfusion, and medical ICU transfer. CTA abd/pelvis showed active extravasation/hemorrhage with hemoperitoneum. Pt now s/p successful lumbar artery embolization in IR. She was extubated on arrival back to ICU and tolerated well.    11/11: Patient underwent IR re-evaluation yesterday. No arterial bleed evident on IR angiography. Patient neuro exam stable.   11/12 Stepped down from medical ICU this morning.  Patient still experiencing pain.  Anesthesia consulted for pain management.  D/C pain pump and started oral medications for muscle and nerve pain.  Will wean to home regimen. Waiting for repeat CBC, transfused one unit of blood 11/11.   Tachycardic/hypertensive will continue to monitor.  11/13: Upgraded diet per SLP recs. Pt continues to c/o R back pain, concern that tachycardia could be pain-induced;  adjusted analgesic regimen further as well as antiemetic regimen with plans to eventually wean to home regimen. Replaced Phos. Dispo inpatient rehab.  11/14: patient hypotensive overnight and this morning requiring fluid boluses. Discontinued all pain medications except for celecoxib in case pain meds are contributing to hypotension. Lidocaine patch and Voltaren gel ordered as well. Infectious workup significant for minor atelectasis in R lower lung with increased procal and lactate. Patient started on IV antibiotics for community acquired pneumonia. Blood cultures pending. ICU notified of patient's hypotension  11/15: Liquid diarrhea, WBC 14, stopped empiric abx, ordered c. Diff and stool culture, start PO vanc, IVFs. Adjusted pain meds with goal to return to home regimen. Nauseous overnight, zofran given.   11/16: CT abdomen and pelvis to f/u retroperitoneal hematoma prior to starting DAPT, H/H stable. T max 99.4, tachy, WBC 13, C. Diff and stool cultures pending, increased  ml/hr, labile BP, continue to monitor. Remove central line.   11/17: CT abdomen/pelvis again with large retroperitoneal hematoma  but decreased in size, H/H uptrend, resume DAPT today. Diarrhea becoming thicker, WBC improved, a febrile, c. Diff antigen positive, toxins negative. Pain well controlled.   11/18 PCR negative, c.diff precautions and vancomycin d/c.  Neurologically stable waiting for placement.    11/19 Patient now recommending SNF.   11/20 Neurologically stable.  Waiting for placement.  Hypotensive episodes overnight given some fluids.     11/21 Orthostatic yesterday, HR 120s to 90s standing to lying, IVFs 1L over 10 hours ordered.   11/22: Nurse encouraging pt toward aggressive PO hydration in the setting of recent orthostatic hypotension. Hb remains stable.  11/23: Reglan discontinue  Importance of oral hydration reinforced with patient. H&H remain stable. Patient accepted to [facility], awaiting insurance auth.      STROKE DOCUMENTATION   Acute Stroke Times   Last Known Normal Date: 10/31/19  Last Known Normal Time: 0630  Symptom Onset Date: 10/31/19  Symptom Onset Time: 0730  Stroke Team Called Date: 10/31/19  Stroke Team Called Time: 0936  Stroke Team Arrival Date: 10/31/19  Stroke Team Arrival Time: 0946  Decision to Treat Time for Alteplase: 1003    NIH Scale:  1a. Level of Consciousness: 0-->Alert, keenly responsive  1b. LOC Questions: 0-->Answers both questions correctly  1c. LOC Commands: 0-->Performs both tasks correctly  2. Best Gaze: 0-->Normal  3. Visual: 0-->No visual loss  4. Facial Palsy: 1-->Minor paralysis (flattened nasolabial fold, asymmetry on smiling)  5a. Motor Arm, Left: 0-->No drift, limb holds 90 (or 45) degrees for full 10 secs  5b. Motor Arm, Right: 1-->Drift, limb holds 90 (or 45) degrees, but drifts down before full 10 secs, does not hit bed or other support  6a. Motor Leg, Left: 0-->No drift, leg holds 30 degree position for full 5 secs  6b. Motor Leg, Right: 2-->Some effort against gravity, leg falls to bed by 5 secs, but has some effort against gravity  7. Limb Ataxia: 0-->Absent  8. Sensory: 0-->Normal, no sensory loss  9. Best Language: 0-->No aphasia, normal  10. Dysarthria: 1-->Mild-to-moderate dysarthria, patient slurs at least some words and, at worst, can be understood with some difficulty  11. Extinction and Inattention (formerly Neglect): 0-->No abnormality  Total (NIH Stroke Scale): 5       Modified [place] Score: 0  [place] Coma Scale:    ABCD2 Score:    [id number] Score:   HAS -BLED Score:   ICH Score:   Hunt & Amaya Classification:      Hemorrhagic change of an Ischemic Stroke: Does this patient have an ischemic stroke with hemorrhagic changes? No     Neurologic Chief Complaint: R-sided weakness -- L MCA    Subjective:     Interval History: Patient is seen for follow-up neurological assessment and treatment recommendations:     DARREN. Reglan discontinued.  Importance of oral  hydration reinforced with patient. H&H remain stable. Patient accepted to [facility], awaiting insurance auth.       HPI, Past Medical, Family, and Social History remains the same as documented in the initial encounter.     Review of Systems   Constitutional: Negative for chills and fever.   Musculoskeletal: Positive for arthralgias and back pain.   Neurological: Positive for facial asymmetry, speech difficulty and weakness.   Psychiatric/Behavioral: Negative for agitation and confusion.     Scheduled Meds:   acetaminophen  650 mg Oral Q8H    aspirin  81 mg Oral Daily    atorvastatin  40 mg Oral QHS    clopidogrel  75 mg Oral Daily    diclofenac sodium  4 g Topical (Top) Daily    DULoxetine  20 mg Oral BID    gabapentin  300 mg Oral TID    heparin (porcine)  5,000 Units Subcutaneous Q8H    lidocaine  1 patch Transdermal Q24H    polyethylene glycol  17 g Oral Daily    potassium chloride 10%  20 mEq Oral BID    traZODone  25 mg Oral QHS     Continuous Infusions:    PRN Meds:dextrose 10 % in water (D10W), glucagon (human recombinant), HYDROcodone-acetaminophen, insulin aspart U-100, melatonin, ondansetron, sodium chloride 0.9%, tiZANidine    Objective:     Vital Signs (Most Recent):  Temp: 98.6 °F (37 °C) (11/23/19 1112)  Pulse: 92 (11/23/19 1157)  Resp: 18 (11/23/19 1112)  BP: (!) 166/74 (11/23/19 1112)  SpO2: (!) 93 % (11/23/19 1112)  BP Location: Left arm    Vital Signs Range (Last 24H):  Temp:  [98.2 °F (36.8 °C)-99.1 °F (37.3 °C)]   Pulse:  []   Resp:  [18]   BP: (105-181)/(57-88)   SpO2:  [93 %-98 %]   BP Location: Left arm    Physical Exam   Constitutional: She is oriented to person, place, and time. She appears well-developed and well-nourished.   HENT:   Head: Normocephalic and atraumatic.   Eyes: Conjunctivae and EOM are normal.   Cardiovascular: Normal rate.   Pulmonary/Chest: Effort normal. No respiratory distress.   Musculoskeletal: She exhibits no edema or deformity.   Neurological:  She is alert and oriented to person, place, and time. She exhibits normal muscle tone.   Skin: Skin is warm and dry. She is not diaphoretic.   Psychiatric: Cognition and memory are not impaired. She is attentive.   Vitals reviewed.      Neurological Exam:   LOC: alert   Attention Span: good  Language: No aphasia  Articulation: Mild dysarthria  Orientation: Person, Time, Place  Visual Fields: Full  EOM (CN III, IV, VI): Full/intact  Facial Movement (CN VII): Lower facial weakness on the Right- mild  Motor: Arm left  Normal 5/5  Leg left  Normal 5/5  Arm right  Paresis: 3+/5   Leg right Paresis: 2/5  Sensation: Intact to light touch, temp  Tone: Normal tone throughout      Laboratory:  CMP:   No results for input(s): GLUCOSE, CALCIUM, ALBUMIN, PROT, NA, K, CO2, CL, BUN, CREATININE, ALKPHOS, ALT, AST, BILITOT in the last 24 hours.  CBC:   Recent Labs   Lab 11/22/19  0529   WBC 11.10   RBC 3.54*   HGB 9.8*   HCT 31.5*   *   MCV 89   MCH 27.7   MCHC 31.1*     Lipid Panel:   No results for input(s): CHOL, LDLCALC, HDL, TRIG in the last 168 hours.  Coagulation:   No results for input(s): PT, INR, APTT in the last 168 hours.  Platelet Aggregation Study: No results for input(s): PLTAGG, PLTAGINTERP, PLTAGREGLACO, ADPPLTAGGREG in the last 168 hours.  Hgb A1C:   No results for input(s): HGBA1C in the last 168 hours.  TSH:   No results for input(s): TSH in the last 168 hours.      Diagnostic Results      Brain Imaging      MRI Brain (11/01/19):  Acute lacunar type infarct coursing through the left posterior limb internal capsule and corona radiata.       CTH (10/31/19):  No acute intracranial pathology        Vessel Imaging      CTA Multiphase (10/31/19):  CTA head: Atherosclerotic disease of the cavernous and supraclinoid ICAs with mild narrowing.  Otherwise unremarkable CTA of the head specifically without evidence for proximal significant stenosis or occlusion.  CTA neck: Less than 50% proximal ICA stenosis by NASCET  criteria.  Atherosclerotic disease with mild moderate narrowing of the right vertebral artery origin.  There is mild left V1 segment narrowing.  No significant focal vertebral artery stenosis or occlusion.  Interlobular septal thickening with tree in bud micro nodularity visualized upper lobes bilaterally which may represent inflammatory/infectious process clinical correlation and correlation with dedicated CT thorax recommended.  CT head: Bandlike region hypoattenuation left posterior limb internal capsule unchanged from prior suggestive for possible recent to subacute age infarction.  No evidence for hydrocephalus or acute intracranial hemorrhage.  Clinical correlation and further evaluation with MRI if patient compatible.        Cardiac Imaging     Echo (11/01/19)  · Increased (hyperdynamic) left ventricular systolic function. The estimated ejection fraction is 75%.  · Concentric left ventricular remodeling.  · Normal LV diastolic function.  · No wall motion abnormalities.  · Normal right ventricular systolic function.  · Normal central venous pressure (3 mm Hg).  · Mild tricuspid regurgitation.  · The estimated PA systolic pressure is 26 mm Hg  · Echolucent structure next to the LA, likely representing hiatal hernia. Clinincal correlation is required.        Miscellaneous Imaging     CT Abdomen Pelvis WO Contrast 11/16/19  Angiography of the right renal vasculature, SMA, and multiple lumbar arteries demonstrates no evidence of active bleeding.    IR Angiogram Visceral 11/10/19  Angiography of the right renal vasculature, SMA, and multiple lumbar arteries demonstrates no evidence of active bleeding.     CTA Abdomen/Pelvis 11/10/19  Right-sided retroperitoneal hematoma with questionable foci of active extravasation within the inferior portion of the collection as described above.     IR Angiogram Visceral 11/9/19  Angiography of the lumbar arteries demonstrates active extravasation of the right L2 lumbar  artery. Embolization using beads as described above.  Plan: Transfer patient to ICU.  Continued serial H and H follow-up.     CTA Abdomen/Pelvis 11/9/19  1. Large right retroperitoneal hematoma with findings concerning for active arterial extravasation/hemorrhage as detailed above.  There is associated hemorrhage extending throughout the right abdomen and pelvis/pericolic gutter with associated mass effect on the right kidney, which is anteriorly displaced.  2. Decreased opacification of the infahepatic and infrarenal IVC, possibly secondary to underlying mass effect versus partial thrombosis.  3. Nonspecific cecal and right colonic wall thickening, possibly reactive due to hemoperitoneum.  4. Additional findings as detailed above.     MRI Thoracic/Lumbar Spine W WO Contr 11/8/19  Large hematoma within the right psoas muscle.  Compression and probable thrombosis of the IVC. Consider contrast enhanced CT with delayed phase.  Multilevel degenerative changes as detailed above, more severe in the lumbar spine.  Irregularity of the T12 through L3 endplates is most likely degenerative.      Gumaro Muro, CARMELA  Comprehensive Stroke Center  Department of Vascular Neurology   [facility]

## 2019-11-23 NOTE — PLAN OF CARE
POC reviewed with pt.  Pt verbalized understanding.  Questions and concerns addressed.  Pt remains AOx4.  Fall/safety precautions maintained throughout shift.  Bed locked and in lowest position.  Call light within reach.  See flowsheet for full assessment and vital signs.  Will continue to monitor.

## 2019-11-24 LAB
POCT GLUCOSE: 181 MG/DL (ref 70–110)
POCT GLUCOSE: 402 MG/DL (ref 70–110)
POCT GLUCOSE: 81 MG/DL (ref 70–110)
POCT GLUCOSE: 85 MG/DL (ref 70–110)

## 2019-11-24 PROCEDURE — 63600175 PHARM REV CODE 636 W HCPCS: Performed by: NURSE PRACTITIONER

## 2019-11-24 PROCEDURE — 99233 SBSQ HOSP IP/OBS HIGH 50: CPT | Mod: ,,, | Performed by: PSYCHIATRY & NEUROLOGY

## 2019-11-24 PROCEDURE — 63600175 PHARM REV CODE 636 W HCPCS: Performed by: FAMILY MEDICINE

## 2019-11-24 PROCEDURE — 25000003 PHARM REV CODE 250: Performed by: PHYSICIAN ASSISTANT

## 2019-11-24 PROCEDURE — 99233 PR SUBSEQUENT HOSPITAL CARE,LEVL III: ICD-10-PCS | Mod: ,,, | Performed by: PSYCHIATRY & NEUROLOGY

## 2019-11-24 PROCEDURE — 25000003 PHARM REV CODE 250: Performed by: FAMILY MEDICINE

## 2019-11-24 PROCEDURE — 11000001 HC ACUTE MED/SURG PRIVATE ROOM

## 2019-11-24 PROCEDURE — 94761 N-INVAS EAR/PLS OXIMETRY MLT: CPT

## 2019-11-24 PROCEDURE — 25000003 PHARM REV CODE 250: Performed by: PSYCHIATRY & NEUROLOGY

## 2019-11-24 RX ORDER — GABAPENTIN 300 MG/1
CAPSULE ORAL
Status: DISPENSED
Start: 2019-11-24 | End: 2019-11-24

## 2019-11-24 RX ORDER — ATORVASTATIN CALCIUM 20 MG/1
TABLET, FILM COATED ORAL
Status: DISPENSED
Start: 2019-11-24 | End: 2019-11-25

## 2019-11-24 RX ORDER — HYDROCODONE BITARTRATE AND ACETAMINOPHEN 10; 325 MG/1; MG/1
1 TABLET ORAL EVERY 4 HOURS PRN
Status: DISCONTINUED | OUTPATIENT
Start: 2019-11-24 | End: 2019-11-25

## 2019-11-24 RX ORDER — LIDOCAINE 50 MG/G
PATCH TOPICAL
Status: DISPENSED
Start: 2019-11-24 | End: 2019-11-24

## 2019-11-24 RX ADMIN — DICLOFENAC 4 G: 10 GEL TOPICAL at 10:11

## 2019-11-24 RX ADMIN — ATORVASTATIN CALCIUM 40 MG: 20 TABLET, FILM COATED ORAL at 09:11

## 2019-11-24 RX ADMIN — HEPARIN SODIUM 5000 UNITS: 5000 INJECTION INTRAVENOUS; SUBCUTANEOUS at 09:11

## 2019-11-24 RX ADMIN — TIZANIDINE 4 MG: 4 TABLET ORAL at 03:11

## 2019-11-24 RX ADMIN — TIZANIDINE 4 MG: 4 TABLET ORAL at 09:11

## 2019-11-24 RX ADMIN — DULOXETINE HYDROCHLORIDE 20 MG: 20 CAPSULE, DELAYED RELEASE ORAL at 09:11

## 2019-11-24 RX ADMIN — SODIUM CHLORIDE 500 ML: 0.9 INJECTION, SOLUTION INTRAVENOUS at 05:11

## 2019-11-24 RX ADMIN — POTASSIUM CHLORIDE 20 MEQ: 20 SOLUTION ORAL at 09:11

## 2019-11-24 RX ADMIN — TIZANIDINE 4 MG: 4 TABLET ORAL at 11:11

## 2019-11-24 RX ADMIN — HYDROCODONE BITARTRATE AND ACETAMINOPHEN 1 TABLET: 10; 325 TABLET ORAL at 07:11

## 2019-11-24 RX ADMIN — TRAZODONE HYDROCHLORIDE 25 MG: 50 TABLET ORAL at 09:11

## 2019-11-24 RX ADMIN — HYDROCODONE BITARTRATE AND ACETAMINOPHEN 1 TABLET: 10; 325 TABLET ORAL at 01:11

## 2019-11-24 RX ADMIN — GABAPENTIN 300 MG: 300 CAPSULE ORAL at 09:11

## 2019-11-24 RX ADMIN — ACETAMINOPHEN 650 MG: 325 TABLET ORAL at 09:11

## 2019-11-24 RX ADMIN — ASPIRIN 81 MG: 81 TABLET, COATED ORAL at 09:11

## 2019-11-24 RX ADMIN — GABAPENTIN 300 MG: 300 CAPSULE ORAL at 01:11

## 2019-11-24 RX ADMIN — CLOPIDOGREL BISULFATE 75 MG: 75 TABLET ORAL at 09:11

## 2019-11-24 RX ADMIN — HEPARIN SODIUM 5000 UNITS: 5000 INJECTION INTRAVENOUS; SUBCUTANEOUS at 01:11

## 2019-11-24 RX ADMIN — ONDANSETRON 4 MG: 2 INJECTION INTRAMUSCULAR; INTRAVENOUS at 01:11

## 2019-11-24 RX ADMIN — INSULIN ASPART 5 UNITS: 100 INJECTION, SOLUTION INTRAVENOUS; SUBCUTANEOUS at 05:11

## 2019-11-24 RX ADMIN — HEPARIN SODIUM 5000 UNITS: 5000 INJECTION INTRAVENOUS; SUBCUTANEOUS at 05:11

## 2019-11-24 RX ADMIN — LIDOCAINE 1 PATCH: 50 PATCH CUTANEOUS at 11:11

## 2019-11-24 NOTE — NURSING
Paged stroke team at 4:52am about patient's low BP of 76/50 taken manually. AYDEE Travis NP ordered a 500ml bolus of 0.9% NACL. KHOA.

## 2019-11-24 NOTE — PROGRESS NOTES
[facility]  Vascular Neurology  Comprehensive Stroke Center  Progress Note    Assessment/Plan:     * Thrombotic stroke involving left middle cerebral artery  Melva Barker is a 74 y.o. female with PMHx of HTN, HLD, and DM who presented to OSH with acute worsening of R-sided weakness after having experienced R-sided weakness x1 month. She was treated with tPA at OSH. CTA without LVO. MRI with infarct in L internal capsule and corona radiata. Etiology likely small vessel disease.      Antithrombotics for secondary stroke prevention: Antiplatelets: ASA 81 mg + Plavix 75 mg -- Initially held due to retroperitoneal hematoma. Resumed DAPT on 11/17.  Statins for secondary stroke prevention and hyperlipidemia, if present: Statins: Atorvastatin- 40 mg daily,   Aggressive risk factor modification: HTN, HLD, Diet  Rehab efforts: The patient has been evaluated by a stroke team provider and the therapy needs have been fully considered based off the presenting complaints and exam findings. The following therapy evaluations are needed: PT evaluate and treat, OT evaluate and treat, SLP evaluate and treat, PM&R evaluate for appropriate placement - Dispo SNF  Diagnostics ordered/pending: None   VTE prophylaxis: Mechanical SCDs, heparin 5000 units q 8 hours   BP parameters: Infarct: Post tPA, SBP <160; Avoid hypotension    At high risk for infection  Patient hypotensive overnight from 11/13 and into the AM 11/14. Received multiple boluses and pressure responsive.  Concern was for sepsis vs pain medication-related.   Infectious workup was ordered -- Elevated procal and lactate with interval development of atelectasis in R lower lung.  Started 7-day course of IV Cefepime and Vanc for possible hospital acquired pneumonia. BCx NGTD.    Then with liquid diarrhea overnight 11/15; WBC 14, stopped empiric abx and ordered CDiff, stool culture. PO Vanc x 10 day course for possible C.diff. IVFs given and central  line removed.  WBC then improved, pt remaining afebrile.     Will continue to monitor patient, labs, vitals for any signs or symptoms concerning for infection.    Moderate malnutrition  Appreciate Nutrition/RD assistance  Ordered Boost Glucose Control TID    Orthostatic hypotension  Patient hypotensive overnight from 11/13 and into the AM 11/14. Received multiple boluses and pressure responsive.  Concern was for sepsis vs pain medication-related.   See infection risk section above    Pt then with orthostatic vital signs on 11/20 -- HR 120s to 90s from standing to lying, s/p IVFs 1L over 10 hours ordered on 11/21.   Nursing reported AFib on tele on 11/20, however EKG showed sinus tachycardia.   Encouraging pt toward aggressive PO hydration. Hb remains stable.   Will continue monitoring, consider midodrine if hypotension persists    Retroperitoneal hematoma  Pt had been c/o back pain in recent days, complicated by her hx of chronic back and sciatic nerve pain with use of Tizanidine and opiates at home.  Due to Hb downtrend and recent tPA administration, MRI Lumbar/Thoracic spine was ordered 11/8 which demonstrated large R psoas hematoma with IVC compression and probable thrombosis.   General Surgery was consulted; No acute intervention pursued.     Patient was then with acute decompensation overnight 11/9; became hypotensive, tachycardic, and apneic with Hb down to 6.7. Rapid Response was called; patient requiring intubation, blood transfusion, and transfer to medical ICU.   CTA abd/pelvis 11/9 showed hematoma with active extravasation/hemorrhage with IVC compression, hemoperitoneum.   Pt went to IR and is now s/p successful R L2 & L3 lumbar spinal artery embolization.   Patient underwent IR re-evaluation on 11/11. No arterial bleed evident on IR angiography.  CT abd/pelvis 11/17: Redemonstration of large right retroperitoneal hematoma which is mildly decreased in size compared to most recent     Hb remains stable,  "Continuing to monitor.    Debility  2/2 stroke  PT/OT eval & treat - Dispo SNF    Cytotoxic cerebral edema  Area of cytotoxic cerebral edema identified when reviewing brain imaging in the territory of the L middle cerebral artery. There is no mass effect associated with it. We will continue to monitor the patients clinical exam for any worsening of symptoms which may indicate expansion of the stroke or the area of the edema resulting in the clinical change. The pattern is suggestive of small vessel etiology.    Essential hypertension  Risk factor for stroke  Holding all BP meds due to hypotension    11/23: BP labile last 24 hours -181.     Back pain  Reported hx chronic back pain with associated sciatic nerve pain as well, then complicated by acute retroperitoneal hematoma.  Consulted to anesthesia pain management    Pain now being controlled with gabapentin, duloxetine, trazodone, diclofenac gel, lidocaine patch, hydrcodone-acetaminophen (prn), tizanidine (prn.)     Nurse reporting pt continues to request pain medication "around the clock." Of note, pt reported to night nurse that her best pain score at home is usually 7 1/2.        Gastroparesis  Previously on Reglan 10 mg TID before meals , reglan then decreased to 5 mg TID. Reglan discontinued on 11/23, will monitor closely and reinitiate if necessary.     Diabetes mellitus type 2 without retinopathy  Stroke risk factor, poorly controlled on glargine 17 units daily  HbA1c 8.3%  Recommend tight glucose control  Currently on SSI   Diabetic diet         11/1/19 MRI with small vessel L MCA infarct, therapy recommending rehab  11/2 - Patient stepped down overnight. Adjusting BP regimen. Patient with continuous complaints if nausea. IV Zofran ordered with some relief. Patient has not has BM since 10/30. Ordering KUB to rule out obstruction. Neuro exam unchanged.   11/3 - NAEON. Patient reports she had no episodes of vomiting overnight. Still complaining of " nausea. Mild lower abdomen tenderness on exam. Lipase ordered and WNL. Continue to monitor.   11/4 - denies nausea and vomiting. Abd tenderness remains present on palpitation. Continue to monitor. HCTZ increased yesterday. Pending rehab placement.   11/5 -  N/V x1 overnight zofran resolved, sucralfate started. Placement pending   11/6 - hypotensive, held all BP meds,  ml bolus, responded well. Increased BUN/Cr, start NS 75ml/hr.   11/7 patient complaining of sciatica pain.  Restarted home tizanidine.  Patient requesting hydrocodone but educated patient that nerve pain is not treated with opioid.  Degenerative disease seen on xray but no fracture.      11/8 Patient continues to experience back pain.  Now with leukocytosis and drop in h/h plan for MRI thoracic/lumbar spine to evaluate for epidural hematoma.    11/9: MRI Lumbar spine had demonstrated large R psoas hematoma with possible IVC compression; General Surgery consulted. Patient was then with acute decompensation overnight; became hypotensive, tachycardic, and apneic requiring intubation, blood transfusion, and medical ICU transfer. CTA abd/pelvis showed active extravasation/hemorrhage with hemoperitoneum. Pt now s/p successful lumbar artery embolization in IR. She was extubated on arrival back to ICU and tolerated well.    11/11: Patient underwent IR re-evaluation yesterday. No arterial bleed evident on IR angiography. Patient neuro exam stable.   11/12 Stepped down from medical ICU this morning.  Patient still experiencing pain.  Anesthesia consulted for pain management.  D/C pain pump and started oral medications for muscle and nerve pain.  Will wean to home regimen. Waiting for repeat CBC, transfused one unit of blood 11/11.   Tachycardic/hypertensive will continue to monitor.  11/13: Upgraded diet per SLP recs. Pt continues to c/o R back pain, concern that tachycardia could be pain-induced; adjusted analgesic regimen further as well as antiemetic  regimen with plans to eventually wean to home regimen. Replaced Phos. Dispo inpatient rehab.  11/14: patient hypotensive overnight and this morning requiring fluid boluses. Discontinued all pain medications except for celecoxib in case pain meds are contributing to hypotension. Lidocaine patch and Voltaren gel ordered as well. Infectious workup significant for minor atelectasis in R lower lung with increased procal and lactate. Patient started on IV antibiotics for community acquired pneumonia. Blood cultures pending. ICU notified of patient's hypotension  11/15: Liquid diarrhea, WBC 14, stopped empiric abx, ordered c. Diff and stool culture, start PO vanc, IVFs. Adjusted pain meds with goal to return to home regimen. Nauseous overnight, zofran given.   11/16: CT abdomen and pelvis to f/u retroperitoneal hematoma prior to starting DAPT, H/H stable. T max 99.4, tachy, WBC 13, C. Diff and stool cultures pending, increased  ml/hr, labile BP, continue to monitor. Remove central line.   11/17: CT abdomen/pelvis again with large retroperitoneal hematoma  but decreased in size, H/H uptrend, resume DAPT today. Diarrhea becoming thicker, WBC improved, a febrile, c. Diff antigen positive, toxins negative. Pain well controlled.   11/18 PCR negative, c.diff precautions and vancomycin d/c.  Neurologically stable waiting for placement.    11/19 Patient now recommending SNF.   11/20 Neurologically stable.  Waiting for placement.  Hypotensive episodes overnight given some fluids.     11/21 Orthostatic yesterday, HR 120s to 90s standing to lying, IVFs 1L over 10 hours ordered.   11/22: Nurse encouraging pt toward aggressive PO hydration in the setting of recent orthostatic hypotension. Hb remains stable.  11/23: Reglan discontinue  Importance of oral hydration reinforced with patient. H&H remain stable. Patient accepted to [facility], awaiting insurance auth.   11/24: patient became hypotensive with SBP in 70s and  responded to 500 cc bolus, continuing to encourage oral fluid intake, will consider midodrine if hypotension persists    STROKE DOCUMENTATION   Acute Stroke Times   Last Known Normal Date: 10/31/19  Last Known Normal Time: 0630  Symptom Onset Date: 10/31/19  Symptom Onset Time: 0730  Stroke Team Called Date: 10/31/19  Stroke Team Called Time: 0936  Stroke Team Arrival Date: 10/31/19  Stroke Team Arrival Time: 0946  Decision to Treat Time for Alteplase: 1003    NIH Scale:  1a. Level of Consciousness: 0-->Alert, keenly responsive  1b. LOC Questions: 0-->Answers both questions correctly  1c. LOC Commands: 0-->Performs both tasks correctly  2. Best Gaze: 0-->Normal  3. Visual: 0-->No visual loss  4. Facial Palsy: 1-->Minor paralysis (flattened nasolabial fold, asymmetry on smiling)  5a. Motor Arm, Left: 0-->No drift, limb holds 90 (or 45) degrees for full 10 secs  5b. Motor Arm, Right: 1-->Drift, limb holds 90 (or 45) degrees, but drifts down before full 10 secs, does not hit bed or other support  6a. Motor Leg, Left: 0-->No drift, leg holds 30 degree position for full 5 secs  6b. Motor Leg, Right: 2-->Some effort against gravity, leg falls to bed by 5 secs, but has some effort against gravity  7. Limb Ataxia: 0-->Absent  8. Sensory: 0-->Normal, no sensory loss  9. Best Language: 0-->No aphasia, normal  10. Dysarthria: 1-->Mild-to-moderate dysarthria, patient slurs at least some words and, at worst, can be understood with some difficulty  11. Extinction and Inattention (formerly Neglect): 0-->No abnormality  Total (NIH Stroke Scale): 5       Modified Dayanna Score: 0  Herndon Coma Scale:    ABCD2 Score:    [id number] Score:   HAS -BLED Score:   ICH Score:   Hunt & Amaya Classification:      Hemorrhagic change of an Ischemic Stroke: Does this patient have an ischemic stroke with hemorrhagic changes? No     Neurologic Chief Complaint: R-sided weakness -- L MCA    Subjective:     Interval History: Patient is seen for  follow-up neurological assessment and treatment recommendations: Hypotensive overnight with SBP in 70s responded to 500 cc bolus, continues to complain of pain      HPI, Past Medical, Family, and Social History remains the same as documented in the initial encounter.     Review of Systems   Constitutional: Negative for chills and fever.   Musculoskeletal: Positive for arthralgias and back pain.   Neurological: Positive for facial asymmetry, speech difficulty and weakness.   Psychiatric/Behavioral: Negative for agitation and confusion.     Scheduled Meds:   acetaminophen  650 mg Oral Q8H    aspirin  81 mg Oral Daily    atorvastatin  40 mg Oral QHS    clopidogrel  75 mg Oral Daily    diclofenac sodium  4 g Topical (Top) Daily    DULoxetine  20 mg Oral BID    gabapentin        gabapentin  300 mg Oral TID    heparin (porcine)  5,000 Units Subcutaneous Q8H    lidocaine        lidocaine  1 patch Transdermal Q24H    polyethylene glycol  17 g Oral Daily    potassium chloride 10%  20 mEq Oral BID    traZODone  25 mg Oral QHS     Continuous Infusions:    PRN Meds:dextrose 10 % in water (D10W), glucagon (human recombinant), HYDROcodone-acetaminophen, insulin aspart U-100, melatonin, ondansetron, sodium chloride 0.9%, tiZANidine    Objective:     Vital Signs (Most Recent):  Temp: 97.4 °F (36.3 °C) (11/24/19 1119)  Pulse: 80 (11/24/19 1142)  Resp: 18 (11/24/19 1119)  BP: (!) 145/58 (11/24/19 1119)  SpO2: 98 % (11/24/19 1119)  BP Location: Right arm    Vital Signs Range (Last 24H):  Temp:  [97.4 °F (36.3 °C)-98.8 °F (37.1 °C)]   Pulse:  [60-95]   Resp:  [16-20]   BP: ()/(50-77)   SpO2:  [95 %-99 %]   BP Location: Right arm    Physical Exam   Constitutional: She is oriented to person, place, and time. She appears well-developed and well-nourished. No distress.   HENT:   Head: Normocephalic and atraumatic.   Eyes: EOM are normal.   Cardiovascular: Normal rate.   Pulmonary/Chest: Effort normal. No respiratory  distress.   Neurological: She is alert and oriented to person, place, and time.   Skin: Skin is warm and dry.   Psychiatric: Cognition and memory are not impaired. She is attentive.   Vitals reviewed.      Neurological Exam:   LOC: alert   Attention Span: good  Language: No aphasia  Articulation: Mild dysarthria  Orientation: Person, Time, Place  Visual Fields: Full  EOM (CN III, IV, VI): Full/intact  Facial Movement (CN VII): Lower facial weakness on the Right- mild  Motor: Arm left  Normal 5/5  Leg left  Normal 5/5  Arm right  Paresis: 3+/5   Leg right Paresis: 2/5  Sensation: Intact to light touch, temp  Tone: Normal tone throughout      Laboratory:  CMP:   No results for input(s): GLUCOSE, CALCIUM, ALBUMIN, PROT, NA, K, CO2, CL, BUN, CREATININE, ALKPHOS, ALT, AST, BILITOT in the last 24 hours.  CBC:   Recent Labs   Lab 11/22/19  0529   WBC 11.10   RBC 3.54*   HGB 9.8*   HCT 31.5*   *   MCV 89   MCH 27.7   MCHC 31.1*     Lipid Panel:   No results for input(s): CHOL, LDLCALC, HDL, TRIG in the last 168 hours.  Coagulation:   No results for input(s): PT, INR, APTT in the last 168 hours.  Platelet Aggregation Study: No results for input(s): PLTAGG, PLTAGINTERP, PLTAGREGLACO, ADPPLTAGGREG in the last 168 hours.  Hgb A1C:   No results for input(s): HGBA1C in the last 168 hours.  TSH:   No results for input(s): TSH in the last 168 hours.      Diagnostic Results      Brain Imaging      MRI Brain (11/01/19):  Acute lacunar type infarct coursing through the left posterior limb internal capsule and corona radiata.       CTH (10/31/19):  No acute intracranial pathology        Vessel Imaging      CTA Multiphase (10/31/19):  CTA head: Atherosclerotic disease of the cavernous and supraclinoid ICAs with mild narrowing.  Otherwise unremarkable CTA of the head specifically without evidence for proximal significant stenosis or occlusion.  CTA neck: Less than 50% proximal ICA stenosis by NASCET criteria.  Atherosclerotic  disease with mild moderate narrowing of the right vertebral artery origin.  There is mild left V1 segment narrowing.  No significant focal vertebral artery stenosis or occlusion.  Interlobular septal thickening with tree in bud micro nodularity visualized upper lobes bilaterally which may represent inflammatory/infectious process clinical correlation and correlation with dedicated CT thorax recommended.  CT head: Bandlike region hypoattenuation left posterior limb internal capsule unchanged from prior suggestive for possible recent to subacute age infarction.  No evidence for hydrocephalus or acute intracranial hemorrhage.  Clinical correlation and further evaluation with MRI if patient compatible.        Cardiac Imaging     Echo (11/01/19)  · Increased (hyperdynamic) left ventricular systolic function. The estimated ejection fraction is 75%.  · Concentric left ventricular remodeling.  · Normal LV diastolic function.  · No wall motion abnormalities.  · Normal right ventricular systolic function.  · Normal central venous pressure (3 mm Hg).  · Mild tricuspid regurgitation.  · The estimated PA systolic pressure is 26 mm Hg  · Echolucent structure next to the LA, likely representing hiatal hernia. Clinincal correlation is required.        Miscellaneous Imaging     CT Abdomen Pelvis WO Contrast 11/16/19  Angiography of the right renal vasculature, SMA, and multiple lumbar arteries demonstrates no evidence of active bleeding.    IR Angiogram Visceral 11/10/19  Angiography of the right renal vasculature, SMA, and multiple lumbar arteries demonstrates no evidence of active bleeding.     CTA Abdomen/Pelvis 11/10/19  Right-sided retroperitoneal hematoma with questionable foci of active extravasation within the inferior portion of the collection as described above.     IR Angiogram Visceral 11/9/19  Angiography of the lumbar arteries demonstrates active extravasation of the right L2 lumbar artery. Embolization using beads as  described above.  Plan: Transfer patient to ICU.  Continued serial H and H follow-up.     CTA Abdomen/Pelvis 11/9/19  1. Large right retroperitoneal hematoma with findings concerning for active arterial extravasation/hemorrhage as detailed above.  There is associated hemorrhage extending throughout the right abdomen and pelvis/pericolic gutter with associated mass effect on the right kidney, which is anteriorly displaced.  2. Decreased opacification of the infahepatic and infrarenal IVC, possibly secondary to underlying mass effect versus partial thrombosis.  3. Nonspecific cecal and right colonic wall thickening, possibly reactive due to hemoperitoneum.  4. Additional findings as detailed above.     MRI Thoracic/Lumbar Spine W WO Contr 11/8/19  Large hematoma within the right psoas muscle.  Compression and probable thrombosis of the IVC. Consider contrast enhanced CT with delayed phase.  Multilevel degenerative changes as detailed above, more severe in the lumbar spine.  Irregularity of the T12 through L3 endplates is most likely degenerative.      Emi Murphy PA-C  Comprehensive Stroke Center  Department of Vascular Neurology   [facility]

## 2019-11-24 NOTE — ASSESSMENT & PLAN NOTE
"Reported hx chronic back pain with associated sciatic nerve pain as well, then complicated by acute retroperitoneal hematoma.  Consulted to anesthesia pain management    Pain now being controlled with gabapentin, duloxetine, trazodone, diclofenac gel, lidocaine patch, hydrcodone-acetaminophen (prn), tizanidine (prn.)     Nurse reporting pt continues to request pain medication "around the clock." Of note, pt reported to night nurse that her best pain score at home is usually 7 1/2.      "

## 2019-11-24 NOTE — PLAN OF CARE
Patient is AAO x4. POC reviewed with patient. Patient verbalized understanding. Patient's breathing is unlabored with equal chest expansion. Patient has right sided deficits. Patient denies any numbness and tingling. Patient complained of pain and nausea during shift;PRN meds given. Patient voids per bedpan. Bed in lowest position,bed alarm on, side rails up x3, no complaints or signs of distress. WCTM.

## 2019-11-24 NOTE — ASSESSMENT & PLAN NOTE
Patient hypotensive overnight from 11/13 and into the AM 11/14. Received multiple boluses and pressure responsive.  Concern was for sepsis vs pain medication-related.   See infection risk section above    Pt then with orthostatic vital signs on 11/20 -- HR 120s to 90s from standing to lying, s/p IVFs 1L over 10 hours ordered on 11/21.   Nursing reported AFib on tele on 11/20, however EKG showed sinus tachycardia.   Encouraging pt toward aggressive PO hydration. Hb remains stable.   Will continue monitoring, consider midodrine if hypotension persists

## 2019-11-24 NOTE — SUBJECTIVE & OBJECTIVE
Neurologic Chief Complaint: R-sided weakness -- L MCA    Subjective:     Interval History: Patient is seen for follow-up neurological assessment and treatment recommendations: Hypotensive overnight with SBP in 70s responded to 500 cc bolus, continues to complain of pain      HPI, Past Medical, Family, and Social History remains the same as documented in the initial encounter.     Review of Systems   Constitutional: Negative for chills and fever.   Musculoskeletal: Positive for arthralgias and back pain.   Neurological: Positive for facial asymmetry, speech difficulty and weakness.   Psychiatric/Behavioral: Negative for agitation and confusion.     Scheduled Meds:   acetaminophen  650 mg Oral Q8H    aspirin  81 mg Oral Daily    atorvastatin  40 mg Oral QHS    clopidogrel  75 mg Oral Daily    diclofenac sodium  4 g Topical (Top) Daily    DULoxetine  20 mg Oral BID    gabapentin        gabapentin  300 mg Oral TID    heparin (porcine)  5,000 Units Subcutaneous Q8H    lidocaine        lidocaine  1 patch Transdermal Q24H    polyethylene glycol  17 g Oral Daily    potassium chloride 10%  20 mEq Oral BID    traZODone  25 mg Oral QHS     Continuous Infusions:    PRN Meds:dextrose 10 % in water (D10W), glucagon (human recombinant), HYDROcodone-acetaminophen, insulin aspart U-100, melatonin, ondansetron, sodium chloride 0.9%, tiZANidine    Objective:     Vital Signs (Most Recent):  Temp: 97.4 °F (36.3 °C) (11/24/19 1119)  Pulse: 80 (11/24/19 1142)  Resp: 18 (11/24/19 1119)  BP: (!) 145/58 (11/24/19 1119)  SpO2: 98 % (11/24/19 1119)  BP Location: Right arm    Vital Signs Range (Last 24H):  Temp:  [97.4 °F (36.3 °C)-98.8 °F (37.1 °C)]   Pulse:  [60-95]   Resp:  [16-20]   BP: ()/(50-77)   SpO2:  [95 %-99 %]   BP Location: Right arm    Physical Exam   Constitutional: She is oriented to person, place, and time. She appears well-developed and well-nourished. No distress.   HENT:   Head: Normocephalic and  atraumatic.   Eyes: EOM are normal.   Cardiovascular: Normal rate.   Pulmonary/Chest: Effort normal. No respiratory distress.   Neurological: She is alert and oriented to person, place, and time.   Skin: Skin is warm and dry.   Psychiatric: Cognition and memory are not impaired. She is attentive.   Vitals reviewed.      Neurological Exam:   LOC: alert   Attention Span: good  Language: No aphasia  Articulation: Mild dysarthria  Orientation: Person, Time, Place  Visual Fields: Full  EOM (CN III, IV, VI): Full/intact  Facial Movement (CN VII): Lower facial weakness on the Right- mild  Motor: Arm left  Normal 5/5  Leg left  Normal 5/5  Arm right  Paresis: 3+/5   Leg right Paresis: 2/5  Sensation: Intact to light touch, temp  Tone: Normal tone throughout      Laboratory:  CMP:   No results for input(s): GLUCOSE, CALCIUM, ALBUMIN, PROT, NA, K, CO2, CL, BUN, CREATININE, ALKPHOS, ALT, AST, BILITOT in the last 24 hours.  CBC:   Recent Labs   Lab 11/22/19  0529   WBC 11.10   RBC 3.54*   HGB 9.8*   HCT 31.5*   *   MCV 89   MCH 27.7   MCHC 31.1*     Lipid Panel:   No results for input(s): CHOL, LDLCALC, HDL, TRIG in the last 168 hours.  Coagulation:   No results for input(s): PT, INR, APTT in the last 168 hours.  Platelet Aggregation Study: No results for input(s): PLTAGG, PLTAGINTERP, PLTAGREGLACO, ADPPLTAGGREG in the last 168 hours.  Hgb A1C:   No results for input(s): HGBA1C in the last 168 hours.  TSH:   No results for input(s): TSH in the last 168 hours.      Diagnostic Results      Brain Imaging      MRI Brain (11/01/19):  Acute lacunar type infarct coursing through the left posterior limb internal capsule and corona radiata.       CTH (10/31/19):  No acute intracranial pathology        Vessel Imaging      CTA Multiphase (10/31/19):  CTA head: Atherosclerotic disease of the cavernous and supraclinoid ICAs with mild narrowing.  Otherwise unremarkable CTA of the head specifically without evidence for proximal  significant stenosis or occlusion.  CTA neck: Less than 50% proximal ICA stenosis by NASCET criteria.  Atherosclerotic disease with mild moderate narrowing of the right vertebral artery origin.  There is mild left V1 segment narrowing.  No significant focal vertebral artery stenosis or occlusion.  Interlobular septal thickening with tree in bud micro nodularity visualized upper lobes bilaterally which may represent inflammatory/infectious process clinical correlation and correlation with dedicated CT thorax recommended.  CT head: Bandlike region hypoattenuation left posterior limb internal capsule unchanged from prior suggestive for possible recent to subacute age infarction.  No evidence for hydrocephalus or acute intracranial hemorrhage.  Clinical correlation and further evaluation with MRI if patient compatible.        Cardiac Imaging     Echo (11/01/19)  · Increased (hyperdynamic) left ventricular systolic function. The estimated ejection fraction is 75%.  · Concentric left ventricular remodeling.  · Normal LV diastolic function.  · No wall motion abnormalities.  · Normal right ventricular systolic function.  · Normal central venous pressure (3 mm Hg).  · Mild tricuspid regurgitation.  · The estimated PA systolic pressure is 26 mm Hg  · Echolucent structure next to the LA, likely representing hiatal hernia. Clinincal correlation is required.        Miscellaneous Imaging     CT Abdomen Pelvis WO Contrast 11/16/19  Angiography of the right renal vasculature, SMA, and multiple lumbar arteries demonstrates no evidence of active bleeding.    IR Angiogram Visceral 11/10/19  Angiography of the right renal vasculature, SMA, and multiple lumbar arteries demonstrates no evidence of active bleeding.     CTA Abdomen/Pelvis 11/10/19  Right-sided retroperitoneal hematoma with questionable foci of active extravasation within the inferior portion of the collection as described above.     IR Angiogram Visceral  11/9/19  Angiography of the lumbar arteries demonstrates active extravasation of the right L2 lumbar artery. Embolization using beads as described above.  Plan: Transfer patient to ICU.  Continued serial H and H follow-up.     CTA Abdomen/Pelvis 11/9/19  1. Large right retroperitoneal hematoma with findings concerning for active arterial extravasation/hemorrhage as detailed above.  There is associated hemorrhage extending throughout the right abdomen and pelvis/pericolic gutter with associated mass effect on the right kidney, which is anteriorly displaced.  2. Decreased opacification of the infahepatic and infrarenal IVC, possibly secondary to underlying mass effect versus partial thrombosis.  3. Nonspecific cecal and right colonic wall thickening, possibly reactive due to hemoperitoneum.  4. Additional findings as detailed above.     MRI Thoracic/Lumbar Spine W WO Contr 11/8/19  Large hematoma within the right psoas muscle.  Compression and probable thrombosis of the IVC. Consider contrast enhanced CT with delayed phase.  Multilevel degenerative changes as detailed above, more severe in the lumbar spine.  Irregularity of the T12 through L3 endplates is most likely degenerative.

## 2019-11-25 LAB
BASOPHILS # BLD AUTO: 0.06 K/UL (ref 0–0.2)
BASOPHILS NFR BLD: 0.6 % (ref 0–1.9)
DIFFERENTIAL METHOD: ABNORMAL
EOSINOPHIL # BLD AUTO: 0.3 K/UL (ref 0–0.5)
EOSINOPHIL NFR BLD: 3.5 % (ref 0–8)
ERYTHROCYTE [DISTWIDTH] IN BLOOD BY AUTOMATED COUNT: 15.5 % (ref 11.5–14.5)
HCT VFR BLD AUTO: 35.7 % (ref 37–48.5)
HGB BLD-MCNC: 11.1 G/DL (ref 12–16)
IMM GRANULOCYTES # BLD AUTO: 0.08 K/UL (ref 0–0.04)
IMM GRANULOCYTES NFR BLD AUTO: 0.8 % (ref 0–0.5)
LYMPHOCYTES # BLD AUTO: 2 K/UL (ref 1–4.8)
LYMPHOCYTES NFR BLD: 20.8 % (ref 18–48)
MCH RBC QN AUTO: 28.2 PG (ref 27–31)
MCHC RBC AUTO-ENTMCNC: 31.1 G/DL (ref 32–36)
MCV RBC AUTO: 91 FL (ref 82–98)
MONOCYTES # BLD AUTO: 1 K/UL (ref 0.3–1)
MONOCYTES NFR BLD: 10 % (ref 4–15)
NEUTROPHILS # BLD AUTO: 6.2 K/UL (ref 1.8–7.7)
NEUTROPHILS NFR BLD: 64.3 % (ref 38–73)
NRBC BLD-RTO: 0 /100 WBC
PLATELET # BLD AUTO: 486 K/UL (ref 150–350)
PMV BLD AUTO: 9.9 FL (ref 9.2–12.9)
POCT GLUCOSE: 142 MG/DL (ref 70–110)
POCT GLUCOSE: 173 MG/DL (ref 70–110)
POCT GLUCOSE: 221 MG/DL (ref 70–110)
RBC # BLD AUTO: 3.93 M/UL (ref 4–5.4)
WBC # BLD AUTO: 9.6 K/UL (ref 3.9–12.7)

## 2019-11-25 PROCEDURE — 99233 SBSQ HOSP IP/OBS HIGH 50: CPT | Mod: ,,, | Performed by: PSYCHIATRY & NEUROLOGY

## 2019-11-25 PROCEDURE — 25000003 PHARM REV CODE 250: Performed by: FAMILY MEDICINE

## 2019-11-25 PROCEDURE — 36415 COLL VENOUS BLD VENIPUNCTURE: CPT

## 2019-11-25 PROCEDURE — 25000003 PHARM REV CODE 250: Performed by: PHYSICIAN ASSISTANT

## 2019-11-25 PROCEDURE — 99233 PR SUBSEQUENT HOSPITAL CARE,LEVL III: ICD-10-PCS | Mod: ,,, | Performed by: PSYCHIATRY & NEUROLOGY

## 2019-11-25 PROCEDURE — 92507 TX SP LANG VOICE COMM INDIV: CPT

## 2019-11-25 PROCEDURE — 85025 COMPLETE CBC W/AUTO DIFF WBC: CPT

## 2019-11-25 PROCEDURE — 63600175 PHARM REV CODE 636 W HCPCS: Performed by: FAMILY MEDICINE

## 2019-11-25 PROCEDURE — 97530 THERAPEUTIC ACTIVITIES: CPT

## 2019-11-25 PROCEDURE — 25000003 PHARM REV CODE 250: Performed by: PSYCHIATRY & NEUROLOGY

## 2019-11-25 PROCEDURE — 11000001 HC ACUTE MED/SURG PRIVATE ROOM

## 2019-11-25 RX ORDER — HYDROCODONE BITARTRATE AND ACETAMINOPHEN 10; 325 MG/1; MG/1
1 TABLET ORAL EVERY 6 HOURS PRN
Status: DISCONTINUED | OUTPATIENT
Start: 2019-11-25 | End: 2019-12-01

## 2019-11-25 RX ADMIN — GABAPENTIN 300 MG: 300 CAPSULE ORAL at 02:11

## 2019-11-25 RX ADMIN — GABAPENTIN 300 MG: 300 CAPSULE ORAL at 08:11

## 2019-11-25 RX ADMIN — POTASSIUM CHLORIDE 20 MEQ: 20 SOLUTION ORAL at 09:11

## 2019-11-25 RX ADMIN — DULOXETINE HYDROCHLORIDE 20 MG: 20 CAPSULE, DELAYED RELEASE ORAL at 08:11

## 2019-11-25 RX ADMIN — HEPARIN SODIUM 5000 UNITS: 5000 INJECTION INTRAVENOUS; SUBCUTANEOUS at 05:11

## 2019-11-25 RX ADMIN — HYDROCODONE BITARTRATE AND ACETAMINOPHEN 1 TABLET: 10; 325 TABLET ORAL at 09:11

## 2019-11-25 RX ADMIN — HYDROCODONE BITARTRATE AND ACETAMINOPHEN 1 TABLET: 10; 325 TABLET ORAL at 01:11

## 2019-11-25 RX ADMIN — ASPIRIN 81 MG: 81 TABLET, COATED ORAL at 08:11

## 2019-11-25 RX ADMIN — TRAZODONE HYDROCHLORIDE 25 MG: 50 TABLET ORAL at 09:11

## 2019-11-25 RX ADMIN — TIZANIDINE 4 MG: 4 TABLET ORAL at 05:11

## 2019-11-25 RX ADMIN — POTASSIUM CHLORIDE 20 MEQ: 20 SOLUTION ORAL at 08:11

## 2019-11-25 RX ADMIN — DULOXETINE HYDROCHLORIDE 20 MG: 20 CAPSULE, DELAYED RELEASE ORAL at 09:11

## 2019-11-25 RX ADMIN — ACETAMINOPHEN 650 MG: 325 TABLET ORAL at 05:11

## 2019-11-25 RX ADMIN — ATORVASTATIN CALCIUM 40 MG: 20 TABLET, FILM COATED ORAL at 09:11

## 2019-11-25 RX ADMIN — HEPARIN SODIUM 5000 UNITS: 5000 INJECTION INTRAVENOUS; SUBCUTANEOUS at 09:11

## 2019-11-25 RX ADMIN — HYDROCODONE BITARTRATE AND ACETAMINOPHEN 1 TABLET: 10; 325 TABLET ORAL at 08:11

## 2019-11-25 RX ADMIN — HYDROCODONE BITARTRATE AND ACETAMINOPHEN 1 TABLET: 10; 325 TABLET ORAL at 02:11

## 2019-11-25 RX ADMIN — HEPARIN SODIUM 5000 UNITS: 5000 INJECTION INTRAVENOUS; SUBCUTANEOUS at 02:11

## 2019-11-25 RX ADMIN — GABAPENTIN 300 MG: 300 CAPSULE ORAL at 09:11

## 2019-11-25 RX ADMIN — CLOPIDOGREL BISULFATE 75 MG: 75 TABLET ORAL at 08:11

## 2019-11-25 RX ADMIN — DICLOFENAC 4 G: 10 GEL TOPICAL at 08:11

## 2019-11-25 RX ADMIN — TIZANIDINE 4 MG: 4 TABLET ORAL at 04:11

## 2019-11-25 RX ADMIN — ACETAMINOPHEN 650 MG: 325 TABLET ORAL at 09:11

## 2019-11-25 RX ADMIN — LIDOCAINE 1 PATCH: 50 PATCH CUTANEOUS at 12:11

## 2019-11-25 NOTE — PT/OT/SLP PROGRESS
Occupational Therapy   Treatment    Name: Melva Barker  MRN: [id number]  Admitting Diagnosis:  Thrombotic stroke involving left middle cerebral artery       Recommendations:     Discharge Recommendations: nursing facility, skilled  Discharge Equipment Recommendations:  (TBD next level of care)  Barriers to discharge:  Decreased caregiver support    Assessment:     Melva Barker is a 74 y.o. female with a medical diagnosis of Thrombotic stroke involving left middle cerebral artery.  She presents with performance deficits including weakness, impaired endurance, impaired self care skills, impaired functional mobilty, decreased coordination, gait instability, impaired balance, decreased safety awareness, pain, decreased upper extremity function, decreased lower extremity function, impaired cardiopulmonary response to activity. Pt progressing slowly toward goals due to minimal tolerance for OOB activity as pt reports constant pain and dizziness with position changes. Pt would continue to benefit from OT to increase functional independence and safety. Recommend SNF upon D/C as pt is unsafe to return home alone at current functional level.    Rehab Prognosis: Fair; patient would benefit from acute skilled OT services to address these deficits and reach maximum level of function.       Plan:     Patient to be seen 3 x/week to address the above listed problems via self-care/home management, therapeutic activities, therapeutic exercises, neuromuscular re-education  · Plan of Care Expires: 12/13/19  · Plan of Care Reviewed with: patient    Subjective     Pain/Comfort:  · Pain Rating 1: (Did not rate)  · Location 1: back  · Pain Addressed 1: Pre-medicate for activity, Reposition, Cessation of Activity, Nurse notified  · Pain Rating Post-Intervention 1: (remained throughout)    Objective:     Communicated with: RN prior to session. Patient found supine with telemetry, bed alarm upon OT entry to room.    General  Precautions: Standard, fall   Orthopedic Precautions:N/A   Braces: N/A     Occupational Performance:     Bed Mobility:    · Patient completed Scooting/Bridging with contact guard assistance in supine to HOB  · Patient completed Supine to Sit with contact guard assistance with side rail  · Patient completed Sit to Supine with minimum assistance for R LE management    Functional Mobility/Transfers:  · Patient completed Sit <> Stand Transfer with minimum assistance from EOB 2 trials with hand-held assist with R knee blocked  · Functional Mobility: Few side steps along EOB and ~3 forward/backward steps with Min-Mod A hand-held assist; declined further OOB activity or sitting up in chair despite encouragement    Activities of Daily Living:  · Pt declined      [facility] 6 Click ADL: 18    Treatment & Education:  -Pt's bp in supine prior to OOB activity: 109/64; once sitting EOB, bp 99/54  -Pt continues to require encouragement to participate in OOB activity-- reports pain at rest and with movement although appears comfortable; able to sit EOB with supervision and agreeable to 2 standing trials and able to take few steps within room; requested to return to supine due to dizziness and reports of R lower back pain; completed 5 reps of supine bridges with assistance to hold R LE in place; RN notified of pt's status    Patient left supine with all lines intact, call button in reach, bed alarm on and RN notifiedEducation:      GOALS:   Multidisciplinary Problems     Occupational Therapy Goals        Problem: Occupational Therapy Goal    Goal Priority Disciplines Outcome Interventions   Occupational Therapy Goal     OT, PT/OT Ongoing, Progressing    Description:  Updated Goals to be met by: 7 days (11/29/19)     Patient will increase functional independence with ADLs by performing:    UE Dressing with Contact Guard Assistance.  LE Dressing with Minimal Assistance.  Grooming while standing with Minimal Assistance.  Toileting from  bedside commode with Minimal Assistance for hygiene and clothing management.   Toilet transfer to bedside commode with Contact Guard Assistance.  Increased functional strength to WFL for ADLs.  Complete functional mobility household distance with CGA using AD as needed.     Updated Goals to be met by: 7 days (11/20/19)     Patient will increase functional independence with ADLs by performing:    UE Dressing with Contact Guard Assistance.  LE Dressing with Minimal Assistance.  Grooming while standing with Minimal Assistance.  Toileting from bedside commode with Minimal Assistance for hygiene and clothing management.   Toilet transfer to bedside commode with Contact Guard Assistance.  Increased functional strength to WFL for ADLs.  Complete functional mobility household distance with CGA using AD as needed.     Updated Goals to be met by: 7 days (11/14/19)     Patient will increase functional independence with ADLs by performing:    UE Dressing with Contact Guard Assistance.  LE Dressing with Minimal Assistance.  Grooming while standing with Minimal Assistance.  Toileting from bedside commode with Minimal Assistance for hygiene and clothing management.   Toilet transfer to bedside commode with Contact Guard Assistance.  Increased functional strength to WFL for ADLs.  Complete functional mobility household distance with CGA using AD as needed.                      Time Tracking:     OT Date of Treatment: 11/25/19  OT Start Time: 1130  OT Stop Time: 1143  OT Total Time (min): 13 min    Billable Minutes:Therapeutic Activity 13 minutes    AURORA Florian  11/25/2019

## 2019-11-25 NOTE — PT/OT/SLP PROGRESS
Physical Therapy      Patient Name:  Melva Barker   MRN:  [id number]    Chart review performed. Pt still appropriate for PT. Will follow-up for progressive mobility on 11/26/2019 per pt availability an as pt medically appropriate.    Rosana Oates, PT

## 2019-11-25 NOTE — PLAN OF CARE
Patient is AAO x4. POC reviewed with patient. Patient verbalized understanding. Patient's breathing is unlabored with equal chest expansion. Patient has right sided deficits. Patient denies any numbness and tingling. Patient complained of pain during shift;PRN meds given. Patient voids per bedpan. Bed in lowest position,bed alarm on, side rails up x3, no complaints or signs of distress. WCTM.

## 2019-11-25 NOTE — PLAN OF CARE
Problem: SLP Goal  Goal: SLP Goal  Description  Speech Language Pathology Goals  Goals expected to be met by 11/20; goals remain appropriate- continue until 12/4  1. Pt will tolerate a mechanical soft diet/thin liquids with no s/s of aspiration.   2. Pt will participate in conversation without cues to express thought.   3. Pt will complete simple functional reasoning tasks with 80% accy given min cues.   4. Pt will name name 13 items in a category in 1 minute with mod assist.  5. Pt will follow multi-step commands with 75% accuracy and min assist.  6. Pt will complete assessment of reading, writing, visual spatial skills to determine need for tx.     Outcome: Ongoing, Progressing     Patient seen for ongoing cognitive-linguistic therapy.    Venu Marquez CCC-SLP  Speech-Language Pathology  Pager: [phone]

## 2019-11-25 NOTE — ASSESSMENT & PLAN NOTE
"Reported hx chronic back pain with associated sciatic nerve pain as well, then complicated by acute retroperitoneal hematoma.  Consulted to anesthesia pain management    Pain now being controlled with gabapentin, duloxetine, trazodone, diclofenac gel, lidocaine patch, hydrcodone-acetaminophen (prn), tizanidine (prn.)     Nurse reporting pt continues to request pain medication "around the clock." Of note, pt reported to night nurse that her best pain score at home is usually 7 1/2.    Discharge pt on home regimen.  Per PNP pt seeing Dr. Hilton Rowland in [place] for pain management. Recommend follow up outpatient. Consider addiction psych.      "

## 2019-11-25 NOTE — PT/OT/SLP PROGRESS
"Speech Language Pathology Treatment    Patient Name:  Melva Barker   MRN:  [id number]  Admitting Diagnosis: Thrombotic stroke involving left middle cerebral artery    Recommendations:                 General Recommendations:  Cognitive-linguistic therapy and monitor diet tolerance  Diet recommendations:  Dental Soft, Liquid Diet Level: Thin   Aspiration Precautions: Feed only when awake/alert, HOB to 90 degrees and Standard aspiration precautions   General Precautions: Standard, fall  Communication strategies:  none    Subjective     Patient awake and alert during session  "Yeah I'm feeling good about it." Patient's response when asked about cognition   Patient with flat affect during session    Pain/Comfort:  · Pain Rating 1: 0/10  · Pain Rating Post-Intervention 1: 0/10    Objective:     Has the patient been evaluated by SLP for swallowing?   Yes  Keep patient NPO? No   Current Respiratory Status: room air      Patient seen for ongoing cognitive-linguistic therapy. She was reclined in bed during session and declined to elevated HOB at this time 2' to back pain. Patient reported that she felt like she had returned to her cognitive baseline, but was agreeable to therapy. She independently recalled 1/3 types of therapy, increasing to 3/3 with mod assist. SLP educated her regarding the purpose of each therapy and, following a 5-minute interval, she was able to explain the function of each therapy with min assist. She answered 5/6 3-step sequencing question independently and was 4/4 on abstract word finding tasks independently. She answered 4/7 complex analogies correctly with mod assist. SLP educated her regarding POC from a ST perspective and she verbalized understanding.     Assessment:     Melva Barker is a 74 y.o. female with an SLP diagnosis of mild Cognitive-Linguistic Impairment.      Goals:   Multidisciplinary Problems     SLP Goals        Problem: SLP Goal    Goal Priority Disciplines Outcome   SLP " Goal     SLP Ongoing, Progressing   Description:  Speech Language Pathology Goals  Goals expected to be met by 11/20; goals remain appropriate- continue until 12/4  1. Pt will tolerate a mechanical soft diet/thin liquids with no s/s of aspiration.   2. Pt will participate in conversation without cues to express thought.   3. Pt will complete simple functional reasoning tasks with 80% accy given min cues.   4. Pt will name name 13 items in a category in 1 minute with mod assist.  5. Pt will follow multi-step commands with 75% accuracy and min assist.  6. Pt will complete assessment of reading, writing, visual spatial skills to determine need for tx.                      Plan:     · Patient to be seen:  3 x/week   · Plan of Care expires:  12/13/19  · Plan of Care reviewed with:  patient   · SLP Follow-Up:  Yes       Discharge recommendations:  nursing facility, skilled   Barriers to Discharge:  None    Time Tracking:     SLP Treatment Date:   11/25/19  Speech Start Time:  1118  Speech Stop Time:  1132     Speech Total Time (min):  14 min    Billable Minutes: Speech Therapy Individual 14    Venu Marquez CCC-SLP  Speech-Language Pathology  Pager: [phone]   11/25/2019

## 2019-11-25 NOTE — PROGRESS NOTES
[facility]  Vascular Neurology  Comprehensive Stroke Center  Progress Note    Assessment/Plan:     * Thrombotic stroke involving left middle cerebral artery  Melva Barker is a 74 y.o. female with PMHx of HTN, HLD, and DM who presented to OSH with acute worsening of R-sided weakness after having experienced R-sided weakness x1 month. She was treated with tPA at OSH. CTA without LVO. MRI with infarct in L internal capsule and corona radiata. Etiology likely small vessel disease.    Antithrombotics for secondary stroke prevention: Antiplatelets: ASA 81 mg + Plavix 75 mg -- Initially held due to retroperitoneal hematoma. Resumed DAPT on 11/17.  Statins for secondary stroke prevention and hyperlipidemia, if present: Statins: Atorvastatin- 40 mg daily,   Aggressive risk factor modification: HTN, HLD, Diet  Rehab efforts: The patient has been evaluated by a stroke team provider and the therapy needs have been fully considered based off the presenting complaints and exam findings. The following therapy evaluations are needed: PT evaluate and treat, OT evaluate and treat, SLP evaluate and treat, PM&R evaluate for appropriate placement - Dispo SNF  Diagnostics ordered/pending: None   VTE prophylaxis: Mechanical SCDs, heparin 5000 units q 8 hours   BP parameters: Infarct: Post tPA, SBP <160; Avoid hypotension    At high risk for infection  Patient hypotensive overnight from 11/13 and into the AM 11/14. Received multiple boluses and pressure responsive.  Concern was for sepsis vs pain medication-related.   Infectious workup was ordered -- Elevated procal and lactate with interval development of atelectasis in R lower lung.  Started 7-day course of IV Cefepime and Vanc for possible hospital acquired pneumonia. BCx NGTD.    Then with liquid diarrhea overnight 11/15; WBC 14, stopped empiric abx and ordered CDiff, stool culture. PO Vanc x 10 day course for possible C.diff. IVFs given and central  line removed.  WBC then improved, pt remaining afebrile.     Will continue to monitor patient, labs, vitals for any signs or symptoms concerning for infection.    Moderate malnutrition  Appreciate Nutrition/RD assistance  Ordered Boost Glucose Control TID    Orthostatic hypotension  Patient hypotensive overnight from 11/13 and into the AM 11/14. Received multiple boluses and pressure responsive.  Concern was for sepsis vs pain medication-related.   See infection risk section above    Pt then with orthostatic vital signs on 11/20 -- HR 120s to 90s from standing to lying, s/p IVFs 1L over 10 hours ordered on 11/21.   Nursing reported AFib on tele on 11/20, however EKG showed sinus tachycardia.   Encouraging pt toward aggressive PO hydration. Hb remains stable.   Will continue monitoring, consider midodrine if hypotension persists    Retroperitoneal hematoma  Pt had been c/o back pain in recent days, complicated by her hx of chronic back and sciatic nerve pain with use of Tizanidine and opiates at home.  Due to Hb downtrend and recent tPA administration, MRI Lumbar/Thoracic spine was ordered 11/8 which demonstrated large R psoas hematoma with IVC compression and probable thrombosis.   General Surgery was consulted; No acute intervention pursued.     Patient was then with acute decompensation overnight 11/9; became hypotensive, tachycardic, and apneic with Hb down to 6.7. Rapid Response was called; patient requiring intubation, blood transfusion, and transfer to medical ICU.   CTA abd/pelvis 11/9 showed hematoma with active extravasation/hemorrhage with IVC compression, hemoperitoneum.   Pt went to IR and is now s/p successful R L2 & L3 lumbar spinal artery embolization.   Patient underwent IR re-evaluation on 11/11. No arterial bleed evident on IR angiography.  CT abd/pelvis 11/17: Redemonstration of large right retroperitoneal hematoma which is mildly decreased in size compared to most recent     Hb remains stable,  "Continuing to monitor.    Debility  2/2 stroke  PT/OT eval & treat - Dispo SNF    Cytotoxic cerebral edema  Area of cytotoxic cerebral edema identified when reviewing brain imaging in the territory of the L middle cerebral artery. There is no mass effect associated with it. We will continue to monitor the patients clinical exam for any worsening of symptoms which may indicate expansion of the stroke or the area of the edema resulting in the clinical change. The pattern is suggestive of small vessel etiology.    Essential hypertension  Risk factor for stroke  Holding all BP meds due to hypotension    11/23: BP labile last 24 hours -181.     Back pain  Reported hx chronic back pain with associated sciatic nerve pain as well, then complicated by acute retroperitoneal hematoma.  Consulted to anesthesia pain management    Pain now being controlled with gabapentin, duloxetine, trazodone, diclofenac gel, lidocaine patch, hydrcodone-acetaminophen (prn), tizanidine (prn.)     Nurse reporting pt continues to request pain medication "around the clock." Of note, pt reported to night nurse that her best pain score at home is usually 7 1/2.    Discharge pt on home regimen.  Per PNP pt seeing Dr. Hilton Rowland in [place] for pain management. Recommend follow up outpatient. Consider addiction psych.        Gastroparesis  Previously on Reglan 10 mg TID before meals , reglan then decreased to 5 mg TID. Reglan discontinued on 11/23, will monitor closely and reinitiate if necessary.     Diabetes mellitus type 2 without retinopathy  Stroke risk factor, poorly controlled on glargine 17 units daily  HbA1c 8.3%  Recommend tight glucose control  Currently on SSI   Diabetic diet         11/1/19 MRI with small vessel L MCA infarct, therapy recommending rehab  11/2 - Patient stepped down overnight. Adjusting BP regimen. Patient with continuous complaints if nausea. IV Zofran ordered with some relief. Patient has not has BM since " 10/30. Ordering KUB to rule out obstruction. Neuro exam unchanged.   11/3 - NAEON. Patient reports she had no episodes of vomiting overnight. Still complaining of nausea. Mild lower abdomen tenderness on exam. Lipase ordered and WNL. Continue to monitor.   11/4 - denies nausea and vomiting. Abd tenderness remains present on palpitation. Continue to monitor. HCTZ increased yesterday. Pending rehab placement.   11/5 -  N/V x1 overnight zofran resolved, sucralfate started. Placement pending   11/6 - hypotensive, held all BP meds,  ml bolus, responded well. Increased BUN/Cr, start NS 75ml/hr.   11/7 patient complaining of sciatica pain.  Restarted home tizanidine.  Patient requesting hydrocodone but educated patient that nerve pain is not treated with opioid.  Degenerative disease seen on xray but no fracture.      11/8 Patient continues to experience back pain.  Now with leukocytosis and drop in h/h plan for MRI thoracic/lumbar spine to evaluate for epidural hematoma.    11/9: MRI Lumbar spine had demonstrated large R psoas hematoma with possible IVC compression; General Surgery consulted. Patient was then with acute decompensation overnight; became hypotensive, tachycardic, and apneic requiring intubation, blood transfusion, and medical ICU transfer. CTA abd/pelvis showed active extravasation/hemorrhage with hemoperitoneum. Pt now s/p successful lumbar artery embolization in IR. She was extubated on arrival back to ICU and tolerated well.    11/11: Patient underwent IR re-evaluation yesterday. No arterial bleed evident on IR angiography. Patient neuro exam stable.   11/12 Stepped down from medical ICU this morning.  Patient still experiencing pain.  Anesthesia consulted for pain management.  D/C pain pump and started oral medications for muscle and nerve pain.  Will wean to home regimen. Waiting for repeat CBC, transfused one unit of blood 11/11.   Tachycardic/hypertensive will continue to monitor.  11/13:  Upgraded diet per SLP recs. Pt continues to c/o R back pain, concern that tachycardia could be pain-induced; adjusted analgesic regimen further as well as antiemetic regimen with plans to eventually wean to home regimen. Replaced Phos. Dispo inpatient rehab.  11/14: patient hypotensive overnight and this morning requiring fluid boluses. Discontinued all pain medications except for celecoxib in case pain meds are contributing to hypotension. Lidocaine patch and Voltaren gel ordered as well. Infectious workup significant for minor atelectasis in R lower lung with increased procal and lactate. Patient started on IV antibiotics for community acquired pneumonia. Blood cultures pending. ICU notified of patient's hypotension  11/15: Liquid diarrhea, WBC 14, stopped empiric abx, ordered c. Diff and stool culture, start PO vanc, IVFs. Adjusted pain meds with goal to return to home regimen. Nauseous overnight, zofran given.   11/16: CT abdomen and pelvis to f/u retroperitoneal hematoma prior to starting DAPT, H/H stable. T max 99.4, tachy, WBC 13, C. Diff and stool cultures pending, increased  ml/hr, labile BP, continue to monitor. Remove central line.   11/17: CT abdomen/pelvis again with large retroperitoneal hematoma  but decreased in size, H/H uptrend, resume DAPT today. Diarrhea becoming thicker, WBC improved, a febrile, c. Diff antigen positive, toxins negative. Pain well controlled.   11/18 PCR negative, c.diff precautions and vancomycin d/c.  Neurologically stable waiting for placement.    11/19 Patient now recommending SNF.   11/20 Neurologically stable.  Waiting for placement.  Hypotensive episodes overnight given some fluids.     11/21 Orthostatic yesterday, HR 120s to 90s standing to lying, IVFs 1L over 10 hours ordered.   11/22: Nurse encouraging pt toward aggressive PO hydration in the setting of recent orthostatic hypotension. Hb remains stable.  11/23: Reglan discontinue  Importance of oral hydration  reinforced with patient. H&H remain stable. Patient accepted to [facility], awaiting insurance auth.   11/24: patient became hypotensive with SBP in 70s and responded to 500 cc bolus, continuing to encourage oral fluid intake, will consider midodrine if hypotension persists  11/25: SBP WNL within last 24 hours. Decreased Norco to home dose q6hr prn, f/u w/ Dr. Canela pain management outpatient, consider addiction psych. C/o decreased appetite, advanced soft diet to level 6 so pt can receive gumbo as she requested, boost supplements, continue to monitor.     STROKE DOCUMENTATION   Acute Stroke Times   Last Known Normal Date: 10/31/19  Last Known Normal Time: 0630  Symptom Onset Date: 10/31/19  Symptom Onset Time: 0730  Stroke Team Called Date: 10/31/19  Stroke Team Called Time: 0936  Stroke Team Arrival Date: 10/31/19  Stroke Team Arrival Time: 0946  Decision to Treat Time for Alteplase: 1003    NIH Scale:  1a. Level of Consciousness: 0-->Alert, keenly responsive  1b. LOC Questions: 0-->Answers both questions correctly  1c. LOC Commands: 0-->Performs both tasks correctly  2. Best Gaze: 0-->Normal  3. Visual: 0-->No visual loss  4. Facial Palsy: 1-->Minor paralysis (flattened nasolabial fold, asymmetry on smiling)  5a. Motor Arm, Left: 0-->No drift, limb holds 90 (or 45) degrees for full 10 secs  5b. Motor Arm, Right: 1-->Drift, limb holds 90 (or 45) degrees, but drifts down before full 10 secs, does not hit bed or other support  6a. Motor Leg, Left: 0-->No drift, leg holds 30 degree position for full 5 secs  6b. Motor Leg, Right: 2-->Some effort against gravity, leg falls to bed by 5 secs, but has some effort against gravity  7. Limb Ataxia: 0-->Absent  8. Sensory: 0-->Normal, no sensory loss  9. Best Language: 0-->No aphasia, normal  10. Dysarthria: 1-->Mild-to-moderate dysarthria, patient slurs at least some words and, at worst, can be understood with some difficulty  11. Extinction and Inattention (formerly  Neglect): 0-->No abnormality  Total (NIH Stroke Scale): 5       Modified [place] Score: 0  Nabila Coma Scale:    ABCD2 Score:    [id number] Score:   HAS -BLED Score:   ICH Score:   Hunt & Amaya Classification:      Hemorrhagic change of an Ischemic Stroke: Does this patient have an ischemic stroke with hemorrhagic changes? No     Neurologic Chief Complaint: R-sided weakness -- L MCA    Subjective:     Interval History: Patient is seen for follow-up neurological assessment and treatment recommendations: SBP WNL within last 24 hours. Decreased Norco to home dose q6hr prn, f/u w/ Dr. Canela pain management outpatient, consider addiction psych. C/o decreased appetite, advanced soft diet to level 6 so pt can receive gumbo as she requested, boost supplements, continue to monitor.       HPI, Past Medical, Family, and Social History remains the same as documented in the initial encounter.     Review of Systems   Constitutional: Negative for chills and fever.   Musculoskeletal: Positive for arthralgias and back pain.   Neurological: Positive for facial asymmetry, speech difficulty and weakness.   Psychiatric/Behavioral: Negative for agitation and confusion.     Scheduled Meds:   acetaminophen  650 mg Oral Q8H    aspirin  81 mg Oral Daily    atorvastatin  40 mg Oral QHS    clopidogrel  75 mg Oral Daily    diclofenac sodium  4 g Topical (Top) Daily    DULoxetine  20 mg Oral BID    gabapentin  300 mg Oral TID    heparin (porcine)  5,000 Units Subcutaneous Q8H    lidocaine  1 patch Transdermal Q24H    polyethylene glycol  17 g Oral Daily    potassium chloride 10%  20 mEq Oral BID    traZODone  25 mg Oral QHS     Continuous Infusions:    PRN Meds:dextrose 10 % in water (D10W), glucagon (human recombinant), HYDROcodone-acetaminophen, insulin aspart U-100, melatonin, ondansetron, sodium chloride 0.9%, tiZANidine    Objective:     Vital Signs (Most Recent):  Temp: 97.4 °F (36.3 °C) (11/25/19 1200)  Pulse: 72 (11/25/19  1200)  Resp: 18 (11/25/19 1200)  BP: (!) 99/54 (11/25/19 1219)  SpO2: 96 % (11/25/19 1200)  BP Location: Right arm    Vital Signs Range (Last 24H):  Temp:  [96.6 °F (35.9 °C)-98.9 °F (37.2 °C)]   Pulse:  [59-95]   Resp:  [12-20]   BP: ()/(52-83)   SpO2:  [96 %-100 %]   BP Location: Right arm    Physical Exam   Constitutional: She is oriented to person, place, and time. She appears well-developed and well-nourished. No distress.   HENT:   Head: Normocephalic and atraumatic.   Eyes: EOM are normal.   Cardiovascular: Normal rate.   Pulmonary/Chest: Effort normal. No respiratory distress.   Neurological: She is alert and oriented to person, place, and time.   Skin: Skin is warm and dry.   Psychiatric: Cognition and memory are not impaired. She is attentive.   Vitals reviewed.      Neurological Exam:   LOC: alert   Attention Span: good  Language: No aphasia  Articulation: Mild dysarthria  Orientation: Person, Time, Place  Visual Fields: Full  EOM (CN III, IV, VI): Full/intact  Facial Movement (CN VII): Lower facial weakness on the Right- mild  Motor: Arm left  Normal 5/5  Leg left  Normal 5/5  Arm right  Paresis: 3+/5   Leg right Paresis: 2/5  Sensation: Intact to light touch, temp  Tone: Normal tone throughout      Laboratory:  CMP:   No results for input(s): GLUCOSE, CALCIUM, ALBUMIN, PROT, NA, K, CO2, CL, BUN, CREATININE, ALKPHOS, ALT, AST, BILITOT in the last 24 hours.  CBC:   Recent Labs   Lab 11/25/19  0457   WBC 9.60   RBC 3.93*   HGB 11.1*   HCT 35.7*   *   MCV 91   MCH 28.2   MCHC 31.1*     Lipid Panel:   No results for input(s): CHOL, LDLCALC, HDL, TRIG in the last 168 hours.  Coagulation:   No results for input(s): PT, INR, APTT in the last 168 hours.  Platelet Aggregation Study: No results for input(s): PLTAGG, PLTAGINTERP, PLTAGREGLACO, ADPPLTAGGREG in the last 168 hours.  Hgb A1C:   No results for input(s): HGBA1C in the last 168 hours.  TSH:   No results for input(s): TSH in the last 168  hours.      Diagnostic Results      Brain Imaging      MRI Brain (11/01/19):  Acute lacunar type infarct coursing through the left posterior limb internal capsule and corona radiata.       CTH (10/31/19):  No acute intracranial pathology        Vessel Imaging      CTA Multiphase (10/31/19):  CTA head: Atherosclerotic disease of the cavernous and supraclinoid ICAs with mild narrowing.  Otherwise unremarkable CTA of the head specifically without evidence for proximal significant stenosis or occlusion.  CTA neck: Less than 50% proximal ICA stenosis by NASCET criteria.  Atherosclerotic disease with mild moderate narrowing of the right vertebral artery origin.  There is mild left V1 segment narrowing.  No significant focal vertebral artery stenosis or occlusion.  Interlobular septal thickening with tree in bud micro nodularity visualized upper lobes bilaterally which may represent inflammatory/infectious process clinical correlation and correlation with dedicated CT thorax recommended.  CT head: Bandlike region hypoattenuation left posterior limb internal capsule unchanged from prior suggestive for possible recent to subacute age infarction.  No evidence for hydrocephalus or acute intracranial hemorrhage.  Clinical correlation and further evaluation with MRI if patient compatible.        Cardiac Imaging     Echo (11/01/19)  · Increased (hyperdynamic) left ventricular systolic function. The estimated ejection fraction is 75%.  · Concentric left ventricular remodeling.  · Normal LV diastolic function.  · No wall motion abnormalities.  · Normal right ventricular systolic function.  · Normal central venous pressure (3 mm Hg).  · Mild tricuspid regurgitation.  · The estimated PA systolic pressure is 26 mm Hg  · Echolucent structure next to the LA, likely representing hiatal hernia. Clinincal correlation is required.        Miscellaneous Imaging     CT Abdomen Pelvis WO Contrast 11/16/19  Angiography of the right renal  vasculature, SMA, and multiple lumbar arteries demonstrates no evidence of active bleeding.    IR Angiogram Visceral 11/10/19  Angiography of the right renal vasculature, SMA, and multiple lumbar arteries demonstrates no evidence of active bleeding.     CTA Abdomen/Pelvis 11/10/19  Right-sided retroperitoneal hematoma with questionable foci of active extravasation within the inferior portion of the collection as described above.     IR Angiogram Visceral 11/9/19  Angiography of the lumbar arteries demonstrates active extravasation of the right L2 lumbar artery. Embolization using beads as described above.  Plan: Transfer patient to ICU.  Continued serial H and H follow-up.     CTA Abdomen/Pelvis 11/9/19  1. Large right retroperitoneal hematoma with findings concerning for active arterial extravasation/hemorrhage as detailed above.  There is associated hemorrhage extending throughout the right abdomen and pelvis/pericolic gutter with associated mass effect on the right kidney, which is anteriorly displaced.  2. Decreased opacification of the infahepatic and infrarenal IVC, possibly secondary to underlying mass effect versus partial thrombosis.  3. Nonspecific cecal and right colonic wall thickening, possibly reactive due to hemoperitoneum.  4. Additional findings as detailed above.     MRI Thoracic/Lumbar Spine W WO Contr 11/8/19  Large hematoma within the right psoas muscle.  Compression and probable thrombosis of the IVC. Consider contrast enhanced CT with delayed phase.  Multilevel degenerative changes as detailed above, more severe in the lumbar spine.  Irregularity of the T12 through L3 endplates is most likely degenerative.      Juan Chen, CARMELA  [facility]  Department of Vascular Neurology   [facility]

## 2019-11-25 NOTE — SUBJECTIVE & OBJECTIVE
Neurologic Chief Complaint: R-sided weakness -- L MCA    Subjective:     Interval History: Patient is seen for follow-up neurological assessment and treatment recommendations: SBP WNL within last 24 hours. Decreased Norco to home dose q6hr prn, f/u w/ Dr. Canela pain management outpatient, consider addiction psych. C/o decreased appetite, advanced soft diet to level 6 so pt can receive gumbo as she requested, boost supplements, continue to monitor.       HPI, Past Medical, Family, and Social History remains the same as documented in the initial encounter.     Review of Systems   Constitutional: Negative for chills and fever.   Musculoskeletal: Positive for arthralgias and back pain.   Neurological: Positive for facial asymmetry, speech difficulty and weakness.   Psychiatric/Behavioral: Negative for agitation and confusion.     Scheduled Meds:   acetaminophen  650 mg Oral Q8H    aspirin  81 mg Oral Daily    atorvastatin  40 mg Oral QHS    clopidogrel  75 mg Oral Daily    diclofenac sodium  4 g Topical (Top) Daily    DULoxetine  20 mg Oral BID    gabapentin  300 mg Oral TID    heparin (porcine)  5,000 Units Subcutaneous Q8H    lidocaine  1 patch Transdermal Q24H    polyethylene glycol  17 g Oral Daily    potassium chloride 10%  20 mEq Oral BID    traZODone  25 mg Oral QHS     Continuous Infusions:    PRN Meds:dextrose 10 % in water (D10W), glucagon (human recombinant), HYDROcodone-acetaminophen, insulin aspart U-100, melatonin, ondansetron, sodium chloride 0.9%, tiZANidine    Objective:     Vital Signs (Most Recent):  Temp: 97.4 °F (36.3 °C) (11/25/19 1200)  Pulse: 72 (11/25/19 1200)  Resp: 18 (11/25/19 1200)  BP: (!) 99/54 (11/25/19 1219)  SpO2: 96 % (11/25/19 1200)  BP Location: Right arm    Vital Signs Range (Last 24H):  Temp:  [96.6 °F (35.9 °C)-98.9 °F (37.2 °C)]   Pulse:  [59-95]   Resp:  [12-20]   BP: ()/(52-83)   SpO2:  [96 %-100 %]   BP Location: Right arm    Physical Exam   Constitutional:  She is oriented to person, place, and time. She appears well-developed and well-nourished. No distress.   HENT:   Head: Normocephalic and atraumatic.   Eyes: EOM are normal.   Cardiovascular: Normal rate.   Pulmonary/Chest: Effort normal. No respiratory distress.   Neurological: She is alert and oriented to person, place, and time.   Skin: Skin is warm and dry.   Psychiatric: Cognition and memory are not impaired. She is attentive.   Vitals reviewed.      Neurological Exam:   LOC: alert   Attention Span: good  Language: No aphasia  Articulation: Mild dysarthria  Orientation: Person, Time, Place  Visual Fields: Full  EOM (CN III, IV, VI): Full/intact  Facial Movement (CN VII): Lower facial weakness on the Right- mild  Motor: Arm left  Normal 5/5  Leg left  Normal 5/5  Arm right  Paresis: 3+/5   Leg right Paresis: 2/5  Sensation: Intact to light touch, temp  Tone: Normal tone throughout      Laboratory:  CMP:   No results for input(s): GLUCOSE, CALCIUM, ALBUMIN, PROT, NA, K, CO2, CL, BUN, CREATININE, ALKPHOS, ALT, AST, BILITOT in the last 24 hours.  CBC:   Recent Labs   Lab 11/25/19  0457   WBC 9.60   RBC 3.93*   HGB 11.1*   HCT 35.7*   *   MCV 91   MCH 28.2   MCHC 31.1*     Lipid Panel:   No results for input(s): CHOL, LDLCALC, HDL, TRIG in the last 168 hours.  Coagulation:   No results for input(s): PT, INR, APTT in the last 168 hours.  Platelet Aggregation Study: No results for input(s): PLTAGG, PLTAGINTERP, PLTAGREGLACO, ADPPLTAGGREG in the last 168 hours.  Hgb A1C:   No results for input(s): HGBA1C in the last 168 hours.  TSH:   No results for input(s): TSH in the last 168 hours.      Diagnostic Results      Brain Imaging      MRI Brain (11/01/19):  Acute lacunar type infarct coursing through the left posterior limb internal capsule and corona radiata.       CTH (10/31/19):  No acute intracranial pathology        Vessel Imaging      CTA Multiphase (10/31/19):  CTA head: Atherosclerotic disease of the  cavernous and supraclinoid ICAs with mild narrowing.  Otherwise unremarkable CTA of the head specifically without evidence for proximal significant stenosis or occlusion.  CTA neck: Less than 50% proximal ICA stenosis by NASCET criteria.  Atherosclerotic disease with mild moderate narrowing of the right vertebral artery origin.  There is mild left V1 segment narrowing.  No significant focal vertebral artery stenosis or occlusion.  Interlobular septal thickening with tree in bud micro nodularity visualized upper lobes bilaterally which may represent inflammatory/infectious process clinical correlation and correlation with dedicated CT thorax recommended.  CT head: Bandlike region hypoattenuation left posterior limb internal capsule unchanged from prior suggestive for possible recent to subacute age infarction.  No evidence for hydrocephalus or acute intracranial hemorrhage.  Clinical correlation and further evaluation with MRI if patient compatible.        Cardiac Imaging     Echo (11/01/19)  · Increased (hyperdynamic) left ventricular systolic function. The estimated ejection fraction is 75%.  · Concentric left ventricular remodeling.  · Normal LV diastolic function.  · No wall motion abnormalities.  · Normal right ventricular systolic function.  · Normal central venous pressure (3 mm Hg).  · Mild tricuspid regurgitation.  · The estimated PA systolic pressure is 26 mm Hg  · Echolucent structure next to the LA, likely representing hiatal hernia. Clinincal correlation is required.        Miscellaneous Imaging     CT Abdomen Pelvis WO Contrast 11/16/19  Angiography of the right renal vasculature, SMA, and multiple lumbar arteries demonstrates no evidence of active bleeding.    IR Angiogram Visceral 11/10/19  Angiography of the right renal vasculature, SMA, and multiple lumbar arteries demonstrates no evidence of active bleeding.     CTA Abdomen/Pelvis 11/10/19  Right-sided retroperitoneal hematoma with questionable  foci of active extravasation within the inferior portion of the collection as described above.     IR Angiogram Visceral 11/9/19  Angiography of the lumbar arteries demonstrates active extravasation of the right L2 lumbar artery. Embolization using beads as described above.  Plan: Transfer patient to ICU.  Continued serial H and H follow-up.     CTA Abdomen/Pelvis 11/9/19  1. Large right retroperitoneal hematoma with findings concerning for active arterial extravasation/hemorrhage as detailed above.  There is associated hemorrhage extending throughout the right abdomen and pelvis/pericolic gutter with associated mass effect on the right kidney, which is anteriorly displaced.  2. Decreased opacification of the infahepatic and infrarenal IVC, possibly secondary to underlying mass effect versus partial thrombosis.  3. Nonspecific cecal and right colonic wall thickening, possibly reactive due to hemoperitoneum.  4. Additional findings as detailed above.     MRI Thoracic/Lumbar Spine W WO Contr 11/8/19  Large hematoma within the right psoas muscle.  Compression and probable thrombosis of the IVC. Consider contrast enhanced CT with delayed phase.  Multilevel degenerative changes as detailed above, more severe in the lumbar spine.  Irregularity of the T12 through L3 endplates is most likely degenerative.

## 2019-11-26 LAB — POCT GLUCOSE: 138 MG/DL (ref 70–110)

## 2019-11-26 PROCEDURE — 25000003 PHARM REV CODE 250: Performed by: PSYCHIATRY & NEUROLOGY

## 2019-11-26 PROCEDURE — 97803 MED NUTRITION INDIV SUBSEQ: CPT

## 2019-11-26 PROCEDURE — 97535 SELF CARE MNGMENT TRAINING: CPT

## 2019-11-26 PROCEDURE — 25000003 PHARM REV CODE 250: Performed by: NURSE PRACTITIONER

## 2019-11-26 PROCEDURE — 99233 SBSQ HOSP IP/OBS HIGH 50: CPT | Mod: ,,, | Performed by: PSYCHIATRY & NEUROLOGY

## 2019-11-26 PROCEDURE — 25000003 PHARM REV CODE 250: Performed by: PHYSICIAN ASSISTANT

## 2019-11-26 PROCEDURE — 11000001 HC ACUTE MED/SURG PRIVATE ROOM

## 2019-11-26 PROCEDURE — 97530 THERAPEUTIC ACTIVITIES: CPT

## 2019-11-26 PROCEDURE — 25000003 PHARM REV CODE 250: Performed by: FAMILY MEDICINE

## 2019-11-26 PROCEDURE — 63600175 PHARM REV CODE 636 W HCPCS: Performed by: FAMILY MEDICINE

## 2019-11-26 PROCEDURE — 99233 PR SUBSEQUENT HOSPITAL CARE,LEVL III: ICD-10-PCS | Mod: ,,, | Performed by: PSYCHIATRY & NEUROLOGY

## 2019-11-26 RX ORDER — DRONABINOL 2.5 MG/1
2.5 CAPSULE ORAL DAILY
Status: DISCONTINUED | OUTPATIENT
Start: 2019-11-26 | End: 2019-12-03 | Stop reason: HOSPADM

## 2019-11-26 RX ADMIN — ONDANSETRON 4 MG: 2 INJECTION INTRAMUSCULAR; INTRAVENOUS at 12:11

## 2019-11-26 RX ADMIN — GABAPENTIN 300 MG: 300 CAPSULE ORAL at 02:11

## 2019-11-26 RX ADMIN — TIZANIDINE 4 MG: 4 TABLET ORAL at 12:11

## 2019-11-26 RX ADMIN — HEPARIN SODIUM 5000 UNITS: 5000 INJECTION INTRAVENOUS; SUBCUTANEOUS at 06:11

## 2019-11-26 RX ADMIN — ASPIRIN 81 MG: 81 TABLET, COATED ORAL at 09:11

## 2019-11-26 RX ADMIN — Medication 6 MG: at 12:11

## 2019-11-26 RX ADMIN — DICLOFENAC 4 G: 10 GEL TOPICAL at 09:11

## 2019-11-26 RX ADMIN — HEPARIN SODIUM 5000 UNITS: 5000 INJECTION INTRAVENOUS; SUBCUTANEOUS at 01:11

## 2019-11-26 RX ADMIN — TRAZODONE HYDROCHLORIDE 25 MG: 50 TABLET ORAL at 09:11

## 2019-11-26 RX ADMIN — Medication 6 MG: at 09:11

## 2019-11-26 RX ADMIN — LIDOCAINE 1 PATCH: 50 PATCH CUTANEOUS at 11:11

## 2019-11-26 RX ADMIN — HYDROCODONE BITARTRATE AND ACETAMINOPHEN 1 TABLET: 10; 325 TABLET ORAL at 04:11

## 2019-11-26 RX ADMIN — ACETAMINOPHEN 650 MG: 325 TABLET ORAL at 06:11

## 2019-11-26 RX ADMIN — POTASSIUM CHLORIDE 20 MEQ: 20 SOLUTION ORAL at 09:11

## 2019-11-26 RX ADMIN — HYDROCODONE BITARTRATE AND ACETAMINOPHEN 1 TABLET: 10; 325 TABLET ORAL at 09:11

## 2019-11-26 RX ADMIN — CLOPIDOGREL BISULFATE 75 MG: 75 TABLET ORAL at 09:11

## 2019-11-26 RX ADMIN — POLYETHYLENE GLYCOL 3350 17 G: 17 POWDER, FOR SOLUTION ORAL at 09:11

## 2019-11-26 RX ADMIN — DULOXETINE HYDROCHLORIDE 20 MG: 20 CAPSULE, DELAYED RELEASE ORAL at 09:11

## 2019-11-26 RX ADMIN — GABAPENTIN 300 MG: 300 CAPSULE ORAL at 09:11

## 2019-11-26 RX ADMIN — HEPARIN SODIUM 5000 UNITS: 5000 INJECTION INTRAVENOUS; SUBCUTANEOUS at 09:11

## 2019-11-26 RX ADMIN — ACETAMINOPHEN 650 MG: 325 TABLET ORAL at 09:11

## 2019-11-26 RX ADMIN — HYDROCODONE BITARTRATE AND ACETAMINOPHEN 1 TABLET: 10; 325 TABLET ORAL at 03:11

## 2019-11-26 RX ADMIN — ATORVASTATIN CALCIUM 40 MG: 20 TABLET, FILM COATED ORAL at 11:11

## 2019-11-26 RX ADMIN — ACETAMINOPHEN 650 MG: 325 TABLET ORAL at 01:11

## 2019-11-26 RX ADMIN — HYDROCODONE BITARTRATE AND ACETAMINOPHEN 1 TABLET: 10; 325 TABLET ORAL at 10:11

## 2019-11-26 RX ADMIN — TIZANIDINE 4 MG: 4 TABLET ORAL at 11:11

## 2019-11-26 RX ADMIN — TIZANIDINE 4 MG: 4 TABLET ORAL at 07:11

## 2019-11-26 NOTE — PLAN OF CARE
Goals remain appropriate. AURORA Valdez 11/26/2019   Problem: Occupational Therapy Goal  Goal: Occupational Therapy Goal  Description  Updated Goals to be met by: 7 days (11/29/19)     Patient will increase functional independence with ADLs by performing:    UE Dressing with Contact Guard Assistance.  LE Dressing with Minimal Assistance.  Grooming while standing with Minimal Assistance.  Toileting from bedside commode with Minimal Assistance for hygiene and clothing management.   Toilet transfer to bedside commode with Contact Guard Assistance.  Increased functional strength to WFL for ADLs.  Complete functional mobility household distance with CGA using AD as needed.    Outcome: Ongoing, Progressing

## 2019-11-26 NOTE — ASSESSMENT & PLAN NOTE
Previously on Reglan 10 mg TID before meals, reglan then decreased to 5 mg TID.   Reglan discontinued on 11/23, will monitor closely and reinitiate if necessary.

## 2019-11-26 NOTE — ASSESSMENT & PLAN NOTE
Patient hypotensive overnight from 11/13 and into the AM 11/14. Received multiple boluses and pressure responsive.  Concern was for sepsis vs pain medication-related.   See infection risk section above    Pt then with orthostatic vital signs on 11/20 -- HR 120s to 90s from standing to lying, s/p IVFs 1L over 10 hours ordered on 11/21.   Nursing reported AFib on tele on 11/20, however EKG showed sinus tachycardia.   Pt receiving multiple boluses in recent days. Encouraged her toward aggressive PO hydration. Hb remains stable.   Will continue monitoring, consider midodrine if hypotension persists

## 2019-11-26 NOTE — ASSESSMENT & PLAN NOTE
Melva Barker is a 74 y.o. female with PMHx of HTN, HLD, and DM who presented to OSH with acute worsening of R-sided weakness after having experienced R-sided weakness x1 month. She was treated with tPA at OSH. CTA without LVO. MRI with infarct in L internal capsule and corona radiata. Etiology likely small vessel disease.    Awaiting insurance authorization for SNF placement.      Antithrombotics for secondary stroke prevention: Antiplatelets: ASA 81 mg + Plavix 75 mg -- Initially held due to retroperitoneal hematoma. Resumed DAPT on 11/17.  Statins for secondary stroke prevention and hyperlipidemia, if present: Statins: Atorvastatin- 40 mg daily,   Aggressive risk factor modification: HTN, HLD, Diet  Rehab efforts: The patient has been evaluated by a stroke team provider and the therapy needs have been fully considered based off the presenting complaints and exam findings. The following therapy evaluations are needed: PT evaluate and treat, OT evaluate and treat, SLP evaluate and treat, PM&R evaluate for appropriate placement - Dispo SNF  Diagnostics ordered/pending: None   VTE prophylaxis: Mechanical SCDs, heparin 5000 units q 8 hours   BP parameters: Infarct: Post tPA, SBP <160; Avoid hypotension

## 2019-11-26 NOTE — PLAN OF CARE
Problem: Malnutrition  Goal: Improved Nutritional Intake  Outcome: Ongoing, Not Progressing  Recommendations     1. Continue soft diabetic diet with Boost Glucose Control TID. 2. Consider appetite stimulant as pt's appetite has been poor since admission.  Goals: Pt eats >50% of meals, Pt drinks >50% of Boost by RD follow-up  Nutrition Goal Status: goal not met  Communication of RD Recs: other (comment)(POC)

## 2019-11-26 NOTE — PT/OT/SLP PROGRESS
Occupational Therapy   Treatment    Name: Melva Barker  MRN: [id number]  Admitting Diagnosis:  Thrombotic stroke involving left middle cerebral artery       Recommendations:     Discharge Recommendations: nursing facility, skilled  Discharge Equipment Recommendations:  (TBD next level of care)  Barriers to discharge:  Decreased caregiver support    Assessment:     Melva Barker is a 74 y.o. female with a medical diagnosis of Thrombotic stroke involving left middle cerebral artery.  She presents with s/s of depression and poor overall motivation for activity at this time. She cont to progress towards goals; however, demo poor intrinsic motivation.  Performance deficits affecting function are weakness, impaired endurance, impaired self care skills, impaired functional mobilty, gait instability, decreased lower extremity function, impaired balance, decreased safety awareness, pain.     Rehab Prognosis:  Good; patient would benefit from acute skilled OT services to address these deficits and reach maximum level of function.       Plan:     Patient to be seen 3 x/week to address the above listed problems via self-care/home management, therapeutic activities, therapeutic exercises, neuromuscular re-education  · Plan of Care Expires: 12/13/19  · Plan of Care Reviewed with: patient    Subjective     Pain/Comfort:  · Pain Rating 1: (c/o lower back and R hip pain)  · Pain Addressed 1: Nurse notified(next medication due at 9:45)  · Pain Rating Post-Intervention 1: (remains)    Objective:     Communicated with: RN prior to session.  Patient found right sidelying with telemetry, bed alarm upon OT entry to room.    General Precautions: Standard, fall, dental soft   Orthopedic Precautions:N/A   Braces: N/A     Occupational Performance:     Bed Mobility:     · Patient completed Supine to Sit with minimum assistance  · Patient completed Sit to Supine with minimum assistance     Functional Mobility/Transfers:  · Patient  completed Sit <> Stand Transfer with minimum assistance  with  no assistive device   · Patient completed Bed <> Chair Transfer using Step Transfer technique with minimum assistance with hand-held assist  · Functional Mobility: ~5 steps to and from bedside chair with hand held assistance and min(A)    Activities of Daily Living:  · Feeding:  modified independence with set up  · Grooming: set up and supervision while seated in chair; oral care and washing face    · Upper Body Dressing: contact guard assistance EOB  · Lower Body Dressing: moderate assistance B socks- doff and don      AMPA 6 Click ADL: 18    Treatment & Education:  -Pt alert and oriented x4; agreeable to therapy session; declined blinds open/lights on for session  -edu on OT role in care and POC in acute setting  -edu on importance of activity and mobility for healing process  -bedside commode adjusted in room for best ergonomics for pt's height   -Communication board updated; questions/concerns addressed within OT scope of practice      Patient left HOB elevated with all lines intact, call button in reach and bed alarm onEducation:      GOALS:   Multidisciplinary Problems     Occupational Therapy Goals        Problem: Occupational Therapy Goal    Goal Priority Disciplines Outcome Interventions   Occupational Therapy Goal     OT, PT/OT Ongoing, Progressing    Description:  Updated Goals to be met by: 7 days (11/29/19)     Patient will increase functional independence with ADLs by performing:    UE Dressing with Contact Guard Assistance.  LE Dressing with Minimal Assistance.  Grooming while standing with Minimal Assistance.  Toileting from bedside commode with Minimal Assistance for hygiene and clothing management.   Toilet transfer to bedside commode with Contact Guard Assistance.  Increased functional strength to WFL for ADLs.  Complete functional mobility household distance with CGA using AD as needed.                     Time Tracking:     OT Date  of Treatment: 11/26/19  OT Start Time: 0849  OT Stop Time: 0903  OT Total Time (min): 14 min    Billable Minutes:Self Care/Home Management 14    AURORA Valdez  11/26/2019

## 2019-11-26 NOTE — ASSESSMENT & PLAN NOTE
Appreciate Nutrition/RD assistance  Ordered Boost Glucose Control TID  Patient requesting appetite stimulant; will discuss with staff.  Encouraged her toward 100% completion of all meals and supplementation.

## 2019-11-26 NOTE — ASSESSMENT & PLAN NOTE
Risk factor for stroke  Holding all BP meds due to recent hypotension  BP continues to be labile -- SBP  over last 24 hours.    Continue close monitoring. Previously considering initiating low-dose Midodrine if hypotension persisted.

## 2019-11-26 NOTE — PROGRESS NOTES
[facility]  Vascular Neurology  Comprehensive Stroke Center  Progress Note    Assessment/Plan:     * Thrombotic stroke involving left middle cerebral artery  Melva Barker is a 74 y.o. female with PMHx of HTN, HLD, and DM who presented to OSH with acute worsening of R-sided weakness after having experienced R-sided weakness x1 month. She was treated with tPA at OSH. CTA without LVO. MRI with infarct in L internal capsule and corona radiata. Etiology likely small vessel disease.    Awaiting insurance authorization for SNF placement.      Antithrombotics for secondary stroke prevention: Antiplatelets: ASA 81 mg + Plavix 75 mg -- Initially held due to retroperitoneal hematoma. Resumed DAPT on 11/17.  Statins for secondary stroke prevention and hyperlipidemia, if present: Statins: Atorvastatin- 40 mg daily,   Aggressive risk factor modification: HTN, HLD, Diet  Rehab efforts: The patient has been evaluated by a stroke team provider and the therapy needs have been fully considered based off the presenting complaints and exam findings. The following therapy evaluations are needed: PT evaluate and treat, OT evaluate and treat, SLP evaluate and treat, PM&R evaluate for appropriate placement - Dispo SNF  Diagnostics ordered/pending: None   VTE prophylaxis: Mechanical SCDs, heparin 5000 units q 8 hours   BP parameters: Infarct: Post tPA, SBP <160; Avoid hypotension    Moderate malnutrition  Appreciate Nutrition/RD assistance  Ordered Boost Glucose Control TID  Patient requesting appetite stimulant; will discuss with staff.  Encouraged her toward 100% completion of all meals and supplementation.    Orthostatic hypotension  Patient hypotensive overnight from 11/13 and into the AM 11/14. Received multiple boluses and pressure responsive.  Concern was for sepsis vs pain medication-related.   See infection risk section above    Pt then with orthostatic vital signs on 11/20 -- HR 120s to 90s from  "standing to lying, s/p IVFs 1L over 10 hours ordered on 11/21.   Nursing reported AFib on tele on 11/20, however EKG showed sinus tachycardia.   Pt receiving multiple boluses in recent days. Encouraged her toward aggressive PO hydration. Hb remains stable.   Will continue monitoring, consider midodrine if hypotension persists    Retroperitoneal hematoma  Pt had been c/o back pain in recent days, complicated by her hx of chronic back and sciatic nerve pain with use of Tizanidine and opiates at home.  Due to Hb downtrend and recent tPA administration, MRI Lumbar/Thoracic spine was ordered 11/8 which demonstrated large R psoas hematoma with IVC compression and probable thrombosis.   General Surgery was consulted; No acute intervention pursued.     Patient was then with acute decompensation overnight 11/9; became hypotensive, tachycardic, and apneic with Hb down to 6.7. Rapid Response was called; patient requiring intubation, blood transfusion, and transfer to medical ICU.   CTA abd/pelvis 11/9 showed hematoma with active extravasation/hemorrhage with IVC compression, hemoperitoneum.   Pt went to IR and is now s/p successful R L2 & L3 lumbar spinal artery embolization.   Patient underwent IR re-evaluation on 11/11. No arterial bleed evident on IR angiography.  CT abd/pelvis 11/17: Redemonstration of large right retroperitoneal hematoma which is mildly decreased in size compared to most recent     Hb remains stable, Continuing to monitor.    Back pain  Reported hx chronic back pain with associated sciatic nerve pain as well, then complicated by acute retroperitoneal hematoma.  Consulted to anesthesia pain management    Pain now being controlled with gabapentin, duloxetine, trazodone, diclofenac gel, lidocaine patch, hydrcodone-acetaminophen (prn), tizanidine (prn.)     Nurse reporting pt continues to request pain medication "around the clock." Of note, pt reported to night nurse that her best pain score at home is " usually 7 1/2.    Restarted pt's home regimen and will continue at discharge.  Per PNP, pt seeing Dr. Hilton Rowland in [place] for pain management. Recommend follow up outpatient and consideration of Addiction Psych referral.    Essential hypertension  Risk factor for stroke  Holding all BP meds due to recent hypotension  BP continues to be labile -- SBP  over last 24 hours.    Continue close monitoring. Previously considering initiating low-dose Midodrine if hypotension persisted.    At high risk for infection  Patient hypotensive overnight from 11/13 and into the AM 11/14. Received multiple boluses and pressure responsive.  Concern was for sepsis vs pain medication-related.   Infectious workup was ordered -- Elevated procal and lactate with interval development of atelectasis in R lower lung.  Started 7-day course of IV Cefepime and Vanc for possible hospital acquired pneumonia. BCx NGTD.    Then with liquid diarrhea overnight 11/15; WBC 14, stopped empiric abx and ordered CDiff, stool culture. PO Vanc x 10 day course for possible C.diff. IVFs given and central line removed.  WBC then improved, pt remaining afebrile.     Will continue to monitor patient, labs, vitals for any signs or symptoms concerning for infection.    Debility  2/2 stroke  PT/OT eval & treat - Dispo SNF    Cytotoxic cerebral edema  Area of cytotoxic cerebral edema identified when reviewing brain imaging in the territory of the L middle cerebral artery. There is no mass effect associated with it. We will continue to monitor the patients clinical exam for any worsening of symptoms which may indicate expansion of the stroke or the area of the edema resulting in the clinical change. The pattern is suggestive of small vessel etiology.    Diabetes mellitus type 2 without retinopathy  Stroke risk factor, poorly controlled on glargine 17 units daily  HbA1c 8.3%  Recommend tight glucose control  Currently on SSI   Diabetic  diet    Gastroparesis  Previously on Reglan 10 mg TID before meals, reglan then decreased to 5 mg TID.   Reglan discontinued on 11/23, will monitor closely and reinitiate if necessary.        11/1/19 MRI with small vessel L MCA infarct, therapy recommending rehab  11/2 - Patient stepped down overnight. Adjusting BP regimen. Patient with continuous complaints if nausea. IV Zofran ordered with some relief. Patient has not has BM since 10/30. Ordering KUB to rule out obstruction. Neuro exam unchanged.   11/3 - NAEON. Patient reports she had no episodes of vomiting overnight. Still complaining of nausea. Mild lower abdomen tenderness on exam. Lipase ordered and WNL. Continue to monitor.   11/4 - denies nausea and vomiting. Abd tenderness remains present on palpitation. Continue to monitor. HCTZ increased yesterday. Pending rehab placement.   11/5 -  N/V x1 overnight zofran resolved, sucralfate started. Placement pending   11/6 - hypotensive, held all BP meds,  ml bolus, responded well. Increased BUN/Cr, start NS 75ml/hr.   11/7 patient complaining of sciatica pain.  Restarted home tizanidine.  Patient requesting hydrocodone but educated patient that nerve pain is not treated with opioid.  Degenerative disease seen on xray but no fracture.      11/8 Patient continues to experience back pain.  Now with leukocytosis and drop in h/h plan for MRI thoracic/lumbar spine to evaluate for epidural hematoma.    11/9: MRI Lumbar spine had demonstrated large R psoas hematoma with possible IVC compression; General Surgery consulted. Patient was then with acute decompensation overnight; became hypotensive, tachycardic, and apneic requiring intubation, blood transfusion, and medical ICU transfer. CTA abd/pelvis showed active extravasation/hemorrhage with hemoperitoneum. Pt now s/p successful lumbar artery embolization in IR. She was extubated on arrival back to ICU and tolerated well.    11/11: Patient underwent IR re-evaluation  yesterday. No arterial bleed evident on IR angiography. Patient neuro exam stable.   11/12 Stepped down from medical ICU this morning.  Patient still experiencing pain.  Anesthesia consulted for pain management.  D/C pain pump and started oral medications for muscle and nerve pain.  Will wean to home regimen. Waiting for repeat CBC, transfused one unit of blood 11/11.   Tachycardic/hypertensive will continue to monitor.  11/13: Upgraded diet per SLP recs. Pt continues to c/o R back pain, concern that tachycardia could be pain-induced; adjusted analgesic regimen further as well as antiemetic regimen with plans to eventually wean to home regimen. Replaced Phos. Dispo inpatient rehab.  11/14: patient hypotensive overnight and this morning requiring fluid boluses. Discontinued all pain medications except for celecoxib in case pain meds are contributing to hypotension. Lidocaine patch and Voltaren gel ordered as well. Infectious workup significant for minor atelectasis in R lower lung with increased procal and lactate. Patient started on IV antibiotics for community acquired pneumonia. Blood cultures pending. ICU notified of patient's hypotension  11/15: Liquid diarrhea, WBC 14, stopped empiric abx, ordered c. Diff and stool culture, start PO vanc, IVFs. Adjusted pain meds with goal to return to home regimen. Nauseous overnight, zofran given.   11/16: CT abdomen and pelvis to f/u retroperitoneal hematoma prior to starting DAPT, H/H stable. T max 99.4, tachy, WBC 13, C. Diff and stool cultures pending, increased  ml/hr, labile BP, continue to monitor. Remove central line.   11/17: CT abdomen/pelvis again with large retroperitoneal hematoma  but decreased in size, H/H uptrend, resume DAPT today. Diarrhea becoming thicker, WBC improved, a febrile, c. Diff antigen positive, toxins negative. Pain well controlled.   11/18 PCR negative, c.diff precautions and vancomycin d/c.  Neurologically stable waiting for placement.     11/19 Patient now recommending SNF.   11/20 Neurologically stable.  Waiting for placement.  Hypotensive episodes overnight given some fluids.     11/21 Orthostatic yesterday, HR 120s to 90s standing to lying, IVFs 1L over 10 hours ordered.   11/22: Nurse encouraging pt toward aggressive PO hydration in the setting of recent orthostatic hypotension. Hb remains stable.  11/23: Reglan discontinue  Importance of oral hydration reinforced with patient. H&H remain stable. Patient accepted to [facility], awaiting insurance auth.   11/24: patient became hypotensive with SBP in 70s and responded to 500 cc bolus, continuing to encourage oral fluid intake, will consider midodrine if hypotension persists  11/25: SBP WNL within last 24 hours. Decreased Norco to home dose q6hr prn, f/u w/ Dr. Canela pain management outpatient, consider addiction psych. C/o decreased appetite, advanced soft diet to level 6 so pt can receive gumbo as she requested, boost supplements, continue to monitor.   11/26: Patient requesting appetite stimulant; encouraged her toward 100% completion of all meals. Continuing to monitor her labile BP.    STROKE DOCUMENTATION   Acute Stroke Times   Last Known Normal Date: 10/31/19  Last Known Normal Time: 0630  Symptom Onset Date: 10/31/19  Symptom Onset Time: 0730  Stroke Team Called Date: 10/31/19  Stroke Team Called Time: 0936  Stroke Team Arrival Date: 10/31/19  Stroke Team Arrival Time: 0946  Decision to Treat Time for Alteplase: 1003    NIH Scale:  1a. Level of Consciousness: 0-->Alert, keenly responsive  1b. LOC Questions: 0-->Answers both questions correctly  1c. LOC Commands: 0-->Performs both tasks correctly  2. Best Gaze: 0-->Normal  3. Visual: 0-->No visual loss  4. Facial Palsy: 1-->Minor paralysis (flattened nasolabial fold, asymmetry on smiling)  5a. Motor Arm, Left: 0-->No drift, limb holds 90 (or 45) degrees for full 10 secs  5b. Motor Arm, Right: 1-->Drift, limb holds 90 (or 45) degrees,  but drifts down before full 10 secs, does not hit bed or other support  6a. Motor Leg, Left: 0-->No drift, leg holds 30 degree position for full 5 secs  6b. Motor Leg, Right: 3-->No effort against gravity, leg falls to bed immediately  7. Limb Ataxia: 0-->Absent  8. Sensory: 0-->Normal, no sensory loss  9. Best Language: 0-->No aphasia, normal  10. Dysarthria: 1-->Mild-to-moderate dysarthria, patient slurs at least some words and, at worst, can be understood with some difficulty  11. Extinction and Inattention (formerly Neglect): 0-->No abnormality  Total (NIH Stroke Scale): 6       Modified Warren Score: 0  Nabila Coma Scale:    ABCD2 Score:    [id number] Score:   HAS -BLED Score:   ICH Score:   Hunt & Amaya Classification:      Hemorrhagic change of an Ischemic Stroke: Does this patient have an ischemic stroke with hemorrhagic changes? No     Neurologic Chief Complaint: R-sided weakness -- L MCA    Subjective:     Interval History: Patient is seen for follow-up neurological assessment and treatment recommendations: DARREN. Patient requesting appetite stimulant; encouraged her toward 100% completion of all meals. Continuing to monitor her labile BP.      HPI, Past Medical, Family, and Social History remains the same as documented in the initial encounter.     Review of Systems   Constitutional: Positive for appetite change. Negative for fever.   Musculoskeletal: Positive for arthralgias and back pain.   Neurological: Positive for facial asymmetry, speech difficulty and weakness.   Psychiatric/Behavioral: Negative for agitation and confusion.     Scheduled Meds:   acetaminophen  650 mg Oral Q8H    aspirin  81 mg Oral Daily    atorvastatin  40 mg Oral QHS    clopidogrel  75 mg Oral Daily    diclofenac sodium  4 g Topical (Top) Daily    DULoxetine  20 mg Oral BID    gabapentin  300 mg Oral TID    heparin (porcine)  5,000 Units Subcutaneous Q8H    lidocaine  1 patch Transdermal Q24H    polyethylene glycol   17 g Oral Daily    potassium chloride 10%  20 mEq Oral BID    traZODone  25 mg Oral QHS     Continuous Infusions:    PRN Meds:dextrose 10 % in water (D10W), glucagon (human recombinant), HYDROcodone-acetaminophen, insulin aspart U-100, melatonin, ondansetron, sodium chloride 0.9%, tiZANidine    Objective:     Vital Signs (Most Recent):  Temp: 97.3 °F (36.3 °C) (11/26/19 1157)  Pulse: 84 (11/26/19 1157)  Resp: 17 (11/26/19 1157)  BP: (!) 85/54 (11/26/19 1157)  SpO2: 97 % (11/26/19 1157)  BP Location: Right arm    Vital Signs Range (Last 24H):  Temp:  [97.3 °F (36.3 °C)-99.5 °F (37.5 °C)]   Pulse:  []   Resp:  [12-18]   BP: ()/(54-84)   SpO2:  [94 %-100 %]   BP Location: Right arm    Physical Exam   Constitutional: She is oriented to person, place, and time. She appears well-developed. No distress.   HENT:   Head: Normocephalic and atraumatic.   Eyes: Conjunctivae and EOM are normal.   Cardiovascular: Normal rate.   Pulmonary/Chest: Effort normal. No respiratory distress.   Musculoskeletal: She exhibits no edema or deformity.   Neurological: She is alert and oriented to person, place, and time.   Skin: Skin is warm and dry. She is not diaphoretic.   Psychiatric: Cognition and memory are not impaired. She is attentive.   Vitals reviewed.      Neurological Exam:   LOC: alert   Attention Span: good  Language: No aphasia  Articulation: Mild dysarthria  Orientation: Person, Time, Place  Visual Fields: Full  EOM (CN III, IV, VI): Full/intact  Facial Movement (CN VII): Lower facial weakness on the Right- mild  Motor: Arm left  Normal 5/5  Leg left  Normal 5/5  Arm right  Paresis: 4/5   Leg right Paresis: 2/5  Sensation: Intact to light touch, temp  Tone: Normal tone throughout      Laboratory:  CMP:   No results for input(s): GLUCOSE, CALCIUM, ALBUMIN, PROT, NA, K, CO2, CL, BUN, CREATININE, ALKPHOS, ALT, AST, BILITOT in the last 24 hours.  CBC:   Recent Labs   Lab 11/25/19  0457   WBC 9.60   RBC 3.93*   HGB  11.1*   HCT 35.7*   *   MCV 91   MCH 28.2   MCHC 31.1*     Lipid Panel:   No results for input(s): CHOL, LDLCALC, HDL, TRIG in the last 168 hours.  Coagulation:   No results for input(s): PT, INR, APTT in the last 168 hours.  Platelet Aggregation Study: No results for input(s): PLTAGG, PLTAGINTERP, PLTAGREGLACO, ADPPLTAGGREG in the last 168 hours.  Hgb A1C:   No results for input(s): HGBA1C in the last 168 hours.  TSH:   No results for input(s): TSH in the last 168 hours.      Diagnostic Results      Brain Imaging      MRI Brain (11/01/19):  Acute lacunar type infarct coursing through the left posterior limb internal capsule and corona radiata.       CTH (10/31/19):  No acute intracranial pathology        Vessel Imaging      CTA Multiphase (10/31/19):  CTA head: Atherosclerotic disease of the cavernous and supraclinoid ICAs with mild narrowing.  Otherwise unremarkable CTA of the head specifically without evidence for proximal significant stenosis or occlusion.  CTA neck: Less than 50% proximal ICA stenosis by NASCET criteria.  Atherosclerotic disease with mild moderate narrowing of the right vertebral artery origin.  There is mild left V1 segment narrowing.  No significant focal vertebral artery stenosis or occlusion.  Interlobular septal thickening with tree in bud micro nodularity visualized upper lobes bilaterally which may represent inflammatory/infectious process clinical correlation and correlation with dedicated CT thorax recommended.  CT head: Bandlike region hypoattenuation left posterior limb internal capsule unchanged from prior suggestive for possible recent to subacute age infarction.  No evidence for hydrocephalus or acute intracranial hemorrhage.  Clinical correlation and further evaluation with MRI if patient compatible.        Cardiac Imaging     Echo (11/01/19)  · Increased (hyperdynamic) left ventricular systolic function. The estimated ejection fraction is 75%.  · Concentric left  ventricular remodeling.  · Normal LV diastolic function.  · No wall motion abnormalities.  · Normal right ventricular systolic function.  · Normal central venous pressure (3 mm Hg).  · Mild tricuspid regurgitation.  · The estimated PA systolic pressure is 26 mm Hg  · Echolucent structure next to the LA, likely representing hiatal hernia. Clinincal correlation is required.        Miscellaneous Imaging     CT Abdomen Pelvis WO Contrast 11/16/19  Angiography of the right renal vasculature, SMA, and multiple lumbar arteries demonstrates no evidence of active bleeding.    IR Angiogram Visceral 11/10/19  Angiography of the right renal vasculature, SMA, and multiple lumbar arteries demonstrates no evidence of active bleeding.     CTA Abdomen/Pelvis 11/10/19  Right-sided retroperitoneal hematoma with questionable foci of active extravasation within the inferior portion of the collection as described above.     IR Angiogram Visceral 11/9/19  Angiography of the lumbar arteries demonstrates active extravasation of the right L2 lumbar artery. Embolization using beads as described above.  Plan: Transfer patient to ICU.  Continued serial H and H follow-up.     CTA Abdomen/Pelvis 11/9/19  1. Large right retroperitoneal hematoma with findings concerning for active arterial extravasation/hemorrhage as detailed above.  There is associated hemorrhage extending throughout the right abdomen and pelvis/pericolic gutter with associated mass effect on the right kidney, which is anteriorly displaced.  2. Decreased opacification of the infahepatic and infrarenal IVC, possibly secondary to underlying mass effect versus partial thrombosis.  3. Nonspecific cecal and right colonic wall thickening, possibly reactive due to hemoperitoneum.  4. Additional findings as detailed above.     MRI Thoracic/Lumbar Spine W WO Contr 11/8/19  Large hematoma within the right psoas muscle.  Compression and probable thrombosis of the IVC. Consider contrast  enhanced CT with delayed phase.  Multilevel degenerative changes as detailed above, more severe in the lumbar spine.  Irregularity of the T12 through L3 endplates is most likely degenerative.      Carina Schwartz PA-C  [facility] St[facility]  Department of Vascular Neurology   [facility]

## 2019-11-26 NOTE — SUBJECTIVE & OBJECTIVE
Neurologic Chief Complaint: R-sided weakness -- L MCA    Subjective:     Interval History: Patient is seen for follow-up neurological assessment and treatment recommendations: DARREN. Patient requesting appetite stimulant; encouraged her toward 100% completion of all meals. Continuing to monitor her labile BP.      HPI, Past Medical, Family, and Social History remains the same as documented in the initial encounter.     Review of Systems   Constitutional: Positive for appetite change. Negative for fever.   Musculoskeletal: Positive for arthralgias and back pain.   Neurological: Positive for facial asymmetry, speech difficulty and weakness.   Psychiatric/Behavioral: Negative for agitation and confusion.     Scheduled Meds:   acetaminophen  650 mg Oral Q8H    aspirin  81 mg Oral Daily    atorvastatin  40 mg Oral QHS    clopidogrel  75 mg Oral Daily    diclofenac sodium  4 g Topical (Top) Daily    DULoxetine  20 mg Oral BID    gabapentin  300 mg Oral TID    heparin (porcine)  5,000 Units Subcutaneous Q8H    lidocaine  1 patch Transdermal Q24H    polyethylene glycol  17 g Oral Daily    potassium chloride 10%  20 mEq Oral BID    traZODone  25 mg Oral QHS     Continuous Infusions:    PRN Meds:dextrose 10 % in water (D10W), glucagon (human recombinant), HYDROcodone-acetaminophen, insulin aspart U-100, melatonin, ondansetron, sodium chloride 0.9%, tiZANidine    Objective:     Vital Signs (Most Recent):  Temp: 97.3 °F (36.3 °C) (11/26/19 1157)  Pulse: 84 (11/26/19 1157)  Resp: 17 (11/26/19 1157)  BP: (!) 85/54 (11/26/19 1157)  SpO2: 97 % (11/26/19 1157)  BP Location: Right arm    Vital Signs Range (Last 24H):  Temp:  [97.3 °F (36.3 °C)-99.5 °F (37.5 °C)]   Pulse:  []   Resp:  [12-18]   BP: ()/(54-84)   SpO2:  [94 %-100 %]   BP Location: Right arm    Physical Exam   Constitutional: She is oriented to person, place, and time. She appears well-developed. No distress.   HENT:   Head: Normocephalic and  atraumatic.   Eyes: Conjunctivae and EOM are normal.   Cardiovascular: Normal rate.   Pulmonary/Chest: Effort normal. No respiratory distress.   Musculoskeletal: She exhibits no edema or deformity.   Neurological: She is alert and oriented to person, place, and time.   Skin: Skin is warm and dry. She is not diaphoretic.   Psychiatric: Cognition and memory are not impaired. She is attentive.   Vitals reviewed.      Neurological Exam:   LOC: alert   Attention Span: good  Language: No aphasia  Articulation: Mild dysarthria  Orientation: Person, Time, Place  Visual Fields: Full  EOM (CN III, IV, VI): Full/intact  Facial Movement (CN VII): Lower facial weakness on the Right- mild  Motor: Arm left  Normal 5/5  Leg left  Normal 5/5  Arm right  Paresis: 4/5   Leg right Paresis: 2/5  Sensation: Intact to light touch, temp  Tone: Normal tone throughout      Laboratory:  CMP:   No results for input(s): GLUCOSE, CALCIUM, ALBUMIN, PROT, NA, K, CO2, CL, BUN, CREATININE, ALKPHOS, ALT, AST, BILITOT in the last 24 hours.  CBC:   Recent Labs   Lab 11/25/19  0457   WBC 9.60   RBC 3.93*   HGB 11.1*   HCT 35.7*   *   MCV 91   MCH 28.2   MCHC 31.1*     Lipid Panel:   No results for input(s): CHOL, LDLCALC, HDL, TRIG in the last 168 hours.  Coagulation:   No results for input(s): PT, INR, APTT in the last 168 hours.  Platelet Aggregation Study: No results for input(s): PLTAGG, PLTAGINTERP, PLTAGREGLACO, ADPPLTAGGREG in the last 168 hours.  Hgb A1C:   No results for input(s): HGBA1C in the last 168 hours.  TSH:   No results for input(s): TSH in the last 168 hours.      Diagnostic Results      Brain Imaging      MRI Brain (11/01/19):  Acute lacunar type infarct coursing through the left posterior limb internal capsule and corona radiata.       CTH (10/31/19):  No acute intracranial pathology        Vessel Imaging      CTA Multiphase (10/31/19):  CTA head: Atherosclerotic disease of the cavernous and supraclinoid ICAs with mild  narrowing.  Otherwise unremarkable CTA of the head specifically without evidence for proximal significant stenosis or occlusion.  CTA neck: Less than 50% proximal ICA stenosis by NASCET criteria.  Atherosclerotic disease with mild moderate narrowing of the right vertebral artery origin.  There is mild left V1 segment narrowing.  No significant focal vertebral artery stenosis or occlusion.  Interlobular septal thickening with tree in bud micro nodularity visualized upper lobes bilaterally which may represent inflammatory/infectious process clinical correlation and correlation with dedicated CT thorax recommended.  CT head: Bandlike region hypoattenuation left posterior limb internal capsule unchanged from prior suggestive for possible recent to subacute age infarction.  No evidence for hydrocephalus or acute intracranial hemorrhage.  Clinical correlation and further evaluation with MRI if patient compatible.        Cardiac Imaging     Echo (11/01/19)  · Increased (hyperdynamic) left ventricular systolic function. The estimated ejection fraction is 75%.  · Concentric left ventricular remodeling.  · Normal LV diastolic function.  · No wall motion abnormalities.  · Normal right ventricular systolic function.  · Normal central venous pressure (3 mm Hg).  · Mild tricuspid regurgitation.  · The estimated PA systolic pressure is 26 mm Hg  · Echolucent structure next to the LA, likely representing hiatal hernia. Clinincal correlation is required.        Miscellaneous Imaging     CT Abdomen Pelvis WO Contrast 11/16/19  Angiography of the right renal vasculature, SMA, and multiple lumbar arteries demonstrates no evidence of active bleeding.    IR Angiogram Visceral 11/10/19  Angiography of the right renal vasculature, SMA, and multiple lumbar arteries demonstrates no evidence of active bleeding.     CTA Abdomen/Pelvis 11/10/19  Right-sided retroperitoneal hematoma with questionable foci of active extravasation within the  inferior portion of the collection as described above.     IR Angiogram Visceral 11/9/19  Angiography of the lumbar arteries demonstrates active extravasation of the right L2 lumbar artery. Embolization using beads as described above.  Plan: Transfer patient to ICU.  Continued serial H and H follow-up.     CTA Abdomen/Pelvis 11/9/19  1. Large right retroperitoneal hematoma with findings concerning for active arterial extravasation/hemorrhage as detailed above.  There is associated hemorrhage extending throughout the right abdomen and pelvis/pericolic gutter with associated mass effect on the right kidney, which is anteriorly displaced.  2. Decreased opacification of the infahepatic and infrarenal IVC, possibly secondary to underlying mass effect versus partial thrombosis.  3. Nonspecific cecal and right colonic wall thickening, possibly reactive due to hemoperitoneum.  4. Additional findings as detailed above.     MRI Thoracic/Lumbar Spine W WO Contr 11/8/19  Large hematoma within the right psoas muscle.  Compression and probable thrombosis of the IVC. Consider contrast enhanced CT with delayed phase.  Multilevel degenerative changes as detailed above, more severe in the lumbar spine.  Irregularity of the T12 through L3 endplates is most likely degenerative.

## 2019-11-26 NOTE — ASSESSMENT & PLAN NOTE
"Reported hx chronic back pain with associated sciatic nerve pain as well, then complicated by acute retroperitoneal hematoma.  Consulted to anesthesia pain management    Pain now being controlled with gabapentin, duloxetine, trazodone, diclofenac gel, lidocaine patch, hydrcodone-acetaminophen (prn), tizanidine (prn.)     Nurse reporting pt continues to request pain medication "around the clock." Of note, pt reported to night nurse that her best pain score at home is usually 7 1/2.    Restarted pt's home regimen and will continue at discharge.  Per PNP, pt seeing Dr. Hilton Rowland in [place] for pain management. Recommend follow up outpatient and consideration of Addiction Psych referral.  "

## 2019-11-26 NOTE — PLAN OF CARE
[facility] accepted. Pending insurance authorization. Son, Paolo, and patient updated.      11/26/19 6933   Discharge Reassessment   Assessment Type Discharge Planning Reassessment   Discharge Plan A Skilled Nursing Facility   Discharge Plan B Rehab   Anticipated Discharge Disposition SNF   Post-Acute Status   Post-Acute Authorization Placement   Post-Acute Placement Status Pending Payor Review

## 2019-11-26 NOTE — PROGRESS NOTES
[facility]  Adult Nutrition  Progress Note    SUMMARY       Recommendations    1. Continue soft diabetic diet with Boost Glucose Control TID. 2. Consider appetite stimulant as pt's appetite has been poor since admission.  Goals: Pt eats >50% of meals, Pt drinks >50% of Boost by RD follow-up  Nutrition Goal Status: goal not met  Communication of RD Recs: other (comment)(POC)    Reason for Assessment    Reason For Assessment: RD follow-up  Diagnosis: stroke/CVA  Relevant Medical History: IDDM, HTN  Interdisciplinary Rounds: attended  General Information Comments: Pt continues with ~50% intake most meals, advanced to dental soft diet. Per NFPE , pt now meets criteria for acute moderate malnutriton. Pt reports continued poor appetite, is not drinking Boost supplement. No weight loss noted, per chart. Pt now requesting appetite stimulant.  Nutrition Discharge Planning: Pt to follow higher protein/high kcal diet.    Nutrition Risk Screen    Nutrition Risk Screen: no indicators present    Nutrition/Diet History    Patient Reported Diet/Restrictions/Preferences: soft  Spiritual, Cultural Beliefs, Tenriism Practices, Values that Affect Care: no  Supplemental Drinks or Food Habits: Ensure Plus  Factors Affecting Nutritional Intake: NPO    Anthropometrics    Temp: 97.3 °F (36.3 °C)  Height Method: Measured  Height: 5' (152.4 cm)  Height (inches): 60 in  Weight Method: Bed Scale  Weight: 57 kg (125 lb 10.6 oz)  Weight (lb): 125.66 lb  Ideal Body Weight (IBW), Female: 100 lb  % Ideal Body Weight, Female (lb): 124.12 lb  BMI (Calculated): 24.5  BMI Grade: 18.5-24.9 - normal  Weight Loss: unintentional  Usual Body Weight (UBW), k kg  % Usual Body Weight: 84.07  % Weight Change From Usual Weight: -16.1 %       Lab/Procedures/Meds    Pertinent Labs Reviewed: reviewed  Pertinent Labs Comments: CO2 21, Glu 114, T. Bili 1.6, A1c 8.3%  Pertinent Medications Reviewed: reviewed  Pertinent Medications  Comments: statin, IV fluids      Estimated/Assessed Needs    Weight Used For Calorie Calculations: 56 kg (123 lb 7.3 oz)  Energy Calorie Requirements (kcal): 1367  Energy Need Method: [place]-St Jeor  Protein Requirements: 1.25  Weight Used For Protein Calculations: 56 kg (123 lb 7.3 oz)  Fluid Requirements (mL): 1mL/kcal or per MD     RDA Method (mL): 1367  CHO Requirement: 175g CHO daily      Nutrition Prescription Ordered    Current Diet Order: soft Diabetic diet  Oral Nutrition Supplement: Boost Glucose Control TID    Evaluation of Received Nutrient/Fluid Intake    Comments: LBM 11/25  % Intake of Estimated Energy Needs: 50 - 75 %  % Meal Intake: 50 - 75 %    Nutrition Risk    Level of Risk/Frequency of Follow-up: low     Assessment and Plan  Assessment and Plan     Nutrition Problem:  Moderate Protein-Calorie Malnutrition  Malnutrition in the context of Acute Illness/Injury     Related to (etiology):  Nausea, poor appetite     Signs and Symptoms (as evidenced by):  Energy Intake: <50% of estimated energy requirement for 7+ days  Body Fat Depletion: mild depletion of triceps, orbitals   Muscle Mass Depletion: moderate depletion of hands, lower extremities     Interventions/Recommendations (treatment strategy):  Collaboration of care to providers.  Commercial beverage: Boost Glucose Control TID     Nutrition Diagnosis Status:  continues    Monitor and Evaluation    Food and Nutrient Intake: food and beverage intake  Food and Nutrient Adminstration: diet order  Knowledge/Beliefs/Attitudes: food and nutrition knowledge/skill  Anthropometric Measurements: weight, weight change, body mass index  Biochemical Data, Medical Tests and Procedures: electrolyte and renal panel, gastrointestinal profile, glucose/endocrine profile, inflammatory profile, lipid profile  Nutrition-Focused Physical Findings: overall appearance     Malnutrition Assessment             Weight Loss (Malnutrition): (16% in 1 year)  Energy Intake  (Malnutrition): less than or equal to 50% for greater than or equal to 5 days   Orbital Region (Subcutaneous Fat Loss): mild depletion  Upper Arm Region (Subcutaneous Fat Loss): mild depletion  Thoracic and Lumbar Region: mild depletion   [place] Region (Muscle Loss): mild depletion  Clavicle Bone Region (Muscle Loss): mild depletion  Clavicle and Acromion Bone Region (Muscle Loss): mild depletion  Scapular Bone Region (Muscle Loss): mild depletion  Dorsal Hand (Muscle Loss): moderate depletion  Patellar Region (Muscle Loss): moderate depletion  Anterior Thigh Region (Muscle Loss): moderate depletion  Posterior Calf Region (Muscle Loss): moderate depletion                 Nutrition Follow-Up    RD Follow-up?: Yes

## 2019-11-26 NOTE — PT/OT/SLP PROGRESS
Physical Therapy Treatment    Patient Name:  Melva Barker   MRN:  [id number]  Admitting Diagnosis:  Thrombotic stroke involving left middle cerebral artery   Recent Surgery: * No surgery found *    Admit Date: 10/31/2019  Length of Stay: 26 days    Recommendations:     Discharge Recommendations:  nursing facility, skilled   Discharge Equipment Recommendations: other (see comments)(tbd)   Barriers to discharge: None    Assessment:     Melva Barker is a 74 y.o. female admitted with a medical diagnosis of Thrombotic stroke involving left middle cerebral artery.  She presents with the following impairments/functional limitations:  weakness, impaired endurance, impaired self care skills, impaired functional mobilty, gait instability, decreased lower extremity function, decreased safety awareness, impaired cardiopulmonary response to activity, pain, impaired fine motor. Pt tolerated session well today. Pt expressed fear in ambulating on this date stating she felt weak and tired. Pt did feel up to performing stand pivot transfer to bedside commode however. Pt with improved strength and balance on this date and able to perform sit <> stand and transfers with CGA with no overt LOB. Pt will benefit from SNF after discharge from acute services in order to continue to progress pt's strength, endurance, and balance to help pt maximize independence at or near PLOF.    Rehab Prognosis: Good; patient would benefit from acute skilled PT services to address these deficits and reach maximum level of function.    Recent Surgery: * No surgery found *      Plan:     During this hospitalization, patient to be seen 3 x/week to address the identified rehab impairments via gait training, therapeutic activities, therapeutic exercises, neuromuscular re-education and progress toward the following goals:    · Plan of Care Expires:  12/12/19    Subjective     RN notified prior to session. No family/friends present upon PT entrance into  room.    Chief Complaint: Pt stated she was feeling sleepy but was willing to get up and move  Patient/Family Comments/goals: get stronger  Pain/Comfort:  · Pain Rating 1: 0/10  · Pain Addressed 1: Pre-medicate for activity  · Pain Rating Post-Intervention 1: 0/10      Objective:     Additional staff present: none    Patient found HOB elevated with: telemetry, bed alarm   Mental Status: Patient is oriented to AxOx4 and follows multistep commands. Patient is Alert and Cooperative during session.    General Precautions: Standard, Cardiac fall, dental soft   Orthopedic Precautions:N/A   Braces: N/A   Body mass index is 24.54 kg/m².  Oxygen Device: Room Air    Outcome Measures:  AM-PAC 6 CLICK MOBILITY  Turning over in bed (including adjusting bedclothes, sheets and blankets)?: 3  Sitting down on and standing up from a chair with arms (e.g., wheelchair, bedside commode, etc.): 3  Moving from lying on back to sitting on the side of the bed?: 3  Moving to and from a bed to a chair (including a wheelchair)?: 3  Need to walk in hospital room?: 3  Climbing 3-5 steps with a railing?: 2  Basic Mobility Total Score: 17     Functional Mobility:    Bed Mobility:   · Rolling/Turning to Right: stand by assistance  · Supine to Sit: stand by assistance; from Rt side of bed  · Scooting anteriorly to EOB to have both feet planted on floor: contact guard assistance  · Sit to Supine: stand by assistance; to Rt side of bed    Sitting Balance at Edge of Bed:   Assistance Level Required: Stand-by Assistance    Transfers:   · Sit <> Stand Transfer: contact guard assistance with no assistive device   · Stand <> Sit Transfer: contact guard assistance with no assistive device   · [id number] from EOB and [id number] from bedside commode  · Bed <> Bedside Commode Transfer: Stand Pivot technique with contact guard assistance with hand-held assist  · Commode on patient's Rt  · Chair <> Bedside Commode Transfer: Stand Pivot technique with contact guard  assistance with hand-held assist  · Bed on patient's Lt    Standing Balance:   Assistance Level Required: Contact Guard Assistance   Patient used: hand-held assist       Gait:  · Patient declined ambulation on this date    Stairs:   Deferred due to pt's performance with above listed functional mobility    Education:   Time provided for education, counseling and discussion of health disposition in regards to patient's current status   All questions answered within PT scope of practice and to patient's satisfaction   PT role in POC to address current functional deficits   Pt educated on proper body mechanics, safety techniques, and energy conservation with PT facilitation and cueing throughout session   Call nursing/pct to transfer to chair/use bathroom. Pt stated understanding.   Whiteboard updated with pt's current mobility status documented above   Safe to perform stand pivot transfer to/from chair/bedside commode CGA and HHA w/ nursing/PCT present   Importance of OOB tolerance 4-6 hrs/day to improve lung ventilation and expansion as well as strengthen postural musculature    Patient left HOB elevated with all lines intact, call button in reach, bed alarm on and RN and visitor present.    GOALS:   Multidisciplinary Problems     Physical Therapy Goals        Problem: Physical Therapy Goal    Goal Priority Disciplines Outcome Goal Variances Interventions   Physical Therapy Goal     PT, PT/OT Ongoing, Progressing     Description:  Goals to be met by:     Patient will increase functional independence with mobility by performin. Supine to sit with contact guard assist - not met  2. Sit to supine with contact guard assist - not met    3. Sit to stand transfer with contact guard assistance using least restrictive device - met 19  4. Gait  X 25 feet with Minimal Assistance using least restrictive device. - not met   5. Stand for 1 minutes with Contact Guard Assistance using  least restrictive  device or no AD - not met  6. Lower extremity exercise program x20 reps per handout, with independence to improve strength and activity tolerance.  - not met                         Time Tracking:     PT Received On: 11/26/19  PT Start Time: 1338     PT Stop Time: 1351  PT Total Time (min): 13 min     Billable Minutes:   · Therapeutic Activity 13    Treatment Type: Treatment  PT/PTA: PT       Jessica Abraham PT, DPT  11/26/2019  Pager: [phone]

## 2019-11-26 NOTE — PLAN OF CARE
Patient is awake and alert. Verbalizes understanding of condition and treatment. Participates with care, continues to verbalize needs. No new problems noted. Will continue to monitor.

## 2019-11-27 LAB
POCT GLUCOSE: 131 MG/DL (ref 70–110)
POCT GLUCOSE: 147 MG/DL (ref 70–110)

## 2019-11-27 PROCEDURE — 99233 SBSQ HOSP IP/OBS HIGH 50: CPT | Mod: ,,, | Performed by: PSYCHIATRY & NEUROLOGY

## 2019-11-27 PROCEDURE — 25000003 PHARM REV CODE 250: Performed by: NURSE PRACTITIONER

## 2019-11-27 PROCEDURE — 99233 PR SUBSEQUENT HOSPITAL CARE,LEVL III: ICD-10-PCS | Mod: ,,, | Performed by: PSYCHIATRY & NEUROLOGY

## 2019-11-27 PROCEDURE — 25000003 PHARM REV CODE 250

## 2019-11-27 PROCEDURE — 63600175 PHARM REV CODE 636 W HCPCS

## 2019-11-27 PROCEDURE — 94761 N-INVAS EAR/PLS OXIMETRY MLT: CPT

## 2019-11-27 PROCEDURE — 25000003 PHARM REV CODE 250: Performed by: PHYSICIAN ASSISTANT

## 2019-11-27 PROCEDURE — 63600175 PHARM REV CODE 636 W HCPCS: Performed by: FAMILY MEDICINE

## 2019-11-27 PROCEDURE — 25000003 PHARM REV CODE 250: Performed by: FAMILY MEDICINE

## 2019-11-27 PROCEDURE — 92507 TX SP LANG VOICE COMM INDIV: CPT

## 2019-11-27 PROCEDURE — 25000003 PHARM REV CODE 250: Performed by: PSYCHIATRY & NEUROLOGY

## 2019-11-27 PROCEDURE — 97535 SELF CARE MNGMENT TRAINING: CPT

## 2019-11-27 PROCEDURE — 63600175 PHARM REV CODE 636 W HCPCS: Performed by: PHYSICIAN ASSISTANT

## 2019-11-27 PROCEDURE — 97530 THERAPEUTIC ACTIVITIES: CPT

## 2019-11-27 PROCEDURE — 11000001 HC ACUTE MED/SURG PRIVATE ROOM

## 2019-11-27 RX ORDER — GABAPENTIN 300 MG/1
CAPSULE ORAL
Status: DISPENSED
Start: 2019-11-27 | End: 2019-11-28

## 2019-11-27 RX ORDER — HEPARIN SODIUM 5000 [USP'U]/ML
INJECTION, SOLUTION INTRAVENOUS; SUBCUTANEOUS
Status: COMPLETED
Start: 2019-11-27 | End: 2019-11-27

## 2019-11-27 RX ORDER — LIDOCAINE 50 MG/G
PATCH TOPICAL
Status: COMPLETED
Start: 2019-11-27 | End: 2019-11-27

## 2019-11-27 RX ORDER — HEPARIN SODIUM 5000 [USP'U]/ML
INJECTION, SOLUTION INTRAVENOUS; SUBCUTANEOUS
Status: DISPENSED
Start: 2019-11-27 | End: 2019-11-28

## 2019-11-27 RX ORDER — CLOPIDOGREL BISULFATE 75 MG/1
TABLET ORAL
Status: DISPENSED
Start: 2019-11-27 | End: 2019-11-27

## 2019-11-27 RX ORDER — CLOPIDOGREL BISULFATE 75 MG/1
TABLET ORAL
Status: DISCONTINUED
Start: 2019-11-27 | End: 2019-11-27 | Stop reason: WASHOUT

## 2019-11-27 RX ADMIN — HYDROCODONE BITARTRATE AND ACETAMINOPHEN 1 TABLET: 10; 325 TABLET ORAL at 03:11

## 2019-11-27 RX ADMIN — ASPIRIN 81 MG: 81 TABLET, COATED ORAL at 08:11

## 2019-11-27 RX ADMIN — HYDROCODONE BITARTRATE AND ACETAMINOPHEN 1 TABLET: 10; 325 TABLET ORAL at 04:11

## 2019-11-27 RX ADMIN — ACETAMINOPHEN 650 MG: 325 TABLET ORAL at 05:11

## 2019-11-27 RX ADMIN — LIDOCAINE 1 PATCH: 50 PATCH CUTANEOUS at 11:11

## 2019-11-27 RX ADMIN — POLYETHYLENE GLYCOL 3350 17 G: 17 POWDER, FOR SOLUTION ORAL at 08:11

## 2019-11-27 RX ADMIN — ACETAMINOPHEN 650 MG: 325 TABLET ORAL at 08:11

## 2019-11-27 RX ADMIN — DULOXETINE HYDROCHLORIDE 20 MG: 20 CAPSULE, DELAYED RELEASE ORAL at 08:11

## 2019-11-27 RX ADMIN — DRONABINOL 2.5 MG: 2.5 CAPSULE ORAL at 08:11

## 2019-11-27 RX ADMIN — GABAPENTIN 300 MG: 300 CAPSULE ORAL at 08:11

## 2019-11-27 RX ADMIN — POTASSIUM CHLORIDE 20 MEQ: 20 SOLUTION ORAL at 08:11

## 2019-11-27 RX ADMIN — GABAPENTIN 300 MG: 300 CAPSULE ORAL at 02:11

## 2019-11-27 RX ADMIN — LIDOCAINE: 50 PATCH TOPICAL at 11:11

## 2019-11-27 RX ADMIN — TIZANIDINE 4 MG: 4 TABLET ORAL at 09:11

## 2019-11-27 RX ADMIN — TRAZODONE HYDROCHLORIDE 25 MG: 50 TABLET ORAL at 08:11

## 2019-11-27 RX ADMIN — HYDROCODONE BITARTRATE AND ACETAMINOPHEN 1 TABLET: 10; 325 TABLET ORAL at 10:11

## 2019-11-27 RX ADMIN — HEPARIN SODIUM 5000 UNITS: 5000 INJECTION, SOLUTION INTRAVENOUS; SUBCUTANEOUS at 08:11

## 2019-11-27 RX ADMIN — HEPARIN SODIUM 5000 UNITS: 5000 INJECTION INTRAVENOUS; SUBCUTANEOUS at 05:11

## 2019-11-27 RX ADMIN — HEPARIN SODIUM 5000 UNITS: 5000 INJECTION INTRAVENOUS; SUBCUTANEOUS at 02:11

## 2019-11-27 RX ADMIN — HEPARIN SODIUM 5000 UNITS: 5000 INJECTION INTRAVENOUS; SUBCUTANEOUS at 08:11

## 2019-11-27 RX ADMIN — DICLOFENAC 4 G: 10 GEL TOPICAL at 08:11

## 2019-11-27 RX ADMIN — ACETAMINOPHEN 650 MG: 325 TABLET ORAL at 02:11

## 2019-11-27 RX ADMIN — CLOPIDOGREL BISULFATE 75 MG: 75 TABLET ORAL at 08:11

## 2019-11-27 RX ADMIN — TIZANIDINE 4 MG: 4 TABLET ORAL at 03:11

## 2019-11-27 RX ADMIN — TIZANIDINE 4 MG: 4 TABLET ORAL at 12:11

## 2019-11-27 RX ADMIN — Medication 6 MG: at 09:11

## 2019-11-27 NOTE — PLAN OF CARE
Goals remain appropriate. AURORA Valdez 11/27/2019   Problem: Occupational Therapy Goal  Goal: Occupational Therapy Goal  Description  Updated Goals to be met by: 7 days (11/29/19)     Patient will increase functional independence with ADLs by performing:    UE Dressing with Contact Guard Assistance.  LE Dressing with Minimal Assistance.  Grooming while standing with Minimal Assistance.  Toileting from bedside commode with Minimal Assistance for hygiene and clothing management.   Toilet transfer to bedside commode with Contact Guard Assistance.  Increased functional strength to WFL for ADLs.  Complete functional mobility household distance with CGA using AD as needed.    Outcome: Ongoing, Progressing

## 2019-11-27 NOTE — PLAN OF CARE
"CM called facility, [facility], which states they do not have a bed available and that the son did not fill out paperwork. States auth was also still pending and that they are now also declining since the son stated she would need longer term care per facility. Pt does not have a supplemental insurance and they do not have medicaid beds if pt needed a second insurance it would be Medicaid. States they are now declining her and stopping the insurance auth.   Spoke with pts son who states he was never sent the paperwork to fill out after he gave the contact information to the admissions liason at the facility. Also states was not looking into assisted or longer term care at this time. States his mother lives her sister and her nephew and she is safe. According to CM note for admit assessment  "her sister (who has Alzheimer's) and her nephew (who has COPD)" is who she lives with. Son states she is not the caretaker for the nephew and the aunt, pts sister, has someone who comes to the house (thinks home health) but that the alzheimer's is not advanced. He states it is a safe environment.   SW in contact with [facility], pts son's third choice. States they are reviewing updates and will be discussing with their DON. Will follow up Friday for acceptance. Pt aware. Son, Paolo aware as well and is looking at other facilities on list previously given.  "

## 2019-11-27 NOTE — PT/OT/SLP PROGRESS
"Speech Language Pathology Treatment    Patient Name:  Melva Barker   MRN:  [id number]  Admitting Diagnosis: Thrombotic stroke involving left middle cerebral artery    Recommendations:                 General Recommendations:  Cognitive-linguistic therapy and monitor diet tolerance  Diet recommendations:  Dental Soft, Liquid Diet Level: Thin   Aspiration Precautions: Feed only when awake/alert, HOB to 90 degrees and Standard aspiration precautions   General Precautions: Standard, fall, dental soft  Communication strategies:  none    Subjective     Patient awake and alert. Flat affect appreciated.   Patient states, "I have some trouble remembering things a little right now."    Objective:     Has the patient been evaluated by SLP for swallowing?   Yes  Keep patient NPO? No   Current Respiratory Status: room air      Patient seen for ongoing cognitive-linguistic therapy. RN provided patient with a second stroke education book to review/read. SLP located patient's glasses to review educational booklet with patient. She read sentences in booklet without difficulty or cues required. Patient answered comprehension questions regarding what was read in booklet. Patient independently recalled 3 signs of a stroke. SLP provided patient with FAST acronym to recall signs of a stroke. Ms. Barker reporting new memory difficulties, and SLP provided education and recommendations for functional memory strategies s/p discharge.  Patient v/u of all discussed.  Improved reasoning skills appreciated during non structured tasks.      Assessment:     Melva Barker is a 74 y.o. female with an SLP diagnosis of mild Cognitive-Linguistic Impairment.  Patient progressing towards goals. ST will continue to follow.     Goals:   Multidisciplinary Problems     SLP Goals        Problem: SLP Goal    Goal Priority Disciplines Outcome   SLP Goal     SLP Ongoing, Progressing   Description:  Speech Language Pathology Goals  Goals expected to be " met by 11/20; goals remain appropriate- continue until 12/4  1. Pt will tolerate a mechanical soft diet/thin liquids with no s/s of aspiration.   2. Pt will participate in conversation without cues to express thought.   3. Pt will complete simple functional reasoning tasks with 80% accy given min cues.   4. Pt will name name 13 items in a category in 1 minute with mod assist.  5. Pt will follow multi-step commands with 75% accuracy and min assist.  6. Pt will complete assessment of reading, writing, visual spatial skills to determine need for tx.                      Plan:     · Patient to be seen:  3 x/week   · Plan of Care expires:  12/13/19  · Plan of Care reviewed with:  patient   · SLP Follow-Up:  Yes       Discharge recommendations:  nursing facility, skilled   Barriers to Discharge:  None    Time Tracking:     SLP Treatment Date:   11/27/19  Speech Start Time:  1050  Speech Stop Time:  1115     Speech Total Time (min):  25 min    Billable Minutes: Speech Therapy Individual 25    BELIA Stewart, CCC-SLP  Pager: [phone]  11/27/2019

## 2019-11-27 NOTE — PLAN OF CARE
Problem: SLP Goal  Goal: SLP Goal  Description  Speech Language Pathology Goals  Goals expected to be met by 11/20; goals remain appropriate- continue until 12/4  1. Pt will tolerate a mechanical soft diet/thin liquids with no s/s of aspiration.   2. Pt will participate in conversation without cues to express thought.   3. Pt will complete simple functional reasoning tasks with 80% accy given min cues.   4. Pt will name name 13 items in a category in 1 minute with mod assist.  5. Pt will follow multi-step commands with 75% accuracy and min assist.  6. Pt will complete assessment of reading, writing, visual spatial skills to determine need for tx. -met  Outcome: Ongoing, Progressing   Patient progressing towards goals.   MARITO Stewart., CCC-SLP  Pager: [phone]  11/27/2019

## 2019-11-27 NOTE — PROGRESS NOTES
[facility]  Vascular Neurology  Comprehensive Stroke Center  Progress Note    Assessment/Plan:     * Thrombotic stroke involving left middle cerebral artery  Melva Barker is a 74 y.o. female with PMHx of HTN, HLD, and DM who presented to OSH with acute worsening of R-sided weakness after having experienced R-sided weakness x1 month. She was treated with tPA at OSH. CTA without LVO. MRI with infarct in L internal capsule and corona radiata. Etiology likely small vessel disease.    Awaiting insurance authorization for SNF placement.    Antithrombotics for secondary stroke prevention: Antiplatelets: ASA 81 mg + Plavix 75 mg -- Initially held due to retroperitoneal hematoma. Resumed DAPT on 11/17.  Statins for secondary stroke prevention and hyperlipidemia, if present: Statins: Atorvastatin- 40 mg daily,   Aggressive risk factor modification: HTN, HLD, Diet  Rehab efforts: The patient has been evaluated by a stroke team provider and the therapy needs have been fully considered based off the presenting complaints and exam findings. The following therapy evaluations are needed: PT evaluate and treat, OT evaluate and treat, SLP evaluate and treat, PM&R evaluate for appropriate placement - Dispo SNF  Diagnostics ordered/pending: None   VTE prophylaxis: Mechanical SCDs, heparin 5000 units q 8 hours   BP parameters: Infarct: Post tPA, SBP <160; Avoid hypotension    At high risk for infection  Patient hypotensive overnight from 11/13 and into the AM 11/14. Received multiple boluses and pressure responsive.  Concern was for sepsis vs pain medication-related.   Infectious workup was ordered -- Elevated procal and lactate with interval development of atelectasis in R lower lung.  Started 7-day course of IV Cefepime and Vanc for possible hospital acquired pneumonia. BCx NGTD.    Then with liquid diarrhea overnight 11/15; WBC 14, stopped empiric abx and ordered CDiff, stool culture. PO Vanc x 10  day course for possible C.diff. IVFs given and central line removed.  WBC then improved, pt remaining afebrile.     Will continue to monitor patient, labs, vitals for any signs or symptoms concerning for infection.    Moderate malnutrition  Appreciate Nutrition/RD assistance  Ordered Boost Glucose Control TID  Patient requesting appetite stimulant; will discuss with staff.  Encouraged her toward 100% completion of all meals and supplementation.    Orthostatic hypotension  Patient hypotensive overnight from 11/13 and into the AM 11/14. Received multiple boluses and pressure responsive.  Concern was for sepsis vs pain medication-related.   See infection risk section above    Pt then with orthostatic vital signs on 11/20 -- HR 120s to 90s from standing to lying, s/p IVFs 1L over 10 hours ordered on 11/21.   Nursing reported AFib on tele on 11/20, however EKG showed sinus tachycardia.   Pt receiving multiple boluses in recent days. Encouraged her toward aggressive PO hydration. Hb remains stable. Thigh-high KAREEN hose in place 11/27.  Will continue monitoring, consider midodrine if hypotension persists    Retroperitoneal hematoma  Pt had been c/o back pain in recent days, complicated by her hx of chronic back and sciatic nerve pain with use of Tizanidine and opiates at home.  Due to Hb downtrend and recent tPA administration, MRI Lumbar/Thoracic spine was ordered 11/8 which demonstrated large R psoas hematoma with IVC compression and probable thrombosis.   General Surgery was consulted; No acute intervention pursued.     Patient was then with acute decompensation overnight 11/9; became hypotensive, tachycardic, and apneic with Hb down to 6.7. Rapid Response was called; patient requiring intubation, blood transfusion, and transfer to medical ICU.   CTA abd/pelvis 11/9 showed hematoma with active extravasation/hemorrhage with IVC compression, hemoperitoneum.   Pt went to IR and is now s/p successful R L2 & L3 lumbar spinal  "artery embolization.   Patient underwent IR re-evaluation on 11/11. No arterial bleed evident on IR angiography.  CT abd/pelvis 11/17: Redemonstration of large right retroperitoneal hematoma which is mildly decreased in size compared to most recent      Hb remains stable, Continuing to monitor.    Debility  2/2 stroke  PT/OT eval & treat - Dispo SNF    Cytotoxic cerebral edema  Area of cytotoxic cerebral edema identified when reviewing brain imaging in the territory of the L middle cerebral artery. There is no mass effect associated with it. We will continue to monitor the patients clinical exam for any worsening of symptoms which may indicate expansion of the stroke or the area of the edema resulting in the clinical change. The pattern is suggestive of small vessel etiology.    Essential hypertension  Risk factor for stroke  Holding all BP meds due to recent hypotension  BP continues to be labile -- SBP  over last 24 hours.    asymptomatic hypotension SBP 80s recheck SBP 90s, place thigh high KAREEN hose.   Continue close monitoring. Previously considering initiating low-dose Midodrine if hypotension persisted.    Back pain  Reported hx chronic back pain with associated sciatic nerve pain as well, then complicated by acute retroperitoneal hematoma.  Consulted to anesthesia pain management    Pain now being controlled with gabapentin, duloxetine, trazodone, diclofenac gel, lidocaine patch, hydrcodone-acetaminophen (prn), tizanidine (prn.)     Nurse reporting pt continues to request pain medication "around the clock." Of note, pt reported to night nurse that her best pain score at home is usually 7 1/2.    Restarted pt's home regimen and will continue at discharge.  Per PNP, pt seeing Dr. Hilton Rowland in [place] for pain management. Recommend follow up outpatient and consideration of Addiction Psych referral.    Gastroparesis  Previously on Reglan 10 mg TID before meals, reglan then decreased to 5 mg TID. "   Reglan discontinued on 11/23, will monitor closely and reinitiate if necessary.     Diabetes mellitus type 2 without retinopathy  Stroke risk factor, poorly controlled on glargine 17 units daily  HbA1c 8.3%  Recommend tight glucose control  Currently on SSI   Diabetic diet         11/1/19 MRI with small vessel L MCA infarct, therapy recommending rehab  11/2 - Patient stepped down overnight. Adjusting BP regimen. Patient with continuous complaints if nausea. IV Zofran ordered with some relief. Patient has not has BM since 10/30. Ordering KUB to rule out obstruction. Neuro exam unchanged.   11/3 - NAEON. Patient reports she had no episodes of vomiting overnight. Still complaining of nausea. Mild lower abdomen tenderness on exam. Lipase ordered and WNL. Continue to monitor.   11/4 - denies nausea and vomiting. Abd tenderness remains present on palpitation. Continue to monitor. HCTZ increased yesterday. Pending rehab placement.   11/5 -  N/V x1 overnight zofran resolved, sucralfate started. Placement pending   11/6 - hypotensive, held all BP meds,  ml bolus, responded well. Increased BUN/Cr, start NS 75ml/hr.   11/7 patient complaining of sciatica pain.  Restarted home tizanidine.  Patient requesting hydrocodone but educated patient that nerve pain is not treated with opioid.  Degenerative disease seen on xray but no fracture.      11/8 Patient continues to experience back pain.  Now with leukocytosis and drop in h/h plan for MRI thoracic/lumbar spine to evaluate for epidural hematoma.    11/9: MRI Lumbar spine had demonstrated large R psoas hematoma with possible IVC compression; General Surgery consulted. Patient was then with acute decompensation overnight; became hypotensive, tachycardic, and apneic requiring intubation, blood transfusion, and medical ICU transfer. CTA abd/pelvis showed active extravasation/hemorrhage with hemoperitoneum. Pt now s/p successful lumbar artery embolization in IR. She was  extubated on arrival back to ICU and tolerated well.    11/11: Patient underwent IR re-evaluation yesterday. No arterial bleed evident on IR angiography. Patient neuro exam stable.   11/12 Stepped down from medical ICU this morning.  Patient still experiencing pain.  Anesthesia consulted for pain management.  D/C pain pump and started oral medications for muscle and nerve pain.  Will wean to home regimen. Waiting for repeat CBC, transfused one unit of blood 11/11.   Tachycardic/hypertensive will continue to monitor.  11/13: Upgraded diet per SLP recs. Pt continues to c/o R back pain, concern that tachycardia could be pain-induced; adjusted analgesic regimen further as well as antiemetic regimen with plans to eventually wean to home regimen. Replaced Phos. Dispo inpatient rehab.  11/14: patient hypotensive overnight and this morning requiring fluid boluses. Discontinued all pain medications except for celecoxib in case pain meds are contributing to hypotension. Lidocaine patch and Voltaren gel ordered as well. Infectious workup significant for minor atelectasis in R lower lung with increased procal and lactate. Patient started on IV antibiotics for community acquired pneumonia. Blood cultures pending. ICU notified of patient's hypotension  11/15: Liquid diarrhea, WBC 14, stopped empiric abx, ordered c. Diff and stool culture, start PO vanc, IVFs. Adjusted pain meds with goal to return to home regimen. Nauseous overnight, zofran given.   11/16: CT abdomen and pelvis to f/u retroperitoneal hematoma prior to starting DAPT, H/H stable. T max 99.4, tachy, WBC 13, C. Diff and stool cultures pending, increased  ml/hr, labile BP, continue to monitor. Remove central line.   11/17: CT abdomen/pelvis again with large retroperitoneal hematoma  but decreased in size, H/H uptrend, resume DAPT today. Diarrhea becoming thicker, WBC improved, a febrile, c. Diff antigen positive, toxins negative. Pain well controlled.   11/18  PCR negative, c.diff precautions and vancomycin d/c.  Neurologically stable waiting for placement.    11/19 Patient now recommending SNF.   11/20 Neurologically stable.  Waiting for placement.  Hypotensive episodes overnight given some fluids.     11/21 Orthostatic yesterday, HR 120s to 90s standing to lying, IVFs 1L over 10 hours ordered.   11/22: Nurse encouraging pt toward aggressive PO hydration in the setting of recent orthostatic hypotension. Hb remains stable.  11/23: Reglan discontinue  Importance of oral hydration reinforced with patient. H&H remain stable. Patient accepted to [facility], awaiting insurance auth.   11/24: patient became hypotensive with SBP in 70s and responded to 500 cc bolus, continuing to encourage oral fluid intake, will consider midodrine if hypotension persists  11/25: SBP WNL within last 24 hours. Decreased Norco to home dose q6hr prn, f/u w/ Dr. Canela pain management outpatient, consider addiction psych. C/o decreased appetite, advanced soft diet to level 6 so pt can receive gumbo as she requested, boost supplements, continue to monitor.   11/26: Patient requesting appetite stimulant; encouraged her toward 100% completion of all meals. Continuing to monitor her labile BP.  11/27: asymptomatic hypotension SBP 80s recheck SBP 90s, place thigh high KAREEN hose, consider midodrine if hypotension continues.     STROKE DOCUMENTATION   Acute Stroke Times   Last Known Normal Date: 10/31/19  Last Known Normal Time: 0630  Symptom Onset Date: 10/31/19  Symptom Onset Time: 0730  Stroke Team Called Date: 10/31/19  Stroke Team Called Time: 0936  Stroke Team Arrival Date: 10/31/19  Stroke Team Arrival Time: 0946  Decision to Treat Time for Alteplase: 1003    NIH Scale:  1a. Level of Consciousness: 0-->Alert, keenly responsive  1b. LOC Questions: 0-->Answers both questions correctly  1c. LOC Commands: 0-->Performs both tasks correctly  2. Best Gaze: 0-->Normal  3. Visual: 0-->No visual loss  4.  Facial Palsy: 1-->Minor paralysis (flattened nasolabial fold, asymmetry on smiling)  5a. Motor Arm, Left: 0-->No drift, limb holds 90 (or 45) degrees for full 10 secs  5b. Motor Arm, Right: 1-->Drift, limb holds 90 (or 45) degrees, but drifts down before full 10 secs, does not hit bed or other support  6a. Motor Leg, Left: 0-->No drift, leg holds 30 degree position for full 5 secs  6b. Motor Leg, Right: 3-->No effort against gravity, leg falls to bed immediately  7. Limb Ataxia: 0-->Absent  8. Sensory: 0-->Normal, no sensory loss  9. Best Language: 0-->No aphasia, normal  10. Dysarthria: 1-->Mild-to-moderate dysarthria, patient slurs at least some words and, at worst, can be understood with some difficulty  11. Extinction and Inattention (formerly Neglect): 0-->No abnormality  Total (NIH Stroke Scale): 6       Modified Solano Score: 0  [place] Coma Scale:    ABCD2 Score:    [id number] Score:   HAS -BLED Score:   ICH Score:   Hunt & Amaya Classification:      Hemorrhagic change of an Ischemic Stroke: Does this patient have an ischemic stroke with hemorrhagic changes? No     Neurologic Chief Complaint: R-sided weakness -- L MCA    Subjective:     Interval History: Patient is seen for follow-up neurological assessment and treatment recommendations:    asymptomatic hypotension SBP 80s recheck SBP 90s, place thigh high KAREEN hose, consider midodrine if hypotension continues.     HPI, Past Medical, Family, and Social History remains the same as documented in the initial encounter.     Review of Systems   Constitutional: Positive for appetite change. Negative for fever.   Musculoskeletal: Positive for arthralgias and back pain.   Neurological: Positive for facial asymmetry, speech difficulty and weakness.   Psychiatric/Behavioral: Negative for agitation and confusion.     Scheduled Meds:   acetaminophen  650 mg Oral Q8H    aspirin  81 mg Oral Daily    atorvastatin  40 mg Oral QHS    clopidogrel        clopidogrel  75  mg Oral Daily    diclofenac sodium  4 g Topical (Top) Daily    dronabinol  2.5 mg Oral Daily    DULoxetine  20 mg Oral BID    gabapentin  300 mg Oral TID    heparin (porcine)        heparin (porcine)  5,000 Units Subcutaneous Q8H    lidocaine  1 patch Transdermal Q24H    polyethylene glycol  17 g Oral Daily    potassium chloride 10%  20 mEq Oral BID    traZODone  25 mg Oral QHS     Continuous Infusions:    PRN Meds:dextrose 10 % in water (D10W), glucagon (human recombinant), HYDROcodone-acetaminophen, insulin aspart U-100, melatonin, ondansetron, sodium chloride 0.9%, tiZANidine    Objective:     Vital Signs (Most Recent):  Temp: 97.3 °F (36.3 °C) (11/27/19 1154)  Pulse: 95 (11/27/19 1154)  Resp: 18 (11/27/19 1154)  BP: 130/74 (11/27/19 1154)  SpO2: 99 % (11/27/19 1154)  BP Location: Left arm    Vital Signs Range (Last 24H):  Temp:  [97.3 °F (36.3 °C)-98.5 °F (36.9 °C)]   Pulse:  [65-97]   Resp:  [16-18]   BP: ()/(51-82)   SpO2:  [96 %-100 %]   BP Location: Left arm    Physical Exam   Constitutional: She is oriented to person, place, and time. She appears well-developed. No distress.   HENT:   Head: Normocephalic and atraumatic.   Eyes: Conjunctivae and EOM are normal.   Cardiovascular: Normal rate.   Pulmonary/Chest: Effort normal. No respiratory distress.   Musculoskeletal: She exhibits no edema or deformity.   Neurological: She is alert and oriented to person, place, and time.   Skin: Skin is warm and dry. She is not diaphoretic.   Psychiatric: Cognition and memory are not impaired. She is attentive.   Vitals reviewed.      Neurological Exam:   LOC: alert   Attention Span: good  Language: No aphasia  Articulation: Mild dysarthria  Orientation: Person, Time, Place  Visual Fields: Full  EOM (CN III, IV, VI): Full/intact  Facial Movement (CN VII): Lower facial weakness on the Right- mild  Motor: Arm left  Normal 5/5  Leg left  Normal 5/5  Arm right  Paresis: 4/5   Leg right Paresis: 2/5  Sensation:  Intact to light touch, temp  Tone: Normal tone throughout      Laboratory:  CMP:   No results for input(s): GLUCOSE, CALCIUM, ALBUMIN, PROT, NA, K, CO2, CL, BUN, CREATININE, ALKPHOS, ALT, AST, BILITOT in the last 24 hours.  CBC:   Recent Labs   Lab 11/25/19  0457   WBC 9.60   RBC 3.93*   HGB 11.1*   HCT 35.7*   *   MCV 91   MCH 28.2   MCHC 31.1*     Lipid Panel:   No results for input(s): CHOL, LDLCALC, HDL, TRIG in the last 168 hours.  Coagulation:   No results for input(s): PT, INR, APTT in the last 168 hours.  Platelet Aggregation Study: No results for input(s): PLTAGG, PLTAGINTERP, PLTAGREGLACO, ADPPLTAGGREG in the last 168 hours.  Hgb A1C:   No results for input(s): HGBA1C in the last 168 hours.  TSH:   No results for input(s): TSH in the last 168 hours.      Diagnostic Results      Brain Imaging      MRI Brain (11/01/19):  Acute lacunar type infarct coursing through the left posterior limb internal capsule and corona radiata.       CTH (10/31/19):  No acute intracranial pathology        Vessel Imaging      CTA Multiphase (10/31/19):  CTA head: Atherosclerotic disease of the cavernous and supraclinoid ICAs with mild narrowing.  Otherwise unremarkable CTA of the head specifically without evidence for proximal significant stenosis or occlusion.  CTA neck: Less than 50% proximal ICA stenosis by NASCET criteria.  Atherosclerotic disease with mild moderate narrowing of the right vertebral artery origin.  There is mild left V1 segment narrowing.  No significant focal vertebral artery stenosis or occlusion.  Interlobular septal thickening with tree in bud micro nodularity visualized upper lobes bilaterally which may represent inflammatory/infectious process clinical correlation and correlation with dedicated CT thorax recommended.  CT head: Bandlike region hypoattenuation left posterior limb internal capsule unchanged from prior suggestive for possible recent to subacute age infarction.  No evidence for  hydrocephalus or acute intracranial hemorrhage.  Clinical correlation and further evaluation with MRI if patient compatible.        Cardiac Imaging     Echo (11/01/19)  · Increased (hyperdynamic) left ventricular systolic function. The estimated ejection fraction is 75%.  · Concentric left ventricular remodeling.  · Normal LV diastolic function.  · No wall motion abnormalities.  · Normal right ventricular systolic function.  · Normal central venous pressure (3 mm Hg).  · Mild tricuspid regurgitation.  · The estimated PA systolic pressure is 26 mm Hg  · Echolucent structure next to the LA, likely representing hiatal hernia. Clinincal correlation is required.        Miscellaneous Imaging     CT Abdomen Pelvis WO Contrast 11/16/19  Angiography of the right renal vasculature, SMA, and multiple lumbar arteries demonstrates no evidence of active bleeding.    IR Angiogram Visceral 11/10/19  Angiography of the right renal vasculature, SMA, and multiple lumbar arteries demonstrates no evidence of active bleeding.     CTA Abdomen/Pelvis 11/10/19  Right-sided retroperitoneal hematoma with questionable foci of active extravasation within the inferior portion of the collection as described above.     IR Angiogram Visceral 11/9/19  Angiography of the lumbar arteries demonstrates active extravasation of the right L2 lumbar artery. Embolization using beads as described above.  Plan: Transfer patient to ICU.  Continued serial H and H follow-up.     CTA Abdomen/Pelvis 11/9/19  1. Large right retroperitoneal hematoma with findings concerning for active arterial extravasation/hemorrhage as detailed above.  There is associated hemorrhage extending throughout the right abdomen and pelvis/pericolic gutter with associated mass effect on the right kidney, which is anteriorly displaced.  2. Decreased opacification of the infahepatic and infrarenal IVC, possibly secondary to underlying mass effect versus partial thrombosis.  3. Nonspecific  cecal and right colonic wall thickening, possibly reactive due to hemoperitoneum.  4. Additional findings as detailed above.     MRI Thoracic/Lumbar Spine W WO Contr 11/8/19  Large hematoma within the right psoas muscle.  Compression and probable thrombosis of the IVC. Consider contrast enhanced CT with delayed phase.  Multilevel degenerative changes as detailed above, more severe in the lumbar spine.  Irregularity of the T12 through L3 endplates is most likely degenerative.      Juan Chen NP  [facility]  Department of Vascular Neurology   [facility]

## 2019-11-27 NOTE — PT/OT/SLP PROGRESS
Physical Therapy      Patient Name:  Melva Barker   MRN:  [id number]    Patient not seen today secondary to Patient unwilling to participate. Patient given extensive education on importance of participating in therapy to improve strength and independence and increased OOB activities. Patient verbalized understanding, however still refused any mobility due to fatigue and some pain in left side. TA charged for education. Will follow-up 11/29/19.    Lavonne Madrid, PTA

## 2019-11-27 NOTE — SUBJECTIVE & OBJECTIVE
Neurologic Chief Complaint: R-sided weakness -- L MCA    Subjective:     Interval History: Patient is seen for follow-up neurological assessment and treatment recommendations:    asymptomatic hypotension SBP 80s recheck SBP 90s, place thigh high KAREEN hose, consider midodrine if hypotension continues.     HPI, Past Medical, Family, and Social History remains the same as documented in the initial encounter.     Review of Systems   Constitutional: Positive for appetite change. Negative for fever.   Musculoskeletal: Positive for arthralgias and back pain.   Neurological: Positive for facial asymmetry, speech difficulty and weakness.   Psychiatric/Behavioral: Negative for agitation and confusion.     Scheduled Meds:   acetaminophen  650 mg Oral Q8H    aspirin  81 mg Oral Daily    atorvastatin  40 mg Oral QHS    clopidogrel        clopidogrel  75 mg Oral Daily    diclofenac sodium  4 g Topical (Top) Daily    dronabinol  2.5 mg Oral Daily    DULoxetine  20 mg Oral BID    gabapentin  300 mg Oral TID    heparin (porcine)        heparin (porcine)  5,000 Units Subcutaneous Q8H    lidocaine  1 patch Transdermal Q24H    polyethylene glycol  17 g Oral Daily    potassium chloride 10%  20 mEq Oral BID    traZODone  25 mg Oral QHS     Continuous Infusions:    PRN Meds:dextrose 10 % in water (D10W), glucagon (human recombinant), HYDROcodone-acetaminophen, insulin aspart U-100, melatonin, ondansetron, sodium chloride 0.9%, tiZANidine    Objective:     Vital Signs (Most Recent):  Temp: 97.3 °F (36.3 °C) (11/27/19 1154)  Pulse: 95 (11/27/19 1154)  Resp: 18 (11/27/19 1154)  BP: 130/74 (11/27/19 1154)  SpO2: 99 % (11/27/19 1154)  BP Location: Left arm    Vital Signs Range (Last 24H):  Temp:  [97.3 °F (36.3 °C)-98.5 °F (36.9 °C)]   Pulse:  [65-97]   Resp:  [16-18]   BP: ()/(51-82)   SpO2:  [96 %-100 %]   BP Location: Left arm    Physical Exam   Constitutional: She is oriented to person, place, and time. She appears  well-developed. No distress.   HENT:   Head: Normocephalic and atraumatic.   Eyes: Conjunctivae and EOM are normal.   Cardiovascular: Normal rate.   Pulmonary/Chest: Effort normal. No respiratory distress.   Musculoskeletal: She exhibits no edema or deformity.   Neurological: She is alert and oriented to person, place, and time.   Skin: Skin is warm and dry. She is not diaphoretic.   Psychiatric: Cognition and memory are not impaired. She is attentive.   Vitals reviewed.      Neurological Exam:   LOC: alert   Attention Span: good  Language: No aphasia  Articulation: Mild dysarthria  Orientation: Person, Time, Place  Visual Fields: Full  EOM (CN III, IV, VI): Full/intact  Facial Movement (CN VII): Lower facial weakness on the Right- mild  Motor: Arm left  Normal 5/5  Leg left  Normal 5/5  Arm right  Paresis: 4/5   Leg right Paresis: 2/5  Sensation: Intact to light touch, temp  Tone: Normal tone throughout      Laboratory:  CMP:   No results for input(s): GLUCOSE, CALCIUM, ALBUMIN, PROT, NA, K, CO2, CL, BUN, CREATININE, ALKPHOS, ALT, AST, BILITOT in the last 24 hours.  CBC:   Recent Labs   Lab 11/25/19  0457   WBC 9.60   RBC 3.93*   HGB 11.1*   HCT 35.7*   *   MCV 91   MCH 28.2   MCHC 31.1*     Lipid Panel:   No results for input(s): CHOL, LDLCALC, HDL, TRIG in the last 168 hours.  Coagulation:   No results for input(s): PT, INR, APTT in the last 168 hours.  Platelet Aggregation Study: No results for input(s): PLTAGG, PLTAGINTERP, PLTAGREGLACO, ADPPLTAGGREG in the last 168 hours.  Hgb A1C:   No results for input(s): HGBA1C in the last 168 hours.  TSH:   No results for input(s): TSH in the last 168 hours.      Diagnostic Results      Brain Imaging      MRI Brain (11/01/19):  Acute lacunar type infarct coursing through the left posterior limb internal capsule and corona radiata.       CTH (10/31/19):  No acute intracranial pathology        Vessel Imaging      CTA Multiphase (10/31/19):  CTA head: Atherosclerotic  Partially impaired: cannot see medication labels or newsprint, but can see obstacles in path, and the surrounding layout; can count fingers at arm's length disease of the cavernous and supraclinoid ICAs with mild narrowing.  Otherwise unremarkable CTA of the head specifically without evidence for proximal significant stenosis or occlusion.  CTA neck: Less than 50% proximal ICA stenosis by NASCET criteria.  Atherosclerotic disease with mild moderate narrowing of the right vertebral artery origin.  There is mild left V1 segment narrowing.  No significant focal vertebral artery stenosis or occlusion.  Interlobular septal thickening with tree in bud micro nodularity visualized upper lobes bilaterally which may represent inflammatory/infectious process clinical correlation and correlation with dedicated CT thorax recommended.  CT head: Bandlike region hypoattenuation left posterior limb internal capsule unchanged from prior suggestive for possible recent to subacute age infarction.  No evidence for hydrocephalus or acute intracranial hemorrhage.  Clinical correlation and further evaluation with MRI if patient compatible.        Cardiac Imaging     Echo (11/01/19)  · Increased (hyperdynamic) left ventricular systolic function. The estimated ejection fraction is 75%.  · Concentric left ventricular remodeling.  · Normal LV diastolic function.  · No wall motion abnormalities.  · Normal right ventricular systolic function.  · Normal central venous pressure (3 mm Hg).  · Mild tricuspid regurgitation.  · The estimated PA systolic pressure is 26 mm Hg  · Echolucent structure next to the LA, likely representing hiatal hernia. Clinincal correlation is required.        Miscellaneous Imaging     CT Abdomen Pelvis WO Contrast 11/16/19  Angiography of the right renal vasculature, SMA, and multiple lumbar arteries demonstrates no evidence of active bleeding.    IR Angiogram Visceral 11/10/19  Angiography of the right renal vasculature, SMA, and multiple lumbar arteries demonstrates no evidence of active bleeding.     CTA Abdomen/Pelvis 11/10/19  Right-sided retroperitoneal hematoma with  questionable foci of active extravasation within the inferior portion of the collection as described above.     IR Angiogram Visceral 11/9/19  Angiography of the lumbar arteries demonstrates active extravasation of the right L2 lumbar artery. Embolization using beads as described above.  Plan: Transfer patient to ICU.  Continued serial H and H follow-up.     CTA Abdomen/Pelvis 11/9/19  1. Large right retroperitoneal hematoma with findings concerning for active arterial extravasation/hemorrhage as detailed above.  There is associated hemorrhage extending throughout the right abdomen and pelvis/pericolic gutter with associated mass effect on the right kidney, which is anteriorly displaced.  2. Decreased opacification of the infahepatic and infrarenal IVC, possibly secondary to underlying mass effect versus partial thrombosis.  3. Nonspecific cecal and right colonic wall thickening, possibly reactive due to hemoperitoneum.  4. Additional findings as detailed above.     MRI Thoracic/Lumbar Spine W WO Contr 11/8/19  Large hematoma within the right psoas muscle.  Compression and probable thrombosis of the IVC. Consider contrast enhanced CT with delayed phase.  Multilevel degenerative changes as detailed above, more severe in the lumbar spine.  Irregularity of the T12 through L3 endplates is most likely degenerative.

## 2019-11-27 NOTE — PT/OT/SLP PROGRESS
"Occupational Therapy   Treatment    Name: Melva Barker  MRN: [id number]  Admitting Diagnosis:  Thrombotic stroke involving left middle cerebral artery       Recommendations:     Discharge Recommendations: nursing facility, skilled  Discharge Equipment Recommendations:  (TBD next level of care)  Barriers to discharge:  Decreased caregiver support    Assessment:     Melva Barker is a 74 y.o. female with a medical diagnosis of Thrombotic stroke involving left middle cerebral artery.  She presents with cont needs for OT services at this time. She would greatly benefit from SNF at post acute level of care to max functional outcomes. Performance deficits affecting function are weakness, impaired endurance, impaired self care skills, impaired functional mobilty, gait instability, decreased lower extremity function, impaired balance, decreased safety awareness, pain.     Rehab Prognosis:  Good; patient would benefit from acute skilled OT services to address these deficits and reach maximum level of function.       Plan:     Patient to be seen 3 x/week to address the above listed problems via self-care/home management, therapeutic activities, therapeutic exercises, neuromuscular re-education  · Plan of Care Expires: 12/13/19  · Plan of Care Reviewed with: patient    Subjective   Pt reported "I normally make 2 different dressings" - in regards to thanksgiving being tomorrow  Pain/Comfort:  · Pain Rating 1: (c/o lower back and R hip pain)  · Pain Addressed 1: Nurse notified  · Pain Rating Post-Intervention 1: (remains)    Objective:     Communicated with: RN prior to session.  Patient found right sidelying with telemetry, bed alarm upon OT entry to room.    General Precautions: Standard, fall, dental soft   Orthopedic Precautions:N/A     Occupational Performance:   Bed Mobility:  HOB flat  · Patient completed Supine to Sit with minimum assistance and with side rail   · Sit<>supine with min(A) and unilateral side " rail    Functional Mobility/Transfers:  · Patient completed Sit <> Stand Transfer with minimum assistance  with  hand-held assist   · Patient completed Bed <> Chair Transfer using Step Transfer technique with minimum assistance with hand-held assist  · Functional Mobility: 5 steps to and from bed/chair with min(A) and hand held assistance    Activities of Daily Living:  · Grooming: modified independence and set up; seated in chair for oral care  · Upper Body Dressing: minimum assistance to doff soiled gown and don clean gown while seated EOB  · Lower Body Dressing: moderate assistance to don pull up brief from EOB; kamari technique w/ modified 4 point position for L LE    AMPAC 6 Click ADL: 18     Therapeutic Exercise: chest press   Modality Used: rolled towel w/ B grasp  # of Sets: 1; # of Reps: 15  Endurance Level: fair  Where Completed: seated in bedside chair    Treatment & Education:  -Pt alert and agreeable to therapy session; reported orientation x4; declined blinds being open and light on after much encouragement  -discussed goal of walking to bathroom and/or chair t/f to bathroom in near future for ADLs and shower in bathroom with much encouragement  -therapeutic listening provided w/ patient about her spouse's death and the holiday time  -Communication board updated; questions/concerns addressed within OT scope of practice    Patient left HOB elevated with all lines intact, call button in reach and bed alarm onEducation:      GOALS:   Multidisciplinary Problems     Occupational Therapy Goals        Problem: Occupational Therapy Goal    Goal Priority Disciplines Outcome Interventions   Occupational Therapy Goal     OT, PT/OT Ongoing, Progressing    Description:  Updated Goals to be met by: 7 days (11/29/19)     Patient will increase functional independence with ADLs by performing:    UE Dressing with Contact Guard Assistance.  LE Dressing with Minimal Assistance.  Grooming while standing with Minimal  Assistance.  Toileting from bedside commode with Minimal Assistance for hygiene and clothing management.   Toilet transfer to bedside commode with Contact Guard Assistance.  Increased functional strength to WFL for ADLs.  Complete functional mobility household distance with CGA using AD as needed.                     Time Tracking:     OT Date of Treatment: 11/27/19  OT Start Time: 0821  OT Stop Time: 0847  OT Total Time (min): 26 min    Billable Minutes:Self Care/Home Management 25    AURORA Valdez  11/27/2019

## 2019-11-27 NOTE — ASSESSMENT & PLAN NOTE
Risk factor for stroke  Holding all BP meds due to recent hypotension  BP continues to be labile -- SBP  over last 24 hours.    asymptomatic hypotension SBP 80s recheck SBP 90s, place thigh high KAREEN hose.   Continue close monitoring. Previously considering initiating low-dose Midodrine if hypotension persisted.

## 2019-11-27 NOTE — ASSESSMENT & PLAN NOTE
Patient hypotensive overnight from 11/13 and into the AM 11/14. Received multiple boluses and pressure responsive.  Concern was for sepsis vs pain medication-related.   See infection risk section above    Pt then with orthostatic vital signs on 11/20 -- HR 120s to 90s from standing to lying, s/p IVFs 1L over 10 hours ordered on 11/21.   Nursing reported AFib on tele on 11/20, however EKG showed sinus tachycardia.   Pt receiving multiple boluses in recent days. Encouraged her toward aggressive PO hydration. Hb remains stable. Thigh-high KAREEN hose in place 11/27.  Will continue monitoring, consider midodrine if hypotension persists

## 2019-11-27 NOTE — ASSESSMENT & PLAN NOTE
"Reported hx chronic back pain with associated sciatic nerve pain as well, then complicated by acute retroperitoneal hematoma.  Consulted to anesthesia pain management    Pain now being controlled with gabapentin, duloxetine, trazodone, diclofenac gel, lidocaine patch, hydrcodone-acetaminophen (prn), tizanidine (prn.)     Nurse reporting pt continues to request pain medication "around the clock." Of note, pt reported to night nurse that her best pain score at home is usually 7 1/2.    Restarted pt's home regimen and will continue at discharge.  Per PNP, pt seeing Dr. Hilton Rowland in [place] for pain management. Recommend follow up outpatient and consideration of Addiction Psych referral.  "

## 2019-11-27 NOTE — PLAN OF CARE
Patient awake and alert, verbalizing needs, communicates well. Remains weak. Up to BSC with assist. No new problems noted, will monitor.

## 2019-11-28 LAB
BASOPHILS # BLD AUTO: 0.06 K/UL (ref 0–0.2)
BASOPHILS NFR BLD: 0.6 % (ref 0–1.9)
DIFFERENTIAL METHOD: ABNORMAL
EOSINOPHIL # BLD AUTO: 0.4 K/UL (ref 0–0.5)
EOSINOPHIL NFR BLD: 4 % (ref 0–8)
ERYTHROCYTE [DISTWIDTH] IN BLOOD BY AUTOMATED COUNT: 15.8 % (ref 11.5–14.5)
HCT VFR BLD AUTO: 37.9 % (ref 37–48.5)
HGB BLD-MCNC: 11.5 G/DL (ref 12–16)
IMM GRANULOCYTES # BLD AUTO: 0.11 K/UL (ref 0–0.04)
IMM GRANULOCYTES NFR BLD AUTO: 1.1 % (ref 0–0.5)
LYMPHOCYTES # BLD AUTO: 2.3 K/UL (ref 1–4.8)
LYMPHOCYTES NFR BLD: 22.5 % (ref 18–48)
MCH RBC QN AUTO: 27.8 PG (ref 27–31)
MCHC RBC AUTO-ENTMCNC: 30.3 G/DL (ref 32–36)
MCV RBC AUTO: 92 FL (ref 82–98)
MONOCYTES # BLD AUTO: 0.8 K/UL (ref 0.3–1)
MONOCYTES NFR BLD: 7.7 % (ref 4–15)
NEUTROPHILS # BLD AUTO: 6.6 K/UL (ref 1.8–7.7)
NEUTROPHILS NFR BLD: 64.1 % (ref 38–73)
NRBC BLD-RTO: 0 /100 WBC
PLATELET # BLD AUTO: 454 K/UL (ref 150–350)
PMV BLD AUTO: 10.3 FL (ref 9.2–12.9)
POCT GLUCOSE: 131 MG/DL (ref 70–110)
POCT GLUCOSE: 133 MG/DL (ref 70–110)
POCT GLUCOSE: 190 MG/DL (ref 70–110)
POCT GLUCOSE: 278 MG/DL (ref 70–110)
POCT GLUCOSE: 310 MG/DL (ref 70–110)
POCT GLUCOSE: 338 MG/DL (ref 70–110)
RBC # BLD AUTO: 4.14 M/UL (ref 4–5.4)
WBC # BLD AUTO: 10.25 K/UL (ref 3.9–12.7)

## 2019-11-28 PROCEDURE — 11000001 HC ACUTE MED/SURG PRIVATE ROOM

## 2019-11-28 PROCEDURE — 25000003 PHARM REV CODE 250: Performed by: PHYSICIAN ASSISTANT

## 2019-11-28 PROCEDURE — 99233 PR SUBSEQUENT HOSPITAL CARE,LEVL III: ICD-10-PCS | Mod: ,,, | Performed by: PSYCHIATRY & NEUROLOGY

## 2019-11-28 PROCEDURE — 25000003 PHARM REV CODE 250

## 2019-11-28 PROCEDURE — 99233 SBSQ HOSP IP/OBS HIGH 50: CPT | Mod: ,,, | Performed by: PSYCHIATRY & NEUROLOGY

## 2019-11-28 PROCEDURE — 94761 N-INVAS EAR/PLS OXIMETRY MLT: CPT

## 2019-11-28 PROCEDURE — 85025 COMPLETE CBC W/AUTO DIFF WBC: CPT

## 2019-11-28 PROCEDURE — 63600175 PHARM REV CODE 636 W HCPCS: Performed by: PHYSICIAN ASSISTANT

## 2019-11-28 PROCEDURE — 25000003 PHARM REV CODE 250: Performed by: PSYCHIATRY & NEUROLOGY

## 2019-11-28 PROCEDURE — 63600175 PHARM REV CODE 636 W HCPCS: Performed by: FAMILY MEDICINE

## 2019-11-28 PROCEDURE — 25000003 PHARM REV CODE 250: Performed by: FAMILY MEDICINE

## 2019-11-28 PROCEDURE — 36415 COLL VENOUS BLD VENIPUNCTURE: CPT

## 2019-11-28 RX ORDER — HYDROCODONE BITARTRATE AND ACETAMINOPHEN 10; 325 MG/1; MG/1
TABLET ORAL
Status: COMPLETED
Start: 2019-11-28 | End: 2019-11-28

## 2019-11-28 RX ADMIN — DICLOFENAC 4 G: 10 GEL TOPICAL at 08:11

## 2019-11-28 RX ADMIN — GABAPENTIN 300 MG: 300 CAPSULE ORAL at 08:11

## 2019-11-28 RX ADMIN — DULOXETINE HYDROCHLORIDE 20 MG: 20 CAPSULE, DELAYED RELEASE ORAL at 08:11

## 2019-11-28 RX ADMIN — HYDROCODONE BITARTRATE AND ACETAMINOPHEN 1 TABLET: 10; 325 TABLET ORAL at 02:11

## 2019-11-28 RX ADMIN — ASPIRIN 81 MG: 81 TABLET, COATED ORAL at 08:11

## 2019-11-28 RX ADMIN — POTASSIUM CHLORIDE 20 MEQ: 20 SOLUTION ORAL at 08:11

## 2019-11-28 RX ADMIN — LIDOCAINE 1 PATCH: 50 PATCH CUTANEOUS at 11:11

## 2019-11-28 RX ADMIN — ACETAMINOPHEN 650 MG: 325 TABLET ORAL at 05:11

## 2019-11-28 RX ADMIN — HEPARIN SODIUM 5000 UNITS: 5000 INJECTION INTRAVENOUS; SUBCUTANEOUS at 03:11

## 2019-11-28 RX ADMIN — HEPARIN SODIUM 5000 UNITS: 5000 INJECTION INTRAVENOUS; SUBCUTANEOUS at 08:11

## 2019-11-28 RX ADMIN — GABAPENTIN 300 MG: 300 CAPSULE ORAL at 03:11

## 2019-11-28 RX ADMIN — TRAZODONE HYDROCHLORIDE 25 MG: 50 TABLET ORAL at 08:11

## 2019-11-28 RX ADMIN — HEPARIN SODIUM 5000 UNITS: 5000 INJECTION INTRAVENOUS; SUBCUTANEOUS at 05:11

## 2019-11-28 RX ADMIN — HYDROCODONE BITARTRATE AND ACETAMINOPHEN 1 TABLET: 10; 325 TABLET ORAL at 11:11

## 2019-11-28 RX ADMIN — INSULIN ASPART 3 UNITS: 100 INJECTION, SOLUTION INTRAVENOUS; SUBCUTANEOUS at 05:11

## 2019-11-28 RX ADMIN — TIZANIDINE 4 MG: 4 TABLET ORAL at 08:11

## 2019-11-28 RX ADMIN — ATORVASTATIN CALCIUM 40 MG: 20 TABLET, FILM COATED ORAL at 08:11

## 2019-11-28 RX ADMIN — DRONABINOL 2.5 MG: 2.5 CAPSULE ORAL at 08:11

## 2019-11-28 RX ADMIN — TIZANIDINE 4 MG: 4 TABLET ORAL at 05:11

## 2019-11-28 RX ADMIN — INSULIN ASPART 4 UNITS: 100 INJECTION, SOLUTION INTRAVENOUS; SUBCUTANEOUS at 11:11

## 2019-11-28 RX ADMIN — ACETAMINOPHEN 650 MG: 325 TABLET ORAL at 08:11

## 2019-11-28 RX ADMIN — CLOPIDOGREL BISULFATE 75 MG: 75 TABLET ORAL at 08:11

## 2019-11-28 RX ADMIN — HYDROCODONE BITARTRATE AND ACETAMINOPHEN 1 TABLET: 10; 325 TABLET ORAL at 05:11

## 2019-11-28 RX ADMIN — INSULIN ASPART 4 UNITS: 100 INJECTION, SOLUTION INTRAVENOUS; SUBCUTANEOUS at 09:11

## 2019-11-28 NOTE — ASSESSMENT & PLAN NOTE
"Reported hx chronic back pain with associated sciatic nerve pain as well, then complicated by acute retroperitoneal hematoma.  Consulted to anesthesia pain management    Pain now being controlled with gabapentin, duloxetine, trazodone, diclofenac gel, lidocaine patch, hydrcodone-acetaminophen (prn), tizanidine (prn.)     Nurse reporting pt continues to request pain medication "around the clock." Of note, pt reported to night nurse that her best pain score at home is usually 7 1/2.    Restarted pt's home regimen and will continue at discharge.  Per PNP, pt seeing Dr. Hilton Rowland in [place] for pain management. Recommend follow up outpatient and consideration of Addiction Psych referral.  "

## 2019-11-28 NOTE — ASSESSMENT & PLAN NOTE
Melva Barker is a 74 y.o. female with PMHx of HTN, HLD, and DM who presented to OSH with acute worsening of R-sided weakness after having experienced R-sided weakness x1 month. She was treated with tPA at OSH. CTA without LVO. MRI with infarct in L internal capsule and corona radiata. Etiology likely small vessel disease.    Awaiting son to sign SNF paperwork.    Antithrombotics for secondary stroke prevention: Antiplatelets: ASA 81 mg + Plavix 75 mg -- Initially held due to retroperitoneal hematoma. Resumed DAPT on 11/17.  Statins for secondary stroke prevention and hyperlipidemia, if present: Statins: Atorvastatin- 40 mg daily,   Aggressive risk factor modification: HTN, HLD, Diet  Rehab efforts: The patient has been evaluated by a stroke team provider and the therapy needs have been fully considered based off the presenting complaints and exam findings. The following therapy evaluations are needed: PT evaluate and treat, OT evaluate and treat, SLP evaluate and treat, PM&R evaluate for appropriate placement - Dispo SNF  Diagnostics ordered/pending: None   VTE prophylaxis: Mechanical SCDs, heparin 5000 units q 8 hours   BP parameters: Infarct: Post tPA, SBP <160; Avoid hypotension

## 2019-11-28 NOTE — SUBJECTIVE & OBJECTIVE
Neurologic Chief Complaint: R-sided weakness -- L MCA    Subjective:     Interval History: Patient is seen for follow-up neurological assessment and treatment recommendations:TJON     HPI, Past Medical, Family, and Social History remains the same as documented in the initial encounter.     Review of Systems   Constitutional: Positive for appetite change. Negative for fever.   Musculoskeletal: Positive for arthralgias and back pain.   Neurological: Positive for facial asymmetry, speech difficulty and weakness.   Psychiatric/Behavioral: Negative for agitation and confusion.     Scheduled Meds:   acetaminophen  650 mg Oral Q8H    aspirin  81 mg Oral Daily    atorvastatin  40 mg Oral QHS    clopidogrel  75 mg Oral Daily    diclofenac sodium  4 g Topical (Top) Daily    dronabinol  2.5 mg Oral Daily    DULoxetine  20 mg Oral BID    gabapentin  300 mg Oral TID    heparin (porcine)  5,000 Units Subcutaneous Q8H    lidocaine  1 patch Transdermal Q24H    polyethylene glycol  17 g Oral Daily    potassium chloride 10%  20 mEq Oral BID    traZODone  25 mg Oral QHS     Continuous Infusions:    PRN Meds:dextrose 10 % in water (D10W), glucagon (human recombinant), HYDROcodone-acetaminophen, insulin aspart U-100, melatonin, ondansetron, sodium chloride 0.9%, tiZANidine    Objective:     Vital Signs (Most Recent):  Temp: 98.1 °F (36.7 °C) (11/28/19 1117)  Pulse: 72 (11/28/19 1117)  Resp: 17 (11/28/19 1117)  BP: 122/61 (11/28/19 1117)  SpO2: 97 % (11/28/19 1117)  BP Location: Right arm    Vital Signs Range (Last 24H):  Temp:  [97.6 °F (36.4 °C)-98.9 °F (37.2 °C)]   Pulse:  [58-99]   Resp:  [16-18]   BP: ()/(61-82)   SpO2:  [96 %-99 %]   BP Location: Right arm    Physical Exam   Constitutional: She is oriented to person, place, and time. She appears well-developed. No distress.   HENT:   Head: Normocephalic and atraumatic.   Eyes: Conjunctivae and EOM are normal.   Cardiovascular: Normal rate.   Pulmonary/Chest:  Effort normal. No respiratory distress.   Musculoskeletal: She exhibits no edema or deformity.   Neurological: She is alert and oriented to person, place, and time.   Skin: Skin is warm and dry. She is not diaphoretic.   Psychiatric: Cognition and memory are not impaired. She is attentive.   Vitals reviewed.      Neurological Exam:   LOC: alert   Attention Span: good  Language: No aphasia  Articulation: Mild dysarthria  Orientation: Person, Time, Place  Visual Fields: Full  EOM (CN III, IV, VI): Full/intact  Facial Movement (CN VII): Lower facial weakness on the Right- mild  Motor: Arm left  Normal 5/5  Leg left  Normal 5/5  Arm right  Paresis: 4/5   Leg right Paresis: 2/5  Sensation: Intact to light touch, temp  Tone: Normal tone throughout      Laboratory:  CMP:   No results for input(s): GLUCOSE, CALCIUM, ALBUMIN, PROT, NA, K, CO2, CL, BUN, CREATININE, ALKPHOS, ALT, AST, BILITOT in the last 24 hours.  CBC:   Recent Labs   Lab 11/28/19  0455   WBC 10.25   RBC 4.14   HGB 11.5*   HCT 37.9   *   MCV 92   MCH 27.8   MCHC 30.3*     Lipid Panel:   No results for input(s): CHOL, LDLCALC, HDL, TRIG in the last 168 hours.  Coagulation:   No results for input(s): PT, INR, APTT in the last 168 hours.  Platelet Aggregation Study: No results for input(s): PLTAGG, PLTAGINTERP, PLTAGREGLACO, ADPPLTAGGREG in the last 168 hours.  Hgb A1C:   No results for input(s): HGBA1C in the last 168 hours.  TSH:   No results for input(s): TSH in the last 168 hours.      Diagnostic Results      Brain Imaging      MRI Brain (11/01/19):  Acute lacunar type infarct coursing through the left posterior limb internal capsule and corona radiata.       CTH (10/31/19):  No acute intracranial pathology        Vessel Imaging      CTA Multiphase (10/31/19):  CTA head: Atherosclerotic disease of the cavernous and supraclinoid ICAs with mild narrowing.  Otherwise unremarkable CTA of the head specifically without evidence for proximal significant  stenosis or occlusion.  CTA neck: Less than 50% proximal ICA stenosis by NASCET criteria.  Atherosclerotic disease with mild moderate narrowing of the right vertebral artery origin.  There is mild left V1 segment narrowing.  No significant focal vertebral artery stenosis or occlusion.  Interlobular septal thickening with tree in bud micro nodularity visualized upper lobes bilaterally which may represent inflammatory/infectious process clinical correlation and correlation with dedicated CT thorax recommended.  CT head: Bandlike region hypoattenuation left posterior limb internal capsule unchanged from prior suggestive for possible recent to subacute age infarction.  No evidence for hydrocephalus or acute intracranial hemorrhage.  Clinical correlation and further evaluation with MRI if patient compatible.        Cardiac Imaging     Echo (11/01/19)  · Increased (hyperdynamic) left ventricular systolic function. The estimated ejection fraction is 75%.  · Concentric left ventricular remodeling.  · Normal LV diastolic function.  · No wall motion abnormalities.  · Normal right ventricular systolic function.  · Normal central venous pressure (3 mm Hg).  · Mild tricuspid regurgitation.  · The estimated PA systolic pressure is 26 mm Hg  · Echolucent structure next to the LA, likely representing hiatal hernia. Clinincal correlation is required.        Miscellaneous Imaging     CT Abdomen Pelvis WO Contrast 11/16/19  Angiography of the right renal vasculature, SMA, and multiple lumbar arteries demonstrates no evidence of active bleeding.    IR Angiogram Visceral 11/10/19  Angiography of the right renal vasculature, SMA, and multiple lumbar arteries demonstrates no evidence of active bleeding.     CTA Abdomen/Pelvis 11/10/19  Right-sided retroperitoneal hematoma with questionable foci of active extravasation within the inferior portion of the collection as described above.     IR Angiogram Visceral 11/9/19  Angiography of the  lumbar arteries demonstrates active extravasation of the right L2 lumbar artery. Embolization using beads as described above.  Plan: Transfer patient to ICU.  Continued serial H and H follow-up.     CTA Abdomen/Pelvis 11/9/19  1. Large right retroperitoneal hematoma with findings concerning for active arterial extravasation/hemorrhage as detailed above.  There is associated hemorrhage extending throughout the right abdomen and pelvis/pericolic gutter with associated mass effect on the right kidney, which is anteriorly displaced.  2. Decreased opacification of the infahepatic and infrarenal IVC, possibly secondary to underlying mass effect versus partial thrombosis.  3. Nonspecific cecal and right colonic wall thickening, possibly reactive due to hemoperitoneum.  4. Additional findings as detailed above.     MRI Thoracic/Lumbar Spine W WO Contr 11/8/19  Large hematoma within the right psoas muscle.  Compression and probable thrombosis of the IVC. Consider contrast enhanced CT with delayed phase.  Multilevel degenerative changes as detailed above, more severe in the lumbar spine.  Irregularity of the T12 through L3 endplates is most likely degenerative.

## 2019-11-28 NOTE — PLAN OF CARE
POC reviewed with patient. All questions and concerns reviewed. Fall/safety precautions implemented and maintained. Pain management provided. Blood glucose monitored. No acute events noted this shift. Please see flow sheet for full assessment and vitals. Bed locked in lowest position. Call bell within reach. Will continue to monitor.

## 2019-11-28 NOTE — ASSESSMENT & PLAN NOTE
Appreciate Nutrition/RD assistance  Ordered Boost Glucose Control TID  Appetite stimulate dronabinol   Encouraged her toward 100% completion of all meals and supplementation.

## 2019-11-28 NOTE — PROGRESS NOTES
[facility]  Vascular Neurology  Comprehensive Stroke Center  Progress Note    Assessment/Plan:     * Thrombotic stroke involving left middle cerebral artery  Melva Barker is a 74 y.o. female with PMHx of HTN, HLD, and DM who presented to OSH with acute worsening of R-sided weakness after having experienced R-sided weakness x1 month. She was treated with tPA at OSH. CTA without LVO. MRI with infarct in L internal capsule and corona radiata. Etiology likely small vessel disease.    Awaiting son to sign SNF paperwork.    Antithrombotics for secondary stroke prevention: Antiplatelets: ASA 81 mg + Plavix 75 mg -- Initially held due to retroperitoneal hematoma. Resumed DAPT on 11/17.  Statins for secondary stroke prevention and hyperlipidemia, if present: Statins: Atorvastatin- 40 mg daily,   Aggressive risk factor modification: HTN, HLD, Diet  Rehab efforts: The patient has been evaluated by a stroke team provider and the therapy needs have been fully considered based off the presenting complaints and exam findings. The following therapy evaluations are needed: PT evaluate and treat, OT evaluate and treat, SLP evaluate and treat, PM&R evaluate for appropriate placement - Dispo SNF  Diagnostics ordered/pending: None   VTE prophylaxis: Mechanical SCDs, heparin 5000 units q 8 hours   BP parameters: Infarct: Post tPA, SBP <160; Avoid hypotension    Cytotoxic cerebral edema  Area of cytotoxic cerebral edema identified when reviewing brain imaging in the territory of the L middle cerebral artery. There is no mass effect associated with it. We will continue to monitor the patients clinical exam for any worsening of symptoms which may indicate expansion of the stroke or the area of the edema resulting in the clinical change. The pattern is suggestive of small vessel etiology.    Retroperitoneal hematoma  Pt had been c/o back pain in recent days, complicated by her hx of chronic back and  sciatic nerve pain with use of Tizanidine and opiates at home.  Due to Hb downtrend and recent tPA administration, MRI Lumbar/Thoracic spine was ordered 11/8 which demonstrated large R psoas hematoma with IVC compression and probable thrombosis.   General Surgery was consulted; No acute intervention pursued.     Patient was then with acute decompensation overnight 11/9; became hypotensive, tachycardic, and apneic with Hb down to 6.7. Rapid Response was called; patient requiring intubation, blood transfusion, and transfer to medical ICU.   CTA abd/pelvis 11/9 showed hematoma with active extravasation/hemorrhage with IVC compression, hemoperitoneum.   Pt went to IR and is now s/p successful R L2 & L3 lumbar spinal artery embolization.   Patient underwent IR re-evaluation on 11/11. No arterial bleed evident on IR angiography.  CT abd/pelvis 11/17: Redemonstration of large right retroperitoneal hematoma which is mildly decreased in size compared to most recent      Hb remains stable, Continuing to monitor.    At high risk for infection  Patient hypotensive overnight from 11/13 and into the AM 11/14. Received multiple boluses and pressure responsive.  Concern was for sepsis vs pain medication-related.   Infectious workup was ordered -- Elevated procal and lactate with interval development of atelectasis in R lower lung.  Started 7-day course of IV Cefepime and Vanc for possible hospital acquired pneumonia. BCx NGTD.    Then with liquid diarrhea overnight 11/15; WBC 14, stopped empiric abx and ordered CDiff, stool culture. PO Vanc x 10 day course for possible C.diff. IVFs given and central line removed.  WBC then improved, pt remaining afebrile.      Will continue to monitor patient, labs, vitals for any signs or symptoms concerning for infection.    Moderate malnutrition  Appreciate Nutrition/RD assistance  Ordered Boost Glucose Control TID  Appetite stimulate dronabinol   Encouraged her toward 100% completion of all  "meals and supplementation.    Orthostatic hypotension  Patient hypotensive overnight from 11/13 and into the AM 11/14. Received multiple boluses and pressure responsive.  Concern was for sepsis vs pain medication-related.   See infection risk section above    Pt then with orthostatic vital signs on 11/20 -- HR 120s to 90s from standing to lying, s/p IVFs 1L over 10 hours ordered on 11/21.   Nursing reported AFib on tele on 11/20, however EKG showed sinus tachycardia.   Pt receiving multiple boluses in recent days. Encouraged her toward aggressive PO hydration. Hb remains stable. Thigh-high KAREEN hose in place 11/27.  Will continue monitoring, consider midodrine if hypotension persists    Debility  2/2 stroke  PT/OT eval & treat - Dispo SNF    Essential hypertension  Risk factor for stroke  Holding all BP meds due to recent hypotension  BP continues to be labile -- SBP  over last 24 hours.    asymptomatic hypotension SBP 80s recheck SBP 90s, place thigh high KAREEN hose.   Continue close monitoring. Previously considering initiating low-dose Midodrine if hypotension persisted.    Back pain  Reported hx chronic back pain with associated sciatic nerve pain as well, then complicated by acute retroperitoneal hematoma.  Consulted to anesthesia pain management    Pain now being controlled with gabapentin, duloxetine, trazodone, diclofenac gel, lidocaine patch, hydrcodone-acetaminophen (prn), tizanidine (prn.)     Nurse reporting pt continues to request pain medication "around the clock." Of note, pt reported to night nurse that her best pain score at home is usually 7 1/2.    Restarted pt's home regimen and will continue at discharge.  Per PNP, pt seeing Dr. Hilton Rowland in [place] for pain management. Recommend follow up outpatient and consideration of Addiction Psych referral.    Gastroparesis  Previously on Reglan 10 mg TID before meals, reglan then decreased to 5 mg TID.   Reglan discontinued on 11/23, will " monitor closely and reinitiate if necessary.     Diabetes mellitus type 2 without retinopathy  Stroke risk factor, poorly controlled on glargine 17 units daily  HbA1c 8.3%  Recommend tight glucose control  Currently on SSI   Diabetic diet         11/1/19 MRI with small vessel L MCA infarct, therapy recommending rehab  11/2 - Patient stepped down overnight. Adjusting BP regimen. Patient with continuous complaints if nausea. IV Zofran ordered with some relief. Patient has not has BM since 10/30. Ordering KUB to rule out obstruction. Neuro exam unchanged.   11/3 - NAEON. Patient reports she had no episodes of vomiting overnight. Still complaining of nausea. Mild lower abdomen tenderness on exam. Lipase ordered and WNL. Continue to monitor.   11/4 - denies nausea and vomiting. Abd tenderness remains present on palpitation. Continue to monitor. HCTZ increased yesterday. Pending rehab placement.   11/5 -  N/V x1 overnight zofran resolved, sucralfate started. Placement pending   11/6 - hypotensive, held all BP meds,  ml bolus, responded well. Increased BUN/Cr, start NS 75ml/hr.   11/7 patient complaining of sciatica pain.  Restarted home tizanidine.  Patient requesting hydrocodone but educated patient that nerve pain is not treated with opioid.  Degenerative disease seen on xray but no fracture.      11/8 Patient continues to experience back pain.  Now with leukocytosis and drop in h/h plan for MRI thoracic/lumbar spine to evaluate for epidural hematoma.    11/9: MRI Lumbar spine had demonstrated large R psoas hematoma with possible IVC compression; General Surgery consulted. Patient was then with acute decompensation overnight; became hypotensive, tachycardic, and apneic requiring intubation, blood transfusion, and medical ICU transfer. CTA abd/pelvis showed active extravasation/hemorrhage with hemoperitoneum. Pt now s/p successful lumbar artery embolization in IR. She was extubated on arrival back to ICU and  tolerated well.    11/11: Patient underwent IR re-evaluation yesterday. No arterial bleed evident on IR angiography. Patient neuro exam stable.   11/12 Stepped down from medical ICU this morning.  Patient still experiencing pain.  Anesthesia consulted for pain management.  D/C pain pump and started oral medications for muscle and nerve pain.  Will wean to home regimen. Waiting for repeat CBC, transfused one unit of blood 11/11.   Tachycardic/hypertensive will continue to monitor.  11/13: Upgraded diet per SLP recs. Pt continues to c/o R back pain, concern that tachycardia could be pain-induced; adjusted analgesic regimen further as well as antiemetic regimen with plans to eventually wean to home regimen. Replaced Phos. Dispo inpatient rehab.  11/14: patient hypotensive overnight and this morning requiring fluid boluses. Discontinued all pain medications except for celecoxib in case pain meds are contributing to hypotension. Lidocaine patch and Voltaren gel ordered as well. Infectious workup significant for minor atelectasis in R lower lung with increased procal and lactate. Patient started on IV antibiotics for community acquired pneumonia. Blood cultures pending. ICU notified of patient's hypotension  11/15: Liquid diarrhea, WBC 14, stopped empiric abx, ordered c. Diff and stool culture, start PO vanc, IVFs. Adjusted pain meds with goal to return to home regimen. Nauseous overnight, zofran given.   11/16: CT abdomen and pelvis to f/u retroperitoneal hematoma prior to starting DAPT, H/H stable. T max 99.4, tachy, WBC 13, C. Diff and stool cultures pending, increased  ml/hr, labile BP, continue to monitor. Remove central line.   11/17: CT abdomen/pelvis again with large retroperitoneal hematoma  but decreased in size, H/H uptrend, resume DAPT today. Diarrhea becoming thicker, WBC improved, a febrile, c. Diff antigen positive, toxins negative. Pain well controlled.   11/18 PCR negative, c.diff precautions and  vancomycin d/c.  Neurologically stable waiting for placement.    11/19 Patient now recommending SNF.   11/20 Neurologically stable.  Waiting for placement.  Hypotensive episodes overnight given some fluids.     11/21 Orthostatic yesterday, HR 120s to 90s standing to lying, IVFs 1L over 10 hours ordered.   11/22: Nurse encouraging pt toward aggressive PO hydration in the setting of recent orthostatic hypotension. Hb remains stable.  11/23: Reglan discontinue  Importance of oral hydration reinforced with patient. H&H remain stable. Patient accepted to [facility], awaiting insurance auth.   11/24: patient became hypotensive with SBP in 70s and responded to 500 cc bolus, continuing to encourage oral fluid intake, will consider midodrine if hypotension persists  11/25: SBP WNL within last 24 hours. Decreased Norco to home dose q6hr prn, f/u w/ Dr. Canela pain management outpatient, consider addiction psych. C/o decreased appetite, advanced soft diet to level 6 so pt can receive gumbo as she requested, boost supplements, continue to monitor.   11/26: Patient requesting appetite stimulant; encouraged her toward 100% completion of all meals. Continuing to monitor her labile BP.  11/27: asymptomatic hypotension SBP 80s recheck SBP 90s, place thigh high KAREEN hose, consider midodrine if hypotension continues.     STROKE DOCUMENTATION   Acute Stroke Times   Last Known Normal Date: 10/31/19  Last Known Normal Time: 0630  Symptom Onset Date: 10/31/19  Symptom Onset Time: 0730  Stroke Team Called Date: 10/31/19  Stroke Team Called Time: 0936  Stroke Team Arrival Date: 10/31/19  Stroke Team Arrival Time: 0946  Decision to Treat Time for Alteplase: 1003    NIH Scale:  1a. Level of Consciousness: 0-->Alert, keenly responsive  1b. LOC Questions: 0-->Answers both questions correctly  1c. LOC Commands: 0-->Performs both tasks correctly  2. Best Gaze: 0-->Normal  3. Visual: 0-->No visual loss  4. Facial Palsy: 1-->Minor paralysis  (flattened nasolabial fold, asymmetry on smiling)  5a. Motor Arm, Left: 0-->No drift, limb holds 90 (or 45) degrees for full 10 secs  5b. Motor Arm, Right: 1-->Drift, limb holds 90 (or 45) degrees, but drifts down before full 10 secs, does not hit bed or other support  6a. Motor Leg, Left: 0-->No drift, leg holds 30 degree position for full 5 secs  6b. Motor Leg, Right: 2-->Some effort against gravity, leg falls to bed by 5 secs, but has some effort against gravity  7. Limb Ataxia: 0-->Absent  8. Sensory: 0-->Normal, no sensory loss  9. Best Language: 0-->No aphasia, normal  10. Dysarthria: 1-->Mild-to-moderate dysarthria, patient slurs at least some words and, at worst, can be understood with some difficulty  11. Extinction and Inattention (formerly Neglect): 0-->No abnormality  Total (NIH Stroke Scale): 5       Modified Pitkin Score: 0  Nabila Coma Scale:    ABCD2 Score:    [id number] Score:   HAS -BLED Score:   ICH Score:   Hunt & Amaya Classification:      Hemorrhagic change of an Ischemic Stroke: Does this patient have an ischemic stroke with hemorrhagic changes? No     Neurologic Chief Complaint: R-sided weakness -- L MCA    Subjective:     Interval History: Patient is seen for follow-up neurological assessment and treatment recommendations:DARREN     HPI, Past Medical, Family, and Social History remains the same as documented in the initial encounter.     Review of Systems   Constitutional: Positive for appetite change. Negative for fever.   Musculoskeletal: Positive for arthralgias and back pain.   Neurological: Positive for facial asymmetry, speech difficulty and weakness.   Psychiatric/Behavioral: Negative for agitation and confusion.     Scheduled Meds:   acetaminophen  650 mg Oral Q8H    aspirin  81 mg Oral Daily    atorvastatin  40 mg Oral QHS    clopidogrel  75 mg Oral Daily    diclofenac sodium  4 g Topical (Top) Daily    dronabinol  2.5 mg Oral Daily    DULoxetine  20 mg Oral BID     gabapentin  300 mg Oral TID    heparin (porcine)  5,000 Units Subcutaneous Q8H    lidocaine  1 patch Transdermal Q24H    polyethylene glycol  17 g Oral Daily    potassium chloride 10%  20 mEq Oral BID    traZODone  25 mg Oral QHS     Continuous Infusions:    PRN Meds:dextrose 10 % in water (D10W), glucagon (human recombinant), HYDROcodone-acetaminophen, insulin aspart U-100, melatonin, ondansetron, sodium chloride 0.9%, tiZANidine    Objective:     Vital Signs (Most Recent):  Temp: 98.1 °F (36.7 °C) (11/28/19 1117)  Pulse: 72 (11/28/19 1117)  Resp: 17 (11/28/19 1117)  BP: 122/61 (11/28/19 1117)  SpO2: 97 % (11/28/19 1117)  BP Location: Right arm    Vital Signs Range (Last 24H):  Temp:  [97.6 °F (36.4 °C)-98.9 °F (37.2 °C)]   Pulse:  [58-99]   Resp:  [16-18]   BP: ()/(61-82)   SpO2:  [96 %-99 %]   BP Location: Right arm    Physical Exam   Constitutional: She is oriented to person, place, and time. She appears well-developed. No distress.   HENT:   Head: Normocephalic and atraumatic.   Eyes: Conjunctivae and EOM are normal.   Cardiovascular: Normal rate.   Pulmonary/Chest: Effort normal. No respiratory distress.   Musculoskeletal: She exhibits no edema or deformity.   Neurological: She is alert and oriented to person, place, and time.   Skin: Skin is warm and dry. She is not diaphoretic.   Psychiatric: Cognition and memory are not impaired. She is attentive.   Vitals reviewed.      Neurological Exam:   LOC: alert   Attention Span: good  Language: No aphasia  Articulation: Mild dysarthria  Orientation: Person, Time, Place  Visual Fields: Full  EOM (CN III, IV, VI): Full/intact  Facial Movement (CN VII): Lower facial weakness on the Right- mild  Motor: Arm left  Normal 5/5  Leg left  Normal 5/5  Arm right  Paresis: 4/5   Leg right Paresis: 2/5  Sensation: Intact to light touch, temp  Tone: Normal tone throughout      Laboratory:  CMP:   No results for input(s): GLUCOSE, CALCIUM, ALBUMIN, PROT, NA, K, CO2, CL,  BUN, CREATININE, ALKPHOS, ALT, AST, BILITOT in the last 24 hours.  CBC:   Recent Labs   Lab 11/28/19  0455   WBC 10.25   RBC 4.14   HGB 11.5*   HCT 37.9   *   MCV 92   MCH 27.8   MCHC 30.3*     Lipid Panel:   No results for input(s): CHOL, LDLCALC, HDL, TRIG in the last 168 hours.  Coagulation:   No results for input(s): PT, INR, APTT in the last 168 hours.  Platelet Aggregation Study: No results for input(s): PLTAGG, PLTAGINTERP, PLTAGREGLACO, ADPPLTAGGREG in the last 168 hours.  Hgb A1C:   No results for input(s): HGBA1C in the last 168 hours.  TSH:   No results for input(s): TSH in the last 168 hours.      Diagnostic Results      Brain Imaging      MRI Brain (11/01/19):  Acute lacunar type infarct coursing through the left posterior limb internal capsule and corona radiata.       CTH (10/31/19):  No acute intracranial pathology        Vessel Imaging      CTA Multiphase (10/31/19):  CTA head: Atherosclerotic disease of the cavernous and supraclinoid ICAs with mild narrowing.  Otherwise unremarkable CTA of the head specifically without evidence for proximal significant stenosis or occlusion.  CTA neck: Less than 50% proximal ICA stenosis by NASCET criteria.  Atherosclerotic disease with mild moderate narrowing of the right vertebral artery origin.  There is mild left V1 segment narrowing.  No significant focal vertebral artery stenosis or occlusion.  Interlobular septal thickening with tree in bud micro nodularity visualized upper lobes bilaterally which may represent inflammatory/infectious process clinical correlation and correlation with dedicated CT thorax recommended.  CT head: Bandlike region hypoattenuation left posterior limb internal capsule unchanged from prior suggestive for possible recent to subacute age infarction.  No evidence for hydrocephalus or acute intracranial hemorrhage.  Clinical correlation and further evaluation with MRI if patient compatible.        Cardiac Imaging     Echo  (11/01/19)  · Increased (hyperdynamic) left ventricular systolic function. The estimated ejection fraction is 75%.  · Concentric left ventricular remodeling.  · Normal LV diastolic function.  · No wall motion abnormalities.  · Normal right ventricular systolic function.  · Normal central venous pressure (3 mm Hg).  · Mild tricuspid regurgitation.  · The estimated PA systolic pressure is 26 mm Hg  · Echolucent structure next to the LA, likely representing hiatal hernia. Clinincal correlation is required.        Miscellaneous Imaging     CT Abdomen Pelvis WO Contrast 11/16/19  Angiography of the right renal vasculature, SMA, and multiple lumbar arteries demonstrates no evidence of active bleeding.    IR Angiogram Visceral 11/10/19  Angiography of the right renal vasculature, SMA, and multiple lumbar arteries demonstrates no evidence of active bleeding.     CTA Abdomen/Pelvis 11/10/19  Right-sided retroperitoneal hematoma with questionable foci of active extravasation within the inferior portion of the collection as described above.     IR Angiogram Visceral 11/9/19  Angiography of the lumbar arteries demonstrates active extravasation of the right L2 lumbar artery. Embolization using beads as described above.  Plan: Transfer patient to ICU.  Continued serial H and H follow-up.     CTA Abdomen/Pelvis 11/9/19  1. Large right retroperitoneal hematoma with findings concerning for active arterial extravasation/hemorrhage as detailed above.  There is associated hemorrhage extending throughout the right abdomen and pelvis/pericolic gutter with associated mass effect on the right kidney, which is anteriorly displaced.  2. Decreased opacification of the infahepatic and infrarenal IVC, possibly secondary to underlying mass effect versus partial thrombosis.  3. Nonspecific cecal and right colonic wall thickening, possibly reactive due to hemoperitoneum.  4. Additional findings as detailed above.     MRI Thoracic/Lumbar Spine W WO  Contr 11/8/19  Large hematoma within the right psoas muscle.  Compression and probable thrombosis of the IVC. Consider contrast enhanced CT with delayed phase.  Multilevel degenerative changes as detailed above, more severe in the lumbar spine.  Irregularity of the T12 through L3 endplates is most likely degenerative.      Danya Mancia PA-C  [facility]  Department of Vascular Neurology   [facility]

## 2019-11-29 LAB
POCT GLUCOSE: 149 MG/DL (ref 70–110)
POCT GLUCOSE: 157 MG/DL (ref 70–110)
POCT GLUCOSE: 180 MG/DL (ref 70–110)

## 2019-11-29 PROCEDURE — 99233 PR SUBSEQUENT HOSPITAL CARE,LEVL III: ICD-10-PCS | Mod: ,,, | Performed by: PSYCHIATRY & NEUROLOGY

## 2019-11-29 PROCEDURE — 25000003 PHARM REV CODE 250: Performed by: PHYSICIAN ASSISTANT

## 2019-11-29 PROCEDURE — 25000003 PHARM REV CODE 250: Performed by: FAMILY MEDICINE

## 2019-11-29 PROCEDURE — 63600175 PHARM REV CODE 636 W HCPCS: Performed by: FAMILY MEDICINE

## 2019-11-29 PROCEDURE — 63600175 PHARM REV CODE 636 W HCPCS: Performed by: PHYSICIAN ASSISTANT

## 2019-11-29 PROCEDURE — 25000003 PHARM REV CODE 250: Performed by: PSYCHIATRY & NEUROLOGY

## 2019-11-29 PROCEDURE — 97530 THERAPEUTIC ACTIVITIES: CPT

## 2019-11-29 PROCEDURE — 25000003 PHARM REV CODE 250: Performed by: NURSE PRACTITIONER

## 2019-11-29 PROCEDURE — 11000001 HC ACUTE MED/SURG PRIVATE ROOM

## 2019-11-29 PROCEDURE — 99233 SBSQ HOSP IP/OBS HIGH 50: CPT | Mod: ,,, | Performed by: PSYCHIATRY & NEUROLOGY

## 2019-11-29 RX ORDER — TIZANIDINE 2 MG/1
2 TABLET ORAL EVERY 8 HOURS PRN
Status: DISCONTINUED | OUTPATIENT
Start: 2019-11-29 | End: 2019-12-03 | Stop reason: HOSPADM

## 2019-11-29 RX ORDER — TIZANIDINE 2 MG/1
2 TABLET ORAL ONCE
Status: COMPLETED | OUTPATIENT
Start: 2019-11-29 | End: 2019-11-29

## 2019-11-29 RX ADMIN — DULOXETINE HYDROCHLORIDE 20 MG: 20 CAPSULE, DELAYED RELEASE ORAL at 08:11

## 2019-11-29 RX ADMIN — HEPARIN SODIUM 5000 UNITS: 5000 INJECTION INTRAVENOUS; SUBCUTANEOUS at 09:11

## 2019-11-29 RX ADMIN — HYDROCODONE BITARTRATE AND ACETAMINOPHEN 1 TABLET: 10; 325 TABLET ORAL at 02:11

## 2019-11-29 RX ADMIN — CLOPIDOGREL BISULFATE 75 MG: 75 TABLET ORAL at 08:11

## 2019-11-29 RX ADMIN — ACETAMINOPHEN 650 MG: 325 TABLET ORAL at 01:11

## 2019-11-29 RX ADMIN — HYDROCODONE BITARTRATE AND ACETAMINOPHEN 1 TABLET: 10; 325 TABLET ORAL at 09:11

## 2019-11-29 RX ADMIN — ONDANSETRON 4 MG: 2 INJECTION INTRAMUSCULAR; INTRAVENOUS at 03:11

## 2019-11-29 RX ADMIN — DRONABINOL 2.5 MG: 2.5 CAPSULE ORAL at 08:11

## 2019-11-29 RX ADMIN — DICLOFENAC 4 G: 10 GEL TOPICAL at 09:11

## 2019-11-29 RX ADMIN — TIZANIDINE 2 MG: 2 TABLET ORAL at 12:11

## 2019-11-29 RX ADMIN — ACETAMINOPHEN 650 MG: 325 TABLET ORAL at 05:11

## 2019-11-29 RX ADMIN — HEPARIN SODIUM 5000 UNITS: 5000 INJECTION INTRAVENOUS; SUBCUTANEOUS at 01:11

## 2019-11-29 RX ADMIN — Medication 6 MG: at 09:11

## 2019-11-29 RX ADMIN — TIZANIDINE 2 MG: 2 TABLET ORAL at 04:11

## 2019-11-29 RX ADMIN — GABAPENTIN 300 MG: 300 CAPSULE ORAL at 03:11

## 2019-11-29 RX ADMIN — TRAZODONE HYDROCHLORIDE 25 MG: 50 TABLET ORAL at 09:11

## 2019-11-29 RX ADMIN — ATORVASTATIN CALCIUM 40 MG: 20 TABLET, FILM COATED ORAL at 09:11

## 2019-11-29 RX ADMIN — POTASSIUM CHLORIDE 20 MEQ: 20 SOLUTION ORAL at 09:11

## 2019-11-29 RX ADMIN — GABAPENTIN 300 MG: 300 CAPSULE ORAL at 09:11

## 2019-11-29 RX ADMIN — DULOXETINE HYDROCHLORIDE 20 MG: 20 CAPSULE, DELAYED RELEASE ORAL at 09:11

## 2019-11-29 RX ADMIN — ASPIRIN 81 MG: 81 TABLET, COATED ORAL at 08:11

## 2019-11-29 RX ADMIN — POTASSIUM CHLORIDE 20 MEQ: 20 SOLUTION ORAL at 08:11

## 2019-11-29 RX ADMIN — GABAPENTIN 300 MG: 300 CAPSULE ORAL at 08:11

## 2019-11-29 RX ADMIN — HEPARIN SODIUM 5000 UNITS: 5000 INJECTION INTRAVENOUS; SUBCUTANEOUS at 05:11

## 2019-11-29 RX ADMIN — ONDANSETRON 4 MG: 2 INJECTION INTRAMUSCULAR; INTRAVENOUS at 09:11

## 2019-11-29 RX ADMIN — LIDOCAINE 1 PATCH: 50 PATCH CUTANEOUS at 10:11

## 2019-11-29 NOTE — PT/OT/SLP PROGRESS
"Physical Therapy Treatment    Patient Name:  Melva Barker   MRN:  [id number]    Recommendations:     Discharge Recommendations:  nursing facility, skilled   Discharge Equipment Recommendations: (TBD)   Barriers to discharge: Decreased caregiver support    Assessment:     Melva Barker is a 74 y.o. female admitted with a medical diagnosis of Thrombotic stroke involving left middle cerebral artery.  She presents with the following impairments/functional limitations:  weakness, impaired endurance, impaired functional mobilty, gait instability, impaired balance, decreased safety awareness, decreased lower extremity function, pain, impaired coordination, impaired fine motor.    Pt currently requires Akash to perform stand pivot transfer to/from [facility].  Pt refused gait training today sec to complaints of being dizzy. PT measured BP in sit at 115/69.  PT ed pt that BP was within normal limits, though pt continued to refuse.  Time taken to ed pt on the importance of daily mobility though pt was not responding appropriately to this information provided.  Pt regressed regarding ability to perform sit<>stand transfers, previously was able to perf sit<>stand with CGA, currently requires Akash. Attempted to provide pt with exercises, pt not very receptive towards this, refusing therex in sitting, only moved her LEs minimally in the bed to "appease" the PT.  Motivation to improve current situation is poor. Pt refused self hygiene, though she is capable to perform.     Rehab Prognosis: Fair; patient would benefit from acute skilled PT services to address these deficits and reach maximum level of function.    Recent Surgery: * No surgery found *      Plan:     During this hospitalization, patient to be seen 3 x/week to address the identified rehab impairments via gait training, therapeutic activities, therapeutic exercises, neuromuscular re-education and progress toward the following goals:    · Plan of Care Expires:  " "12/10/19    Subjective     Chief Complaint: Back pain  Patient/Family Comments/goals: "I'm dizzy"  Pain/Comfort:  · Pain Rating 1: 10/10  · Location 1: back  · Pain Addressed 1: Pre-medicate for activity, Nurse notified, Cessation of Activity      Objective:     Communicated with nursing prior to session.  Patient found supine with peripheral IV, telemetry upon PT entry to room.     General Precautions: Standard, fall, aspiration(dysphagia mechanical soft diet)   Orthopedic Precautions:N/A   Braces: N/A     Functional Mobility:  · Bed Mobility:     · Rolling Right: moderate assistance  · Scooting: independence  · Supine to Sit: moderate assistance  · Sit to Supine: minimum assistance    · Transfers:     · Sit to Stand:  Akash from bed and BSC with ed on hand placement  · Bed to/from BSC Chair: minimum assistance with RW using  Stand Pivot    · Gait: Pt amb a few steps during stand pivot transfer, Akash, with RW    · Balance: Akash: dynamic standing balance without AD      AM-PAC 6 CLICK MOBILITY  Turning over in bed (including adjusting bedclothes, sheets and blankets)?: 3  Sitting down on and standing up from a chair with arms (e.g., wheelchair, bedside commode, etc.): 3  Moving from lying on back to sitting on the side of the bed?: 3  Moving to and from a bed to a chair (including a wheelchair)?: 3  Need to walk in hospital room?: 3  Climbing 3-5 steps with a railing?: 1  Basic Mobility Total Score: 16       Therapeutic Activities and Exercises:   Whiteboard updated  Bowel management: MaxA for hygiene, perf in stance    Patient left supine with all lines intact, call button in reach and nursing notified.    GOALS:   Multidisciplinary Problems     Physical Therapy Goals        Problem: Physical Therapy Goal    Goal Priority Disciplines Outcome Goal Variances Interventions   Physical Therapy Goal     PT, PT/OT Ongoing, Not Progressing     Description:  Goals to be met by: 12/10/19    Patient will increase functional " independence with mobility by performin. Supine to sit with Akash.  2. Sit to supine with contact guard assist.    3. Sit to stand transfer with contact guard assistance using least restrictive device.  4. Gait  X 25 feet with Minimal Assistance using least restrictive device.   5. Stand for 1 minutes with Contact Guard Assistance using  least restrictive device or no AD.  6. Lower extremity exercise program x 20 reps per handout, with independence to improve strength and activity tolerance.                             Time Tracking:     PT Received On: 19  PT Start Time: 1028     PT Stop Time: 1052  PT Total Time (min): 24 min     Billable Minutes: Therapeutic Activity 24    Treatment Type: Treatment  PT/PTA: PT     PTA Visit Number: 0     Reta Montgomery, PT  2019

## 2019-11-29 NOTE — ASSESSMENT & PLAN NOTE
Melva Barker is a 74 y.o. female with PMHx of HTN, HLD, and DM who presented to OSH with acute worsening of R-sided weakness after having experienced R-sided weakness x1 month. She was treated with tPA at OSH. CTA without LVO. MRI with infarct in L internal capsule and corona radiata. Etiology likely small vessel disease.    Awaiting placement     Antithrombotics for secondary stroke prevention: Antiplatelets: ASA 81 mg + Plavix 75 mg -- Initially held due to retroperitoneal hematoma. Resumed DAPT on 11/17.  Statins for secondary stroke prevention and hyperlipidemia, if present: Statins: Atorvastatin- 40 mg daily,   Aggressive risk factor modification: HTN, HLD, Diet  Rehab efforts: The patient has been evaluated by a stroke team provider and the therapy needs have been fully considered based off the presenting complaints and exam findings. The following therapy evaluations are needed: PT evaluate and treat, OT evaluate and treat, SLP evaluate and treat, PM&R evaluate for appropriate placement - Dispo SNF  Diagnostics ordered/pending: None   VTE prophylaxis: Mechanical SCDs, heparin 5000 units q 8 hours   BP parameters: Infarct: Post tPA, SBP <160; Avoid hypotension

## 2019-11-29 NOTE — PLAN OF CARE
Problem: Physical Therapy Goal  Goal: Physical Therapy Goal  Description  Goals to be met by: 12/10/19    Patient will increase functional independence with mobility by performin. Supine to sit with Akash.  2. Sit to supine with contact guard assist.    3. Sit to stand transfer with contact guard assistance using least restrictive device.  4. Gait  X 25 feet with Minimal Assistance using least restrictive device.   5. Stand for 1 minutes with Contact Guard Assistance using  least restrictive device or no AD.  6. Lower extremity exercise program x 20 reps per handout, with independence to improve strength and activity tolerance.            Outcome: Ongoing, Not Progressing    Pt regressed regarding ability to perform sit<>stand transfers, previously was able to perf sit<>stand with CGA, currently requires Akash.

## 2019-11-29 NOTE — SUBJECTIVE & OBJECTIVE
Neurologic Chief Complaint: R-sided weakness -- L MCA    Subjective:     Interval History: Patient is seen for follow-up neurological assessment and treatment recommendations: asymptomatic hypotension continues efforts to decrease tizanidine, however pt continues to complain of pain.     HPI, Past Medical, Family, and Social History remains the same as documented in the initial encounter.     Review of Systems   Constitutional: Positive for appetite change. Negative for fever.   Musculoskeletal: Positive for arthralgias and back pain.   Neurological: Positive for facial asymmetry, speech difficulty and weakness.   Psychiatric/Behavioral: Negative for agitation and confusion.     Scheduled Meds:   aspirin  81 mg Oral Daily    atorvastatin  40 mg Oral QHS    clopidogrel  75 mg Oral Daily    diclofenac sodium  4 g Topical (Top) Daily    dronabinol  2.5 mg Oral Daily    DULoxetine  20 mg Oral BID    gabapentin  300 mg Oral TID    heparin (porcine)  5,000 Units Subcutaneous Q8H    lidocaine  1 patch Transdermal Q24H    polyethylene glycol  17 g Oral Daily    potassium chloride 10%  20 mEq Oral BID    tiZANidine  2 mg Oral Once    traZODone  25 mg Oral QHS     Continuous Infusions:    PRN Meds:dextrose 10 % in water (D10W), glucagon (human recombinant), HYDROcodone-acetaminophen, insulin aspart U-100, melatonin, ondansetron, sodium chloride 0.9%, tiZANidine    Objective:     Vital Signs (Most Recent):  Temp: 98.4 °F (36.9 °C) (11/29/19 1513)  Pulse: (!) 127 (11/29/19 1532)  Resp: 18 (11/29/19 1513)  BP: (!) 183/88 (11/29/19 1513)  SpO2: 99 % (11/29/19 1513)  BP Location: Right arm    Vital Signs Range (Last 24H):  Temp:  [98.3 °F (36.8 °C)-98.6 °F (37 °C)]   Pulse:  []   Resp:  [16-18]   BP: ()/(44-91)   SpO2:  [97 %-99 %]   BP Location: Right arm    Physical Exam   Constitutional: She is oriented to person, place, and time. She appears well-developed. No distress.   HENT:   Head: Normocephalic  and atraumatic.   Eyes: Conjunctivae and EOM are normal.   Cardiovascular: Normal rate.   Pulmonary/Chest: Effort normal. No respiratory distress.   Musculoskeletal: She exhibits no edema or deformity.   Neurological: She is alert and oriented to person, place, and time.   Skin: Skin is warm and dry. She is not diaphoretic.   Psychiatric: Cognition and memory are not impaired. She is attentive.   Vitals reviewed.      Neurological Exam:   LOC: alert   Attention Span: good  Language: No aphasia  Articulation: Mild dysarthria  Orientation: Person, Time, Place  Visual Fields: Full  EOM (CN III, IV, VI): Full/intact  Facial Movement (CN VII): Lower facial weakness on the Right- mild  Motor: Arm left  Normal 5/5  Leg left  Normal 5/5  Arm right  Paresis: 4/5   Leg right Paresis: 2/5  Sensation: Intact to light touch, temp  Tone: Normal tone throughout      Laboratory:  CMP:   No results for input(s): GLUCOSE, CALCIUM, ALBUMIN, PROT, NA, K, CO2, CL, BUN, CREATININE, ALKPHOS, ALT, AST, BILITOT in the last 24 hours.  CBC:   Recent Labs   Lab 11/28/19  0455   WBC 10.25   RBC 4.14   HGB 11.5*   HCT 37.9   *   MCV 92   MCH 27.8   MCHC 30.3*     Lipid Panel:   No results for input(s): CHOL, LDLCALC, HDL, TRIG in the last 168 hours.  Coagulation:   No results for input(s): PT, INR, APTT in the last 168 hours.  Platelet Aggregation Study: No results for input(s): PLTAGG, PLTAGINTERP, PLTAGREGLACO, ADPPLTAGGREG in the last 168 hours.  Hgb A1C:   No results for input(s): HGBA1C in the last 168 hours.  TSH:   No results for input(s): TSH in the last 168 hours.      Diagnostic Results      Brain Imaging      MRI Brain (11/01/19):  Acute lacunar type infarct coursing through the left posterior limb internal capsule and corona radiata.       CTH (10/31/19):  No acute intracranial pathology        Vessel Imaging      CTA Multiphase (10/31/19):  CTA head: Atherosclerotic disease of the cavernous and supraclinoid ICAs with mild  narrowing.  Otherwise unremarkable CTA of the head specifically without evidence for proximal significant stenosis or occlusion.  CTA neck: Less than 50% proximal ICA stenosis by NASCET criteria.  Atherosclerotic disease with mild moderate narrowing of the right vertebral artery origin.  There is mild left V1 segment narrowing.  No significant focal vertebral artery stenosis or occlusion.  Interlobular septal thickening with tree in bud micro nodularity visualized upper lobes bilaterally which may represent inflammatory/infectious process clinical correlation and correlation with dedicated CT thorax recommended.  CT head: Bandlike region hypoattenuation left posterior limb internal capsule unchanged from prior suggestive for possible recent to subacute age infarction.  No evidence for hydrocephalus or acute intracranial hemorrhage.  Clinical correlation and further evaluation with MRI if patient compatible.        Cardiac Imaging     Echo (11/01/19)  · Increased (hyperdynamic) left ventricular systolic function. The estimated ejection fraction is 75%.  · Concentric left ventricular remodeling.  · Normal LV diastolic function.  · No wall motion abnormalities.  · Normal right ventricular systolic function.  · Normal central venous pressure (3 mm Hg).  · Mild tricuspid regurgitation.  · The estimated PA systolic pressure is 26 mm Hg  · Echolucent structure next to the LA, likely representing hiatal hernia. Clinincal correlation is required.        Miscellaneous Imaging     CT Abdomen Pelvis WO Contrast 11/16/19  Angiography of the right renal vasculature, SMA, and multiple lumbar arteries demonstrates no evidence of active bleeding.    IR Angiogram Visceral 11/10/19  Angiography of the right renal vasculature, SMA, and multiple lumbar arteries demonstrates no evidence of active bleeding.     CTA Abdomen/Pelvis 11/10/19  Right-sided retroperitoneal hematoma with questionable foci of active extravasation within the  inferior portion of the collection as described above.     IR Angiogram Visceral 11/9/19  Angiography of the lumbar arteries demonstrates active extravasation of the right L2 lumbar artery. Embolization using beads as described above.  Plan: Transfer patient to ICU.  Continued serial H and H follow-up.     CTA Abdomen/Pelvis 11/9/19  1. Large right retroperitoneal hematoma with findings concerning for active arterial extravasation/hemorrhage as detailed above.  There is associated hemorrhage extending throughout the right abdomen and pelvis/pericolic gutter with associated mass effect on the right kidney, which is anteriorly displaced.  2. Decreased opacification of the infahepatic and infrarenal IVC, possibly secondary to underlying mass effect versus partial thrombosis.  3. Nonspecific cecal and right colonic wall thickening, possibly reactive due to hemoperitoneum.  4. Additional findings as detailed above.     MRI Thoracic/Lumbar Spine W WO Contr 11/8/19  Large hematoma within the right psoas muscle.  Compression and probable thrombosis of the IVC. Consider contrast enhanced CT with delayed phase.  Multilevel degenerative changes as detailed above, more severe in the lumbar spine.  Irregularity of the T12 through L3 endplates is most likely degenerative.

## 2019-11-29 NOTE — PT/OT/SLP PROGRESS
Speech Language Pathology      Melva Barker  MRN: [id number]    Patient not seen today secondary to patient with episode of emesis upon ST attempt.  RN in room to assist Pt.  SLP unable to re-attempt later service day  . Will follow-up per ST POC.    MARITO Smyth., CCC-SLP  Speech-Language Pathology  Pager: [phone]  11/29/2019

## 2019-11-29 NOTE — PLAN OF CARE
CM called [facility] five times and only had 1 time where someone answered and took a message. Left voicemail for patient's son, Paolo, to call with further choices. CM in contact with . Referrals sent to other SNF in [facility] to not hold up discharge. Patient updated and asked about her living situation. States lives with sister and nephew. States the nephew cares for them and her sister who has alzheimer's.      11/29/19 4125   Discharge Reassessment   Assessment Type Discharge Planning Reassessment   Discharge Plan A Skilled Nursing Facility   Discharge Plan B Rehab   Anticipated Discharge Disposition SNF   Post-Acute Status   Post-Acute Authorization Placement   Post-Acute Placement Status Referrals Sent

## 2019-11-29 NOTE — ASSESSMENT & PLAN NOTE
Patient hypotensive overnight from 11/13 and into the AM 11/14. Received multiple boluses and pressure responsive.  Concern was for sepsis vs pain medication-related.   See infection risk section above    Pt then with orthostatic vital signs on 11/20 -- HR 120s to 90s from standing to lying, s/p IVFs 1L over 10 hours ordered on 11/21.   Nursing reported AFib on tele on 11/20, however EKG showed sinus tachycardia.   Pt receiving multiple boluses in recent days. Encouraged her toward aggressive PO hydration. Hb remains stable. Thigh-high KAREEN hose in place 11/27.  efforts to decrease tizanidine, however pt continues to complain of pain.   Will continue monitoring, consider midodrine if hypotension persists

## 2019-11-29 NOTE — ASSESSMENT & PLAN NOTE
"Reported hx chronic back pain with associated sciatic nerve pain as well, then complicated by acute retroperitoneal hematoma.  Consulted to anesthesia pain management    Pain now being controlled with gabapentin, duloxetine, trazodone, diclofenac gel, lidocaine patch, hydrcodone-acetaminophen (prn), tizanidine (prn.)     Nurse reporting pt continues to request pain medication "around the clock." Of note, pt reported to night nurse that her best pain score at home is usually 7 1/2.    Restarted pt's home regimen and will continue at discharge.  Per PNP, pt seeing Dr. Hilton Rowland in [place] for pain management. Recommend follow up outpatient and consideration of Addiction Psych referral.  "

## 2019-11-29 NOTE — PLAN OF CARE
POC reviewed with patient. All questions and concerns reviewed. Patient requires reinforcement. Fall/safety precautions implemented and maintained. Blood glucose monitored. No acute events noted this shift. Please see flow sheet for full assessment and vitals. Bed locked in lowest position. Call bell within reach. Will continue to monitor.

## 2019-11-29 NOTE — PROGRESS NOTES
[facility]  Vascular Neurology  Comprehensive Stroke Center  Progress Note    Assessment/Plan:     * Thrombotic stroke involving left middle cerebral artery  Melva Barker is a 74 y.o. female with PMHx of HTN, HLD, and DM who presented to OSH with acute worsening of R-sided weakness after having experienced R-sided weakness x1 month. She was treated with tPA at OSH. CTA without LVO. MRI with infarct in L internal capsule and corona radiata. Etiology likely small vessel disease.    Awaiting placement     Antithrombotics for secondary stroke prevention: Antiplatelets: ASA 81 mg + Plavix 75 mg -- Initially held due to retroperitoneal hematoma. Resumed DAPT on 11/17.  Statins for secondary stroke prevention and hyperlipidemia, if present: Statins: Atorvastatin- 40 mg daily,   Aggressive risk factor modification: HTN, HLD, Diet  Rehab efforts: The patient has been evaluated by a stroke team provider and the therapy needs have been fully considered based off the presenting complaints and exam findings. The following therapy evaluations are needed: PT evaluate and treat, OT evaluate and treat, SLP evaluate and treat, PM&R evaluate for appropriate placement - Dispo SNF  Diagnostics ordered/pending: None   VTE prophylaxis: Mechanical SCDs, heparin 5000 units q 8 hours   BP parameters: Infarct: Post tPA, SBP <160; Avoid hypotension    At high risk for infection  Patient hypotensive overnight from 11/13 and into the AM 11/14. Received multiple boluses and pressure responsive.  Concern was for sepsis vs pain medication-related.   Infectious workup was ordered -- Elevated procal and lactate with interval development of atelectasis in R lower lung.  Started 7-day course of IV Cefepime and Vanc for possible hospital acquired pneumonia. BCx NGTD.    Then with liquid diarrhea overnight 11/15; WBC 14, stopped empiric abx and ordered CDiff, stool culture. PO Vanc x 10 day course for possible C.diff.  IVFs given and central line removed.  WBC then improved, pt remaining afebrile.      Will continue to monitor patient, labs, vitals for any signs or symptoms concerning for infection.    Moderate malnutrition  Appreciate Nutrition/RD assistance  Ordered Boost Glucose Control TID  Appetite stimulate dronabinol   Encouraged her toward 100% completion of all meals and supplementation.    Orthostatic hypotension  Patient hypotensive overnight from 11/13 and into the AM 11/14. Received multiple boluses and pressure responsive.  Concern was for sepsis vs pain medication-related.   See infection risk section above    Pt then with orthostatic vital signs on 11/20 -- HR 120s to 90s from standing to lying, s/p IVFs 1L over 10 hours ordered on 11/21.   Nursing reported AFib on tele on 11/20, however EKG showed sinus tachycardia.   Pt receiving multiple boluses in recent days. Encouraged her toward aggressive PO hydration. Hb remains stable. Thigh-high KAREEN hose in place 11/27.  efforts to decrease tizanidine, however pt continues to complain of pain.   Will continue monitoring, consider midodrine if hypotension persists    Retroperitoneal hematoma  Pt had been c/o back pain in recent days, complicated by her hx of chronic back and sciatic nerve pain with use of Tizanidine and opiates at home.  Due to Hb downtrend and recent tPA administration, MRI Lumbar/Thoracic spine was ordered 11/8 which demonstrated large R psoas hematoma with IVC compression and probable thrombosis.   General Surgery was consulted; No acute intervention pursued.     Patient was then with acute decompensation overnight 11/9; became hypotensive, tachycardic, and apneic with Hb down to 6.7. Rapid Response was called; patient requiring intubation, blood transfusion, and transfer to medical ICU.   CTA abd/pelvis 11/9 showed hematoma with active extravasation/hemorrhage with IVC compression, hemoperitoneum.   Pt went to IR and is now s/p successful R L2 & L3  "lumbar spinal artery embolization.   Patient underwent IR re-evaluation on 11/11. No arterial bleed evident on IR angiography.  CT abd/pelvis 11/17: Redemonstration of large right retroperitoneal hematoma which is mildly decreased in size compared to most recent      Hb remains stable, Continuing to monitor.    Debility  2/2 stroke  PT/OT eval & treat - Dispo SNF    Cytotoxic cerebral edema  Area of cytotoxic cerebral edema identified when reviewing brain imaging in the territory of the L middle cerebral artery. There is no mass effect associated with it. We will continue to monitor the patients clinical exam for any worsening of symptoms which may indicate expansion of the stroke or the area of the edema resulting in the clinical change. The pattern is suggestive of small vessel etiology.    Essential hypertension  Risk factor for stroke  Holding all BP meds due to recent hypotension  BP continues to be labile -- SBP  over last 24 hours.    asymptomatic hypotension SBP 80s recheck SBP 90s, place thigh high KAREEN hose.   efforts to decrease tizanidine, however pt continues to complain of pain.   Continue close monitoring. Previously considering initiating low-dose Midodrine if hypotension persisted.    Back pain  Reported hx chronic back pain with associated sciatic nerve pain as well, then complicated by acute retroperitoneal hematoma.  Consulted to anesthesia pain management    Pain now being controlled with gabapentin, duloxetine, trazodone, diclofenac gel, lidocaine patch, hydrcodone-acetaminophen (prn), tizanidine (prn.)     Nurse reporting pt continues to request pain medication "around the clock." Of note, pt reported to night nurse that her best pain score at home is usually 7 1/2.    Restarted pt's home regimen and will continue at discharge.  Per PNP, pt seeing Dr. Hilton Rowland in [place] for pain management. Recommend follow up outpatient and consideration of Addiction Psych " referral.    Gastroparesis  Previously on Reglan 10 mg TID before meals, reglan then decreased to 5 mg TID.   Reglan discontinued on 11/23, will monitor closely and reinitiate if necessary.     Diabetes mellitus type 2 without retinopathy  Stroke risk factor, poorly controlled on glargine 17 units daily  HbA1c 8.3%  Recommend tight glucose control  Currently on SSI   Diabetic diet         11/1/19 MRI with small vessel L MCA infarct, therapy recommending rehab  11/2 - Patient stepped down overnight. Adjusting BP regimen. Patient with continuous complaints if nausea. IV Zofran ordered with some relief. Patient has not has BM since 10/30. Ordering KUB to rule out obstruction. Neuro exam unchanged.   11/3 - NAEON. Patient reports she had no episodes of vomiting overnight. Still complaining of nausea. Mild lower abdomen tenderness on exam. Lipase ordered and WNL. Continue to monitor.   11/4 - denies nausea and vomiting. Abd tenderness remains present on palpitation. Continue to monitor. HCTZ increased yesterday. Pending rehab placement.   11/5 -  N/V x1 overnight zofran resolved, sucralfate started. Placement pending   11/6 - hypotensive, held all BP meds,  ml bolus, responded well. Increased BUN/Cr, start NS 75ml/hr.   11/7 patient complaining of sciatica pain.  Restarted home tizanidine.  Patient requesting hydrocodone but educated patient that nerve pain is not treated with opioid.  Degenerative disease seen on xray but no fracture.      11/8 Patient continues to experience back pain.  Now with leukocytosis and drop in h/h plan for MRI thoracic/lumbar spine to evaluate for epidural hematoma.    11/9: MRI Lumbar spine had demonstrated large R psoas hematoma with possible IVC compression; General Surgery consulted. Patient was then with acute decompensation overnight; became hypotensive, tachycardic, and apneic requiring intubation, blood transfusion, and medical ICU transfer. CTA abd/pelvis showed active  extravasation/hemorrhage with hemoperitoneum. Pt now s/p successful lumbar artery embolization in IR. She was extubated on arrival back to ICU and tolerated well.    11/11: Patient underwent IR re-evaluation yesterday. No arterial bleed evident on IR angiography. Patient neuro exam stable.   11/12 Stepped down from medical ICU this morning.  Patient still experiencing pain.  Anesthesia consulted for pain management.  D/C pain pump and started oral medications for muscle and nerve pain.  Will wean to home regimen. Waiting for repeat CBC, transfused one unit of blood 11/11.   Tachycardic/hypertensive will continue to monitor.  11/13: Upgraded diet per SLP recs. Pt continues to c/o R back pain, concern that tachycardia could be pain-induced; adjusted analgesic regimen further as well as antiemetic regimen with plans to eventually wean to home regimen. Replaced Phos. Dispo inpatient rehab.  11/14: patient hypotensive overnight and this morning requiring fluid boluses. Discontinued all pain medications except for celecoxib in case pain meds are contributing to hypotension. Lidocaine patch and Voltaren gel ordered as well. Infectious workup significant for minor atelectasis in R lower lung with increased procal and lactate. Patient started on IV antibiotics for community acquired pneumonia. Blood cultures pending. ICU notified of patient's hypotension  11/15: Liquid diarrhea, WBC 14, stopped empiric abx, ordered c. Diff and stool culture, start PO vanc, IVFs. Adjusted pain meds with goal to return to home regimen. Nauseous overnight, zofran given.   11/16: CT abdomen and pelvis to f/u retroperitoneal hematoma prior to starting DAPT, H/H stable. T max 99.4, tachy, WBC 13, C. Diff and stool cultures pending, increased  ml/hr, labile BP, continue to monitor. Remove central line.   11/17: CT abdomen/pelvis again with large retroperitoneal hematoma  but decreased in size, H/H uptrend, resume DAPT today. Diarrhea  becoming thicker, WBC improved, a febrile, c. Diff antigen positive, toxins negative. Pain well controlled.   11/18 PCR negative, c.diff precautions and vancomycin d/c.  Neurologically stable waiting for placement.    11/19 Patient now recommending SNF.   11/20 Neurologically stable.  Waiting for placement.  Hypotensive episodes overnight given some fluids.     11/21 Orthostatic yesterday, HR 120s to 90s standing to lying, IVFs 1L over 10 hours ordered.   11/22: Nurse encouraging pt toward aggressive PO hydration in the setting of recent orthostatic hypotension. Hb remains stable.  11/23: Reglan discontinue  Importance of oral hydration reinforced with patient. H&H remain stable. Patient accepted to [facility], awaiting insurance auth.   11/24: patient became hypotensive with SBP in 70s and responded to 500 cc bolus, continuing to encourage oral fluid intake, will consider midodrine if hypotension persists  11/25: SBP WNL within last 24 hours. Decreased Norco to home dose q6hr prn, f/u w/ Dr. Canela pain management outpatient, consider addiction psych. C/o decreased appetite, advanced soft diet to level 6 so pt can receive gumbo as she requested, boost supplements, continue to monitor.   11/26: Patient requesting appetite stimulant; encouraged her toward 100% completion of all meals. Continuing to monitor her labile BP.  11/27: asymptomatic hypotension SBP 80s recheck SBP 90s, place thigh high KAREEN hose, consider midodrine if hypotension continues.   11/29: asymptomatic hypotension continues efforts to decrease tizanidine, however pt continues to complain of pain.     STROKE DOCUMENTATION   Acute Stroke Times   Last Known Normal Date: 10/31/19  Last Known Normal Time: 0630  Symptom Onset Date: 10/31/19  Symptom Onset Time: 0730  Stroke Team Called Date: 10/31/19  Stroke Team Called Time: 0936  Stroke Team Arrival Date: 10/31/19  Stroke Team Arrival Time: 0946  Decision to Treat Time for Alteplase: 1003    [facility]  Scale:  1a. Level of Consciousness: 0-->Alert, keenly responsive  1b. LOC Questions: 0-->Answers both questions correctly  1c. LOC Commands: 0-->Performs both tasks correctly  2. Best Gaze: 0-->Normal  3. Visual: 0-->No visual loss  4. Facial Palsy: 1-->Minor paralysis (flattened nasolabial fold, asymmetry on smiling)  5a. Motor Arm, Left: 0-->No drift, limb holds 90 (or 45) degrees for full 10 secs  5b. Motor Arm, Right: 1-->Drift, limb holds 90 (or 45) degrees, but drifts down before full 10 secs, does not hit bed or other support  6a. Motor Leg, Left: 0-->No drift, leg holds 30 degree position for full 5 secs  6b. Motor Leg, Right: 2-->Some effort against gravity, leg falls to bed by 5 secs, but has some effort against gravity  7. Limb Ataxia: 0-->Absent  8. Sensory: 0-->Normal, no sensory loss  9. Best Language: 0-->No aphasia, normal  10. Dysarthria: 1-->Mild-to-moderate dysarthria, patient slurs at least some words and, at worst, can be understood with some difficulty  11. Extinction and Inattention (formerly Neglect): 0-->No abnormality  Total (NIH Stroke Scale): 5       Modified Dayanna Score: 0  [place] Coma Scale:    ABCD2 Score:    [id number] Score:   HAS -BLED Score:   ICH Score:   Hunt & Amaya Classification:      Hemorrhagic change of an Ischemic Stroke: Does this patient have an ischemic stroke with hemorrhagic changes? No     Neurologic Chief Complaint: R-sided weakness -- L MCA    Subjective:     Interval History: Patient is seen for follow-up neurological assessment and treatment recommendations: asymptomatic hypotension continues efforts to decrease tizanidine, however pt continues to complain of pain.     HPI, Past Medical, Family, and Social History remains the same as documented in the initial encounter.     Review of Systems   Constitutional: Positive for appetite change. Negative for fever.   Musculoskeletal: Positive for arthralgias and back pain.   Neurological: Positive for facial  asymmetry, speech difficulty and weakness.   Psychiatric/Behavioral: Negative for agitation and confusion.     Scheduled Meds:   aspirin  81 mg Oral Daily    atorvastatin  40 mg Oral QHS    clopidogrel  75 mg Oral Daily    diclofenac sodium  4 g Topical (Top) Daily    dronabinol  2.5 mg Oral Daily    DULoxetine  20 mg Oral BID    gabapentin  300 mg Oral TID    heparin (porcine)  5,000 Units Subcutaneous Q8H    lidocaine  1 patch Transdermal Q24H    polyethylene glycol  17 g Oral Daily    potassium chloride 10%  20 mEq Oral BID    tiZANidine  2 mg Oral Once    traZODone  25 mg Oral QHS     Continuous Infusions:    PRN Meds:dextrose 10 % in water (D10W), glucagon (human recombinant), HYDROcodone-acetaminophen, insulin aspart U-100, melatonin, ondansetron, sodium chloride 0.9%, tiZANidine    Objective:     Vital Signs (Most Recent):  Temp: 98.4 °F (36.9 °C) (11/29/19 1513)  Pulse: (!) 127 (11/29/19 1532)  Resp: 18 (11/29/19 1513)  BP: (!) 183/88 (11/29/19 1513)  SpO2: 99 % (11/29/19 1513)  BP Location: Right arm    Vital Signs Range (Last 24H):  Temp:  [98.3 °F (36.8 °C)-98.6 °F (37 °C)]   Pulse:  []   Resp:  [16-18]   BP: ()/(44-91)   SpO2:  [97 %-99 %]   BP Location: Right arm    Physical Exam   Constitutional: She is oriented to person, place, and time. She appears well-developed. No distress.   HENT:   Head: Normocephalic and atraumatic.   Eyes: Conjunctivae and EOM are normal.   Cardiovascular: Normal rate.   Pulmonary/Chest: Effort normal. No respiratory distress.   Musculoskeletal: She exhibits no edema or deformity.   Neurological: She is alert and oriented to person, place, and time.   Skin: Skin is warm and dry. She is not diaphoretic.   Psychiatric: Cognition and memory are not impaired. She is attentive.   Vitals reviewed.      Neurological Exam:   LOC: alert   Attention Span: good  Language: No aphasia  Articulation: Mild dysarthria  Orientation: Person, Time, Place  Visual Fields:  Full  EOM (CN III, IV, VI): Full/intact  Facial Movement (CN VII): Lower facial weakness on the Right- mild  Motor: Arm left  Normal 5/5  Leg left  Normal 5/5  Arm right  Paresis: 4/5   Leg right Paresis: 2/5  Sensation: Intact to light touch, temp  Tone: Normal tone throughout      Laboratory:  CMP:   No results for input(s): GLUCOSE, CALCIUM, ALBUMIN, PROT, NA, K, CO2, CL, BUN, CREATININE, ALKPHOS, ALT, AST, BILITOT in the last 24 hours.  CBC:   Recent Labs   Lab 11/28/19  0455   WBC 10.25   RBC 4.14   HGB 11.5*   HCT 37.9   *   MCV 92   MCH 27.8   MCHC 30.3*     Lipid Panel:   No results for input(s): CHOL, LDLCALC, HDL, TRIG in the last 168 hours.  Coagulation:   No results for input(s): PT, INR, APTT in the last 168 hours.  Platelet Aggregation Study: No results for input(s): PLTAGG, PLTAGINTERP, PLTAGREGLACO, ADPPLTAGGREG in the last 168 hours.  Hgb A1C:   No results for input(s): HGBA1C in the last 168 hours.  TSH:   No results for input(s): TSH in the last 168 hours.      Diagnostic Results      Brain Imaging      MRI Brain (11/01/19):  Acute lacunar type infarct coursing through the left posterior limb internal capsule and corona radiata.       CTH (10/31/19):  No acute intracranial pathology        Vessel Imaging      CTA Multiphase (10/31/19):  CTA head: Atherosclerotic disease of the cavernous and supraclinoid ICAs with mild narrowing.  Otherwise unremarkable CTA of the head specifically without evidence for proximal significant stenosis or occlusion.  CTA neck: Less than 50% proximal ICA stenosis by NASCET criteria.  Atherosclerotic disease with mild moderate narrowing of the right vertebral artery origin.  There is mild left V1 segment narrowing.  No significant focal vertebral artery stenosis or occlusion.  Interlobular septal thickening with tree in bud micro nodularity visualized upper lobes bilaterally which may represent inflammatory/infectious process clinical correlation and correlation  with dedicated CT thorax recommended.  CT head: Bandlike region hypoattenuation left posterior limb internal capsule unchanged from prior suggestive for possible recent to subacute age infarction.  No evidence for hydrocephalus or acute intracranial hemorrhage.  Clinical correlation and further evaluation with MRI if patient compatible.        Cardiac Imaging     Echo (11/01/19)  · Increased (hyperdynamic) left ventricular systolic function. The estimated ejection fraction is 75%.  · Concentric left ventricular remodeling.  · Normal LV diastolic function.  · No wall motion abnormalities.  · Normal right ventricular systolic function.  · Normal central venous pressure (3 mm Hg).  · Mild tricuspid regurgitation.  · The estimated PA systolic pressure is 26 mm Hg  · Echolucent structure next to the LA, likely representing hiatal hernia. Clinincal correlation is required.        Miscellaneous Imaging     CT Abdomen Pelvis WO Contrast 11/16/19  Angiography of the right renal vasculature, SMA, and multiple lumbar arteries demonstrates no evidence of active bleeding.    IR Angiogram Visceral 11/10/19  Angiography of the right renal vasculature, SMA, and multiple lumbar arteries demonstrates no evidence of active bleeding.     CTA Abdomen/Pelvis 11/10/19  Right-sided retroperitoneal hematoma with questionable foci of active extravasation within the inferior portion of the collection as described above.     IR Angiogram Visceral 11/9/19  Angiography of the lumbar arteries demonstrates active extravasation of the right L2 lumbar artery. Embolization using beads as described above.  Plan: Transfer patient to ICU.  Continued serial H and H follow-up.     CTA Abdomen/Pelvis 11/9/19  1. Large right retroperitoneal hematoma with findings concerning for active arterial extravasation/hemorrhage as detailed above.  There is associated hemorrhage extending throughout the right abdomen and pelvis/pericolic gutter with associated mass  effect on the right kidney, which is anteriorly displaced.  2. Decreased opacification of the infahepatic and infrarenal IVC, possibly secondary to underlying mass effect versus partial thrombosis.  3. Nonspecific cecal and right colonic wall thickening, possibly reactive due to hemoperitoneum.  4. Additional findings as detailed above.     MRI Thoracic/Lumbar Spine W WO Contr 11/8/19  Large hematoma within the right psoas muscle.  Compression and probable thrombosis of the IVC. Consider contrast enhanced CT with delayed phase.  Multilevel degenerative changes as detailed above, more severe in the lumbar spine.  Irregularity of the T12 through L3 endplates is most likely degenerative.      Juan Chen, CARMELA  Comprehensive Stroke Center  Department of Vascular Neurology   [facility]

## 2019-11-29 NOTE — ASSESSMENT & PLAN NOTE
Risk factor for stroke  Holding all BP meds due to recent hypotension  BP continues to be labile -- SBP  over last 24 hours.    asymptomatic hypotension SBP 80s recheck SBP 90s, place thigh high KAREEN hose.   efforts to decrease tizanidine, however pt continues to complain of pain.   Continue close monitoring. Previously considering initiating low-dose Midodrine if hypotension persisted.

## 2019-11-29 NOTE — PLAN OF CARE
Problem: Fall Injury Risk  Goal: Absence of Fall and Fall-Related Injury  Outcome: Ongoing, Progressing     Problem: Adult Inpatient Plan of Care  Goal: Plan of Care Review  Outcome: Ongoing, Progressing     Problem: Adult Inpatient Plan of Care  Goal: Absence of Hospital-Acquired Illness or Injury  Outcome: Ongoing, Progressing     Problem: Skin Injury Risk Increased  Goal: Skin Health and Integrity  Outcome: Ongoing, Progressing

## 2019-11-29 NOTE — PLAN OF CARE
Reviewed plan of care with pt. All questions addressed. Chronic pain is a concern for patient. Medications reveiwed with patient and doctor. Bed alarm on and in the lowest position and blood sugar monitored. No acute events. Vitals stable

## 2019-11-29 NOTE — PLAN OF CARE
11/29/19 1455   Post-Acute Status   Post-Acute Authorization Placement   Post-Acute Placement Status Referrals Sent       Referrals sent to:    [facility]:  Referral sent  [facility]:  Referral sent  [facility]: Referral sent  [facility]: Referral sent  [facility]: Referral sent  [facility]: Referral sent  [facility]:  Referral sent  [facility]: Accepted then Declined  [facility]: Referral sent  [facility]: accepted only in-house referrals  [facility]: Recs changed from Rehab to SNF  [facility]: Recs changed from Rehab to SNF  [facility]: Recs changed from Rehab to SNF  [facility]: Recs changed from Rehab to SNF      Awaiting acceptance.  SW in contact with CM and Medical staff. Will continue to follow and offer support as needed.     Demario Ledesma, LMSW [facility]   Ext. [phone]

## 2019-11-30 LAB
POCT GLUCOSE: 221 MG/DL (ref 70–110)
POCT GLUCOSE: 278 MG/DL (ref 70–110)
POCT GLUCOSE: 285 MG/DL (ref 70–110)
POCT GLUCOSE: 307 MG/DL (ref 70–110)

## 2019-11-30 PROCEDURE — 99233 SBSQ HOSP IP/OBS HIGH 50: CPT | Mod: ,,, | Performed by: PSYCHIATRY & NEUROLOGY

## 2019-11-30 PROCEDURE — 25000003 PHARM REV CODE 250: Performed by: STUDENT IN AN ORGANIZED HEALTH CARE EDUCATION/TRAINING PROGRAM

## 2019-11-30 PROCEDURE — 11000001 HC ACUTE MED/SURG PRIVATE ROOM

## 2019-11-30 PROCEDURE — 25000003 PHARM REV CODE 250: Performed by: PSYCHIATRY & NEUROLOGY

## 2019-11-30 PROCEDURE — 25000003 PHARM REV CODE 250: Performed by: PHYSICIAN ASSISTANT

## 2019-11-30 PROCEDURE — 63600175 PHARM REV CODE 636 W HCPCS: Performed by: FAMILY MEDICINE

## 2019-11-30 PROCEDURE — 63600175 PHARM REV CODE 636 W HCPCS: Performed by: PHYSICIAN ASSISTANT

## 2019-11-30 PROCEDURE — 25000003 PHARM REV CODE 250: Performed by: FAMILY MEDICINE

## 2019-11-30 PROCEDURE — 99233 PR SUBSEQUENT HOSPITAL CARE,LEVL III: ICD-10-PCS | Mod: ,,, | Performed by: PSYCHIATRY & NEUROLOGY

## 2019-11-30 PROCEDURE — 94761 N-INVAS EAR/PLS OXIMETRY MLT: CPT

## 2019-11-30 RX ORDER — AMLODIPINE BESYLATE 5 MG/1
5 TABLET ORAL DAILY
Status: DISCONTINUED | OUTPATIENT
Start: 2019-11-30 | End: 2019-12-03 | Stop reason: HOSPADM

## 2019-11-30 RX ADMIN — HYDROCODONE BITARTRATE AND ACETAMINOPHEN 1 TABLET: 10; 325 TABLET ORAL at 04:11

## 2019-11-30 RX ADMIN — GABAPENTIN 300 MG: 300 CAPSULE ORAL at 09:11

## 2019-11-30 RX ADMIN — ONDANSETRON 4 MG: 2 INJECTION INTRAMUSCULAR; INTRAVENOUS at 10:11

## 2019-11-30 RX ADMIN — INSULIN ASPART 3 UNITS: 100 INJECTION, SOLUTION INTRAVENOUS; SUBCUTANEOUS at 04:11

## 2019-11-30 RX ADMIN — CLOPIDOGREL BISULFATE 75 MG: 75 TABLET ORAL at 09:11

## 2019-11-30 RX ADMIN — AMLODIPINE BESYLATE 5 MG: 5 TABLET ORAL at 08:11

## 2019-11-30 RX ADMIN — LIDOCAINE 1 PATCH: 50 PATCH CUTANEOUS at 10:11

## 2019-11-30 RX ADMIN — GABAPENTIN 300 MG: 300 CAPSULE ORAL at 08:11

## 2019-11-30 RX ADMIN — POTASSIUM CHLORIDE 20 MEQ: 20 SOLUTION ORAL at 08:11

## 2019-11-30 RX ADMIN — HEPARIN SODIUM 5000 UNITS: 5000 INJECTION INTRAVENOUS; SUBCUTANEOUS at 08:11

## 2019-11-30 RX ADMIN — ONDANSETRON 4 MG: 2 INJECTION INTRAMUSCULAR; INTRAVENOUS at 07:11

## 2019-11-30 RX ADMIN — HEPARIN SODIUM 5000 UNITS: 5000 INJECTION INTRAVENOUS; SUBCUTANEOUS at 01:11

## 2019-11-30 RX ADMIN — POTASSIUM CHLORIDE 20 MEQ: 20 SOLUTION ORAL at 09:11

## 2019-11-30 RX ADMIN — TRAZODONE HYDROCHLORIDE 25 MG: 50 TABLET ORAL at 08:11

## 2019-11-30 RX ADMIN — TIZANIDINE 2 MG: 2 TABLET ORAL at 08:11

## 2019-11-30 RX ADMIN — INSULIN ASPART 3 UNITS: 100 INJECTION, SOLUTION INTRAVENOUS; SUBCUTANEOUS at 11:11

## 2019-11-30 RX ADMIN — INSULIN ASPART 2 UNITS: 100 INJECTION, SOLUTION INTRAVENOUS; SUBCUTANEOUS at 07:11

## 2019-11-30 RX ADMIN — DICLOFENAC 4 G: 10 GEL TOPICAL at 09:11

## 2019-11-30 RX ADMIN — GABAPENTIN 300 MG: 300 CAPSULE ORAL at 01:11

## 2019-11-30 RX ADMIN — DULOXETINE HYDROCHLORIDE 20 MG: 20 CAPSULE, DELAYED RELEASE ORAL at 08:11

## 2019-11-30 RX ADMIN — DULOXETINE HYDROCHLORIDE 20 MG: 20 CAPSULE, DELAYED RELEASE ORAL at 09:11

## 2019-11-30 RX ADMIN — ATORVASTATIN CALCIUM 40 MG: 20 TABLET, FILM COATED ORAL at 08:11

## 2019-11-30 RX ADMIN — HYDROCODONE BITARTRATE AND ACETAMINOPHEN 1 TABLET: 10; 325 TABLET ORAL at 10:11

## 2019-11-30 RX ADMIN — DRONABINOL 2.5 MG: 2.5 CAPSULE ORAL at 09:11

## 2019-11-30 RX ADMIN — ASPIRIN 81 MG: 81 TABLET, COATED ORAL at 09:11

## 2019-11-30 NOTE — SUBJECTIVE & OBJECTIVE
Neurologic Chief Complaint: R-sided weakness -- L MCA    Subjective:     Interval History: Patient is seen for follow-up neurological assessment and treatment recommendations: decreased breath sounds in right lower lung.  CXR wnl.  Will continue to monitor. Discussed breathing exercises with patient.      HPI, Past Medical, Family, and Social History remains the same as documented in the initial encounter.     Review of Systems   Constitutional: Positive for appetite change. Negative for fever.   Musculoskeletal: Positive for arthralgias and back pain.   Neurological: Positive for facial asymmetry, speech difficulty and weakness.   Psychiatric/Behavioral: Negative for agitation and confusion.     Scheduled Meds:   aspirin  81 mg Oral Daily    atorvastatin  40 mg Oral QHS    clopidogrel  75 mg Oral Daily    diclofenac sodium  4 g Topical (Top) Daily    dronabinol  2.5 mg Oral Daily    DULoxetine  20 mg Oral BID    gabapentin  300 mg Oral TID    heparin (porcine)  5,000 Units Subcutaneous Q8H    lidocaine  1 patch Transdermal Q24H    polyethylene glycol  17 g Oral Daily    potassium chloride 10%  20 mEq Oral BID    traZODone  25 mg Oral QHS     Continuous Infusions:    PRN Meds:dextrose 10 % in water (D10W), glucagon (human recombinant), HYDROcodone-acetaminophen, insulin aspart U-100, melatonin, ondansetron, sodium chloride 0.9%, tiZANidine    Objective:     Vital Signs (Most Recent):  Temp: 97.8 °F (36.6 °C) (11/30/19 1123)  Pulse: (!) 120 (11/30/19 1123)  Resp: 16 (11/30/19 1123)  BP: (!) 154/78 (11/30/19 1136)  SpO2: 98 % (11/30/19 1123)  BP Location: Right arm    Vital Signs Range (Last 24H):  Temp:  [97.8 °F (36.6 °C)-99 °F (37.2 °C)]   Pulse:  [101-131]   Resp:  [16-18]   BP: (135-183)/(68-89)   SpO2:  [95 %-99 %]   BP Location: Right arm    Physical Exam   Constitutional: She is oriented to person, place, and time. She appears well-developed. No distress.   HENT:   Head: Normocephalic and  atraumatic.   Eyes: Conjunctivae and EOM are normal.   Cardiovascular: Normal rate.   Pulmonary/Chest: Effort normal. No respiratory distress.   Musculoskeletal: She exhibits no edema or deformity.   Neurological: She is alert and oriented to person, place, and time.   Skin: Skin is warm and dry. She is not diaphoretic.   Psychiatric: Cognition and memory are not impaired. She is attentive.   Vitals reviewed.      Neurological Exam:   LOC: alert   Attention Span: good  Language: No aphasia  Articulation: Mild dysarthria  Orientation: Person, Time, Place  Visual Fields: Full  EOM (CN III, IV, VI): Full/intact  Facial Movement (CN VII): Lower facial weakness on the Right- mild  Motor: Arm left  Normal 5/5  Leg left  Normal 5/5  Arm right  Paresis: 4/5   Leg right Paresis: 2/5  Sensation: Intact to light touch, temp  Tone: Normal tone throughout      Laboratory:  CMP:   No results for input(s): GLUCOSE, CALCIUM, ALBUMIN, PROT, NA, K, CO2, CL, BUN, CREATININE, ALKPHOS, ALT, AST, BILITOT in the last 24 hours.  CBC:   Recent Labs   Lab 11/28/19  0455   WBC 10.25   RBC 4.14   HGB 11.5*   HCT 37.9   *   MCV 92   MCH 27.8   MCHC 30.3*     Lipid Panel:   No results for input(s): CHOL, LDLCALC, HDL, TRIG in the last 168 hours.  Coagulation:   No results for input(s): PT, INR, APTT in the last 168 hours.  Platelet Aggregation Study: No results for input(s): PLTAGG, PLTAGINTERP, PLTAGREGLACO, ADPPLTAGGREG in the last 168 hours.  Hgb A1C:   No results for input(s): HGBA1C in the last 168 hours.  TSH:   No results for input(s): TSH in the last 168 hours.      Diagnostic Results      Brain Imaging      MRI Brain (11/01/19):  Acute lacunar type infarct coursing through the left posterior limb internal capsule and corona radiata.       CTH (10/31/19):  No acute intracranial pathology        Vessel Imaging      CTA Multiphase (10/31/19):  CTA head: Atherosclerotic disease of the cavernous and supraclinoid ICAs with mild  narrowing.  Otherwise unremarkable CTA of the head specifically without evidence for proximal significant stenosis or occlusion.  CTA neck: Less than 50% proximal ICA stenosis by NASCET criteria.  Atherosclerotic disease with mild moderate narrowing of the right vertebral artery origin.  There is mild left V1 segment narrowing.  No significant focal vertebral artery stenosis or occlusion.  Interlobular septal thickening with tree in bud micro nodularity visualized upper lobes bilaterally which may represent inflammatory/infectious process clinical correlation and correlation with dedicated CT thorax recommended.  CT head: Bandlike region hypoattenuation left posterior limb internal capsule unchanged from prior suggestive for possible recent to subacute age infarction.  No evidence for hydrocephalus or acute intracranial hemorrhage.  Clinical correlation and further evaluation with MRI if patient compatible.        Cardiac Imaging     Echo (11/01/19)  · Increased (hyperdynamic) left ventricular systolic function. The estimated ejection fraction is 75%.  · Concentric left ventricular remodeling.  · Normal LV diastolic function.  · No wall motion abnormalities.  · Normal right ventricular systolic function.  · Normal central venous pressure (3 mm Hg).  · Mild tricuspid regurgitation.  · The estimated PA systolic pressure is 26 mm Hg  · Echolucent structure next to the LA, likely representing hiatal hernia. Clinincal correlation is required.        Miscellaneous Imaging     CT Abdomen Pelvis WO Contrast 11/16/19  Angiography of the right renal vasculature, SMA, and multiple lumbar arteries demonstrates no evidence of active bleeding.    IR Angiogram Visceral 11/10/19  Angiography of the right renal vasculature, SMA, and multiple lumbar arteries demonstrates no evidence of active bleeding.     CTA Abdomen/Pelvis 11/10/19  Right-sided retroperitoneal hematoma with questionable foci of active extravasation within the  inferior portion of the collection as described above.     IR Angiogram Visceral 11/9/19  Angiography of the lumbar arteries demonstrates active extravasation of the right L2 lumbar artery. Embolization using beads as described above.  Plan: Transfer patient to ICU.  Continued serial H and H follow-up.     CTA Abdomen/Pelvis 11/9/19  1. Large right retroperitoneal hematoma with findings concerning for active arterial extravasation/hemorrhage as detailed above.  There is associated hemorrhage extending throughout the right abdomen and pelvis/pericolic gutter with associated mass effect on the right kidney, which is anteriorly displaced.  2. Decreased opacification of the infahepatic and infrarenal IVC, possibly secondary to underlying mass effect versus partial thrombosis.  3. Nonspecific cecal and right colonic wall thickening, possibly reactive due to hemoperitoneum.  4. Additional findings as detailed above.     MRI Thoracic/Lumbar Spine W WO Contr 11/8/19  Large hematoma within the right psoas muscle.  Compression and probable thrombosis of the IVC. Consider contrast enhanced CT with delayed phase.  Multilevel degenerative changes as detailed above, more severe in the lumbar spine.  Irregularity of the T12 through L3 endplates is most likely degenerative.

## 2019-11-30 NOTE — ASSESSMENT & PLAN NOTE
<Tejas Goff - Last Filed: 06/07/17 18:29>





History of Present Illness





- [facility]


Consult date: 06/07/17


Requesting physician: Laura Watson


Consult reason: other (Lumbar degenerative changes seen on CT; recent fall; 

history of lumbar surgery)


History of present illness: 





Patient is a very pleasant 85-year-old male who is seen and examined at bedside 

after we were consulted for degenerative changes of his lumbar spine seen on 

CT.  Patient was originally brought to [facility] for further 

evaluation by his family for after sustaining a fall over the weekend and 

increased lower extremity weakness.  Patient also has been experiencing some 

urinary incontinence.  Patient previously resided in [place] and was 

moved to [place] by his family, who is a nurse practitioner here at  [facility].  After being admitted, a computed tomography scan of the 

lumbar spine was performed and showed evidence of degenerative changes without 

acute change.  He does have a history of an L3-4 laminectomy surgery formed 

approximately 4 years ago that was performed in [place].  Patient states 

at the bedside following that surgery he had significant improvement of his 

symptoms at that time.  Patient states at the bedside he is not currently 

experiencing any significant low back pain, lower extremity radiculopathy, or 

lower extremity weakness bilaterally.  He was previously experiencing some 

right thigh pain that has subsided.  He states he is not exactly sure why he 

was admitted.  He does not currently wish to have further treatment or 

evaluation in regards to his lumbar spine.  Medicine states patient does appear 

to have some dementia.  He is also currently being treated for urinary tract 

infection after a urine culture was positive for E. coli.  At the time of 

admission document state he was having mental status change.  Today the patient 

is currently awake, alert, and oriented 3 and able to communicate without 

significant difficulty.  Patient is able to answer questions about his history 

thoroughly without difficulty.





Past Medical History


Past Medical History: Asthma


Additional Past Medical History / Comment(s): chronic back problems, PUD


History of Any Multi-Drug Resistant Organisms: ESBL


Year Discovered:: 06/04/17


MDRO Source:: URINE


Past Surgical History: Back Surgery


Past Anesthesia/Blood Transfusion Reactions: No Reported Reaction


Past Psychological History: No Psychological Hx Reported


Smoking Status: Never smoker


Past Alcohol Use History: None Reported


Past Drug Use History: None Reported





- Past Family History


  ** Father


Family Medical History: Myocardial Infarction (MI)





  ** Mother


Family Medical History: No Reported History





Medications and Allergies


 Home Medications











 Medication  Instructions  Recorded  Confirmed  Type


 


No Known Home Medications [No  06/04/17 06/04/17 History





Known Home Medications]    











 Allergies











Allergy/AdvReac Type Severity Reaction Status Date / Time


 


No Known Allergies Allergy   Verified 06/04/17 21:12














Physical Examination








Physical exam: 


Patient is awake, alert, and oriented 3


Vital signs stable


Good chest excursion with deep inspiration and expiration


Abdomen soft nontender


Examination of lumbar spine reveals skin is intact with no abrasions, 

lacerations, or bruises; no erythema, purulence or signs of infection


No pain with palpation of the lumbosacral spine


Evidence of a well-healed incision along the midline of the lumbar spine


Dorsiflexion, plantarflexion, and extensor hallucis longus positive sustained 

bilaterally


Lower extremity strength 5/5 bilaterally


Patellar reflex 2+ bilaterally


No lower extremity hyperreflexia bilaterally


Straight leg test negative bilateral lower extremities


No signs or symptoms of DVT; no calf pain


No pain with internal and external rotation of the hips bilaterally


Neurovascularly intact





Results





Pertinent studies:


CT lumbar spine taken on 06/04/2017 with contrast: L2-3 degenerative disc 

disease with sclerosis, facet arthropathy, and spur formation resulting in 

spinal stenosis; L3-4 evidence of laminectomy defect, extensive spur formation 

and posterior disc herniation resulting in spinal encroachment; L4-5 posterior 

disc herniation greater on the right with some encroachment of the spinal canal

; L5 spondylolysis bilaterally; no evidence of vertebral body compression 

fracture





- Labs


Labs: 


 Abnormal Lab Results - Last 24 Hours (Table)











  06/07/17 06/07/17 06/07/17 Range/Units





  05:50 05:50 17:30 


 


WBC  10.7 H    (3.8-10.6)  k/uL


 


RBC  3.70 L    (4.30-5.90)  m/uL


 


Hgb  10.8 L    (13.0-17.5)  gm/dL


 


Hct  33.4 L    (39.0-53.0)  %


 


Neutrophils #  9.0 H    (1.3-7.7)  k/uL


 


Lymphocytes #  0.9 L    (1.0-4.8)  k/uL


 


Calcium   8.1 L   (8.4-10.2)  mg/dL


 


Urine Protein    1+ H  (Negative)  


 


Urine Blood    Small H  (Negative)  


 


Ur Leukocyte Esterase    Large H  (Negative)  


 


Urine WBC    >182 H  (0-5)  /hpf


 


Urine WBC Clumps    Many H  (None)  /hpf


 


Urine Bacteria    Many H  (None)  /hpf








 Microbiology - Last 24 Hours (Table)











 06/06/17 15:18 Blood Culture - Preliminary





 Blood    No Growth after 24 hours


 


 06/06/17 15:07 Blood Culture - Preliminary





 Blood    No Growth after 24 hours


 


 06/04/17 15:19 Urine Culture - Final





 Urine,Voided    Escherichia coli








 H & H











  06/04/17 06/06/17 06/07/17 Range/Units





  15:54 05:35 05:50 


 


Hgb  12.6 L  10.6 L  10.8 L  (13.0-17.5)  gm/dL


 


Hct  38.5 L  32.2 L  33.4 L  (39.0-53.0)  %








 Coagulation











  06/04/17 Range/Units





  15:54 


 


INR  1.4  (<1.1)  











Result Diagrams: 


 06/07/17 05:50





 06/07/17 05:50





Assessment and Plan


(1) History of laminectomy


Status: Acute   





(2) Lumbar facet arthropathy


Status: Acute   





(3) Lumbar degenerative disc disease


Status: Acute   





(4) Lumbar spinal stenosis


Status: Acute   





(5) Lumbar spondylolysis


Status: Acute   





(6) History of recent fall


Status: Acute   





(7) UTI (urinary tract infection)


Status: Acute   


Plan: 





Assessment:


History of L3-4 laminectomy


Urinary tract infection; positive for E. coli on urine culture


Lumbar facet arthropathy


Lumbar degenerative disc disease


Lumbar spinal stenosis


L5 spondylolysis


Recent fall





Plan:


1.  After reviewing imaging, physical examination of the patient, further 

discussion with the patient, and further discussion with his family, we are not 

currently planning for any further treatment, imaging, or evaluation in regards 

to the patient's lumbosacral spine.  Patient states he is not currently 

experiencing any low back pain, lower extremity weakness, or lower extremity 

radiculopathy.  He does not wish to have further treatment in regards to his 

lumbosacral spine.  We did discuss that if he has an exacerbation of low back 

pain or extremity radiculopathy, we could consult with pain management for 

further evaluation.  If he begins to experience lower extremity weakness, he 

may also benefit by working through physical therapy.  At this time we will 

plan to continue with conservative treatment.  Patient agrees conservative 

treatment is a good plan of care.  Conservative treatment was discussed in 

detail with his family as well who is in agreement with this plan.


2.  Medicine to continue following the patient for his other medical diagnoses


3.  From an orthopedic spine standpoint, patient is clear for discharge once 

cleared by medicine and other providers


4.  Following discharge, patient may follow-up with Tejas Goff PA-C or Dr. Milton Mathew at [facility] on an as-needed basis


5.  I have discussed this patient detail with Dr. Milton Mathew and he agrees with 

this plan


Time with Patient: Less than 30





<LILIYA Mathew - Last Filed: 06/08/17 10:13>





History of Present Illness





- HPI


History of present illness: 





The patient is seen today at bedside on 06/08/2017.  I discussed the case 

yesterday with our physician assistant Tejas Gutierrez.  I also reviewed the 

computed tomography scan.  The patient currently denies any problems with his 

back.  He had surgery 2 years ago at his lumbar spine in [place] and 

feels that he had significant improvement in his back is happy with his 

results.  He does have significant degenerative changes at his lumbar spine and 

prior decompression that has been appropriate for him and he is not having new 

changes in his back or lower extremities but he does have chronic changes at 

his lumbar spine as seen on his imaging.  He is not instrumented any further 

workup or treatment for his lumbar spine and I think that is reasonable.  He 

can follow-up with his regular physician for further care and may call her 

office if he feels he needs any further assistance with his spine.  I discussed 

this with him and he is agreeable.





Physical Examination


Osteopathic Statement: *.  No significant issues noted on an osteopathic 

structural exam other than those noted in the History and Physical/Consult.





Results





- Labs


Labs: 


 Abnormal Lab Results - Last 24 Hours (Table)











  06/07/17 Range/Units





  17:30 


 


Urine Protein  1+ H  (Negative)  


 


Urine Blood  Small H  (Negative)  


 


Ur Leukocyte Esterase  Large H  (Negative)  


 


Urine WBC  >182 H  (0-5)  /hpf


 


Urine WBC Clumps  Many H  (None)  /hpf


 


Urine Bacteria  Many H  (None)  /hpf








 Microbiology - Last 24 Hours (Table)











 06/07/17 17:30 Urine Culture - Preliminary





 Urine,Clean Catch 


 


 06/06/17 15:18 Blood Culture - Preliminary





 Blood    No Growth after 24 hours


 


 06/06/17 15:07 Blood Culture - Preliminary





 Blood    No Growth after 24 hours


 


 06/04/17 15:19 Urine Culture - Final





 Urine,Voided    Escherichia coli








 H & H











  06/04/17 06/06/17 06/07/17 Range/Units





  15:54 05:35 05:50 


 


Hgb  12.6 L  10.6 L  10.8 L  (13.0-17.5)  gm/dL


 


Hct  38.5 L  32.2 L  33.4 L  (39.0-53.0)  %








 Coagulation











  06/04/17 Range/Units





  15:54 


 


INR  1.4  (<1.1)  











Result Diagrams: 


 06/07/17 05:50





 06/07/17 05:50 Pt had been c/o back pain in recent days, complicated by her hx of chronic back and sciatic nerve pain with use of Tizanidine and opiates at home.  Due to Hb downtrend and recent tPA administration, MRI Lumbar/Thoracic spine was ordered 11/8 which demonstrated large R psoas hematoma with IVC compression and probable thrombosis.   General Surgery was consulted; No acute intervention pursued.     Patient was then with acute decompensation overnight 11/9; became hypotensive, tachycardic, and apneic with Hb down to 6.7. Rapid Response was called; patient requiring intubation, blood transfusion, and transfer to medical ICU.   CTA abd/pelvis 11/9 showed hematoma with active extravasation/hemorrhage with IVC compression, hemoperitoneum.   Pt went to IR and is now s/p successful R L2 & L3 lumbar spinal artery embolization.   Patient underwent IR re-evaluation on 11/11. No arterial bleed evident on IR angiography.  CT abd/pelvis 11/17: Redemonstration of large right retroperitoneal hematoma which is mildly decreased in size compared to most recent      Hb remains stable, Continuing to monitor.

## 2019-11-30 NOTE — PROGRESS NOTES
[facility]  Vascular Neurology  Comprehensive Stroke Center  Progress Note    Assessment/Plan:     * Thrombotic stroke involving left middle cerebral artery  Melva Barker is a 74 y.o. female with PMHx of HTN, HLD, and DM who presented to OSH with acute worsening of R-sided weakness after having experienced R-sided weakness x1 month. She was treated with tPA at OSH. CTA without LVO. MRI with infarct in L internal capsule and corona radiata. Etiology small vessel disease.    Awaiting placement     Antithrombotics for secondary stroke prevention: Antiplatelets: ASA 81 mg + Plavix 75 mg -- Initially held due to retroperitoneal hematoma. Resumed DAPT on 11/17.  Statins for secondary stroke prevention and hyperlipidemia, if present: Statins: Atorvastatin- 40 mg daily,   Aggressive risk factor modification: HTN, HLD, Diet  Rehab efforts: The patient has been evaluated by a stroke team provider and the therapy needs have been fully considered based off the presenting complaints and exam findings. The following therapy evaluations are needed: PT evaluate and treat, OT evaluate and treat, SLP evaluate and treat, PM&R evaluate for appropriate placement - Dispo SNF  Diagnostics ordered/pending: None   VTE prophylaxis: Mechanical SCDs, heparin 5000 units q 8 hours   BP parameters: Infarct: Post tPA, SBP <160; Avoid hypotension    Cytotoxic cerebral edema  Area of cytotoxic cerebral edema identified when reviewing brain imaging in the territory of the L middle cerebral artery. There is no mass effect associated with it. We will continue to monitor the patients clinical exam for any worsening of symptoms which may indicate expansion of the stroke or the area of the edema resulting in the clinical change. The pattern is suggestive of small vessel etiology.    Retroperitoneal hematoma  Pt had been c/o back pain in recent days, complicated by her hx of chronic back and sciatic nerve pain with use  of Tizanidine and opiates at home.  Due to Hb downtrend and recent tPA administration, MRI Lumbar/Thoracic spine was ordered 11/8 which demonstrated large R psoas hematoma with IVC compression and probable thrombosis.   General Surgery was consulted; No acute intervention pursued.     Patient was then with acute decompensation overnight 11/9; became hypotensive, tachycardic, and apneic with Hb down to 6.7. Rapid Response was called; patient requiring intubation, blood transfusion, and transfer to medical ICU.   CTA abd/pelvis 11/9 showed hematoma with active extravasation/hemorrhage with IVC compression, hemoperitoneum.   Pt went to IR and is now s/p successful R L2 & L3 lumbar spinal artery embolization.   Patient underwent IR re-evaluation on 11/11. No arterial bleed evident on IR angiography.  CT abd/pelvis 11/17: Redemonstration of large right retroperitoneal hematoma which is mildly decreased in size compared to most recent      Hb remains stable, Continuing to monitor.    At high risk for infection  Patient hypotensive overnight from 11/13 and into the AM 11/14. Received multiple boluses and pressure responsive.  Concern was for sepsis vs pain medication-related.   Infectious workup was ordered -- Elevated procal and lactate with interval development of atelectasis in R lower lung.  Started 7-day course of IV Cefepime and Vanc for possible hospital acquired pneumonia. BCx NGTD.    Then with liquid diarrhea overnight 11/15; WBC 14, stopped empiric abx and ordered CDiff, stool culture. PO Vanc x 10 day course for possible C.diff. IVFs given and central line removed.  WBC then improved, pt remaining afebrile.      Will continue to monitor patient, labs, vitals for any signs or symptoms concerning for infection.    Moderate malnutrition  Appreciate Nutrition/RD assistance  Ordered Boost Glucose Control TID  Appetite stimulate dronabinol   Encouraged her toward 100% completion of all meals and  "supplementation.    Orthostatic hypotension  Patient hypotensive overnight from 11/13 and into the AM 11/14. Received multiple boluses and pressure responsive.  Concern was for sepsis vs pain medication-related.   See infection risk section above    Pt then with orthostatic vital signs on 11/20 -- HR 120s to 90s from standing to lying, s/p IVFs 1L over 10 hours ordered on 11/21.   Nursing reported AFib on tele on 11/20, however EKG showed sinus tachycardia.   Pt receiving multiple boluses in recent days. Encouraged her toward aggressive PO hydration. Hb remains stable. Thigh-high KAREEN hose in place 11/27.  efforts to decrease tizanidine, however pt continues to complain of pain.   Will continue monitoring, consider midodrine if hypotension persists    Debility  2/2 stroke  PT/OT eval & treat - Dispo SNF    Essential hypertension  Risk factor for stroke  Holding all BP meds due to recent hypotension  BP continues to be labile -- SBP  over last 24 hours.    asymptomatic hypotension SBP 80s recheck SBP 90s, place thigh high KAREEN hose.   efforts to decrease tizanidine, however pt continues to complain of pain.   Continue close monitoring. Previously considering initiating low-dose Midodrine if hypotension persisted.    Back pain  Reported hx chronic back pain with associated sciatic nerve pain as well, then complicated by acute retroperitoneal hematoma.  Consulted to anesthesia pain management    Pain now being controlled with gabapentin, duloxetine, trazodone, diclofenac gel, lidocaine patch, hydrcodone-acetaminophen (prn), tizanidine (prn.)     Nurse reporting pt continues to request pain medication "around the clock." Of note, pt reported to night nurse that her best pain score at home is usually 7 1/2.    Restarted pt's home regimen and will continue at discharge.  Per PNP, pt seeing Dr. Hilton Rowland in [place] for pain management. Recommend follow up outpatient and consideration of Addiction Psych " referral.    Gastroparesis  Previously on Reglan 10 mg TID before meals, reglan then decreased to 5 mg TID.   Reglan discontinued on 11/23, will monitor closely and reinitiate if necessary.     Diabetes mellitus type 2 without retinopathy  Stroke risk factor, poorly controlled on glargine 17 units daily  HbA1c 8.3%  Recommend tight glucose control  Currently on SSI   Diabetic diet         11/1/19 MRI with small vessel L MCA infarct, therapy recommending rehab  11/2 - Patient stepped down overnight. Adjusting BP regimen. Patient with continuous complaints if nausea. IV Zofran ordered with some relief. Patient has not has BM since 10/30. Ordering KUB to rule out obstruction. Neuro exam unchanged.   11/3 - NAEON. Patient reports she had no episodes of vomiting overnight. Still complaining of nausea. Mild lower abdomen tenderness on exam. Lipase ordered and WNL. Continue to monitor.   11/4 - denies nausea and vomiting. Abd tenderness remains present on palpitation. Continue to monitor. HCTZ increased yesterday. Pending rehab placement.   11/5 -  N/V x1 overnight zofran resolved, sucralfate started. Placement pending   11/6 - hypotensive, held all BP meds,  ml bolus, responded well. Increased BUN/Cr, start NS 75ml/hr.   11/7 patient complaining of sciatica pain.  Restarted home tizanidine.  Patient requesting hydrocodone but educated patient that nerve pain is not treated with opioid.  Degenerative disease seen on xray but no fracture.      11/8 Patient continues to experience back pain.  Now with leukocytosis and drop in h/h plan for MRI thoracic/lumbar spine to evaluate for epidural hematoma.    11/9: MRI Lumbar spine had demonstrated large R psoas hematoma with possible IVC compression; General Surgery consulted. Patient was then with acute decompensation overnight; became hypotensive, tachycardic, and apneic requiring intubation, blood transfusion, and medical ICU transfer. CTA abd/pelvis showed active  extravasation/hemorrhage with hemoperitoneum. Pt now s/p successful lumbar artery embolization in IR. She was extubated on arrival back to ICU and tolerated well.    11/11: Patient underwent IR re-evaluation yesterday. No arterial bleed evident on IR angiography. Patient neuro exam stable.   11/12 Stepped down from medical ICU this morning.  Patient still experiencing pain.  Anesthesia consulted for pain management.  D/C pain pump and started oral medications for muscle and nerve pain.  Will wean to home regimen. Waiting for repeat CBC, transfused one unit of blood 11/11.   Tachycardic/hypertensive will continue to monitor.  11/13: Upgraded diet per SLP recs. Pt continues to c/o R back pain, concern that tachycardia could be pain-induced; adjusted analgesic regimen further as well as antiemetic regimen with plans to eventually wean to home regimen. Replaced Phos. Dispo inpatient rehab.  11/14: patient hypotensive overnight and this morning requiring fluid boluses. Discontinued all pain medications except for celecoxib in case pain meds are contributing to hypotension. Lidocaine patch and Voltaren gel ordered as well. Infectious workup significant for minor atelectasis in R lower lung with increased procal and lactate. Patient started on IV antibiotics for community acquired pneumonia. Blood cultures pending. ICU notified of patient's hypotension  11/15: Liquid diarrhea, WBC 14, stopped empiric abx, ordered c. Diff and stool culture, start PO vanc, IVFs. Adjusted pain meds with goal to return to home regimen. Nauseous overnight, zofran given.   11/16: CT abdomen and pelvis to f/u retroperitoneal hematoma prior to starting DAPT, H/H stable. T max 99.4, tachy, WBC 13, C. Diff and stool cultures pending, increased  ml/hr, labile BP, continue to monitor. Remove central line.   11/17: CT abdomen/pelvis again with large retroperitoneal hematoma  but decreased in size, H/H uptrend, resume DAPT today. Diarrhea  becoming thicker, WBC improved, a febrile, c. Diff antigen positive, toxins negative. Pain well controlled.   11/18 PCR negative, c.diff precautions and vancomycin d/c.  Neurologically stable waiting for placement.    11/19 Patient now recommending SNF.   11/20 Neurologically stable.  Waiting for placement.  Hypotensive episodes overnight given some fluids.     11/21 Orthostatic yesterday, HR 120s to 90s standing to lying, IVFs 1L over 10 hours ordered.   11/22: Nurse encouraging pt toward aggressive PO hydration in the setting of recent orthostatic hypotension. Hb remains stable.  11/23: Reglan discontinue  Importance of oral hydration reinforced with patient. H&H remain stable. Patient accepted to [facility], awaiting insurance auth.   11/24: patient became hypotensive with SBP in 70s and responded to 500 cc bolus, continuing to encourage oral fluid intake, will consider midodrine if hypotension persists  11/25: SBP WNL within last 24 hours. Decreased Norco to home dose q6hr prn, f/u w/ Dr. Canela pain management outpatient, consider addiction psych. C/o decreased appetite, advanced soft diet to level 6 so pt can receive gumbo as she requested, boost supplements, continue to monitor.   11/26: Patient requesting appetite stimulant; encouraged her toward 100% completion of all meals. Continuing to monitor her labile BP.  11/27: asymptomatic hypotension SBP 80s recheck SBP 90s, place thigh high KAREEN hose, consider midodrine if hypotension continues.   11/29: asymptomatic hypotension continues efforts to decrease tizanidine, however pt continues to complain of pain.   11/30 decreased breath sounds in right lower lung.  CXR wnl.  Will continue to monitor discussed breathing exercises     STROKE DOCUMENTATION   Acute Stroke Times   Last Known Normal Date: 10/31/19  Last Known Normal Time: 0630  Symptom Onset Date: 10/31/19  Symptom Onset Time: 0730  Stroke Team Called Date: 10/31/19  Stroke Team Called Time:  0936  Stroke Team Arrival Date: 10/31/19  Stroke Team Arrival Time: 0946  Decision to Treat Time for Alteplase: 1003    NIH Scale:  1a. Level of Consciousness: 0-->Alert, keenly responsive  1b. LOC Questions: 0-->Answers both questions correctly  1c. LOC Commands: 0-->Performs both tasks correctly  2. Best Gaze: 0-->Normal  3. Visual: 0-->No visual loss  4. Facial Palsy: 1-->Minor paralysis (flattened nasolabial fold, asymmetry on smiling)  5a. Motor Arm, Left: 0-->No drift, limb holds 90 (or 45) degrees for full 10 secs  5b. Motor Arm, Right: 1-->Drift, limb holds 90 (or 45) degrees, but drifts down before full 10 secs, does not hit bed or other support  6a. Motor Leg, Left: 0-->No drift, leg holds 30 degree position for full 5 secs  6b. Motor Leg, Right: 3-->No effort against gravity, leg falls to bed immediately  7. Limb Ataxia: 0-->Absent  8. Sensory: 0-->Normal, no sensory loss  9. Best Language: 0-->No aphasia, normal  10. Dysarthria: 1-->Mild-to-moderate dysarthria, patient slurs at least some words and, at worst, can be understood with some difficulty  11. Extinction and Inattention (formerly Neglect): 0-->No abnormality  Total (NIH Stroke Scale): 6       Modified [place] Score: 0  [place] Coma Scale:    ABCD2 Score:    [id number] Score:   HAS -BLED Score:   ICH Score:   Hunt & Amaya Classification:      Hemorrhagic change of an Ischemic Stroke: Does this patient have an ischemic stroke with hemorrhagic changes? No     Neurologic Chief Complaint: R-sided weakness -- L MCA    Subjective:     Interval History: Patient is seen for follow-up neurological assessment and treatment recommendations: decreased breath sounds in right lower lung.  CXR wnl.  Will continue to monitor. Discussed breathing exercises with patient.      HPI, Past Medical, Family, and Social History remains the same as documented in the initial encounter.     Review of Systems   Constitutional: Positive for appetite change. Negative for  fever.   Musculoskeletal: Positive for arthralgias and back pain.   Neurological: Positive for facial asymmetry, speech difficulty and weakness.   Psychiatric/Behavioral: Negative for agitation and confusion.     Scheduled Meds:   aspirin  81 mg Oral Daily    atorvastatin  40 mg Oral QHS    clopidogrel  75 mg Oral Daily    diclofenac sodium  4 g Topical (Top) Daily    dronabinol  2.5 mg Oral Daily    DULoxetine  20 mg Oral BID    gabapentin  300 mg Oral TID    heparin (porcine)  5,000 Units Subcutaneous Q8H    lidocaine  1 patch Transdermal Q24H    polyethylene glycol  17 g Oral Daily    potassium chloride 10%  20 mEq Oral BID    traZODone  25 mg Oral QHS     Continuous Infusions:    PRN Meds:dextrose 10 % in water (D10W), glucagon (human recombinant), HYDROcodone-acetaminophen, insulin aspart U-100, melatonin, ondansetron, sodium chloride 0.9%, tiZANidine    Objective:     Vital Signs (Most Recent):  Temp: 97.8 °F (36.6 °C) (11/30/19 1123)  Pulse: (!) 120 (11/30/19 1123)  Resp: 16 (11/30/19 1123)  BP: (!) 154/78 (11/30/19 1136)  SpO2: 98 % (11/30/19 1123)  BP Location: Right arm    Vital Signs Range (Last 24H):  Temp:  [97.8 °F (36.6 °C)-99 °F (37.2 °C)]   Pulse:  [101-131]   Resp:  [16-18]   BP: (135-183)/(68-89)   SpO2:  [95 %-99 %]   BP Location: Right arm    Physical Exam   Constitutional: She is oriented to person, place, and time. She appears well-developed. No distress.   HENT:   Head: Normocephalic and atraumatic.   Eyes: Conjunctivae and EOM are normal.   Cardiovascular: Normal rate.   Pulmonary/Chest: Effort normal. No respiratory distress.   Musculoskeletal: She exhibits no edema or deformity.   Neurological: She is alert and oriented to person, place, and time.   Skin: Skin is warm and dry. She is not diaphoretic.   Psychiatric: Cognition and memory are not impaired. She is attentive.   Vitals reviewed.      Neurological Exam:   LOC: alert   Attention Span: good  Language: No  aphasia  Articulation: Mild dysarthria  Orientation: Person, Time, Place  Visual Fields: Full  EOM (CN III, IV, VI): Full/intact  Facial Movement (CN VII): Lower facial weakness on the Right- mild  Motor: Arm left  Normal 5/5  Leg left  Normal 5/5  Arm right  Paresis: 4/5   Leg right Paresis: 2/5  Sensation: Intact to light touch, temp  Tone: Normal tone throughout      Laboratory:  CMP:   No results for input(s): GLUCOSE, CALCIUM, ALBUMIN, PROT, NA, K, CO2, CL, BUN, CREATININE, ALKPHOS, ALT, AST, BILITOT in the last 24 hours.  CBC:   Recent Labs   Lab 11/28/19  0455   WBC 10.25   RBC 4.14   HGB 11.5*   HCT 37.9   *   MCV 92   MCH 27.8   MCHC 30.3*     Lipid Panel:   No results for input(s): CHOL, LDLCALC, HDL, TRIG in the last 168 hours.  Coagulation:   No results for input(s): PT, INR, APTT in the last 168 hours.  Platelet Aggregation Study: No results for input(s): PLTAGG, PLTAGINTERP, PLTAGREGLACO, ADPPLTAGGREG in the last 168 hours.  Hgb A1C:   No results for input(s): HGBA1C in the last 168 hours.  TSH:   No results for input(s): TSH in the last 168 hours.      Diagnostic Results      Brain Imaging      MRI Brain (11/01/19):  Acute lacunar type infarct coursing through the left posterior limb internal capsule and corona radiata.       CTH (10/31/19):  No acute intracranial pathology        Vessel Imaging      CTA Multiphase (10/31/19):  CTA head: Atherosclerotic disease of the cavernous and supraclinoid ICAs with mild narrowing.  Otherwise unremarkable CTA of the head specifically without evidence for proximal significant stenosis or occlusion.  CTA neck: Less than 50% proximal ICA stenosis by NASCET criteria.  Atherosclerotic disease with mild moderate narrowing of the right vertebral artery origin.  There is mild left V1 segment narrowing.  No significant focal vertebral artery stenosis or occlusion.  Interlobular septal thickening with tree in bud micro nodularity visualized upper lobes bilaterally  which may represent inflammatory/infectious process clinical correlation and correlation with dedicated CT thorax recommended.  CT head: Bandlike region hypoattenuation left posterior limb internal capsule unchanged from prior suggestive for possible recent to subacute age infarction.  No evidence for hydrocephalus or acute intracranial hemorrhage.  Clinical correlation and further evaluation with MRI if patient compatible.        Cardiac Imaging     Echo (11/01/19)  · Increased (hyperdynamic) left ventricular systolic function. The estimated ejection fraction is 75%.  · Concentric left ventricular remodeling.  · Normal LV diastolic function.  · No wall motion abnormalities.  · Normal right ventricular systolic function.  · Normal central venous pressure (3 mm Hg).  · Mild tricuspid regurgitation.  · The estimated PA systolic pressure is 26 mm Hg  · Echolucent structure next to the LA, likely representing hiatal hernia. Clinincal correlation is required.        Miscellaneous Imaging     CT Abdomen Pelvis WO Contrast 11/16/19  Angiography of the right renal vasculature, SMA, and multiple lumbar arteries demonstrates no evidence of active bleeding.    IR Angiogram Visceral 11/10/19  Angiography of the right renal vasculature, SMA, and multiple lumbar arteries demonstrates no evidence of active bleeding.     CTA Abdomen/Pelvis 11/10/19  Right-sided retroperitoneal hematoma with questionable foci of active extravasation within the inferior portion of the collection as described above.     IR Angiogram Visceral 11/9/19  Angiography of the lumbar arteries demonstrates active extravasation of the right L2 lumbar artery. Embolization using beads as described above.  Plan: Transfer patient to ICU.  Continued serial H and H follow-up.     CTA Abdomen/Pelvis 11/9/19  1. Large right retroperitoneal hematoma with findings concerning for active arterial extravasation/hemorrhage as detailed above.  There is associated hemorrhage  extending throughout the right abdomen and pelvis/pericolic gutter with associated mass effect on the right kidney, which is anteriorly displaced.  2. Decreased opacification of the infahepatic and infrarenal IVC, possibly secondary to underlying mass effect versus partial thrombosis.  3. Nonspecific cecal and right colonic wall thickening, possibly reactive due to hemoperitoneum.  4. Additional findings as detailed above.     MRI Thoracic/Lumbar Spine W WO Contr 11/8/19  Large hematoma within the right psoas muscle.  Compression and probable thrombosis of the IVC. Consider contrast enhanced CT with delayed phase.  Multilevel degenerative changes as detailed above, more severe in the lumbar spine.  Irregularity of the T12 through L3 endplates is most likely degenerative.      Danya Mancia PA-C  Comprehensive Stroke Center  Department of Vascular Neurology   [facility]

## 2019-11-30 NOTE — ASSESSMENT & PLAN NOTE
Melva Barker is a 74 y.o. female with PMHx of HTN, HLD, and DM who presented to OSH with acute worsening of R-sided weakness after having experienced R-sided weakness x1 month. She was treated with tPA at OSH. CTA without LVO. MRI with infarct in L internal capsule and corona radiata. Etiology small vessel disease.    Awaiting placement     Antithrombotics for secondary stroke prevention: Antiplatelets: ASA 81 mg + Plavix 75 mg -- Initially held due to retroperitoneal hematoma. Resumed DAPT on 11/17.  Statins for secondary stroke prevention and hyperlipidemia, if present: Statins: Atorvastatin- 40 mg daily,   Aggressive risk factor modification: HTN, HLD, Diet  Rehab efforts: The patient has been evaluated by a stroke team provider and the therapy needs have been fully considered based off the presenting complaints and exam findings. The following therapy evaluations are needed: PT evaluate and treat, OT evaluate and treat, SLP evaluate and treat, PM&R evaluate for appropriate placement - Dispo SNF  Diagnostics ordered/pending: None   VTE prophylaxis: Mechanical SCDs, heparin 5000 units q 8 hours   BP parameters: Infarct: Post tPA, SBP <160; Avoid hypotension

## 2019-11-30 NOTE — ASSESSMENT & PLAN NOTE
"Reported hx chronic back pain with associated sciatic nerve pain as well, then complicated by acute retroperitoneal hematoma.  Consulted to anesthesia pain management    Pain now being controlled with gabapentin, duloxetine, trazodone, diclofenac gel, lidocaine patch, hydrcodone-acetaminophen (prn), tizanidine (prn.)     Nurse reporting pt continues to request pain medication "around the clock." Of note, pt reported to night nurse that her best pain score at home is usually 7 1/2.    Restarted pt's home regimen and will continue at discharge.  Per PNP, pt seeing Dr. Hilton Rowland in [place] for pain management. Recommend follow up outpatient and consideration of Addiction Psych referral.  "

## 2019-11-30 NOTE — NURSING
Pt a/o x 4, cont b/b, lying in bed with iv fluids @ 75 , and appears to have no sign of distress, wheels locked, hob 30-45, call light, personal belongings within reach, suction at the bedside, seizure precautions and was informed to call for assistance if/when needed.

## 2019-11-30 NOTE — PLAN OF CARE
POC reviewed with patient, questions concerning pain medications addressed, bed locked in the lowest position, call bell in reach, bed alarm on, no distress noted, wctm  Problem: Adult Inpatient Plan of Care  Goal: Plan of Care Review  Outcome: Ongoing, Progressing

## 2019-12-01 PROBLEM — F19.90 SUBSTANCE USE DISORDER: Status: ACTIVE | Noted: 2019-12-01

## 2019-12-01 LAB
BASOPHILS # BLD AUTO: 0.08 K/UL (ref 0–0.2)
BASOPHILS NFR BLD: 0.5 % (ref 0–1.9)
DIFFERENTIAL METHOD: ABNORMAL
EOSINOPHIL # BLD AUTO: 0.2 K/UL (ref 0–0.5)
EOSINOPHIL NFR BLD: 1.4 % (ref 0–8)
ERYTHROCYTE [DISTWIDTH] IN BLOOD BY AUTOMATED COUNT: 16.3 % (ref 11.5–14.5)
HCT VFR BLD AUTO: 37.7 % (ref 37–48.5)
HGB BLD-MCNC: 11.9 G/DL (ref 12–16)
IMM GRANULOCYTES # BLD AUTO: 0.36 K/UL (ref 0–0.04)
IMM GRANULOCYTES NFR BLD AUTO: 2.4 % (ref 0–0.5)
LYMPHOCYTES # BLD AUTO: 2.7 K/UL (ref 1–4.8)
LYMPHOCYTES NFR BLD: 17.9 % (ref 18–48)
MCH RBC QN AUTO: 28.3 PG (ref 27–31)
MCHC RBC AUTO-ENTMCNC: 31.6 G/DL (ref 32–36)
MCV RBC AUTO: 90 FL (ref 82–98)
MONOCYTES # BLD AUTO: 1.3 K/UL (ref 0.3–1)
MONOCYTES NFR BLD: 8.6 % (ref 4–15)
NEUTROPHILS # BLD AUTO: 10.2 K/UL (ref 1.8–7.7)
NEUTROPHILS NFR BLD: 69.2 % (ref 38–73)
NRBC BLD-RTO: 0 /100 WBC
PLATELET # BLD AUTO: 501 K/UL (ref 150–350)
PMV BLD AUTO: 10.2 FL (ref 9.2–12.9)
POCT GLUCOSE: 113 MG/DL (ref 70–110)
POCT GLUCOSE: 136 MG/DL (ref 70–110)
POCT GLUCOSE: 164 MG/DL (ref 70–110)
POCT GLUCOSE: 173 MG/DL (ref 70–110)
POCT GLUCOSE: 206 MG/DL (ref 70–110)
POCT GLUCOSE: 290 MG/DL (ref 70–110)
RBC # BLD AUTO: 4.2 M/UL (ref 4–5.4)
WBC # BLD AUTO: 14.82 K/UL (ref 3.9–12.7)

## 2019-12-01 PROCEDURE — 25000003 PHARM REV CODE 250: Performed by: PSYCHIATRY & NEUROLOGY

## 2019-12-01 PROCEDURE — 63600175 PHARM REV CODE 636 W HCPCS: Performed by: PHYSICIAN ASSISTANT

## 2019-12-01 PROCEDURE — 94761 N-INVAS EAR/PLS OXIMETRY MLT: CPT

## 2019-12-01 PROCEDURE — 99233 SBSQ HOSP IP/OBS HIGH 50: CPT | Mod: ,,, | Performed by: PSYCHIATRY & NEUROLOGY

## 2019-12-01 PROCEDURE — 85025 COMPLETE CBC W/AUTO DIFF WBC: CPT

## 2019-12-01 PROCEDURE — 25000003 PHARM REV CODE 250: Performed by: PHYSICIAN ASSISTANT

## 2019-12-01 PROCEDURE — 63600175 PHARM REV CODE 636 W HCPCS: Performed by: FAMILY MEDICINE

## 2019-12-01 PROCEDURE — 25000003 PHARM REV CODE 250: Performed by: STUDENT IN AN ORGANIZED HEALTH CARE EDUCATION/TRAINING PROGRAM

## 2019-12-01 PROCEDURE — 99233 PR SUBSEQUENT HOSPITAL CARE,LEVL III: ICD-10-PCS | Mod: ,,, | Performed by: PSYCHIATRY & NEUROLOGY

## 2019-12-01 PROCEDURE — 25000003 PHARM REV CODE 250: Performed by: FAMILY MEDICINE

## 2019-12-01 PROCEDURE — 11000001 HC ACUTE MED/SURG PRIVATE ROOM

## 2019-12-01 PROCEDURE — 25000003 PHARM REV CODE 250: Performed by: NURSE PRACTITIONER

## 2019-12-01 PROCEDURE — 36415 COLL VENOUS BLD VENIPUNCTURE: CPT

## 2019-12-01 RX ORDER — SODIUM CHLORIDE 9 MG/ML
INJECTION, SOLUTION INTRAVENOUS CONTINUOUS
Status: ACTIVE | OUTPATIENT
Start: 2019-12-01 | End: 2019-12-01

## 2019-12-01 RX ORDER — HYDROCODONE BITARTRATE AND ACETAMINOPHEN 10; 325 MG/1; MG/1
1 TABLET ORAL EVERY 8 HOURS PRN
Status: DISCONTINUED | OUTPATIENT
Start: 2019-12-01 | End: 2019-12-03 | Stop reason: HOSPADM

## 2019-12-01 RX ORDER — SODIUM CHLORIDE 9 MG/ML
INJECTION, SOLUTION INTRAVENOUS CONTINUOUS
Status: DISCONTINUED | OUTPATIENT
Start: 2019-12-01 | End: 2019-12-01

## 2019-12-01 RX ADMIN — LIDOCAINE 1 PATCH: 50 PATCH CUTANEOUS at 11:12

## 2019-12-01 RX ADMIN — HYDROCODONE BITARTRATE AND ACETAMINOPHEN 1 TABLET: 10; 325 TABLET ORAL at 04:12

## 2019-12-01 RX ADMIN — ASPIRIN 81 MG: 81 TABLET, COATED ORAL at 08:12

## 2019-12-01 RX ADMIN — SODIUM CHLORIDE: 0.9 INJECTION, SOLUTION INTRAVENOUS at 09:12

## 2019-12-01 RX ADMIN — ONDANSETRON 4 MG: 2 INJECTION INTRAMUSCULAR; INTRAVENOUS at 09:12

## 2019-12-01 RX ADMIN — HYDROCODONE BITARTRATE AND ACETAMINOPHEN 1 TABLET: 10; 325 TABLET ORAL at 10:12

## 2019-12-01 RX ADMIN — DICLOFENAC 4 G: 10 GEL TOPICAL at 08:12

## 2019-12-01 RX ADMIN — TIZANIDINE 2 MG: 2 TABLET ORAL at 08:12

## 2019-12-01 RX ADMIN — POTASSIUM CHLORIDE 20 MEQ: 20 SOLUTION ORAL at 08:12

## 2019-12-01 RX ADMIN — GABAPENTIN 300 MG: 300 CAPSULE ORAL at 02:12

## 2019-12-01 RX ADMIN — Medication 6 MG: at 09:12

## 2019-12-01 RX ADMIN — INSULIN ASPART 1 UNITS: 100 INJECTION, SOLUTION INTRAVENOUS; SUBCUTANEOUS at 01:12

## 2019-12-01 RX ADMIN — HEPARIN SODIUM 5000 UNITS: 5000 INJECTION INTRAVENOUS; SUBCUTANEOUS at 09:12

## 2019-12-01 RX ADMIN — DRONABINOL 2.5 MG: 2.5 CAPSULE ORAL at 08:12

## 2019-12-01 RX ADMIN — TRAZODONE HYDROCHLORIDE 25 MG: 50 TABLET ORAL at 08:12

## 2019-12-01 RX ADMIN — AMLODIPINE BESYLATE 5 MG: 5 TABLET ORAL at 08:12

## 2019-12-01 RX ADMIN — DULOXETINE HYDROCHLORIDE 20 MG: 20 CAPSULE, DELAYED RELEASE ORAL at 08:12

## 2019-12-01 RX ADMIN — GABAPENTIN 300 MG: 300 CAPSULE ORAL at 08:12

## 2019-12-01 RX ADMIN — HYDROCODONE BITARTRATE AND ACETAMINOPHEN 1 TABLET: 10; 325 TABLET ORAL at 06:12

## 2019-12-01 RX ADMIN — HEPARIN SODIUM 5000 UNITS: 5000 INJECTION INTRAVENOUS; SUBCUTANEOUS at 04:12

## 2019-12-01 RX ADMIN — HEPARIN SODIUM 5000 UNITS: 5000 INJECTION INTRAVENOUS; SUBCUTANEOUS at 02:12

## 2019-12-01 RX ADMIN — TIZANIDINE 2 MG: 2 TABLET ORAL at 02:12

## 2019-12-01 RX ADMIN — CLOPIDOGREL BISULFATE 75 MG: 75 TABLET ORAL at 08:12

## 2019-12-01 RX ADMIN — ATORVASTATIN CALCIUM 40 MG: 20 TABLET, FILM COATED ORAL at 08:12

## 2019-12-01 NOTE — SUBJECTIVE & OBJECTIVE
Neurologic Chief Complaint: R-sided weakness -- L MCA    Subjective:     Interval History: Patient is seen for follow-up neurological assessment and treatment recommendations:  waiting for placement to SNF.  Sinus tachycardia due to dehydration.  Patient refusing to eat/drink and will eat and drink if she receives more pain medication.  Discussed risk of not getting enough hydration and nutrition.      HPI, Past Medical, Family, and Social History remains the same as documented in the initial encounter.     Review of Systems   Constitutional: Positive for appetite change. Negative for fever.   Musculoskeletal: Positive for arthralgias and back pain.   Neurological: Positive for facial asymmetry, speech difficulty and weakness.   Psychiatric/Behavioral: Negative for agitation and confusion.     Scheduled Meds:   amLODIPine  5 mg Oral Daily    aspirin  81 mg Oral Daily    atorvastatin  40 mg Oral QHS    clopidogrel  75 mg Oral Daily    diclofenac sodium  4 g Topical (Top) Daily    dronabinol  2.5 mg Oral Daily    DULoxetine  20 mg Oral BID    gabapentin  300 mg Oral TID    heparin (porcine)  5,000 Units Subcutaneous Q8H    lidocaine  1 patch Transdermal Q24H    polyethylene glycol  17 g Oral Daily    potassium chloride 10%  20 mEq Oral BID    traZODone  25 mg Oral QHS     Continuous Infusions:   sodium chloride 0.9% 75 mL/hr at 12/01/19 0951     PRN Meds:dextrose 10 % in water (D10W), glucagon (human recombinant), HYDROcodone-acetaminophen, insulin aspart U-100, melatonin, ondansetron, sodium chloride 0.9%, tiZANidine    Objective:     Vital Signs (Most Recent):  Temp: 99 °F (37.2 °C) (12/01/19 1132)  Pulse: 97 (12/01/19 1132)  Resp: 17 (12/01/19 1132)  BP: (!) 148/79 (12/01/19 1132)  SpO2: 97 % (12/01/19 1132)  BP Location: Left arm    Vital Signs Range (Last 24H):  Temp:  [97.6 °F (36.4 °C)-99 °F (37.2 °C)]   Pulse:  []   Resp:  [16-18]   BP: (109-169)/(71-98)   SpO2:  [95 %-99 %]   BP Location:  Left arm    Physical Exam   Constitutional: She is oriented to person, place, and time. She appears well-developed. No distress.   HENT:   Head: Normocephalic and atraumatic.   Eyes: Conjunctivae and EOM are normal.   Cardiovascular: Normal rate.   Pulmonary/Chest: Effort normal. No respiratory distress.   Musculoskeletal: She exhibits no edema or deformity.   Neurological: She is alert and oriented to person, place, and time.   Skin: Skin is warm and dry. She is not diaphoretic.   Psychiatric: Cognition and memory are not impaired. She is attentive.   Vitals reviewed.      Neurological Exam:   LOC: alert   Attention Span: good  Language: No aphasia  Articulation: Mild dysarthria  Orientation: Person, Time, Place  Visual Fields: Full  EOM (CN III, IV, VI): Full/intact  Facial Movement (CN VII): Lower facial weakness on the Right- mild  Motor: Arm left  Normal 5/5  Leg left  Normal 5/5  Arm right  Paresis: 4/5   Leg right Paresis: 2/5  Sensation: Intact to light touch, temp  Tone: Normal tone throughout      Laboratory:  CMP:   No results for input(s): GLUCOSE, CALCIUM, ALBUMIN, PROT, NA, K, CO2, CL, BUN, CREATININE, ALKPHOS, ALT, AST, BILITOT in the last 24 hours.  CBC:   Recent Labs   Lab 12/01/19  0416   WBC 14.82*   RBC 4.20   HGB 11.9*   HCT 37.7   *   MCV 90   MCH 28.3   MCHC 31.6*     Lipid Panel:   No results for input(s): CHOL, LDLCALC, HDL, TRIG in the last 168 hours.  Coagulation:   No results for input(s): PT, INR, APTT in the last 168 hours.  Platelet Aggregation Study: No results for input(s): PLTAGG, PLTAGINTERP, PLTAGREGLACO, ADPPLTAGGREG in the last 168 hours.  Hgb A1C:   No results for input(s): HGBA1C in the last 168 hours.  TSH:   No results for input(s): TSH in the last 168 hours.      Diagnostic Results      Brain Imaging      MRI Brain (11/01/19):  Acute lacunar type infarct coursing through the left posterior limb internal capsule and corona radiata.       CTH (10/31/19):  No acute  intracranial pathology        Vessel Imaging      CTA Multiphase (10/31/19):  CTA head: Atherosclerotic disease of the cavernous and supraclinoid ICAs with mild narrowing.  Otherwise unremarkable CTA of the head specifically without evidence for proximal significant stenosis or occlusion.  CTA neck: Less than 50% proximal ICA stenosis by NASCET criteria.  Atherosclerotic disease with mild moderate narrowing of the right vertebral artery origin.  There is mild left V1 segment narrowing.  No significant focal vertebral artery stenosis or occlusion.  Interlobular septal thickening with tree in bud micro nodularity visualized upper lobes bilaterally which may represent inflammatory/infectious process clinical correlation and correlation with dedicated CT thorax recommended.  CT head: Bandlike region hypoattenuation left posterior limb internal capsule unchanged from prior suggestive for possible recent to subacute age infarction.  No evidence for hydrocephalus or acute intracranial hemorrhage.  Clinical correlation and further evaluation with MRI if patient compatible.        Cardiac Imaging     Echo (11/01/19)  · Increased (hyperdynamic) left ventricular systolic function. The estimated ejection fraction is 75%.  · Concentric left ventricular remodeling.  · Normal LV diastolic function.  · No wall motion abnormalities.  · Normal right ventricular systolic function.  · Normal central venous pressure (3 mm Hg).  · Mild tricuspid regurgitation.  · The estimated PA systolic pressure is 26 mm Hg  · Echolucent structure next to the LA, likely representing hiatal hernia. Clinincal correlation is required.        Miscellaneous Imaging     CT Abdomen Pelvis WO Contrast 11/16/19  Angiography of the right renal vasculature, SMA, and multiple lumbar arteries demonstrates no evidence of active bleeding.    IR Angiogram Visceral 11/10/19  Angiography of the right renal vasculature, SMA, and multiple lumbar arteries demonstrates no  evidence of active bleeding.     CTA Abdomen/Pelvis 11/10/19  Right-sided retroperitoneal hematoma with questionable foci of active extravasation within the inferior portion of the collection as described above.     IR Angiogram Visceral 11/9/19  Angiography of the lumbar arteries demonstrates active extravasation of the right L2 lumbar artery. Embolization using beads as described above.  Plan: Transfer patient to ICU.  Continued serial H and H follow-up.     CTA Abdomen/Pelvis 11/9/19  1. Large right retroperitoneal hematoma with findings concerning for active arterial extravasation/hemorrhage as detailed above.  There is associated hemorrhage extending throughout the right abdomen and pelvis/pericolic gutter with associated mass effect on the right kidney, which is anteriorly displaced.  2. Decreased opacification of the infahepatic and infrarenal IVC, possibly secondary to underlying mass effect versus partial thrombosis.  3. Nonspecific cecal and right colonic wall thickening, possibly reactive due to hemoperitoneum.  4. Additional findings as detailed above.     MRI Thoracic/Lumbar Spine W WO Contr 11/8/19  Large hematoma within the right psoas muscle.  Compression and probable thrombosis of the IVC. Consider contrast enhanced CT with delayed phase.  Multilevel degenerative changes as detailed above, more severe in the lumbar spine.  Irregularity of the T12 through L3 endplates is most likely degenerative.

## 2019-12-01 NOTE — ASSESSMENT & PLAN NOTE
"Reported hx chronic back pain with associated sciatic nerve pain as well, then complicated by acute retroperitoneal hematoma.  Consulted to anesthesia pain management    Pain now being controlled with gabapentin, duloxetine, trazodone, diclofenac gel, lidocaine patch, hydrcodone-acetaminophen (prn), tizanidine (prn.)     Nurse reporting pt continues to request pain medication "around the clock." Of note, pt reported to night nurse that her best pain score at home is usually 7 1/2.    Restarted pt's home regimen and will continue at discharge.  Per PNP, pt seeing Dr. Hilton Rowland in [place] for pain management. Recommend follow up outpatient and consideration of Addiction Psych referral.  "

## 2019-12-01 NOTE — PLAN OF CARE
Pt slept comfortably throughout evening.  Pt continues to have right sided weakness.  Chronic back pain controlled by PRN Norco and Tizanidine.      Pt's BP >160 in evening, stroke notified.  Norvasc started for patient.    Pt SR/ST 80s-110s.      Pt up with standby assist to bedside commode.    Pt's IV out dated.  Four attempts to obtain additional IV access unsuccessful.  PICC team consulted for new IV access.

## 2019-12-01 NOTE — PROGRESS NOTES
[facility]  Vascular Neurology  Comprehensive Stroke Center  Progress Note    Assessment/Plan:     * Thrombotic stroke involving left middle cerebral artery  Melva Barker is a 74 y.o. female with PMHx of HTN, HLD, and DM who presented to OSH with acute worsening of R-sided weakness after having experienced R-sided weakness x1 month. She was treated with tPA at OSH. CTA without LVO. MRI with infarct in L internal capsule and corona radiata. Etiology small vessel disease.    Awaiting placement     Antithrombotics for secondary stroke prevention: Antiplatelets: ASA 81 mg + Plavix 75 mg -- Initially held due to retroperitoneal hematoma. Resumed DAPT on 11/17.  Statins for secondary stroke prevention and hyperlipidemia, if present: Statins: Atorvastatin- 40 mg daily,   Aggressive risk factor modification: HTN, HLD, Diet  Rehab efforts: The patient has been evaluated by a stroke team provider and the therapy needs have been fully considered based off the presenting complaints and exam findings. The following therapy evaluations are needed: PT evaluate and treat, OT evaluate and treat, SLP evaluate and treat, PM&R evaluate for appropriate placement - Dispo SNF  Diagnostics ordered/pending: None   VTE prophylaxis: Mechanical SCDs, heparin 5000 units q 8 hours   BP parameters: Infarct: Post tPA, SBP <160; Avoid hypotension    Cytotoxic cerebral edema  Area of cytotoxic cerebral edema identified when reviewing brain imaging in the territory of the L middle cerebral artery. There is no mass effect associated with it. We will continue to monitor the patients clinical exam for any worsening of symptoms which may indicate expansion of the stroke or the area of the edema resulting in the clinical change. The pattern is suggestive of small vessel etiology.    Retroperitoneal hematoma  Pt had been c/o back pain in recent days, complicated by her hx of chronic back and sciatic nerve pain with use  of Tizanidine and opiates at home.  Due to Hb downtrend and recent tPA administration, MRI Lumbar/Thoracic spine was ordered 11/8 which demonstrated large R psoas hematoma with IVC compression and probable thrombosis.   General Surgery was consulted; No acute intervention pursued.     Patient was then with acute decompensation overnight 11/9; became hypotensive, tachycardic, and apneic with Hb down to 6.7. Rapid Response was called; patient requiring intubation, blood transfusion, and transfer to medical ICU.   CTA abd/pelvis 11/9 showed hematoma with active extravasation/hemorrhage with IVC compression, hemoperitoneum.   Pt went to IR and is now s/p successful R L2 & L3 lumbar spinal artery embolization.   Patient underwent IR re-evaluation on 11/11. No arterial bleed evident on IR angiography.  CT abd/pelvis 11/17: Redemonstration of large right retroperitoneal hematoma which is mildly decreased in size compared to most recent      Hb remains stable, Continuing to monitor.    Substance use disorder  History of opioid addiction  Discussed with son and patient has been to rehab in the past  On home pain regimen   Currently Bargaining for pain medication     At high risk for infection  Patient hypotensive overnight from 11/13 and into the AM 11/14. Received multiple boluses and pressure responsive.  Concern was for sepsis vs pain medication-related.   Infectious workup was ordered -- Elevated procal and lactate with interval development of atelectasis in R lower lung.  Started 7-day course of IV Cefepime and Vanc for possible hospital acquired pneumonia. BCx NGTD.    Then with liquid diarrhea overnight 11/15; WBC 14, stopped empiric abx and ordered CDiff, stool culture. PO Vanc x 10 day course for possible C.diff. IVFs given and central line removed.  WBC then improved, pt remaining afebrile.      Will continue to monitor patient, labs, vitals for any signs or symptoms concerning for infection.    Moderate  "malnutrition  Appreciate Nutrition/RD assistance  Ordered Boost Glucose Control TID  Appetite stimulate dronabinol   Encouraged her toward 100% completion of all meals and supplementation.    Orthostatic hypotension  Patient hypotensive overnight from 11/13 and into the AM 11/14. Received multiple boluses and pressure responsive.  Concern was for sepsis vs pain medication-related.   See infection risk section above    Pt then with orthostatic vital signs on 11/20 -- HR 120s to 90s from standing to lying, s/p IVFs 1L over 10 hours ordered on 11/21.   Nursing reported AFib on tele on 11/20, however EKG showed sinus tachycardia.   Pt receiving multiple boluses in recent days. Encouraged her toward aggressive PO hydration. Hb remains stable. Thigh-high KAREEN hose in place 11/27.  efforts to decrease tizanidine, however pt continues to complain of pain.   Will continue monitoring, consider midodrine if hypotension persists    Debility  2/2 stroke  PT/OT eval & treat - Dispo SNF    Essential hypertension  Risk factor for stroke  Holding all BP meds due to recent hypotension  BP continues to be labile -- SBP  over last 24 hours.    asymptomatic hypotension SBP 80s recheck SBP 90s, place thigh high KAREEN hose.   efforts to decrease tizanidine, however pt continues to complain of pain.   Continue close monitoring. Previously considering initiating low-dose Midodrine if hypotension persisted.    Back pain  Reported hx chronic back pain with associated sciatic nerve pain as well, then complicated by acute retroperitoneal hematoma.  Consulted to anesthesia pain management    Pain now being controlled with gabapentin, duloxetine, trazodone, diclofenac gel, lidocaine patch, hydrcodone-acetaminophen (prn), tizanidine (prn.)     Nurse reporting pt continues to request pain medication "around the clock." Of note, pt reported to night nurse that her best pain score at home is usually 7 1/2.    Restarted pt's home regimen and will " continue at discharge.  Per PNP, pt seeing Dr. Hilton Rowland in [place] for pain management. Recommend follow up outpatient and consideration of Addiction Psych referral.    Gastroparesis  Previously on Reglan 10 mg TID before meals, reglan then decreased to 5 mg TID.   Reglan discontinued on 11/23, will monitor closely and reinitiate if necessary.     Diabetes mellitus type 2 without retinopathy  Stroke risk factor, poorly controlled on glargine 17 units daily  HbA1c 8.3%  Recommend tight glucose control  Currently on SSI   Diabetic diet         11/1/19 MRI with small vessel L MCA infarct, therapy recommending rehab  11/2 - Patient stepped down overnight. Adjusting BP regimen. Patient with continuous complaints if nausea. IV Zofran ordered with some relief. Patient has not has BM since 10/30. Ordering KUB to rule out obstruction. Neuro exam unchanged.   11/3 - NAEON. Patient reports she had no episodes of vomiting overnight. Still complaining of nausea. Mild lower abdomen tenderness on exam. Lipase ordered and WNL. Continue to monitor.   11/4 - denies nausea and vomiting. Abd tenderness remains present on palpitation. Continue to monitor. HCTZ increased yesterday. Pending rehab placement.   11/5 -  N/V x1 overnight zofran resolved, sucralfate started. Placement pending   11/6 - hypotensive, held all BP meds,  ml bolus, responded well. Increased BUN/Cr, start NS 75ml/hr.   11/7 patient complaining of sciatica pain.  Restarted home tizanidine.  Patient requesting hydrocodone but educated patient that nerve pain is not treated with opioid.  Degenerative disease seen on xray but no fracture.      11/8 Patient continues to experience back pain.  Now with leukocytosis and drop in h/h plan for MRI thoracic/lumbar spine to evaluate for epidural hematoma.    11/9: MRI Lumbar spine had demonstrated large R psoas hematoma with possible IVC compression; General Surgery consulted. Patient was then with acute  decompensation overnight; became hypotensive, tachycardic, and apneic requiring intubation, blood transfusion, and medical ICU transfer. CTA abd/pelvis showed active extravasation/hemorrhage with hemoperitoneum. Pt now s/p successful lumbar artery embolization in IR. She was extubated on arrival back to ICU and tolerated well.    11/11: Patient underwent IR re-evaluation yesterday. No arterial bleed evident on IR angiography. Patient neuro exam stable.   11/12 Stepped down from medical ICU this morning.  Patient still experiencing pain.  Anesthesia consulted for pain management.  D/C pain pump and started oral medications for muscle and nerve pain.  Will wean to home regimen. Waiting for repeat CBC, transfused one unit of blood 11/11.   Tachycardic/hypertensive will continue to monitor.  11/13: Upgraded diet per SLP recs. Pt continues to c/o R back pain, concern that tachycardia could be pain-induced; adjusted analgesic regimen further as well as antiemetic regimen with plans to eventually wean to home regimen. Replaced Phos. Dispo inpatient rehab.  11/14: patient hypotensive overnight and this morning requiring fluid boluses. Discontinued all pain medications except for celecoxib in case pain meds are contributing to hypotension. Lidocaine patch and Voltaren gel ordered as well. Infectious workup significant for minor atelectasis in R lower lung with increased procal and lactate. Patient started on IV antibiotics for community acquired pneumonia. Blood cultures pending. ICU notified of patient's hypotension  11/15: Liquid diarrhea, WBC 14, stopped empiric abx, ordered c. Diff and stool culture, start PO vanc, IVFs. Adjusted pain meds with goal to return to home regimen. Nauseous overnight, zofran given.   11/16: CT abdomen and pelvis to f/u retroperitoneal hematoma prior to starting DAPT, H/H stable. T max 99.4, tachy, WBC 13, C. Diff and stool cultures pending, increased  ml/hr, labile BP, continue to  monitor. Remove central line.   11/17: CT abdomen/pelvis again with large retroperitoneal hematoma  but decreased in size, H/H uptrend, resume DAPT today. Diarrhea becoming thicker, WBC improved, a febrile, c. Diff antigen positive, toxins negative. Pain well controlled.   11/18 PCR negative, c.diff precautions and vancomycin d/c.  Neurologically stable waiting for placement.    11/19 Patient now recommending SNF.   11/20 Neurologically stable.  Waiting for placement.  Hypotensive episodes overnight given some fluids.     11/21 Orthostatic yesterday, HR 120s to 90s standing to lying, IVFs 1L over 10 hours ordered.   11/22: Nurse encouraging pt toward aggressive PO hydration in the setting of recent orthostatic hypotension. Hb remains stable.  11/23: Reglan discontinue  Importance of oral hydration reinforced with patient. H&H remain stable. Patient accepted to [facility], awaiting insurance auth.   11/24: patient became hypotensive with SBP in 70s and responded to 500 cc bolus, continuing to encourage oral fluid intake, will consider midodrine if hypotension persists  11/25: SBP WNL within last 24 hours. Decreased Norco to home dose q6hr prn, f/u w/ Dr. Canela pain management outpatient, consider addiction psych. C/o decreased appetite, advanced soft diet to level 6 so pt can receive gumbo as she requested, boost supplements, continue to monitor.   11/26: Patient requesting appetite stimulant; encouraged her toward 100% completion of all meals. Continuing to monitor her labile BP.  11/27: asymptomatic hypotension SBP 80s recheck SBP 90s, place thigh high KAREEN hose, consider midodrine if hypotension continues.   11/29: asymptomatic hypotension continues efforts to decrease tizanidine, however pt continues to complain of pain.   11/30 decreased breath sounds in right lower lung.  CXR wnl.  Will continue to monitor discussed breathing exercises   12/1 waiting for placement to SNF.  Sinus tachycardia due to  dehydration.  Patient refusing to eat/drink and will eat and drink if she receives more pain medication.  Discussed risk of not getting enough hydration and nutrition.      STROKE DOCUMENTATION   Acute Stroke Times   Last Known Normal Date: 10/31/19  Last Known Normal Time: 0630  Symptom Onset Date: 10/31/19  Symptom Onset Time: 0730  Stroke Team Called Date: 10/31/19  Stroke Team Called Time: 0936  Stroke Team Arrival Date: 10/31/19  Stroke Team Arrival Time: 0946  Decision to Treat Time for Alteplase: 1003    NIH Scale:  1a. Level of Consciousness: 0-->Alert, keenly responsive  1b. LOC Questions: 0-->Answers both questions correctly  1c. LOC Commands: 0-->Performs both tasks correctly  2. Best Gaze: 0-->Normal  3. Visual: 0-->No visual loss  4. Facial Palsy: 1-->Minor paralysis (flattened nasolabial fold, asymmetry on smiling)  5a. Motor Arm, Left: 0-->No drift, limb holds 90 (or 45) degrees for full 10 secs  5b. Motor Arm, Right: 1-->Drift, limb holds 90 (or 45) degrees, but drifts down before full 10 secs, does not hit bed or other support  6a. Motor Leg, Left: 0-->No drift, leg holds 30 degree position for full 5 secs  6b. Motor Leg, Right: 3-->No effort against gravity, leg falls to bed immediately  7. Limb Ataxia: 0-->Absent  8. Sensory: 0-->Normal, no sensory loss  9. Best Language: 0-->No aphasia, normal  10. Dysarthria: 1-->Mild-to-moderate dysarthria, patient slurs at least some words and, at worst, can be understood with some difficulty  11. Extinction and Inattention (formerly Neglect): 0-->No abnormality  Total (NIH Stroke Scale): 6       Modified Dayanna Score: 0  Nabila Coma Scale:    ABCD2 Score:    [id number] Score:   HAS -BLED Score:   ICH Score:   Hunt & Amaya Classification:      Hemorrhagic change of an Ischemic Stroke: Does this patient have an ischemic stroke with hemorrhagic changes? No     Neurologic Chief Complaint: R-sided weakness -- L MCA    Subjective:     Interval History: Patient is  seen for follow-up neurological assessment and treatment recommendations:  waiting for placement to SNF.  Sinus tachycardia due to dehydration.  Patient refusing to eat/drink and will eat and drink if she receives more pain medication.  Discussed risk of not getting enough hydration and nutrition.      HPI, Past Medical, Family, and Social History remains the same as documented in the initial encounter.     Review of Systems   Constitutional: Positive for appetite change. Negative for fever.   Musculoskeletal: Positive for arthralgias and back pain.   Neurological: Positive for facial asymmetry, speech difficulty and weakness.   Psychiatric/Behavioral: Negative for agitation and confusion.     Scheduled Meds:   amLODIPine  5 mg Oral Daily    aspirin  81 mg Oral Daily    atorvastatin  40 mg Oral QHS    clopidogrel  75 mg Oral Daily    diclofenac sodium  4 g Topical (Top) Daily    dronabinol  2.5 mg Oral Daily    DULoxetine  20 mg Oral BID    gabapentin  300 mg Oral TID    heparin (porcine)  5,000 Units Subcutaneous Q8H    lidocaine  1 patch Transdermal Q24H    polyethylene glycol  17 g Oral Daily    potassium chloride 10%  20 mEq Oral BID    traZODone  25 mg Oral QHS     Continuous Infusions:   sodium chloride 0.9% 75 mL/hr at 12/01/19 0951     PRN Meds:dextrose 10 % in water (D10W), glucagon (human recombinant), HYDROcodone-acetaminophen, insulin aspart U-100, melatonin, ondansetron, sodium chloride 0.9%, tiZANidine    Objective:     Vital Signs (Most Recent):  Temp: 99 °F (37.2 °C) (12/01/19 1132)  Pulse: 97 (12/01/19 1132)  Resp: 17 (12/01/19 1132)  BP: (!) 148/79 (12/01/19 1132)  SpO2: 97 % (12/01/19 1132)  BP Location: Left arm    Vital Signs Range (Last 24H):  Temp:  [97.6 °F (36.4 °C)-99 °F (37.2 °C)]   Pulse:  []   Resp:  [16-18]   BP: (109-169)/(71-98)   SpO2:  [95 %-99 %]   BP Location: Left arm    Physical Exam   Constitutional: She is oriented to person, place, and time. She appears  well-developed. No distress.   HENT:   Head: Normocephalic and atraumatic.   Eyes: Conjunctivae and EOM are normal.   Cardiovascular: Normal rate.   Pulmonary/Chest: Effort normal. No respiratory distress.   Musculoskeletal: She exhibits no edema or deformity.   Neurological: She is alert and oriented to person, place, and time.   Skin: Skin is warm and dry. She is not diaphoretic.   Psychiatric: Cognition and memory are not impaired. She is attentive.   Vitals reviewed.      Neurological Exam:   LOC: alert   Attention Span: good  Language: No aphasia  Articulation: Mild dysarthria  Orientation: Person, Time, Place  Visual Fields: Full  EOM (CN III, IV, VI): Full/intact  Facial Movement (CN VII): Lower facial weakness on the Right- mild  Motor: Arm left  Normal 5/5  Leg left  Normal 5/5  Arm right  Paresis: 4/5   Leg right Paresis: 2/5  Sensation: Intact to light touch, temp  Tone: Normal tone throughout      Laboratory:  CMP:   No results for input(s): GLUCOSE, CALCIUM, ALBUMIN, PROT, NA, K, CO2, CL, BUN, CREATININE, ALKPHOS, ALT, AST, BILITOT in the last 24 hours.  CBC:   Recent Labs   Lab 12/01/19  0416   WBC 14.82*   RBC 4.20   HGB 11.9*   HCT 37.7   *   MCV 90   MCH 28.3   MCHC 31.6*     Lipid Panel:   No results for input(s): CHOL, LDLCALC, HDL, TRIG in the last 168 hours.  Coagulation:   No results for input(s): PT, INR, APTT in the last 168 hours.  Platelet Aggregation Study: No results for input(s): PLTAGG, PLTAGINTERP, PLTAGREGLACO, ADPPLTAGGREG in the last 168 hours.  Hgb A1C:   No results for input(s): HGBA1C in the last 168 hours.  TSH:   No results for input(s): TSH in the last 168 hours.      Diagnostic Results      Brain Imaging      MRI Brain (11/01/19):  Acute lacunar type infarct coursing through the left posterior limb internal capsule and corona radiata.       CTH (10/31/19):  No acute intracranial pathology        Vessel Imaging      CTA Multiphase (10/31/19):  CTA head: Atherosclerotic  disease of the cavernous and supraclinoid ICAs with mild narrowing.  Otherwise unremarkable CTA of the head specifically without evidence for proximal significant stenosis or occlusion.  CTA neck: Less than 50% proximal ICA stenosis by NASCET criteria.  Atherosclerotic disease with mild moderate narrowing of the right vertebral artery origin.  There is mild left V1 segment narrowing.  No significant focal vertebral artery stenosis or occlusion.  Interlobular septal thickening with tree in bud micro nodularity visualized upper lobes bilaterally which may represent inflammatory/infectious process clinical correlation and correlation with dedicated CT thorax recommended.  CT head: Bandlike region hypoattenuation left posterior limb internal capsule unchanged from prior suggestive for possible recent to subacute age infarction.  No evidence for hydrocephalus or acute intracranial hemorrhage.  Clinical correlation and further evaluation with MRI if patient compatible.        Cardiac Imaging     Echo (11/01/19)  · Increased (hyperdynamic) left ventricular systolic function. The estimated ejection fraction is 75%.  · Concentric left ventricular remodeling.  · Normal LV diastolic function.  · No wall motion abnormalities.  · Normal right ventricular systolic function.  · Normal central venous pressure (3 mm Hg).  · Mild tricuspid regurgitation.  · The estimated PA systolic pressure is 26 mm Hg  · Echolucent structure next to the LA, likely representing hiatal hernia. Clinincal correlation is required.        Miscellaneous Imaging     CT Abdomen Pelvis WO Contrast 11/16/19  Angiography of the right renal vasculature, SMA, and multiple lumbar arteries demonstrates no evidence of active bleeding.    IR Angiogram Visceral 11/10/19  Angiography of the right renal vasculature, SMA, and multiple lumbar arteries demonstrates no evidence of active bleeding.     CTA Abdomen/Pelvis 11/10/19  Right-sided retroperitoneal hematoma with  questionable foci of active extravasation within the inferior portion of the collection as described above.     IR Angiogram Visceral 11/9/19  Angiography of the lumbar arteries demonstrates active extravasation of the right L2 lumbar artery. Embolization using beads as described above.  Plan: Transfer patient to ICU.  Continued serial H and H follow-up.     CTA Abdomen/Pelvis 11/9/19  1. Large right retroperitoneal hematoma with findings concerning for active arterial extravasation/hemorrhage as detailed above.  There is associated hemorrhage extending throughout the right abdomen and pelvis/pericolic gutter with associated mass effect on the right kidney, which is anteriorly displaced.  2. Decreased opacification of the infahepatic and infrarenal IVC, possibly secondary to underlying mass effect versus partial thrombosis.  3. Nonspecific cecal and right colonic wall thickening, possibly reactive due to hemoperitoneum.  4. Additional findings as detailed above.     MRI Thoracic/Lumbar Spine W WO Contr 11/8/19  Large hematoma within the right psoas muscle.  Compression and probable thrombosis of the IVC. Consider contrast enhanced CT with delayed phase.  Multilevel degenerative changes as detailed above, more severe in the lumbar spine.  Irregularity of the T12 through L3 endplates is most likely degenerative.      Danya Mancia PA-C  Comprehensive Stroke Center  Department of Vascular Neurology   [facility]

## 2019-12-01 NOTE — ASSESSMENT & PLAN NOTE
History of opioid addiction  Discussed with son and patient has been to rehab in the past  On home pain regimen   Currently Bargaining for pain medication

## 2019-12-01 NOTE — NURSING
Pt has not eaten any meals this shift, stroke notified and is aware, pt had one incident with emesis earlier this shift, was given zofran, with some relief, pt was given all meds today as well as prn pain meds, pt has only consumed around 240 ml of liquids sipping periodically and says she just isnt very thirsty or hungry, wheels locked, hob elevated, call light, personal items within reach and rails up x2, bed at lowest position. Pt was informed to call for assistance

## 2019-12-02 PROBLEM — E87.5 HYPERKALEMIA: Status: ACTIVE | Noted: 2019-12-02

## 2019-12-02 PROBLEM — I95.1 ORTHOSTATIC HYPOTENSION: Status: RESOLVED | Noted: 2019-11-14 | Resolved: 2019-12-02

## 2019-12-02 LAB
ANION GAP SERPL CALC-SCNC: 10 MMOL/L (ref 8–16)
ANION GAP SERPL CALC-SCNC: 5 MMOL/L (ref 8–16)
ANION GAP SERPL CALC-SCNC: 7 MMOL/L (ref 8–16)
ANION GAP SERPL CALC-SCNC: 9 MMOL/L (ref 8–16)
BASOPHILS # BLD AUTO: 0.05 K/UL (ref 0–0.2)
BASOPHILS NFR BLD: 0.5 % (ref 0–1.9)
BUN SERPL-MCNC: 15 MG/DL (ref 8–23)
BUN SERPL-MCNC: 18 MG/DL (ref 8–23)
BUN SERPL-MCNC: 20 MG/DL (ref 8–23)
BUN SERPL-MCNC: 26 MG/DL (ref 8–23)
CALCIUM SERPL-MCNC: 10.6 MG/DL (ref 8.7–10.5)
CALCIUM SERPL-MCNC: 10.7 MG/DL (ref 8.7–10.5)
CALCIUM SERPL-MCNC: 10.9 MG/DL (ref 8.7–10.5)
CALCIUM SERPL-MCNC: 11.2 MG/DL (ref 8.7–10.5)
CHLORIDE SERPL-SCNC: 102 MMOL/L (ref 95–110)
CHLORIDE SERPL-SCNC: 103 MMOL/L (ref 95–110)
CHLORIDE SERPL-SCNC: 106 MMOL/L (ref 95–110)
CHLORIDE SERPL-SCNC: 108 MMOL/L (ref 95–110)
CO2 SERPL-SCNC: 14 MMOL/L (ref 23–29)
CO2 SERPL-SCNC: 17 MMOL/L (ref 23–29)
CO2 SERPL-SCNC: 19 MMOL/L (ref 23–29)
CO2 SERPL-SCNC: 21 MMOL/L (ref 23–29)
CREAT SERPL-MCNC: 0.9 MG/DL (ref 0.5–1.4)
CREAT SERPL-MCNC: 1 MG/DL (ref 0.5–1.4)
CREAT SERPL-MCNC: 1 MG/DL (ref 0.5–1.4)
CREAT SERPL-MCNC: 1.2 MG/DL (ref 0.5–1.4)
DIFFERENTIAL METHOD: ABNORMAL
EOSINOPHIL # BLD AUTO: 0.2 K/UL (ref 0–0.5)
EOSINOPHIL NFR BLD: 1.7 % (ref 0–8)
ERYTHROCYTE [DISTWIDTH] IN BLOOD BY AUTOMATED COUNT: 16.1 % (ref 11.5–14.5)
EST. GFR  (AFRICAN AMERICAN): 51.8 ML/MIN/1.73 M^2
EST. GFR  (AFRICAN AMERICAN): >60 ML/MIN/1.73 M^2
EST. GFR  (NON AFRICAN AMERICAN): 44.9 ML/MIN/1.73 M^2
EST. GFR  (NON AFRICAN AMERICAN): 56 ML/MIN/1.73 M^2
EST. GFR  (NON AFRICAN AMERICAN): 56 ML/MIN/1.73 M^2
EST. GFR  (NON AFRICAN AMERICAN): >60 ML/MIN/1.73 M^2
GLUCOSE SERPL-MCNC: 105 MG/DL (ref 70–110)
GLUCOSE SERPL-MCNC: 123 MG/DL (ref 70–110)
GLUCOSE SERPL-MCNC: 75 MG/DL (ref 70–110)
GLUCOSE SERPL-MCNC: 87 MG/DL (ref 70–110)
HCT VFR BLD AUTO: 38.2 % (ref 37–48.5)
HGB BLD-MCNC: 12 G/DL (ref 12–16)
IMM GRANULOCYTES # BLD AUTO: 0.3 K/UL (ref 0–0.04)
IMM GRANULOCYTES NFR BLD AUTO: 2.8 % (ref 0–0.5)
LYMPHOCYTES # BLD AUTO: 2.5 K/UL (ref 1–4.8)
LYMPHOCYTES NFR BLD: 23.1 % (ref 18–48)
MAGNESIUM SERPL-MCNC: 2 MG/DL (ref 1.6–2.6)
MCH RBC QN AUTO: 28.8 PG (ref 27–31)
MCHC RBC AUTO-ENTMCNC: 31.4 G/DL (ref 32–36)
MCV RBC AUTO: 92 FL (ref 82–98)
MONOCYTES # BLD AUTO: 0.6 K/UL (ref 0.3–1)
MONOCYTES NFR BLD: 5.8 % (ref 4–15)
NEUTROPHILS # BLD AUTO: 7.1 K/UL (ref 1.8–7.7)
NEUTROPHILS NFR BLD: 66.1 % (ref 38–73)
NRBC BLD-RTO: 0 /100 WBC
PHOSPHATE SERPL-MCNC: 3.6 MG/DL (ref 2.7–4.5)
PLATELET # BLD AUTO: 433 K/UL (ref 150–350)
PMV BLD AUTO: 10.4 FL (ref 9.2–12.9)
POCT GLUCOSE: 86 MG/DL (ref 70–110)
POCT GLUCOSE: 94 MG/DL (ref 70–110)
POTASSIUM SERPL-SCNC: 4.8 MMOL/L (ref 3.5–5.1)
POTASSIUM SERPL-SCNC: 4.8 MMOL/L (ref 3.5–5.1)
POTASSIUM SERPL-SCNC: 5.7 MMOL/L (ref 3.5–5.1)
POTASSIUM SERPL-SCNC: 5.7 MMOL/L (ref 3.5–5.1)
POTASSIUM SERPL-SCNC: 6.8 MMOL/L (ref 3.5–5.1)
POTASSIUM SERPL-SCNC: 7 MMOL/L (ref 3.5–5.1)
RBC # BLD AUTO: 4.17 M/UL (ref 4–5.4)
SODIUM SERPL-SCNC: 128 MMOL/L (ref 136–145)
SODIUM SERPL-SCNC: 129 MMOL/L (ref 136–145)
SODIUM SERPL-SCNC: 129 MMOL/L (ref 136–145)
SODIUM SERPL-SCNC: 135 MMOL/L (ref 136–145)
WBC # BLD AUTO: 10.77 K/UL (ref 3.9–12.7)

## 2019-12-02 PROCEDURE — 93010 EKG 12-LEAD: ICD-10-PCS | Mod: ,,, | Performed by: INTERNAL MEDICINE

## 2019-12-02 PROCEDURE — 93010 ELECTROCARDIOGRAM REPORT: CPT | Mod: ,,, | Performed by: INTERNAL MEDICINE

## 2019-12-02 PROCEDURE — 25000003 PHARM REV CODE 250: Performed by: PHYSICIAN ASSISTANT

## 2019-12-02 PROCEDURE — 25000003 PHARM REV CODE 250: Performed by: NURSE PRACTITIONER

## 2019-12-02 PROCEDURE — 25000003 PHARM REV CODE 250: Performed by: FAMILY MEDICINE

## 2019-12-02 PROCEDURE — 93005 ELECTROCARDIOGRAM TRACING: CPT

## 2019-12-02 PROCEDURE — 99233 PR SUBSEQUENT HOSPITAL CARE,LEVL III: ICD-10-PCS | Mod: ,,, | Performed by: PSYCHIATRY & NEUROLOGY

## 2019-12-02 PROCEDURE — 83735 ASSAY OF MAGNESIUM: CPT

## 2019-12-02 PROCEDURE — 63600175 PHARM REV CODE 636 W HCPCS: Performed by: FAMILY MEDICINE

## 2019-12-02 PROCEDURE — 11000001 HC ACUTE MED/SURG PRIVATE ROOM

## 2019-12-02 PROCEDURE — 80048 BASIC METABOLIC PNL TOTAL CA: CPT

## 2019-12-02 PROCEDURE — 94640 AIRWAY INHALATION TREATMENT: CPT

## 2019-12-02 PROCEDURE — 97530 THERAPEUTIC ACTIVITIES: CPT

## 2019-12-02 PROCEDURE — 63600175 PHARM REV CODE 636 W HCPCS: Performed by: PHYSICIAN ASSISTANT

## 2019-12-02 PROCEDURE — 85025 COMPLETE CBC W/AUTO DIFF WBC: CPT

## 2019-12-02 PROCEDURE — 36415 COLL VENOUS BLD VENIPUNCTURE: CPT

## 2019-12-02 PROCEDURE — 25000003 PHARM REV CODE 250: Performed by: PSYCHIATRY & NEUROLOGY

## 2019-12-02 PROCEDURE — 99233 SBSQ HOSP IP/OBS HIGH 50: CPT | Mod: ,,, | Performed by: PSYCHIATRY & NEUROLOGY

## 2019-12-02 PROCEDURE — 92507 TX SP LANG VOICE COMM INDIV: CPT

## 2019-12-02 PROCEDURE — 25000003 PHARM REV CODE 250: Performed by: STUDENT IN AN ORGANIZED HEALTH CARE EDUCATION/TRAINING PROGRAM

## 2019-12-02 PROCEDURE — 94761 N-INVAS EAR/PLS OXIMETRY MLT: CPT

## 2019-12-02 PROCEDURE — 25000242 PHARM REV CODE 250 ALT 637 W/ HCPCS: Performed by: PHYSICIAN ASSISTANT

## 2019-12-02 PROCEDURE — 84100 ASSAY OF PHOSPHORUS: CPT

## 2019-12-02 RX ORDER — FUROSEMIDE 10 MG/ML
20 INJECTION INTRAMUSCULAR; INTRAVENOUS ONCE
Status: COMPLETED | OUTPATIENT
Start: 2019-12-02 | End: 2019-12-02

## 2019-12-02 RX ORDER — ALBUTEROL SULFATE 2.5 MG/.5ML
10 SOLUTION RESPIRATORY (INHALATION) ONCE
Status: COMPLETED | OUTPATIENT
Start: 2019-12-02 | End: 2019-12-02

## 2019-12-02 RX ADMIN — DICLOFENAC 4 G: 10 GEL TOPICAL at 08:12

## 2019-12-02 RX ADMIN — HEPARIN SODIUM 5000 UNITS: 5000 INJECTION INTRAVENOUS; SUBCUTANEOUS at 05:12

## 2019-12-02 RX ADMIN — DULOXETINE HYDROCHLORIDE 20 MG: 20 CAPSULE, DELAYED RELEASE ORAL at 08:12

## 2019-12-02 RX ADMIN — Medication 6 MG: at 09:12

## 2019-12-02 RX ADMIN — SODIUM POLYSTYRENE SULFONATE 30 G: 15 SUSPENSION ORAL; RECTAL at 07:12

## 2019-12-02 RX ADMIN — HYDROCODONE BITARTRATE AND ACETAMINOPHEN 1 TABLET: 10; 325 TABLET ORAL at 10:12

## 2019-12-02 RX ADMIN — DRONABINOL 2.5 MG: 2.5 CAPSULE ORAL at 08:12

## 2019-12-02 RX ADMIN — HEPARIN SODIUM 5000 UNITS: 5000 INJECTION INTRAVENOUS; SUBCUTANEOUS at 03:12

## 2019-12-02 RX ADMIN — AMLODIPINE BESYLATE 5 MG: 5 TABLET ORAL at 08:12

## 2019-12-02 RX ADMIN — ASPIRIN 81 MG: 81 TABLET, COATED ORAL at 08:12

## 2019-12-02 RX ADMIN — ONDANSETRON 4 MG: 2 INJECTION INTRAMUSCULAR; INTRAVENOUS at 07:12

## 2019-12-02 RX ADMIN — HEPARIN SODIUM 5000 UNITS: 5000 INJECTION INTRAVENOUS; SUBCUTANEOUS at 09:12

## 2019-12-02 RX ADMIN — DULOXETINE HYDROCHLORIDE 20 MG: 20 CAPSULE, DELAYED RELEASE ORAL at 09:12

## 2019-12-02 RX ADMIN — ONDANSETRON 4 MG: 2 INJECTION INTRAMUSCULAR; INTRAVENOUS at 08:12

## 2019-12-02 RX ADMIN — GABAPENTIN 300 MG: 300 CAPSULE ORAL at 08:12

## 2019-12-02 RX ADMIN — ATORVASTATIN CALCIUM 40 MG: 20 TABLET, FILM COATED ORAL at 09:12

## 2019-12-02 RX ADMIN — ALBUTEROL SULFATE 10 MG: 2.5 SOLUTION RESPIRATORY (INHALATION) at 05:12

## 2019-12-02 RX ADMIN — GABAPENTIN 300 MG: 300 CAPSULE ORAL at 09:12

## 2019-12-02 RX ADMIN — CLOPIDOGREL BISULFATE 75 MG: 75 TABLET ORAL at 08:12

## 2019-12-02 RX ADMIN — GABAPENTIN 300 MG: 300 CAPSULE ORAL at 03:12

## 2019-12-02 RX ADMIN — HYDROCODONE BITARTRATE AND ACETAMINOPHEN 1 TABLET: 10; 325 TABLET ORAL at 06:12

## 2019-12-02 RX ADMIN — TIZANIDINE 2 MG: 2 TABLET ORAL at 05:12

## 2019-12-02 RX ADMIN — FUROSEMIDE 20 MG: 10 INJECTION, SOLUTION INTRAMUSCULAR; INTRAVENOUS at 09:12

## 2019-12-02 RX ADMIN — SODIUM POLYSTYRENE SULFONATE 30 G: 15 SUSPENSION ORAL; RECTAL at 03:12

## 2019-12-02 RX ADMIN — TIZANIDINE 2 MG: 2 TABLET ORAL at 09:12

## 2019-12-02 RX ADMIN — TRAZODONE HYDROCHLORIDE 25 MG: 50 TABLET ORAL at 09:12

## 2019-12-02 RX ADMIN — POTASSIUM CHLORIDE 20 MEQ: 20 SOLUTION ORAL at 08:12

## 2019-12-02 RX ADMIN — HYDROCODONE BITARTRATE AND ACETAMINOPHEN 1 TABLET: 10; 325 TABLET ORAL at 02:12

## 2019-12-02 RX ADMIN — LIDOCAINE 1 PATCH: 50 PATCH CUTANEOUS at 11:12

## 2019-12-02 NOTE — PT/OT/SLP PROGRESS
Physical Therapy Treatment    Patient Name:  Melva Barker   MRN:  [id number]  Admitting Diagnosis:  Thrombotic stroke involving left middle cerebral artery   Recent Surgery: * No surgery found *    Admit Date: 10/31/2019  Length of Stay: 32 days    Recommendations:     Discharge Recommendations:  nursing facility, skilled   Discharge Equipment Recommendations: other (see comments)(tbd)   Barriers to discharge: None    Assessment:     Melva Barker is a 74 y.o. female admitted with a medical diagnosis of Thrombotic stroke involving left middle cerebral artery.  She presents with the following impairments/functional limitations:  weakness, impaired endurance, impaired self care skills, impaired functional mobilty, gait instability, decreased lower extremity function, pain, decreased coordination, impaired fine motor. Pt tolerated session fair today. Pt expressing 10/10 pain at this time and RN notified. Pt demo's good functional strength for mobility tasks but decreased motivation to perform. Pt was able to stand with Min A to reposition in bed for improved lung expansion. Discussed pt with OT, believe pt would benefit from psych consult for depression-like symptoms including decreased motivation, decreased po intake, and general lethargy. Pt will benefit from SNF after discharge from acute services in order to continue to progress pt's strength, endurance, and balance to help pt maximize independence at or near [facility].    Rehab Prognosis: Good; patient would benefit from acute skilled PT services to address these deficits and reach maximum level of function.    Recent Surgery: * No surgery found *      Plan:     During this hospitalization, patient to be seen 3 x/week to address the identified rehab impairments via therapeutic activities, gait training, therapeutic exercises, neuromuscular re-education and progress toward the following goals:    · Plan of Care Expires:  12/10/19    Subjective     RN notified  "prior to session. No family/friends present upon PT entrance into room.    Chief Complaint: "I just haven't been hungry or thirsty"  Patient/Family Comments/goals: go home  Pain/Comfort:  · Pain Rating 1: 10/10  · Location - Side 1: Right  · Location 1: (groin and LBP)  · Pain Addressed 1: Nurse notified, Reposition, Distraction  · Pain Rating Post-Intervention 1: other (see comments)(no change in pain with repositioning)      Objective:     Additional staff present: none    Patient found right sidelying with: bed alarm, telemetry, peripheral IV   Mental Status: Patient is oriented to AxOx4 and follows multi-step commands. Patient is Alert, Cooperative and Flat affect during session.    General Precautions: Standard, Cardiac fall, aspiration(dysphagia mechanical soft diet)   Orthopedic Precautions:N/A   Braces: N/A   Body mass index is 24.54 kg/m².  Oxygen Device: Room Air    Outcome Measures:  AM-PAC 6 CLICK MOBILITY  Turning over in bed (including adjusting bedclothes, sheets and blankets)?: 3  Sitting down on and standing up from a chair with arms (e.g., wheelchair, bedside commode, etc.): 3  Moving from lying on back to sitting on the side of the bed?: 3  Moving to and from a bed to a chair (including a wheelchair)?: 3  Need to walk in hospital room?: 3  Climbing 3-5 steps with a railing?: 2  Basic Mobility Total Score: 17     Functional Mobility:    Bed Mobility:   · Rolling/Turning to Right: minimum assistance and with side rail  · Scooting to HOB via supine bridge: supervision and with side rail  · Supine to Sit: minimum assistance and with side rail; from Rt side of bed  · Scooting anteriorly to EOB to have both feet planted on floor: stand by assistance    Sitting Balance at Edge of Bed:   Assistance Level Required: Supervision   Time: 5 minutes   Postural deviations noted: slouched posture and rounded shoulders    Transfers:   · Sit <> Stand Transfer: minimum assistance with hand-held assist   · Stand " <> Sit Transfer: minimum assistance with hand-held assist   · [id number] from EOB    Standing Balance:   Assistance Level Required: Contact Guard Assistance   Patient used: hand-held assist       Gait:  · Patient ambulated: 3 Rt side steps at HOB   · Patient required: contact guard  · Patient used:  hand-held assist   · Gait Pattern observed: step-to   · Pt declined further ambulation on this date due to pain    Therapeutic Exercises: pt declined due to pain    Education:   Time provided for education, counseling and discussion of health disposition in regards to patient's current status   All questions answered within PT scope of practice and to patient's satisfaction   PT role in POC to address current functional deficits   Pt educated on proper body mechanics, safety techniques, and energy conservation with PT facilitation and cueing throughout session   Call nursing/pct to transfer to chair/use bathroom. Pt stated understanding.   RW ordered for in room use with nursing staff   Whiteboard updated with pt's current mobility status documented above   Safe to perform step transfer to/from chair/bedside commode CGA and HHA x 2 w/ nursing/PCT present    Patient left right sidelying with all lines intact, call button in reach, bed alarm on, RN notified and PCT present.    GOALS:   Multidisciplinary Problems     Physical Therapy Goals        Problem: Physical Therapy Goal    Goal Priority Disciplines Outcome Goal Variances Interventions   Physical Therapy Goal     PT, PT/OT Ongoing, Progressing     Description:  Goals to be met by: 12/10/19    Patient will increase functional independence with mobility by performin. Supine to sit with [place].-met 19  2. Sit to supine with contact guard assist. -not met     3. Sit to stand transfer with contact guard assistance using least restrictive device. -not met     4. Gait  X 25 feet with Minimal Assistance using least restrictive device. -not met     5. Stand  for 1 minutes with Contact Guard Assistance using  least restrictive device or no AD. -not met     6. Lower extremity exercise program x 20 reps per handout, with independence to improve strength and activity tolerance.  -not met                              Time Tracking:     PT Received On: 12/02/19  PT Start Time: 0948     PT Stop Time: 0957  PT Total Time (min): 9 min       Billable Minutes:   · Therapeutic Activity 9    Treatment Type: Treatment  PT/PTA: PT       Jessica Abraham PT, DPT  12/2/2019  Pager: [phone]

## 2019-12-02 NOTE — PLAN OF CARE
Problem: SLP Goal  Goal: SLP Goal  Description  Speech Language Pathology Goals  Goals expected to be met by 11/20; goals remain appropriate- continue until 12/4  1. Pt will tolerate a mechanical soft diet/thin liquids with no s/s of aspiration.   2. Pt will participate in conversation without cues to express thought.   3. Pt will complete simple functional reasoning tasks with 80% accy given min cues.   4. Pt will name name 13 items in a category in 1 minute with mod assist.  5. Pt will follow multi-step commands with 75% accuracy and min assist.  6. Pt will complete assessment of reading, writing, visual spatial skills to determine need for tx.     Outcome: Ongoing, Progressing     Pt progressing with goals. ST to continue to follow.     MARITO Smyth., [facility]-SLP  Speech-Language Pathology  Pager: [phone]  12/2/2019

## 2019-12-02 NOTE — PLAN OF CARE
SW received call from [facility] [place] ([phone]) regarding this Pt. She advised they have a bed if the family is good to go there. SW informed covering NONA of the above.     Mely Tran LCSW  Neurocritical Care   [facility]  [id number]

## 2019-12-02 NOTE — PLAN OF CARE
Goals remain appropriate. AURORA Valdez 12/2/2019   Problem: Occupational Therapy Goal  Goal: Occupational Therapy Goal    Updated Goals to be met by: 12/10    Patient will increase functional independence with ADLs by performing:    UE Dressing while seated EOB with Contact Guard Assistance.  LE Dressing (brief) with Minimal Assistance.  Grooming while standing with Minimal Assistance.  Toileting from bedside commode with Minimal Assistance for hygiene and clothing management.   Toilet transfer to bedside commode with Contact Guard Assistance.  B UE endurance HEP with handout and assistance as needed.  Increased B UE functional strength to WFL for ADLs.  Complete functional mobility household distance for ADL task with CGA using AD as needed.

## 2019-12-02 NOTE — PLAN OF CARE
[facility] has accepted and is now pending insurance auth.      12/02/19 1311   Post-Acute Status   Post-Acute Authorization Placement   Post-Acute Placement Status Pending Payor Review

## 2019-12-02 NOTE — PT/OT/SLP PROGRESS
Occupational Therapy   Treatment    Name: Melva Barker  MRN: [id number]  Admitting Diagnosis:  Thrombotic stroke involving left middle cerebral artery       Recommendations:     Discharge Recommendations: nursing facility, skilled  Discharge Equipment Recommendations:  (TBD next level of care)  Barriers to discharge:  Decreased caregiver support    Assessment:     Melva Barker is a 74 y.o. female with a medical diagnosis of Thrombotic stroke involving left middle cerebral artery.  She presents with cont c/o pain and self limiting behaviors on this date.  Performance deficits affecting function are weakness, impaired endurance, impaired self care skills, impaired functional mobilty, gait instability, decreased lower extremity function, impaired balance, decreased safety awareness, pain.     Pt would greatly benefit from psych consult for depression like symptoms.     Rehab Prognosis:  Good; patient would benefit from acute skilled OT services to address these deficits and reach maximum level of function.       Plan:     Patient to be seen 3 x/week to address the above listed problems via self-care/home management, therapeutic activities, therapeutic exercises, neuromuscular re-education  · Plan of Care Expires: 12/13/19  · Plan of Care Reviewed with: patient    Subjective     Pain/Comfort:  · Pain Rating 1: (c/o R groin and lower back pain)  · Pain Addressed 1: Nurse notified  · Pain Rating Post-Intervention 1: (remains throughout)    Objective:     Communicated with: RN prior to session.  Patient found right sidelying with telemetry, bed alarm upon OT entry to room.    General Precautions: Standard, fall, dental soft   Orthopedic Precautions:N/A   Braces: N/A     Occupational Performance:     Bed Mobility:  w/ side rail  · Patient completed Supine to Sit with minimum assistance  · Patient completed Sit to Supine with minimum assistance     Functional Mobility/Transfers:  · Patient completed Sit <> Stand  Transfer with minimum assistance  with  hand-held assist   · Patient completed Bed <> Chair Transfer using Step Transfer technique with minimum assistance with hand-held assist  · Patient completed Toilet Transfer Step Transfer technique with minimum assistance with  hand-held assist and bedside commode  · Functional Mobility: ~4 steps to chair; ~5 steps to bedside commode; ~4 steps to return to EOB  · 0 AD; B hand held assistance    Activities of Daily Living:  · Toileting: maximal assistance perianal care from bedside commode  · Encouraged pt to self complete with set up with need for reinforcement    · Pt declined grooming tasks and LB dressing and further ADLs    [facility] 6 Click ADL: 18    Treatment & Education:  -Pt alert and oriented; agreeable to therapy session within pain free range  -completed B LE exercises while seated in chair 2/2 complaint of R groin strain; exercises to encourage modified 4 point position for ADLs  -Communication board updated; questions/concerns addressed within OT scope of practice  -no family at bedside for education     Patient left HOB elevated with all lines intact, call button in reach, bed alarm on and RN notifiedEducation:      GOALS: (addendum completed 12/3 for goal revision)  Updated Goals to be met by: 12/10    Patient will increase functional independence with ADLs by performing:    UE Dressing while seated EOB with Contact Guard Assistance.  LE Dressing (brief) with Minimal Assistance.  Grooming while standing with Minimal Assistance.  Toileting from bedside commode with Minimal Assistance for hygiene and clothing management.   Toilet transfer to bedside commode with Contact Guard Assistance.  B UE endurance HEP with handout and assistance as needed.  Increased B UE functional strength to WFL for ADLs.  Complete functional mobility household distance for ADL task with CGA using AD as needed.         Time Tracking:     OT Date of Treatment: 12/02/19  OT Start Time:  0907  OT Stop Time: 0924  OT Total Time (min): 17 min    Billable Minutes: TA: 17    AURORA Valdez  12/2/2019

## 2019-12-02 NOTE — PT/OT/SLP PROGRESS
"Speech Language Pathology Treatment    Patient Name:  Melva Barker   MRN:  [id number]  Admitting Diagnosis: Thrombotic stroke involving left middle cerebral artery    Recommendations:                 General Recommendations:  Cognitive-linguistic therapy and monitor tolerance of diet   Diet recommendations:  Dental Soft, Liquid Diet Level: Thin   Aspiration Precautions: Eliminate distractions, Feed only when awake/alert, HOB to 90 degrees and Standard aspiration precautions   General Precautions: Standard, fall, dental soft  Communication strategies:  none    Subjective     SLP reviewed Pt with RN pre/post session, RN reports Pt with minimal PO intake yet accepting more fluids   Pt presents calm  She explains, "I usually write everything down on the calendar"     Pain/Comfort:  ·  Pt reported back pain, RN notified, RN confirmed Pt not due for next medication yet    Objective:     Has the patient been evaluated by SLP for swallowing?      Keep patient NPO?     Current Respiratory Status: room air      Pt seen for cognitive therapy. She was awake and alert in room upon SLP entrance. She requested pain medications, RN confirmed Pt medications not yet due. Pt educated on SLP role and POC. She verbalized understanding. She recalled 2/3 unrelated words post 3 minute filled delay I'ly and up to 3/3 with verbal cues x2 from SLP. She completed fx math tasks for time management with 100% accuracy across 3 attempts, MOD I provided visual aid in room (clock on wall.) She answered general reasoning/problem solving questions for household safety situations with 90% accuracy provided occasional cues for expansion or clarification. She continued to express she felt her pain medications were due t/o session which required occasional cues to redirect to task. RN notified. No additional questions noted. Pt remained in position found with call light in reach upon SLP exit from room.    Assessment:     Melva Barker is a 74 " y.o. female with an SLP diagnosis of mild cognitive impairment.     Goals:   Multidisciplinary Problems     SLP Goals        Problem: SLP Goal    Goal Priority Disciplines Outcome   SLP Goal     SLP Ongoing, Progressing   Description:  Speech Language Pathology Goals  Goals expected to be met by 11/20; goals remain appropriate- continue until 12/4  1. Pt will tolerate a mechanical soft diet/thin liquids with no s/s of aspiration.   2. Pt will participate in conversation without cues to express thought.   3. Pt will complete simple functional reasoning tasks with 80% accy given min cues.   4. Pt will name name 13 items in a category in 1 minute with mod assist.  5. Pt will follow multi-step commands with 75% accuracy and min assist.  6. Pt will complete assessment of reading, writing, visual spatial skills to determine need for tx.                      Plan:     · Patient to be seen:  3 x/week   · Plan of Care expires:  12/13/19  · Plan of Care reviewed with:  patient   · SLP Follow-Up:  Yes       Discharge recommendations:  nursing facility, skilled       Time Tracking:     SLP Treatment Date:   12/02/19  Speech Start Time:  1314  Speech Stop Time:  1324     Speech Total Time (min):  10 min     Billable Minutes: Speech Therapy Individual 10    BELIA Smyth, [facility]-SLP  Speech-Language Pathology  Pager: [phone]      12/02/2019

## 2019-12-02 NOTE — PLAN OF CARE
Discharge Recommendation: SNF.    1 goal met today. PT goals appropriate.    Jessica Abraham PT, DPT  2019  Pager: [phone]        Problem: Physical Therapy Goal  Goal: Physical Therapy Goal  Description  Goals to be met by: 12/10/19    Patient will increase functional independence with mobility by performin. Supine to sit with [place].-met 19  2. Sit to supine with contact guard assist. -not met     3. Sit to stand transfer with contact guard assistance using least restrictive device. -not met     4. Gait  X 25 feet with Minimal Assistance using least restrictive device. -not met     5. Stand for 1 minutes with Contact Guard Assistance using  least restrictive device or no AD. -not met     6. Lower extremity exercise program x 20 reps per handout, with independence to improve strength and activity tolerance.  -not met               Outcome: Ongoing, Progressing

## 2019-12-02 NOTE — PLAN OF CARE
POC reviewed w/ patient.  All questions answered and reviewed. Verbalized understanding. Neuro exam unchanged, patient remains AAOx4. Pain unable to be controlled w/ prn medications. VSS. Patient educated on the importance of eating when taking medications crushed. Nausea helped w/ prn Zofran. Repositioned for comfort. Call light in reach, bed locked in low position, side rails up x2. Fall precautions remained. Advised to call staff for assistance. Will continue to monitor.

## 2019-12-03 VITALS
HEIGHT: 60 IN | RESPIRATION RATE: 17 BRPM | SYSTOLIC BLOOD PRESSURE: 128 MMHG | WEIGHT: 125.69 LBS | OXYGEN SATURATION: 99 % | BODY MASS INDEX: 24.68 KG/M2 | HEART RATE: 109 BPM | DIASTOLIC BLOOD PRESSURE: 61 MMHG | TEMPERATURE: 98 F

## 2019-12-03 LAB
ANION GAP SERPL CALC-SCNC: 10 MMOL/L (ref 8–16)
ANION GAP SERPL CALC-SCNC: 10 MMOL/L (ref 8–16)
BASOPHILS # BLD AUTO: 0.06 K/UL (ref 0–0.2)
BASOPHILS NFR BLD: 0.5 % (ref 0–1.9)
BUN SERPL-MCNC: 13 MG/DL (ref 8–23)
BUN SERPL-MCNC: 14 MG/DL (ref 8–23)
CALCIUM SERPL-MCNC: 10.5 MG/DL (ref 8.7–10.5)
CALCIUM SERPL-MCNC: 9.9 MG/DL (ref 8.7–10.5)
CHLORIDE SERPL-SCNC: 106 MMOL/L (ref 95–110)
CHLORIDE SERPL-SCNC: 109 MMOL/L (ref 95–110)
CO2 SERPL-SCNC: 19 MMOL/L (ref 23–29)
CO2 SERPL-SCNC: 21 MMOL/L (ref 23–29)
CREAT SERPL-MCNC: 0.8 MG/DL (ref 0.5–1.4)
CREAT SERPL-MCNC: 0.9 MG/DL (ref 0.5–1.4)
DIFFERENTIAL METHOD: ABNORMAL
EOSINOPHIL # BLD AUTO: 0.2 K/UL (ref 0–0.5)
EOSINOPHIL NFR BLD: 1.6 % (ref 0–8)
ERYTHROCYTE [DISTWIDTH] IN BLOOD BY AUTOMATED COUNT: 15.9 % (ref 11.5–14.5)
EST. GFR  (AFRICAN AMERICAN): >60 ML/MIN/1.73 M^2
EST. GFR  (AFRICAN AMERICAN): >60 ML/MIN/1.73 M^2
EST. GFR  (NON AFRICAN AMERICAN): >60 ML/MIN/1.73 M^2
EST. GFR  (NON AFRICAN AMERICAN): >60 ML/MIN/1.73 M^2
GLUCOSE SERPL-MCNC: 104 MG/DL (ref 70–110)
GLUCOSE SERPL-MCNC: 98 MG/DL (ref 70–110)
HCT VFR BLD AUTO: 34.3 % (ref 37–48.5)
HGB BLD-MCNC: 10.4 G/DL (ref 12–16)
IMM GRANULOCYTES # BLD AUTO: 0.26 K/UL (ref 0–0.04)
IMM GRANULOCYTES NFR BLD AUTO: 2.1 % (ref 0–0.5)
LYMPHOCYTES # BLD AUTO: 2.4 K/UL (ref 1–4.8)
LYMPHOCYTES NFR BLD: 19.3 % (ref 18–48)
MCH RBC QN AUTO: 27.5 PG (ref 27–31)
MCHC RBC AUTO-ENTMCNC: 30.3 G/DL (ref 32–36)
MCV RBC AUTO: 91 FL (ref 82–98)
MONOCYTES # BLD AUTO: 0.9 K/UL (ref 0.3–1)
MONOCYTES NFR BLD: 7.7 % (ref 4–15)
NEUTROPHILS # BLD AUTO: 8.4 K/UL (ref 1.8–7.7)
NEUTROPHILS NFR BLD: 68.8 % (ref 38–73)
NRBC BLD-RTO: 0 /100 WBC
PLATELET # BLD AUTO: 397 K/UL (ref 150–350)
PMV BLD AUTO: 10.7 FL (ref 9.2–12.9)
POCT GLUCOSE: 103 MG/DL (ref 70–110)
POCT GLUCOSE: 114 MG/DL (ref 70–110)
POCT GLUCOSE: 147 MG/DL (ref 70–110)
POTASSIUM SERPL-SCNC: 4.1 MMOL/L (ref 3.5–5.1)
POTASSIUM SERPL-SCNC: 4.1 MMOL/L (ref 3.5–5.1)
POTASSIUM SERPL-SCNC: 4.7 MMOL/L (ref 3.5–5.1)
POTASSIUM SERPL-SCNC: 4.7 MMOL/L (ref 3.5–5.1)
RBC # BLD AUTO: 3.78 M/UL (ref 4–5.4)
SODIUM SERPL-SCNC: 137 MMOL/L (ref 136–145)
SODIUM SERPL-SCNC: 138 MMOL/L (ref 136–145)
WBC # BLD AUTO: 12.17 K/UL (ref 3.9–12.7)

## 2019-12-03 PROCEDURE — 36415 COLL VENOUS BLD VENIPUNCTURE: CPT

## 2019-12-03 PROCEDURE — 25500020 PHARM REV CODE 255: Performed by: PSYCHIATRY & NEUROLOGY

## 2019-12-03 PROCEDURE — 25000003 PHARM REV CODE 250: Performed by: PHYSICIAN ASSISTANT

## 2019-12-03 PROCEDURE — 25000003 PHARM REV CODE 250: Performed by: STUDENT IN AN ORGANIZED HEALTH CARE EDUCATION/TRAINING PROGRAM

## 2019-12-03 PROCEDURE — 97112 NEUROMUSCULAR REEDUCATION: CPT

## 2019-12-03 PROCEDURE — 93010 ELECTROCARDIOGRAM REPORT: CPT | Mod: ,,, | Performed by: INTERNAL MEDICINE

## 2019-12-03 PROCEDURE — 80048 BASIC METABOLIC PNL TOTAL CA: CPT

## 2019-12-03 PROCEDURE — 99233 SBSQ HOSP IP/OBS HIGH 50: CPT | Mod: ,,, | Performed by: PSYCHIATRY & NEUROLOGY

## 2019-12-03 PROCEDURE — 93005 ELECTROCARDIOGRAM TRACING: CPT

## 2019-12-03 PROCEDURE — 97535 SELF CARE MNGMENT TRAINING: CPT

## 2019-12-03 PROCEDURE — 63600175 PHARM REV CODE 636 W HCPCS: Performed by: PHYSICIAN ASSISTANT

## 2019-12-03 PROCEDURE — 25000003 PHARM REV CODE 250: Performed by: PSYCHIATRY & NEUROLOGY

## 2019-12-03 PROCEDURE — 97803 MED NUTRITION INDIV SUBSEQ: CPT

## 2019-12-03 PROCEDURE — 25000003 PHARM REV CODE 250: Performed by: FAMILY MEDICINE

## 2019-12-03 PROCEDURE — 80048 BASIC METABOLIC PNL TOTAL CA: CPT | Mod: 91

## 2019-12-03 PROCEDURE — 63600175 PHARM REV CODE 636 W HCPCS: Performed by: FAMILY MEDICINE

## 2019-12-03 PROCEDURE — 85025 COMPLETE CBC W/AUTO DIFF WBC: CPT

## 2019-12-03 PROCEDURE — 97530 THERAPEUTIC ACTIVITIES: CPT

## 2019-12-03 PROCEDURE — 93010 EKG 12-LEAD: ICD-10-PCS | Mod: ,,, | Performed by: INTERNAL MEDICINE

## 2019-12-03 PROCEDURE — 99233 PR SUBSEQUENT HOSPITAL CARE,LEVL III: ICD-10-PCS | Mod: ,,, | Performed by: PSYCHIATRY & NEUROLOGY

## 2019-12-03 RX ORDER — AMOXICILLIN 250 MG
1 CAPSULE ORAL DAILY
Status: DISCONTINUED | OUTPATIENT
Start: 2019-12-03 | End: 2019-12-03 | Stop reason: HOSPADM

## 2019-12-03 RX ORDER — HYDROCODONE BITARTRATE AND ACETAMINOPHEN 10; 325 MG/1; MG/1
1 TABLET ORAL EVERY 8 HOURS PRN
Refills: 0 | Status: ON HOLD
Start: 2019-12-03 | End: 2019-12-21 | Stop reason: HOSPADM

## 2019-12-03 RX ORDER — AMOXICILLIN 250 MG
1 CAPSULE ORAL DAILY
Start: 2019-12-03

## 2019-12-03 RX ORDER — TIZANIDINE 2 MG/1
2 TABLET ORAL EVERY 8 HOURS PRN
Start: 2019-12-03 | End: 2019-12-13

## 2019-12-03 RX ORDER — AMLODIPINE BESYLATE 5 MG/1
5 TABLET ORAL DAILY
Qty: 30 TABLET | Refills: 11
Start: 2019-12-04 | End: 2020-12-03

## 2019-12-03 RX ORDER — DRONABINOL 2.5 MG/1
2.5 CAPSULE ORAL DAILY
Start: 2019-12-04

## 2019-12-03 RX ADMIN — DRONABINOL 2.5 MG: 2.5 CAPSULE ORAL at 08:12

## 2019-12-03 RX ADMIN — DICLOFENAC 4 G: 10 GEL TOPICAL at 08:12

## 2019-12-03 RX ADMIN — ONDANSETRON 4 MG: 2 INJECTION INTRAMUSCULAR; INTRAVENOUS at 09:12

## 2019-12-03 RX ADMIN — HEPARIN SODIUM 5000 UNITS: 5000 INJECTION INTRAVENOUS; SUBCUTANEOUS at 01:12

## 2019-12-03 RX ADMIN — HYDROCODONE BITARTRATE AND ACETAMINOPHEN 1 TABLET: 10; 325 TABLET ORAL at 09:12

## 2019-12-03 RX ADMIN — HYDROCODONE BITARTRATE AND ACETAMINOPHEN 1 TABLET: 10; 325 TABLET ORAL at 01:12

## 2019-12-03 RX ADMIN — LIDOCAINE 1 PATCH: 50 PATCH CUTANEOUS at 11:12

## 2019-12-03 RX ADMIN — AMLODIPINE BESYLATE 5 MG: 5 TABLET ORAL at 08:12

## 2019-12-03 RX ADMIN — TIZANIDINE 2 MG: 2 TABLET ORAL at 05:12

## 2019-12-03 RX ADMIN — GABAPENTIN 300 MG: 300 CAPSULE ORAL at 02:12

## 2019-12-03 RX ADMIN — SODIUM CHLORIDE 1000 ML: 0.9 INJECTION, SOLUTION INTRAVENOUS at 01:12

## 2019-12-03 RX ADMIN — GABAPENTIN 300 MG: 300 CAPSULE ORAL at 08:12

## 2019-12-03 RX ADMIN — CLOPIDOGREL BISULFATE 75 MG: 75 TABLET ORAL at 08:12

## 2019-12-03 RX ADMIN — DULOXETINE HYDROCHLORIDE 20 MG: 20 CAPSULE, DELAYED RELEASE ORAL at 08:12

## 2019-12-03 RX ADMIN — TIZANIDINE 2 MG: 2 TABLET ORAL at 01:12

## 2019-12-03 RX ADMIN — ASPIRIN 81 MG: 81 TABLET, COATED ORAL at 08:12

## 2019-12-03 RX ADMIN — IOHEXOL 75 ML: 350 INJECTION, SOLUTION INTRAVENOUS at 10:12

## 2019-12-03 RX ADMIN — HEPARIN SODIUM 5000 UNITS: 5000 INJECTION INTRAVENOUS; SUBCUTANEOUS at 05:12

## 2019-12-03 NOTE — ASSESSMENT & PLAN NOTE
Pt had been c/o back pain in recent days, complicated by her hx of chronic back and sciatic nerve pain with use of Tizanidine and opiates at home.  Due to Hb downtrend and recent tPA administration, MRI Lumbar/Thoracic spine was ordered 11/8 which demonstrated large R psoas hematoma with IVC compression and probable thrombosis.   General Surgery was consulted; No acute intervention pursued.     Patient was then with acute decompensation overnight 11/9; became hypotensive, tachycardic, and apneic with Hb down to 6.7. Rapid Response was called; patient requiring intubation, blood transfusion, and transfer to medical ICU.   CTA abd/pelvis 11/9 showed hematoma with active extravasation/hemorrhage with IVC compression, hemoperitoneum.   Pt went to IR and is now s/p successful R L2 & L3 lumbar spinal artery embolization.   Patient underwent IR re-evaluation on 11/11. No arterial bleed evident on IR angiography.  CT abd/pelvis 11/17: Redemonstration of large right retroperitoneal hematoma which is mildly decreased in size compared to most recent      Hb remains stable.  Pt continues to c/o R flank pain now radiating anteriorly; ordered CT Abd/Pelvis W WO to further assess. Still pending  Continuing to monitor.

## 2019-12-03 NOTE — PT/OT/SLP PROGRESS
Occupational Therapy   Treatment    Name: Melva Barker  MRN: [id number]  Admitting Diagnosis:  Thrombotic stroke involving left middle cerebral artery       Recommendations:     Discharge Recommendations: nursing facility, skilled  Discharge Equipment Recommendations:  (TBD)  Barriers to discharge:  Decreased caregiver support    Assessment:     Melva Barker is a 74 y.o. female with a medical diagnosis of Thrombotic stroke involving left middle cerebral artery.  She presents with performance deficits affecting function are weakness, impaired endurance, gait instability, impaired functional mobilty, impaired self care skills, impaired balance, decreased lower extremity function, decreased upper extremity function, decreased coordination, decreased safety awareness, decreased ROM, impaired cardiopulmonary response to activity, impaired fine motor, abnormal tone.     Rehab Prognosis:  Good; patient would benefit from acute skilled OT services to address these deficits and reach maximum level of function.       Plan:     Patient to be seen 3 x/week to address the above listed problems via self-care/home management, therapeutic activities, therapeutic exercises, neuromuscular re-education  · Plan of Care Expires: 12/13/19  · Plan of Care Reviewed with: patient    Subjective     Pain/Comfort:  · Pain Rating 1: 0/10  · Pain Rating Post-Intervention 1: 0/10    Objective:     Communicated with: nurse prior to session.  Patient found HOB elevated with bed alarm, telemetry, peripheral IV upon OT entry to room.    General Precautions: Standard, fall, aspiration   Orthopedic Precautions:N/A   Braces: N/A     Occupational Performance:     Bed Mobility:    · Patient completed Rolling/Turning to Left with  supervision  · Patient completed Rolling/Turning to Right with supervision  · Patient completed Scooting/Bridging with supervision  · Patient completed Supine to Sit with contact guard assistance  · Patient completed  Sit to Supine with stand by assistance     Functional Mobility/Transfers:  · Patient completed Sit <> Stand Transfer with contact guard assistance  with  hand-held assist   · Patient completed Bed <> Chair Transfer using Step Transfer technique with contact guard assistance with hand-held assist  · Functional Mobility: Pt was able to side step 6 steps toward HOB with HHA    Activities of Daily Living:  · Grooming: stand by assistance sitting unsupported EOB performing oral care and washing face and hands.  · Upper Body Dressing: minimum [facility] gown as a robe secondary to IV (R) UE.  · Lower Body Dressing: contact guard assistance sitting EOB to doff/don socks. Brief not tested.      [facility] 6 Click ADL: 18    Treatment & Education:  Pt edu on Plan of care,  safety when performing functional transfers, self care tasks and functional standing activities.  - White board updated  - Self care tasks completed-- as noted above       Patient left HOB elevated with all lines intact, call button in reach, bed alarm on.Education:      GOALS:   Multidisciplinary Problems     Occupational Therapy Goals        Problem: Occupational Therapy Goal    Goal Priority Disciplines Outcome Interventions   Occupational Therapy Goal     OT, PT/OT Ongoing, Progressing    Description:  Updated Goals to be met by: 12/10    Patient will increase functional independence with ADLs by performing:    UE Dressing while seated EOB with Contact Guard Assistance.  LE Dressing (brief) with Minimal Assistance.  Grooming while standing with Minimal Assistance.  Toileting from bedside commode with Minimal Assistance for hygiene and clothing management.   Toilet transfer to bedside commode with Contact Guard Assistance.  B UE endurance HEP with handout and assistance as needed.  Increased B UE functional strength to WFL for ADLs.  Complete functional mobility household distance for ADL task with CGA using AD as needed.                       Time Tracking:     OT Date of Treatment: 12/03/19  OT Start Time: 1425  OT Stop Time: 1455  OT Total Time (min): 30 min    Billable Minutes:Self Care/Home Management 16  Neuromuscular Re-education 14    STEPH Galicia/SEFERINO  12/3/2019

## 2019-12-03 NOTE — ASSESSMENT & PLAN NOTE
"Reported hx chronic back pain with associated sciatic nerve pain as well, then complicated by acute retroperitoneal hematoma.  Previously consulted to anesthesia pain management.  Pain now being controlled with gabapentin, duloxetine, trazodone, diclofenac gel, lidocaine patch, hydrcodone-acetaminophen (prn), tizanidine (prn.)   Nurse reporting pt continues to request pain medication "around the clock." Of note, pt reported to night nurse that her best pain score at home is usually 7 1/2.    Restarted pt's home regimen and will likely continue at discharge.  Per PNP, pt seeing Dr. Hilton Rowland in [place] for pain management. Recommend follow up outpatient and consideration of Addiction Psych referral.    Pt continuing to c/o R flank pain now radiating anteriorly on 12/2.  Ordered CT Abd/Pelvis to further assess before considering any regimen adjustments (Hb is stable in the setting of recent retroperitoneal hematoma.)  "

## 2019-12-03 NOTE — PROGRESS NOTES
[facility]  Adult Nutrition  Progress Note    SUMMARY       Recommendations    1. Continue soft diabetic diet with Boost Glucose Control TID.  Goals: Pt eats >50% of meals, Pt drinks >50% of Boost by RD follow-up  Nutrition Goal Status: progressing towards goal  Communication of RD Recs: other (comment)(POC)    Reason for Assessment    Reason For Assessment: RD follow-up  Diagnosis: stroke/CVA  Relevant Medical History: IDDM, HTN  Interdisciplinary Rounds: did not attend  General Information Comments: Pt noted with moderate malnutrition per NFPE  and continued poor po intake. Pt started on dronabinol, continues with varied intake meals, at times 100%.  Nutrition Discharge Planning: Pt to follow higher protein/high kcal diet.    Nutrition Risk Screen    Nutrition Risk Screen: no indicators present    Nutrition/Diet History    Patient Reported Diet/Restrictions/Preferences: soft  Spiritual, Cultural Beliefs, Uatsdin Practices, Values that Affect Care: no  Supplemental Drinks or Food Habits: Ensure Plus  Factors Affecting Nutritional Intake: (NA)    Anthropometrics    Temp: 98.4 °F (36.9 °C)  Height Method: Measured  Height: 5' (152.4 cm)  Height (inches): 60 in  Weight Method: Bed Scale  Weight: 57 kg (125 lb 10.6 oz)  Weight (lb): 125.66 lb  Ideal Body Weight (IBW), Female: 100 lb  % Ideal Body Weight, Female (lb): 124.12 lb  BMI (Calculated): 24.5  BMI Grade: 18.5-24.9 - normal  Weight Loss: unintentional  Usual Body Weight (UBW), k kg  % Usual Body Weight: 84.07  % Weight Change From Usual Weight: -16.1 %       Lab/Procedures/Meds    Pertinent Labs Reviewed: reviewed  Pertinent Labs Comments: CO2 19, A1c 8.3% (10/31/19)  Pertinent Medications Reviewed: reviewed  Pertinent Medications Comments: heparin, lasix, dronabinol      Estimated/Assessed Needs    Weight Used For Calorie Calculations: 56 kg (123 lb 7.3 oz)  Energy Calorie Requirements (kcal): 1367  Energy Need Method:  [facility]  Protein Requirements: 1.25  Weight Used For Protein Calculations: 56 kg (123 lb 7.3 oz)  Fluid Requirements (mL): 1mL/kcal or per MD     RDA Method (mL): 1367  CHO Requirement: 175g CHO daily      Nutrition Prescription Ordered    Current Diet Order: soft Diabetic diet  Oral Nutrition Supplement: Boost Glucose Control TID    Evaluation of Received Nutrient/Fluid Intake    Comments: LBM 12/02  % Intake of Estimated Energy Needs: 50 - 75 %  % Meal Intake: 50 - 75 %    Nutrition Risk    Level of Risk/Frequency of Follow-up: low     Assessment and Plan     Nutrition Problem:  Moderate Protein-Calorie Malnutrition  Malnutrition in the context of Acute Illness/Injury     Related to (etiology):  Nausea, poor appetite     Signs and Symptoms (as evidenced by):  Energy Intake: <50% of estimated energy requirement for 7+ days  Body Fat Depletion: mild depletion of triceps, orbitals   Muscle Mass Depletion: moderate depletion of hands, lower extremities     Interventions/Recommendations (treatment strategy):  Collaboration of care to providers.  Commercial beverage: Boost Glucose Control TID     Nutrition Diagnosis Status:  continues    Monitor and Evaluation    Food and Nutrient Intake: food and beverage intake  Food and Nutrient Adminstration: diet order  Knowledge/Beliefs/Attitudes: food and nutrition knowledge/skill  Anthropometric Measurements: weight, weight change, body mass index  Biochemical Data, Medical Tests and Procedures: electrolyte and renal panel, gastrointestinal profile, glucose/endocrine profile, inflammatory profile, lipid profile  Nutrition-Focused Physical Findings: overall appearance     Malnutrition Assessment             Weight Loss (Malnutrition): (16% in 1 year)  Energy Intake (Malnutrition): less than or equal to 50% for greater than or equal to 5 days   Orbital Region (Subcutaneous Fat Loss): mild depletion  Upper Arm Region (Subcutaneous Fat Loss): mild depletion  Thoracic and  Lumbar Region: mild depletion   Latter day Region (Muscle Loss): mild depletion  Clavicle Bone Region (Muscle Loss): mild depletion  Clavicle and Acromion Bone Region (Muscle Loss): mild depletion  Scapular Bone Region (Muscle Loss): mild depletion  Dorsal Hand (Muscle Loss): moderate depletion  Patellar Region (Muscle Loss): moderate depletion  Anterior Thigh Region (Muscle Loss): moderate depletion  Posterior Calf Region (Muscle Loss): moderate depletion                 Nutrition Follow-Up    RD Follow-up?: Yes

## 2019-12-03 NOTE — SUBJECTIVE & OBJECTIVE
Neurologic Chief Complaint: R-sided weakness -- L MCA    Subjective:     Interval History: Patient is seen for follow-up neurological assessment and treatment recommendations: DARREN. Critical K level 7.0; d/c'd scheduled regimen and ordered lasix, insulin, dextrose, kayexalate, and albuterol. Continuing q4 BMP. Pt continues to c/o R flank pain now radiating anteriorly; ordered CT Abd/Pelvis to further assess (Hb stable.)    HPI, Past Medical, Family, and Social History remains the same as documented in the initial encounter.     Review of Systems   Constitutional: Positive for appetite change. Negative for fever.   Musculoskeletal: Positive for arthralgias, back pain and myalgias.   Neurological: Positive for facial asymmetry, speech difficulty and weakness.   Psychiatric/Behavioral: Negative for agitation and confusion.     Scheduled Meds:   amLODIPine  5 mg Oral Daily    aspirin  81 mg Oral Daily    atorvastatin  40 mg Oral QHS    clopidogrel  75 mg Oral Daily    dextrose 50%  50 g Intravenous Once    And    insulin regular  10 Units Intravenous Once    diclofenac sodium  4 g Topical (Top) Daily    dronabinol  2.5 mg Oral Daily    DULoxetine  20 mg Oral BID    furosemide  20 mg Intravenous Once    gabapentin  300 mg Oral TID    heparin (porcine)  5,000 Units Subcutaneous Q8H    lidocaine  1 patch Transdermal Q24H    polyethylene glycol  17 g Oral Daily    sodium polystyrene  30 g Oral Q4H    traZODone  25 mg Oral QHS     Continuous Infusions:    PRN Meds:dextrose 10 % in water (D10W), dextrose 50% **AND** dextrose 50% **AND** insulin regular, glucagon (human recombinant), HYDROcodone-acetaminophen, insulin aspart U-100, iohexol, melatonin, ondansetron, sodium chloride 0.9%, tiZANidine    Objective:     Vital Signs (Most Recent):  Temp: 98.4 °F (36.9 °C) (12/02/19 1600)  Pulse: 109 (12/02/19 1700)  Resp: (!) 25 (12/02/19 1700)  BP: (!) 167/76 (12/02/19 1600)  SpO2: 98 % (12/02/19 1700)  BP Location:  Left arm    Vital Signs Range (Last 24H):  Temp:  [98 °F (36.7 °C)-98.4 °F (36.9 °C)]   Pulse:  []   Resp:  [16-25]   BP: (130-167)/(60-76)   SpO2:  [97 %-100 %]   BP Location: Left arm    Physical Exam   Constitutional: She is oriented to person, place, and time. She appears well-developed. No distress.   HENT:   Head: Normocephalic and atraumatic.   Eyes: Conjunctivae and EOM are normal.   Cardiovascular: Normal rate.   Pulmonary/Chest: Effort normal. No respiratory distress.   Abdominal: There is tenderness. There is guarding (Exam inconsistent- pt at times guarding and other times able to tolerate palpation.).   Musculoskeletal: She exhibits tenderness (R flank). She exhibits no edema or deformity.   Neurological: She is alert and oriented to person, place, and time.   Skin: Skin is warm and dry. She is not diaphoretic.   Psychiatric: Cognition and memory are not impaired. She is attentive.   Vitals reviewed.      Neurological Exam:   LOC: alert   Attention Span: good  Language: No aphasia  Articulation: Mild dysarthria  Orientation: Person, Time, Place  Visual Fields: Full  EOM (CN III, IV, VI): Full/intact  Facial Movement (CN VII): Lower facial weakness on the Right- mild  Motor: Arm left  Normal 5/5  Leg left  Normal 5/5  Arm right  Paresis: 4/5   Leg right Paresis: 2/5  Sensation: Intact to light touch, temp  Tone: Normal tone throughout      Laboratory:  CMP:   Recent Labs   Lab 12/02/19  1557   CALCIUM 11.2*   *   K 5.7*  5.7*   CO2 19*      BUN 18   CREATININE 1.0     CBC:   Recent Labs   Lab 12/02/19  0824   WBC 10.77   RBC 4.17   HGB 12.0   HCT 38.2   *   MCV 92   MCH 28.8   MCHC 31.4*     Lipid Panel:   No results for input(s): CHOL, LDLCALC, HDL, TRIG in the last 168 hours.  Coagulation:   No results for input(s): PT, INR, APTT in the last 168 hours.  Platelet Aggregation Study: No results for input(s): PLTAGG, PLTAGINTERP, PLTAGREGLACO, ADPPLTAGGREG in the last 168  hours.  Hgb A1C:   No results for input(s): HGBA1C in the last 168 hours.  TSH:   No results for input(s): TSH in the last 168 hours.      Diagnostic Results      Brain Imaging      MRI Brain (11/01/19):  Acute lacunar type infarct coursing through the left posterior limb internal capsule and corona radiata.       CTH (10/31/19):  No acute intracranial pathology        Vessel Imaging      CTA Multiphase (10/31/19):  CTA head: Atherosclerotic disease of the cavernous and supraclinoid ICAs with mild narrowing.  Otherwise unremarkable CTA of the head specifically without evidence for proximal significant stenosis or occlusion.  CTA neck: Less than 50% proximal ICA stenosis by NASCET criteria.  Atherosclerotic disease with mild moderate narrowing of the right vertebral artery origin.  There is mild left V1 segment narrowing.  No significant focal vertebral artery stenosis or occlusion.  Interlobular septal thickening with tree in bud micro nodularity visualized upper lobes bilaterally which may represent inflammatory/infectious process clinical correlation and correlation with dedicated CT thorax recommended.  CT head: Bandlike region hypoattenuation left posterior limb internal capsule unchanged from prior suggestive for possible recent to subacute age infarction.  No evidence for hydrocephalus or acute intracranial hemorrhage.  Clinical correlation and further evaluation with MRI if patient compatible.        Cardiac Imaging     Echo (11/01/19)  · Increased (hyperdynamic) left ventricular systolic function. The estimated ejection fraction is 75%.  · Concentric left ventricular remodeling.  · Normal LV diastolic function.  · No wall motion abnormalities.  · Normal right ventricular systolic function.  · Normal central venous pressure (3 mm Hg).  · Mild tricuspid regurgitation.  · The estimated PA systolic pressure is 26 mm Hg  · Echolucent structure next to the LA, likely representing hiatal hernia. Clinincal  correlation is required.        Miscellaneous Imaging     CT Abdomen Pelvis WO Contrast 11/16/19  Angiography of the right renal vasculature, SMA, and multiple lumbar arteries demonstrates no evidence of active bleeding.    IR Angiogram Visceral 11/10/19  Angiography of the right renal vasculature, SMA, and multiple lumbar arteries demonstrates no evidence of active bleeding.     CTA Abdomen/Pelvis 11/10/19  Right-sided retroperitoneal hematoma with questionable foci of active extravasation within the inferior portion of the collection as described above.     IR Angiogram Visceral 11/9/19  Angiography of the lumbar arteries demonstrates active extravasation of the right L2 lumbar artery. Embolization using beads as described above.  Plan: Transfer patient to ICU.  Continued serial H and H follow-up.     CTA Abdomen/Pelvis 11/9/19  1. Large right retroperitoneal hematoma with findings concerning for active arterial extravasation/hemorrhage as detailed above.  There is associated hemorrhage extending throughout the right abdomen and pelvis/pericolic gutter with associated mass effect on the right kidney, which is anteriorly displaced.  2. Decreased opacification of the infahepatic and infrarenal IVC, possibly secondary to underlying mass effect versus partial thrombosis.  3. Nonspecific cecal and right colonic wall thickening, possibly reactive due to hemoperitoneum.  4. Additional findings as detailed above.     MRI Thoracic/Lumbar Spine W WO Contr 11/8/19  Large hematoma within the right psoas muscle.  Compression and probable thrombosis of the IVC. Consider contrast enhanced CT with delayed phase.  Multilevel degenerative changes as detailed above, more severe in the lumbar spine.  Irregularity of the T12 through L3 endplates is most likely degenerative.

## 2019-12-03 NOTE — ASSESSMENT & PLAN NOTE
Critical K level 7.0; repeat 6.8. Likely iatrogenic; d/c'd scheduled regimen.  ECG largely WNL.  Discussed with Hospital Medicine; Ordered lasix, insulin, dextrose, kayexalate, and albuterol.   Repeat K 5.8.   Dextrose and insulin d/c'd overnight as pt's K returned to normal range, now 4.1.  BMP qOD

## 2019-12-03 NOTE — PT/OT/SLP PROGRESS
"Physical Therapy Treatment    Patient Name:  Melva Barker   MRN:  [id number]    Recommendations:     Discharge Recommendations:  nursing facility, skilled   Discharge Equipment Recommendations: (TBD)   Barriers to discharge: Inaccessible home, Decreased caregiver support and patient below functional baseline    Assessment:     Melva Barker is a 74 y.o. female admitted with a medical diagnosis of Thrombotic stroke involving left middle cerebral artery.  She presents with the following impairments/functional limitations:  weakness, impaired endurance, impaired self care skills, impaired functional mobilty, gait instability, impaired balance, decreased upper extremity function, decreased lower extremity function, decreased coordination, decreased safety awareness, pain, abnormal tone, impaired coordination, decreased ROM, impaired cardiopulmonary response to activity, impaired fine motor. The patient demonstrates chronic R hemiparesis, impaired activity tolerance due to back pain. Patient is self-limiting with mobility in spite of discussion of patient's goals to ambulate and education on role of therapy to increase her independence with mobility.     Rehab Prognosis: Fair; patient would benefit from acute skilled PT services to address these deficits and reach maximum level of function.    Recent Surgery: * No surgery found *      Plan:     During this hospitalization, patient to be seen 3 x/week to address the identified rehab impairments via gait training, therapeutic activities, therapeutic exercises, neuromuscular re-education and progress toward the following goals:    · Plan of Care Expires:  12/10/19    Subjective     Chief Complaint: back pain, "can you come next time after I've had my pain medicine", patient receiving topical medication for back pain, patient education that therapy coordinates with pain medication but patient encouraged to attempt to mobilize in spite of pain   Patient/Family " Comments/goals: patient states she wants to be able to ambulate, educated patient on importance of fully participating in therapy to achieve her goal  Pain/Comfort:  · Pain Rating 1: 9/10  · Location - Side 1: Right  · Location - Orientation 1: lower  · Location 1: (back and hip)  · Pain Addressed 1: Cessation of Activity, Nurse notified, Reposition, Distraction  · Pain Rating Post-Intervention 1: (remained throughout session)      Objective:     Communicated with RN prior to session.  Patient found patient using BSC, RN present and assisting with bed alarm, telemetry, peripheral IV upon PT entry to room.     General Precautions: Standard, fall, aspiration   Orthopedic Precautions:N/A   Braces: N/A     Functional Mobility:    Bed Mobility  Sit to supine: contact guard assist, requires minimum assistance to swing R LE into bed  Scoot to HOB in sitting: stand by assistance, patient able to use UE to reposition   Transfers Sit to Stand:  minimum assistance, pushing up from BSC using hand rails   Gait  Gait Distance: sidesteps commode to bed towards R side with hand held assist  Assistance Level: minimum assistance   Description: short shuffling strides, hips/knees/trunkl flexed, impaired weight shift, decreased weight bearing and stance time on R leg. Facilitation and cues for posture and sequencing and weight shift.           AM-PAC 6 CLICK MOBILITY  Turning over in bed (including adjusting bedclothes, sheets and blankets)?: 3  Sitting down on and standing up from a chair with arms (e.g., wheelchair, bedside commode, etc.): 3  Moving from lying on back to sitting on the side of the bed?: 3  Moving to and from a bed to a chair (including a wheelchair)?: 3  Need to walk in hospital room?: 3  Climbing 3-5 steps with a railing?: 2  Basic Mobility Total Score: 17       Therapeutic Activities and Exercises:  Stood with minimum assistance 30 sec for pericare with hand held assist. Patient deferring further gait or mobility  due to fatigue and pain. Patient educated on benefits of sitting up in bedside chair, deferring at this time.   Patient educated on role of therapy, goals of session, benefits of out of bed mobility. Patient agreeable to mobilize with therapy.  Discussed PT plan of care during hospitalization. Patient educated that they need to call for assistance to mobilize out of bed. Whiteboard updated as appropriate. Patient educated on how their diagnosis impacts their mobility within PT scope of practice.     Patient left HOB elevated with all lines intact, call button in reach, bed alarm on and RN notified..    GOALS:   Multidisciplinary Problems     Physical Therapy Goals        Problem: Physical Therapy Goal    Goal Priority Disciplines Outcome Goal Variances Interventions   Physical Therapy Goal     PT, PT/OT Ongoing, Progressing     Description:  Goals to be met by: 12/10/19    Patient will increase functional independence with mobility by performin. Supine to sit with [place].-met 19  Revised: stand by assistance   2. Sit to supine with contact guard assist. -not met    3. Sit to stand transfer with contact guard assistance using least restrictive device. -not met     4. Gait  X 25 feet with Minimal Assistance using least restrictive device. -not met     5. Stand for 1 minutes with Contact Guard Assistance using  least restrictive device or no AD. -not met     6. Lower extremity exercise program x 20 reps per handout, with independence to improve strength and activity tolerance.  -not met                                 Time Tracking:     PT Received On: 19  PT Start Time: 1132     PT Stop Time: 1145  PT Total Time (min): 13 min     Billable Minutes: Therapeutic Activity 13    Treatment Type: Treatment  PT/PTA: PT     PTA Visit Number: 0     Hortencia Umaña, PT  2019

## 2019-12-03 NOTE — ASSESSMENT & PLAN NOTE
Reported hx of opioid addiction.  Previously discussed with son and patient - she has been to rehab in the past  Pt now on home pain regimen and bargaining for more pain medication.  Plans for outpatient pain management follow-up.

## 2019-12-03 NOTE — NURSING
Stroke services NP made aware of drop in potassium from 5.7 to 4.8. Orders for D10 IV and insulin discontinued.

## 2019-12-03 NOTE — PLAN OF CARE
12/03/19 1613   Post-Acute Status   Post-Acute Authorization Placement   Post-Acute Placement Status Set-up Complete       Pt has been accepted by [facility] in [place].  Nurse can call report to [phone] and ask for the charge nurse.  Transportation setup by EMS for 5:30pm.  SW in contact with CM and Medical staff. Will continue to follow and offer support as needed.     Demario Ledesma, CH[facility]   Ext. [phone]

## 2019-12-03 NOTE — PLAN OF CARE
POC reviewed w/ patient.  All questions answered and reviewed. Verbalized understanding. Neuro exam unchanged. Pain uncontrolled by prn medications, MD notified no change in orders at this time. VSS. Repositioned for comfort. Call light in reach, bed locked in low position, side rails up x2. Fall precautions remained. Advised to call staff for assistance. Will continue to monitor.

## 2019-12-03 NOTE — ASSESSMENT & PLAN NOTE
Pt tachycardic overnight, -139.   ECG shows sinus tachycardia.   Concern that patient may be dehydrated in the setting of NPO status for planned contrasted imaging.  Ordered NS bolus   Will continue to monitor and encourage PO intake as able.

## 2019-12-03 NOTE — ASSESSMENT & PLAN NOTE
Reported hx of opioid addiction.  Previously discussed with son and patient - she has been to rehab in the past  Pt now on home pain regimen and bargaining for more pain medication.  Outpatient pain management follow-up.

## 2019-12-03 NOTE — ASSESSMENT & PLAN NOTE
Pt had been c/o back pain, complicated by her hx of chronic back and sciatic nerve pain with use of Tizanidine and opiates at home.  Due to Hb downtrend and recent tPA administration, MRI Lumbar/Thoracic spine was ordered 11/8 which demonstrated large R psoas hematoma with IVC compression and probable thrombosis.   General Surgery was consulted; No acute intervention pursued.     Patient was then with acute decompensation overnight 11/9; became hypotensive, tachycardic, and apneic with Hb down to 6.7. Rapid Response was called; patient required intubation, blood transfusion, and transfer to medical ICU.   CTA abd/pelvis 11/9 showed hematoma with active extravasation/hemorrhage with IVC compression, hemoperitoneum.   Pt went to IR and is now s/p successful R L2 & L3 lumbar spinal artery embolization.   Patient underwent IR re-evaluation on 11/11. No arterial bleed evident on IR angiography.  CT abd/pelvis 11/17: Redemonstration of large right retroperitoneal hematoma but decreased in size. DAPT was restarted.  Hb remains stable and repeat CT Abd/Pelvis W WO 12/3 showed continued decreasing size of hematoma.   PCP follow-up

## 2019-12-03 NOTE — ASSESSMENT & PLAN NOTE
Critical K level 7.0; repeat 6.8. Suspected iatrogenic; d/c'd scheduled regimen.  ECG largely WNL.  Discussed with Hospital Medicine; Ordered lasix, insulin, dextrose, kayexalate, and albuterol.   Repeat K 5.8. Dextrose and insulin d/c'd overnight as pt's K returned to normal range, now 4.1.  Further monitoring per SNF

## 2019-12-03 NOTE — PROGRESS NOTES
[facility]  Vascular Neurology  Comprehensive Stroke Center  Progress Note    Assessment/Plan:     * Thrombotic stroke involving left middle cerebral artery  74 y.o. female with PMHx of HTN, HLD, and DM who presented to OSH with acute worsening of R-sided weakness after having experienced R-sided weakness x1 month. She was treated with tPA at OSH. CTA without LVO. MRI with infarct in L internal capsule and corona radiata. Etiology small vessel disease.    Continued issues with placement efforts; new SNF referrals sent and pt now pending insurance authorization at accepting facility.       Antithrombotics for secondary stroke prevention: Antiplatelets: ASA 81 mg + Plavix 75 mg -- Initially held due to retroperitoneal hematoma. Resumed DAPT on 11/17.  Statins for secondary stroke prevention and hyperlipidemia, if present: Statins: Atorvastatin- 40 mg daily,   Aggressive risk factor modification: HTN, HLD, Diet  Rehab efforts: The patient has been evaluated by a stroke team provider and the therapy needs have been fully considered based off the presenting complaints and exam findings. The following therapy evaluations are needed: PT evaluate and treat, OT evaluate and treat, SLP evaluate and treat, PM&R evaluate for appropriate placement - Dispo SNF  Diagnostics ordered/pending: None   VTE prophylaxis: Mechanical SCDs, heparin 5000 units q 8 hours   BP parameters: Infarct: Post tPA, SBP <160; Avoid hypotension    Tachycardia  Pt tachycardic overnight, -139.   ECG shows sinus tachycardia.   Concern that patient may be dehydrated in the setting of NPO status for planned contrasted imaging.  Ordered NS bolus   Will continue to monitor and encourage PO intake as able.    Back pain  Reported hx chronic back pain with associated sciatic nerve pain as well, then complicated by acute retroperitoneal hematoma.  Previously consulted to anesthesia pain management.  Pain now being controlled with  "gabapentin, duloxetine, trazodone, diclofenac gel, lidocaine patch, hydrcodone-acetaminophen (prn), tizanidine (prn.)   Nurse reporting pt continues to request pain medication "around the clock." Of note, pt reported to night nurse that her best pain score at home is usually 7 1/2.    Restarted pt's home regimen and will likely continue at discharge.  Per PNP, pt seeing Dr. Hilton Rowland in [place] for pain management. Recommend follow up outpatient and consideration of Addiction Psych referral.    Pt continuing to c/o R flank pain now radiating anteriorly on 12/2.  Ordered CT Abd/Pelvis to further assess before considering any regimen adjustments (Hb is largely stable in the setting of recent retroperitoneal hematoma.)    Hyperkalemia  Critical K level 7.0; repeat 6.8. Likely iatrogenic; d/c'd scheduled regimen.  ECG largely WNL.  Discussed with Hospital Medicine; Ordered lasix, insulin, dextrose, kayexalate, and albuterol.   Repeat K 5.8.   Dextrose and insulin d/c'd overnight as pt's K returned to normal range, now 4.1.  BMP qOD    Substance use disorder  Reported hx of opioid addiction.  Previously discussed with son and patient - she has been to rehab in the past  Pt now on home pain regimen and bargaining for more pain medication.  Outpatient pain management follow-up.    Moderate malnutrition  Appreciate Nutrition/RD assistance  Ordered Boost Glucose Control TID  Appetite stimulant ordered - Pt requesting consider increased dosing  Encouraged her toward 100% completion of all meals and supplementation.    Retroperitoneal hematoma  Pt had been c/o back pain in recent days, complicated by her hx of chronic back and sciatic nerve pain with use of Tizanidine and opiates at home.  Due to Hb downtrend and recent tPA administration, MRI Lumbar/Thoracic spine was ordered 11/8 which demonstrated large R psoas hematoma with IVC compression and probable thrombosis.   General Surgery was consulted; No acute " intervention pursued.     Patient was then with acute decompensation overnight 11/9; became hypotensive, tachycardic, and apneic with Hb down to 6.7. Rapid Response was called; patient requiring intubation, blood transfusion, and transfer to medical ICU.   CTA abd/pelvis 11/9 showed hematoma with active extravasation/hemorrhage with IVC compression, hemoperitoneum.   Pt went to IR and is now s/p successful R L2 & L3 lumbar spinal artery embolization.   Patient underwent IR re-evaluation on 11/11. No arterial bleed evident on IR angiography.  CT abd/pelvis 11/17: Redemonstration of large right retroperitoneal hematoma which is mildly decreased in size compared to most recent      Hb remains stable.  Pt continues to c/o R flank pain now radiating anteriorly; ordered CT Abd/Pelvis W WO to further assess. Still pending  Continuing to monitor.    Essential hypertension  Risk factor for stroke  Had held all BP meds due to recent hypotension, then BP continuing to be labile.  Ordered thigh high KAREEN hose.   Efforts were nade to decrease tizanidine, however pt continued to complain of pain.   Previously considering initiating low-dose Midodrine if hypotension persisted, however now started on Norvasc 5mg Daily.  Continue close monitoring; pt able to tolerate thus far.    At high risk for infection  Patient hypotensive overnight from 11/13 and into the AM 11/14. Received multiple boluses and pressure responsive.  Concern was for sepsis vs pain medication-related.   Infectious workup was ordered -- Elevated procal and lactate with interval development of atelectasis in R lower lung.  Started 7-day course of IV Cefepime and Vanc for possible hospital acquired pneumonia. BCx NGTD.    Then with liquid diarrhea overnight 11/15; WBC 14, stopped empiric abx and ordered CDiff, stool culture. PO Vanc x 10 day course for possible C.diff. IVFs given and central line removed.  WBC then improved, pt remaining afebrile.      Will continue  to monitor patient, labs, vitals for any signs or symptoms concerning for infection.    Debility  2/2 stroke  PT/OT eval & treat - Dispo SNF    Cytotoxic cerebral edema  Area of cytotoxic cerebral edema identified when reviewing brain imaging in the territory of the L middle cerebral artery. There is no mass effect associated with it. We will continue to monitor the patients clinical exam for any worsening of symptoms which may indicate expansion of the stroke or the area of the edema resulting in the clinical change. The pattern is suggestive of small vessel etiology.    Diabetes mellitus type 2 without retinopathy  Stroke risk factor, poorly controlled on glargine 17 units daily  HbA1c 8.3%  Recommend tight glucose control  Currently on SSI  Diabetic diet    Gastroparesis  Previously on Reglan 10 mg TID before meals, reglan then decreased to 5 mg TID.   Reglan discontinued on 11/23, will monitor closely and reinitiate if necessary.        11/1/19 MRI with small vessel L MCA infarct, therapy recommending rehab  11/2 - Patient stepped down overnight. Adjusting BP regimen. Patient with continuous complaints if nausea. IV Zofran ordered with some relief. Patient has not has BM since 10/30. Ordering KUB to rule out obstruction. Neuro exam unchanged.   11/3 - NAEON. Patient reports she had no episodes of vomiting overnight. Still complaining of nausea. Mild lower abdomen tenderness on exam. Lipase ordered and WNL. Continue to monitor.   11/4 - denies nausea and vomiting. Abd tenderness remains present on palpitation. Continue to monitor. HCTZ increased yesterday. Pending rehab placement.   11/5 -  N/V x1 overnight zofran resolved, sucralfate started. Placement pending   11/6 - hypotensive, held all BP meds,  ml bolus, responded well. Increased BUN/Cr, start NS 75ml/hr.   11/7 patient complaining of sciatica pain.  Restarted home tizanidine.  Patient requesting hydrocodone but educated patient that nerve pain is  not treated with opioid.  Degenerative disease seen on xray but no fracture.      11/8 Patient continues to experience back pain.  Now with leukocytosis and drop in h/h plan for MRI thoracic/lumbar spine to evaluate for epidural hematoma.    11/9: MRI Lumbar spine had demonstrated large R psoas hematoma with possible IVC compression; General Surgery consulted. Patient was then with acute decompensation overnight; became hypotensive, tachycardic, and apneic requiring intubation, blood transfusion, and medical ICU transfer. CTA abd/pelvis showed active extravasation/hemorrhage with hemoperitoneum. Pt now s/p successful lumbar artery embolization in IR. She was extubated on arrival back to ICU and tolerated well.    11/11: Patient underwent IR re-evaluation yesterday. No arterial bleed evident on IR angiography. Patient neuro exam stable.   11/12 Stepped down from medical ICU this morning.  Patient still experiencing pain.  Anesthesia consulted for pain management.  D/C pain pump and started oral medications for muscle and nerve pain.  Will wean to home regimen. Waiting for repeat CBC, transfused one unit of blood 11/11.   Tachycardic/hypertensive will continue to monitor.  11/13: Upgraded diet per SLP recs. Pt continues to c/o R back pain, concern that tachycardia could be pain-induced; adjusted analgesic regimen further as well as antiemetic regimen with plans to eventually wean to home regimen. Replaced Phos. Dispo inpatient rehab.  11/14: patient hypotensive overnight and this morning requiring fluid boluses. Discontinued all pain medications except for celecoxib in case pain meds are contributing to hypotension. Lidocaine patch and Voltaren gel ordered as well. Infectious workup significant for minor atelectasis in R lower lung with increased procal and lactate. Patient started on IV antibiotics for community acquired pneumonia. Blood cultures pending. ICU notified of patient's hypotension  11/15: Liquid diarrhea,  WBC 14, stopped empiric abx, ordered c. Diff and stool culture, start PO vanc, IVFs. Adjusted pain meds with goal to return to home regimen. Nauseous overnight, zofran given.   11/16: CT abdomen and pelvis to f/u retroperitoneal hematoma prior to starting DAPT, H/H stable. T max 99.4, tachy, WBC 13, C. Diff and stool cultures pending, increased  ml/hr, labile BP, continue to monitor. Remove central line.   11/17: CT abdomen/pelvis again with large retroperitoneal hematoma  but decreased in size, H/H uptrend, resume DAPT today. Diarrhea becoming thicker, WBC improved, a febrile, c. Diff antigen positive, toxins negative. Pain well controlled.   11/18 PCR negative, c.diff precautions and vancomycin d/c.  Neurologically stable waiting for placement.    11/19 Patient now recommending SNF.   11/20 Neurologically stable.  Waiting for placement.  Hypotensive episodes overnight given some fluids.     11/21 Orthostatic yesterday, HR 120s to 90s standing to lying, IVFs 1L over 10 hours ordered.   11/22: Nurse encouraging pt toward aggressive PO hydration in the setting of recent orthostatic hypotension. Hb remains stable.  11/23: Reglan discontinue  Importance of oral hydration reinforced with patient. H&H remain stable. Patient accepted to [facility], awaiting insurance auth.   11/24: patient became hypotensive with SBP in 70s and responded to 500 cc bolus, continuing to encourage oral fluid intake, will consider midodrine if hypotension persists  11/25: SBP WNL within last 24 hours. Decreased Norco to home dose q6hr prn, f/u w/ Dr. Canela pain management outpatient, consider addiction psych. C/o decreased appetite, advanced soft diet to level 6 so pt can receive gumbo as she requested, boost supplements, continue to monitor.   11/26: Patient requesting appetite stimulant; encouraged her toward 100% completion of all meals. Continuing to monitor her labile BP.  11/27: asymptomatic hypotension SBP 80s recheck SBP  90s, place thigh high KAREEN hose, consider midodrine if hypotension continues.   11/29: asymptomatic hypotension continues efforts to decrease tizanidine, however pt continues to complain of pain.   11/30 decreased breath sounds in right lower lung.  CXR wnl.  Will continue to monitor discussed breathing exercises   12/1 waiting for placement to SNF.  Sinus tachycardia due to dehydration.  Patient refusing to eat/drink and will eat and drink if she receives more pain medication.  Discussed risk of not getting enough hydration and nutrition.    12/2: Critical K level 7.0; d/c'd scheduled regimen and ordered lasix, insulin, dextrose, kayexalate, and albuterol. Continuing q4 BMP. Pt continues to c/o R flank pain now radiating anteriorly; ordered CT Abd/Pelvis to further assess (Hb stable.)  12/3: Dextrose and insulin d/c'd overnight as pt's K returned to normal range, now 4.1. CT Abd/Pelvis still pending. Pt tachycardic overnight; ECG shows sinus tach. Ordered NS bolus (pt is NPO for contrasted imaging.)    STROKE DOCUMENTATION   Acute Stroke Times   Last Known Normal Date: 10/31/19  Last Known Normal Time: 0630  Symptom Onset Date: 10/31/19  Symptom Onset Time: 0730  Stroke Team Called Date: 10/31/19  Stroke Team Called Time: 0936  Stroke Team Arrival Date: 10/31/19  Stroke Team Arrival Time: 0946  Decision to Treat Time for Alteplase: 1003    NIH Scale:  1a. Level of Consciousness: 0-->Alert, keenly responsive  1b. LOC Questions: 0-->Answers both questions correctly  1c. LOC Commands: 0-->Performs both tasks correctly  2. Best Gaze: 0-->Normal  3. Visual: 0-->No visual loss  4. Facial Palsy: 1-->Minor paralysis (flattened nasolabial fold, asymmetry on smiling)  5a. Motor Arm, Left: 0-->No drift, limb holds 90 (or 45) degrees for full 10 secs  5b. Motor Arm, Right: 1-->Drift, limb holds 90 (or 45) degrees, but drifts down before full 10 secs, does not hit bed or other support  6a. Motor Leg, Left: 0-->No drift, leg  holds 30 degree position for full 5 secs  6b. Motor Leg, Right: 2-->Some effort against gravity, leg falls to bed by 5 secs, but has some effort against gravity  7. Limb Ataxia: 0-->Absent  8. Sensory: 0-->Normal, no sensory loss  9. Best Language: 0-->No aphasia, normal  10. Dysarthria: 1-->Mild-to-moderate dysarthria, patient slurs at least some words and, at worst, can be understood with some difficulty  11. Extinction and Inattention (formerly Neglect): 0-->No abnormality  Total (NIH Stroke Scale): 5       Modified Worcester Score: 0  Nabila Coma Scale:    ABCD2 Score:    [id number] Score:   HAS -BLED Score:   ICH Score:   Hunt & Amaya Classification:      Hemorrhagic change of an Ischemic Stroke: Does this patient have an ischemic stroke with hemorrhagic changes? No     Neurologic Chief Complaint: R-sided weakness -- L MCA    Subjective:     Interval History: Patient is seen for follow-up neurological assessment and treatment recommendations: NAEON. Dextrose and insulin d/c'd overnight as pt's K returned to normal range, now 4.1. CT Abd/Pelvis still pending. Pt tachycardic overnight; ECG shows sinus tach. Ordered NS bolus (pt is NPO for contrasted imaging.)    HPI, Past Medical, Family, and Social History remains the same as documented in the initial encounter.     Review of Systems   Constitutional: Negative for fatigue and fever.   Musculoskeletal: Positive for arthralgias, back pain and myalgias.   Neurological: Positive for facial asymmetry, speech difficulty and weakness.   Psychiatric/Behavioral: Negative for agitation and confusion.     Scheduled Meds:   amLODIPine  5 mg Oral Daily    aspirin  81 mg Oral Daily    atorvastatin  40 mg Oral QHS    clopidogrel  75 mg Oral Daily    diclofenac sodium  4 g Topical (Top) Daily    dronabinol  2.5 mg Oral Daily    DULoxetine  20 mg Oral BID    gabapentin  300 mg Oral TID    heparin (porcine)  5,000 Units Subcutaneous Q8H    lidocaine  1 patch Transdermal  Q24H    polyethylene glycol  17 g Oral Daily    sodium chloride 0.9%  1,000 mL Intravenous Once    traZODone  25 mg Oral QHS     Continuous Infusions:    PRN Meds:dextrose 10 % in water (D10W), Dextrose 10% Bolus, glucagon (human recombinant), HYDROcodone-acetaminophen, insulin aspart U-100, iohexol, iohexol, melatonin, ondansetron, sodium chloride 0.9%, tiZANidine    Objective:     Vital Signs (Most Recent):  Temp: 98.4 °F (36.9 °C) (12/03/19 1109)  Pulse: 105 (12/03/19 1109)  Resp: 17 (12/03/19 1109)  BP: 139/64 (12/03/19 1109)  SpO2: 96 % (12/03/19 1109)  BP Location: Right arm    Vital Signs Range (Last 24H):  Temp:  [98.2 °F (36.8 °C)-98.7 °F (37.1 °C)]   Pulse:  [105-139]   Resp:  [16-25]   BP: (124-167)/(64-88)   SpO2:  [96 %-100 %]   BP Location: Right arm    Physical Exam   Constitutional: She is oriented to person, place, and time. She appears well-developed. No distress.   HENT:   Head: Normocephalic and atraumatic.   Eyes: Conjunctivae and EOM are normal.   Cardiovascular: Normal rate.   Pulmonary/Chest: Effort normal. No respiratory distress.   Abdominal: There is tenderness. There is guarding (Exam inconsistent- pt at times guarding and other times able to tolerate palpation in the RLQ.).   Musculoskeletal: She exhibits tenderness (R flank). She exhibits no edema or deformity.   Neurological: She is alert and oriented to person, place, and time.   Skin: Skin is warm and dry. She is not diaphoretic.   Psychiatric: Cognition and memory are not impaired. She is attentive.   Vitals reviewed.      Neurological Exam:   LOC: alert   Attention Span: good  Language: No aphasia  Articulation: Mild dysarthria  Orientation: Person, Time, Place  Visual Fields: Full  EOM (CN III, IV, VI): Full/intact  Facial Movement (CN VII): Lower facial weakness on the Right- mild  Motor: Arm left  Normal 5/5  Leg left  Normal 5/5  Arm right  Paresis: 4/5   Leg right Paresis: 3/5  Sensation: Intact to light touch, temp  Tone:  Normal tone throughout      Laboratory:  CMP:   Recent Labs   Lab 12/03/19  0446   CALCIUM 9.9      K 4.1  4.1   CO2 19*      BUN 13   CREATININE 0.8     CBC:   Recent Labs   Lab 12/03/19  0446   WBC 12.17   RBC 3.78*   HGB 10.4*   HCT 34.3*   *   MCV 91   MCH 27.5   MCHC 30.3*     Lipid Panel:   No results for input(s): CHOL, LDLCALC, HDL, TRIG in the last 168 hours.  Coagulation:   No results for input(s): PT, INR, APTT in the last 168 hours.  Platelet Aggregation Study: No results for input(s): PLTAGG, PLTAGINTERP, PLTAGREGLACO, ADPPLTAGGREG in the last 168 hours.  Hgb A1C:   No results for input(s): HGBA1C in the last 168 hours.  TSH:   No results for input(s): TSH in the last 168 hours.      Diagnostic Results      Brain Imaging      MRI Brain (11/01/19):  Acute lacunar type infarct coursing through the left posterior limb internal capsule and corona radiata.       CTH (10/31/19):  No acute intracranial pathology        Vessel Imaging      CTA Multiphase (10/31/19):  CTA head: Atherosclerotic disease of the cavernous and supraclinoid ICAs with mild narrowing.  Otherwise unremarkable CTA of the head specifically without evidence for proximal significant stenosis or occlusion.  CTA neck: Less than 50% proximal ICA stenosis by NASCET criteria.  Atherosclerotic disease with mild moderate narrowing of the right vertebral artery origin.  There is mild left V1 segment narrowing.  No significant focal vertebral artery stenosis or occlusion.  Interlobular septal thickening with tree in bud micro nodularity visualized upper lobes bilaterally which may represent inflammatory/infectious process clinical correlation and correlation with dedicated CT thorax recommended.  CT head: Bandlike region hypoattenuation left posterior limb internal capsule unchanged from prior suggestive for possible recent to subacute age infarction.  No evidence for hydrocephalus or acute intracranial hemorrhage.  Clinical  correlation and further evaluation with MRI if patient compatible.        Cardiac Imaging     Echo (11/01/19)  · Increased (hyperdynamic) left ventricular systolic function. The estimated ejection fraction is 75%.  · Concentric left ventricular remodeling.  · Normal LV diastolic function.  · No wall motion abnormalities.  · Normal right ventricular systolic function.  · Normal central venous pressure (3 mm Hg).  · Mild tricuspid regurgitation.  · The estimated PA systolic pressure is 26 mm Hg  · Echolucent structure next to the LA, likely representing hiatal hernia. Clinincal correlation is required.        Miscellaneous Imaging     CT Abdomen Pelvis WO Contrast 11/16/19  Angiography of the right renal vasculature, SMA, and multiple lumbar arteries demonstrates no evidence of active bleeding.    IR Angiogram Visceral 11/10/19  Angiography of the right renal vasculature, SMA, and multiple lumbar arteries demonstrates no evidence of active bleeding.     CTA Abdomen/Pelvis 11/10/19  Right-sided retroperitoneal hematoma with questionable foci of active extravasation within the inferior portion of the collection as described above.     IR Angiogram Visceral 11/9/19  Angiography of the lumbar arteries demonstrates active extravasation of the right L2 lumbar artery. Embolization using beads as described above.  Plan: Transfer patient to ICU.  Continued serial H and H follow-up.     CTA Abdomen/Pelvis 11/9/19  1. Large right retroperitoneal hematoma with findings concerning for active arterial extravasation/hemorrhage as detailed above.  There is associated hemorrhage extending throughout the right abdomen and pelvis/pericolic gutter with associated mass effect on the right kidney, which is anteriorly displaced.  2. Decreased opacification of the infahepatic and infrarenal IVC, possibly secondary to underlying mass effect versus partial thrombosis.  3. Nonspecific cecal and right colonic wall thickening, possibly reactive  due to hemoperitoneum.  4. Additional findings as detailed above.     MRI Thoracic/Lumbar Spine W WO Contr 11/8/19  Large hematoma within the right psoas muscle.  Compression and probable thrombosis of the IVC. Consider contrast enhanced CT with delayed phase.  Multilevel degenerative changes as detailed above, more severe in the lumbar spine.  Irregularity of the T12 through L3 endplates is most likely degenerative.      Carina Schwartz PA-C  [facility]  Department of Vascular Neurology   [facility]

## 2019-12-03 NOTE — ASSESSMENT & PLAN NOTE
Pt had been c/o back pain in recent days, complicated by her hx of chronic back and sciatic nerve pain with use of Tizanidine and opiates at home.  Due to Hb downtrend and recent tPA administration, MRI Lumbar/Thoracic spine was ordered 11/8 which demonstrated large R psoas hematoma with IVC compression and probable thrombosis.   General Surgery was consulted; No acute intervention pursued.     Patient was then with acute decompensation overnight 11/9; became hypotensive, tachycardic, and apneic with Hb down to 6.7. Rapid Response was called; patient requiring intubation, blood transfusion, and transfer to medical ICU.   CTA abd/pelvis 11/9 showed hematoma with active extravasation/hemorrhage with IVC compression, hemoperitoneum.   Pt went to IR and is now s/p successful R L2 & L3 lumbar spinal artery embolization.   Patient underwent IR re-evaluation on 11/11. No arterial bleed evident on IR angiography.  CT abd/pelvis 11/17: Redemonstration of large right retroperitoneal hematoma which is mildly decreased in size compared to most recent      Hb remains stable.  Pt continues to c/o R flank pain now radiating anteriorly; ordered CT Abd/Pelvis to further assess.   Continuing to monitor.

## 2019-12-03 NOTE — SUBJECTIVE & OBJECTIVE
Neurologic Chief Complaint: R-sided weakness -- L MCA    Subjective:     Interval History: Patient is seen for follow-up neurological assessment and treatment recommendations: NAEON. Dextrose and insulin d/c'd overnight as pt's K returned to normal range, now 4.1. CT Abd/Pelvis still pending. Pt tachycardic overnight; ECG shows sinus tach. Ordered NS bolus (pt is NPO for contrasted imaging.)    HPI, Past Medical, Family, and Social History remains the same as documented in the initial encounter.     Review of Systems   Constitutional: Negative for fatigue and fever.   Musculoskeletal: Positive for arthralgias, back pain and myalgias.   Neurological: Positive for facial asymmetry, speech difficulty and weakness.   Psychiatric/Behavioral: Negative for agitation and confusion.     Scheduled Meds:   amLODIPine  5 mg Oral Daily    aspirin  81 mg Oral Daily    atorvastatin  40 mg Oral QHS    clopidogrel  75 mg Oral Daily    diclofenac sodium  4 g Topical (Top) Daily    dronabinol  2.5 mg Oral Daily    DULoxetine  20 mg Oral BID    gabapentin  300 mg Oral TID    heparin (porcine)  5,000 Units Subcutaneous Q8H    lidocaine  1 patch Transdermal Q24H    polyethylene glycol  17 g Oral Daily    sodium chloride 0.9%  1,000 mL Intravenous Once    traZODone  25 mg Oral QHS     Continuous Infusions:    PRN Meds:dextrose 10 % in water (D10W), Dextrose 10% Bolus, glucagon (human recombinant), HYDROcodone-acetaminophen, insulin aspart U-100, iohexol, iohexol, melatonin, ondansetron, sodium chloride 0.9%, tiZANidine    Objective:     Vital Signs (Most Recent):  Temp: 98.4 °F (36.9 °C) (12/03/19 1109)  Pulse: 105 (12/03/19 1109)  Resp: 17 (12/03/19 1109)  BP: 139/64 (12/03/19 1109)  SpO2: 96 % (12/03/19 1109)  BP Location: Right arm    Vital Signs Range (Last 24H):  Temp:  [98.2 °F (36.8 °C)-98.7 °F (37.1 °C)]   Pulse:  [105-139]   Resp:  [16-25]   BP: (124-167)/(64-88)   SpO2:  [96 %-100 %]   BP Location: Right  arm    Physical Exam   Constitutional: She is oriented to person, place, and time. She appears well-developed. No distress.   HENT:   Head: Normocephalic and atraumatic.   Eyes: Conjunctivae and EOM are normal.   Cardiovascular: Normal rate.   Pulmonary/Chest: Effort normal. No respiratory distress.   Abdominal: There is tenderness. There is guarding (Exam inconsistent- pt at times guarding and other times able to tolerate palpation in the RLQ.).   Musculoskeletal: She exhibits tenderness (R flank). She exhibits no edema or deformity.   Neurological: She is alert and oriented to person, place, and time.   Skin: Skin is warm and dry. She is not diaphoretic.   Psychiatric: Cognition and memory are not impaired. She is attentive.   Vitals reviewed.      Neurological Exam:   LOC: alert   Attention Span: good  Language: No aphasia  Articulation: Mild dysarthria  Orientation: Person, Time, Place  Visual Fields: Full  EOM (CN III, IV, VI): Full/intact  Facial Movement (CN VII): Lower facial weakness on the Right- mild  Motor: Arm left  Normal 5/5  Leg left  Normal 5/5  Arm right  Paresis: 4/5   Leg right Paresis: 3/5  Sensation: Intact to light touch, temp  Tone: Normal tone throughout      Laboratory:  CMP:   Recent Labs   Lab 12/03/19  0446   CALCIUM 9.9      K 4.1  4.1   CO2 19*      BUN 13   CREATININE 0.8     CBC:   Recent Labs   Lab 12/03/19  0446   WBC 12.17   RBC 3.78*   HGB 10.4*   HCT 34.3*   *   MCV 91   MCH 27.5   MCHC 30.3*     Lipid Panel:   No results for input(s): CHOL, LDLCALC, HDL, TRIG in the last 168 hours.  Coagulation:   No results for input(s): PT, INR, APTT in the last 168 hours.  Platelet Aggregation Study: No results for input(s): PLTAGG, PLTAGINTERP, PLTAGREGLACO, ADPPLTAGGREG in the last 168 hours.  Hgb A1C:   No results for input(s): HGBA1C in the last 168 hours.  TSH:   No results for input(s): TSH in the last 168 hours.      Diagnostic Results      Brain Imaging      MRI  Brain (11/01/19):  Acute lacunar type infarct coursing through the left posterior limb internal capsule and corona radiata.       CTH (10/31/19):  No acute intracranial pathology        Vessel Imaging      CTA Multiphase (10/31/19):  CTA head: Atherosclerotic disease of the cavernous and supraclinoid ICAs with mild narrowing.  Otherwise unremarkable CTA of the head specifically without evidence for proximal significant stenosis or occlusion.  CTA neck: Less than 50% proximal ICA stenosis by NASCET criteria.  Atherosclerotic disease with mild moderate narrowing of the right vertebral artery origin.  There is mild left V1 segment narrowing.  No significant focal vertebral artery stenosis or occlusion.  Interlobular septal thickening with tree in bud micro nodularity visualized upper lobes bilaterally which may represent inflammatory/infectious process clinical correlation and correlation with dedicated CT thorax recommended.  CT head: Bandlike region hypoattenuation left posterior limb internal capsule unchanged from prior suggestive for possible recent to subacute age infarction.  No evidence for hydrocephalus or acute intracranial hemorrhage.  Clinical correlation and further evaluation with MRI if patient compatible.        Cardiac Imaging     Echo (11/01/19)  · Increased (hyperdynamic) left ventricular systolic function. The estimated ejection fraction is 75%.  · Concentric left ventricular remodeling.  · Normal LV diastolic function.  · No wall motion abnormalities.  · Normal right ventricular systolic function.  · Normal central venous pressure (3 mm Hg).  · Mild tricuspid regurgitation.  · The estimated PA systolic pressure is 26 mm Hg  · Echolucent structure next to the LA, likely representing hiatal hernia. Clinincal correlation is required.        Miscellaneous Imaging     CT Abdomen Pelvis WO Contrast 11/16/19  Angiography of the right renal vasculature, SMA, and multiple lumbar arteries demonstrates no  evidence of active bleeding.    IR Angiogram Visceral 11/10/19  Angiography of the right renal vasculature, SMA, and multiple lumbar arteries demonstrates no evidence of active bleeding.     CTA Abdomen/Pelvis 11/10/19  Right-sided retroperitoneal hematoma with questionable foci of active extravasation within the inferior portion of the collection as described above.     IR Angiogram Visceral 11/9/19  Angiography of the lumbar arteries demonstrates active extravasation of the right L2 lumbar artery. Embolization using beads as described above.  Plan: Transfer patient to ICU.  Continued serial H and H follow-up.     CTA Abdomen/Pelvis 11/9/19  1. Large right retroperitoneal hematoma with findings concerning for active arterial extravasation/hemorrhage as detailed above.  There is associated hemorrhage extending throughout the right abdomen and pelvis/pericolic gutter with associated mass effect on the right kidney, which is anteriorly displaced.  2. Decreased opacification of the infahepatic and infrarenal IVC, possibly secondary to underlying mass effect versus partial thrombosis.  3. Nonspecific cecal and right colonic wall thickening, possibly reactive due to hemoperitoneum.  4. Additional findings as detailed above.     MRI Thoracic/Lumbar Spine W WO Contr 11/8/19  Large hematoma within the right psoas muscle.  Compression and probable thrombosis of the IVC. Consider contrast enhanced CT with delayed phase.  Multilevel degenerative changes as detailed above, more severe in the lumbar spine.  Irregularity of the T12 through L3 endplates is most likely degenerative.

## 2019-12-03 NOTE — ASSESSMENT & PLAN NOTE
Stroke risk factor, poorly controlled on glargine 17 units daily  HbA1c 8.3%  Recommend tight glucose control  Currently on SSI. S/p dextrose and insuline administration on 12/2 for hyperkalemia.  Diabetic diet

## 2019-12-03 NOTE — ASSESSMENT & PLAN NOTE
Pt previously on Reglan 10 mg TID before meals, Reglan then decreased to 5 mg TID.   Reglan discontinued on 11/23  To be further monitored at [facility] and can reinitiate if necessary.

## 2019-12-03 NOTE — PROGRESS NOTES
[facility]  Vascular Neurology  Comprehensive Stroke Center  Progress Note    Assessment/Plan:     * Thrombotic stroke involving left middle cerebral artery  74 y.o. female with PMHx of HTN, HLD, and DM who presented to OSH with acute worsening of R-sided weakness after having experienced R-sided weakness x1 month. She was treated with tPA at OSH. CTA without LVO. MRI with infarct in L internal capsule and corona radiata. Etiology small vessel disease.    Continued issues with placement efforts; new SNF referrals sent and pt now pending insurance authorization at accepting facility.      Antithrombotics for secondary stroke prevention: Antiplatelets: ASA 81 mg + Plavix 75 mg -- Initially held due to retroperitoneal hematoma. Resumed DAPT on 11/17.  Statins for secondary stroke prevention and hyperlipidemia, if present: Statins: Atorvastatin- 40 mg daily,   Aggressive risk factor modification: HTN, HLD, Diet  Rehab efforts: The patient has been evaluated by a stroke team provider and the therapy needs have been fully considered based off the presenting complaints and exam findings. The following therapy evaluations are needed: PT evaluate and treat, OT evaluate and treat, SLP evaluate and treat, PM&R evaluate for appropriate placement - Dispo SNF  Diagnostics ordered/pending: None   VTE prophylaxis: Mechanical SCDs, heparin 5000 units q 8 hours   BP parameters: Infarct: Post tPA, SBP <160; Avoid hypotension    Hyperkalemia  Critical K level 7.0; repeat 6.8. Likely iatrogenic; d/c'd scheduled regimen.  ECG largely WNL.  Discussed with Hospital Medicine; Ordered lasix, insulin, dextrose, kayexalate, and albuterol.   Repeat K now 5.8.  Continuing q4 BMP    Moderate malnutrition  Appreciate Nutrition/RD assistance  Ordered Boost Glucose Control TID  Appetite stimulant ordered - Pt requesting increased dosing on 12/2.  Encouraged her toward 100% completion of all meals and  "supplementation.    Retroperitoneal hematoma  Pt had been c/o back pain in recent days, complicated by her hx of chronic back and sciatic nerve pain with use of Tizanidine and opiates at home.  Due to Hb downtrend and recent tPA administration, MRI Lumbar/Thoracic spine was ordered 11/8 which demonstrated large R psoas hematoma with IVC compression and probable thrombosis.   General Surgery was consulted; No acute intervention pursued.     Patient was then with acute decompensation overnight 11/9; became hypotensive, tachycardic, and apneic with Hb down to 6.7. Rapid Response was called; patient requiring intubation, blood transfusion, and transfer to medical ICU.   CTA abd/pelvis 11/9 showed hematoma with active extravasation/hemorrhage with IVC compression, hemoperitoneum.   Pt went to IR and is now s/p successful R L2 & L3 lumbar spinal artery embolization.   Patient underwent IR re-evaluation on 11/11. No arterial bleed evident on IR angiography.  CT abd/pelvis 11/17: Redemonstration of large right retroperitoneal hematoma which is mildly decreased in size compared to most recent      Hb remains stable.  Pt continues to c/o R flank pain now radiating anteriorly; ordered CT Abd/Pelvis to further assess.   Continuing to monitor.    Back pain  Reported hx chronic back pain with associated sciatic nerve pain as well, then complicated by acute retroperitoneal hematoma.  Previously consulted to anesthesia pain management.  Pain now being controlled with gabapentin, duloxetine, trazodone, diclofenac gel, lidocaine patch, hydrcodone-acetaminophen (prn), tizanidine (prn.)   Nurse reporting pt continues to request pain medication "around the clock." Of note, pt reported to night nurse that her best pain score at home is usually 7 1/2.    Restarted pt's home regimen and will likely continue at discharge.  Per PNP, pt seeing Dr. Hilton Rowland in [place] for pain management. Recommend follow up outpatient and " consideration of Addiction Psych referral.    Pt continuing to c/o R flank pain now radiating anteriorly on 12/2.  Ordered CT Abd/Pelvis to further assess before considering any regimen adjustments (Hb is stable in the setting of recent retroperitoneal hematoma.)    Substance use disorder  Reported hx of opioid addiction.  Previously discussed with son and patient - she has been to rehab in the past  Pt now on home pain regimen and bargaining for more pain medication.  Outpatient pain management follow-up.    Essential hypertension  Risk factor for stroke  Had held all BP meds due to recent hypotension, then BP continuing to be labile.  Ordered thigh high KAREEN hose.   Efforts were nade to decrease tizanidine, however pt continued to complain of pain.   Previously considering initiating low-dose Midodrine if hypotension persisted, however now started on Norvasc 5mg Daily.  Continue close monitoring.    At high risk for infection  Patient hypotensive overnight from 11/13 and into the AM 11/14. Received multiple boluses and pressure responsive.  Concern was for sepsis vs pain medication-related.   Infectious workup was ordered -- Elevated procal and lactate with interval development of atelectasis in R lower lung.  Started 7-day course of IV Cefepime and Vanc for possible hospital acquired pneumonia. BCx NGTD.    Then with liquid diarrhea overnight 11/15; WBC 14, stopped empiric abx and ordered CDiff, stool culture. PO Vanc x 10 day course for possible C.diff. IVFs given and central line removed.  WBC then improved, pt remaining afebrile.      Will continue to monitor patient, labs, vitals for any signs or symptoms concerning for infection.    Debility  2/2 stroke  PT/OT eval & treat - Dispo SNF    Cytotoxic cerebral edema  Area of cytotoxic cerebral edema identified when reviewing brain imaging in the territory of the L middle cerebral artery. There is no mass effect associated with it. We will continue to monitor the  patients clinical exam for any worsening of symptoms which may indicate expansion of the stroke or the area of the edema resulting in the clinical change. The pattern is suggestive of small vessel etiology.    Diabetes mellitus type 2 without retinopathy  Stroke risk factor, poorly controlled on glargine 17 units daily  HbA1c 8.3%  Recommend tight glucose control  Currently on SSI. S/p dextrose and insuline administration on 12/2 for hyperkalemia.  Diabetic diet    Gastroparesis  Previously on Reglan 10 mg TID before meals, reglan then decreased to 5 mg TID.   Reglan discontinued on 11/23, will monitor closely and reinitiate if necessary.          11/1/19 MRI with small vessel L MCA infarct, therapy recommending rehab  11/2 - Patient stepped down overnight. Adjusting BP regimen. Patient with continuous complaints if nausea. IV Zofran ordered with some relief. Patient has not has BM since 10/30. Ordering KUB to rule out obstruction. Neuro exam unchanged.   11/3 - NAEON. Patient reports she had no episodes of vomiting overnight. Still complaining of nausea. Mild lower abdomen tenderness on exam. Lipase ordered and WNL. Continue to monitor.   11/4 - denies nausea and vomiting. Abd tenderness remains present on palpitation. Continue to monitor. HCTZ increased yesterday. Pending rehab placement.   11/5 -  N/V x1 overnight zofran resolved, sucralfate started. Placement pending   11/6 - hypotensive, held all BP meds,  ml bolus, responded well. Increased BUN/Cr, start NS 75ml/hr.   11/7 patient complaining of sciatica pain.  Restarted home tizanidine.  Patient requesting hydrocodone but educated patient that nerve pain is not treated with opioid.  Degenerative disease seen on xray but no fracture.      11/8 Patient continues to experience back pain.  Now with leukocytosis and drop in h/h plan for MRI thoracic/lumbar spine to evaluate for epidural hematoma.    11/9: MRI Lumbar spine had demonstrated large R psoas  hematoma with possible IVC compression; General Surgery consulted. Patient was then with acute decompensation overnight; became hypotensive, tachycardic, and apneic requiring intubation, blood transfusion, and medical ICU transfer. CTA abd/pelvis showed active extravasation/hemorrhage with hemoperitoneum. Pt now s/p successful lumbar artery embolization in IR. She was extubated on arrival back to ICU and tolerated well.    11/11: Patient underwent IR re-evaluation yesterday. No arterial bleed evident on IR angiography. Patient neuro exam stable.   11/12 Stepped down from medical ICU this morning.  Patient still experiencing pain.  Anesthesia consulted for pain management.  D/C pain pump and started oral medications for muscle and nerve pain.  Will wean to home regimen. Waiting for repeat CBC, transfused one unit of blood 11/11.   Tachycardic/hypertensive will continue to monitor.  11/13: Upgraded diet per SLP recs. Pt continues to c/o R back pain, concern that tachycardia could be pain-induced; adjusted analgesic regimen further as well as antiemetic regimen with plans to eventually wean to home regimen. Replaced Phos. Dispo inpatient rehab.  11/14: patient hypotensive overnight and this morning requiring fluid boluses. Discontinued all pain medications except for celecoxib in case pain meds are contributing to hypotension. Lidocaine patch and Voltaren gel ordered as well. Infectious workup significant for minor atelectasis in R lower lung with increased procal and lactate. Patient started on IV antibiotics for community acquired pneumonia. Blood cultures pending. ICU notified of patient's hypotension  11/15: Liquid diarrhea, WBC 14, stopped empiric abx, ordered c. Diff and stool culture, start PO vanc, IVFs. Adjusted pain meds with goal to return to home regimen. Nauseous overnight, zofran given.   11/16: CT abdomen and pelvis to f/u retroperitoneal hematoma prior to starting DAPT, H/H stable. T max 99.4, tachy,  WBC 13, C. Diff and stool cultures pending, increased  ml/hr, labile BP, continue to monitor. Remove central line.   11/17: CT abdomen/pelvis again with large retroperitoneal hematoma  but decreased in size, H/H uptrend, resume DAPT today. Diarrhea becoming thicker, WBC improved, a febrile, c. Diff antigen positive, toxins negative. Pain well controlled.   11/18 PCR negative, c.diff precautions and vancomycin d/c.  Neurologically stable waiting for placement.    11/19 Patient now recommending SNF.   11/20 Neurologically stable.  Waiting for placement.  Hypotensive episodes overnight given some fluids.     11/21 Orthostatic yesterday, HR 120s to 90s standing to lying, IVFs 1L over 10 hours ordered.   11/22: Nurse encouraging pt toward aggressive PO hydration in the setting of recent orthostatic hypotension. Hb remains stable.  11/23: Reglan discontinue  Importance of oral hydration reinforced with patient. H&H remain stable. Patient accepted to [facility], awaiting insurance auth.   11/24: patient became hypotensive with SBP in 70s and responded to 500 cc bolus, continuing to encourage oral fluid intake, will consider midodrine if hypotension persists  11/25: SBP WNL within last 24 hours. Decreased Norco to home dose q6hr prn, f/u w/ Dr. Canela pain management outpatient, consider addiction psych. C/o decreased appetite, advanced soft diet to level 6 so pt can receive gumbo as she requested, boost supplements, continue to monitor.   11/26: Patient requesting appetite stimulant; encouraged her toward 100% completion of all meals. Continuing to monitor her labile BP.  11/27: asymptomatic hypotension SBP 80s recheck SBP 90s, place thigh high KAREEN hose, consider midodrine if hypotension continues.   11/29: asymptomatic hypotension continues efforts to decrease tizanidine, however pt continues to complain of pain.   11/30 decreased breath sounds in right lower lung.  CXR wnl.  Will continue to monitor discussed  breathing exercises   12/1 waiting for placement to SNF.  Sinus tachycardia due to dehydration.  Patient refusing to eat/drink and will eat and drink if she receives more pain medication.  Discussed risk of not getting enough hydration and nutrition.    12/2: Critical K level 7.0; d/c'd scheduled regimen and ordered lasix, insulin, dextrose, kayexalate, and albuterol. Continuing q4 BMP. Pt continues to c/o R flank pain now radiating anteriorly; ordered CT Abd/Pelvis to further assess (Hb stable.)    STROKE DOCUMENTATION   Acute Stroke Times   Last Known Normal Date: 10/31/19  Last Known Normal Time: 0630  Symptom Onset Date: 10/31/19  Symptom Onset Time: 0730  Stroke Team Called Date: 10/31/19  Stroke Team Called Time: 0936  Stroke Team Arrival Date: 10/31/19  Stroke Team Arrival Time: 0946  Decision to Treat Time for Alteplase: 1003    NIH Scale:  1a. Level of Consciousness: 0-->Alert, keenly responsive  1b. LOC Questions: 0-->Answers both questions correctly  1c. LOC Commands: 0-->Performs both tasks correctly  2. Best Gaze: 0-->Normal  3. Visual: 0-->No visual loss  4. Facial Palsy: 1-->Minor paralysis (flattened nasolabial fold, asymmetry on smiling)  5a. Motor Arm, Left: 0-->No drift, limb holds 90 (or 45) degrees for full 10 secs  5b. Motor Arm, Right: 1-->Drift, limb holds 90 (or 45) degrees, but drifts down before full 10 secs, does not hit bed or other support  6a. Motor Leg, Left: 0-->No drift, leg holds 30 degree position for full 5 secs  6b. Motor Leg, Right: 3-->No effort against gravity, leg falls to bed immediately  7. Limb Ataxia: 0-->Absent  8. Sensory: 0-->Normal, no sensory loss  9. Best Language: 0-->No aphasia, normal  10. Dysarthria: 1-->Mild-to-moderate dysarthria, patient slurs at least some words and, at worst, can be understood with some difficulty  11. Extinction and Inattention (formerly Neglect): 0-->No abnormality  Total (NIH Stroke Scale): 6       Modified Arv[place] Score: 0  Nabila Coma  Scale:    ABCD2 Score:    [id number] Score:   HAS -BLED Score:   ICH Score:   Hunt & Amaya Classification:      Hemorrhagic change of an Ischemic Stroke: Does this patient have an ischemic stroke with hemorrhagic changes? No     Neurologic Chief Complaint: R-sided weakness -- L MCA    Subjective:     Interval History: Patient is seen for follow-up neurological assessment and treatment recommendations: DARREN. Critical K level 7.0; d/c'd scheduled regimen and ordered lasix, insulin, dextrose, kayexalate, and albuterol. Continuing q4 BMP. Pt continues to c/o R flank pain now radiating anteriorly; ordered CT Abd/Pelvis to further assess (Hb stable.)    HPI, Past Medical, Family, and Social History remains the same as documented in the initial encounter.     Review of Systems   Constitutional: Positive for appetite change. Negative for fever.   Musculoskeletal: Positive for arthralgias, back pain and myalgias.   Neurological: Positive for facial asymmetry, speech difficulty and weakness.   Psychiatric/Behavioral: Negative for agitation and confusion.     Scheduled Meds:   amLODIPine  5 mg Oral Daily    aspirin  81 mg Oral Daily    atorvastatin  40 mg Oral QHS    clopidogrel  75 mg Oral Daily    dextrose 50%  50 g Intravenous Once    And    insulin regular  10 Units Intravenous Once    diclofenac sodium  4 g Topical (Top) Daily    dronabinol  2.5 mg Oral Daily    DULoxetine  20 mg Oral BID    furosemide  20 mg Intravenous Once    gabapentin  300 mg Oral TID    heparin (porcine)  5,000 Units Subcutaneous Q8H    lidocaine  1 patch Transdermal Q24H    polyethylene glycol  17 g Oral Daily    sodium polystyrene  30 g Oral Q4H    traZODone  25 mg Oral QHS     Continuous Infusions:    PRN Meds:dextrose 10 % in water (D10W), dextrose 50% **AND** dextrose 50% **AND** insulin regular, glucagon (human recombinant), HYDROcodone-acetaminophen, insulin aspart U-100, iohexol, melatonin, ondansetron, sodium chloride  0.9%, tiZANidine    Objective:     Vital Signs (Most Recent):  Temp: 98.4 °F (36.9 °C) (12/02/19 1600)  Pulse: 109 (12/02/19 1700)  Resp: (!) 25 (12/02/19 1700)  BP: (!) 167/76 (12/02/19 1600)  SpO2: 98 % (12/02/19 1700)  BP Location: Left arm    Vital Signs Range (Last 24H):  Temp:  [98 °F (36.7 °C)-98.4 °F (36.9 °C)]   Pulse:  []   Resp:  [16-25]   BP: (130-167)/(60-76)   SpO2:  [97 %-100 %]   BP Location: Left arm    Physical Exam   Constitutional: She is oriented to person, place, and time. She appears well-developed. No distress.   HENT:   Head: Normocephalic and atraumatic.   Eyes: Conjunctivae and EOM are normal.   Cardiovascular: Normal rate.   Pulmonary/Chest: Effort normal. No respiratory distress.   Abdominal: There is tenderness. There is guarding (Exam inconsistent- pt at times guarding and other times able to tolerate palpation.).   Musculoskeletal: She exhibits tenderness (R flank). She exhibits no edema or deformity.   Neurological: She is alert and oriented to person, place, and time.   Skin: Skin is warm and dry. She is not diaphoretic.   Psychiatric: Cognition and memory are not impaired. She is attentive.   Vitals reviewed.      Neurological Exam:   LOC: alert   Attention Span: good  Language: No aphasia  Articulation: Mild dysarthria  Orientation: Person, Time, Place  Visual Fields: Full  EOM (CN III, IV, VI): Full/intact  Facial Movement (CN VII): Lower facial weakness on the Right- mild  Motor: Arm left  Normal 5/5  Leg left  Normal 5/5  Arm right  Paresis: 4/5   Leg right Paresis: 2/5  Sensation: Intact to light touch, temp  Tone: Normal tone throughout      Laboratory:  CMP:   Recent Labs   Lab 12/02/19  1557   CALCIUM 11.2*   *   K 5.7*  5.7*   CO2 19*      BUN 18   CREATININE 1.0     CBC:   Recent Labs   Lab 12/02/19  0824   WBC 10.77   RBC 4.17   HGB 12.0   HCT 38.2   *   MCV 92   MCH 28.8   MCHC 31.4*     Lipid Panel:   No results for input(s): CHOL, LDLCALC,  HDL, TRIG in the last 168 hours.  Coagulation:   No results for input(s): PT, INR, APTT in the last 168 hours.  Platelet Aggregation Study: No results for input(s): PLTAGG, PLTAGINTERP, PLTAGREGLACO, ADPPLTAGGREG in the last 168 hours.  Hgb A1C:   No results for input(s): HGBA1C in the last 168 hours.  TSH:   No results for input(s): TSH in the last 168 hours.      Diagnostic Results      Brain Imaging      MRI Brain (11/01/19):  Acute lacunar type infarct coursing through the left posterior limb internal capsule and corona radiata.       CTH (10/31/19):  No acute intracranial pathology        Vessel Imaging      CTA Multiphase (10/31/19):  CTA head: Atherosclerotic disease of the cavernous and supraclinoid ICAs with mild narrowing.  Otherwise unremarkable CTA of the head specifically without evidence for proximal significant stenosis or occlusion.  CTA neck: Less than 50% proximal ICA stenosis by NASCET criteria.  Atherosclerotic disease with mild moderate narrowing of the right vertebral artery origin.  There is mild left V1 segment narrowing.  No significant focal vertebral artery stenosis or occlusion.  Interlobular septal thickening with tree in bud micro nodularity visualized upper lobes bilaterally which may represent inflammatory/infectious process clinical correlation and correlation with dedicated CT thorax recommended.  CT head: Bandlike region hypoattenuation left posterior limb internal capsule unchanged from prior suggestive for possible recent to subacute age infarction.  No evidence for hydrocephalus or acute intracranial hemorrhage.  Clinical correlation and further evaluation with MRI if patient compatible.        Cardiac Imaging     Echo (11/01/19)  · Increased (hyperdynamic) left ventricular systolic function. The estimated ejection fraction is 75%.  · Concentric left ventricular remodeling.  · Normal LV diastolic function.  · No wall motion abnormalities.  · Normal right ventricular systolic  function.  · Normal central venous pressure (3 mm Hg).  · Mild tricuspid regurgitation.  · The estimated PA systolic pressure is 26 mm Hg  · Echolucent structure next to the LA, likely representing hiatal hernia. Clinincal correlation is required.        Miscellaneous Imaging     CT Abdomen Pelvis WO Contrast 11/16/19  Angiography of the right renal vasculature, SMA, and multiple lumbar arteries demonstrates no evidence of active bleeding.    IR Angiogram Visceral 11/10/19  Angiography of the right renal vasculature, SMA, and multiple lumbar arteries demonstrates no evidence of active bleeding.     CTA Abdomen/Pelvis 11/10/19  Right-sided retroperitoneal hematoma with questionable foci of active extravasation within the inferior portion of the collection as described above.     IR Angiogram Visceral 11/9/19  Angiography of the lumbar arteries demonstrates active extravasation of the right L2 lumbar artery. Embolization using beads as described above.  Plan: Transfer patient to ICU.  Continued serial H and H follow-up.     CTA Abdomen/Pelvis 11/9/19  1. Large right retroperitoneal hematoma with findings concerning for active arterial extravasation/hemorrhage as detailed above.  There is associated hemorrhage extending throughout the right abdomen and pelvis/pericolic gutter with associated mass effect on the right kidney, which is anteriorly displaced.  2. Decreased opacification of the infahepatic and infrarenal IVC, possibly secondary to underlying mass effect versus partial thrombosis.  3. Nonspecific cecal and right colonic wall thickening, possibly reactive due to hemoperitoneum.  4. Additional findings as detailed above.     MRI Thoracic/Lumbar Spine W WO Contr 11/8/19  Large hematoma within the right psoas muscle.  Compression and probable thrombosis of the IVC. Consider contrast enhanced CT with delayed phase.  Multilevel degenerative changes as detailed above, more severe in the lumbar spine.  Irregularity  of the T12 through L3 endplates is most likely degenerative.      Carina Schwartz PA-C  [facility]  Department of Vascular Neurology   [facility]

## 2019-12-03 NOTE — PLAN OF CARE
Problem: Occupational Therapy Goal  Goal: Occupational Therapy Goal  Description  Updated Goals to be met by: 12/10    Patient will increase functional independence with ADLs by performing:    UE Dressing while seated EOB with Contact Guard Assistance.  LE Dressing (brief) with Minimal Assistance.  Grooming while standing with Minimal Assistance.  Toileting from bedside commode with Minimal Assistance for hygiene and clothing management.   Toilet transfer to bedside commode with Contact Guard Assistance.  B UE endurance HEP with handout and assistance as needed.  Increased B UE functional strength to WFL for ADLs.  Complete functional mobility household distance for ADL task with CGA using AD as needed.     Outcome: Ongoing, Progressing

## 2019-12-03 NOTE — NURSING
Pt was discharged with all discharge instructions as well as all personal items and is being transferred to [facility].

## 2019-12-03 NOTE — ASSESSMENT & PLAN NOTE
74 y.o. female with PMHx of HTN, HLD, and DM who presented to OSH with acute worsening of R-sided weakness after having experienced R-sided weakness x1 month. She was treated with tPA at OSH. CTA without LVO. MRI with infarct in L internal capsule and corona radiata. Etiology small vessel disease.    Continued issues with placement efforts; new SNF referrals sent and pt now pending insurance authorization at accepting facility.       Antithrombotics for secondary stroke prevention: Antiplatelets: ASA 81 mg + Plavix 75 mg -- Initially held due to retroperitoneal hematoma. Resumed DAPT on 11/17.  Statins for secondary stroke prevention and hyperlipidemia, if present: Statins: Atorvastatin- 40 mg daily,   Aggressive risk factor modification: HTN, HLD, Diet  Rehab efforts: The patient has been evaluated by a stroke team provider and the therapy needs have been fully considered based off the presenting complaints and exam findings. The following therapy evaluations are needed: PT evaluate and treat, OT evaluate and treat, SLP evaluate and treat, PM&R evaluate for appropriate placement - Dispo SNF  Diagnostics ordered/pending: None   VTE prophylaxis: Mechanical SCDs, heparin 5000 units q 8 hours   BP parameters: Infarct: Post tPA, SBP <160; Avoid hypotension

## 2019-12-03 NOTE — ASSESSMENT & PLAN NOTE
Appreciate Nutrition/RD assistance  Ordered Boost Glucose Control TID  Appetite stimulant ordered - Pt requesting increased dosing on 12/2.  Encouraged her toward 100% completion of all meals and supplementation.

## 2019-12-03 NOTE — ASSESSMENT & PLAN NOTE
"Reported hx chronic back pain with associated sciatic nerve pain as well, then complicated by acute retroperitoneal hematoma.  Previously consulted to anesthesia pain management.  Pain now being controlled with gabapentin, duloxetine, trazodone, diclofenac gel, lidocaine patch, hydrcodone-acetaminophen (prn), tizanidine (prn.)   Nurse reporting pt continues to request pain medication "around the clock." Of note, pt reported to night nurse that her best pain score at home is usually 7 1/2.    Restarted pt's home regimen and will likely continue at discharge.  Per PNP, pt seeing Dr. Hilton Rowland in [place] for pain management. Recommend follow up outpatient and consideration of Addiction Psych referral.    Pt continuing to c/o R flank pain now radiating anteriorly on 12/2.  Ordered CT Abd/Pelvis to further assess before considering any regimen adjustments (Hb is largely stable in the setting of recent retroperitoneal hematoma.)  "

## 2019-12-03 NOTE — ASSESSMENT & PLAN NOTE
Risk factor for stroke  Had held all BP meds due to recent hypotension, then BP continuing to be labile.  Ordered thigh high KAREEN hose.   Efforts were nade to decrease tizanidine, however pt continued to complain of pain.   Previously considering initiating low-dose Midodrine if hypotension persisted, however now started on Norvasc 5mg Daily.  Continue close monitoring.

## 2019-12-03 NOTE — ASSESSMENT & PLAN NOTE
Critical K level 7.0; repeat 6.8. Likely iatrogenic; d/c'd scheduled regimen.  ECG largely WNL.  Discussed with Hospital Medicine; Ordered lasix, insulin, dextrose, kayexalate, and albuterol.   Repeat K now 5.8.  Continuing q4 BMP

## 2019-12-03 NOTE — ASSESSMENT & PLAN NOTE
Stroke risk factor, poorly controlled on glargine 17 units daily  HbA1c 8.3%  Recommend tight glucose control  Currently on SSI while admitted  Diabetic diet  PCP follow-up

## 2019-12-03 NOTE — DISCHARGE SUMMARY
[facility]  Vascular Neurology  Comprehensive Stroke Center  Discharge Summary     Summary:     Admit Date: 10/31/2019 12:23 PM    Discharge Date and Time: No discharge date for patient encounter.    Attending Physician: Venkat Amaro MD     Discharge Provider: Carina Schwartz PA-C    History of Present Illness: The patient is a 73 y/o AAF with HTN and DM, Who is being evaluated by vascular neurology as an acute transfer from [facility] for left MCA syndrome.       Patient reports chronic RSW that started 1 month however she never sought medical attention. She woke up this morning at her baseline, however later around 7:30 am she developed sudden worsening in right-sided weakness. She was taken to OSH where she was evaluated by tele stroke, NIH 9 at the time, initial CTH without intracranial bleed, and she deemed a candidate for thrombolytic therapy. tPA bolus was given at 10:26 and infusion started at 10:27. She was transferred [facility] [place] [facility] and close monitoring in [facility]. She reports some improvement in RUE weakness but not the leg. She reports compliance with her medication.         Hospital Course (synopsis of major diagnoses, care, treatment, and services provided during the course of the hospital stay): Ms. Melva Barker was admitted to Vascular Neurology for acute stroke workup. Stroke etiology suspected to be small artery occlusion 2/2 small vessel disease at this time. Hospital stay was extended due to complications related to R-sided retroperitoneal hematoma requiring blood transfusions, as well lumbar artery embolization in IR with subsequent return of hemodynamic stability and evidence of progressively decreased size of hematoma on repeat imaging. Pt also with intermittent episodes of hypotension during admission requiring fluid resuscitation; BP stabilized and later required initiation of antihypertensive agent. Discharge then delayed due to several issues surrounding  coordinating placement. Patient now accepted to [facility]; family by phone amenable to plan.     Inpatient acute stroke work-up completed and patient is stable for discharge. Please see all appropriate medication changes, imaging results, and necessary follow-up below.    Stroke Etiology: Evident Small Artery Occlusion (JEANNIE)    STROKE DOCUMENTATION   Acute Stroke Times   Last Known Normal Date: 10/31/19  Last Known Normal Time: 0630  Symptom Onset Date: 10/31/19  Symptom Onset Time: 0730  Stroke Team Called Date: 10/31/19  Stroke Team Called Time: 0936  Stroke Team Arrival Date: 10/31/19  Stroke Team Arrival Time: 0946  Decision to Treat Time for Alteplase: 1003     NIH Scale:  1a. Level of Consciousness: 0-->Alert, keenly responsive  1b. LOC Questions: 0-->Answers both questions correctly  1c. LOC Commands: 0-->Performs both tasks correctly  2. Best Gaze: 0-->Normal  3. Visual: 0-->No visual loss  4. Facial Palsy: 1-->Minor paralysis (flattened nasolabial fold, asymmetry on smiling)  5a. Motor Arm, Left: 0-->No drift, limb holds 90 (or 45) degrees for full 10 secs  5b. Motor Arm, Right: 1-->Drift, limb holds 90 (or 45) degrees, but drifts down before full 10 secs, does not hit bed or other support  6a. Motor Leg, Left: 0-->No drift, leg holds 30 degree position for full 5 secs  6b. Motor Leg, Right: 2-->Some effort against gravity, leg falls to bed by 5 secs, but has some effort against gravity  7. Limb Ataxia: 0-->Absent  8. Sensory: 0-->Normal, no sensory loss  9. Best Language: 0-->No aphasia, normal  10. Dysarthria: 1-->Mild-to-moderate dysarthria, patient slurs at least some words and, at worst, can be understood with some difficulty  11. Extinction and Inattention (formerly Neglect): 0-->No abnormality  Total (NIH Stroke Scale): 5        Modified [place]ion Score: 3  [place] Coma Scale:    ABCD2 Score:    [id number] Score:   HAS -BLED Score:   ICH Score:   Hunt & Amaya Classification:       Assessment/Plan:      Diagnostic Results:    Brain Imaging      MRI Brain (11/01/19):  Acute lacunar type infarct coursing through the left posterior limb internal capsule and corona radiata.       CTH (10/31/19):  No acute intracranial pathology        Vessel Imaging      CTA Multiphase (10/31/19):  CTA head: Atherosclerotic disease of the cavernous and supraclinoid ICAs with mild narrowing.  Otherwise unremarkable CTA of the head specifically without evidence for proximal significant stenosis or occlusion.  CTA neck: Less than 50% proximal ICA stenosis by NASCET criteria.  Atherosclerotic disease with mild moderate narrowing of the right vertebral artery origin.  There is mild left V1 segment narrowing.  No significant focal vertebral artery stenosis or occlusion.  Interlobular septal thickening with tree in bud micro nodularity visualized upper lobes bilaterally which may represent inflammatory/infectious process clinical correlation and correlation with dedicated CT thorax recommended.  CT head: Bandlike region hypoattenuation left posterior limb internal capsule unchanged from prior suggestive for possible recent to subacute age infarction.  No evidence for hydrocephalus or acute intracranial hemorrhage.  Clinical correlation and further evaluation with MRI if patient compatible.        Cardiac Imaging      Echo (11/01/19)  · Increased (hyperdynamic) left ventricular systolic function. The estimated ejection fraction is 75%.  · Concentric left ventricular remodeling.  · Normal LV diastolic function.  · No wall motion abnormalities.  · Normal right ventricular systolic function.  · Normal central venous pressure (3 mm Hg).  · Mild tricuspid regurgitation.  · The estimated PA systolic pressure is 26 mm Hg  · Echolucent structure next to the LA, likely representing hiatal hernia. Clinincal correlation is required.        Miscellaneous Imaging     CT Abdomen Pelvis WO Contrast 11/16/19  Angiography of the right renal vasculature,  SMA, and multiple lumbar arteries demonstrates no evidence of active bleeding.     IR Angiogram Visceral 11/10/19  Angiography of the right renal vasculature, SMA, and multiple lumbar arteries demonstrates no evidence of active bleeding.     CTA Abdomen/Pelvis 11/10/19  Right-sided retroperitoneal hematoma with questionable foci of active extravasation within the inferior portion of the collection as described above.     IR Angiogram Visceral 11/9/19  Angiography of the lumbar arteries demonstrates active extravasation of the right L2 lumbar artery. Embolization using beads as described above.  Plan: Transfer patient to ICU.  Continued serial H and H follow-up.     CTA Abdomen/Pelvis 11/9/19  1. Large right retroperitoneal hematoma with findings concerning for active arterial extravasation/hemorrhage as detailed above.  There is associated hemorrhage extending throughout the right abdomen and pelvis/pericolic gutter with associated mass effect on the right kidney, which is anteriorly displaced.  2. Decreased opacification of the infahepatic and infrarenal IVC, possibly secondary to underlying mass effect versus partial thrombosis.  3. Nonspecific cecal and right colonic wall thickening, possibly reactive due to hemoperitoneum.  4. Additional findings as detailed above.     MRI Thoracic/Lumbar Spine W WO Contr 11/8/19  Large hematoma within the right psoas muscle.  Compression and probable thrombosis of the IVC. Consider contrast enhanced CT with delayed phase.  Multilevel degenerative changes as detailed above, more severe in the lumbar spine.  Irregularity of the T12 through L3 endplates is most likely degenerative.      Interventions: IV t-PA, Lumbar angiography s/p embolization    Complications: Retroperitoneal hematoma    Disposition: Skilled Nursing Facility - [place]    Final Active Diagnoses:    Diagnosis Date Noted POA    PRINCIPAL PROBLEM:  Thrombotic stroke involving left middle cerebral artery [I63.312]  10/31/2019 Yes    Tachycardia [R00.0] 11/16/2019 No    Back pain [M54.9] 08/20/2015 Yes    Hyperkalemia [E87.5] 12/02/2019 No    Substance use disorder [F19.90] 12/01/2019 Yes    Moderate malnutrition [E44.0] 11/19/2019 Yes    Retroperitoneal hematoma [K66.1] 11/08/2019 No    Essential hypertension [I10] 08/20/2015 Yes     Chronic    At high risk for infection [Z91.89] 11/22/2019 No    Cytotoxic cerebral edema [G93.6] 10/31/2019 Yes    Debility [R53.81] 10/31/2019 Yes    Diabetes mellitus type 2 without retinopathy [E11.9] 04/23/2015 Yes     Chronic    Gastroparesis [K31.84] 04/22/2017 Yes    Neuropathy [G62.9] 11/01/2019 Yes      Problems Resolved During this Admission:    Diagnosis Date Noted Date Resolved POA    Orthostatic hypotension [I95.1] 11/14/2019 12/02/2019 No    Acute hypoxemic respiratory failure [J96.01] 11/09/2019 11/13/2019 No    Acute metabolic encephalopathy [G93.41] 11/09/2019 11/10/2019 No    Nontraumatic hemorrhagic shock [R57.8] 11/09/2019 11/13/2019 No    FALLON (acute kidney injury) [N17.9] 11/06/2019 11/13/2019 No    Nausea & vomiting [R11.2] 11/02/2019 11/19/2019 No    Leukocytosis [D72.829] 04/22/2015 11/22/2019 Yes     * Thrombotic stroke involving left middle cerebral artery  74 y.o. female with PMHx of HTN, HLD, and DM who presented to OSH with acute worsening of R-sided weakness after having experienced R-sided weakness x1 month. She was treated with tPA at OSH. CTA without LVO. MRI with infarct in L internal capsule and corona radiata. Etiology small vessel disease.      Antithrombotics for secondary stroke prevention: Antiplatelets: ASA 81 mg + Plavix 75 mg -- Initially held due to retroperitoneal hematoma. Resumed DAPT on 11/17.  Statins for secondary stroke prevention and hyperlipidemia, if present: Statins: Atorvastatin- 40 mg daily,   Aggressive risk factor modification: HTN, HLD, Diet  Rehab efforts: [facility]  BP parameters: Infarct: Post tPA, SBP <160;  "Avoid hypotension    Tachycardia  Pt intermittently tachycardic throughout hospital stay. ECG showing sinus tachycardia.   Concern for dehydration in the setting of pt with low PO intake.   Ordered NS bolus   SNF to further monitor. Encouraged PO intake.    Back pain  Reported hx chronic back pain with associated sciatic nerve pain as well, then complicated by acute retroperitoneal hematoma.  Had previously consulted to anesthesia pain management.  Pain now being controlled with gabapentin, duloxetine, trazodone, diclofenac gel, lidocaine patch, hydrcodone-acetaminophen (prn), tizanidine (prn.)   Nurse reporting pt continues to request pain medication "around the clock." Of note, pt reported to night nurse that her best pain score at home is usually 7 1/2.    Pt continues to c/o R flank pain now radiating anteriorly.  CT Abd/Pelvis W WO demonstrated no acute process, improving retroperitoneal hematoma.    Restarted pt's home regimen and will likely continue at discharge.  Per PNP, pt seeing Dr. Hilton Rowland in [place] for pain management. Recommend follow up outpatient and possible consideration of Addiction Psych referral.    Hyperkalemia  Critical K level 7.0; repeat 6.8. Suspected iatrogenic; d/c'd scheduled regimen.  ECG largely WNL.  Discussed with Hospital Medicine; Ordered lasix, insulin, dextrose, kayexalate, and albuterol.   Repeat K 5.8. Dextrose and insulin d/c'd overnight as pt's K returned to normal range, now 4.1.  Further monitoring per SNF    Substance use disorder  Reported hx of opioid addiction.  Previously discussed with son and patient - she has been to rehab in the past  Pt now on home pain regimen and bargaining for more pain medication.  Plans for outpatient pain management follow-up.    Moderate malnutrition  Appreciate Nutrition/RD assistance  Ordered Boost Glucose Control TID  Appetite stimulant ordered - Pt requesting consider increased dosing  Encouraged her toward 100% completion " of all meals and supplementation.    Retroperitoneal hematoma  Pt had been c/o back pain, complicated by her hx of chronic back and sciatic nerve pain with use of Tizanidine and opiates at home.  Due to Hb downtrend and recent tPA administration, MRI Lumbar/Thoracic spine was ordered 11/8 which demonstrated large R psoas hematoma with IVC compression and probable thrombosis.   General Surgery was consulted; No acute intervention pursued.     Patient was then with acute decompensation overnight 11/9; became hypotensive, tachycardic, and apneic with Hb down to 6.7. Rapid Response was called; patient required intubation, blood transfusion, and transfer to medical ICU.   CTA abd/pelvis 11/9 showed hematoma with active extravasation/hemorrhage with IVC compression, hemoperitoneum.   Pt went to IR and is now s/p successful R L2 & L3 lumbar spinal artery embolization.   Patient underwent IR re-evaluation on 11/11. No arterial bleed evident on IR angiography.  CT abd/pelvis 11/17: Redemonstration of large right retroperitoneal hematoma but decreased in size. DAPT was restarted.  Hb remains stable and repeat CT Abd/Pelvis W WO 12/3 showed continued decreasing size of hematoma.   PCP follow-up    Essential hypertension  Risk factor for stroke  Had held all BP meds due to recent hypotension, then BP continuing to be labile.  Ordered thigh high KAREEN hose.   Efforts were made to decrease tizanidine, however pt continued to complain of pain.   Previously considered initiating low-dose Midodrine if hypotension persisted, however now started on Norvasc 5mg Daily and patient's BP tolerating.  PCP follow-up    At high risk for infection  Patient hypotensive overnight from 11/13 and into the AM 11/14. Received multiple boluses and pressure responsive.  Concern was for sepsis vs pain medication-related.   Infectious workup was ordered -- Elevated procal and lactate with interval development of atelectasis in R lower lung.  Started 7-day  course of IV Cefepime and Vanc for possible hospital acquired pneumonia. BCx NGTD.  Pt then with liquid diarrhea overnight 11/15; WBC 14, stopped empiric abx and ordered CDiff, stool culture. PO Vanc x 10 day course for possible C.diff. IVFs given and central line removed.  WBC then improved, pt remaining afebrile.      No recent concerns for infectious process.  Labs, vitals, and signs/symptoms concerning for infection to be further monitored at SNF.    Debility  2/2 stroke  PT/OT eval & treat - Dispo SNF    Cytotoxic cerebral edema  Area of cytotoxic cerebral edema identified when reviewing brain imaging in the territory of the L middle cerebral artery. There is no mass effect associated with it.   The patients clinical exam remained without any worsening of symptoms which may indicate expansion of the stroke or the area of the edema resulting in the clinical change. The pattern is suggestive of small vessel etiology.    Diabetes mellitus type 2 without retinopathy  Stroke risk factor, poorly controlled on glargine 17 units daily  HbA1c 8.3%  Recommend tight glucose control  Currently on SSI while admitted  Diabetic diet  PCP follow-up    Gastroparesis  Pt previously on Reglan 10 mg TID before meals, Reglan then decreased to 5 mg TID.   Reglan discontinued on 11/23  To be further monitored at SNF and can reinitiate if necessary.       Recommendations:     Post-discharge complication risks: Falls, Pneumonia, Skin breakdown, Urinary tract infections    Stroke Education given to: patient and family    Follow-up in Stroke Clinic in 4-8 weeks.     Discharge Plan:  Antithrombotics: Aspirin 81mg, Clopidogrel 75mg  Statin: Atorvastatin 40mg  Aggresive risk factor modification:  Hypertension  Diabetes  High Cholesterol  Diet  Exercise    Follow Up:  Follow-up Information     Claire Souza MD In 1 week.    Specialty:  Family Medicine  Why:  follow up with PCP within 10 days of skilled facility discharge   Contact  information:  [address] [facility]  [place] [address]  [phone]             [facility] VASCULAR NEUROLOGY In 6 weeks.    Specialty:  Vascular Neurology  Why:  Someone will call to set up hospital follow up in stroke clinic within 4- 6 weeks. If nobody calls within 1 week, call number above to schedule an appt.  Contact information:  [address]  [place] [address]  [phone]           Albaro Canela MD In 2 weeks.    Specialty:  Pain Medicine  Why:  Follow-up with pain management provider as needed.  Contact information:  [address] [facility]  [place] [address]  [phone]                   Patient Instructions:   No discharge procedures on file.    Medications:  Reconciled Home Medications:      Medication List      START taking these medications    amLODIPine 5 MG tablet  Commonly known as:  NORVASC  Take 1 tablet (5 mg total) by mouth once daily.  Start taking on:  December 4, 2019     aspirin 81 MG EC tablet  Commonly known as:  ECOTRIN  Take 1 tablet (81 mg total) by mouth once daily.     atorvastatin 40 MG tablet  Commonly known as:  LIPITOR  Take 1 tablet (40 mg total) by mouth every evening.     clopidogrel 75 mg tablet  Commonly known as:  PLAVIX  Take 1 tablet (75 mg total) by mouth once daily.     diclofenac sodium 1 % Gel  Commonly known as:  VOLTAREN  Apply 4 g topically once daily.     dronabinol 2.5 MG capsule  Commonly known as:  MARINOL  Take 1 capsule (2.5 mg total) by mouth once daily.  Start taking on:  December 4, 2019     DULoxetine 20 MG capsule  Commonly known as:  CYMBALTA  Take 1 capsule (20 mg total) by mouth 2 (two) times daily.     gabapentin 300 MG capsule  Commonly known as:  NEURONTIN  Take 1 capsule (300 mg total) by mouth 3 (three) times daily.  Replaces:  gabapentin 600 MG tablet     insulin aspart U-100 100 unit/mL (3 mL) Inpn pen  Commonly known as:  NovoLOG  Inject 0-5 Units into the skin every 6 (six) hours as needed (Hyperglycemia).  Replaces:   insulin aspart U-100 100 unit/mL injection     lidocaine 5 %  Commonly known as:  LIDODERM  Place 1 patch onto the skin once daily. Remove & Discard patch within 12 hours or as directed by MD     melatonin  Commonly known as:  MELATIN  Take 2 tablets (6 mg total) by mouth nightly as needed for Insomnia.     ondansetron 4 mg/2 mL Soln  Inject 4 mg into the vein every 6 (six) hours as needed.     polyethylene glycol 17 gram Pwpk  Commonly known as:  GLYCOLAX  Take 17 g by mouth once daily.     senna-docusate 8.6-50 mg 8.6-50 mg per tablet  Commonly known as:  PERICOLACE  Take 1 tablet by mouth once daily.     traZODone 50 MG tablet  Commonly known as:  DESYREL  Take 0.5 tablets (25 mg total) by mouth every evening.        CHANGE how you take these medications    HYDROcodone-acetaminophen  mg per tablet  Commonly known as:  NORCO  Take 1 tablet by mouth every 8 (eight) hours as needed.  What changed:    · when to take this  · reasons to take this     tiZANidine 2 MG tablet  Commonly known as:  ZANAFLEX  Take 1 tablet (2 mg total) by mouth every 8 (eight) hours as needed.  What changed:    · medication strength  · how much to take  · when to take this        CONTINUE taking these medications    True Metrix Glucose Meter Misc  Generic drug:  blood-glucose meter     True Metrix Glucose Test Strip Strp  Generic drug:  blood sugar diagnostic     TRUEplus Lancets 33 gauge Misc  Generic drug:  lancets     Vitamin D2 50,000 unit Cap  Generic drug:  ergocalciferol  Take 50,000 Units by mouth every 7 days.        STOP taking these medications    benazepril 40 MG tablet  Commonly known as:  LOTENSIN     bisoprolol-hydrochlorothiazide 10-6.25 mg per tablet  Commonly known as:  ZIAC     fluconazole 150 MG Tab  Commonly known as:  DIFLUCAN     gabapentin 600 MG tablet  Commonly known as:  NEURONTIN  Replaced by:  gabapentin 300 MG capsule     insulin aspart U-100 100 unit/mL injection  Commonly known as:  NOVOLOG  Replaced by:   insulin aspart U-100 100 unit/mL (3 mL) Inpn pen     mupirocin 2 % ointment  Commonly known as:  BACTROBAN     pantoprazole 40 MG tablet  Commonly known as:  PROTONIX     promethazine 25 MG suppository  Commonly known as:  PHENERGAN     COCO Schwartz PA-C  [facility]  Department of Vascular Neurology   [facility]

## 2019-12-03 NOTE — ASSESSMENT & PLAN NOTE
Area of cytotoxic cerebral edema identified when reviewing brain imaging in the territory of the L middle cerebral artery. There is no mass effect associated with it.   The patients clinical exam remained without any worsening of symptoms which may indicate expansion of the stroke or the area of the edema resulting in the clinical change. The pattern is suggestive of small vessel etiology.

## 2019-12-03 NOTE — PLAN OF CARE
Problem: Physical Therapy Goal  Goal: Physical Therapy Goal  Description  Goals to be met by: 12/10/19    Patient will increase functional independence with mobility by performin. Supine to sit with [place].-met 19  Revised: stand by assistance   2. Sit to supine with contact guard assist. -not met    3. Sit to stand transfer with contact guard assistance using least restrictive device. -not met     4. Gait  X 25 feet with Minimal Assistance using least restrictive device. -not met     5. Stand for 1 minutes with Contact Guard Assistance using  least restrictive device or no AD. -not met     6. Lower extremity exercise program x 20 reps per handout, with independence to improve strength and activity tolerance.  -not met                Outcome: Ongoing, Progressing   Continue with plan of care.   Hortencia Umaña, PT  12/3/2019

## 2019-12-03 NOTE — PLAN OF CARE
[facility]     Department of Hospital Medicine     [address] [place] [address]     [phone] after hours  [phone] fax       NURSING HOME ORDERS    12/03/2019    Admit to Nursing Home:  Skilled Bed                                                 Diagnoses:  Active Hospital Problems    Diagnosis  POA    *Thrombotic stroke involving left middle cerebral artery [I63.312]  Yes     Priority: 1 - High    Tachycardia [R00.0]  No     Priority: 2     Back pain [M54.9]  Yes     Priority: 2     Hyperkalemia [E87.5]  No     Priority: 3     Substance use disorder [F19.90]  Yes     Priority: 3     Moderate malnutrition [E44.0]  Yes     Priority: 3      Assessment and Plan    Nutrition Problem:  Moderate Protein-Calorie Malnutrition  Malnutrition in the context of Acute Illness/Injury    Related to (etiology):  Nausea, poor appetite    Signs and Symptoms (as evidenced by):  Energy Intake: <50% of estimated energy requirement for 7+ days  Body Fat Depletion: mild depletion of triceps, orbitals   Muscle Mass Depletion: moderate depletion of hands, lower extremities    Interventions/Recommendations (treatment strategy):  Collaboration of care to providers.  Commercial beverage: Boost Glucose Control TID    Nutrition Diagnosis Status:  New     Recommendations    1. Continue mechanical soft diet with Boost Glucose Control TID.  Goals: Pt eats >50% of meals, Pt drinks >50% of Boost by RD follow-up  Nutrition Goal Status: goal met  Communication of RD Recs: other (comment)(POC)      Retroperitoneal hematoma [K66.1]  No     Priority: 3     Essential hypertension [I10]  Yes     Priority: 3      Chronic    At high risk for infection [Z91.89]  No     Priority: 4     Cytotoxic cerebral edema [G93.6]  Yes     Priority: 4     Debility [R53.81]  Yes     Priority: 4     Diabetes mellitus type 2 without retinopathy [E11.9]  Yes     Priority: 4      Chronic    Gastroparesis  [K31.84]  Yes     Priority: 5     Neuropathy [G62.9]  Yes      Resolved Hospital Problems    Diagnosis Date Resolved POA    Orthostatic hypotension [I95.1] 12/02/2019 No    Acute hypoxemic respiratory failure [J96.01] 11/13/2019 No    Acute metabolic encephalopathy [G93.41] 11/10/2019 No    Nontraumatic hemorrhagic shock [R57.8] 11/13/2019 No    FALLON (acute kidney injury) [N17.9] 11/13/2019 No    Nausea & vomiting [R11.2] 11/19/2019 No    Leukocytosis [D72.829] 11/22/2019 Yes       Patient is homebound due to:  Thrombotic stroke involving left middle cerebral artery    Allergies:  Review of patient's allergies indicates:   Allergen Reactions    Morphine Other (See Comments)     headache    Latex, natural rubber Rash       Vitals:      Every shift     Diet: Diabetic dental soft, Thin liquids    Supplement:  1 can every three times a day with meals                         Type:  Boost Glucose Control    Acitivities:  As tolerated       LABS:  Per facility protocol    Nursing Precautions:   - Aspiration precautions:             - Total assistance with meals            -  Upright 90 degrees befor during and after meals             -  Suction at bedside          - Fall precautions per nursing home protocol   - Seizure precaution per halfway protocol   - Decubitus precautions:        -  for positioning   - Pressure reducing foam mattress   - Turn patient every two hours. Use wedge pillows to anchor patient    CONSULTS:      Physical Therapy to evaluate and treat     Occupational Therapy to evaluate and treat     Speech Therapy  to evaluate and treat     Nutrition to evaluate and recommend diet     Psychiatry to evaluate and follow patients for delirium    MISCELLANEOUS CARE:      Routine Skin for Bedridden Patients:  Apply moisture barrier cream to all    skin folds and wet areas in perineal area daily and after baths and                           all bowel movements.                 DIABETES CARE:        Check blood sugar:      Fingerstick blood sugar AC and HS   Fingerstick blood sugar every 6 hours if unable to eat      Report CBG < 60 or > 400 to physician.                                          Insulin Sliding Scale          Glucose  Novolog Insulin Subcutaneous        0 - 60   Orange juice or glucose tablet, hold insulin      No insulin   201-250  2 units   251-300  4 units   301-350  6 units   351-400  8 units   >400   10 units then call physician      Medications: Discontinue all previous medication orders, if any. See new list below.   [facility] Medication Instructions DAVID:[id number]    Printed on:12/03/19 7200   Medication Information                      amLODIPine (NORVASC) 5 MG tablet  Take 1 tablet (5 mg total) by mouth once daily.             aspirin (ECOTRIN) 81 MG EC tablet  Take 1 tablet (81 mg total) by mouth once daily.             atorvastatin (LIPITOR) 40 MG tablet  Take 1 tablet (40 mg total) by mouth every evening.             clopidogrel (PLAVIX) 75 mg tablet  Take 1 tablet (75 mg total) by mouth once daily.             diclofenac sodium (VOLTAREN) 1 % Gel  Apply 4 g topically once daily.             dronabinol (MARINOL) 2.5 MG capsule  Take 1 capsule (2.5 mg total) by mouth once daily.             DULoxetine (CYMBALTA) 20 MG capsule  Take 1 capsule (20 mg total) by mouth 2 (two) times daily.             ergocalciferol (VITAMIN D2) 50,000 unit Cap  Take 50,000 Units by mouth every 7 days.             gabapentin (NEURONTIN) 300 MG capsule  Take 1 capsule (300 mg total) by mouth 3 (three) times daily.             HYDROcodone-acetaminophen (NORCO)  mg per tablet  Take 1 tablet by mouth every 8 (eight) hours as needed.             insulin aspart U-100 (NOVOLOG) 100 unit/mL (3 mL) InPn pen  Inject 0-5 Units into the skin every 6 (six) hours as needed (Hyperglycemia).             lidocaine (LIDODERM) 5 %  Place 1 patch onto the skin once daily. Remove & Discard  patch within 12 hours or as directed by MD             melatonin (MELATIN)  Take 2 tablets (6 mg total) by mouth nightly as needed for Insomnia.             ondansetron 4 mg/2 mL Soln  Inject 4 mg into the vein every 6 (six) hours as needed.             polyethylene glycol (GLYCOLAX) 17 gram PwPk  Take 17 g by mouth once daily.             senna-docusate 8.6-50 mg (PERICOLACE) 8.6-50 mg per tablet  Take 1 tablet by mouth once daily.             tiZANidine (ZANAFLEX) 2 MG tablet  Take 1 tablet (2 mg total) by mouth every 8 (eight) hours as needed.             traZODone (DESYREL) 50 MG tablet  Take 0.5 tablets (25 mg total) by mouth every evening.             TRUE METRIX GLUCOSE METER Misc               TRUE METRIX GLUCOSE TEST STRIP Strp               TRUEPLUS LANCETS 33 gauge Misc                     Follow-up With    Details  Why  Contact Info   Claire Souza MD  In 1 week  follow up with PCP within 10 days of hospital discharge   [address] [facility]  [place] [address]  [phone]     [facility]  In 6 weeks  Someone will call to set up hospital follow up in stroke clinic within 4- 6 weeks. If nobody calls within 1 week, call number above to schedule an appt.    [address] [facility] [address]  [phone]   Albaro Canela MD  In 2 weeks  Follow-up with pain management provider as needed.  [address] [facility]  [place] [address]  [phone]                   _________________________________  Carina Schwartz PA-C  12/03/2019

## 2019-12-03 NOTE — ASSESSMENT & PLAN NOTE
Pt intermittently tachycardic throughout hospital stay. ECG showing sinus tachycardia.   Concern for dehydration in the setting of pt with low PO intake.   Ordered NS bolus   SNF to further monitor. Encouraged PO intake.

## 2019-12-03 NOTE — ASSESSMENT & PLAN NOTE
74 y.o. female with PMHx of HTN, HLD, and DM who presented to OSH with acute worsening of R-sided weakness after having experienced R-sided weakness x1 month. She was treated with tPA at OSH. CTA without LVO. MRI with infarct in L internal capsule and corona radiata. Etiology small vessel disease.      Antithrombotics for secondary stroke prevention: Antiplatelets: ASA 81 mg + Plavix 75 mg -- Initially held due to retroperitoneal hematoma. Resumed DAPT on 11/17.  Statins for secondary stroke prevention and hyperlipidemia, if present: Statins: Atorvastatin- 40 mg daily,   Aggressive risk factor modification: HTN, HLD, Diet  Rehab efforts: [facility]  BP parameters: Infarct: Post tPA, SBP <160; Avoid hypotension

## 2019-12-03 NOTE — ASSESSMENT & PLAN NOTE
Risk factor for stroke  Had held all BP meds due to recent hypotension, then BP continuing to be labile.  Ordered thigh high KAREEN hose.   Efforts were nade to decrease tizanidine, however pt continued to complain of pain.   Previously considering initiating low-dose Midodrine if hypotension persisted, however now started on Norvasc 5mg Daily.  Continue close monitoring; pt able to tolerate thus far.

## 2019-12-03 NOTE — ASSESSMENT & PLAN NOTE
Patient hypotensive overnight from 11/13 and into the AM 11/14. Received multiple boluses and pressure responsive.  Concern was for sepsis vs pain medication-related.   Infectious workup was ordered -- Elevated procal and lactate with interval development of atelectasis in R lower lung.  Started 7-day course of IV Cefepime and Vanc for possible hospital acquired pneumonia. BCx NGTD.  Pt then with liquid diarrhea overnight 11/15; WBC 14, stopped empiric abx and ordered CDiff, stool culture. PO Vanc x 10 day course for possible C.diff. IVFs given and central line removed.  WBC then improved, pt remaining afebrile.      No recent concerns for infectious process.  Labs, vitals, and signs/symptoms concerning for infection to be further monitored at [facility].

## 2019-12-03 NOTE — NURSING
Pt was discharged with all discharge instructions as well as all personal items and is being transferred to [facility].

## 2019-12-03 NOTE — ASSESSMENT & PLAN NOTE
Appreciate Nutrition/RD assistance  Ordered Boost Glucose Control TID  Appetite stimulant ordered - Pt requesting consider increased dosing  Encouraged her toward 100% completion of all meals and supplementation.

## 2019-12-03 NOTE — ASSESSMENT & PLAN NOTE
"Reported hx chronic back pain with associated sciatic nerve pain as well, then complicated by acute retroperitoneal hematoma.  Had previously consulted to anesthesia pain management.  Pain now being controlled with gabapentin, duloxetine, trazodone, diclofenac gel, lidocaine patch, hydrcodone-acetaminophen (prn), tizanidine (prn.)   Nurse reporting pt continues to request pain medication "around the clock." Of note, pt reported to night nurse that her best pain score at home is usually 7 1/2.    Pt continues to c/o R flank pain now radiating anteriorly.  CT Abd/Pelvis W WO demonstrated no acute process, improving retroperitoneal hematoma.    Restarted pt's home regimen and will likely continue at discharge.  Per PNP, pt seeing Dr. Hilton Rowland in [place] for pain management. Recommend follow up outpatient and possible consideration of Addiction Psych referral.  "

## 2019-12-03 NOTE — ASSESSMENT & PLAN NOTE
Risk factor for stroke  Had held all BP meds due to recent hypotension, then BP continuing to be labile.  Ordered thigh high KAREEN hose.   Efforts were made to decrease tizanidine, however pt continued to complain of pain.   Previously considered initiating low-dose Midodrine if hypotension persisted, however now started on Norvasc 5mg Daily and patient's BP tolerating.  PCP follow-up

## 2019-12-03 NOTE — PLAN OF CARE
12/03/19 1620   Final Note   Assessment Type Final Discharge Note   Anticipated Discharge Disposition SNF   What phone number can be called within the next 1-3 days to see how you are doing after discharge? [phone]   Hospital Follow Up  Appt(s) scheduled? Yes   Discharge plans and expectations educations in teach back method with documentation complete? Yes   Right Care Referral Info   Post Acute Recommendation Home-care   Referral Type SNF   Facility Name [address] [address]

## 2019-12-19 ENCOUNTER — HOSPITAL ENCOUNTER (OUTPATIENT)
Facility: HOSPITAL | Age: 74
Discharge: NURSING, REHAB OR LONG-TERM CARE | End: 2019-12-24
Attending: EMERGENCY MEDICINE | Admitting: INTERNAL MEDICINE
Payer: MEDICARE

## 2019-12-19 DIAGNOSIS — R65.10 SIRS (SYSTEMIC INFLAMMATORY RESPONSE SYNDROME): ICD-10-CM

## 2019-12-19 DIAGNOSIS — R00.0 TACHYCARDIA: ICD-10-CM

## 2019-12-19 DIAGNOSIS — R53.81 DEBILITY: ICD-10-CM

## 2019-12-19 DIAGNOSIS — R10.84 GENERALIZED ABDOMINAL PAIN: Primary | ICD-10-CM

## 2019-12-19 PROCEDURE — P9612 CATHETERIZE FOR URINE SPEC: HCPCS

## 2019-12-19 PROCEDURE — 93005 ELECTROCARDIOGRAM TRACING: CPT

## 2019-12-19 PROCEDURE — 99291 CRITICAL CARE FIRST HOUR: CPT

## 2019-12-19 PROCEDURE — 93010 EKG 12-LEAD: ICD-10-PCS | Mod: ,,, | Performed by: INTERNAL MEDICINE

## 2019-12-19 PROCEDURE — 96361 HYDRATE IV INFUSION ADD-ON: CPT

## 2019-12-19 PROCEDURE — 96374 THER/PROPH/DIAG INJ IV PUSH: CPT

## 2019-12-19 PROCEDURE — 93010 ELECTROCARDIOGRAM REPORT: CPT | Mod: ,,, | Performed by: INTERNAL MEDICINE

## 2019-12-19 RX ORDER — ONDANSETRON 2 MG/ML
4 INJECTION INTRAMUSCULAR; INTRAVENOUS
Status: COMPLETED | OUTPATIENT
Start: 2019-12-20 | End: 2019-12-20

## 2019-12-20 PROBLEM — D72.829 LEUKOCYTOSIS: Status: ACTIVE | Noted: 2019-12-20

## 2019-12-20 PROBLEM — R10.84 GENERALIZED ABDOMINAL PAIN: Status: ACTIVE | Noted: 2019-12-20

## 2019-12-20 PROBLEM — I10 UNCONTROLLED HYPERTENSION: Chronic | Status: ACTIVE | Noted: 2017-04-23

## 2019-12-20 PROBLEM — N25.89: Status: ACTIVE | Noted: 2019-12-20

## 2019-12-20 PROBLEM — E44.0 MODERATE MALNUTRITION: Chronic | Status: ACTIVE | Noted: 2019-11-19

## 2019-12-20 PROBLEM — I63.312 THROMBOTIC STROKE INVOLVING LEFT MIDDLE CEREBRAL ARTERY: Chronic | Status: ACTIVE | Noted: 2019-10-31

## 2019-12-20 PROBLEM — R65.10 SIRS (SYSTEMIC INFLAMMATORY RESPONSE SYNDROME): Status: ACTIVE | Noted: 2019-12-20

## 2019-12-20 PROBLEM — E43 SEVERE MALNUTRITION: Status: ACTIVE | Noted: 2019-12-20

## 2019-12-20 PROBLEM — E87.8 LOW BICARBONATE LEVEL: Status: ACTIVE | Noted: 2019-12-20

## 2019-12-20 LAB
ALBUMIN SERPL BCP-MCNC: 3.3 G/DL (ref 3.5–5.2)
ALBUMIN SERPL BCP-MCNC: 3.3 G/DL (ref 3.5–5.2)
ALLENS TEST: ABNORMAL
ALP SERPL-CCNC: 75 U/L (ref 55–135)
ALP SERPL-CCNC: 75 U/L (ref 55–135)
ALT SERPL W/O P-5'-P-CCNC: 8 U/L (ref 10–44)
ALT SERPL W/O P-5'-P-CCNC: 8 U/L (ref 10–44)
ANION GAP SERPL CALC-SCNC: 16 MMOL/L (ref 8–16)
ANION GAP SERPL CALC-SCNC: 17 MMOL/L (ref 8–16)
ANION GAP SERPL CALC-SCNC: 17 MMOL/L (ref 8–16)
APTT BLDCRRT: 25.1 SEC (ref 21–32)
AST SERPL-CCNC: 26 U/L (ref 10–40)
AST SERPL-CCNC: 26 U/L (ref 10–40)
B-OH-BUTYR BLD STRIP-SCNC: 3.6 MMOL/L (ref 0–0.5)
BACTERIA #/AREA URNS HPF: ABNORMAL /HPF
BASOPHILS # BLD AUTO: 0.05 K/UL (ref 0–0.2)
BASOPHILS NFR BLD: 0.3 % (ref 0–1.9)
BILIRUB SERPL-MCNC: 1.2 MG/DL (ref 0.1–1)
BILIRUB SERPL-MCNC: 1.2 MG/DL (ref 0.1–1)
BILIRUB UR QL STRIP: ABNORMAL
BNP SERPL-MCNC: 49 PG/ML (ref 0–99)
BUN SERPL-MCNC: 8 MG/DL (ref 8–23)
BUN SERPL-MCNC: 8 MG/DL (ref 8–23)
BUN SERPL-MCNC: 9 MG/DL (ref 8–23)
CALCIUM SERPL-MCNC: 9.6 MG/DL (ref 8.7–10.5)
CALCIUM SERPL-MCNC: 9.6 MG/DL (ref 8.7–10.5)
CALCIUM SERPL-MCNC: 9.8 MG/DL (ref 8.7–10.5)
CHLORIDE SERPL-SCNC: 108 MMOL/L (ref 95–110)
CHLORIDE SERPL-SCNC: 108 MMOL/L (ref 95–110)
CHLORIDE SERPL-SCNC: 109 MMOL/L (ref 95–110)
CLARITY UR: CLEAR
CO2 SERPL-SCNC: 14 MMOL/L (ref 23–29)
CO2 SERPL-SCNC: 14 MMOL/L (ref 23–29)
CO2 SERPL-SCNC: 15 MMOL/L (ref 23–29)
COLOR UR: YELLOW
CREAT SERPL-MCNC: 0.8 MG/DL (ref 0.5–1.4)
DELSYS: ABNORMAL
DIFFERENTIAL METHOD: ABNORMAL
EOSINOPHIL # BLD AUTO: 0.1 K/UL (ref 0–0.5)
EOSINOPHIL NFR BLD: 0.3 % (ref 0–8)
ERYTHROCYTE [DISTWIDTH] IN BLOOD BY AUTOMATED COUNT: 15.8 % (ref 11.5–14.5)
ERYTHROCYTE [DISTWIDTH] IN BLOOD BY AUTOMATED COUNT: 15.9 % (ref 11.5–14.5)
EST. GFR  (AFRICAN AMERICAN): >60 ML/MIN/1.73 M^2
EST. GFR  (NON AFRICAN AMERICAN): >60 ML/MIN/1.73 M^2
GLUCOSE SERPL-MCNC: 127 MG/DL (ref 70–110)
GLUCOSE SERPL-MCNC: 127 MG/DL (ref 70–110)
GLUCOSE SERPL-MCNC: 132 MG/DL (ref 70–110)
GLUCOSE UR QL STRIP: NEGATIVE
HCO3 UR-SCNC: 16.9 MMOL/L (ref 24–28)
HCT VFR BLD AUTO: 31.3 % (ref 37–48.5)
HCT VFR BLD AUTO: 39.7 % (ref 37–48.5)
HCV AB SERPL QL IA: NEGATIVE
HGB BLD-MCNC: 10.2 G/DL (ref 12–16)
HGB BLD-MCNC: 13 G/DL (ref 12–16)
HGB UR QL STRIP: ABNORMAL
HYALINE CASTS #/AREA URNS LPF: 3 /LPF
IMM GRANULOCYTES # BLD AUTO: 0.12 K/UL (ref 0–0.04)
IMM GRANULOCYTES NFR BLD AUTO: 0.8 % (ref 0–0.5)
INFLUENZA A, MOLECULAR: NEGATIVE
INFLUENZA B, MOLECULAR: NEGATIVE
INR PPP: 1.1 (ref 0.8–1.2)
KETONES UR QL STRIP: ABNORMAL
LACTATE SERPL-SCNC: 1.7 MMOL/L (ref 0.5–2.2)
LEUKOCYTE ESTERASE UR QL STRIP: NEGATIVE
LIPASE SERPL-CCNC: 13 U/L (ref 4–60)
LYMPHOCYTES # BLD AUTO: 1.8 K/UL (ref 1–4.8)
LYMPHOCYTES NFR BLD: 11.8 % (ref 18–48)
MAGNESIUM SERPL-MCNC: 1.8 MG/DL (ref 1.6–2.6)
MCH RBC QN AUTO: 27.5 PG (ref 27–31)
MCH RBC QN AUTO: 27.9 PG (ref 27–31)
MCHC RBC AUTO-ENTMCNC: 32.6 G/DL (ref 32–36)
MCHC RBC AUTO-ENTMCNC: 32.7 G/DL (ref 32–36)
MCV RBC AUTO: 84 FL (ref 82–98)
MCV RBC AUTO: 86 FL (ref 82–98)
MICROSCOPIC COMMENT: ABNORMAL
MONOCYTES # BLD AUTO: 0.8 K/UL (ref 0.3–1)
MONOCYTES NFR BLD: 5.3 % (ref 4–15)
NEUTROPHILS # BLD AUTO: 12.5 K/UL (ref 1.8–7.7)
NEUTROPHILS NFR BLD: 81.5 % (ref 38–73)
NITRITE UR QL STRIP: NEGATIVE
NRBC BLD-RTO: 0 /100 WBC
PCO2 BLDA: 25.5 MMHG (ref 35–45)
PH SMN: 7.43 [PH] (ref 7.35–7.45)
PH UR STRIP: 6 [PH] (ref 5–8)
PHOSPHATE SERPL-MCNC: 2.8 MG/DL (ref 2.7–4.5)
PLATELET # BLD AUTO: 328 K/UL (ref 150–350)
PLATELET # BLD AUTO: 376 K/UL (ref 150–350)
PMV BLD AUTO: 10 FL (ref 9.2–12.9)
PMV BLD AUTO: 10.4 FL (ref 9.2–12.9)
PO2 BLDA: 44 MMHG (ref 40–60)
POC BE: -7 MMOL/L
POC SATURATED O2: 82 % (ref 95–100)
POCT GLUCOSE: 109 MG/DL (ref 70–110)
POCT GLUCOSE: 114 MG/DL (ref 70–110)
POCT GLUCOSE: 128 MG/DL (ref 70–110)
POTASSIUM SERPL-SCNC: 3.7 MMOL/L (ref 3.5–5.1)
POTASSIUM SERPL-SCNC: 4 MMOL/L (ref 3.5–5.1)
POTASSIUM SERPL-SCNC: 4 MMOL/L (ref 3.5–5.1)
PROCALCITONIN SERPL IA-MCNC: 0.13 NG/ML
PROT SERPL-MCNC: 8.1 G/DL (ref 6–8.4)
PROT SERPL-MCNC: 8.1 G/DL (ref 6–8.4)
PROT UR QL STRIP: ABNORMAL
PROTHROMBIN TIME: 11.4 SEC (ref 9–12.5)
RBC # BLD AUTO: 3.65 M/UL (ref 4–5.4)
RBC # BLD AUTO: 4.72 M/UL (ref 4–5.4)
RBC #/AREA URNS HPF: 0 /HPF (ref 0–4)
SAMPLE: ABNORMAL
SITE: ABNORMAL
SODIUM SERPL-SCNC: 139 MMOL/L (ref 136–145)
SODIUM SERPL-SCNC: 139 MMOL/L (ref 136–145)
SODIUM SERPL-SCNC: 140 MMOL/L (ref 136–145)
SP GR UR STRIP: 1.02 (ref 1–1.03)
SPECIMEN SOURCE: NORMAL
TROPONIN I SERPL DL<=0.01 NG/ML-MCNC: 0.01 NG/ML (ref 0–0.03)
URN SPEC COLLECT METH UR: ABNORMAL
UROBILINOGEN UR STRIP-ACNC: 1 EU/DL
WBC # BLD AUTO: 15.36 K/UL (ref 3.9–12.7)
WBC # BLD AUTO: 15.4 K/UL (ref 3.9–12.7)
WBC #/AREA URNS HPF: 3 /HPF (ref 0–5)

## 2019-12-20 PROCEDURE — 83690 ASSAY OF LIPASE: CPT

## 2019-12-20 PROCEDURE — 80048 BASIC METABOLIC PNL TOTAL CA: CPT

## 2019-12-20 PROCEDURE — G0378 HOSPITAL OBSERVATION PER HR: HCPCS

## 2019-12-20 PROCEDURE — 83880 ASSAY OF NATRIURETIC PEPTIDE: CPT

## 2019-12-20 PROCEDURE — 93010 EKG 12-LEAD: ICD-10-PCS | Mod: ,,, | Performed by: INTERNAL MEDICINE

## 2019-12-20 PROCEDURE — 85730 THROMBOPLASTIN TIME PARTIAL: CPT

## 2019-12-20 PROCEDURE — 63600175 PHARM REV CODE 636 W HCPCS: Performed by: INTERNAL MEDICINE

## 2019-12-20 PROCEDURE — 87040 BLOOD CULTURE FOR BACTERIA: CPT | Mod: 59

## 2019-12-20 PROCEDURE — 96375 TX/PRO/DX INJ NEW DRUG ADDON: CPT | Mod: 59 | Performed by: EMERGENCY MEDICINE

## 2019-12-20 PROCEDURE — 93005 ELECTROCARDIOGRAM TRACING: CPT

## 2019-12-20 PROCEDURE — 87502 INFLUENZA DNA AMP PROBE: CPT

## 2019-12-20 PROCEDURE — 96361 HYDRATE IV INFUSION ADD-ON: CPT | Performed by: EMERGENCY MEDICINE

## 2019-12-20 PROCEDURE — 93010 ELECTROCARDIOGRAM REPORT: CPT | Mod: ,,, | Performed by: INTERNAL MEDICINE

## 2019-12-20 PROCEDURE — 36415 COLL VENOUS BLD VENIPUNCTURE: CPT

## 2019-12-20 PROCEDURE — 82803 BLOOD GASES ANY COMBINATION: CPT

## 2019-12-20 PROCEDURE — 84100 ASSAY OF PHOSPHORUS: CPT

## 2019-12-20 PROCEDURE — 82010 KETONE BODYS QUAN: CPT

## 2019-12-20 PROCEDURE — 84484 ASSAY OF TROPONIN QUANT: CPT

## 2019-12-20 PROCEDURE — 97802 MEDICAL NUTRITION INDIV IN: CPT

## 2019-12-20 PROCEDURE — 99900035 HC TECH TIME PER 15 MIN (STAT)

## 2019-12-20 PROCEDURE — 96372 THER/PROPH/DIAG INJ SC/IM: CPT | Mod: 59 | Performed by: EMERGENCY MEDICINE

## 2019-12-20 PROCEDURE — 25000003 PHARM REV CODE 250: Performed by: INTERNAL MEDICINE

## 2019-12-20 PROCEDURE — 84145 PROCALCITONIN (PCT): CPT

## 2019-12-20 PROCEDURE — 81000 URINALYSIS NONAUTO W/SCOPE: CPT

## 2019-12-20 PROCEDURE — 85610 PROTHROMBIN TIME: CPT

## 2019-12-20 PROCEDURE — 96376 TX/PRO/DX INJ SAME DRUG ADON: CPT | Performed by: EMERGENCY MEDICINE

## 2019-12-20 PROCEDURE — 83735 ASSAY OF MAGNESIUM: CPT

## 2019-12-20 PROCEDURE — 63600175 PHARM REV CODE 636 W HCPCS: Performed by: NURSE PRACTITIONER

## 2019-12-20 PROCEDURE — 25000003 PHARM REV CODE 250: Performed by: EMERGENCY MEDICINE

## 2019-12-20 PROCEDURE — 25000003 PHARM REV CODE 250: Performed by: NURSE PRACTITIONER

## 2019-12-20 PROCEDURE — 83605 ASSAY OF LACTIC ACID: CPT

## 2019-12-20 PROCEDURE — 85027 COMPLETE CBC AUTOMATED: CPT | Mod: 59

## 2019-12-20 PROCEDURE — 85025 COMPLETE CBC W/AUTO DIFF WBC: CPT

## 2019-12-20 PROCEDURE — 80053 COMPREHEN METABOLIC PANEL: CPT

## 2019-12-20 PROCEDURE — 63600175 PHARM REV CODE 636 W HCPCS: Performed by: EMERGENCY MEDICINE

## 2019-12-20 PROCEDURE — 86803 HEPATITIS C AB TEST: CPT

## 2019-12-20 RX ORDER — MORPHINE SULFATE 2 MG/ML
1 INJECTION, SOLUTION INTRAMUSCULAR; INTRAVENOUS ONCE
Status: DISCONTINUED | OUTPATIENT
Start: 2019-12-20 | End: 2019-12-20

## 2019-12-20 RX ORDER — LORAZEPAM 1 MG/1
1 TABLET ORAL
Status: COMPLETED | OUTPATIENT
Start: 2019-12-20 | End: 2019-12-20

## 2019-12-20 RX ORDER — AMLODIPINE BESYLATE 5 MG/1
5 TABLET ORAL DAILY
Status: DISCONTINUED | OUTPATIENT
Start: 2019-12-20 | End: 2019-12-20

## 2019-12-20 RX ORDER — CLOPIDOGREL BISULFATE 75 MG/1
75 TABLET ORAL DAILY
Status: DISCONTINUED | OUTPATIENT
Start: 2019-12-20 | End: 2019-12-24 | Stop reason: HOSPADM

## 2019-12-20 RX ORDER — ONDANSETRON 2 MG/ML
4 INJECTION INTRAMUSCULAR; INTRAVENOUS EVERY 6 HOURS PRN
Status: DISCONTINUED | OUTPATIENT
Start: 2019-12-20 | End: 2019-12-24 | Stop reason: HOSPADM

## 2019-12-20 RX ORDER — LABETALOL HYDROCHLORIDE 5 MG/ML
20 INJECTION, SOLUTION INTRAVENOUS
Status: COMPLETED | OUTPATIENT
Start: 2019-12-20 | End: 2019-12-20

## 2019-12-20 RX ORDER — ATORVASTATIN CALCIUM 40 MG/1
40 TABLET, FILM COATED ORAL NIGHTLY
Status: DISCONTINUED | OUTPATIENT
Start: 2019-12-20 | End: 2019-12-24 | Stop reason: HOSPADM

## 2019-12-20 RX ORDER — METOCLOPRAMIDE 10 MG/1
10 TABLET ORAL
Status: DISCONTINUED | OUTPATIENT
Start: 2019-12-20 | End: 2019-12-24 | Stop reason: HOSPADM

## 2019-12-20 RX ORDER — CLONIDINE HYDROCHLORIDE 0.1 MG/1
0.1 TABLET ORAL ONCE
Status: COMPLETED | OUTPATIENT
Start: 2019-12-20 | End: 2019-12-20

## 2019-12-20 RX ORDER — ACETAMINOPHEN 325 MG/1
650 TABLET ORAL EVERY 6 HOURS PRN
Status: DISCONTINUED | OUTPATIENT
Start: 2019-12-20 | End: 2019-12-24 | Stop reason: HOSPADM

## 2019-12-20 RX ORDER — PANTOPRAZOLE SODIUM 40 MG/1
40 TABLET, DELAYED RELEASE ORAL DAILY
Status: DISCONTINUED | OUTPATIENT
Start: 2019-12-20 | End: 2019-12-24 | Stop reason: HOSPADM

## 2019-12-20 RX ORDER — AMLODIPINE BESYLATE 10 MG/1
10 TABLET ORAL DAILY
Status: DISCONTINUED | OUTPATIENT
Start: 2019-12-21 | End: 2019-12-23

## 2019-12-20 RX ORDER — IBUPROFEN 200 MG
24 TABLET ORAL
Status: DISCONTINUED | OUTPATIENT
Start: 2019-12-20 | End: 2019-12-24 | Stop reason: HOSPADM

## 2019-12-20 RX ORDER — LABETALOL HYDROCHLORIDE 5 MG/ML
10 INJECTION, SOLUTION INTRAVENOUS EVERY 4 HOURS PRN
Status: DISCONTINUED | OUTPATIENT
Start: 2019-12-20 | End: 2019-12-20

## 2019-12-20 RX ORDER — SODIUM BICARBONATE 650 MG/1
650 TABLET ORAL ONCE
Status: COMPLETED | OUTPATIENT
Start: 2019-12-20 | End: 2019-12-20

## 2019-12-20 RX ORDER — DULOXETIN HYDROCHLORIDE 20 MG/1
20 CAPSULE, DELAYED RELEASE ORAL 2 TIMES DAILY
Status: DISCONTINUED | OUTPATIENT
Start: 2019-12-20 | End: 2019-12-24 | Stop reason: HOSPADM

## 2019-12-20 RX ORDER — GABAPENTIN 300 MG/1
300 CAPSULE ORAL 3 TIMES DAILY
Status: DISCONTINUED | OUTPATIENT
Start: 2019-12-20 | End: 2019-12-24 | Stop reason: HOSPADM

## 2019-12-20 RX ORDER — INSULIN ASPART 100 [IU]/ML
1-10 INJECTION, SOLUTION INTRAVENOUS; SUBCUTANEOUS
Status: DISCONTINUED | OUTPATIENT
Start: 2019-12-20 | End: 2019-12-24 | Stop reason: HOSPADM

## 2019-12-20 RX ORDER — TRAMADOL HYDROCHLORIDE 50 MG/1
50 TABLET ORAL EVERY 6 HOURS PRN
Status: DISCONTINUED | OUTPATIENT
Start: 2019-12-20 | End: 2019-12-24 | Stop reason: HOSPADM

## 2019-12-20 RX ORDER — ENOXAPARIN SODIUM 100 MG/ML
40 INJECTION SUBCUTANEOUS EVERY 24 HOURS
Status: DISCONTINUED | OUTPATIENT
Start: 2019-12-20 | End: 2019-12-24 | Stop reason: HOSPADM

## 2019-12-20 RX ORDER — CLONIDINE HYDROCHLORIDE 0.1 MG/1
0.1 TABLET ORAL EVERY 6 HOURS PRN
Status: DISCONTINUED | OUTPATIENT
Start: 2019-12-20 | End: 2019-12-24 | Stop reason: HOSPADM

## 2019-12-20 RX ORDER — HYDROCODONE BITARTRATE AND ACETAMINOPHEN 5; 325 MG/1; MG/1
1 TABLET ORAL EVERY 6 HOURS PRN
Status: DISCONTINUED | OUTPATIENT
Start: 2019-12-20 | End: 2019-12-20

## 2019-12-20 RX ORDER — KETOROLAC TROMETHAMINE 30 MG/ML
15 INJECTION, SOLUTION INTRAMUSCULAR; INTRAVENOUS EVERY 6 HOURS PRN
Status: DISPENSED | OUTPATIENT
Start: 2019-12-20 | End: 2019-12-23

## 2019-12-20 RX ORDER — AMOXICILLIN 250 MG
1 CAPSULE ORAL DAILY
Status: DISCONTINUED | OUTPATIENT
Start: 2019-12-20 | End: 2019-12-24 | Stop reason: HOSPADM

## 2019-12-20 RX ORDER — GLUCAGON 1 MG
1 KIT INJECTION
Status: DISCONTINUED | OUTPATIENT
Start: 2019-12-20 | End: 2019-12-24 | Stop reason: HOSPADM

## 2019-12-20 RX ORDER — IBUPROFEN 200 MG
16 TABLET ORAL
Status: DISCONTINUED | OUTPATIENT
Start: 2019-12-20 | End: 2019-12-24 | Stop reason: HOSPADM

## 2019-12-20 RX ORDER — SODIUM CHLORIDE 9 MG/ML
INJECTION, SOLUTION INTRAVENOUS CONTINUOUS
Status: DISCONTINUED | OUTPATIENT
Start: 2019-12-20 | End: 2019-12-20

## 2019-12-20 RX ORDER — NALOXONE HCL 0.4 MG/ML
0.4 VIAL (ML) INJECTION
Status: DISPENSED | OUTPATIENT
Start: 2019-12-20 | End: 2019-12-20

## 2019-12-20 RX ORDER — HYDRALAZINE HYDROCHLORIDE 20 MG/ML
10 INJECTION INTRAMUSCULAR; INTRAVENOUS EVERY 4 HOURS PRN
Status: DISCONTINUED | OUTPATIENT
Start: 2019-12-20 | End: 2019-12-24 | Stop reason: HOSPADM

## 2019-12-20 RX ORDER — POLYETHYLENE GLYCOL 3350 17 G/17G
17 POWDER, FOR SOLUTION ORAL DAILY
Status: DISCONTINUED | OUTPATIENT
Start: 2019-12-20 | End: 2019-12-24 | Stop reason: HOSPADM

## 2019-12-20 RX ORDER — FLUMAZENIL 0.1 MG/ML
0.5 INJECTION INTRAVENOUS ONCE
Status: COMPLETED | OUTPATIENT
Start: 2019-12-20 | End: 2019-12-20

## 2019-12-20 RX ORDER — ASPIRIN 81 MG/1
81 TABLET ORAL DAILY
Status: DISCONTINUED | OUTPATIENT
Start: 2019-12-20 | End: 2019-12-24 | Stop reason: HOSPADM

## 2019-12-20 RX ADMIN — DULOXETINE HYDROCHLORIDE 20 MG: 20 CAPSULE, DELAYED RELEASE ORAL at 10:12

## 2019-12-20 RX ADMIN — ATORVASTATIN CALCIUM 40 MG: 40 TABLET, FILM COATED ORAL at 08:12

## 2019-12-20 RX ADMIN — LABETALOL HYDROCHLORIDE 10 MG: 5 INJECTION, SOLUTION INTRAVENOUS at 03:12

## 2019-12-20 RX ADMIN — LORAZEPAM 1 MG: 1 TABLET ORAL at 03:12

## 2019-12-20 RX ADMIN — ONDANSETRON 4 MG: 2 INJECTION INTRAMUSCULAR; INTRAVENOUS at 12:12

## 2019-12-20 RX ADMIN — AMLODIPINE BESYLATE 5 MG: 5 TABLET ORAL at 09:12

## 2019-12-20 RX ADMIN — SENNOSIDES, DOCUSATE SODIUM 1 TABLET: 50; 8.6 TABLET, FILM COATED ORAL at 09:12

## 2019-12-20 RX ADMIN — KETOROLAC TROMETHAMINE 15 MG: 30 INJECTION, SOLUTION INTRAMUSCULAR; INTRAVENOUS at 09:12

## 2019-12-20 RX ADMIN — KETOROLAC TROMETHAMINE 15 MG: 30 INJECTION, SOLUTION INTRAMUSCULAR; INTRAVENOUS at 10:12

## 2019-12-20 RX ADMIN — KETOROLAC TROMETHAMINE 15 MG: 30 INJECTION, SOLUTION INTRAMUSCULAR; INTRAVENOUS at 03:12

## 2019-12-20 RX ADMIN — SODIUM CHLORIDE 1000 ML: 0.9 INJECTION, SOLUTION INTRAVENOUS at 12:12

## 2019-12-20 RX ADMIN — SODIUM CHLORIDE: 0.9 INJECTION, SOLUTION INTRAVENOUS at 09:12

## 2019-12-20 RX ADMIN — HYDRALAZINE HYDROCHLORIDE 10 MG: 20 INJECTION INTRAMUSCULAR; INTRAVENOUS at 05:12

## 2019-12-20 RX ADMIN — METOCLOPRAMIDE HYDROCHLORIDE 10 MG: 10 TABLET ORAL at 03:12

## 2019-12-20 RX ADMIN — PANTOPRAZOLE SODIUM 40 MG: 40 TABLET, DELAYED RELEASE ORAL at 09:12

## 2019-12-20 RX ADMIN — DULOXETINE HYDROCHLORIDE 20 MG: 20 CAPSULE, DELAYED RELEASE ORAL at 08:12

## 2019-12-20 RX ADMIN — ENOXAPARIN SODIUM 40 MG: 100 INJECTION SUBCUTANEOUS at 05:12

## 2019-12-20 RX ADMIN — HYDROCODONE BITARTRATE AND ACETAMINOPHEN 1 TABLET: 5; 325 TABLET ORAL at 04:12

## 2019-12-20 RX ADMIN — ASPIRIN 81 MG: 81 TABLET, COATED ORAL at 09:12

## 2019-12-20 RX ADMIN — FLUMAZENIL 0.5 MG: 0.1 INJECTION, SOLUTION INTRAVENOUS at 05:12

## 2019-12-20 RX ADMIN — METOCLOPRAMIDE HYDROCHLORIDE 10 MG: 10 TABLET ORAL at 10:12

## 2019-12-20 RX ADMIN — CLONIDINE HYDROCHLORIDE 0.1 MG: 0.1 TABLET ORAL at 12:12

## 2019-12-20 RX ADMIN — GABAPENTIN 300 MG: 300 CAPSULE ORAL at 08:12

## 2019-12-20 RX ADMIN — CLOPIDOGREL BISULFATE 75 MG: 75 TABLET ORAL at 09:12

## 2019-12-20 RX ADMIN — CEFTRIAXONE 1 G: 1 INJECTION, SOLUTION INTRAVENOUS at 12:12

## 2019-12-20 RX ADMIN — POLYETHYLENE GLYCOL (3350) 17 G: 17 POWDER, FOR SOLUTION ORAL at 09:12

## 2019-12-20 RX ADMIN — LABETALOL HYDROCHLORIDE 20 MG: 5 INJECTION INTRAVENOUS at 06:12

## 2019-12-20 RX ADMIN — SODIUM BICARBONATE 650 MG TABLET 650 MG: at 09:12

## 2019-12-20 RX ADMIN — GABAPENTIN 300 MG: 300 CAPSULE ORAL at 03:12

## 2019-12-20 RX ADMIN — LABETALOL HYDROCHLORIDE 10 MG: 5 INJECTION, SOLUTION INTRAVENOUS at 10:12

## 2019-12-20 RX ADMIN — TRAMADOL HYDROCHLORIDE 50 MG: 50 TABLET, FILM COATED ORAL at 12:12

## 2019-12-20 RX ADMIN — GABAPENTIN 300 MG: 300 CAPSULE ORAL at 09:12

## 2019-12-20 NOTE — H&P
[facility] Hospital Medicine  History & Physical    Patient Name: Melva Barker  MRN: [id number]  Admission Date: 12/19/2019  Attending Physician: Edie Munguia MD   Primary Care Provider: Claire Souza MD         Patient information was obtained from patient, relative(s), EMS personnel, nursing home, past medical records and ER records.     Subjective:     Principal Problem:SIRS (systemic inflammatory response syndrome)    Chief Complaint:   Chief Complaint   Patient presents with    Altered Mental Status     [facility] reports AMS after administering zanaflex. [facility] reports that patient typically refuses medications and would not respond tonight. EMS reports that patient wouldn't respond to them but talked to fire dept and asked the paramedics to close the ambulance door because it was cold.        HPI: Ms. Barker is a 74 y.o. female  with a PMHx of HTN, HLD, recent CVA in 10/2019 with right-sided weakness, DM II with gastroparesis, chronic back pain, anxiety, malnutrition, and opiate dependence.  She was transferred from [facility] to the ED for c/o worsening right-sided weakness after receiving Zanaflex.  Upon arrival to the ED, patient was AAO x 4 with no acute focal neuro deficits noted.  However, patient had c/o generalized ABD pain with associated nausea and vomiting.  Family reports patient overuses her narcotic pain medications, which has been decreased while at [facility].  Family thinks symptoms are due to opiate withdraw.  Patient denies any HA,  lightheadedness, dizziness, syncope, visual disturbance, facial droop, slurred speech, confusion, aphasia, dysphagia, hemiparesis, CP, palpitations, SOB, cough, wheezing, rhinorrhea, sore throat, ABD distention, diarrhea, constipation, hematemesis, melena, dysuria, hematuria, back or neck pain, fatigue, fever or chills.  Work-up in the ED resulted WBC 15.4, 0% bands, CO2 14, anion gap 17, stable kidney function and LFTs, lipase  13, negative troponin, lactic 1.7, procalcitonin 0.13, influenza negative, UA negative for UTI, CXR unremarkable, EKG unrevealing.  CT of the head and ABD/pelvis were negative for acute process.  VBG with pH 7.429, HCO3 16.9, BE -7.  In the ED, patient tachycardic (sinus) and hypertensive with /121.  IV labetalol 20 mg given, which improved HR into 80s and BP to 137/88.  Hospital Medicine was called for admission.      Past Medical History:   Diagnosis Date    Anxiety     Back pain     chronic    Diabetes mellitus     Gastroparesis     Hypertension     Sciatica     Stroke        History reviewed. No pertinent surgical history.    Review of patient's allergies indicates:   Allergen Reactions    Morphine Other (See Comments)     headache    Latex, natural rubber Rash       No current facility-administered medications on file prior to encounter.      Current Outpatient Medications on File Prior to Encounter   Medication Sig    amLODIPine (NORVASC) 5 MG tablet Take 1 tablet (5 mg total) by mouth once daily.    aspirin (ECOTRIN) 81 MG EC tablet Take 1 tablet (81 mg total) by mouth once daily.    atorvastatin (LIPITOR) 40 MG tablet Take 1 tablet (40 mg total) by mouth every evening.    clopidogrel (PLAVIX) 75 mg tablet Take 1 tablet (75 mg total) by mouth once daily.    diclofenac sodium (VOLTAREN) 1 % Gel Apply 4 g topically once daily.    dronabinol (MARINOL) 2.5 MG capsule Take 1 capsule (2.5 mg total) by mouth once daily.    DULoxetine (CYMBALTA) 20 MG capsule Take 1 capsule (20 mg total) by mouth 2 (two) times daily.    ergocalciferol (VITAMIN D2) 50,000 unit Cap Take 50,000 Units by mouth every 7 days.    gabapentin (NEURONTIN) 300 MG capsule Take 1 capsule (300 mg total) by mouth 3 (three) times daily.    HYDROcodone-acetaminophen (NORCO)  mg per tablet Take 1 tablet by mouth every 8 (eight) hours as needed.    insulin aspart U-100 (NOVOLOG) 100 unit/mL (3 mL) InPn pen Inject 0-5  Units into the skin every 6 (six) hours as needed (Hyperglycemia).    lidocaine (LIDODERM) 5 % Place 1 patch onto the skin once daily. Remove & Discard patch within 12 hours or as directed by MD    melatonin (MELATIN) Take 2 tablets (6 mg total) by mouth nightly as needed for Insomnia.    ondansetron 4 mg/2 mL Soln Inject 4 mg into the vein every 6 (six) hours as needed.    polyethylene glycol (GLYCOLAX) 17 gram PwPk Take 17 g by mouth once daily.    senna-docusate 8.6-50 mg (PERICOLACE) 8.6-50 mg per tablet Take 1 tablet by mouth once daily.    traZODone (DESYREL) 50 MG tablet Take 0.5 tablets (25 mg total) by mouth every evening.    TRUE METRIX GLUCOSE METER Misc     TRUE METRIX GLUCOSE TEST STRIP Strp     TRUEPLUS LANCETS 33 gauge Misc      Family History     Problem Relation (Age of Onset)    Hyperlipidemia Father    Hypertension Mother        Tobacco Use    Smoking status: Never Smoker    Smokeless tobacco: Never Used   Substance and Sexual Activity    Alcohol use: No     Alcohol/week: 0.0 standard drinks    Drug use: No    Sexual activity: Never     Review of Systems   Constitutional: Negative for appetite change, chills, diaphoresis, fatigue and fever.   HENT: Negative for congestion, drooling, sore throat and trouble swallowing.    Eyes: Negative for visual disturbance.   Respiratory: Negative for cough, chest tightness, shortness of breath and wheezing.    Cardiovascular: Negative for chest pain, palpitations and leg swelling.   Gastrointestinal: Positive for abdominal pain, nausea and vomiting. Negative for abdominal distention, blood in stool, constipation and diarrhea.   Genitourinary: Negative for difficulty urinating, dysuria, frequency, hematuria and urgency.   Musculoskeletal: Negative for arthralgias, back pain and myalgias.   Skin: Negative for pallor, rash and wound.   Neurological: Negative for dizziness, tremors, seizures, syncope, facial asymmetry, speech difficulty, weakness,  "light-headedness, numbness and headaches.   Psychiatric/Behavioral: Negative for agitation, confusion and hallucinations. The patient is not nervous/anxious.         "Feeling funny".   All other systems reviewed and are negative.    Objective:     Vital Signs (Most Recent):  Temp: 98.6 °F (37 °C) (12/19/19 2337)  Pulse: (!) 126 (12/20/19 0610)  Resp: 20 (12/20/19 0610)  BP: (!) 190/121 (12/20/19 0610)  SpO2: 99 % (12/20/19 0610) Vital Signs (24h Range):  Temp:  [98.6 °F (37 °C)] 98.6 °F (37 °C)  Pulse:  [113-137] 126  Resp:  [20-28] 20  SpO2:  [99 %-100 %] 99 %  BP: (126-190)/() 190/121     Weight: 41.4 kg (91 lb 4.3 oz)  Body mass index is 17.83 kg/m².    Physical Exam   Constitutional: She is oriented to person, place, and time. She appears well-developed and well-nourished. No distress.   HENT:   Head: Normocephalic and atraumatic.   Eyes: Conjunctivae are normal.   PERRL; EOM intact.   Neck: Normal range of motion. Neck supple.   Cardiovascular: Regular rhythm, S1 normal, S2 normal and intact distal pulses.  No extrasystoles are present. Tachycardia present. Exam reveals no gallop and no friction rub.   No murmur heard.  Pulses:       Radial pulses are 2+ on the right side, and 2+ on the left side.        Dorsalis pedis pulses are 2+ on the right side, and 2+ on the left side.        Posterior tibial pulses are 2+ on the right side, and 2+ on the left side.   Pulmonary/Chest: Effort normal and breath sounds normal. No accessory muscle usage. No tachypnea. No respiratory distress. She has no wheezes. She has no rhonchi. She has no rales.   Abdominal: Soft. Bowel sounds are normal. She exhibits no distension. There is no hepatosplenomegaly. There is generalized tenderness. There is no rigidity, no rebound, no guarding, no CVA tenderness and negative Inman's sign. No hernia.   Musculoskeletal: Normal range of motion. She exhibits no edema, tenderness or deformity.   Neurological: She is alert and oriented to " person, place, and time. No cranial nerve deficit or sensory deficit. GCS eye subscore is 4. GCS verbal subscore is 5. GCS motor subscore is 6.   No acute focal deficits appreciated.    Skin: Skin is warm, dry and intact. Capillary refill takes less than 2 seconds. No rash noted. She is not diaphoretic. No cyanosis or erythema.   Psychiatric: She has a normal mood and affect. Her speech is normal and behavior is normal. Cognition and memory are normal.   Nursing note and vitals reviewed.          Significant Labs:  Results for orders placed or performed during the hospital encounter of 12/19/19   Influenza A & B by Molecular   Result Value Ref Range    Influenza A, Molecular Negative Negative    Influenza B, Molecular Negative Negative    Flu A & B Source Nasal swab    CBC auto differential   Result Value Ref Range    WBC 15.36 (H) 3.90 - 12.70 K/uL    RBC 4.72 4.00 - 5.40 M/uL    Hemoglobin 13.0 12.0 - 16.0 g/dL    Hematocrit 39.7 37.0 - 48.5 %    Mean Corpuscular Volume 84 82 - 98 fL    Mean Corpuscular Hemoglobin 27.5 27.0 - 31.0 pg    Mean Corpuscular Hemoglobin Conc 32.7 32.0 - 36.0 g/dL    RDW 15.9 (H) 11.5 - 14.5 %    Platelets 376 (H) 150 - 350 K/uL    MPV 10.0 9.2 - 12.9 fL    Immature Granulocytes 0.8 (H) 0.0 - 0.5 %    Gran # (ANC) 12.5 (H) 1.8 - 7.7 K/uL    Immature Grans (Abs) 0.12 (H) 0.00 - 0.04 K/uL    Lymph # 1.8 1.0 - 4.8 K/uL    Mono # 0.8 0.3 - 1.0 K/uL    Eos # 0.1 0.0 - 0.5 K/uL    Baso # 0.05 0.00 - 0.20 K/uL    nRBC 0 0 /100 WBC    Gran% 81.5 (H) 38.0 - 73.0 %    Lymph% 11.8 (L) 18.0 - 48.0 %    Mono% 5.3 4.0 - 15.0 %    Eosinophil% 0.3 0.0 - 8.0 %    Basophil% 0.3 0.0 - 1.9 %    Differential Method Automated    Lactic acid, plasma #1   Result Value Ref Range    Lactate (Lactic Acid) 1.7 0.5 - 2.2 mmol/L   Urinalysis, Reflex to Urine Culture Urine, Clean Catch   Result Value Ref Range    Specimen UA Urine, Catheterized     Color, UA Yellow Yellow, Straw, Tonya    Appearance, UA Clear Clear     pH, UA 6.0 5.0 - 8.0    Specific Gravity, UA 1.025 1.005 - 1.030    Protein, UA 2+ (A) Negative    Glucose, UA Negative Negative    Ketones, UA 2+ (A) Negative    Bilirubin (UA) 1+ (A) Negative    Occult Blood UA Trace (A) Negative    Nitrite, UA Negative Negative    Urobilinogen, UA 1.0 <2.0 EU/dL    Leukocytes, UA Negative Negative   Urinalysis Microscopic   Result Value Ref Range    RBC, UA 0 0 - 4 /hpf    WBC, UA 3 0 - 5 /hpf    Bacteria Few (A) None-Occ /hpf    Hyaline Casts, UA 3 (A) 0-1/lpf /lpf    Microscopic Comment SEE COMMENT    Protime-INR   Result Value Ref Range    Prothrombin Time 11.4 9.0 - 12.5 sec    INR 1.1 0.8 - 1.2   Troponin I   Result Value Ref Range    Troponin I 0.014 0.000 - 0.026 ng/mL   Lipase   Result Value Ref Range    Lipase 13 4 - 60 U/L   Comprehensive metabolic panel   Result Value Ref Range    Sodium 139 136 - 145 mmol/L    Potassium 4.0 3.5 - 5.1 mmol/L    Chloride 108 95 - 110 mmol/L    CO2 14 (L) 23 - 29 mmol/L    Glucose 127 (H) 70 - 110 mg/dL    BUN, Bld 8 8 - 23 mg/dL    Creatinine 0.8 0.5 - 1.4 mg/dL    Calcium 9.6 8.7 - 10.5 mg/dL    Total Protein 8.1 6.0 - 8.4 g/dL    Albumin 3.3 (L) 3.5 - 5.2 g/dL    Total Bilirubin 1.2 (H) 0.1 - 1.0 mg/dL    Alkaline Phosphatase 75 55 - 135 U/L    AST 26 10 - 40 U/L    ALT 8 (L) 10 - 44 U/L    Anion Gap 17 (H) 8 - 16 mmol/L    eGFR if African American >60 >60 mL/min/1.73 m^2    eGFR if non African American >60 >60 mL/min/1.73 m^2   APTT   Result Value Ref Range    aPTT 25.1 21.0 - 32.0 sec   Brain natriuretic peptide   Result Value Ref Range    BNP 49 0 - 99 pg/mL   Comprehensive metabolic panel   Result Value Ref Range    Sodium 139 136 - 145 mmol/L    Potassium 4.0 3.5 - 5.1 mmol/L    Chloride 108 95 - 110 mmol/L    CO2 14 (L) 23 - 29 mmol/L    Glucose 127 (H) 70 - 110 mg/dL    BUN, Bld 8 8 - 23 mg/dL    Creatinine 0.8 0.5 - 1.4 mg/dL    Calcium 9.6 8.7 - 10.5 mg/dL    Total Protein 8.1 6.0 - 8.4 g/dL    Albumin 3.3 (L) 3.5 - 5.2 g/dL     Total Bilirubin 1.2 (H) 0.1 - 1.0 mg/dL    Alkaline Phosphatase 75 55 - 135 U/L    AST 26 10 - 40 U/L    ALT 8 (L) 10 - 44 U/L    Anion Gap 17 (H) 8 - 16 mmol/L    eGFR if African American >60 >60 mL/min/1.73 m^2    eGFR if non African American >60 >60 mL/min/1.73 m^2   Hepatitis C antibody   Result Value Ref Range    Hepatitis C Ab Negative Negative   CBC Without Differential   Result Value Ref Range    WBC 15.40 (H) 3.90 - 12.70 K/uL    RBC 3.65 (L) 4.00 - 5.40 M/uL    Hemoglobin 10.2 (L) 12.0 - 16.0 g/dL    Hematocrit 31.3 (L) 37.0 - 48.5 %    Mean Corpuscular Volume 86 82 - 98 fL    Mean Corpuscular Hemoglobin 27.9 27.0 - 31.0 pg    Mean Corpuscular Hemoglobin Conc 32.6 32.0 - 36.0 g/dL    RDW 15.8 (H) 11.5 - 14.5 %    Platelets 328 150 - 350 K/uL    MPV 10.4 9.2 - 12.9 fL   ISTAT PROCEDURE   Result Value Ref Range    POC PH 7.429 7.35 - 7.45    POC PCO2 25.5 (L) 35 - 45 mmHg    POC PO2 44 40 - 60 mmHg    POC HCO3 16.9 (L) 24 - 28 mmol/L    POC BE -7 -2 to 2 mmol/L    POC SATURATED O2 82 (L) 95 - 100 %    Sample VENOUS     Site Other     Allens Test N/A     DelSys Room Air       All pertinent labs within the past 24 hours have been reviewed.    Significant Imaging:  Imaging Results          CT Head Without Contrast (In process)    No acute process.            CT Abdomen Pelvis  Without Contrast (In process)    No acute process.            X-Ray Chest AP Portable (In process)    No acute process.            I have reviewed all pertinent imaging results/findings within the past 24 hours.     EKG: (personally reviewed)  Sinus tachycardia, no acute ischemic ST-T changes.  No significant change from previous tracings.           Assessment/Plan:     * SIRS (systemic inflammatory response syndrome)  - WBC of 15.4, HR >90, RR >20.  Lactic and procalcitonin normal.  Patient afebrile.   - No evidence of infectious process: UA and CXR unremarkable, CT of ABD/pelvis negative for acute process.  - Blood cultures obtained  in the ED.  - Supportive care.  IV hydration.  - Will add empiric abx if patient becomes febrile or WBC trend up.  - Follow labs.    Bicarbonate deficit  - Etiology unclear.  Appears chronic with baseline HCO3 of 17-19.  No acidosis with pH 7.429.  - Will give PO supplementation.  - Follow labs.    Generalized abdominal pain  - Possibly secondary to known gastroparesis.  - CT of ABD/Pelvis was negative for acute process.  - Add scheduled PO Reglan.  - Try to avoid opiates if possible.  - Consider GI consult if no improvement.    Moderate malnutrition  - Dietary consult.    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  - Continue ASA, Plavix, and statin.  - BP control as above.    Uncontrolled hypertension  - Improved after IV labetalol given in the ED.  - Continue home amlodipine.  - IV labetalol PRN.    Type 2 diabetes mellitus, with long-term current use of insulin  - AccuChecks with moderate dose SSI.  - Diabetic diet.  - Recent HbA1c of 8.3.      VTE Risk Mitigation (From admission, onward)         Ordered     enoxaparin injection 40 mg  Daily      12/20/19 0700     IP VTE HIGH RISK PATIENT  Once      12/20/19 0700     Place sequential compression device  Until discontinued      12/20/19 0700                   Sofia Yeh NP  Department of Hospital Medicine   [facility]

## 2019-12-20 NOTE — CONSULTS
"  [facility]   Adult Nutrition  Consult Note    SUMMARY     Recommendations    Recommendation: 1. Consider ST eval  2. RD to follow  Goals: >85% EEN/EPN by RD f/u  Nutrition Goal Status: new  Communication of RD Recs: (POC, sticky note, secure chat RN)    Reason for Assessment    Reason For Assessment: consult  Diagnosis: (tachycardia, abdominal pain)  Relevant Medical History: HTN, DM2, gastroparesis, stroke  General Information Comments: Pt nauseous during visit and reports poor appetite today (PO intake 0%). Stated she is having trouble swallowing- will discuss with RN. Pt reports decreased appetite PTA (PO intake <50%) and UBW of 115-125lbs. Per epic records, 23lb weight loss in 6 months (20%). NFPE performed 12/20/19. Severe muscle wasting and subcutaneous fat depletion noted. Discussed oral nutrition supplements, but patient declined stating that she did not like them. Encouraged PO intake.  Nutrition Discharge Planning: Diabetic, cardiac diet    Nutrition Risk Screen    Nutrition Risk Screen: dysphagia or difficulty swallowing    Nutrition/Diet History    Spiritual, Cultural Beliefs, Baptism Practices, Values that Affect Care: no    Anthropometrics    Height: 4' 10" (147.3 cm)  Height (inches): 58 in  Weight: 41.5 kg (91 lb 7.9 oz)  Ideal Body Weight (IBW), Female: 90 lb  % Ideal Body Weight, Female (lb): 101.66 %  BMI (Calculated): 19.1  BMI Grade: 18.5-24.9 - normal       Lab/Procedures/Meds    Pertinent Labs Reviewed: reviewed  BMP  Lab Results   Component Value Date     12/20/2019    K 3.7 12/20/2019     12/20/2019    CO2 15 (L) 12/20/2019    BUN 9 12/20/2019    CREATININE 0.8 12/20/2019    CALCIUM 9.8 12/20/2019    ANIONGAP 16 12/20/2019    ESTGFRAFRICA >60 12/20/2019    EGFRNONAA >60 12/20/2019     Lab Results   Component Value Date    ALBUMIN 3.3 (L) 12/20/2019    ALBUMIN 3.3 (L) 12/20/2019     No results for input(s): POCTGLUCOSE in the last 24 hours.  Lab Results "   Component Value Date    HGBA1C 8.3 (H) 10/31/2019       Pertinent Medications Reviewed: reviewed      Estimated/Assessed Needs    Weight Used For Calorie Calculations: 41.5 kg (91 lb 7.9 oz)  Energy Calorie Requirements (kcal): 0847-5788  Energy Need Method: [place]-St Jeor(1.3-1.5 AF)  Protein Requirements: 41-62 g  Weight Used For Protein Calculations: 41.5 kg (91 lb 7.9 oz)        RDA Method (mL): 1051  CHO Requirement: 151 g      Nutrition Prescription Ordered    Current Diet Order: diabetic, cardiac, 2000 kcal    Evaluation of Received Nutrient/Fluid Intake       % Intake of Estimated Energy Needs: 0 - 25 %  % Meal Intake: 0 - 25 %    Nutrition Risk    Level of Risk/Frequency of Follow-up: (2x/week)     Assessment and Plan    Severe malnutrition  Nutrition Problem:  (Severe) Protein-Calorie Malnutrition  Malnutrition in the context of Chronic Illness/Injury and Social/Environmental Circumstances    Related to (etiology):  Multiple diagnoses, suspected overuse of narcotic pain medication    Signs and Symptoms (as evidenced by):  Energy Intake: <50% of estimated energy requirement for 1 month  Body Fat Depletion: severe depletion of orbitals and triceps   Muscle Mass Depletion: severe depletion of temples, clavicle region and lower extremities   Weight Loss: 20% x 6 mo     Interventions(treatment strategy):  Collaboration with other providers    Nutrition Diagnosis Status:  New           Monitor and Evaluation    Food and Nutrient Intake: energy intake, food and beverage intake  Food and Nutrient Adminstration: diet order  Anthropometric Measurements: weight  Biochemical Data, Medical Tests and Procedures: electrolyte and renal panel, glucose/endocrine profile  Nutrition-Focused Physical Findings: overall appearance     Malnutrition Assessment                 Orbital Region (Subcutaneous Fat Loss): severe depletion  Upper Arm Region (Subcutaneous Fat Loss): severe depletion   Episcopalian Region (Muscle Loss):  moderate depletion  Clavicle and Acromion Bone Region (Muscle Loss): severe depletion  Dorsal Hand (Muscle Loss): severe depletion  Patellar Region (Muscle Loss): severe depletion                 Nutrition Follow-Up    RD Follow-up?: Yes

## 2019-12-20 NOTE — ED PROVIDER NOTES
SCRIBE #1 NOTE: I, Karie Hale/Eb Santa, am scribing for, and in the presence of, Beny Garcia MD. I have scribed the entire note.       History     Chief Complaint   Patient presents with    Altered Mental Status     [facility] reports AMS after administering zanaflex. [facility] reports that patient typically refuses medications and would not respond tonight. EMS reports that patient wouldn't respond to them but talked to fire dept and asked the paramedics to close the ambulance door because it was cold.     Review of patient's allergies indicates:   Allergen Reactions    Morphine Other (See Comments)     headache    Latex, natural rubber Rash         History of Present Illness     HPI    12/19/2019, 11:50 PM  History obtained from the pt.      History of Present Illness: Melva Barker is a 74 y.o. female patient who presents to the Emergency Department for evaluation of right side weakness s/p zanaflex administration with unknown onset. Symptoms are constant and moderate in severity. No mitigating or exacerbating factors reported. Associated sxs include vomiting and diffuse abd pain. Patient denies any HA, LOC, facial droop, slurred speech, confusion, and all other sxs at this time. Prior Txs were administered for pain, but pt stated she was unable to recall which medications she took. No further complaints or concerns at this time.       Arrival mode: EMS    PCP: Claire Souza MD      Past Medical History:  Past Medical History:   Diagnosis Date    Anxiety     Back pain     chronic    Diabetes mellitus     Gastroparesis     Hypertension     Sciatica     Stroke        Past Surgical History:  History reviewed. No pertinent past surgical history.         Family History:  Family History   Problem Relation Age of Onset    Hyperlipidemia Father     Hypertension Mother        Social History:   Social History     Tobacco Use    Smoking status: Never Smoker    Smokeless tobacco: Never Used    Substance and Sexual Activity    Alcohol use: No     Alcohol/week: 0.0 standard drinks    Drug use: No    Sexual activity: Never        Review of Systems     Review of Systems   Constitutional: Negative for fever.   HENT: Negative for sore throat.    Respiratory: Negative for cough and shortness of breath.    Cardiovascular: Negative for chest pain.   Gastrointestinal: Positive for abdominal pain and vomiting. Negative for nausea.   Genitourinary: Negative for dysuria.   Musculoskeletal: Negative for back pain.   Skin: Negative for rash.   Neurological: Negative for syncope, facial asymmetry, speech difficulty, weakness and headaches.   Hematological: Does not bruise/bleed easily.   Psychiatric/Behavioral: Negative for confusion.   All other systems reviewed and are negative.       Physical Exam     Initial Vitals [12/19/19 2337]   BP Pulse Resp Temp SpO2   (!) 181/99 (!) 137 (!) 28 98.6 °F (37 °C) 99 %      MAP       --          Physical Exam  Nursing Notes and Vital Signs Reviewed.  Constitutional: Well-developed and well-nourished. NAD.  Head: Atraumatic. Normocephalic.  Eyes: PERRL. EOM intact. Conjunctivae are not pale. No scleral icterus.  ENT: Mucous membranes are moist. Oropharynx is clear and symmetric.    Neck: Supple. Full ROM. No lymphadenopathy.  Cardiovascular: Tachycardic. Regular rhythm. No murmurs, rubs, or gallops. Distal pulses are 2+ and symmetric.  Pulmonary/Chest: No respiratory distress. Clear to auscultation bilaterally. No wheezing or rales.  Abdominal: Soft and non-distended.  There is diffuse tenderness, worse on right side.  No rebound, guarding, or rigidity. Good bowel sounds.  Genitourinary: No CVA tenderness  Musculoskeletal: Moves all extremities. No obvious deformities. No calf tenderness.  Skin: Warm and dry.   Psychiatric: Normal affect. Good eye contact. Appropriate in content.  Neurological: Patient is alert and oriented to person and time, but not place. Pupils ERRL and EOM  normal. Cranial nerves II-XII are intact. Right side weakness. Speech is clear and normal.      ED Course   Critical Care  Date/Time: 12/20/2019 6:03 AM  Performed by: Beny Garcia MD  Authorized by: Beny Garcia MD   Direct patient critical care time: 20 minutes  Additional history critical care time: 10 minutes  Ordering / reviewing critical care time: 8 minutes  Documentation critical care time: 5 minutes  Total critical care time (exclusive of procedural time) : 43 minutes  Critical care time was exclusive of separately billable procedures and treating other patients and teaching time.  Critical care was necessary to treat or prevent imminent or life-threatening deterioration of the following conditions: AMS.  Critical care was time spent personally by me on the following activities: blood draw for specimens, development of treatment plan with patient or surrogate, interpretation of cardiac output measurements, evaluation of patient's response to treatment, examination of patient, obtaining history from patient or surrogate, ordering and performing treatments and interventions, ordering and review of laboratory studies, ordering and review of radiographic studies, re-evaluation of patient's condition, pulse oximetry and review of old charts.        ED Vital Signs:  Vitals:    12/19/19 2337 12/20/19 0030 12/20/19 0039 12/20/19 0330   BP: (!) 181/99 126/75  (!) 168/94   Pulse: (!) 137 (!) 118  (!) 113   Resp: (!) 28 20  20   Temp: 98.6 °F (37 °C)      TempSrc: Oral      SpO2: 99% 100%  100%   Weight:   41.4 kg (91 lb 4.3 oz)    Height: 5' (1.524 m)       12/20/19 0348 12/20/19 0430 12/20/19 0500   BP:  (!) 188/82 (!) 172/105   Pulse: (!) 116 (!) 118 (!) 126   Resp:  20 20   Temp:      TempSrc:      SpO2:  100% 100%   Weight:      Height:          Abnormal Lab Results:  Labs Reviewed   CBC W/ AUTO DIFFERENTIAL - Abnormal; Notable for the following components:       Result Value    WBC 15.36 (*)     RDW 15.9 (*)      Platelets 376 (*)     Immature Granulocytes 0.8 (*)     Gran # (ANC) 12.5 (*)     Immature Grans (Abs) 0.12 (*)     Gran% 81.5 (*)     Lymph% 11.8 (*)     All other components within normal limits   URINALYSIS, REFLEX TO URINE CULTURE - Abnormal; Notable for the following components:    Protein, UA 2+ (*)     Ketones, UA 2+ (*)     Bilirubin (UA) 1+ (*)     Occult Blood UA Trace (*)     All other components within normal limits    Narrative:     Preferred Collection Type->Urine, Clean Catch   URINALYSIS MICROSCOPIC - Abnormal; Notable for the following components:    Bacteria Few (*)     Hyaline Casts, UA 3 (*)     All other components within normal limits    Narrative:     Preferred Collection Type->Urine, Clean Catch   COMPREHENSIVE METABOLIC PANEL - Abnormal; Notable for the following components:    CO2 14 (*)     Glucose 127 (*)     Albumin 3.3 (*)     Total Bilirubin 1.2 (*)     ALT 8 (*)     Anion Gap 17 (*)     All other components within normal limits   COMPREHENSIVE METABOLIC PANEL - Abnormal; Notable for the following components:    CO2 14 (*)     Glucose 127 (*)     Albumin 3.3 (*)     Total Bilirubin 1.2 (*)     ALT 8 (*)     Anion Gap 17 (*)     All other components within normal limits   CBC WITHOUT DIFFERENTIAL - Abnormal; Notable for the following components:    WBC 15.40 (*)     RBC 3.65 (*)     Hemoglobin 10.2 (*)     Hematocrit 31.3 (*)     RDW 15.8 (*)     All other components within normal limits   ISTAT PROCEDURE - Abnormal; Notable for the following components:    POC PCO2 25.5 (*)     POC HCO3 16.9 (*)     POC SATURATED O2 82 (*)     All other components within normal limits   INFLUENZA A & B BY MOLECULAR   CULTURE, BLOOD   CULTURE, BLOOD   LACTIC ACID, PLASMA   PROTIME-INR   TROPONIN I   LIPASE   APTT   B-TYPE NATRIURETIC PEPTIDE   HEPATITIS C ANTIBODY   PROCALCITONIN   BETA - HYDROXYBUTYRATE, SERUM   PROCALCITONIN   BETA - HYDROXYBUTYRATE, SERUM        All Lab Results:  Results for  orders placed or performed during the hospital encounter of 12/19/19   Influenza A & B by Molecular   Result Value Ref Range    Influenza A, Molecular Negative Negative    Influenza B, Molecular Negative Negative    Flu A & B Source Nasal swab    CBC auto differential   Result Value Ref Range    WBC 15.36 (H) 3.90 - 12.70 K/uL    RBC 4.72 4.00 - 5.40 M/uL    Hemoglobin 13.0 12.0 - 16.0 g/dL    Hematocrit 39.7 37.0 - 48.5 %    Mean Corpuscular Volume 84 82 - 98 fL    Mean Corpuscular Hemoglobin 27.5 27.0 - 31.0 pg    Mean Corpuscular Hemoglobin Conc 32.7 32.0 - 36.0 g/dL    RDW 15.9 (H) 11.5 - 14.5 %    Platelets 376 (H) 150 - 350 K/uL    MPV 10.0 9.2 - 12.9 fL    Immature Granulocytes 0.8 (H) 0.0 - 0.5 %    Gran # (ANC) 12.5 (H) 1.8 - 7.7 K/uL    Immature Grans (Abs) 0.12 (H) 0.00 - 0.04 K/uL    Lymph # 1.8 1.0 - 4.8 K/uL    Mono # 0.8 0.3 - 1.0 K/uL    Eos # 0.1 0.0 - 0.5 K/uL    Baso # 0.05 0.00 - 0.20 K/uL    nRBC 0 0 /100 WBC    Gran% 81.5 (H) 38.0 - 73.0 %    Lymph% 11.8 (L) 18.0 - 48.0 %    Mono% 5.3 4.0 - 15.0 %    Eosinophil% 0.3 0.0 - 8.0 %    Basophil% 0.3 0.0 - 1.9 %    Differential Method Automated    Lactic acid, plasma #1   Result Value Ref Range    Lactate (Lactic Acid) 1.7 0.5 - 2.2 mmol/L   Urinalysis, Reflex to Urine Culture Urine, Clean Catch   Result Value Ref Range    Specimen UA Urine, Catheterized     Color, UA Yellow Yellow, Straw, Tonya    Appearance, UA Clear Clear    pH, UA 6.0 5.0 - 8.0    Specific Gravity, UA 1.025 1.005 - 1.030    Protein, UA 2+ (A) Negative    Glucose, UA Negative Negative    Ketones, UA 2+ (A) Negative    Bilirubin (UA) 1+ (A) Negative    Occult Blood UA Trace (A) Negative    Nitrite, UA Negative Negative    Urobilinogen, UA 1.0 <2.0 EU/dL    Leukocytes, UA Negative Negative   Urinalysis Microscopic   Result Value Ref Range    RBC, UA 0 0 - 4 /hpf    WBC, UA 3 0 - 5 /hpf    Bacteria Few (A) None-Occ /hpf    Hyaline Casts, UA 3 (A) 0-1/lpf /lpf    Microscopic Comment  SEE COMMENT    Protime-INR   Result Value Ref Range    Prothrombin Time 11.4 9.0 - 12.5 sec    INR 1.1 0.8 - 1.2   Troponin I   Result Value Ref Range    Troponin I 0.014 0.000 - 0.026 ng/mL   Lipase   Result Value Ref Range    Lipase 13 4 - 60 U/L   Comprehensive metabolic panel   Result Value Ref Range    Sodium 139 136 - 145 mmol/L    Potassium 4.0 3.5 - 5.1 mmol/L    Chloride 108 95 - 110 mmol/L    CO2 14 (L) 23 - 29 mmol/L    Glucose 127 (H) 70 - 110 mg/dL    BUN, Bld 8 8 - 23 mg/dL    Creatinine 0.8 0.5 - 1.4 mg/dL    Calcium 9.6 8.7 - 10.5 mg/dL    Total Protein 8.1 6.0 - 8.4 g/dL    Albumin 3.3 (L) 3.5 - 5.2 g/dL    Total Bilirubin 1.2 (H) 0.1 - 1.0 mg/dL    Alkaline Phosphatase 75 55 - 135 U/L    AST 26 10 - 40 U/L    ALT 8 (L) 10 - 44 U/L    Anion Gap 17 (H) 8 - 16 mmol/L    eGFR if African American >60 >60 mL/min/1.73 m^2    eGFR if non African American >60 >60 mL/min/1.73 m^2   APTT   Result Value Ref Range    aPTT 25.1 21.0 - 32.0 sec   Brain natriuretic peptide   Result Value Ref Range    BNP 49 0 - 99 pg/mL   Comprehensive metabolic panel   Result Value Ref Range    Sodium 139 136 - 145 mmol/L    Potassium 4.0 3.5 - 5.1 mmol/L    Chloride 108 95 - 110 mmol/L    CO2 14 (L) 23 - 29 mmol/L    Glucose 127 (H) 70 - 110 mg/dL    BUN, Bld 8 8 - 23 mg/dL    Creatinine 0.8 0.5 - 1.4 mg/dL    Calcium 9.6 8.7 - 10.5 mg/dL    Total Protein 8.1 6.0 - 8.4 g/dL    Albumin 3.3 (L) 3.5 - 5.2 g/dL    Total Bilirubin 1.2 (H) 0.1 - 1.0 mg/dL    Alkaline Phosphatase 75 55 - 135 U/L    AST 26 10 - 40 U/L    ALT 8 (L) 10 - 44 U/L    Anion Gap 17 (H) 8 - 16 mmol/L    eGFR if African American >60 >60 mL/min/1.73 m^2    eGFR if non African American >60 >60 mL/min/1.73 m^2   Hepatitis C antibody   Result Value Ref Range    Hepatitis C Ab Negative Negative   CBC Without Differential   Result Value Ref Range    WBC 15.40 (H) 3.90 - 12.70 K/uL    RBC 3.65 (L) 4.00 - 5.40 M/uL    Hemoglobin 10.2 (L) 12.0 - 16.0 g/dL    Hematocrit  31.3 (L) 37.0 - 48.5 %    Mean Corpuscular Volume 86 82 - 98 fL    Mean Corpuscular Hemoglobin 27.9 27.0 - 31.0 pg    Mean Corpuscular Hemoglobin Conc 32.6 32.0 - 36.0 g/dL    RDW 15.8 (H) 11.5 - 14.5 %    Platelets 328 150 - 350 K/uL    MPV 10.4 9.2 - 12.9 fL   ISTAT PROCEDURE   Result Value Ref Range    POC PH 7.429 7.35 - 7.45    POC PCO2 25.5 (L) 35 - 45 mmHg    POC PO2 44 40 - 60 mmHg    POC HCO3 16.9 (L) 24 - 28 mmol/L    POC BE -7 -2 to 2 mmol/L    POC SATURATED O2 82 (L) 95 - 100 %    Sample VENOUS     Site Other     Allens Test N/A     DelSys Room Air          Imaging Results          CT Head Without Contrast (In process)                CT Abdomen Pelvis  Without Contrast (In process)                X-Ray Chest AP Portable (In process)              12:41 AM: Per ED physician, pt's CXR results: NAF.  4:31 AM: Per STAT radiology, pt's CT a/p results: no acute findings in the abd or pelvis    The EKG was ordered, reviewed, and independently interpreted by the ED provider.  Interpretation time: 2354  Rate: 133 BPM  Rhythm: sinus tachycardia  Interpretation: possible left atrial enlargement. Septal infarct. No STEMI.    The EKG was ordered, reviewed, and independently interpreted by the ED provider.  Interpretation time: 05:40  Rate: 127 BPM  Rhythm: sinus tachycardia  Interpretation: Possible left atrial enlargement. Septal infarct. No STEMI.      The Emergency Provider reviewed the vital signs and test results, which are outlined above.     ED Discussion     1:06 AM: Per family, patient is at [facility] after suffering a stroke. Family states patient has been addicted to pain medication, taking her medication every 2 hours. However, at [facility], patient has been given her medication every 12 hours and is in withdrawal and has not been eating for the past several days.    5:11 AM: Patient c/o adverse effect from ativan.     5:41 AM: Discussed case with Dr. Ocampo ([facility]). Dr. Ocampo agrees with  current care and management of pt and accepts admission.   Admitting Service: Hospital Medicine   Admitting Physician: Dr. Ocampo  Admit to: OBS/Tele         MDM        Medical Decision Making:   Clinical Tests:   Lab Tests: Ordered and Reviewed  Radiological Study: Reviewed and Ordered  Medical Tests: Reviewed and Ordered           ED Medication(s):  Medications   HYDROcodone-acetaminophen 5-325 mg per tablet 1 tablet (1 tablet Oral Given 12/20/19 0405)   0.9%  NaCl infusion (has no administration in time range)   naloxone 0.4 mg/mL injection 0.4 mg (has no administration in time range)   labetalol injection 20 mg (has no administration in time range)   sodium chloride 0.9% bolus 30 mL/kg (0 mL/kg Intravenous Stopped 12/20/19 0341)   ondansetron injection 4 mg (4 mg Intravenous Given 12/20/19 0008)   LORazepam tablet 1 mg (1 mg Oral Given 12/20/19 0331)   flumazenil injection 0.5 mg (0.5 mg Intravenous Given 12/20/19 0522)       New Prescriptions    No medications on file        Medication List      ASK your doctor about these medications    amLODIPine 5 MG tablet  Commonly known as:  NORVASC  Take 1 tablet (5 mg total) by mouth once daily.     aspirin 81 MG EC tablet  Commonly known as:  ECOTRIN  Take 1 tablet (81 mg total) by mouth once daily.     atorvastatin 40 MG tablet  Commonly known as:  LIPITOR  Take 1 tablet (40 mg total) by mouth every evening.     clopidogrel 75 mg tablet  Commonly known as:  PLAVIX  Take 1 tablet (75 mg total) by mouth once daily.     diclofenac sodium 1 % Gel  Commonly known as:  VOLTAREN  Apply 4 g topically once daily.     dronabinol 2.5 MG capsule  Commonly known as:  MARINOL  Take 1 capsule (2.5 mg total) by mouth once daily.     DULoxetine 20 MG capsule  Commonly known as:  CYMBALTA  Take 1 capsule (20 mg total) by mouth 2 (two) times daily.     gabapentin 300 MG capsule  Commonly known as:  NEURONTIN  Take 1 capsule (300 mg total) by mouth 3 (three) times daily.      HYDROcodone-acetaminophen  mg per tablet  Commonly known as:  NORCO  Take 1 tablet by mouth every 8 (eight) hours as needed.     insulin aspart U-100 100 unit/mL (3 mL) Inpn pen  Commonly known as:  NovoLOG  Inject 0-5 Units into the skin every 6 (six) hours as needed (Hyperglycemia).     lidocaine 5 %  Commonly known as:  LIDODERM  Place 1 patch onto the skin once daily. Remove & Discard patch within 12 hours or as directed by MD     melatonin  Commonly known as:  MELATIN  Take 2 tablets (6 mg total) by mouth nightly as needed for Insomnia.     ondansetron 4 mg/2 mL Soln  Inject 4 mg into the vein every 6 (six) hours as needed.     polyethylene glycol 17 gram Pwpk  Commonly known as:  GLYCOLAX  Take 17 g by mouth once daily.     senna-docusate 8.6-50 mg 8.6-50 mg per tablet  Commonly known as:  PERICOLACE  Take 1 tablet by mouth once daily.     traZODone 50 MG tablet  Commonly known as:  DESYREL  Take 0.5 tablets (25 mg total) by mouth every evening.     True Metrix Glucose Meter Misc  Generic drug:  blood-glucose meter     True Metrix Glucose Test Strip Strp  Generic drug:  blood sugar diagnostic     TRUEplus Lancets 33 gauge Misc  Generic drug:  lancets     Vitamin D2 50,000 unit Cap  Generic drug:  ergocalciferol            Follow-up Information     Claire Souza MD In 1 day.    Specialty:  Family Medicine  Contact information:  [address] [facility] [address]  [phone]             [facility] - .    Specialty:  Emergency Medicine  Why:  As needed, If symptoms worsen  Contact information:  [address] [facility] [address] [phone]                     Scribe Attestation:   Scribe #1: I performed the above scribed service and the documentation accurately describes the services I performed. I attest to the accuracy of the note.     Attending:   Physician Attestation Statement for Scribe #1: I, Beny Garcia MD, personally performed the services  described in this documentation, as scribed by Karie Hale/Eb Santa, in my presence, and it is both accurate and complete.           Clinical Impression       ICD-10-CM ICD-9-CM   1. Generalized abdominal pain R10.84 789.07   2. Tachycardia R00.0 785.0       Disposition:   Disposition: Placed in Observation  Condition: Fair       Beny Garcia MD  12/20/19 0547       Beny Garcia MD  12/20/19 0604

## 2019-12-20 NOTE — PLAN OF CARE
Recommendation: 1. Consider ST eval  2. RD to follow  Goals: >85% EEN/EPN by RD f/u  Nutrition Goal Status: new  Communication of RD Recs: (POC, sticky note, secure chat RN)

## 2019-12-20 NOTE — ASSESSMENT & PLAN NOTE
Nutrition Problem:  (Severe) Protein-Calorie Malnutrition  Malnutrition in the context of Chronic Illness/Injury and Social/Environmental Circumstances    Related to (etiology):  Multiple diagnoses, suspected overuse of narcotic pain medication    Signs and Symptoms (as evidenced by):  Energy Intake: <50% of estimated energy requirement for 1 month  Body Fat Depletion: severe depletion of orbitals and triceps   Muscle Mass Depletion: severe depletion of temples, clavicle region and lower extremities   Weight Loss: 20% x 6 mo     Interventions(treatment strategy):  Collaboration with other providers    Nutrition Diagnosis Status:  New

## 2019-12-20 NOTE — PLAN OF CARE
CM spoke to patient and informed her that her son had called and would like more information related to her care. CM asked if it was ok to add her son Paolo to the Emergency Contact, patient replied yes and also informed CM to make him first. CM made changes in the system to reflect the request.

## 2019-12-20 NOTE — ASSESSMENT & PLAN NOTE
- Possibly secondary to known gastroparesis.  - CT of ABD/Pelvis was negative for acute process.  -  PO Reglan.

## 2019-12-20 NOTE — SUBJECTIVE & OBJECTIVE
Past Medical History:   Diagnosis Date    Anxiety     Back pain     chronic    Diabetes mellitus     Gastroparesis     Hypertension     Sciatica     Stroke        History reviewed. No pertinent surgical history.    Review of patient's allergies indicates:   Allergen Reactions    Morphine Other (See Comments)     headache    Latex, natural rubber Rash       No current facility-administered medications on file prior to encounter.      Current Outpatient Medications on File Prior to Encounter   Medication Sig    amLODIPine (NORVASC) 5 MG tablet Take 1 tablet (5 mg total) by mouth once daily.    aspirin (ECOTRIN) 81 MG EC tablet Take 1 tablet (81 mg total) by mouth once daily.    atorvastatin (LIPITOR) 40 MG tablet Take 1 tablet (40 mg total) by mouth every evening.    clopidogrel (PLAVIX) 75 mg tablet Take 1 tablet (75 mg total) by mouth once daily.    diclofenac sodium (VOLTAREN) 1 % Gel Apply 4 g topically once daily.    dronabinol (MARINOL) 2.5 MG capsule Take 1 capsule (2.5 mg total) by mouth once daily.    DULoxetine (CYMBALTA) 20 MG capsule Take 1 capsule (20 mg total) by mouth 2 (two) times daily.    ergocalciferol (VITAMIN D2) 50,000 unit Cap Take 50,000 Units by mouth every 7 days.    gabapentin (NEURONTIN) 300 MG capsule Take 1 capsule (300 mg total) by mouth 3 (three) times daily.    HYDROcodone-acetaminophen (NORCO)  mg per tablet Take 1 tablet by mouth every 8 (eight) hours as needed.    insulin aspart U-100 (NOVOLOG) 100 unit/mL (3 mL) InPn pen Inject 0-5 Units into the skin every 6 (six) hours as needed (Hyperglycemia).    lidocaine (LIDODERM) 5 % Place 1 patch onto the skin once daily. Remove & Discard patch within 12 hours or as directed by MD    melatonin (MELATIN) Take 2 tablets (6 mg total) by mouth nightly as needed for Insomnia.    ondansetron 4 mg/2 mL Soln Inject 4 mg into the vein every 6 (six) hours as needed.    polyethylene glycol (GLYCOLAX) 17 gram PwPk Take 17  "g by mouth once daily.    senna-docusate 8.6-50 mg (PERICOLACE) 8.6-50 mg per tablet Take 1 tablet by mouth once daily.    traZODone (DESYREL) 50 MG tablet Take 0.5 tablets (25 mg total) by mouth every evening.    TRUE METRIX GLUCOSE METER Misc     TRUE METRIX GLUCOSE TEST STRIP Strp     TRUEPLUS LANCETS 33 gauge Misc      Family History     Problem Relation (Age of Onset)    Hyperlipidemia Father    Hypertension Mother        Tobacco Use    Smoking status: Never Smoker    Smokeless tobacco: Never Used   Substance and Sexual Activity    Alcohol use: No     Alcohol/week: 0.0 standard drinks    Drug use: No    Sexual activity: Never     Review of Systems   Constitutional: Negative for appetite change, chills, diaphoresis, fatigue and fever.   HENT: Negative for congestion, drooling, sore throat and trouble swallowing.    Eyes: Negative for visual disturbance.   Respiratory: Negative for cough, chest tightness, shortness of breath and wheezing.    Cardiovascular: Negative for chest pain, palpitations and leg swelling.   Gastrointestinal: Positive for abdominal pain, nausea and vomiting. Negative for abdominal distention, blood in stool, constipation and diarrhea.   Genitourinary: Negative for difficulty urinating, dysuria, frequency, hematuria and urgency.   Musculoskeletal: Negative for arthralgias, back pain and myalgias.   Skin: Negative for pallor, rash and wound.   Neurological: Negative for dizziness, tremors, seizures, syncope, facial asymmetry, speech difficulty, weakness, light-headedness, numbness and headaches.   Psychiatric/Behavioral: Negative for agitation, confusion and hallucinations. The patient is not nervous/anxious.         "Feeling funny".   All other systems reviewed and are negative.    Objective:     Vital Signs (Most Recent):  Temp: 98.6 °F (37 °C) (12/19/19 2337)  Pulse: (!) 126 (12/20/19 0610)  Resp: 20 (12/20/19 0610)  BP: (!) 190/121 (12/20/19 0610)  SpO2: 99 % (12/20/19 0610) " Vital Signs (24h Range):  Temp:  [98.6 °F (37 °C)] 98.6 °F (37 °C)  Pulse:  [113-137] 126  Resp:  [20-28] 20  SpO2:  [99 %-100 %] 99 %  BP: (126-190)/() 190/121     Weight: 41.4 kg (91 lb 4.3 oz)  Body mass index is 17.83 kg/m².    Physical Exam   Constitutional: She is oriented to person, place, and time. She appears well-developed and well-nourished. No distress.   HENT:   Head: Normocephalic and atraumatic.   Eyes: Conjunctivae are normal.   PERRL; EOM intact.   Neck: Normal range of motion. Neck supple.   Cardiovascular: Regular rhythm, S1 normal, S2 normal and intact distal pulses.  No extrasystoles are present. Tachycardia present. Exam reveals no gallop and no friction rub.   No murmur heard.  Pulses:       Radial pulses are 2+ on the right side, and 2+ on the left side.        Dorsalis pedis pulses are 2+ on the right side, and 2+ on the left side.        Posterior tibial pulses are 2+ on the right side, and 2+ on the left side.   Pulmonary/Chest: Effort normal and breath sounds normal. No accessory muscle usage. No tachypnea. No respiratory distress. She has no wheezes. She has no rhonchi. She has no rales.   Abdominal: Soft. Bowel sounds are normal. She exhibits no distension. There is no hepatosplenomegaly. There is generalized tenderness. There is no rigidity, no rebound, no guarding, no CVA tenderness and negative Inman's sign. No hernia.   Musculoskeletal: Normal range of motion. She exhibits no edema, tenderness or deformity.   Neurological: She is alert and oriented to person, place, and time. No cranial nerve deficit or sensory deficit. GCS eye subscore is 4. GCS verbal subscore is 5. GCS motor subscore is 6.   No acute focal deficits appreciated.    Skin: Skin is warm, dry and intact. Capillary refill takes less than 2 seconds. No rash noted. She is not diaphoretic. No cyanosis or erythema.   Psychiatric: She has a normal mood and affect. Her speech is normal and behavior is normal.  Cognition and memory are normal.   Nursing note and vitals reviewed.          Significant Labs:  Results for orders placed or performed during the hospital encounter of 12/19/19   Influenza A & B by Molecular   Result Value Ref Range    Influenza A, Molecular Negative Negative    Influenza B, Molecular Negative Negative    Flu A & B Source Nasal swab    CBC auto differential   Result Value Ref Range    WBC 15.36 (H) 3.90 - 12.70 K/uL    RBC 4.72 4.00 - 5.40 M/uL    Hemoglobin 13.0 12.0 - 16.0 g/dL    Hematocrit 39.7 37.0 - 48.5 %    Mean Corpuscular Volume 84 82 - 98 fL    Mean Corpuscular Hemoglobin 27.5 27.0 - 31.0 pg    Mean Corpuscular Hemoglobin Conc 32.7 32.0 - 36.0 g/dL    RDW 15.9 (H) 11.5 - 14.5 %    Platelets 376 (H) 150 - 350 K/uL    MPV 10.0 9.2 - 12.9 fL    Immature Granulocytes 0.8 (H) 0.0 - 0.5 %    Gran # (ANC) 12.5 (H) 1.8 - 7.7 K/uL    Immature Grans (Abs) 0.12 (H) 0.00 - 0.04 K/uL    Lymph # 1.8 1.0 - 4.8 K/uL    Mono # 0.8 0.3 - 1.0 K/uL    Eos # 0.1 0.0 - 0.5 K/uL    Baso # 0.05 0.00 - 0.20 K/uL    nRBC 0 0 /100 WBC    Gran% 81.5 (H) 38.0 - 73.0 %    Lymph% 11.8 (L) 18.0 - 48.0 %    Mono% 5.3 4.0 - 15.0 %    Eosinophil% 0.3 0.0 - 8.0 %    Basophil% 0.3 0.0 - 1.9 %    Differential Method Automated    Lactic acid, plasma #1   Result Value Ref Range    Lactate (Lactic Acid) 1.7 0.5 - 2.2 mmol/L   Urinalysis, Reflex to Urine Culture Urine, Clean Catch   Result Value Ref Range    Specimen UA Urine, Catheterized     Color, UA Yellow Yellow, Straw, Tonya    Appearance, UA Clear Clear    pH, UA 6.0 5.0 - 8.0    Specific Gravity, UA 1.025 1.005 - 1.030    Protein, UA 2+ (A) Negative    Glucose, UA Negative Negative    Ketones, UA 2+ (A) Negative    Bilirubin (UA) 1+ (A) Negative    Occult Blood UA Trace (A) Negative    Nitrite, UA Negative Negative    Urobilinogen, UA 1.0 <2.0 EU/dL    Leukocytes, UA Negative Negative   Urinalysis Microscopic   Result Value Ref Range    RBC, UA 0 0 - 4 /hpf    WBC, UA 3  0 - 5 /hpf    Bacteria Few (A) None-Occ /hpf    Hyaline Casts, UA 3 (A) 0-1/lpf /lpf    Microscopic Comment SEE COMMENT    Protime-INR   Result Value Ref Range    Prothrombin Time 11.4 9.0 - 12.5 sec    INR 1.1 0.8 - 1.2   Troponin I   Result Value Ref Range    Troponin I 0.014 0.000 - 0.026 ng/mL   Lipase   Result Value Ref Range    Lipase 13 4 - 60 U/L   Comprehensive metabolic panel   Result Value Ref Range    Sodium 139 136 - 145 mmol/L    Potassium 4.0 3.5 - 5.1 mmol/L    Chloride 108 95 - 110 mmol/L    CO2 14 (L) 23 - 29 mmol/L    Glucose 127 (H) 70 - 110 mg/dL    BUN, Bld 8 8 - 23 mg/dL    Creatinine 0.8 0.5 - 1.4 mg/dL    Calcium 9.6 8.7 - 10.5 mg/dL    Total Protein 8.1 6.0 - 8.4 g/dL    Albumin 3.3 (L) 3.5 - 5.2 g/dL    Total Bilirubin 1.2 (H) 0.1 - 1.0 mg/dL    Alkaline Phosphatase 75 55 - 135 U/L    AST 26 10 - 40 U/L    ALT 8 (L) 10 - 44 U/L    Anion Gap 17 (H) 8 - 16 mmol/L    eGFR if African American >60 >60 mL/min/1.73 m^2    eGFR if non African American >60 >60 mL/min/1.73 m^2   APTT   Result Value Ref Range    aPTT 25.1 21.0 - 32.0 sec   Brain natriuretic peptide   Result Value Ref Range    BNP 49 0 - 99 pg/mL   Comprehensive metabolic panel   Result Value Ref Range    Sodium 139 136 - 145 mmol/L    Potassium 4.0 3.5 - 5.1 mmol/L    Chloride 108 95 - 110 mmol/L    CO2 14 (L) 23 - 29 mmol/L    Glucose 127 (H) 70 - 110 mg/dL    BUN, Bld 8 8 - 23 mg/dL    Creatinine 0.8 0.5 - 1.4 mg/dL    Calcium 9.6 8.7 - 10.5 mg/dL    Total Protein 8.1 6.0 - 8.4 g/dL    Albumin 3.3 (L) 3.5 - 5.2 g/dL    Total Bilirubin 1.2 (H) 0.1 - 1.0 mg/dL    Alkaline Phosphatase 75 55 - 135 U/L    AST 26 10 - 40 U/L    ALT 8 (L) 10 - 44 U/L    Anion Gap 17 (H) 8 - 16 mmol/L    eGFR if African American >60 >60 mL/min/1.73 m^2    eGFR if non African American >60 >60 mL/min/1.73 m^2   Hepatitis C antibody   Result Value Ref Range    Hepatitis C Ab Negative Negative   CBC Without Differential   Result Value Ref Range    WBC 15.40  (H) 3.90 - 12.70 K/uL    RBC 3.65 (L) 4.00 - 5.40 M/uL    Hemoglobin 10.2 (L) 12.0 - 16.0 g/dL    Hematocrit 31.3 (L) 37.0 - 48.5 %    Mean Corpuscular Volume 86 82 - 98 fL    Mean Corpuscular Hemoglobin 27.9 27.0 - 31.0 pg    Mean Corpuscular Hemoglobin Conc 32.6 32.0 - 36.0 g/dL    RDW 15.8 (H) 11.5 - 14.5 %    Platelets 328 150 - 350 K/uL    MPV 10.4 9.2 - 12.9 fL   ISTAT PROCEDURE   Result Value Ref Range    POC PH 7.429 7.35 - 7.45    POC PCO2 25.5 (L) 35 - 45 mmHg    POC PO2 44 40 - 60 mmHg    POC HCO3 16.9 (L) 24 - 28 mmol/L    POC BE -7 -2 to 2 mmol/L    POC SATURATED O2 82 (L) 95 - 100 %    Sample VENOUS     Site Other     Allens Test N/A     DelSys Room Air       All pertinent labs within the past 24 hours have been reviewed.    Significant Imaging:  Imaging Results          CT Head Without Contrast (In process)    No acute process.            CT Abdomen Pelvis  Without Contrast (In process)    No acute process.            X-Ray Chest AP Portable (In process)    No acute process.            I have reviewed all pertinent imaging results/findings within the past 24 hours.     EKG: (personally reviewed)

## 2019-12-20 NOTE — SUBJECTIVE & OBJECTIVE
Interval History:     Review of Systems   Constitutional: Negative.    HENT: Negative.    Eyes: Negative.    Respiratory: Negative.    Cardiovascular: Negative.    Gastrointestinal: Negative.    Endocrine: Negative.    Genitourinary: Negative.    Musculoskeletal: Negative.    Skin: Negative.    Allergic/Immunologic: Negative.    Neurological: Negative.    Hematological: Negative.    Psychiatric/Behavioral: Positive for agitation.     Objective:     Vital Signs (Most Recent):  Temp: 97.8 °F (36.6 °C) (12/20/19 1136)  Pulse: 91 (12/20/19 1136)  Resp: 20 (12/20/19 1136)  BP: (!) 218/100 (12/20/19 1136)  SpO2: 100 % (12/20/19 1136) Vital Signs (24h Range):  Temp:  [97.8 °F (36.6 °C)-98.6 °F (37 °C)] 97.8 °F (36.6 °C)  Pulse:  [] 91  Resp:  [16-28] 20  SpO2:  [99 %-100 %] 100 %  BP: (126-218)/() 218/100     Weight: 41.5 kg (91 lb 7.9 oz)  Body mass index is 19.12 kg/m².    Intake/Output Summary (Last 24 hours) at 12/20/2019 1141  Last data filed at 12/20/2019 0341  Gross per 24 hour   Intake 1000 ml   Output --   Net 1000 ml      Physical Exam   Constitutional: She is oriented to person, place, and time. She appears well-developed and well-nourished. She appears distressed.   HENT:   Head: Normocephalic and atraumatic.   Eyes: Pupils are equal, round, and reactive to light. EOM are normal.   Neck: Normal range of motion. Neck supple.   Cardiovascular: Normal rate and regular rhythm.   Pulmonary/Chest: Effort normal and breath sounds normal.   Abdominal: Soft. Bowel sounds are normal. There is tenderness.   Right flank pain    Musculoskeletal: Normal range of motion.   Neurological: She is alert and oriented to person, place, and time.   Skin: Skin is warm and dry.   Psychiatric: She has a normal mood and affect. Her behavior is normal. Judgment and thought content normal.   Nursing note and vitals reviewed.      Significant Labs:   BMP:   Recent Labs   Lab 12/20/19  0712   *      K 3.7       CO2 15*   BUN 9   CREATININE 0.8   CALCIUM 9.8   MG 1.8     CBC:   Recent Labs   Lab 12/20/19  0000 12/20/19  0110   WBC 15.36* 15.40*   HGB 13.0 10.2*   HCT 39.7 31.3*   * 328     CMP:   Recent Labs   Lab 12/20/19  0110 12/20/19  0712     139 140   K 4.0  4.0 3.7     108 109   CO2 14*  14* 15*   *  127* 132*   BUN 8  8 9   CREATININE 0.8  0.8 0.8   CALCIUM 9.6  9.6 9.8   PROT 8.1  8.1  --    ALBUMIN 3.3*  3.3*  --    BILITOT 1.2*  1.2*  --    ALKPHOS 75  75  --    AST 26  26  --    ALT 8*  8*  --    ANIONGAP 17*  17* 16   EGFRNONAA >60  >60 >60       Significant Imaging:

## 2019-12-20 NOTE — PLAN OF CARE
CM spoke to patient's son who called to hospital to speak to CM. Patient's son Paolo states that he currently lives in [place] and called the ER on last night to inform staff about his mother and provide his contact information. Patient son states that his mother lives at home with her sister who has Dementia and her nephew who cannot participate in her care. Son states that he has been communicating with [facility] and would like for staff to call him with any questions or concerns. CM asked if he had POA, or Medical POA because he was not listed under Emergency Contact. Patient son states that patient will not sign any information to let him help her. CM informed son that I would leave his number and name in the note and ask floor nurse to call him, per his request. Son reports that patient is at [facility] and they call him for her plan of care being that he is her only child, states that his mother needs a lot of help but refuses to move with him to [place]. Paolo Harrell (son) [phone].

## 2019-12-20 NOTE — ASSESSMENT & PLAN NOTE
- Etiology unclear.  Appears chronic with baseline HCO3 of 17-19.  No acidosis with pH 7.429.  - Will give PO supplementation.  - Follow labs.

## 2019-12-20 NOTE — NURSING
0720 - patient complains of pain and states BP high due to back pain. Notified MD, awaiting new orders.  0900 - MD saw patient at the bedside assessed pt and ordered IV Ketorolac.   0924 - IV pain meds given; recheck done and patient pain level continue and BP increased.  1058 - Notified MD and IV BP meds given; rechecked and BP remains same.  1130 - attempted reposition changes and darken patient room  1230 New orders for pain med and once dose of oral BP meds; given Ultram and Catapress by mouth; Recheck BP and pain level no resolution.   Informed MD that patient BP levels and pain levels remain the same after medications administered. Stopped IV fluids to prevent fluid overload. Check breath sounds diminished but not coarse or wet at this time.

## 2019-12-20 NOTE — ASSESSMENT & PLAN NOTE
- WBC of 15.4, HR >90, RR >20.  Lactic and procalcitonin normal.    - Probable UTI   - Blood cultures obtained in the ED.  - Supportive care.  IV hydration.  -Rocephin added

## 2019-12-20 NOTE — HOSPITAL COURSE
12/20/  Complaints of right flank pain   Tramadol /Toradol added for pain    12/21/19  Right humerus fracture- evaluated by ortho   Recommends conservative management  Right psoas muscle hematoma visualized on CT of abdomen and pelvis  Interventional Radiology currently does not see indication for aspiration or placement of drain  SIRS of unclear etiology has resolved.  Seen and examined , stable for discharge      12/22/19   Discharge held due to placement issues      12/23/19    Lopressor added this am     Due to tachycardia      12/24/19     Seen and examined , stable for discharge

## 2019-12-20 NOTE — ED NOTES
Patient states I am feeling weird after that medication. Pt states I feel like I am having an overdose of medication. VS stable. Dr. Garcia at the bedside.

## 2019-12-20 NOTE — PLAN OF CARE
Continue to monitor patient BP due to abnormal levels. Gave PRN hypertension medications per MAR. MD aware of abnormal findings. She refuse to eat was able to get her to drink liquids. She complains of back pain, administered PRN pain meds per MAR. Continue on tele monitor. Will continue to monitor.

## 2019-12-20 NOTE — PROGRESS NOTES
Patient was complaining of pain very frequently. Dr. Garcia aware. Pt stated she had cp, EKG obtained and Dr. Garcia aware. 1  Mg of ativan and 5 mg hydrocodone-acetaminophen administered. Pt stated she was feeling bad after medication. MD aware. Pt vomited on herself. Romazicon administered. Pt became unresponsive to verbal stimuli but aroused to sternal rub. Dr. Garcia ordered narcan but pt woke up and began stating her leg and back was hurting again. Narcan not administered due to pt being AAOx4. MD aware.

## 2019-12-20 NOTE — ASSESSMENT & PLAN NOTE
- WBC of 15.4, HR >90, RR >20.  Lactic and procalcitonin normal.  Patient afebrile.   - No evidence of infectious process: UA and CXR unremarkable, CT of ABD/pelvis negative for acute process.  - Blood cultures obtained in the ED.  - Supportive care.  IV hydration.  - Will add empiric abx if patient becomes febrile or WBC trend up.  - Follow labs.

## 2019-12-20 NOTE — ASSESSMENT & PLAN NOTE
- Possibly secondary to known gastroparesis.  - CT of ABD/Pelvis was negative for acute process.  - Add scheduled PO Reglan.  - Try to avoid opiates if possible.  - Consider GI consult if no improvement.

## 2019-12-20 NOTE — PLAN OF CARE
CM spoke to patient who is awake, alert, and able to make needs known. Patient reports that her family lives at home with her, and she uses a cane when she is at home. Patient reports that she did not want to answer all those questions because she was not feeling well. CM spoke to Rosalva with [facility] that confirmed patient is at [facility] and asked if I could send referral information.  CM provided a transitional care folder, information on advanced directives, information on pharmacy bedside delivery, and discharge planning begins on admission with contact information for any needs/questions.    D/C Plan: [facility]   PCP: Dr. Souza   Preferred Pharmacy:   Discharge transportation: [facility]   My [facility]:   Pharmacy Bedside Delivery:       [facility] [address]  [address]  [place] [address]  Phone: [phone] Fax: [phone]       12/20/19 1027   Discharge Assessment   Assessment Type Discharge Planning Assessment   Confirmed/corrected address and phone number on facesheet? Yes   Assessment information obtained from? Patient   Expected Length of Stay (days)   (TBD )   Communicated expected length of stay with patient/caregiver yes   Prior to hospitilization cognitive status: Alert/Oriented   Prior to hospitalization functional status: Assistive Equipment   Current cognitive status: Alert/Oriented   Current Functional Status: Assistive Equipment   Facility Arrived From: [facility]    Lives With other relative(s)   Able to Return to Prior Arrangements yes   Is patient able to care for self after discharge?   (TBD )   Who are your caregiver(s) and their phone number(s)? Aliyah (relative ) [phone]   Patient's perception of discharge disposition skilled nursing facility   Patient currently being followed by outpatient case management? No   Patient currently receives any other outside agency services? No   Equipment Currently Used at Home cane, straight    Do you have any problems affording any of your prescribed medications? No   Is the patient taking medications as prescribed? yes   Does the patient have transportation home? Yes   Transportation Anticipated family or friend will provide   Does the patient receive services at the [facility]? No   Discharge Plan A Return to nursing home;Skilled Nursing Facility   DME Needed Upon Discharge  none   Patient/Family in Agreement with Plan yes

## 2019-12-20 NOTE — HPI
Ms. Barker is a 74 y.o. 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  [facility] for c/o worsening right-sided weakness after receiving Zanaflex.  Upon arrival to the ED, patient was AAO x 4 with no acute focal neuro deficits noted.  However, patient had c/o generalized ABD pain with associated nausea and vomiting.  Family reports patient overuses her narcotic pain medications, which has been decreased while at [facility].  Family thinks symptoms are due to opiate withdraw.  Patient denies any HA, lightheadedness, dizziness, syncope, visual disturbance, facial droop, slurred speech, confusion, aphasia, dysphagia, hemiparesis, CP, palpitations, SOB, cough, wheezing, rhinorrhea, sore throat, ABD distention, diarrhea, constipation, hematemesis, melena, dysuria, hematuria, back or neck pain, fatigue, fever or chills.  Work-up in the ED resulted WBC 15.4, 0% bands, CO2 14, anion gap 17, stable kidney function and LFTs, lipase 13, negative troponin, lactic 1.7, procalcitonin 0.13, influenza negative, UA negative for UTI, CXR unremarkable, EKG unrevealing.  CT of the head and ABD/pelvis were negative for acute process.  VBG with pH 7.429, HCO3 16.9, BE -7.  In the ED, patient tachycardic (sinus) and hypertensive with /121.  IV labetalol 20 mg given, which improved HR into 80s and BP to 137/88.  Hospital Medicine was called for admission.

## 2019-12-20 NOTE — PROGRESS NOTES
[facility] Hospital Medicine  Progress Note    Patient Name: Melva Barker  MRN: [id number]  Patient Class: OP- Observation   Admission Date: 12/19/2019  Length of Stay: 0 days  Attending Physician: Edie Munguia MD  Primary Care Provider: Claire Souza MD        Subjective:     Principal Problem:SIRS (systemic inflammatory response syndrome)        HPI:  Ms. Barker is a 74 y.o. female  with a PMHx of HTN, HLD, recent CVA in 10/2019 with right-sided weakness, DM II with gastroparesis, chronic back pain, anxiety, malnutrition, and opiate dependence.  She was transferred from [facility] to the ED for c/o worsening right-sided weakness after receiving Zanaflex.  Upon arrival to the ED, patient was AAO x 4 with no acute focal neuro deficits noted.  However, patient had c/o generalized ABD pain with associated nausea and vomiting.  Family reports patient overuses her narcotic pain medications, which [facility].  Family thinks symptoms are due to opiate withdraw.  Patient denies any HA,  lightheadedness, dizziness, syncope, visual disturbance, facial droop, slurred speech, confusion, aphasia, dysphagia, hemiparesis, CP, palpitations, SOB, cough, wheezing, rhinorrhea, sore throat, ABD distention, diarrhea, constipation, hematemesis, melena, dysuria, hematuria, back or neck pain, fatigue, fever or chills.  Work-up in the ED resulted WBC 15.4, 0% bands, CO2 14, anion gap 17, stable kidney function and LFTs, lipase 13, negative troponin, lactic 1.7, procalcitonin 0.13, influenza negative, UA negative for UTI, CXR unremarkable, EKG unrevealing.  CT of the head and ABD/pelvis were negative for acute process.  VBG with pH 7.429, HCO3 16.9, BE -7.  In the ED, patient tachycardic (sinus) and hypertensive with /121.  IV labetalol 20 mg given, which improved HR into 80s and BP to 137/88.  Hospital Medicine was called for admission.      Overview/Hospital  Course:  12/20/  Complaints of right flank pain   Tramadol /Toradol added for pain      Interval History:     Review of Systems   Constitutional: Negative.    HENT: Negative.    Eyes: Negative.    Respiratory: Negative.    Cardiovascular: Negative.    Gastrointestinal: Negative.    Endocrine: Negative.    Genitourinary: Negative.    Musculoskeletal: Negative.    Skin: Negative.    Allergic/Immunologic: Negative.    Neurological: Negative.    Hematological: Negative.    Psychiatric/Behavioral: Positive for agitation.     Objective:     Vital Signs (Most Recent):  Temp: 97.8 °F (36.6 °C) (12/20/19 1136)  Pulse: 91 (12/20/19 1136)  Resp: 20 (12/20/19 1136)  BP: (!) 218/100 (12/20/19 1136)  SpO2: 100 % (12/20/19 1136) Vital Signs (24h Range):  Temp:  [97.8 °F (36.6 °C)-98.6 °F (37 °C)] 97.8 °F (36.6 °C)  Pulse:  [] 91  Resp:  [16-28] 20  SpO2:  [99 %-100 %] 100 %  BP: (126-218)/() 218/100     Weight: 41.5 kg (91 lb 7.9 oz)  Body mass index is 19.12 kg/m².    Intake/Output Summary (Last 24 hours) at 12/20/2019 1141  Last data filed at 12/20/2019 0341  Gross per 24 hour   Intake 1000 ml   Output --   Net 1000 ml      Physical Exam   Constitutional: She is oriented to person, place, and time. She appears well-developed and well-nourished. She appears distressed.   HENT:   Head: Normocephalic and atraumatic.   Eyes: Pupils are equal, round, and reactive to light. EOM are normal.   Neck: Normal range of motion. Neck supple.   Cardiovascular: Normal rate and regular rhythm.   Pulmonary/Chest: Effort normal and breath sounds normal.   Abdominal: Soft. Bowel sounds are normal. There is tenderness.   Right flank pain    Musculoskeletal: Normal range of motion.   Neurological: She is alert and oriented to person, place, and time.   Skin: Skin is warm and dry.   Psychiatric: She has a normal mood and affect. Her behavior is normal. Judgment and thought content normal.   Nursing note and vitals  reviewed.      Significant Labs:   BMP:   Recent Labs   Lab 12/20/19  0712   *      K 3.7      CO2 15*   BUN 9   CREATININE 0.8   CALCIUM 9.8   MG 1.8     CBC:   Recent Labs   Lab 12/20/19  0000 12/20/19  0110   WBC 15.36* 15.40*   HGB 13.0 10.2*   HCT 39.7 31.3*   * 328     CMP:   Recent Labs   Lab 12/20/19  0110 12/20/19  0712     139 140   K 4.0  4.0 3.7     108 109   CO2 14*  14* 15*   *  127* 132*   BUN 8  8 9   CREATININE 0.8  0.8 0.8   CALCIUM 9.6  9.6 9.8   PROT 8.1  8.1  --    ALBUMIN 3.3*  3.3*  --    BILITOT 1.2*  1.2*  --    ALKPHOS 75  75  --    AST 26  26  --    ALT 8*  8*  --    ANIONGAP 17*  17* 16   EGFRNONAA >60  >60 >60       Significant Imaging:       Assessment/Plan:      * SIRS (systemic inflammatory response syndrome)  - WBC of 15.4, HR >90, RR >20.  Lactic and procalcitonin normal.    - Probable UTI   - Blood cultures obtained in the ED.  - Supportive care.  IV hydration.  -Rocephin added    Bicarbonate deficit  - Etiology unclear.  Appears chronic with baseline HCO3 of 17-19.  No acidosis with pH 7.429.  - Will give PO supplementation.  - Follow labs.    Generalized abdominal pain  - Possibly secondary to known gastroparesis.  - CT of ABD/Pelvis was negative for acute process.  -  PO Reglan.      Moderate malnutrition  - Dietary consult.    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  - Continue ASA, Plavix, and statin.  - BP control as above.    Uncontrolled hypertension  - Improved after IV labetalol given in the ED.  - Continue home amlodipine.  - IV labetalol PRN.    Type 2 diabetes mellitus, with long-term current use of insulin  - AccuChecks with moderate dose SSI.  - Diabetic diet.  - Recent HbA1c of 8.3.      VTE Risk Mitigation (From admission, onward)         Ordered     enoxaparin injection 40 mg  Daily      12/20/19 0700     IP VTE HIGH RISK PATIENT   Once      12/20/19 0700     Place sequential compression device  Until discontinued      12/20/19 0700                      Edie Munguia MD  Department of Hospital Medicine   [facility]

## 2019-12-20 NOTE — PLAN OF CARE
Patient is currently with [facility]        12/20/19 1101   Post-Acute Status   Post-Acute Authorization Placement   Post-Acute Placement Status Set-up Complete

## 2019-12-21 PROBLEM — R10.84 GENERALIZED ABDOMINAL PAIN: Status: RESOLVED | Noted: 2019-12-20 | Resolved: 2019-12-21

## 2019-12-21 PROBLEM — I10 UNCONTROLLED HYPERTENSION: Chronic | Status: RESOLVED | Noted: 2017-04-23 | Resolved: 2019-12-21

## 2019-12-21 PROBLEM — R65.10 SIRS (SYSTEMIC INFLAMMATORY RESPONSE SYNDROME): Status: RESOLVED | Noted: 2019-12-20 | Resolved: 2019-12-21

## 2019-12-21 PROBLEM — N25.89: Status: RESOLVED | Noted: 2019-12-20 | Resolved: 2019-12-21

## 2019-12-21 LAB
ALBUMIN SERPL BCP-MCNC: 3.1 G/DL (ref 3.5–5.2)
ALP SERPL-CCNC: 71 U/L (ref 55–135)
ALT SERPL W/O P-5'-P-CCNC: 6 U/L (ref 10–44)
ANION GAP SERPL CALC-SCNC: 15 MMOL/L (ref 8–16)
AST SERPL-CCNC: 19 U/L (ref 10–40)
BASOPHILS # BLD AUTO: 0.06 K/UL (ref 0–0.2)
BASOPHILS NFR BLD: 0.5 % (ref 0–1.9)
BILIRUB SERPL-MCNC: 1.2 MG/DL (ref 0.1–1)
BUN SERPL-MCNC: 8 MG/DL (ref 8–23)
CALCIUM SERPL-MCNC: 9 MG/DL (ref 8.7–10.5)
CHLORIDE SERPL-SCNC: 107 MMOL/L (ref 95–110)
CO2 SERPL-SCNC: 15 MMOL/L (ref 23–29)
CREAT SERPL-MCNC: 0.8 MG/DL (ref 0.5–1.4)
DIFFERENTIAL METHOD: ABNORMAL
EOSINOPHIL # BLD AUTO: 0.2 K/UL (ref 0–0.5)
EOSINOPHIL NFR BLD: 1.9 % (ref 0–8)
ERYTHROCYTE [DISTWIDTH] IN BLOOD BY AUTOMATED COUNT: 15.5 % (ref 11.5–14.5)
EST. GFR  (AFRICAN AMERICAN): >60 ML/MIN/1.73 M^2
EST. GFR  (NON AFRICAN AMERICAN): >60 ML/MIN/1.73 M^2
GLUCOSE SERPL-MCNC: 98 MG/DL (ref 70–110)
HCT VFR BLD AUTO: 28.8 % (ref 37–48.5)
HGB BLD-MCNC: 9.6 G/DL (ref 12–16)
IMM GRANULOCYTES # BLD AUTO: 0.09 K/UL (ref 0–0.04)
IMM GRANULOCYTES NFR BLD AUTO: 0.7 % (ref 0–0.5)
LYMPHOCYTES # BLD AUTO: 3 K/UL (ref 1–4.8)
LYMPHOCYTES NFR BLD: 24.9 % (ref 18–48)
MCH RBC QN AUTO: 27.4 PG (ref 27–31)
MCHC RBC AUTO-ENTMCNC: 33.3 G/DL (ref 32–36)
MCV RBC AUTO: 82 FL (ref 82–98)
MONOCYTES # BLD AUTO: 1 K/UL (ref 0.3–1)
MONOCYTES NFR BLD: 8.5 % (ref 4–15)
NEUTROPHILS # BLD AUTO: 7.7 K/UL (ref 1.8–7.7)
NEUTROPHILS NFR BLD: 63.5 % (ref 38–73)
NRBC BLD-RTO: 0 /100 WBC
PLATELET # BLD AUTO: 367 K/UL (ref 150–350)
PMV BLD AUTO: 10.3 FL (ref 9.2–12.9)
POCT GLUCOSE: 103 MG/DL (ref 70–110)
POCT GLUCOSE: 108 MG/DL (ref 70–110)
POCT GLUCOSE: 154 MG/DL (ref 70–110)
POCT GLUCOSE: 174 MG/DL (ref 70–110)
POTASSIUM SERPL-SCNC: 3.3 MMOL/L (ref 3.5–5.1)
PROT SERPL-MCNC: 7.5 G/DL (ref 6–8.4)
RBC # BLD AUTO: 3.5 M/UL (ref 4–5.4)
SODIUM SERPL-SCNC: 137 MMOL/L (ref 136–145)
WBC # BLD AUTO: 12.15 K/UL (ref 3.9–12.7)

## 2019-12-21 PROCEDURE — 96376 TX/PRO/DX INJ SAME DRUG ADON: CPT | Performed by: EMERGENCY MEDICINE

## 2019-12-21 PROCEDURE — 25000003 PHARM REV CODE 250: Performed by: NURSE PRACTITIONER

## 2019-12-21 PROCEDURE — 85025 COMPLETE CBC W/AUTO DIFF WBC: CPT

## 2019-12-21 PROCEDURE — 63600175 PHARM REV CODE 636 W HCPCS: Performed by: NURSE PRACTITIONER

## 2019-12-21 PROCEDURE — 80053 COMPREHEN METABOLIC PANEL: CPT

## 2019-12-21 PROCEDURE — 96372 THER/PROPH/DIAG INJ SC/IM: CPT | Mod: 59 | Performed by: EMERGENCY MEDICINE

## 2019-12-21 PROCEDURE — 36415 COLL VENOUS BLD VENIPUNCTURE: CPT

## 2019-12-21 PROCEDURE — G0378 HOSPITAL OBSERVATION PER HR: HCPCS

## 2019-12-21 PROCEDURE — 25000003 PHARM REV CODE 250: Performed by: INTERNAL MEDICINE

## 2019-12-21 PROCEDURE — 63600175 PHARM REV CODE 636 W HCPCS: Performed by: INTERNAL MEDICINE

## 2019-12-21 RX ORDER — TRAMADOL HYDROCHLORIDE 50 MG/1
50 TABLET ORAL EVERY 6 HOURS PRN
Qty: 12 TABLET | Refills: 0 | Status: SHIPPED | OUTPATIENT
Start: 2019-12-21 | End: 2019-12-24 | Stop reason: HOSPADM

## 2019-12-21 RX ORDER — NAPROXEN 500 MG/1
500 TABLET ORAL 2 TIMES DAILY PRN
Qty: 60 TABLET | Refills: 0 | Status: SHIPPED | OUTPATIENT
Start: 2019-12-21

## 2019-12-21 RX ORDER — HYDROCODONE BITARTRATE AND ACETAMINOPHEN 5; 325 MG/1; MG/1
1 TABLET ORAL ONCE
Status: COMPLETED | OUTPATIENT
Start: 2019-12-21 | End: 2019-12-21

## 2019-12-21 RX ADMIN — INSULIN ASPART 2 UNITS: 100 INJECTION, SOLUTION INTRAVENOUS; SUBCUTANEOUS at 01:12

## 2019-12-21 RX ADMIN — DULOXETINE HYDROCHLORIDE 20 MG: 20 CAPSULE, DELAYED RELEASE ORAL at 09:12

## 2019-12-21 RX ADMIN — HYDROCODONE BITARTRATE AND ACETAMINOPHEN 1 TABLET: 5; 325 TABLET ORAL at 09:12

## 2019-12-21 RX ADMIN — GABAPENTIN 300 MG: 300 CAPSULE ORAL at 08:12

## 2019-12-21 RX ADMIN — TRAMADOL HYDROCHLORIDE 50 MG: 50 TABLET, FILM COATED ORAL at 11:12

## 2019-12-21 RX ADMIN — METOCLOPRAMIDE HYDROCHLORIDE 10 MG: 10 TABLET ORAL at 10:12

## 2019-12-21 RX ADMIN — DULOXETINE HYDROCHLORIDE 20 MG: 20 CAPSULE, DELAYED RELEASE ORAL at 08:12

## 2019-12-21 RX ADMIN — TRAMADOL HYDROCHLORIDE 50 MG: 50 TABLET, FILM COATED ORAL at 04:12

## 2019-12-21 RX ADMIN — CLOPIDOGREL BISULFATE 75 MG: 75 TABLET ORAL at 08:12

## 2019-12-21 RX ADMIN — AMLODIPINE BESYLATE 10 MG: 10 TABLET ORAL at 08:12

## 2019-12-21 RX ADMIN — METOCLOPRAMIDE HYDROCHLORIDE 10 MG: 10 TABLET ORAL at 06:12

## 2019-12-21 RX ADMIN — ENOXAPARIN SODIUM 40 MG: 100 INJECTION SUBCUTANEOUS at 04:12

## 2019-12-21 RX ADMIN — GABAPENTIN 300 MG: 300 CAPSULE ORAL at 02:12

## 2019-12-21 RX ADMIN — KETOROLAC TROMETHAMINE 15 MG: 30 INJECTION, SOLUTION INTRAMUSCULAR; INTRAVENOUS at 08:12

## 2019-12-21 RX ADMIN — ASPIRIN 81 MG: 81 TABLET, COATED ORAL at 08:12

## 2019-12-21 RX ADMIN — INSULIN ASPART 2 UNITS: 100 INJECTION, SOLUTION INTRAVENOUS; SUBCUTANEOUS at 04:12

## 2019-12-21 RX ADMIN — TRAMADOL HYDROCHLORIDE 50 MG: 50 TABLET, FILM COATED ORAL at 02:12

## 2019-12-21 RX ADMIN — PANTOPRAZOLE SODIUM 40 MG: 40 TABLET, DELAYED RELEASE ORAL at 08:12

## 2019-12-21 RX ADMIN — METOCLOPRAMIDE HYDROCHLORIDE 10 MG: 10 TABLET ORAL at 04:12

## 2019-12-21 RX ADMIN — ATORVASTATIN CALCIUM 40 MG: 40 TABLET, FILM COATED ORAL at 08:12

## 2019-12-21 NOTE — PLAN OF CARE
Patient AAO x4. VSS..   Patient remained afebrile throughout the shift.  Patient remained free of falls this shift.  Pain medications administered as ordered.  Blood glucose monitored, corrected via SSI.  Plan of care reviewed.  Patient verbalized understanding.  Patient turning independently   Frequent weight shifting encouraged.  Patient SR on monitor.  Bed low, siderails up x2, wheels locked, call light in reach.  Sitter at bedside  Patient instructed to call for assistance.  Hourly rounding completed.  Will continue to monitor.

## 2019-12-21 NOTE — NURSING
Assumed care from SOLITARIO Prasad. I agree with his assessment findings. Will continue to monitor.

## 2019-12-21 NOTE — CONSULTS
Asked by Dr Munguia to evaluate this 73 yo female with complaint of right shoulder pain and xray + for fracture of proximal humerus as well as pain right flank.  CT scan no contrast + for hematoma over right psoas.    . Patient states fall and injury last [place] 12/15.   Complex presentation with hospital [facility] for SIRS of unknown origin and possible opioid withdrawal.  WBC 15 on admission.  Afebrile.      Started on IV antibiotics.  Quick response with now resolved leukocytosis.       Vital Signs      Report   View Graph    12/20 0700  12/21 0659 12/21 0700 12/21 1322  Most Recent     Temp (°F) 97.3-98.8 98.1-98.6  98.1 (36.7)  12/21 1115   Pulse  103-120  119   12/21 1115   Resp 16-20 16-18  16  12/21 1115   //117 120//78  120/73  12/21 1115   MAP (mmHg)    91  12/21 1115   SpO2 (%)    100  12/21 1115   Weight (kg) 41.5 41.6  41.6 kg (91 lb 11.4 oz)  12/21 0700               Past Medical History:   Diagnosis Date    Anxiety      Back pain       chronic    Diabetes mellitus      Gastroparesis      Hypertension      Sciatica      Stroke           History reviewed. No pertinent surgical history.           Review of patient's allergies indicates:   Allergen Reactions    Morphine Other (See Comments)       headache    Latex, natural rubber Rash         No current facility-administered medications on file prior to encounter.            Current Outpatient Medications on File Prior to Encounter   Medication Sig    amLODIPine (NORVASC) 5 MG tablet Take 1 tablet (5 mg total) by mouth once daily.    aspirin (ECOTRIN) 81 MG EC tablet Take 1 tablet (81 mg total) by mouth once daily.    atorvastatin (LIPITOR) 40 MG tablet Take 1 tablet (40 mg total) by mouth every evening.    clopidogrel (PLAVIX) 75 mg tablet Take 1 tablet (75 mg total) by mouth once daily.    diclofenac sodium (VOLTAREN) 1 % Gel Apply 4 g topically once daily.     dronabinol (MARINOL) 2.5 MG capsule Take 1 capsule (2.5 mg total) by mouth once daily.    DULoxetine (CYMBALTA) 20 MG capsule Take 1 capsule (20 mg total) by mouth 2 (two) times daily.    ergocalciferol (VITAMIN D2) 50,000 unit Cap Take 50,000 Units by mouth every 7 days.    gabapentin (NEURONTIN) 300 MG capsule Take 1 capsule (300 mg total) by mouth 3 (three) times daily.    HYDROcodone-acetaminophen (NORCO)  mg per tablet Take 1 tablet by mouth every 8 (eight) hours as needed.    insulin aspart U-100 (NOVOLOG) 100 unit/mL (3 mL) InPn pen Inject 0-5 Units into the skin every 6 (six) hours as needed (Hyperglycemia).    lidocaine (LIDODERM) 5 % Place 1 patch onto the skin once daily. Remove & Discard patch within 12 hours or as directed by MD    melatonin (MELATIN) Take 2 tablets (6 mg total) by mouth nightly as needed for Insomnia.    ondansetron 4 mg/2 mL Soln Inject 4 mg into the vein every 6 (six) hours as needed.    polyethylene glycol (GLYCOLAX) 17 gram PwPk Take 17 g by mouth once daily.    senna-docusate 8.6-50 mg (PERICOLACE) 8.6-50 mg per tablet Take 1 tablet by mouth once daily.    traZODone (DESYREL) 50 MG tablet Take 0.5 tablets (25 mg total) by mouth every evening.    TRUE METRIX GLUCOSE METER Misc      TRUE METRIX GLUCOSE TEST STRIP Strp      TRUEPLUS LANCETS 33 gauge Misc             Family History      Problem Relation (Age of Onset)     Hyperlipidemia Father     Hypertension Mother                Tobacco Use    Smoking status: Never Smoker    Smokeless tobacco: Never Used   Substance and Sexual Activity    Alcohol use: No       Alcohol/week: 0.0 standard drinks    Drug use: No    Sexual activity: Never      Review of Systems: No nausea, vomiting, chest pain, shortness of breath, fever chills, night sweats, weight gain or weight loss.  No sensory changes to arms or legs.  No head ache, neck ache or visual field changes.          WBC  12.15  12/21 0327  Hemoglobin  9.6  12/21  0327  Hematocrit  28.8  12/21 0327  Platelets  367  12/21 0327  Coumadin Monitoring INR  1.1  12/20 0110  BUN    IMAGING:     FINDINGS:  Transverse fracture identified through the right humeral neck demonstrating approximately 1.4 cm maximal shortening.  No associated greater tuberosity fracture detected.  The remaining humerus is intact.  The soft tissues are normal.      Impression       Transverse fracture through the right surgical neck with 1.4 cm shortening.      Electronically signed by: Melvin Figueroa  Date: 12/20/2019  Time: 13:34     Paravertebral soft tissues demonstrates fusiform enlargement of the right psoas compatible with patient's known psoas hematoma, measures approximately 13.3 cm in maximal length which is not changed significantly in size since the prior exam.  The remaining retroperitoneum is unremarkable.  The remaining visualized intra-abdominal and intrapelvic contents are normal.      CT L SPINE 12/20       1. Fusiform large mid right psoas muscle compatible with known right psoas muscle hematoma.  Finding grossly unchanged in size since prior MRI dated 11/08/2019.  2. Mild compression deformity L1, chronic.  No change since 11/08/2019.  3. Advanced multilevel degenerative change with multilevel central canal stenosis L3-4, L4-5 and L5-S1 as detailed above.  No significant interval change since the prior MRI.  All CT scans at this facility are performed  using dose modulation techniques as appropriate to performed exam including the following:  automated exposure control; adjustment of mA and/or kV according to the patients size (this includes techniques or standardized protocols for targeted exams where dose is matched to indication/reason for exam: i.e. extremities or head);  iterative reconstruction technique.      Electronically signed by: Melvin Figueroa  Date: 12/20/2019  Time: 18:34       EXAM: Sitting up in bed enjoying lunch and tv show  Alert and oriented: communicates  well.  No distress though complains right arm and right flank pain.  Does not appear ill.     Right arm no skin changes   (+) thumbs up wish okay power 4+/5  Distal light touch intact median radial and ulnar nerve distributions  Fingers warm and well perfused     Right leg no gross deformity  + EHL/ GA equivocal Tib Ant.  Distal light touch intact Sural Tibial Plantar  Calf soft  No pain with passive rotation of leg/thigh.    A/P closed impacted fracture right surgical neck and psoas hematoma 75 yo female s/p CVA with right side weakness and opioid dependence.    Stable fracture pattern amenable to conservative care.    Simple Sling  Ice pack prn  6 oz (cell phone) lift limit  Daily OT for range of motion     For flank pain and elevated WBC ifferential diagnosis to include psoas abscess.  This is discussed with Hospital Medicine.  No CT with contrast to see ring enhancement of fluid collection.  Interventional Radiology currently does not see indication for aspiration/drain placement given stable presentation cf. Prior imaging.      Lovenox reasonable given chronicity.  Add SCDs.    If infectious picture persists consider aspiration, labs with infectious indices and/or CT scan pelvis and thigh with contrast.      Thank you for consulting your Orthopedic Surgicalist service.

## 2019-12-21 NOTE — PLAN OF CARE
NO SAFE D/C DISPOSITION    Pt received to [facility] from [facility].  However, pt has been denied to return back to Skilled care at [place] as pt no longer meet qualifications for skilled carel. Per Micheal, pt declined to participate in therapy, take meds or eat meals.      Spoke with pt.'s Son Paolo who states pt does not have a d/c disposition other than nursing home placement as pt declines to move to [place] with Son.  Son states pt lived with her Sister who now has dementia and is living with her daughter which happens to be the pt.'s niece.  Per Son, the patient's sister will be going to [facility] at the end of they year and would like to see if pt and her Sister can be roommates at facility.  CM provided pt with list of nursing facilities; however, pt stated she is not interested at present as she does not have her glasses.  Pt did not oppose nursing home placement at [facility] when asked.    Pt wants to speak to nimartin Kirklandjenelleperez about d/cing home with her.  Per son, there is no room for pt to d/c home with [facility].  Pt states if she is unable to return to [facility] she would like to have her belongings returned.  CM reached out to [facility] regarding returning pt.'s belongings as pt does not have family to  belongings.    Pt informed going to a Skilled facility is not an option at present as pt does not have a d/c disposition once d/c'd from facility.

## 2019-12-22 PROBLEM — S42.309A HUMERUS FRACTURE: Status: ACTIVE | Noted: 2019-12-22

## 2019-12-22 PROBLEM — T14.8XXA HEMATOMA: Status: ACTIVE | Noted: 2019-12-22

## 2019-12-22 LAB
POCT GLUCOSE: 101 MG/DL (ref 70–110)
POCT GLUCOSE: 106 MG/DL (ref 70–110)
POCT GLUCOSE: 128 MG/DL (ref 70–110)

## 2019-12-22 PROCEDURE — 25000003 PHARM REV CODE 250: Performed by: NURSE PRACTITIONER

## 2019-12-22 PROCEDURE — 96372 THER/PROPH/DIAG INJ SC/IM: CPT | Mod: 59 | Performed by: EMERGENCY MEDICINE

## 2019-12-22 PROCEDURE — G8979 MOBILITY GOAL STATUS: HCPCS | Mod: CK

## 2019-12-22 PROCEDURE — 97163 PT EVAL HIGH COMPLEX 45 MIN: CPT

## 2019-12-22 PROCEDURE — 96376 TX/PRO/DX INJ SAME DRUG ADON: CPT | Performed by: EMERGENCY MEDICINE

## 2019-12-22 PROCEDURE — G8978 MOBILITY CURRENT STATUS: HCPCS | Mod: CL

## 2019-12-22 PROCEDURE — 97530 THERAPEUTIC ACTIVITIES: CPT

## 2019-12-22 PROCEDURE — 94761 N-INVAS EAR/PLS OXIMETRY MLT: CPT

## 2019-12-22 PROCEDURE — 63600175 PHARM REV CODE 636 W HCPCS: Performed by: INTERNAL MEDICINE

## 2019-12-22 PROCEDURE — 63600175 PHARM REV CODE 636 W HCPCS: Performed by: NURSE PRACTITIONER

## 2019-12-22 PROCEDURE — 25000003 PHARM REV CODE 250: Performed by: INTERNAL MEDICINE

## 2019-12-22 PROCEDURE — 97167 OT EVAL HIGH COMPLEX 60 MIN: CPT

## 2019-12-22 PROCEDURE — G0378 HOSPITAL OBSERVATION PER HR: HCPCS

## 2019-12-22 RX ORDER — CYCLOBENZAPRINE HCL 10 MG
10 TABLET ORAL 3 TIMES DAILY PRN
Status: DISCONTINUED | OUTPATIENT
Start: 2019-12-22 | End: 2019-12-24 | Stop reason: HOSPADM

## 2019-12-22 RX ADMIN — ENOXAPARIN SODIUM 40 MG: 100 INJECTION SUBCUTANEOUS at 05:12

## 2019-12-22 RX ADMIN — METOCLOPRAMIDE HYDROCHLORIDE 10 MG: 10 TABLET ORAL at 03:12

## 2019-12-22 RX ADMIN — DULOXETINE HYDROCHLORIDE 20 MG: 20 CAPSULE, DELAYED RELEASE ORAL at 08:12

## 2019-12-22 RX ADMIN — METOCLOPRAMIDE HYDROCHLORIDE 10 MG: 10 TABLET ORAL at 10:12

## 2019-12-22 RX ADMIN — KETOROLAC TROMETHAMINE 15 MG: 30 INJECTION, SOLUTION INTRAMUSCULAR; INTRAVENOUS at 04:12

## 2019-12-22 RX ADMIN — GABAPENTIN 300 MG: 300 CAPSULE ORAL at 03:12

## 2019-12-22 RX ADMIN — AMLODIPINE BESYLATE 10 MG: 10 TABLET ORAL at 08:12

## 2019-12-22 RX ADMIN — CYCLOBENZAPRINE 10 MG: 10 TABLET, FILM COATED ORAL at 11:12

## 2019-12-22 RX ADMIN — TRAMADOL HYDROCHLORIDE 50 MG: 50 TABLET, FILM COATED ORAL at 09:12

## 2019-12-22 RX ADMIN — CLOPIDOGREL BISULFATE 75 MG: 75 TABLET ORAL at 08:12

## 2019-12-22 RX ADMIN — METOCLOPRAMIDE HYDROCHLORIDE 10 MG: 10 TABLET ORAL at 06:12

## 2019-12-22 RX ADMIN — ASPIRIN 81 MG: 81 TABLET, COATED ORAL at 08:12

## 2019-12-22 RX ADMIN — TRAMADOL HYDROCHLORIDE 50 MG: 50 TABLET, FILM COATED ORAL at 06:12

## 2019-12-22 RX ADMIN — GABAPENTIN 300 MG: 300 CAPSULE ORAL at 08:12

## 2019-12-22 RX ADMIN — ATORVASTATIN CALCIUM 40 MG: 40 TABLET, FILM COATED ORAL at 08:12

## 2019-12-22 RX ADMIN — TRAMADOL HYDROCHLORIDE 50 MG: 50 TABLET, FILM COATED ORAL at 03:12

## 2019-12-22 RX ADMIN — KETOROLAC TROMETHAMINE 15 MG: 30 INJECTION, SOLUTION INTRAMUSCULAR; INTRAVENOUS at 10:12

## 2019-12-22 RX ADMIN — PANTOPRAZOLE SODIUM 40 MG: 40 TABLET, DELAYED RELEASE ORAL at 08:12

## 2019-12-22 RX ADMIN — CYCLOBENZAPRINE 10 MG: 10 TABLET, FILM COATED ORAL at 08:12

## 2019-12-22 NOTE — SUBJECTIVE & OBJECTIVE
Interval History:     Review of Systems   Constitutional: Negative.    HENT: Negative.    Eyes: Negative.    Respiratory: Negative.    Cardiovascular: Negative.    Gastrointestinal: Negative.    Endocrine: Negative.    Genitourinary: Negative.    Musculoskeletal: Negative.    Skin: Negative.    Allergic/Immunologic: Negative.    Neurological: Negative.    Hematological: Negative.    Psychiatric/Behavioral: Positive for agitation.     Objective:     Vital Signs (Most Recent):  Temp: 99.1 °F (37.3 °C) (12/22/19 1602)  Pulse: (!) 116 (12/22/19 1700)  Resp: 18 (12/22/19 1602)  BP: (!) 144/76 (12/22/19 1602)  SpO2: 98 % (12/22/19 1602) Vital Signs (24h Range):  Temp:  [97 °F (36.1 °C)-99.1 °F (37.3 °C)] 99.1 °F (37.3 °C)  Pulse:  [104-131] 116  Resp:  [16-19] 18  SpO2:  [95 %-100 %] 98 %  BP: (130-157)/(65-85) 144/76     Weight: 42.4 kg (93 lb 7.6 oz)  Body mass index is 19.54 kg/m².    Intake/Output Summary (Last 24 hours) at 12/22/2019 1757  Last data filed at 12/22/2019 1600  Gross per 24 hour   Intake 576 ml   Output 500 ml   Net 76 ml      Physical Exam   Constitutional: She is oriented to person, place, and time. She appears well-developed and well-nourished. She appears distressed.   HENT:   Head: Normocephalic and atraumatic.   Eyes: Pupils are equal, round, and reactive to light. EOM are normal.   Neck: Normal range of motion. Neck supple.   Cardiovascular: Normal rate and regular rhythm.   Pulmonary/Chest: Effort normal and breath sounds normal.   Abdominal: Soft. Bowel sounds are normal. There is tenderness.   Right flank pain    Musculoskeletal: Normal range of motion.   Neurological: She is alert and oriented to person, place, and time.   Skin: Skin is warm and dry.   Psychiatric: She has a normal mood and affect. Her behavior is normal. Judgment and thought content normal.   Nursing note and vitals reviewed.      Significant Labs:   BMP:   Recent Labs   Lab 12/21/19  0327   GLU 98      K 3.3*       CO2 15*   BUN 8   CREATININE 0.8   CALCIUM 9.0     CBC:   Recent Labs   Lab 12/21/19 0327   WBC 12.15   HGB 9.6*   HCT 28.8*   *     CMP:   Recent Labs   Lab 12/21/19 0327      K 3.3*      CO2 15*   GLU 98   BUN 8   CREATININE 0.8   CALCIUM 9.0   PROT 7.5   ALBUMIN 3.1*   BILITOT 1.2*   ALKPHOS 71   AST 19   ALT 6*   ANIONGAP 15   EGFRNONAA >60       Significant Imaging:

## 2019-12-22 NOTE — NURSING
Patient complain of back pain rating 9, administered prn tramadol 50 mg po. Patient tolerated medication crushed with apple juice.

## 2019-12-22 NOTE — PLAN OF CARE
PATIENT WOULD BENEFIT FROM SKILLED OT INTERVENTION TO INCREASE (I) AND SAFETY WITH SELF CARE TASKS WITHIN RUE ORTHOPAEDIC PRECAUTIONS AS WELL AS NEURO RE-ED TO INCREASE FUNC USE OF (R) SIDE OF BODY WITHIN ORTHPAEDIC PRECAUTIONS WITH SELF CARE TASKS.

## 2019-12-22 NOTE — PT/OT/SLP EVAL
Occupational Therapy   Evaluation    Name: Melva Barker  MRN: [id number]  Admitting Diagnosis:  SIRS (systemic inflammatory response syndrome)      Recommendations:     Discharge Recommendations: nursing facility, skilled  Discharge Equipment Recommendations:  (TBD)  Barriers to discharge:  (PATIENT WAS LIVING WITH SISTER WHO HAS ALZHEIMER'S.  PATIENT WAS HER CAREGIVER.)    Assessment:     Melva Barker is a 74 y.o. female with a medical diagnosis of SIRS (systemic inflammatory response syndrome).  She presents with SELF CARE DEBILITY. Performance deficits affecting function: weakness, impaired endurance, impaired self care skills, impaired functional mobilty, gait instability, impaired balance, decreased coordination, decreased lower extremity function, decreased upper extremity function, decreased safety awareness, pain, abnormal tone, decreased ROM, impaired fine motor, impaired coordination, orthopedic precautions(PATIENT PRESENTLY WITH NO SLING WITH RUE POORLY POSITIONED ON BED IN WRIST FLEXION.  SLING WAS REQUESTED FOR PROPER POSITIONING OF RUE.).      Rehab Prognosis: Good; patient would benefit from acute skilled OT services to address these deficits and reach maximum level of function.       Plan:     Patient to be seen 3 x/week to address the above listed problems via self-care/home management, therapeutic activities, therapeutic exercises  · Plan of Care Expires: 12/29/19  · Plan of Care Reviewed with: patient    Subjective     Chief Complaint: BACK PAIN.  Patient/Family Comments/goals: NONE STATED.    Occupational Profile:  Living Environment: LIVES IN 1-STORY HOUSE WITH 2 STEPS WITH SISTER.  Previous level of function: PATIENT WAS (I) WITH ADL'S AND IADLS'S.  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  Roles and Routines: PATIENT STATED SHE USED HER CANE IN THE HOME AND IN THE COMMUNITY.  Equipment Used at Home:  cane, straight  Assistance upon Discharge:  TBD    Pain/Comfort:  · Pain Rating 1: 8/10  · Location - Orientation 1: lower  · Location 1: back  · Pain Addressed 1: Nurse notified, Reposition, Distraction, Cessation of Activity    Patients cultural, spiritual, Anabaptism conflicts given the current situation: no    Objective:     Communicated with: NURSE prior to session.  Patient found left sidelying with peripheral IV, telemetry upon OT entry to room.    General Precautions: Standard, fall   Orthopedic Precautions:RUE non weight bearing   Braces: (RUE SLING)     Occupational Performance:    Bed Mobility:    · Patient completed Rolling/Turning to Left with  moderate assistance  · Patient completed Scooting/Bridging with moderate assistance  · Patient completed Supine to Sit with moderate assistance  · Patient completed Sit to Supine with moderate assistance    Functional Mobility/Transfers:  · Patient completed Sit <> Stand Transfer with moderate assistance  with  hand-held assist and WITH RUE SUPPORTED BY THERAPIST DUE TO NO SLING PRESENT AT THE TIME.   · Functional Mobility: MOD (A) WITH HHA ON (L) OF ONE PERSON WITH ANOTHER PERSON STABILIZING RUE DUE TO NO SLING PRESENT.    Activities of Daily Living:  · Feeding:  BREAKFAST TRAY PRESENT, BUT PATIENT DID NOT WANT TO EAT.  PATIENT BECAME NAUSEATED DURING AMBULATION.      · Grooming: minimum assistance WITH ORAL HYGIENE  · Upper Body Dressing: maximal assistance      · Lower Body Dressing: dependence        Cognitive/Visual Perceptual:  Cognitive/Psychosocial Skills:     -       Oriented to: Person, Place, Time and Situation   -       Follows Commands/attention:Follows multistep  commands  -       Communication: clear/fluent  -       Memory: No Deficits noted  -       Safety awareness/insight to disability: IMPAIRED WITH PROPER POSITIONING OF RUE IN SLING OR ON PILLOW DUE TO NO SLING AT TIME OF EVAL.   -       Mood/Affect/Coping skills/emotional control: Appropriate to situation    Physical Exam:  Balance:     -       MOD (A) WITH DYNAMIC STANDING BALANCE  Upper Extremity Range of Motion:     -       Right Upper Extremity: TRACE MOVEMENT AT (R) HAND.  RUE SHOULD BE IN SLING DUE TO HUMERAL FX.  NURSING REQUESTED TO OBTAIN SLING FOR PATIENT.  -       Left Upper Extremity: WFL  Upper Extremity Strength:    -       Right Upper Extremity: PATIENT NWB RUE--RUE SHOULD BE IN SLING.  TRACE MOVEMENT AT (R) HAND.  NO STRENGTH OVERALL.  -       Left Upper Extremity: WFL   Strength:    -       Right Upper Extremity: Deficits: TRACE (R) HAND  STRENGTH  -       Left Upper Extremity: WFL  Fine Motor Coordination:    -       Impaired  (R) HAND  Gross motor coordination:   RUE IMMOBILIZED    AMPAC 6 Click ADL:  AMPAC Total Score: 14    Treatment & Education:  OT EVAL COMPLETED. PATIENT WAS EDUCATED RE: BENEFIT & PURPOSE OF OT.  PATIENT ALSO EDUCATED RE:  USE OF SLING TO IMMOBILIZE RUE AS WELL AS NWB PRECAUTION.  Education:    Patient left supine with all lines intact, call button in reach, bed alarm on and NURSE present    GOALS:   Multidisciplinary Problems     Occupational Therapy Goals        Problem: Occupational Therapy Goal    Goal Priority Disciplines Outcome Interventions   Occupational Therapy Goal     OT, PT/OT Ongoing, Progressing    Description:  1)  PATIENT WILL BE ABLE TO VERBALIZE WB PRECAUTIONS AND DEMO PROPER POSITIONING OF RUE WITH SLING OR WITH PILLOW SUPPORT IF SLING NOT RECEIVED.  2)  PATIENT WILL DON/DOFF UB CLOTHING WITH MOD (A) WITH KYLER-DRESSING TECHNIQUE.  3)  PATIENT WILL T/F EOB <> BSC WITH MIN (A).                    History:     Past Medical History:   Diagnosis Date    Anxiety     Back pain     chronic    Diabetes mellitus     Gastroparesis     Hypertension     Sciatica     Stroke        History reviewed. No pertinent surgical history.    Time Tracking:     OT Date of Treatment: 12/22/19  OT Start Time: 0823  OT Stop Time: 0842  OT Total Time (min): 19 min    Billable Minutes:Evaluation  10  Therapeutic Activity 9    Kiersten Murphy OT  12/22/2019

## 2019-12-22 NOTE — PLAN OF CARE
Pt in bed AAOx4. Plan of Care reviewed, Verbalized understanding.   Patient remained free from falls, fall precautions in place.  Patient is Sinus tachycardia on monitor.VSS.  Pain managed throughout shift. BS monitoring.   Patient no longer has a sitter at the bedside. Patient is completely oriented.   Call bell and personal belongings within reach. Hourly rounding complete. Reminded to call for assistance. No complaints at this time. Will continue to monitor.

## 2019-12-22 NOTE — PT/OT/SLP EVAL
Physical Therapy Evaluation    Patient Name:  Melva Barker   MRN:  [id number]    Recommendations:     Discharge Recommendations:  nursing facility, skilled   Discharge Equipment Recommendations: other (see comments)(TBD)   Barriers to discharge: Decreased caregiver support    Assessment:     Melva Barker is a 74 y.o. female admitted with a medical diagnosis of SIRS (systemic inflammatory response syndrome).  She presents with the following impairments/functional limitations:  weakness, impaired endurance, impaired self care skills, impaired functional mobilty, gait instability, impaired balance, decreased upper extremity function, decreased lower extremity function, decreased coordination, decreased safety awareness, pain, orthopedic precautions.    Rehab Prognosis: Good; patient would benefit from acute skilled PT services to address these deficits and reach maximum level of function.    Recent Surgery: * No surgery found *      Plan:     During this hospitalization, patient to be seen 5 x/week to address the identified rehab impairments via gait training, therapeutic activities, therapeutic exercises, neuromuscular re-education and progress toward the following goals:    · Plan of Care Expires:  12/29/19    Subjective     Chief Complaint: Back pain, impaired mobility  Patient/Family Comments/goals:To go home and return to prior level of funtion  Pain/Comfort:  · Pain Rating 1: 8/10  · Location - Orientation 1: lower  · Location 1: back  · Pain Addressed 1: Nurse notified, Reposition, Distraction, Cessation of Activity    Patients cultural, spiritual, Sikhism conflicts given the current situation: no    Living Environment:  Patient lives home with her sister (who has Alzheimer's) in a one-story house with 2 steps to enter.  Prior to admission, patients level of function was modified indep amb with quad cane and indep with ADLs.  Equipment used at home: cane, straight.  DME owned (not currently used):  none.  Upon discharge, patient will have assistance from ???.    Objective:     Communicated with Epic and nurses Ting/Sophia prior to session.  Patient found HOB elevated with peripheral IV, telemetry  upon PT entry to room.    General Precautions: Standard, fall   Orthopedic Precautions:RUE non weight bearing   Braces: UE Sling     COMMENT:  NO UE SLING IN ROOM.  THERAPIST INQUIRED ABOUT THIS WITH NURSING AS THE SLING WAS WRITTEN IN THE ORTHOPEDIC PHYSICIAN'S NOTES.  THEY STATED THAT HE DID NOT WRITE ORDERS FOR THE SLING YET.  CHARGE NURSE AND PATIENT'S NURSE AWARE OF SITUATION.    Exams:  · Cognitive Exam:  Patient is oriented to Person, Place and Situation  · Gross Motor Coordination:  Impaired  · Postural Exam:  Patient presented with the following abnormalities:    · -       Rounded shoulders  · -       Forward head  · RLE ROM: PROM WFL; AROM significantly decreased  · RLE Strength: Grossly 2-/5 at hip; 2/5 at knee and ankle  · LLE ROM: WFL  · LLE Strength: WFL    Functional Mobility:  · Bed Mobility:     · Rolling Left:  moderate assistance  · Scooting: moderate assistance  · Supine to Sit: moderate assistance  · Sit to Supine: moderate assistance  · Transfers:     · Sit to Stand:  moderate assistance with hand-held assist  · Gait: Patient ambulated 8 feet with HHA with mod assist.  Gait deviations included poor balance, decreased R step length and foot clearance, decreased R weight-bearing.  RUE sling was not in room; therefore, OT supported RUE during ambulation.  · Balance: Fair + sitting balance; poor standing balance      Therapeutic Activities and Exercises:   Patient participated in bed mobility, transfer training, and gait training with HHA.  Patient educated on RUE weight-bearing precautions as she sat EOB x 8 minutes with Fair + sitting balance.  She also participated in ADL activities and LE exercises while sitting EOB.    AM-PAC 6 CLICK MOBILITY  Total Score:12     Patient left HOB elevated with  all lines intact, call button in reach and bed alarm on.    GOALS:   Multidisciplinary Problems     Physical Therapy Goals        Problem: Physical Therapy Goal    Goal Priority Disciplines Outcome Goal Variances Interventions   Physical Therapy Goal     PT, PT/OT      Description:  LTGs to be met within 7 days (12/29/19):  1.  Patient will perform bed mobility with min assist  2.  Patient will perform functional transfers with appropriate AD with min assist  3.  Patient will ambulate with appropriate AD with min assist and no gross LOB  4.  Patient will perform BLE therapeutic exercises 10 x 2 in all planes (active-assist to RLE as needed)                    History:     Past Medical History:   Diagnosis Date    Anxiety     Back pain     chronic    Diabetes mellitus     Gastroparesis     Hypertension     Sciatica     Stroke        History reviewed. No pertinent surgical history.    Time Tracking:     PT Received On: 12/22/19  PT Start Time: 0822     PT Stop Time: 0845  PT Total Time (min): 23 min     Billable Minutes: Evaluation 14 min and Therapeutic Activity 9 min      Dorota Mustafa, PT, DPT  12/22/2019

## 2019-12-22 NOTE — PLAN OF CARE
Patient currently requires mod assist for bed mobility, mod assist for transfers with HHA, and mod assist to ambulate approx 8 feet with HHA

## 2019-12-22 NOTE — PLAN OF CARE
Pt AAO x4.  Pt remains free of falls throughout shift.  Pain addressed throughout shift.  Plan of care reviewed. Pt verbalizes understanding.  Pt moving and turning with prompting. Frequent weight shifting encouraged.  Sinus tach on monitor.  Hourly rounding completed.  Awaiting placement.  Will continue to monitor.

## 2019-12-22 NOTE — PLAN OF CARE
CM called and spoke to DON at [facility] related to the patient being discharged on today. ALEXA (Nickolas) states that he was informed that the patient is not able to return to the facility and was discharged after she was gone for 24 hours. CM asked if patient was informed that she would not be returning to the facility and was being discharged. ALEXA states that she was not notified but she is not able to return. CM also asked do all SNF patients if they go out the hospital in observation will they be discharged from the facility and admission process start over again. ALEXA states that this is how they do it here at [facility]. CM will follow up with hospital medicine for PT/OT eval.

## 2019-12-23 LAB
ANION GAP SERPL CALC-SCNC: 12 MMOL/L (ref 8–16)
BASOPHILS # BLD AUTO: 0.05 K/UL (ref 0–0.2)
BASOPHILS NFR BLD: 0.5 % (ref 0–1.9)
BUN SERPL-MCNC: 8 MG/DL (ref 8–23)
CALCIUM SERPL-MCNC: 10.3 MG/DL (ref 8.7–10.5)
CHLORIDE SERPL-SCNC: 104 MMOL/L (ref 95–110)
CO2 SERPL-SCNC: 20 MMOL/L (ref 23–29)
CREAT SERPL-MCNC: 0.7 MG/DL (ref 0.5–1.4)
DIFFERENTIAL METHOD: ABNORMAL
EOSINOPHIL # BLD AUTO: 0.3 K/UL (ref 0–0.5)
EOSINOPHIL NFR BLD: 2.5 % (ref 0–8)
ERYTHROCYTE [DISTWIDTH] IN BLOOD BY AUTOMATED COUNT: 15.6 % (ref 11.5–14.5)
EST. GFR  (AFRICAN AMERICAN): >60 ML/MIN/1.73 M^2
EST. GFR  (NON AFRICAN AMERICAN): >60 ML/MIN/1.73 M^2
GLUCOSE SERPL-MCNC: 112 MG/DL (ref 70–110)
HCT VFR BLD AUTO: 31.2 % (ref 37–48.5)
HGB BLD-MCNC: 10.2 G/DL (ref 12–16)
IMM GRANULOCYTES # BLD AUTO: 0.06 K/UL (ref 0–0.04)
IMM GRANULOCYTES NFR BLD AUTO: 0.6 % (ref 0–0.5)
LYMPHOCYTES # BLD AUTO: 2.8 K/UL (ref 1–4.8)
LYMPHOCYTES NFR BLD: 25.3 % (ref 18–48)
MCH RBC QN AUTO: 27.3 PG (ref 27–31)
MCHC RBC AUTO-ENTMCNC: 32.7 G/DL (ref 32–36)
MCV RBC AUTO: 84 FL (ref 82–98)
MONOCYTES # BLD AUTO: 1 K/UL (ref 0.3–1)
MONOCYTES NFR BLD: 9.2 % (ref 4–15)
NEUTROPHILS # BLD AUTO: 6.8 K/UL (ref 1.8–7.7)
NEUTROPHILS NFR BLD: 61.9 % (ref 38–73)
NRBC BLD-RTO: 0 /100 WBC
PLATELET # BLD AUTO: 383 K/UL (ref 150–350)
PMV BLD AUTO: 10 FL (ref 9.2–12.9)
POCT GLUCOSE: 107 MG/DL (ref 70–110)
POCT GLUCOSE: 117 MG/DL (ref 70–110)
POCT GLUCOSE: 126 MG/DL (ref 70–110)
POCT GLUCOSE: 95 MG/DL (ref 70–110)
POTASSIUM SERPL-SCNC: 3.4 MMOL/L (ref 3.5–5.1)
RBC # BLD AUTO: 3.73 M/UL (ref 4–5.4)
SODIUM SERPL-SCNC: 136 MMOL/L (ref 136–145)
WBC # BLD AUTO: 10.9 K/UL (ref 3.9–12.7)

## 2019-12-23 PROCEDURE — 97110 THERAPEUTIC EXERCISES: CPT | Performed by: PHYSICAL THERAPIST

## 2019-12-23 PROCEDURE — 96374 THER/PROPH/DIAG INJ IV PUSH: CPT | Mod: 59 | Performed by: EMERGENCY MEDICINE

## 2019-12-23 PROCEDURE — 97116 GAIT TRAINING THERAPY: CPT | Mod: 59

## 2019-12-23 PROCEDURE — 80048 BASIC METABOLIC PNL TOTAL CA: CPT

## 2019-12-23 PROCEDURE — 63600175 PHARM REV CODE 636 W HCPCS: Performed by: NURSE PRACTITIONER

## 2019-12-23 PROCEDURE — 25000003 PHARM REV CODE 250: Performed by: NURSE PRACTITIONER

## 2019-12-23 PROCEDURE — 85025 COMPLETE CBC W/AUTO DIFF WBC: CPT

## 2019-12-23 PROCEDURE — 63600175 PHARM REV CODE 636 W HCPCS: Performed by: INTERNAL MEDICINE

## 2019-12-23 PROCEDURE — 97110 THERAPEUTIC EXERCISES: CPT

## 2019-12-23 PROCEDURE — 97530 THERAPEUTIC ACTIVITIES: CPT | Performed by: PHYSICAL THERAPIST

## 2019-12-23 PROCEDURE — 25000003 PHARM REV CODE 250: Performed by: INTERNAL MEDICINE

## 2019-12-23 PROCEDURE — 96372 THER/PROPH/DIAG INJ SC/IM: CPT | Mod: 59 | Performed by: EMERGENCY MEDICINE

## 2019-12-23 PROCEDURE — G0378 HOSPITAL OBSERVATION PER HR: HCPCS

## 2019-12-23 RX ORDER — MORPHINE SULFATE 15 MG/1
15 TABLET ORAL ONCE
Status: COMPLETED | OUTPATIENT
Start: 2019-12-23 | End: 2019-12-23

## 2019-12-23 RX ORDER — METOPROLOL TARTRATE 25 MG/1
25 TABLET, FILM COATED ORAL 2 TIMES DAILY
Status: DISCONTINUED | OUTPATIENT
Start: 2019-12-23 | End: 2019-12-24 | Stop reason: HOSPADM

## 2019-12-23 RX ADMIN — ENOXAPARIN SODIUM 40 MG: 100 INJECTION SUBCUTANEOUS at 04:12

## 2019-12-23 RX ADMIN — METOCLOPRAMIDE HYDROCHLORIDE 10 MG: 10 TABLET ORAL at 06:12

## 2019-12-23 RX ADMIN — METOPROLOL TARTRATE 25 MG: 25 TABLET ORAL at 11:12

## 2019-12-23 RX ADMIN — CLOPIDOGREL BISULFATE 75 MG: 75 TABLET ORAL at 08:12

## 2019-12-23 RX ADMIN — GABAPENTIN 300 MG: 300 CAPSULE ORAL at 08:12

## 2019-12-23 RX ADMIN — METOPROLOL TARTRATE 25 MG: 25 TABLET ORAL at 08:12

## 2019-12-23 RX ADMIN — METOCLOPRAMIDE HYDROCHLORIDE 10 MG: 10 TABLET ORAL at 03:12

## 2019-12-23 RX ADMIN — AMLODIPINE BESYLATE 10 MG: 10 TABLET ORAL at 08:12

## 2019-12-23 RX ADMIN — DULOXETINE HYDROCHLORIDE 20 MG: 20 CAPSULE, DELAYED RELEASE ORAL at 08:12

## 2019-12-23 RX ADMIN — KETOROLAC TROMETHAMINE 15 MG: 30 INJECTION, SOLUTION INTRAMUSCULAR; INTRAVENOUS at 12:12

## 2019-12-23 RX ADMIN — TRAMADOL HYDROCHLORIDE 50 MG: 50 TABLET, FILM COATED ORAL at 04:12

## 2019-12-23 RX ADMIN — PANTOPRAZOLE SODIUM 40 MG: 40 TABLET, DELAYED RELEASE ORAL at 08:12

## 2019-12-23 RX ADMIN — TRAMADOL HYDROCHLORIDE 50 MG: 50 TABLET, FILM COATED ORAL at 09:12

## 2019-12-23 RX ADMIN — CYCLOBENZAPRINE 10 MG: 10 TABLET, FILM COATED ORAL at 07:12

## 2019-12-23 RX ADMIN — CYCLOBENZAPRINE 10 MG: 10 TABLET, FILM COATED ORAL at 10:12

## 2019-12-23 RX ADMIN — TRAMADOL HYDROCHLORIDE 50 MG: 50 TABLET, FILM COATED ORAL at 03:12

## 2019-12-23 RX ADMIN — METOCLOPRAMIDE HYDROCHLORIDE 10 MG: 10 TABLET ORAL at 11:12

## 2019-12-23 RX ADMIN — MORPHINE SULFATE 15 MG: 15 TABLET ORAL at 01:12

## 2019-12-23 RX ADMIN — ASPIRIN 81 MG: 81 TABLET, COATED ORAL at 08:12

## 2019-12-23 RX ADMIN — TRAMADOL HYDROCHLORIDE 50 MG: 50 TABLET, FILM COATED ORAL at 10:12

## 2019-12-23 RX ADMIN — GABAPENTIN 300 MG: 300 CAPSULE ORAL at 03:12

## 2019-12-23 RX ADMIN — ATORVASTATIN CALCIUM 40 MG: 40 TABLET, FILM COATED ORAL at 08:12

## 2019-12-23 NOTE — PROGRESS NOTES
[facility] Hospital Medicine  Progress Note    Patient Name: Melva Barker  MRN: [id number]  Patient Class: OP- Observation   Admission Date: 12/19/2019  Length of Stay: 0 days  Attending Physician: Edie Munguia MD  Primary Care Provider: Claire Souza MD        Subjective:     Principal Problem:SIRS (systemic inflammatory response syndrome)        HPI:  Ms. Barker is a 74 y.o. female  with a PMHx of HTN, HLD, recent CVA in 10/2019 with right-sided weakness, DM II with gastroparesis, chronic back pain, anxiety, malnutrition, and opiate dependence.  She was transferred from [facility] to the ED for c/o worsening right-sided weakness after receiving Zanaflex.  Upon arrival to the ED, patient was AAO x 4 with no acute focal neuro deficits noted.  However, patient had c/o generalized ABD pain with associated nausea and vomiting.  Family reports patient overuses her narcotic pain medications, which [facility].  Family thinks symptoms are due to opiate withdraw.  Patient denies any HA,  lightheadedness, dizziness, syncope, visual disturbance, facial droop, slurred speech, confusion, aphasia, dysphagia, hemiparesis, CP, palpitations, SOB, cough, wheezing, rhinorrhea, sore throat, ABD distention, diarrhea, constipation, hematemesis, melena, dysuria, hematuria, back or neck pain, fatigue, fever or chills.  Work-up in the ED resulted WBC 15.4, 0% bands, CO2 14, anion gap 17, stable kidney function and LFTs, lipase 13, negative troponin, lactic 1.7, procalcitonin 0.13, influenza negative, UA negative for UTI, CXR unremarkable, EKG unrevealing.  CT of the head and ABD/pelvis were negative for acute process.  VBG with pH 7.429, HCO3 16.9, BE -7.  In the ED, patient tachycardic (sinus) and hypertensive with /121.  IV labetalol 20 mg given, which improved HR into 80s and BP to 137/88.  Hospital Medicine was called for admission.      Overview/Hospital  Course:  12/20/  Complaints of right flank pain   Tramadol /Toradol added for pain    12/21/19  Right humerus fracture- evaluated by ortho   Recommends conservative management  Right psoas muscle hematoma visualized on CT of abdomen and pelvis  Interventional Radiology currently does not see indication for aspiration or placement of drain  SIRS of unclear etiology has resolved.  Seen and examined , stable for discharge      12/22/19   Discharge held due to placement issues     Interval History:     Review of Systems   Constitutional: Negative.    HENT: Negative.    Eyes: Negative.    Respiratory: Negative.    Cardiovascular: Negative.    Gastrointestinal: Negative.    Endocrine: Negative.    Genitourinary: Negative.    Musculoskeletal: Negative.    Skin: Negative.    Allergic/Immunologic: Negative.    Neurological: Negative.    Hematological: Negative.    Psychiatric/Behavioral: Positive for agitation.     Objective:     Vital Signs (Most Recent):  Temp: 99.1 °F (37.3 °C) (12/22/19 1602)  Pulse: (!) 116 (12/22/19 1700)  Resp: 18 (12/22/19 1602)  BP: (!) 144/76 (12/22/19 1602)  SpO2: 98 % (12/22/19 1602) Vital Signs (24h Range):  Temp:  [97 °F (36.1 °C)-99.1 °F (37.3 °C)] 99.1 °F (37.3 °C)  Pulse:  [104-131] 116  Resp:  [16-19] 18  SpO2:  [95 %-100 %] 98 %  BP: (130-157)/(65-85) 144/76     Weight: 42.4 kg (93 lb 7.6 oz)  Body mass index is 19.54 kg/m².    Intake/Output Summary (Last 24 hours) at 12/22/2019 1757  Last data filed at 12/22/2019 1600  Gross per 24 hour   Intake 576 ml   Output 500 ml   Net 76 ml      Physical Exam   Constitutional: She is oriented to person, place, and time. She appears well-developed and well-nourished. She appears distressed.   HENT:   Head: Normocephalic and atraumatic.   Eyes: Pupils are equal, round, and reactive to light. EOM are normal.   Neck: Normal range of motion. Neck supple.   Cardiovascular: Normal rate and regular rhythm.   Pulmonary/Chest: Effort normal and breath sounds  normal.   Abdominal: Soft. Bowel sounds are normal. There is tenderness.   Right flank pain    Musculoskeletal: Normal range of motion.   Neurological: She is alert and oriented to person, place, and time.   Skin: Skin is warm and dry.   Psychiatric: She has a normal mood and affect. Her behavior is normal. Judgment and thought content normal.   Nursing note and vitals reviewed.      Significant Labs:   BMP:   Recent Labs   Lab 12/21/19 0327   GLU 98      K 3.3*      CO2 15*   BUN 8   CREATININE 0.8   CALCIUM 9.0     CBC:   Recent Labs   Lab 12/21/19 0327   WBC 12.15   HGB 9.6*   HCT 28.8*   *     CMP:   Recent Labs   Lab 12/21/19 0327      K 3.3*      CO2 15*   GLU 98   BUN 8   CREATININE 0.8   CALCIUM 9.0   PROT 7.5   ALBUMIN 3.1*   BILITOT 1.2*   ALKPHOS 71   AST 19   ALT 6*   ANIONGAP 15   EGFRNONAA >60       Significant Imaging:       Assessment/Plan:      Hematoma  Right psoas muscle - evaluated by IR   Conservative management        Humerus fracture  Evaluated by Ortho - conservative managment      Severe malnutrition        Moderate malnutrition  continue supplements with meals .    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  - Continue ASA, Plavix, and statin.  - BP control as above.    Type 2 diabetes mellitus, with long-term current use of insulin  - AccuChecks with moderate dose SSI.  - Diabetic diet.  - Recent HbA1c of 8.3.      VTE Risk Mitigation (From admission, onward)         Ordered     enoxaparin injection 40 mg  Daily      12/20/19 0700     IP VTE HIGH RISK PATIENT  Once      12/20/19 0700     Place sequential compression device  Until discontinued      12/20/19 0700                      Edie Munguia MD  Department of Hospital Medicine   [facility]

## 2019-12-23 NOTE — NURSING
Report given to TERRY Aguilera  at bedside. NAD noted. Safety precautions maintained. Call bell in reach. Chart check completed.

## 2019-12-23 NOTE — PROGRESS NOTES
[facility] Hospital Medicine  Progress Note    Patient Name: Melva Barker  MRN: [id number]  Patient Class: OP- Observation   Admission Date: 12/19/2019  Length of Stay: 0 days  Attending Physician: Edie Munguia MD  Primary Care Provider: Claire Souza MD        Subjective:     Principal Problem:SIRS (systemic inflammatory response syndrome)        HPI:  Ms. Barker is a 74 y.o. female  with a PMHx of HTN, HLD, recent CVA in 10/2019 with right-sided weakness, DM II with gastroparesis, chronic back pain, anxiety, malnutrition, and opiate dependence.  She was transferred from [facility] to the ED for c/o worsening right-sided weakness after receiving Zanaflex.  Upon arrival to the ED, patient was AAO x 4 with no acute focal neuro deficits noted.  However, patient had c/o generalized ABD pain with associated nausea and vomiting.  Family reports patient overuses her narcotic pain medications, which has been [facility].  Family thinks symptoms are due to opiate withdraw.  Patient denies any HA,  lightheadedness, dizziness, syncope, visual disturbance, facial droop, slurred speech, confusion, aphasia, dysphagia, hemiparesis, CP, palpitations, SOB, cough, wheezing, rhinorrhea, sore throat, ABD distention, diarrhea, constipation, hematemesis, melena, dysuria, hematuria, back or neck pain, fatigue, fever or chills.  Work-up in the ED resulted WBC 15.4, 0% bands, CO2 14, anion gap 17, stable kidney function and LFTs, lipase 13, negative troponin, lactic 1.7, procalcitonin 0.13, influenza negative, UA negative for UTI, CXR unremarkable, EKG unrevealing.  CT of the head and ABD/pelvis were negative for acute process.  VBG with pH 7.429, HCO3 16.9, BE -7.  In the ED, patient tachycardic (sinus) and hypertensive with /121.  IV labetalol 20 mg given, which improved HR into 80s and BP to 137/88.  Hospital Medicine was called for admission.      Overview/Hospital  Course:  12/20/  Complaints of right flank pain   Tramadol /Toradol added for pain    12/21/19  Right humerus fracture- evaluated by ortho   Recommends conservative management  Right psoas muscle hematoma visualized on CT of abdomen and pelvis  Interventional Radiology currently does not see indication for aspiration or placement of drain  SIRS of unclear etiology has resolved.  Seen and examined , stable for discharge      12/22/19   Discharge held due to placement issues      12/23/19    Lopressor added this am     Due to tachycardia        Interval History:     Review of Systems   Constitutional: Negative.    HENT: Negative.    Eyes: Negative.    Respiratory: Negative.    Cardiovascular: Negative.    Gastrointestinal: Negative.    Endocrine: Negative.    Genitourinary: Negative.    Musculoskeletal: Negative.    Skin: Negative.    Allergic/Immunologic: Negative.    Neurological: Negative.    Hematological: Negative.    Psychiatric/Behavioral: Positive for agitation.     Objective:     Vital Signs (Most Recent):  Temp: 98.4 °F (36.9 °C) (12/23/19 0721)  Pulse: (!) 114 (12/23/19 0900)  Resp: 18 (12/23/19 0721)  BP: (!) 141/81 (12/23/19 0721)  SpO2: 99 % (12/23/19 0721) Vital Signs (24h Range):  Temp:  [97.4 °F (36.3 °C)-99.1 °F (37.3 °C)] 98.4 °F (36.9 °C)  Pulse:  [114-131] 114  Resp:  [18] 18  SpO2:  [98 %-100 %] 99 %  BP: (127-144)/(73-81) 141/81     Weight: 41.9 kg (92 lb 6 oz)  Body mass index is 19.31 kg/m².    Intake/Output Summary (Last 24 hours) at 12/23/2019 1111  Last data filed at 12/23/2019 0537  Gross per 24 hour   Intake 476 ml   Output 375 ml   Net 101 ml      Physical Exam   Constitutional: She is oriented to person, place, and time. She appears well-developed and well-nourished. She appears distressed.   HENT:   Head: Normocephalic and atraumatic.   Eyes: Pupils are equal, round, and reactive to light. EOM are normal.   Neck: Normal range of motion. Neck supple.   Cardiovascular: Normal rate and  regular rhythm.   Pulmonary/Chest: Effort normal and breath sounds normal.   Abdominal: Soft. Bowel sounds are normal. There is tenderness.   Right flank pain    Musculoskeletal: Normal range of motion.   Neurological: She is alert and oriented to person, place, and time.   Skin: Skin is warm and dry.   Psychiatric: She has a normal mood and affect. Her behavior is normal. Judgment and thought content normal.   Nursing note and vitals reviewed.      Significant Labs:   BMP:   Recent Labs   Lab 12/23/19 0822   *      K 3.4*      CO2 20*   BUN 8   CREATININE 0.7   CALCIUM 10.3     CBC:   Recent Labs   Lab 12/23/19 0822   WBC 10.90   HGB 10.2*   HCT 31.2*   *     CMP:   Recent Labs   Lab 12/23/19 0822      K 3.4*      CO2 20*   *   BUN 8   CREATININE 0.7   CALCIUM 10.3   ANIONGAP 12   EGFRNONAA >60       Significant Imaging:       Assessment/Plan:      Hematoma  Right psoas muscle - evaluated by IR   Conservative management        Humerus fracture  Evaluated by Ortho - conservative managment      Severe malnutrition        Moderate malnutrition  continue supplements with meals .    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  - Continue ASA, Plavix, and statin.  - BP control as above.    Type 2 diabetes mellitus, with long-term current use of insulin  - AccuChecks with moderate dose SSI.  - Diabetic diet.  - Recent HbA1c of 8.3.      VTE Risk Mitigation (From admission, onward)         Ordered     enoxaparin injection 40 mg  Daily      12/20/19 0700     IP VTE HIGH RISK PATIENT  Once      12/20/19 0700     Place sequential compression device  Until discontinued      12/20/19 0700                      Edie Munguia MD  Department of Hospital Medicine   [facility]

## 2019-12-23 NOTE — PT/OT/SLP PROGRESS
Physical Therapy  Treatment    Melva Barker   MRN: [id number]   Admitting Diagnosis: SIRS (systemic inflammatory response syndrome)    PT Received On: 12/23/19  PT Start Time: 1030     PT Stop Time: 1055    PT Total Time (min): 25 min       Billable Minutes:  Gait Training 15 and Therapeutic Exercise 10  PT/PTA: PT      General Precautions: Standard, fall  Orthopedic Precautions: RUE non weight bearing   Braces: UE Sling    Subjective:  Communicated with NURSE PAGET prior to session.  Pain/Comfort  Pain Rating 1: 5/10  Location - Side 1: Right  Location - Orientation 1: generalized  Location 1: arm    Objective:   Patient found with: telemetry, peripheral IV    Functional Mobility:  Therapeutic Activities and Exercises:  PT FOUND SUPINE IN BED UPON ARRIVAL, C/O DIZZINESS BUT AGREEABLE TO TX., SUP>SIT WITH ANITHA, RUE SLING ADJUST FOR PT, PT EDUCATED IN AND PERFORMED BLE THEREX X 15 REPS AROM WITH REST, CUES TO STAY ON TASK, SIT>STAND WITH MODA, PT AMB 10' WITH HHA/MODA X 2, PT C/O DIZZINESS, UNSTEADY DUE TO RLE WEAKNESS, QUICK TO FATIGUE, ELEVATED HR 150BPM PER NURSE SO ASSIST BACK TO BED, PT DECLINED SITTING IN CHAIR AT THIS TIME, RETURN TO SUPINE WITH ANITHA, PT POSITIONED TO COMFORT    AM-PAC 6 CLICK MOBILITY  How much help from another person does this patient currently need?   1 = Unable, Total/Dependent Assistance  2 = A lot, Maximum/Moderate Assistance  3 = A little, Minimum/Contact Guard/Supervision  4 = None, Modified [place]/Independent    Turning over in bed (including adjusting bedclothes, sheets and blankets)?: 3  Sitting down on and standing up from a chair with arms (e.g., wheelchair, bedside commode, etc.): 2  Moving from lying on back to sitting on the side of the bed?: 3  Moving to and from a bed to a chair (including a wheelchair)?: 2  Need to walk in hospital room?: 2  Climbing 3-5 steps with a railing?: 1  Basic Mobility Total Score: 13    AM-PAC Raw Score CMS G-Code Modifier Level of  Impairment Assistance   6 % Total / Unable   7 - 9 CM 80 - 100% Maximal Assist   10 - 14 CL 60 - 80% Moderate Assist   15 - 19 CK 40 - 60% Moderate Assist   20 - 22 CJ 20 - 40% Minimal Assist   23 CI 1-20% SBA / CGA   24 CH 0% Independent/ Mod I     Patient left supine with all lines intact, call button in reach, NURSE notified and NURSE present.    Assessment:  Melva Barker is a 74 y.o. female with a medical diagnosis of SIRS (systemic inflammatory response syndrome) and presents with IMPAIRED FUNCTIONAL MOBILITY. PT WILL BENEFIT FROM CONT SKILLED P.T. TO ADDRESS IMPAIRMENTS    Rehab identified problem list/impairments: Rehab identified problem list/impairments: weakness, impaired endurance, gait instability, impaired functional mobilty, impaired balance, decreased coordination, decreased safety awareness    Rehab potential is good.    Activity tolerance: Good    Discharge recommendations: Discharge Facility/Level of Care Needs: nursing facility, skilled     Barriers to discharge:      Equipment recommendations: Equipment Needed After Discharge: (TBD)     GOALS:   Multidisciplinary Problems     Physical Therapy Goals        Problem: Physical Therapy Goal    Goal Priority Disciplines Outcome Goal Variances Interventions   Physical Therapy Goal     PT, PT/OT Ongoing, Progressing     Description:  LTGs to be met within 7 days (12/29/19):  1.  Patient will perform bed mobility with min assist  2.  Patient will perform functional transfers with appropriate AD with min assist  3.  Patient will ambulate with appropriate AD with min assist and no gross LOB  4.  Patient will perform BLE therapeutic exercises 10 x 2 in all planes (active-assist to RLE as needed)                    PLAN:    Patient to be seen 5 x/week  to address the above listed problems via gait training, therapeutic exercises, therapeutic activities  Plan of Care expires: 12/29/19  Plan of Care reviewed with: patient    PT ENCOURAGED TO CALL  FOR ASSISTANCE WITH ALL NEEDS DUE TO FALL RISK STATUS, PT AGREEABLE    Emma Werner, PT  12/23/2019

## 2019-12-23 NOTE — SUBJECTIVE & OBJECTIVE
Interval History:     Review of Systems   Constitutional: Negative.    HENT: Negative.    Eyes: Negative.    Respiratory: Negative.    Cardiovascular: Negative.    Gastrointestinal: Negative.    Endocrine: Negative.    Genitourinary: Negative.    Musculoskeletal: Negative.    Skin: Negative.    Allergic/Immunologic: Negative.    Neurological: Negative.    Hematological: Negative.    Psychiatric/Behavioral: Positive for agitation.     Objective:     Vital Signs (Most Recent):  Temp: 98.4 °F (36.9 °C) (12/23/19 0721)  Pulse: (!) 114 (12/23/19 0900)  Resp: 18 (12/23/19 0721)  BP: (!) 141/81 (12/23/19 0721)  SpO2: 99 % (12/23/19 0721) Vital Signs (24h Range):  Temp:  [97.4 °F (36.3 °C)-99.1 °F (37.3 °C)] 98.4 °F (36.9 °C)  Pulse:  [114-131] 114  Resp:  [18] 18  SpO2:  [98 %-100 %] 99 %  BP: (127-144)/(73-81) 141/81     Weight: 41.9 kg (92 lb 6 oz)  Body mass index is 19.31 kg/m².    Intake/Output Summary (Last 24 hours) at 12/23/2019 1111  Last data filed at 12/23/2019 0537  Gross per 24 hour   Intake 476 ml   Output 375 ml   Net 101 ml      Physical Exam   Constitutional: She is oriented to person, place, and time. She appears well-developed and well-nourished. She appears distressed.   HENT:   Head: Normocephalic and atraumatic.   Eyes: Pupils are equal, round, and reactive to light. EOM are normal.   Neck: Normal range of motion. Neck supple.   Cardiovascular: Normal rate and regular rhythm.   Pulmonary/Chest: Effort normal and breath sounds normal.   Abdominal: Soft. Bowel sounds are normal. There is tenderness.   Right flank pain    Musculoskeletal: Normal range of motion.   Neurological: She is alert and oriented to person, place, and time.   Skin: Skin is warm and dry.   Psychiatric: She has a normal mood and affect. Her behavior is normal. Judgment and thought content normal.   Nursing note and vitals reviewed.      Significant Labs:   BMP:   Recent Labs   Lab 12/23/19  0822   *      K 3.4*       CO2 20*   BUN 8   CREATININE 0.7   CALCIUM 10.3     CBC:   Recent Labs   Lab 12/23/19  0822   WBC 10.90   HGB 10.2*   HCT 31.2*   *     CMP:   Recent Labs   Lab 12/23/19  0822      K 3.4*      CO2 20*   *   BUN 8   CREATININE 0.7   CALCIUM 10.3   ANIONGAP 12   EGFRNONAA >60       Significant Imaging:

## 2019-12-23 NOTE — PLAN OF CARE
Additional clinical information requested, all information sent through [facility]        12/23/19 8791   Post-Acute Status   Post-Acute Authorization Placement   Post-Acute Placement Status Additional Clinical Requested

## 2019-12-23 NOTE — PLAN OF CARE
[facility] is [facility], currently waiting insurance authorization.        12/23/19 1151   Post-Acute Status   Post-Acute Authorization Placement   Post-Acute Placement Status Referrals Sent

## 2019-12-23 NOTE — PLAN OF CARE
[facility] referral sent to assist patient with placement.        12/23/19 5561   Post-Acute Status   Post-Acute Authorization Placement   Post-Acute Placement Status Referrals Sent   Patient choice form signed by patient/caregiver List with quality metrics by geographic area provided

## 2019-12-23 NOTE — PT/OT/SLP PROGRESS
Occupational Therapy  Treatment    Melva Barker   MRN: [id number]   Admitting Diagnosis: SIRS (systemic inflammatory response syndrome)    OT Date of Treatment: 12/23/19   OT Start Time: 1030  OT Stop Time: 1055  OT Total Time (min): 25 min    Billable Minutes:  Therapeutic Activity 15 min and Therapeutic Exercise 10 min    General Precautions: Standard, fall  Orthopedic Precautions: RUE non weight bearing  Braces: UE Sling         Subjective:  Communicated with Nurse Aguilera and epic chart review prior to session.  Pt found supine in bed and agreeable to tx at this time. Pt sated that she felt dizzy at this time.   Pain/Comfort  Pain Rating 1: 5/10  Location - Side 1: Right  Location 1: arm  Pain Addressed 1: Pre-medicate for activity, Reposition, Distraction, Nurse notified  Pain Rating Post-Intervention 1: 5/10    Objective:  Patient found with: peripheral IV, telemetry     Functional Mobility:  Therapeutic Activities and Exercises:  Pt performed supine>sit with Min A, scooted to EOB with Min A. Pt sat EOB and performed (B) UE ROM exercises 1 x 10 reps: bicep curls, forearm sup/ pro, refused shoulder ex; sling adjusted to fit properly. Pt performed sit>stand with Mod A, functional mobility ~10ft with Mod A using HHA, had c/o dizziness and fatigue, Nurse reported elevated HR of 150BPM so pt assisted to sitting EOB with Mod A, sit>supine with Min A.    AM-PAC 6 CLICK ADL   How much help from another person does this patient currently need?   1 = Unable, Total/Dependent Assistance  2 = A lot, Maximum/Moderate Assistance  3 = A little, Minimum/Contact Guard/Supervision  4 = None, Modified [place]/Independent    Putting on and taking off regular lower body clothing? : 2  Bathing (including washing, rinsing, drying)?: 2  Toileting, which includes using toilet, bedpan, or urinal? : 2  Putting on and taking off regular upper body clothing?: 2  Taking care of personal grooming such as brushing teeth?: 3  Eating  "meals?: 3  Daily Activity Total Score: 14     AM-PAC Raw Score CMS "G-Code Modifier Level of Impairment Assistance   6 % Total / Unable   7 - 8 CM 80 - 100% Maximal Assist   9-13 CL 60 - 80% Moderate Assist   14 - 19 CK 40 - 60% Moderate Assist   20 - 22 CJ 20 - 40% Minimal Assist   23 CI 1-20% SBA / CGA   24 CH 0% Independent/ Mod I       Patient left supine with all lines intact, call button in reach and Nurse Ale notified    ASSESSMENT:  Melva Barker is a 74 y.o. female with a medical diagnosis of SIRS (systemic inflammatory response syndrome) and presents with impaired functional mobility and ADLs. Pt will benefit from continued skilled OT in order to address impairments.    Rehab identified problem list/impairments: Rehab identified problem list/impairments: weakness, impaired endurance, impaired self care skills, impaired functional mobilty, decreased coordination, decreased safety awareness, decreased lower extremity function, impaired balance, gait instability, decreased upper extremity function, pain, orthopedic precautions    Rehab potential is fair.    Activity tolerance: Fair    Discharge recommendations: Discharge Facility/Level of Care Needs: nursing facility, skilled     Barriers to discharge: Barriers to Discharge: Decreased caregiver support    Equipment recommendations: (TBD)     GOALS:   Multidisciplinary Problems     Occupational Therapy Goals        Problem: Occupational Therapy Goal    Goal Priority Disciplines Outcome Interventions   Occupational Therapy Goal     OT, PT/OT Ongoing, Progressing    Description:  1)  PATIENT WILL BE ABLE TO VERBALIZE WB PRECAUTIONS AND DEMO PROPER POSITIONING OF RUE WITH SLING OR WITH PILLOW SUPPORT IF SLING NOT RECEIVED.  2)  PATIENT WILL DON/DOFF UB CLOTHING WITH MOD (A) WITH KYLER-DRESSING TECHNIQUE.  3)  PATIENT WILL T/F EOB <> BSC WITH MIN (A).                    Plan:  Patient to be seen 3 x/week to address the above listed problems via " self-care/home management, therapeutic activities, therapeutic exercises  Plan of Care expires: 12/29/19  Plan of Care reviewed with: patient    OT G-codes  Functional Assessment Tool Used: [facility]  Score: 14    Erica Phillips, PT/OT  12/23/2019

## 2019-12-23 NOTE — PLAN OF CARE
Problem: Fall Injury Risk  Goal: Absence of Fall and Fall-Related Injury  Outcome: Ongoing, Progressing  Intervention: Promote Injury-Free Environment  Flowsheets (Taken 12/23/2019 0406)  Safety Promotion/Fall Prevention: assistive device/personal item within reach; bed alarm set; diversional activities provided; Fall Risk reviewed with patient/family; Fall Risk signage in place; lighting adjusted; medications reviewed; nonskid shoes/socks when out of bed; room near unit station; side rails raised x 3; instructed to call staff for mobility  Environmental Safety Modification: assistive device/personal items within reach; clutter free environment maintained; lighting adjusted; room near unit station     Problem: Adult Inpatient Plan of Care  Goal: Plan of Care Review  Outcome: Ongoing, Progressing     Problem: Diabetes Comorbidity  Goal: Blood Glucose Level Within Desired Range  Intervention: Maintain Glycemic Control  Flowsheets (Taken 12/23/2019 0406)  Glycemic Management: blood glucose monitoring     Problem: Pain Acute  Goal: Optimal Pain Control  Intervention: Prevent or Manage Pain  Flowsheets (Taken 12/23/2019 0406)  Sleep/Rest Enhancement: awakenings minimized; relaxation techniques promoted; regular sleep/rest pattern promoted  Sensory Stimulation Regulation: auditory stimulation minimized; care clustered; lighting decreased; quiet environment promoted

## 2019-12-23 NOTE — NURSING
Was told by Leigh Ann Bustos that [facility] did not send patient's belongings with her which included her glasses. I spoke with Osmar RN to see if he knew of patients belonging and he said that he was told nothing. He and I will speak with Paget, RN, [facility] nurse, to inquire about patients belongings.

## 2019-12-23 NOTE — PLAN OF CARE
Pt AAO x4.  Pt remains free of falls throughout shift.  Pain addressed.   Plan of care reviewed. Pt verbalizes understanding.  Pt moving and turning independently. Frequent weight shifting encouraged.  Normal sinus rhythm to sinus tach on monitor.  Hourly rounding completed.  Will continue to monitor.

## 2019-12-24 VITALS
SYSTOLIC BLOOD PRESSURE: 123 MMHG | HEART RATE: 86 BPM | HEIGHT: 58 IN | TEMPERATURE: 99 F | RESPIRATION RATE: 18 BRPM | BODY MASS INDEX: 19.67 KG/M2 | DIASTOLIC BLOOD PRESSURE: 82 MMHG | OXYGEN SATURATION: 100 % | WEIGHT: 93.69 LBS

## 2019-12-24 LAB
POCT GLUCOSE: 118 MG/DL (ref 70–110)
POCT GLUCOSE: 87 MG/DL (ref 70–110)
POCT GLUCOSE: 96 MG/DL (ref 70–110)

## 2019-12-24 PROCEDURE — 97803 MED NUTRITION INDIV SUBSEQ: CPT

## 2019-12-24 PROCEDURE — 63600175 PHARM REV CODE 636 W HCPCS: Performed by: INTERNAL MEDICINE

## 2019-12-24 PROCEDURE — G0009 ADMIN PNEUMOCOCCAL VACCINE: HCPCS | Performed by: INTERNAL MEDICINE

## 2019-12-24 PROCEDURE — 90471 IMMUNIZATION ADMIN: CPT | Performed by: INTERNAL MEDICINE

## 2019-12-24 PROCEDURE — 25000003 PHARM REV CODE 250: Performed by: NURSE PRACTITIONER

## 2019-12-24 PROCEDURE — 97110 THERAPEUTIC EXERCISES: CPT

## 2019-12-24 PROCEDURE — 97530 THERAPEUTIC ACTIVITIES: CPT | Performed by: PHYSICAL THERAPIST

## 2019-12-24 PROCEDURE — 90662 IIV NO PRSV INCREASED AG IM: CPT | Performed by: INTERNAL MEDICINE

## 2019-12-24 PROCEDURE — 90472 IMMUNIZATION ADMIN EACH ADD: CPT | Performed by: INTERNAL MEDICINE

## 2019-12-24 PROCEDURE — 97116 GAIT TRAINING THERAPY: CPT

## 2019-12-24 PROCEDURE — 90670 PCV13 VACCINE IM: CPT | Performed by: INTERNAL MEDICINE

## 2019-12-24 PROCEDURE — G0008 ADMIN INFLUENZA VIRUS VAC: HCPCS | Performed by: INTERNAL MEDICINE

## 2019-12-24 PROCEDURE — G0378 HOSPITAL OBSERVATION PER HR: HCPCS

## 2019-12-24 PROCEDURE — 25000003 PHARM REV CODE 250: Performed by: INTERNAL MEDICINE

## 2019-12-24 RX ORDER — METOPROLOL TARTRATE 25 MG/1
25 TABLET, FILM COATED ORAL 2 TIMES DAILY
Qty: 60 TABLET | Refills: 11 | Status: SHIPPED | OUTPATIENT
Start: 2019-12-24 | End: 2020-12-23

## 2019-12-24 RX ORDER — MORPHINE SULFATE 15 MG/1
15 TABLET ORAL 2 TIMES DAILY PRN
Qty: 8 TABLET | Refills: 0 | Status: SHIPPED | OUTPATIENT
Start: 2019-12-24

## 2019-12-24 RX ORDER — MORPHINE SULFATE 15 MG/1
15 TABLET ORAL 2 TIMES DAILY PRN
Status: DISCONTINUED | OUTPATIENT
Start: 2019-12-24 | End: 2019-12-24 | Stop reason: HOSPADM

## 2019-12-24 RX ORDER — CYCLOBENZAPRINE HCL 10 MG
10 TABLET ORAL 3 TIMES DAILY PRN
Qty: 30 TABLET | Refills: 0 | Status: SHIPPED | OUTPATIENT
Start: 2019-12-24 | End: 2020-01-03

## 2019-12-24 RX ORDER — MORPHINE SULFATE 15 MG/1
15 TABLET ORAL ONCE
Status: COMPLETED | OUTPATIENT
Start: 2019-12-24 | End: 2019-12-24

## 2019-12-24 RX ADMIN — GABAPENTIN 300 MG: 300 CAPSULE ORAL at 08:12

## 2019-12-24 RX ADMIN — CYCLOBENZAPRINE 10 MG: 10 TABLET, FILM COATED ORAL at 01:12

## 2019-12-24 RX ADMIN — TRAMADOL HYDROCHLORIDE 50 MG: 50 TABLET, FILM COATED ORAL at 06:12

## 2019-12-24 RX ADMIN — CLOPIDOGREL BISULFATE 75 MG: 75 TABLET ORAL at 08:12

## 2019-12-24 RX ADMIN — METOCLOPRAMIDE HYDROCHLORIDE 10 MG: 10 TABLET ORAL at 05:12

## 2019-12-24 RX ADMIN — PANTOPRAZOLE SODIUM 40 MG: 40 TABLET, DELAYED RELEASE ORAL at 08:12

## 2019-12-24 RX ADMIN — MORPHINE SULFATE 15 MG: 15 TABLET ORAL at 10:12

## 2019-12-24 RX ADMIN — DULOXETINE HYDROCHLORIDE 20 MG: 20 CAPSULE, DELAYED RELEASE ORAL at 08:12

## 2019-12-24 RX ADMIN — ASPIRIN 81 MG: 81 TABLET, COATED ORAL at 08:12

## 2019-12-24 RX ADMIN — PNEUMOCOCCAL 13-VALENT CONJUGATE VACCINE 0.5 ML: 2.2; 2.2; 2.2; 2.2; 2.2; 4.4; 2.2; 2.2; 2.2; 2.2; 2.2; 2.2; 2.2 INJECTION, SUSPENSION INTRAMUSCULAR at 01:12

## 2019-12-24 RX ADMIN — INFLUENZA A VIRUS A/MICHIGAN/45/2015 X-275 (H1N1) ANTIGEN (FORMALDEHYDE INACTIVATED), INFLUENZA A VIRUS A/SINGAPORE/INFIMH-16-0019/2016 IVR-186 (H3N2) ANTIGEN (FORMALDEHYDE INACTIVATED), AND INFLUENZA B VIRUS B/MARYLAND/15/2016 BX-69A (A B/COLORADO/6/2017-LIKE VIRUS) ANTIGEN (FORMALDEHYDE INACTIVATED) 0.5 ML: 60; 60; 60 INJECTION, SUSPENSION INTRAMUSCULAR at 01:12

## 2019-12-24 RX ADMIN — METOCLOPRAMIDE HYDROCHLORIDE 10 MG: 10 TABLET ORAL at 10:12

## 2019-12-24 RX ADMIN — METOPROLOL TARTRATE 25 MG: 25 TABLET ORAL at 08:12

## 2019-12-24 RX ADMIN — CYCLOBENZAPRINE 10 MG: 10 TABLET, FILM COATED ORAL at 06:12

## 2019-12-24 NOTE — PLAN OF CARE
RYAN spoke to Kirsten from [facility] insurance authorization was obtained Facility currently waiting on discharge orders. CM will call facility when discharge orders are in for a  time. CM informed patient that she will be discharging on today to [place] for therapy services.        12/24/19 1159   Post-Acute Status   Post-Acute Authorization Placement   Post-Acute Placement Status Set-up Complete

## 2019-12-24 NOTE — PLAN OF CARE
CM spoke to [place]  time will be at 2:00pm, information given to floor nurse to call report in.        12/24/19 1141   Post-Acute Status   Post-Acute Authorization Placement   Post-Acute Placement Status Set-up Complete

## 2019-12-24 NOTE — PT/OT/SLP PROGRESS
Physical Therapy  Treatment    Melva Barker   MRN: [id number]   Admitting Diagnosis: SIRS (systemic inflammatory response syndrome)       PT Start Time: 1200     PT Stop Time: 1225    PT Total Time (min): 25 min       Billable Minutes:  Gait Training 10 and Therapeutic Exercise 15    Treatment Type: Treatment  PT/PTA: PT             General Precautions: Standard, fall  Orthopedic Precautions: RUE non weight bearing   Braces: UE Sling    Spiritual, Cultural Beliefs, Latter day Practices, Values that Affect Care: no    Subjective:  Communicated with RN prior to session.      Pain/Comfort  Pain Rating 1: 3/10  Location - Side 1: Right  Location - Orientation 1: upper  Location 1: arm  Pain Addressed 1: Reposition, Cessation of Activity, Distraction  Pain Rating Post-Intervention 1: 0/10(at rest)    Objective:   Patient found with: telemetry    Functional Mobility:  Bed Mobility: supine to/from sit min A with cues for body mechanics and safe/efficient technique; supine scooting cga       Transfers: sit to/from stand x 2 reps min a; sat down 1st trial r/t dizziness; resolved per pt.then walked       Gait: amb 12ft in room min A with handheld A for balance and safety; unsafe andinc gt speed when pt. Felt weak/dizzy - unsteady gt and inc fall risk       Stairs: n/a      Balance:   Static Sit: sba  Dynamic Sit: cga  Static Stand: cga with handheld A  Dynamic stand: min a handheld A     Therapeutic Activities and Exercises:  PT educated patient on POC, supine B LE TE with assist needed for R LE ROM and vc/tc's needed for all exs AND safety/fall/R UE NWB precautions with mobility.    AM-PAC 6 CLICK MOBILITY  How much help from another person does this patient currently need?   1 = Unable, Total/Dependent Assistance  2 = A lot, Maximum/Moderate Assistance  3 = A little, Minimum/Contact Guard/Supervision  4 = None, Modified [place]/Independent    Turning over in bed (including adjusting bedclothes, sheets and  blankets)?: 3  Sitting down on and standing up from a chair with arms (e.g., wheelchair, bedside commode, etc.): 3  Moving from lying on back to sitting on the side of the bed?: 3  Moving to and from a bed to a chair (including a wheelchair)?: 3  Need to walk in hospital room?: 3  Climbing 3-5 steps with a railing?: 1  Basic Mobility Total Score: 16    AM-PAC Raw Score CMS G-Code Modifier Level of Impairment Assistance   6 % Total / Unable   7 - 9 CM 80 - 100% Maximal Assist   10 - 14 CL 60 - 80% Moderate Assist   15 - 19 CK 40 - 60% Moderate Assist   20 - 22 CJ 20 - 40% Minimal Assist   23 CI 1-20% SBA / CGA   24 CH 0% Independent/ Mod I     Patient left HOB elevated with all lines intact, call button in reach, bed alarm on and RN notified.    Assessment:  Melva Barker is a 74 y.o. female with a medical diagnosis of SIRS (systemic inflammatory response syndrome) and presents with impaired mobility/inc fall risk.    Rehab identified problem list/impairments: Rehab identified problem list/impairments: weakness, impaired endurance, impaired self care skills, impaired functional mobilty, gait instability, impaired balance, decreased coordination, decreased upper extremity function, decreased lower extremity function, decreased safety awareness, pain, decreased ROM    Rehab potential is fair.    Activity tolerance: Fair    Discharge recommendations: Discharge Facility/Level of Care Needs: nursing facility, skilled     Barriers to discharge:      Equipment recommendations: Equipment Needed After Discharge: (tbd by next level of care)     GOALS:   Multidisciplinary Problems     Physical Therapy Goals        Problem: Physical Therapy Goal    Goal Priority Disciplines Outcome Goal Variances Interventions   Physical Therapy Goal     PT, PT/OT Ongoing, Progressing     Description:  LTGs to be met within 7 days (12/29/19):  1.  Patient will perform bed mobility with min assist  2.  Patient will perform  functional transfers with appropriate AD with min assist  3.  Patient will ambulate with appropriate AD with min assist and no gross LOB  4.  Patient will perform BLE therapeutic exercises 10 x 2 in all planes (active-assist to RLE as needed)                    PLAN:    Patient to be seen 5 x/week  to address the above listed problems via gait training, therapeutic activities, therapeutic exercises  Plan of Care expires: 12/29/19  Plan of Care reviewed with: patient         Kale Hancock, PT  12/24/2019

## 2019-12-24 NOTE — PLAN OF CARE
RYAN spoke to son Paolo [phone] to give him the information of the Facility that patient is transferred to on today. Patients son stated that he thanked staff for all we have done with his mom since she has been at [facility]. Patient is transferred to [facility], son will follow up with facility with any questions or concerns.

## 2019-12-24 NOTE — PLAN OF CARE
Pt without fall.  VSS.  NSR on the monitor,  CBG checks AC&HS w SS coverage prn.  Tramadol for pain.  Sling in place to R arm.  POC reviewed with patient.     Problem: Fall Injury Risk  Goal: Absence of Fall and Fall-Related Injury  Outcome: Ongoing, Progressing     Problem: Adult Inpatient Plan of Care  Goal: Plan of Care Review  Outcome: Ongoing, Progressing  Goal: Patient-Specific Goal (Individualization)  Outcome: Ongoing, Progressing  Goal: Absence of Hospital-Acquired Illness or Injury  Outcome: Ongoing, Progressing  Goal: Optimal Comfort and Wellbeing  Outcome: Ongoing, Progressing  Goal: Readiness for Transition of Care  Outcome: Ongoing, Progressing  Goal: Rounds/Family Conference  Outcome: Ongoing, Progressing     Problem: Diabetes Comorbidity  Goal: Blood Glucose Level Within Desired Range  Outcome: Ongoing, Progressing     Problem: Skin Injury Risk Increased  Goal: Skin Health and Integrity  Outcome: Ongoing, Progressing     Problem: Pain Chronic (Persistent) (Comorbidity Management)  Goal: Acceptable Pain Control and Functional Ability  Outcome: Ongoing, Progressing     Problem: Pain Acute  Goal: Optimal Pain Control  Outcome: Ongoing, Progressing   Will continue monitoring.

## 2019-12-24 NOTE — PLAN OF CARE
Min a bed mobility and transfers; began min a with amb but got weak/dizzy and needed to get back to bed quickly/unsafe and dec balance; needs continued PT post acute

## 2019-12-24 NOTE — PT/OT/SLP PROGRESS
"Occupational Therapy  Treatment    Melva Barker   MRN: [id number]   Admitting Diagnosis: SIRS (systemic inflammatory response syndrome)    OT Date of Treatment: 12/24/19   OT Start Time: 1025  OT Stop Time: 1040  OT Total Time (min): 15 min    Billable Minutes:  Therapeutic Activity 15 min    General Precautions: Standard, fall  Orthopedic Precautions: RUE non weight bearing  Braces: UE Sling         Subjective:  Communicated with Nurse Anastacio and epic chart review prior to session.  Pt found supine in bed and only agreeable to education at this time.   Pain/Comfort  Pain Rating 1: 9/10  Location - Side 1: Right  Location - Orientation 1: generalized  Location 1: arm  Pain Addressed 1: Nurse notified, Reposition, Distraction  Pain Rating Post-Intervention 1: 9/10    Objective:  Patient found with: peripheral IV, telemetry     Functional Mobility:  Therapeutic Activities and Exercises:  OT educated pt on proper placement of (R) UE sling and made adjustments. OT educated pt in (R) UE ROM exercises and instructed pt in the importance of performing 2-3x/ day. Pt performed AROM of (R) finger flex/ ext, wrist flex/ ext and UD/RD; ~5xs each. Pt refused to perform forearm sup/ pro, elbow flex/ ext, shoulder flex/ ext and abd/ add.     AM-PAC 6 CLICK ADL   How much help from another person does this patient currently need?   1 = Unable, Total/Dependent Assistance  2 = A lot, Maximum/Moderate Assistance  3 = A little, Minimum/Contact Guard/Supervision  4 = None, Modified [place]/Independent    Putting on and taking off regular lower body clothing? : 2  Bathing (including washing, rinsing, drying)?: 2  Toileting, which includes using toilet, bedpan, or urinal? : 2  Putting on and taking off regular upper body clothing?: 2  Taking care of personal grooming such as brushing teeth?: 3  Eating meals?: 3  Daily Activity Total Score: 14     AM-PAC Raw Score CMS "G-Code Modifier Level of Impairment Assistance   6 % " Total / Unable   7 - 8 CM 80 - 100% Maximal Assist   9-13 CL 60 - 80% Moderate Assist   14 - 19 CK 40 - 60% Moderate Assist   20 - 22 CJ 20 - 40% Minimal Assist   23 CI 1-20% SBA / CGA   24 CH 0% Independent/ Mod I       Patient left HOB elevated with all lines intact, call button in reach and Nurse Anastacio present    ASSESSMENT:  Melva Barker is a 74 y.o. female with a medical diagnosis of SIRS (systemic inflammatory response syndrome) and presents with  impaired functional mobility and ADLs. Pt will benefit from continued skilled OT in order to address impairments.    Rehab identified problem list/impairments: Rehab identified problem list/impairments: weakness, impaired endurance, impaired self care skills, impaired functional mobilty, decreased coordination, pain, orthopedic precautions, decreased safety awareness, impaired balance, decreased lower extremity function, decreased upper extremity function, gait instability, decreased ROM    Rehab potential is fair.    Activity tolerance: Fair    Discharge recommendations: Discharge Facility/Level of Care Needs: nursing facility, skilled     Barriers to discharge: Barriers to Discharge: Decreased caregiver support    Equipment recommendations: (TBD)     GOALS:   Multidisciplinary Problems     Occupational Therapy Goals        Problem: Occupational Therapy Goal    Goal Priority Disciplines Outcome Interventions   Occupational Therapy Goal     OT, PT/OT Ongoing, Progressing    Description:  1)  PATIENT WILL BE ABLE TO VERBALIZE WB PRECAUTIONS AND DEMO PROPER POSITIONING OF RUE WITH SLING OR WITH PILLOW SUPPORT IF SLING NOT RECEIVED.  2)  PATIENT WILL DON/DOFF UB CLOTHING WITH MOD (A) WITH KYLER-DRESSING TECHNIQUE.  3)  PATIENT WILL T/F EOB <> BSC WITH MIN (A).                    Plan:  Patient to be seen 3 x/week to address the above listed problems via self-care/home management, therapeutic activities, therapeutic exercises  Plan of Care expires: 12/29/19  Plan of  Care reviewed with: patient    OT G-codes  Functional Assessment Tool Used: [facility]  Score: 14    Erica Phillips, PT/OT  12/24/2019

## 2019-12-24 NOTE — DISCHARGE SUMMARY
[facility] Hospital Medicine  Discharge Summary      Patient Name: Melva Barker  MRN: [id number]  Admission Date: 12/19/2019  Hospital Length of Stay: 0 days  Discharge Date and Time:  12/24/2019 12:07 PM  Attending Physician: Edie Munguia MD   Discharging Provider: Edie Munguia MD  Primary Care Provider: Claire Souza MD      HPI:   Ms. Barker is a 74 y.o. female  with a PMHx of HTN, HLD, recent CVA in 10/2019 with right-sided weakness, DM II with gastroparesis, chronic back pain, anxiety, malnutrition, and opiate dependence.  She was transferred from [facility] to the ED for c/o worsening right-sided weakness after receiving Zanaflex.  Upon arrival to the ED, patient was AAO x 4 with no acute focal neuro deficits noted.  However, patient had c/o generalized ABD pain with associated nausea and vomiting.  Family reports patient overuses her narcotic pain medications, which has [facility].  Family thinks symptoms are due to opiate withdraw.  Patient denies any HA,  lightheadedness, dizziness, syncope, visual disturbance, facial droop, slurred speech, confusion, aphasia, dysphagia, hemiparesis, CP, palpitations, SOB, cough, wheezing, rhinorrhea, sore throat, ABD distention, diarrhea, constipation, hematemesis, melena, dysuria, hematuria, back or neck pain, fatigue, fever or chills.  Work-up in the ED resulted WBC 15.4, 0% bands, CO2 14, anion gap 17, stable kidney function and LFTs, lipase 13, negative troponin, lactic 1.7, procalcitonin 0.13, influenza negative, UA negative for UTI, CXR unremarkable, EKG unrevealing.  CT of the head and ABD/pelvis were negative for acute process.  VBG with pH 7.429, HCO3 16.9, BE -7.  In the ED, patient tachycardic (sinus) and hypertensive with /121.  IV labetalol 20 mg given, which improved HR into 80s and BP to 137/88.  Hospital Medicine was called for admission.      * No surgery found *      Hospital Course:    12/20/  Complaints of right flank pain   Tramadol /Toradol added for pain    12/21/19  Right humerus fracture- evaluated by ortho   Recommends conservative management  Right psoas muscle hematoma visualized on CT of abdomen and pelvis  Interventional Radiology currently does not see indication for aspiration or placement of drain  SIRS of unclear etiology has resolved.  Seen and examined , stable for discharge      12/22/19   Discharge held due to placement issues      12/23/19    Lopressor added this am     Due to tachycardia      12/24/19     Seen and examined , stable for discharge        Consults:   Consults (From admission, onward)        Status Ordering Provider     Inpatient consult to Orthopedic Surgery  Once     Provider:  Kenneth Meza MD    Acknowledged PAKO GREENBERG     Inpatient consult to Registered Dietitian/Nutritionist  Once     Provider:  (Not yet assigned)    Completed RAMÓN JANE     Inpatient consult to Social Work  Once     Provider:  (Not yet assigned)    Completed RAMÓN JANE          No new Assessment & Plan notes have been filed under this hospital service since the last note was generated.  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 [I63.312] 10/31/2019 Yes     Chronic    Type 2 diabetes mellitus, with long-term current use of insulin [E11.9, Z79.4] 04/23/2015 Not Applicable     Chronic      Problems Resolved During this Admission:    Diagnosis Date Noted Date Resolved POA    PRINCIPAL PROBLEM:  SIRS (systemic inflammatory response syndrome) [R65.10] 12/20/2019 12/21/2019 Yes    Generalized abdominal pain [R10.84] 12/20/2019  12/21/2019 Yes    Bicarbonate deficit [N25.89] 12/20/2019 12/21/2019 Yes    Uncontrolled hypertension [I10] 04/23/2017 12/21/2019 Yes     Chronic       Discharged Condition: good    Disposition: Skilled Nursing Facility    Follow Up:  Follow-up Information     Claire Souza MD In 1 day.    Specialty:  Family Medicine  Contact information:  [address] [id number] [phone]             [facility]    Specialty:  Emergency Medicine  Why:  As needed, If symptoms worsen  Contact information:  [address]  [place] [facility] [address] [phone]           Claire Souza MD.    Specialty:  Family Medicine  Contact information:  [address] [id number] [phone]                 Patient Instructions:   No discharge procedures on file.    Significant Diagnostic Studies: Labs:   BMP:   Recent Labs   Lab 12/23/19  0822   *      K 3.4*      CO2 20*   BUN 8   CREATININE 0.7   CALCIUM 10.3   , CMP   Recent Labs   Lab 12/23/19  0822      K 3.4*      CO2 20*   *   BUN 8   CREATININE 0.7   CALCIUM 10.3   ANIONGAP 12   ESTGFRAFRICA >60   EGFRNONAA >60    and CBC   Recent Labs   Lab 12/23/19  0822   WBC 10.90   HGB 10.2*   HCT 31.2*   *       Pending Diagnostic Studies:     None         Medications:  Reconciled Home Medications:      Medication List      START taking these medications    naproxen 500 MG tablet  Commonly known as:  NAPROSYN  Take 1 tablet (500 mg total) by mouth 2 (two) times daily as needed.     traMADol 50 mg tablet  Commonly known as:  ULTRAM  Take 1 tablet (50 mg total) by mouth every 6 (six) hours as needed.        CONTINUE taking these medications    amLODIPine 5 MG tablet  Commonly known as:  NORVASC  Take 1 tablet (5 mg total) by mouth once daily.     aspirin 81 MG EC tablet  Commonly known as:  ECOTRIN  Take 1 tablet (81 mg total) by mouth once daily.     atorvastatin 40 MG  tablet  Commonly known as:  LIPITOR  Take 1 tablet (40 mg total) by mouth every evening.     clopidogrel 75 mg tablet  Commonly known as:  PLAVIX  Take 1 tablet (75 mg total) by mouth once daily.     diclofenac sodium 1 % Gel  Commonly known as:  VOLTAREN  Apply 4 g topically once daily.     dronabinol 2.5 MG capsule  Commonly known as:  MARINOL  Take 1 capsule (2.5 mg total) by mouth once daily.     DULoxetine 20 MG capsule  Commonly known as:  CYMBALTA  Take 1 capsule (20 mg total) by mouth 2 (two) times daily.     gabapentin 300 MG capsule  Commonly known as:  NEURONTIN  Take 1 capsule (300 mg total) by mouth 3 (three) times daily.     insulin aspart U-100 100 unit/mL (3 mL) Inpn pen  Commonly known as:  NovoLOG  Inject 0-5 Units into the skin every 6 (six) hours as needed (Hyperglycemia).     lidocaine 5 %  Commonly known as:  LIDODERM  Place 1 patch onto the skin once daily. Remove & Discard patch within 12 hours or as directed by MD     melatonin  Commonly known as:  MELATIN  Take 2 tablets (6 mg total) by mouth nightly as needed for Insomnia.     ondansetron 4 mg/2 mL Soln  Inject 4 mg into the vein every 6 (six) hours as needed.     polyethylene glycol 17 gram Pwpk  Commonly known as:  GLYCOLAX  Take 17 g by mouth once daily.     senna-docusate 8.6-50 mg 8.6-50 mg per tablet  Commonly known as:  PERICOLACE  Take 1 tablet by mouth once daily.     traZODone 50 MG tablet  Commonly known as:  DESYREL  Take 0.5 tablets (25 mg total) by mouth every evening.     True Metrix Glucose Meter Misc  Generic drug:  blood-glucose meter     True Metrix Glucose Test Strip Strp  Generic drug:  blood sugar diagnostic     TRUEplus Lancets 33 gauge Misc  Generic drug:  lancets     Vitamin D2 50,000 unit Cap  Generic drug:  ergocalciferol  Take 50,000 Units by mouth every 7 days.        STOP taking these medications    HYDROcodone-acetaminophen  mg per tablet  Commonly known as:  NORCO            Indwelling Lines/Drains at  time of discharge:   Lines/Drains/Airways     None                 Time spent on the discharge of patient: 40  minutes  Patient was seen and examined on the date of discharge and determined to be suitable for discharge.         Edie Munguia MD  Department of Hospital Medicine  [facility]

## 2019-12-24 NOTE — CONSULTS
"  [facility] -   Adult Nutrition  Progress Note    SUMMARY     Recommendations    Recommendation: 1. Consider appetite stimulant 2. Continue current diet  3. RD to follow  Goals: >85% EEN/EPN by RD f/u  Nutrition Goal Status: new  Communication of RD Recs: (POC, sticky note, secure chat RN)    Reason for Assessment    Reason For Assessment: consult  Diagnosis: (tachycardia, abdominal pain)  Relevant Medical History: HTN, DM2, gastroparesis, stroke  General Information Comments:   12/20/19- Pt nauseous during visit and reports poor appetite today (PO intake 0%). Stated she is having trouble swallowing- will discuss with RN. Pt reports decreased appetite PTA (PO intake <50%) and UBW of 115-125lbs. Per epic records, 23lb weight loss in 6 months (20%). NFPE performed 12/20/19. Severe muscle wasting and subcutaneous fat depletion noted. Discussed oral nutrition supplements, but patient declined stating that she did not like them. Encouraged PO intake.  12/24/19- Pt continues to report poor appetite, PO intake of 0% this AM. Encouraged PO intake, however pt states nothing sounds good. Asked about trying Boost again, but pt states it gives her diarrhea.   Nutrition Discharge Planning: Diabetic, cardiac diet    Nutrition Risk Screen    Nutrition Risk Screen: dysphagia or difficulty swallowing    Nutrition/Diet History    Spiritual, Cultural Beliefs, Protestant Practices, Values that Affect Care: no    Anthropometrics    Height: 4' 10" (147.3 cm)  Height (inches): 58 in  Weight: 41.5 kg (91 lb 7.9 oz)  Ideal Body Weight (IBW), Female: 90 lb  % Ideal Body Weight, Female (lb): 101.66 %  BMI (Calculated): 19.1  BMI Grade: 18.5-24.9 - normal       Lab/Procedures/Meds    Pertinent Labs Reviewed: reviewed  BMP  Lab Results   Component Value Date     12/23/2019    K 3.4 (L) 12/23/2019     12/23/2019    CO2 20 (L) 12/23/2019    BUN 8 12/23/2019    CREATININE 0.7 12/23/2019    CALCIUM 10.3 12/23/2019    " ANIONGAP 12 12/23/2019    ESTGFRAFRICA >60 12/23/2019    EGFRNONAA >60 12/23/2019     Lab Results   Component Value Date    ALBUMIN 3.1 (L) 12/21/2019     Recent Labs   Lab 12/24/19  1103   POCTGLUCOSE 118*     Lab Results   Component Value Date    HGBA1C 8.3 (H) 10/31/2019       Pertinent Medications Reviewed: reviewed      Estimated/Assessed Needs    Weight Used For Calorie Calculations: 41.5 kg (91 lb 7.9 oz)  Energy Calorie Requirements (kcal): 6087-4605  Energy Need Method: [place]-St Jeor(1.3-1.5 AF)  Protein Requirements: 41-62 g  Weight Used For Protein Calculations: 41.5 kg (91 lb 7.9 oz)        RDA Method (mL): 1051  CHO Requirement: 151 g      Nutrition Prescription Ordered    Current Diet Order: diabetic, cardiac, 2000 kcal    Evaluation of Received Nutrient/Fluid Intake       % Intake of Estimated Energy Needs: 0 - 25 %  % Meal Intake: 0 - 25 %    Nutrition Risk    Level of Risk/Frequency of Follow-up: (2x/week)     Assessment and Plan    Severe malnutrition  Nutrition Problem:  (Severe) Protein-Calorie Malnutrition  Malnutrition in the context of Chronic Illness/Injury and Social/Environmental Circumstances    Related to (etiology):  Multiple diagnoses, suspected overuse of narcotic pain medication    Signs and Symptoms (as evidenced by):  Energy Intake: <50% of estimated energy requirement for 1 month  Body Fat Depletion: severe depletion of orbitals and triceps   Muscle Mass Depletion: severe depletion of temples, clavicle region and lower extremities   Weight Loss: 20% x 6 mo     Interventions(treatment strategy):  Collaboration with other providers    Nutrition Diagnosis Status:  Continues           Monitor and Evaluation    Food and Nutrient Intake: energy intake, food and beverage intake  Food and Nutrient Adminstration: diet order  Anthropometric Measurements: weight  Biochemical Data, Medical Tests and Procedures: electrolyte and renal panel, glucose/endocrine profile  Nutrition-Focused  Physical Findings: overall appearance     Malnutrition Assessment                 Orbital Region (Subcutaneous Fat Loss): severe depletion  Upper Arm Region (Subcutaneous Fat Loss): severe depletion   Hinduism Region (Muscle Loss): moderate depletion  Clavicle and Acromion Bone Region (Muscle Loss): severe depletion  Dorsal Hand (Muscle Loss): severe depletion  Patellar Region (Muscle Loss): severe depletion                 Nutrition Follow-Up    RD Follow-up?: Yes

## 2019-12-24 NOTE — SUBJECTIVE & OBJECTIVE
Interval History:     Review of Systems   Constitutional: Negative.    HENT: Negative.    Eyes: Negative.    Respiratory: Negative.    Cardiovascular: Negative.    Gastrointestinal: Negative.    Endocrine: Negative.    Genitourinary: Negative.    Musculoskeletal: Negative.    Skin: Negative.    Allergic/Immunologic: Negative.    Neurological: Negative.    Hematological: Negative.    Psychiatric/Behavioral: Negative for agitation.     Objective:     Vital Signs (Most Recent):  Temp: 97.7 °F (36.5 °C) (12/24/19 0728)  Pulse: 93 (12/24/19 0900)  Resp: 16 (12/24/19 0728)  BP: (!) 142/75 (12/24/19 0728)  SpO2: 98 % (12/24/19 0728) Vital Signs (24h Range):  Temp:  [97.5 °F (36.4 °C)-98.6 °F (37 °C)] 97.7 °F (36.5 °C)  Pulse:  [] 93  Resp:  [16-20] 16  SpO2:  [97 %-100 %] 98 %  BP: (118-142)/(71-77) 142/75     Weight: 42.5 kg (93 lb 11.1 oz)  Body mass index is 19.58 kg/m².    Intake/Output Summary (Last 24 hours) at 12/24/2019 1057  Last data filed at 12/24/2019 0600  Gross per 24 hour   Intake 1473 ml   Output 975 ml   Net 498 ml      Physical Exam   Constitutional: She is oriented to person, place, and time. She appears well-developed and well-nourished. No distress.   HENT:   Head: Normocephalic and atraumatic.   Eyes: Pupils are equal, round, and reactive to light. EOM are normal.   Neck: Normal range of motion. Neck supple.   Cardiovascular: Normal rate and regular rhythm.   Pulmonary/Chest: Effort normal and breath sounds normal.   Abdominal: Soft. Bowel sounds are normal. There is tenderness.   Right flank pain    Musculoskeletal: Normal range of motion.   Neurological: She is alert and oriented to person, place, and time.   Skin: Skin is warm and dry.   Psychiatric: She has a normal mood and affect. Her behavior is normal. Judgment and thought content normal.   Nursing note and vitals reviewed.      Significant Labs:   BMP:   Recent Labs   Lab 12/23/19  0822   *      K 3.4*      CO2 20*    BUN 8   CREATININE 0.7   CALCIUM 10.3     CBC:   Recent Labs   Lab 12/23/19  0822   WBC 10.90   HGB 10.2*   HCT 31.2*   *     CMP:   Recent Labs   Lab 12/23/19 0822      K 3.4*      CO2 20*   *   BUN 8   CREATININE 0.7   CALCIUM 10.3   ANIONGAP 12   EGFRNONAA >60       Significant Imaging:

## 2019-12-24 NOTE — PT/OT/SLP PROGRESS
"Physical Therapy      Patient Name:  Melva Barker   MRN:  [id number]    ATTEMPTED P.T. TX. THIS AM, PT DECLINED DESPITE ENCOURAGEMENT, "I AM WAITING FOR PAIN MEDICINE", NOTIFIED NURSE DAVID, WILL ASSESS PT NEXT VISIT    Emma Werner, PT   12/24/2019  0935    "

## 2019-12-24 NOTE — PROGRESS NOTES
[facility] Hospital Medicine  Progress Note    Patient Name: Melva Barker  MRN: [id number]  Patient Class: OP- Observation   Admission Date: 12/19/2019  Length of Stay: 0 days  Attending Physician: Edie Munguia MD  Primary Care Provider: Claire Souza MD        Subjective:     Principal Problem:SIRS (systemic inflammatory response syndrome)        HPI:  Ms. Barker is a 74 y.o. female  with a PMHx of HTN, HLD, recent CVA in 10/2019 with right-sided weakness, DM II with gastroparesis, chronic back pain, anxiety, malnutrition, and opiate dependence.  She was transferred from [facility] to the ED for c/o worsening right-sided weakness after receiving Zanaflex.  Upon arrival to the ED, patient was AAO x 4 with no acute focal neuro deficits noted.  However, patient had c/o generalized ABD pain with associated nausea and vomiting.  Family reports patient overuses her narcotic pain medications, which [facility].  Family thinks symptoms are due to opiate withdraw.  Patient denies any HA,  lightheadedness, dizziness, syncope, visual disturbance, facial droop, slurred speech, confusion, aphasia, dysphagia, hemiparesis, CP, palpitations, SOB, cough, wheezing, rhinorrhea, sore throat, ABD distention, diarrhea, constipation, hematemesis, melena, dysuria, hematuria, back or neck pain, fatigue, fever or chills.  Work-up in the ED resulted WBC 15.4, 0% bands, CO2 14, anion gap 17, stable kidney function and LFTs, lipase 13, negative troponin, lactic 1.7, procalcitonin 0.13, influenza negative, UA negative for UTI, CXR unremarkable, EKG unrevealing.  CT of the head and ABD/pelvis were negative for acute process.  VBG with pH 7.429, HCO3 16.9, BE -7.  In the ED, patient tachycardic (sinus) and hypertensive with /121.  IV labetalol 20 mg given, which improved HR into 80s and BP to 137/88.  Hospital Medicine was called for admission.      Overview/Hospital  Course:  12/20/  Complaints of right flank pain   Tramadol /Toradol added for pain    12/21/19  Right humerus fracture- evaluated by ortho   Recommends conservative management  Right psoas muscle hematoma visualized on CT of abdomen and pelvis  Interventional Radiology currently does not see indication for aspiration or placement of drain  SIRS of unclear etiology has resolved.  Seen and examined , stable for discharge      12/22/19   Discharge held due to placement issues      12/23/19    Lopressor added this am     Due to tachycardia      12/24/19    Await placement        Interval History:     Review of Systems   Constitutional: Negative.    HENT: Negative.    Eyes: Negative.    Respiratory: Negative.    Cardiovascular: Negative.    Gastrointestinal: Negative.    Endocrine: Negative.    Genitourinary: Negative.    Musculoskeletal: Negative.    Skin: Negative.    Allergic/Immunologic: Negative.    Neurological: Negative.    Hematological: Negative.    Psychiatric/Behavioral: Negative for agitation.     Objective:     Vital Signs (Most Recent):  Temp: 97.7 °F (36.5 °C) (12/24/19 0728)  Pulse: 93 (12/24/19 0900)  Resp: 16 (12/24/19 0728)  BP: (!) 142/75 (12/24/19 0728)  SpO2: 98 % (12/24/19 0728) Vital Signs (24h Range):  Temp:  [97.5 °F (36.4 °C)-98.6 °F (37 °C)] 97.7 °F (36.5 °C)  Pulse:  [] 93  Resp:  [16-20] 16  SpO2:  [97 %-100 %] 98 %  BP: (118-142)/(71-77) 142/75     Weight: 42.5 kg (93 lb 11.1 oz)  Body mass index is 19.58 kg/m².    Intake/Output Summary (Last 24 hours) at 12/24/2019 1057  Last data filed at 12/24/2019 0600  Gross per 24 hour   Intake 1473 ml   Output 975 ml   Net 498 ml      Physical Exam   Constitutional: She is oriented to person, place, and time. She appears well-developed and well-nourished. No distress.   HENT:   Head: Normocephalic and atraumatic.   Eyes: Pupils are equal, round, and reactive to light. EOM are normal.   Neck: Normal range of motion. Neck supple.    Cardiovascular: Normal rate and regular rhythm.   Pulmonary/Chest: Effort normal and breath sounds normal.   Abdominal: Soft. Bowel sounds are normal. There is tenderness.   Right flank pain    Musculoskeletal: Normal range of motion.   Neurological: She is alert and oriented to person, place, and time.   Skin: Skin is warm and dry.   Psychiatric: She has a normal mood and affect. Her behavior is normal. Judgment and thought content normal.   Nursing note and vitals reviewed.      Significant Labs:   BMP:   Recent Labs   Lab 12/23/19  0822   *      K 3.4*      CO2 20*   BUN 8   CREATININE 0.7   CALCIUM 10.3     CBC:   Recent Labs   Lab 12/23/19 0822   WBC 10.90   HGB 10.2*   HCT 31.2*   *     CMP:   Recent Labs   Lab 12/23/19 0822      K 3.4*      CO2 20*   *   BUN 8   CREATININE 0.7   CALCIUM 10.3   ANIONGAP 12   EGFRNONAA >60       Significant Imaging:       Assessment/Plan:      Hematoma  Right psoas muscle - evaluated by IR   Conservative management        Humerus fracture  Evaluated by Ortho - conservative managment      Severe malnutrition        Moderate malnutrition  continue supplements with meals .    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  - Continue ASA, Plavix, and statin.  - BP control as above.    Type 2 diabetes mellitus, with long-term current use of insulin  - AccuChecks with moderate dose SSI.  - Diabetic diet.  - Recent HbA1c of 8.3.      VTE Risk Mitigation (From admission, onward)         Ordered     enoxaparin injection 40 mg  Daily      12/20/19 0700     IP VTE HIGH RISK PATIENT  Once      12/20/19 0700     Place sequential compression device  Until discontinued      12/20/19 0700                      Edie Munguia MD  Department of Hospital Medicine   [facility]

## 2019-12-24 NOTE — PT/OT/SLP PROGRESS
Occupational Therapy      Patient Name:  Melva Barker   MRN:  [id number]    OT attempted tx at 9:10am, pt refused depsite encouragement and stated that she had just taking her medicine and needed to let it go down. OT educated pt in the importance of performing ROM with (R) UE. OT will attempt tx at later time in order to continue with POC.     Erica Phillips, PT/OT  12/24/2019

## 2019-12-24 NOTE — PLAN OF CARE
Recommendation: 1. Consider appetite stimulant 2. Continue current diet  3. RD to follow  Goals: >85% EEN/EPN by RD f/u  Nutrition Goal Status: new  Communication of RD Recs: (POC, sticky note, secure chat RN)

## 2019-12-25 LAB
BACTERIA BLD CULT: NORMAL
BACTERIA BLD CULT: NORMAL

## 2019-12-27 NOTE — PROGRESS NOTES
[facility] Hospital Medicine  Progress Note    Patient Name: Melva Barker  MRN: [id number]  Patient Class: OP- Observation   Admission Date: 12/19/2019  Length of Stay: 0 days  Attending Physician: No att. providers found  Primary Care Provider: Claire Souza MD        Subjective:     Principal Problem:SIRS (systemic inflammatory response syndrome)        HPI:  Ms. Barker is a 74 y.o. female  with a PMHx of HTN, HLD, recent CVA in 10/2019 with right-sided weakness, DM II with gastroparesis, chronic back pain, anxiety, malnutrition, and opiate dependence.  She was transferred from [facility] to the ED for c/o worsening right-sided weakness after receiving Zanaflex.  Upon arrival to the ED, patient was AAO x 4 with no acute focal neuro deficits noted.  However, patient had c/o generalized ABD pain with associated nausea and vomiting.  Family reports patient overuses her narcotic pain medications, which [facility].  Family thinks symptoms are due to opiate withdraw.  Patient denies any HA,  lightheadedness, dizziness, syncope, visual disturbance, facial droop, slurred speech, confusion, aphasia, dysphagia, hemiparesis, CP, palpitations, SOB, cough, wheezing, rhinorrhea, sore throat, ABD distention, diarrhea, constipation, hematemesis, melena, dysuria, hematuria, back or neck pain, fatigue, fever or chills.  Work-up in the ED resulted WBC 15.4, 0% bands, CO2 14, anion gap 17, stable kidney function and LFTs, lipase 13, negative troponin, lactic 1.7, procalcitonin 0.13, influenza negative, UA negative for UTI, CXR unremarkable, EKG unrevealing.  CT of the head and ABD/pelvis were negative for acute process.  VBG with pH 7.429, HCO3 16.9, BE -7.  In the ED, patient tachycardic (sinus) and hypertensive with /121.  IV labetalol 20 mg given, which improved HR into 80s and BP to 137/88.  Hospital Medicine was called for admission.      Overview/Hospital  Course:  12/20/  Complaints of right flank pain   Tramadol /Toradol added for pain    12/21/19  Right humerus fracture- evaluated by ortho   Recommends conservative management  Right psoas muscle hematoma visualized on CT of abdomen and pelvis  Interventional Radiology currently does not see indication for aspiration or placement of drain  SIRS of unclear etiology has resolved.  Seen and examined , stable for discharge      12/22/19   Discharge held due to placement issues      12/23/19    Lopressor added this am     Due to tachycardia      12/24/19     Seen and examined , stable for discharge       Interval History:     Review of Systems   Constitutional: Negative.    HENT: Negative.    Eyes: Negative.    Respiratory: Negative.    Cardiovascular: Negative.    Gastrointestinal: Negative.    Endocrine: Negative.    Genitourinary: Negative.    Musculoskeletal: Negative.    Skin: Negative.    Allergic/Immunologic: Negative.    Neurological: Negative.    Hematological: Negative.    Psychiatric/Behavioral: Negative for agitation.     Objective:     Vital Signs (Most Recent):  Temp: 98.6 °F (37 °C) (12/24/19 1150)  Pulse: 86 (12/24/19 1300)  Resp: 18 (12/24/19 1150)  BP: 123/82 (12/24/19 1150)  SpO2: 100 % (12/24/19 1150) Vital Signs (24h Range):        Weight: 42.5 kg (93 lb 11.1 oz)  Body mass index is 19.58 kg/m².  No intake or output data in the 24 hours ending 12/27/19 1043   Physical Exam   Constitutional: She is oriented to person, place, and time. She appears well-developed and well-nourished. No distress.   HENT:   Head: Normocephalic and atraumatic.   Eyes: Pupils are equal, round, and reactive to light. EOM are normal.   Neck: Normal range of motion. Neck supple.   Cardiovascular: Normal rate and regular rhythm.   Pulmonary/Chest: Effort normal and breath sounds normal.   Abdominal: Soft. Bowel sounds are normal. There is tenderness.   Right flank pain    Musculoskeletal: Normal range of motion.   Neurological:  She is alert and oriented to person, place, and time.   Skin: Skin is warm and dry.   Psychiatric: She has a normal mood and affect. Her behavior is normal. Judgment and thought content normal.   Nursing note and vitals reviewed.      Significant Labs:   BMP:   No results for input(s): GLU, NA, K, CL, CO2, BUN, CREATININE, CALCIUM, MG in the last 48 hours.  CBC:   No results for input(s): WBC, HGB, HCT, PLT in the last 48 hours.  CMP:   No results for input(s): NA, K, CL, CO2, GLU, BUN, CREATININE, CALCIUM, PROT, ALBUMIN, BILITOT, ALKPHOS, AST, ALT, ANIONGAP, EGFRNONAA in the last 48 hours.    Invalid input(s): ESTGF[place]    Significant Imaging:       Assessment/Plan:      Hematoma  Right psoas muscle - evaluated by IR   Conservative management        Humerus fracture  Evaluated by Ortho - conservative managment      Severe malnutrition        Moderate malnutrition  continue supplements with meals .    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  - Continue ASA, Plavix, and statin.  - BP control as above.    Type 2 diabetes mellitus, with long-term current use of insulin  - AccuChecks with moderate dose SSI.  - Diabetic diet.  - Recent HbA1c of 8.3.      VTE Risk Mitigation (From admission, onward)         Ordered     IP VTE HIGH RISK PATIENT  Once      12/20/19 0700                      Edie Munguia MD  Department of Hospital Medicine   [facility]

## 2019-12-27 NOTE — SUBJECTIVE & OBJECTIVE
Interval History:     Review of Systems   Constitutional: Negative.    HENT: Negative.    Eyes: Negative.    Respiratory: Negative.    Cardiovascular: Negative.    Gastrointestinal: Negative.    Endocrine: Negative.    Genitourinary: Negative.    Musculoskeletal: Negative.    Skin: Negative.    Allergic/Immunologic: Negative.    Neurological: Negative.    Hematological: Negative.    Psychiatric/Behavioral: Negative for agitation.     Objective:     Vital Signs (Most Recent):  Temp: 98.6 °F (37 °C) (12/24/19 1150)  Pulse: 86 (12/24/19 1300)  Resp: 18 (12/24/19 1150)  BP: 123/82 (12/24/19 1150)  SpO2: 100 % (12/24/19 1150) Vital Signs (24h Range):        Weight: 42.5 kg (93 lb 11.1 oz)  Body mass index is 19.58 kg/m².  No intake or output data in the 24 hours ending 12/27/19 1043   Physical Exam   Constitutional: She is oriented to person, place, and time. She appears well-developed and well-nourished. No distress.   HENT:   Head: Normocephalic and atraumatic.   Eyes: Pupils are equal, round, and reactive to light. EOM are normal.   Neck: Normal range of motion. Neck supple.   Cardiovascular: Normal rate and regular rhythm.   Pulmonary/Chest: Effort normal and breath sounds normal.   Abdominal: Soft. Bowel sounds are normal. There is tenderness.   Right flank pain    Musculoskeletal: Normal range of motion.   Neurological: She is alert and oriented to person, place, and time.   Skin: Skin is warm and dry.   Psychiatric: She has a normal mood and affect. Her behavior is normal. Judgment and thought content normal.   Nursing note and vitals reviewed.      Significant Labs:   BMP:   No results for input(s): GLU, NA, K, CL, CO2, BUN, CREATININE, CALCIUM, MG in the last 48 hours.  CBC:   No results for input(s): WBC, HGB, HCT, PLT in the last 48 hours.  CMP:   No results for input(s): NA, K, CL, CO2, GLU, BUN, CREATININE, CALCIUM, PROT, ALBUMIN, BILITOT, ALKPHOS, AST, ALT, ANIONGAP, EGFRNONAA in the last 48  hours.    Invalid input(s): ANTONIA    Significant Imaging:

## 2020-06-26 NOTE — PLAN OF CARE
Problem: Adult Inpatient Plan of Care  Goal: Plan of Care Review  Outcome: Ongoing, Progressing   Recommendations     : 1. Continue diabetic diet; change consistency to soft.  2. Add Boost Glucose Control (strawberry) TID.  Goals: 1. Pt's intake meals, supplements >50% by RD follow up.  Nutrition Goal Status: new  Communication of RD Recs: reviewed with physician   [Time Spent: ___ minutes] : I have spent [unfilled] minutes of time on the encounter. [>50% of the face to face encounter time was spent on counseling and/or coordination of care for ___] : Greater than 50% of the face to face encounter time was spent on counseling and/or coordination of care for [unfilled]

## 2021-03-30 NOTE — ASSESSMENT & PLAN NOTE
Patient hypotensive overnight from 11/13-11/14, continued hypotension on morning of 11/14  Received multiple boluses and pressure responding  Differential includes sepsis vs pain medication     Infectious workup ordered  Elevated procal and lactate with interval development of atelectasis in R lower lung, started on 7 day course of IV cefepime and vanc for possible hospital acquired pneumonia, blood culture pending     Problem: Falls - Risk of  Goal: *Absence of Falls  Description: Document Shirley Moon Fall Risk and appropriate interventions in the flowsheet. Outcome: Progressing Towards Goal  Note: Fall Risk Interventions:       Mentation Interventions: Door open when patient unattended, Bed/chair exit alarm         Elimination Interventions: Toileting schedule/hourly rounds              Problem: Patient Education: Go to Patient Education Activity  Goal: Patient/Family Education  Outcome: Progressing Towards Goal     Problem: Pressure Injury - Risk of  Goal: *Prevention of pressure injury  Description: Document Jake Scale and appropriate interventions in the flowsheet. Outcome: Not Progressing Towards Goal  Note: Pressure Injury Interventions:  Sensory Interventions: Assess changes in LOC, Assess need for specialty bed    Moisture Interventions: Absorbent underpads, Internal/External urinary devices    Activity Interventions: Increase time out of bed    Mobility Interventions: Turn and reposition approx.  every two hours(pillow and wedges)    Nutrition Interventions: Document food/fluid/supplement intake    Friction and Shear Interventions: Apply protective barrier, creams and emollients, Feet elevated on foot rest, Foam dressings/transparent film/skin sealants                Problem: Patient Education: Go to Patient Education Activity  Goal: Patient/Family Education  Outcome: Progressing Towards Goal

## 2021-04-20 NOTE — ASSESSMENT & PLAN NOTE
--secondary to hemorrhagic shock  --intubated emergently for obtundation and failure to protect airway  --wean vent as tolerated  --Extubated on 11/09 following angiogram  --currently on nasal cannula with EtCO2 monitoring   Radiofrequency Lesioning, Care After  This sheet gives you information about how to care for yourself after your procedure. Your health care provider may also give you more specific instructions. If you have problems or questions, contact your health care provider.  What can I expect after the procedure?  After the procedure, it is common to have:  · Slight pain in the area where the procedure was done.  · Temporary numbness.  Follow these instructions at home:    Medicines  · Take over-the-counter and prescription medicines only as told by your health care provider.  · Do not drive for 24 hours if you were given a sedative during your procedure.  · Do not drive or use heavy machinery while taking prescription pain medicine.  Insertion site care    · Remove the bandage (dressing) after 24 hours or as directed by your health care provider.  · Check your needle insertion site every day for signs of infection. Watch for:  ? Redness, swelling, or pain.  ? Fluid or blood.  ? Warmth.  ? Pus or a bad smell.  Managing pain    · If directed, put ice on the painful area.  ? Put ice in a plastic bag.  ? Place a towel between your skin and the bag.  ? Leave the ice on for 20 minutes, 2-3 times a day.  General instructions  · Return to your normal activities as told by your health care provider. Ask your health care provider what activities are safe for you.  · Pay close attention to how you feel after the procedure. If you start to have pain, write down when it hurts and how it feels. This will help you and your health care provider know if you need an additional treatment.  · Keep all follow-up visits as told by your health care provider. This is important.  Contact a health care provider if you have:  · Pain that does not get better.  · Redness, warmth, swelling, or pain at the needle insertion site.  · Fluid, blood, or pus coming from the needle insertion site.  · A fever.  Get help right away if you develop:  · Sudden,  severe pain.  · Numbness or tingling near the insertion site and those symptoms do not go away.  Summary  · After the procedure, it is common to have slight pain and temporary numbness in the area where the procedure was done.  · Do not drive for 24 hours if you were given a sedative during your procedure.  · Pay close attention to how you feel after the procedure. If you start to have pain, write down when it hurts and how it feels. This will help you and your health care provider know if you need an additional treatment.  · Contact a health care provider if you have a fever or pain that does not get better, or if you notice redness, warmth, swelling, pain, fluid, blood, or pus at the insertion site.  · Get help right away if you develop sudden, severe pain, or numbness or tingling near the insertion site.  This information is not intended to replace advice given to you by your health care provider. Make sure you discuss any questions you have with your health care provider.  Document Revised: 09/05/2019 Document Reviewed: 09/05/2019  Elsevier Patient Education © 2021 [employer]

## 2021-04-28 ENCOUNTER — PATIENT MESSAGE (OUTPATIENT)
Dept: RESEARCH | Facility: HOSPITAL | Age: 76
End: 2021-04-28

## 2021-06-15 NOTE — ASSESSMENT & PLAN NOTE
Currently on Reglan 5 mg TID  Previously on Reglan 10 mg TID before meals, will order if needed   Left message to call back and schedule appointment

## 2023-01-29 NOTE — PLAN OF CARE
Problem: SLP Goal  Goal: SLP Goal  Description  Speech Language Pathology Goals  Goals expected to be met by 11/20  1. Pt will tolerate a mechanical soft diet/thin liquids with no s/s of aspiration.   2. Pt will participate in conversation without cues to express thought.   3. Pt will complete simple functional reasoning tasks with 80% accy given min cues.   4. Pt will name name 13 items in a category in 1 minute with mod assist.  5. Pt will follow multi-step commands with 75% accuracy and min assist.  6. Pt will complete assessment of reading, writing, visual spatial skills to determine need for tx.    Outcome: Ongoing, Progressing     Patient seen for a bedside swallow eval and a speech/language/cognitive assessment. SLP recommending advancing diet as tolerated to mechanical soft/thin liquids as medically appropriate.    Venu Marquez CCC-SLP  Speech-Language Pathology  Pager: [phone]      minutes on the discharge service.

## 2023-04-20 ENCOUNTER — PATIENT MESSAGE (OUTPATIENT)
Dept: RESEARCH | Facility: HOSPITAL | Age: 78
End: 2023-04-20
Payer: MEDICARE

## 2025-04-16 NOTE — ASSESSMENT & PLAN NOTE
Area of cytotoxic cerebral edema identified when reviewing brain imaging in the territory of the L middle cerebral artery. There is no mass effect associated with it. We will continue to monitor the patients clinical exam for any worsening of symptoms which may indicate expansion of the stroke or the area of the edema resulting in the clinical change. The pattern is suggestive of small vessel etiology.   Resident/Fellow